# Patient Record
Sex: FEMALE | Race: WHITE | NOT HISPANIC OR LATINO | Employment: OTHER | URBAN - METROPOLITAN AREA
[De-identification: names, ages, dates, MRNs, and addresses within clinical notes are randomized per-mention and may not be internally consistent; named-entity substitution may affect disease eponyms.]

---

## 2017-01-06 ENCOUNTER — HOSPITAL ENCOUNTER (OUTPATIENT)
Dept: NEUROLOGY | Facility: HOSPITAL | Age: 72
Discharge: HOME | End: 2017-01-06
Payer: MEDICARE

## 2017-01-06 DIAGNOSIS — M54.12 CERVICAL RADICULOPATHY: ICD-10-CM

## 2017-01-06 DIAGNOSIS — G56.31 POSTERIOR INTEROSSEOUS MONONEUROPATHY, RIGHT: ICD-10-CM

## 2017-01-06 PROCEDURE — 95886 MUSC TEST DONE W/N TEST COMP: CPT

## 2017-01-06 PROCEDURE — 95912 NRV CNDJ TEST 11-12 STUDIES: CPT

## 2020-04-24 DIAGNOSIS — M47.27 OSTEOARTHRITIS OF SPINE WITH RADICULOPATHY, LUMBOSACRAL REGION: ICD-10-CM

## 2020-04-24 DIAGNOSIS — M17.0 PRIMARY OSTEOARTHRITIS OF BOTH KNEES: Primary | ICD-10-CM

## 2020-05-07 ENCOUNTER — TELEMEDICINE (OUTPATIENT)
Dept: FAMILY MEDICINE CLINIC | Facility: CLINIC | Age: 75
End: 2020-05-07
Payer: MEDICARE

## 2020-05-07 DIAGNOSIS — K21.9 CHRONIC GERD: ICD-10-CM

## 2020-05-07 DIAGNOSIS — T50.905A DRUG REACTION, INITIAL ENCOUNTER: Primary | ICD-10-CM

## 2020-05-07 DIAGNOSIS — M79.7 FIBROMYALGIA: ICD-10-CM

## 2020-05-07 DIAGNOSIS — R05.9 COUGH: ICD-10-CM

## 2020-05-07 PROCEDURE — 99442 PR PHYS/QHP TELEPHONE EVALUATION 11-20 MIN: CPT | Performed by: FAMILY MEDICINE

## 2020-05-07 RX ORDER — DULOXETIN HYDROCHLORIDE 30 MG/1
30 CAPSULE, DELAYED RELEASE ORAL DAILY
Qty: 30 CAPSULE | Refills: 6 | Status: SHIPPED | OUTPATIENT
Start: 2020-05-07 | End: 2020-07-30

## 2020-05-07 RX ORDER — FAMOTIDINE 40 MG/1
40 TABLET, FILM COATED ORAL DAILY
Qty: 90 TABLET | Refills: 3
Start: 2020-05-07 | End: 2020-08-19

## 2020-05-14 ENCOUNTER — TELEPHONE (OUTPATIENT)
Dept: FAMILY MEDICINE CLINIC | Facility: CLINIC | Age: 75
End: 2020-05-14

## 2020-05-21 ENCOUNTER — TELEPHONE (OUTPATIENT)
Dept: FAMILY MEDICINE CLINIC | Facility: CLINIC | Age: 75
End: 2020-05-21

## 2020-05-29 ENCOUNTER — TELEPHONE (OUTPATIENT)
Dept: FAMILY MEDICINE CLINIC | Facility: CLINIC | Age: 75
End: 2020-05-29

## 2020-05-29 DIAGNOSIS — M79.7 FIBROMYALGIA: Primary | ICD-10-CM

## 2020-06-01 RX ORDER — DULOXETIN HYDROCHLORIDE 60 MG/1
60 CAPSULE, DELAYED RELEASE ORAL DAILY
COMMUNITY
End: 2020-06-01 | Stop reason: SDUPTHER

## 2020-06-01 RX ORDER — DULOXETIN HYDROCHLORIDE 60 MG/1
60 CAPSULE, DELAYED RELEASE ORAL DAILY
Qty: 30 CAPSULE | Refills: 5 | Status: SHIPPED | OUTPATIENT
Start: 2020-06-01 | End: 2020-07-27 | Stop reason: SDUPTHER

## 2020-06-04 ENCOUNTER — TELEPHONE (OUTPATIENT)
Dept: FAMILY MEDICINE CLINIC | Facility: CLINIC | Age: 75
End: 2020-06-04

## 2020-06-04 DIAGNOSIS — E03.9 HYPOTHYROIDISM, ADULT: Primary | ICD-10-CM

## 2020-06-10 RX ORDER — LEVOTHYROXINE SODIUM 0.1 MG/1
100 TABLET ORAL DAILY
Qty: 90 TABLET | Refills: 1 | Status: SHIPPED | OUTPATIENT
Start: 2020-06-10 | End: 2020-10-19 | Stop reason: SDUPTHER

## 2020-06-23 ENCOUNTER — TRANSCRIBE ORDERS (OUTPATIENT)
Dept: ADMINISTRATIVE | Facility: HOSPITAL | Age: 75
End: 2020-06-23

## 2020-06-23 DIAGNOSIS — I65.29 STENOSIS OF CAROTID ARTERY, UNSPECIFIED LATERALITY: Primary | ICD-10-CM

## 2020-07-21 ENCOUNTER — HOSPITAL ENCOUNTER (OUTPATIENT)
Dept: NON INVASIVE DIAGNOSTICS | Facility: CLINIC | Age: 75
Discharge: HOME | End: 2020-07-21
Payer: MEDICARE

## 2020-07-21 DIAGNOSIS — I65.29 STENOSIS OF CAROTID ARTERY, UNSPECIFIED LATERALITY: ICD-10-CM

## 2020-07-21 PROCEDURE — 93880 EXTRACRANIAL BILAT STUDY: CPT

## 2020-07-21 PROCEDURE — 93880 EXTRACRANIAL BILAT STUDY: CPT | Performed by: SURGERY

## 2020-07-27 ENCOUNTER — TELEPHONE (OUTPATIENT)
Dept: FAMILY MEDICINE CLINIC | Facility: CLINIC | Age: 75
End: 2020-07-27

## 2020-07-27 DIAGNOSIS — M79.7 FIBROMYALGIA: ICD-10-CM

## 2020-07-27 RX ORDER — DULOXETIN HYDROCHLORIDE 60 MG/1
60 CAPSULE, DELAYED RELEASE ORAL DAILY
Qty: 30 CAPSULE | Refills: 5 | Status: SHIPPED | OUTPATIENT
Start: 2020-07-27 | End: 2020-07-30

## 2020-07-27 NOTE — TELEPHONE ENCOUNTER
[facility] received the order for DULoxetine (CYMBALTA) 30 mg but not for the 60 mg - patient takes both  - can you please send?

## 2020-07-30 ENCOUNTER — OFFICE VISIT (OUTPATIENT)
Dept: FAMILY MEDICINE CLINIC | Facility: CLINIC | Age: 75
End: 2020-07-30
Payer: MEDICARE

## 2020-07-30 VITALS
HEART RATE: 88 BPM | BODY MASS INDEX: 31.92 KG/M2 | TEMPERATURE: 97.3 F | OXYGEN SATURATION: 97 % | DIASTOLIC BLOOD PRESSURE: 80 MMHG | SYSTOLIC BLOOD PRESSURE: 130 MMHG | RESPIRATION RATE: 12 BRPM | WEIGHT: 187 LBS | HEIGHT: 64 IN

## 2020-07-30 DIAGNOSIS — E78.2 MIXED HYPERLIPIDEMIA: ICD-10-CM

## 2020-07-30 DIAGNOSIS — E55.9 VITAMIN D INSUFFICIENCY: ICD-10-CM

## 2020-07-30 DIAGNOSIS — R73.03 PRE-DIABETES: ICD-10-CM

## 2020-07-30 DIAGNOSIS — M31.6 TA (TEMPORAL ARTERITIS) (HCC): ICD-10-CM

## 2020-07-30 DIAGNOSIS — E03.9 HYPOTHYROIDISM, ADULT: Primary | ICD-10-CM

## 2020-07-30 DIAGNOSIS — M79.7 FIBROMYALGIA: ICD-10-CM

## 2020-07-30 DIAGNOSIS — G89.4 CHRONIC PAIN SYNDROME: ICD-10-CM

## 2020-07-30 DIAGNOSIS — M19.90 ARTHRITIS: ICD-10-CM

## 2020-07-30 PROCEDURE — 3008F BODY MASS INDEX DOCD: CPT | Performed by: FAMILY MEDICINE

## 2020-07-30 PROCEDURE — 4040F PNEUMOC VAC/ADMIN/RCVD: CPT | Performed by: FAMILY MEDICINE

## 2020-07-30 PROCEDURE — 1036F TOBACCO NON-USER: CPT | Performed by: FAMILY MEDICINE

## 2020-07-30 PROCEDURE — 99214 OFFICE O/P EST MOD 30 MIN: CPT | Performed by: FAMILY MEDICINE

## 2020-07-30 PROCEDURE — 1160F RVW MEDS BY RX/DR IN RCRD: CPT | Performed by: FAMILY MEDICINE

## 2020-07-30 RX ORDER — LATANOPROST 50 UG/ML
SOLUTION/ DROPS OPHTHALMIC
COMMUNITY
Start: 2020-05-22

## 2020-07-30 RX ORDER — FERROUS SULFATE 325(65) MG
1 TABLET ORAL 2 TIMES DAILY WITH MEALS
COMMUNITY
Start: 2020-07-15

## 2020-07-30 RX ORDER — BENZONATATE 100 MG/1
CAPSULE ORAL
COMMUNITY
End: 2020-07-30

## 2020-07-30 RX ORDER — ALENDRONATE SODIUM 70 MG/1
TABLET ORAL
COMMUNITY
Start: 2014-04-30 | End: 2020-07-30

## 2020-07-30 RX ORDER — SIMVASTATIN 40 MG
TABLET ORAL DAILY
COMMUNITY
End: 2020-07-30

## 2020-07-30 RX ORDER — DULOXETIN HYDROCHLORIDE 30 MG/1
30 CAPSULE, DELAYED RELEASE ORAL DAILY
Qty: 30 CAPSULE | Refills: 6 | Status: SHIPPED | OUTPATIENT
Start: 2020-07-30 | End: 2020-10-09

## 2020-07-30 RX ORDER — RIBOFLAVIN (VITAMIN B2) 100 MG
100 TABLET ORAL DAILY
COMMUNITY

## 2020-07-30 RX ORDER — VITAMIN B COMPLEX
1 CAPSULE ORAL DAILY
COMMUNITY

## 2020-07-30 RX ORDER — MOMETASONE FUROATE 1 MG/ML
SOLUTION TOPICAL
COMMUNITY
Start: 2020-07-13 | End: 2020-07-30

## 2020-07-30 RX ORDER — CITALOPRAM 20 MG/1
TABLET ORAL
COMMUNITY
End: 2020-07-30

## 2020-07-30 RX ORDER — FEXOFENADINE HCL 180 MG/1
180 TABLET ORAL AS NEEDED
COMMUNITY

## 2020-07-30 RX ORDER — RALOXIFENE HYDROCHLORIDE 60 MG/1
TABLET, FILM COATED ORAL DAILY
COMMUNITY
End: 2020-07-30

## 2020-07-30 RX ORDER — TRAMADOL HYDROCHLORIDE 50 MG/1
TABLET ORAL
COMMUNITY
Start: 2014-01-10 | End: 2020-07-30

## 2020-07-30 RX ORDER — OXYMORPHONE HYDROCHLORIDE 5 MG/1
TABLET ORAL EVERY 12 HOURS
COMMUNITY
End: 2020-07-30

## 2020-07-30 RX ORDER — HYDROCHLOROTHIAZIDE 25 MG/1
TABLET ORAL
COMMUNITY
End: 2020-07-30

## 2020-07-30 NOTE — PATIENT INSTRUCTIONS
Temporal Arteritis   WHAT YOU NEED TO KNOW:   Temporal arteritis (giant cell arteritis or cranial arteritis) is an inflammation of the lining of your arteries  It most often affects the temporal arteries  Temporal arteries are blood vessels that are located near your temples  Your arteries may become swollen, narrow, and tender  Over time, the swollen and narrowed temporal arteries cause decreased blood flow to the eyes, face, and brain  The lack of oxygen may result in other serious conditions, such as a stroke, heart attack, or blindness  Temporal arteritis may become life-threatening  DISCHARGE INSTRUCTIONS:   Medicines:   · Medicines , such as steroids, will be given to decrease inflammation  Medicines may also be given to help your immune system  · Antiplatelets , such as aspirin, help prevent blood clots  Take your antiplatelet medicine exactly as directed  These medicines make it more likely for you to bleed or bruise  If you are told to take aspirin, do not take acetaminophen or ibuprofen instead  · Vitamin D and calcium  may be given while you are using steroid medicines  These supplements help prevent bone loss  · Take your medicine as directed  Contact your healthcare provider if you think your medicine is not helping or if you have side effects  Tell him or her if you are allergic to any medicine  Keep a list of the medicines, vitamins, and herbs you take  Include the amounts, and when and why you take them  Bring the list or the pill bottles to follow-up visits  Carry your medicine list with you in case of an emergency  Follow up with your healthcare provider as directed:  Write down your questions so you remember to ask them during your visits  Self-care:   · Limit alcohol  Women should limit alcohol to 1 drink a day  Men should limit alcohol to 2 drinks a day  A drink of alcohol is 12 ounces of beer, 5 ounces of wine, or 1½ ounces of liquor  · Exercise    Ask your healthcare provider about the best exercise plan for you  Exercise can help build and strengthen bone  · Do not smoke  If you smoke, it is never too late to quit  Ask for information if you need help quitting  Contact your healthcare provider if:   · You have a fever  · You have chills, a cough, or feel weak and achy  · Your skin is itchy, swollen, or has a rash  · You have questions or concerns about your condition or care  Return to the emergency department if:   · You have any of the following signs of a heart attack:      ¨ Squeezing, pressure, or pain in your chest that lasts longer than 5 minutes or returns    ¨ Discomfort or pain in your back, neck, jaw, stomach, or arm     ¨ Trouble breathing    ¨ Nausea or vomiting    ¨ Lightheadedness or a sudden cold sweat, especially with chest pain or trouble breathing    · You have any of the following signs of a stroke:      ¨ Numbness or drooping on one side of your face     ¨ Weakness in an arm or leg    ¨ Confusion or difficulty speaking    ¨ Dizziness, a severe headache, or vision loss    · You have sudden vision loss in one or both eyes  · Your signs and symptoms come back or get worse  © 2017 [employer]  Information is for End User's use only and may not be sold, redistributed or otherwise used for commercial purposes  All illustrations and images included in CareNotes® are the copyrighted property of [employer]  or Leandro Medeiros  The above information is an  only  It is not intended as medical advice for individual conditions or treatments  Talk to your doctor, nurse or pharmacist before following any medical regimen to see if it is safe and effective for you

## 2020-07-30 NOTE — PROGRESS NOTES
Assessment/Plan:         Problem List Items Addressed This Visit     None            Subjective:      Patient ID: aSndrita Leone is a 67 y o  female  R facial swelling is under control and only swells q day late in the day  Starts around 4 pm, and lasts til 2-4 a m  And in AM upon awakening, it's gone  Slight tenderness over the R temple artery  Symptoms now are:  Skin stinging, burnin sensation , itching al oer gen pain, falls asleep anytime instantely, difficulty sleeping, up q 2-3 hrs, restless, no energy, feeling hot       The following portions of the patient's history were reviewed and updated as appropriate:   She has a past medical history of Chronic respiratory failure ([facility] [address] ), Hiatal hernia, and Pulmonary embolism ([facility] [address] )  ,  does not have a problem list on file  ,   has a past surgical history that includes Tonsillectomy; Vein ligation (Right); Tubal ligation; ADENOIDECTOMY; Laminectomy; Spinal fusion; Replacement total knee; Carpal tunnel release; Cardiac catheterization; Pulmonary embolism surgery; and Mastectomy (Bilateral)  ,  Family history is unknown by patient  ,   reports that she has never smoked  She has never used smokeless tobacco  Her alcohol and drug histories are not on file  ,  is allergic to hydromorphone; merthiolate  [thimerosal]; quinine derivatives; codeine; pollen extract; and wound dressing adhesive     Current Outpatient Medications   Medication Sig Dispense Refill    Ascorbic Acid (VITAMIN C) 100 MG tablet Take 100 mg by mouth daily      atorvastatin (LIPITOR) 40 mg tablet atorvastatin 40 mg tablet      b complex vitamins capsule Take 1 capsule by mouth daily      brimonidine (Alphagan P) 0 1 % Alphagan P 0 1 % eye drops   1qd      DULoxetine (CYMBALTA) 30 mg delayed release capsule Take 1 capsule (30 mg total) by mouth daily 30 capsule 6    famotidine (PEPCID) 40 MG tablet Take 1 tablet (40 mg total) by mouth daily 90 tablet 3    FEROSUL 325 (65 Fe) MG tablet Take 1 tablet by mouth 2 (two) times a day with meals      fexofenadine (ALLEGRA) 180 MG tablet Take 180 mg by mouth daily      fluticasone (FLONASE) 50 mcg/act nasal spray fluticasone propionate 50 mcg/actuation nasal spray,suspension   prn      folic acid (FOLVITE) 1 mg tablet Take 1 mg by mouth daily      latanoprost (XALATAN) 0 005 % ophthalmic solution       levothyroxine 100 mcg tablet Take 1 tablet (100 mcg total) by mouth daily 90 tablet 1    losartan (COZAAR) 50 mg tablet Take 50 mg by mouth daily      rivaroxaban (XARELTO) 20 mg tablet Xarelto 20 mg tablet      rOPINIRole (REQUIP) 1 mg tablet Take by mouth      torsemide (DEMADEX) 10 mg tablet TK 1 T PO QD       No current facility-administered medications for this visit  Review of Systems   Constitutional: Negative for activity change, appetite change, chills, diaphoresis, fatigue and fever  HENT: Negative for congestion, ear discharge, ear pain, facial swelling, nosebleeds, sore throat, tinnitus, trouble swallowing and voice change  Eyes: Negative for photophobia, pain, discharge, redness, itching and visual disturbance  Respiratory: Negative for apnea, cough, choking, chest tightness and shortness of breath  Cardiovascular: Negative for chest pain, palpitations and leg swelling  Gastrointestinal: Negative for abdominal distention, abdominal pain, blood in stool, constipation, diarrhea, nausea and vomiting  Endocrine: Negative for cold intolerance, heat intolerance, polydipsia, polyphagia and polyuria  Genitourinary: Negative for decreased urine volume, difficulty urinating, dysuria, enuresis, frequency, hematuria, pelvic pain, urgency and vaginal bleeding  Musculoskeletal: Negative for arthralgias, back pain, gait problem, joint swelling, neck pain and neck stiffness  Skin: Negative for color change, pallor and rash  Allergic/Immunologic: Negative for immunocompromised state     Neurological: Negative for dizziness, seizures, facial asymmetry, light-headedness, numbness and headaches  Hematological: Negative for adenopathy  Psychiatric/Behavioral: Negative for agitation, behavioral problems, confusion, decreased concentration, dysphoric mood and hallucinations  Objective:  Vitals:    07/30/20 1340   BP: 130/80   BP Location: Left arm   Patient Position: Sitting   Cuff Size: Adult   Pulse: 88   Resp: 12   Temp: (!) 97 3 °F (36 3 °C)   TempSrc: Temporal   SpO2: 97%   Weight: 84 8 kg (187 lb)   Height: 5' 4" (1 626 m)     Body mass index is 32 1 kg/m²  Physical Exam   Constitutional: She is oriented to person, place, and time  She appears well-developed and well-nourished  No distress  HENT:   Head: Normocephalic and atraumatic  Nose: Nose normal    Eyes: Pupils are equal, round, and reactive to light  Conjunctivae and EOM are normal    Neck: Normal range of motion  Neck supple  No tracheal deviation present  No thyromegaly present  Cardiovascular: Normal rate, regular rhythm and normal heart sounds  Pulmonary/Chest: Breath sounds normal  No respiratory distress  She has no wheezes  She has no rales  She exhibits no tenderness  Abdominal: Soft  Bowel sounds are normal  She exhibits no distension and no mass  There is no tenderness  There is no rebound and no guarding  Musculoskeletal: Normal range of motion  She exhibits no edema, tenderness or deformity  Neurological: She is alert and oriented to person, place, and time  No cranial nerve deficit  Skin: Skin is warm and dry  No rash noted  She is not diaphoretic  No erythema  No pallor  Psychiatric: She has a normal mood and affect  Her behavior is normal  Judgment and thought content normal    Nursing note and vitals reviewed

## 2020-07-31 ENCOUNTER — APPOINTMENT (OUTPATIENT)
Dept: LAB | Facility: HOSPITAL | Age: 75
End: 2020-07-31
Payer: MEDICARE

## 2020-07-31 ENCOUNTER — TELEPHONE (OUTPATIENT)
Dept: FAMILY MEDICINE CLINIC | Facility: CLINIC | Age: 75
End: 2020-07-31

## 2020-07-31 DIAGNOSIS — M19.90 ARTHRITIS: ICD-10-CM

## 2020-07-31 DIAGNOSIS — M79.7 FIBROMYALGIA: Primary | ICD-10-CM

## 2020-07-31 DIAGNOSIS — G89.4 CHRONIC PAIN SYNDROME: ICD-10-CM

## 2020-07-31 DIAGNOSIS — M31.6 TA (TEMPORAL ARTERITIS) (HCC): ICD-10-CM

## 2020-07-31 DIAGNOSIS — M79.7 FIBROMYALGIA: ICD-10-CM

## 2020-07-31 DIAGNOSIS — E03.9 HYPOTHYROIDISM, ADULT: ICD-10-CM

## 2020-07-31 DIAGNOSIS — E55.9 VITAMIN D INSUFFICIENCY: ICD-10-CM

## 2020-07-31 DIAGNOSIS — E78.2 MIXED HYPERLIPIDEMIA: ICD-10-CM

## 2020-07-31 LAB
25(OH)D3 SERPL-MCNC: 29.8 NG/ML (ref 30–100)
ALBUMIN SERPL BCP-MCNC: 4.2 G/DL (ref 3.4–4.8)
ALP SERPL-CCNC: 104.6 U/L (ref 35–140)
ALT SERPL W P-5'-P-CCNC: 20 U/L (ref 5–54)
ANION GAP SERPL CALCULATED.3IONS-SCNC: 12 MMOL/L (ref 4–13)
AST SERPL W P-5'-P-CCNC: 17 U/L (ref 15–41)
BACTERIA UR QL AUTO: ABNORMAL /HPF
BILIRUB SERPL-MCNC: 0.9 MG/DL (ref 0.3–1.2)
BILIRUB UR QL STRIP: NEGATIVE
BUN SERPL-MCNC: 12 MG/DL (ref 6–20)
CALCIUM SERPL-MCNC: 9.4 MG/DL (ref 8.4–10.2)
CHLORIDE SERPL-SCNC: 108 MMOL/L (ref 96–108)
CHOLEST SERPL-MCNC: 188 MG/DL
CLARITY UR: CLEAR
CO2 SERPL-SCNC: 22 MMOL/L (ref 22–33)
COLOR UR: YELLOW
CREAT SERPL-MCNC: 0.79 MG/DL (ref 0.4–1.1)
CRP SERPL QL: 0.2 MG/L (ref 0–1)
ERYTHROCYTE [DISTWIDTH] IN BLOOD BY AUTOMATED COUNT: 14.3 % (ref 11.6–15.1)
ERYTHROCYTE [SEDIMENTATION RATE] IN BLOOD: 20 MM/HOUR (ref 0–30)
EST. AVERAGE GLUCOSE BLD GHB EST-MCNC: 134 MG/DL
GFR SERPL CREATININE-BSD FRML MDRD: 73 ML/MIN/1.73SQ M
GLUCOSE P FAST SERPL-MCNC: 133 MG/DL (ref 65–99)
GLUCOSE UR STRIP-MCNC: NEGATIVE MG/DL
HBA1C MFR BLD: 6.3 %
HCT VFR BLD AUTO: 44 % (ref 34.8–46.1)
HDLC SERPL-MCNC: 56 MG/DL
HGB BLD-MCNC: 14.4 G/DL (ref 11.5–15.4)
HGB UR QL STRIP.AUTO: ABNORMAL
KETONES UR STRIP-MCNC: NEGATIVE MG/DL
LDLC SERPL CALC-MCNC: 95 MG/DL (ref 0–100)
LEUKOCYTE ESTERASE UR QL STRIP: ABNORMAL
MCH RBC QN AUTO: 29.8 PG (ref 26.8–34.3)
MCHC RBC AUTO-ENTMCNC: 32.7 G/DL (ref 31.4–37.4)
MCV RBC AUTO: 91 FL (ref 82–98)
NITRITE UR QL STRIP: NEGATIVE
NON-SQ EPI CELLS URNS QL MICRO: ABNORMAL /HPF
NONHDLC SERPL-MCNC: 132 MG/DL
OTHER STN SPEC: ABNORMAL
PH UR STRIP.AUTO: 5.5 [PH]
PLATELET # BLD AUTO: 368 THOUSANDS/UL (ref 149–390)
PMV BLD AUTO: 10.7 FL (ref 8.9–12.7)
POTASSIUM SERPL-SCNC: 3.7 MMOL/L (ref 3.5–5)
PROT SERPL-MCNC: 7.3 G/DL (ref 6.4–8.3)
PROT UR STRIP-MCNC: NEGATIVE MG/DL
RBC # BLD AUTO: 4.83 MILLION/UL (ref 3.81–5.12)
RBC #/AREA URNS AUTO: ABNORMAL /HPF
SODIUM SERPL-SCNC: 142 MMOL/L (ref 133–145)
SP GR UR STRIP.AUTO: 1.02 (ref 1–1.03)
TRIGL SERPL-MCNC: 185.1 MG/DL
TSH SERPL DL<=0.05 MIU/L-ACNC: 2.31 UIU/ML (ref 0.34–5.6)
UROBILINOGEN UR QL STRIP.AUTO: 0.2 E.U./DL
VIT B12 SERPL-MCNC: 402 PG/ML (ref 100–900)
WBC # BLD AUTO: 8.05 THOUSAND/UL (ref 4.31–10.16)
WBC #/AREA URNS AUTO: ABNORMAL /HPF

## 2020-07-31 PROCEDURE — 82306 VITAMIN D 25 HYDROXY: CPT

## 2020-07-31 PROCEDURE — 36415 COLL VENOUS BLD VENIPUNCTURE: CPT

## 2020-07-31 PROCEDURE — 86225 DNA ANTIBODY NATIVE: CPT

## 2020-07-31 PROCEDURE — 80061 LIPID PANEL: CPT

## 2020-07-31 PROCEDURE — 84443 ASSAY THYROID STIM HORMONE: CPT

## 2020-07-31 PROCEDURE — 80053 COMPREHEN METABOLIC PANEL: CPT

## 2020-07-31 PROCEDURE — 86038 ANTINUCLEAR ANTIBODIES: CPT

## 2020-07-31 PROCEDURE — 85652 RBC SED RATE AUTOMATED: CPT

## 2020-07-31 PROCEDURE — 87147 CULTURE TYPE IMMUNOLOGIC: CPT | Performed by: FAMILY MEDICINE

## 2020-07-31 PROCEDURE — 83036 HEMOGLOBIN GLYCOSYLATED A1C: CPT

## 2020-07-31 PROCEDURE — 81001 URINALYSIS AUTO W/SCOPE: CPT | Performed by: FAMILY MEDICINE

## 2020-07-31 PROCEDURE — 82607 VITAMIN B-12: CPT

## 2020-07-31 PROCEDURE — 85027 COMPLETE CBC AUTOMATED: CPT

## 2020-07-31 PROCEDURE — 86430 RHEUMATOID FACTOR TEST QUAL: CPT

## 2020-07-31 PROCEDURE — 86140 C-REACTIVE PROTEIN: CPT

## 2020-07-31 PROCEDURE — 87086 URINE CULTURE/COLONY COUNT: CPT | Performed by: FAMILY MEDICINE

## 2020-07-31 RX ORDER — DULOXETIN HYDROCHLORIDE 60 MG/1
60 CAPSULE, DELAYED RELEASE ORAL DAILY
COMMUNITY
End: 2020-07-31 | Stop reason: SDUPTHER

## 2020-07-31 RX ORDER — DULOXETIN HYDROCHLORIDE 60 MG/1
60 CAPSULE, DELAYED RELEASE ORAL DAILY
Qty: 90 CAPSULE | Refills: 1 | Status: SHIPPED | OUTPATIENT
Start: 2020-07-31 | End: 2020-10-19 | Stop reason: SDUPTHER

## 2020-07-31 NOTE — TELEPHONE ENCOUNTER
Patient called as had received duloxetine via mail order in 30mg but patient takes 90 mg of duloxetine, takes a 30mg capsule and a 60mg capsule daily  Asking if we can send order for the 60mg capsules to mail order aswell so can receive them

## 2020-08-01 LAB — DSDNA AB SER-ACNC: <1 IU/ML (ref 0–9)

## 2020-08-02 LAB
BACTERIA UR CULT: ABNORMAL
BACTERIA UR CULT: ABNORMAL

## 2020-08-03 LAB
RHEUMATOID FACT SER QL LA: NEGATIVE
RYE IGE QN: NEGATIVE

## 2020-08-10 ENCOUNTER — APPOINTMENT (OUTPATIENT)
Dept: LAB | Facility: HOSPITAL | Age: 75
End: 2020-08-10
Payer: MEDICARE

## 2020-08-10 ENCOUNTER — TRANSCRIBE ORDERS (OUTPATIENT)
Dept: ADMINISTRATIVE | Facility: HOSPITAL | Age: 75
End: 2020-08-10

## 2020-08-10 DIAGNOSIS — G89.4 CHRONIC PAIN SYNDROME: ICD-10-CM

## 2020-08-10 DIAGNOSIS — G89.4 CHRONIC PAIN SYNDROME: Primary | ICD-10-CM

## 2020-08-10 PROCEDURE — 87086 URINE CULTURE/COLONY COUNT: CPT

## 2020-08-11 LAB — BACTERIA UR CULT: NORMAL

## 2020-08-13 ENCOUNTER — TELEPHONE (OUTPATIENT)
Dept: FAMILY MEDICINE CLINIC | Facility: CLINIC | Age: 75
End: 2020-08-13

## 2020-08-13 DIAGNOSIS — L29.9 ITCHING: Primary | ICD-10-CM

## 2020-08-13 NOTE — TELEPHONE ENCOUNTER
Pt called and states that they are extremely itchy all over body  (states that Dr Tamia Ward aware of issue) Has taken Allegra, Claritan and Benadryl  Is requesting Adderax? Not sure of spelling      Breann Coley

## 2020-08-15 RX ORDER — HYDROXYZINE HYDROCHLORIDE 25 MG/1
25 TABLET, FILM COATED ORAL EVERY 6 HOURS PRN
Qty: 30 TABLET | Refills: 0 | Status: SHIPPED | OUTPATIENT
Start: 2020-08-15 | End: 2020-10-02 | Stop reason: SDUPTHER

## 2020-08-18 DIAGNOSIS — K21.9 CHRONIC GERD: ICD-10-CM

## 2020-08-19 RX ORDER — FAMOTIDINE 40 MG/1
TABLET, FILM COATED ORAL
Qty: 90 TABLET | Refills: 3 | Status: SHIPPED | OUTPATIENT
Start: 2020-08-19 | End: 2021-02-26 | Stop reason: SDUPTHER

## 2020-09-16 ENCOUNTER — OFFICE VISIT (OUTPATIENT)
Dept: FAMILY MEDICINE CLINIC | Facility: CLINIC | Age: 75
End: 2020-09-16
Payer: MEDICARE

## 2020-09-16 VITALS
TEMPERATURE: 98.3 F | RESPIRATION RATE: 18 BRPM | HEIGHT: 64 IN | BODY MASS INDEX: 28.68 KG/M2 | WEIGHT: 168 LBS | SYSTOLIC BLOOD PRESSURE: 126 MMHG | HEART RATE: 96 BPM | DIASTOLIC BLOOD PRESSURE: 80 MMHG

## 2020-09-16 DIAGNOSIS — F41.9 ANXIETY AND DEPRESSION: ICD-10-CM

## 2020-09-16 DIAGNOSIS — Z23 FLU VACCINE NEED: Primary | ICD-10-CM

## 2020-09-16 DIAGNOSIS — I10 ESSENTIAL HYPERTENSION: ICD-10-CM

## 2020-09-16 DIAGNOSIS — E78.2 MIXED HYPERLIPIDEMIA: ICD-10-CM

## 2020-09-16 DIAGNOSIS — E03.9 HYPOTHYROIDISM (ACQUIRED): ICD-10-CM

## 2020-09-16 DIAGNOSIS — K44.9 HIATAL HERNIA: ICD-10-CM

## 2020-09-16 DIAGNOSIS — F32.A ANXIETY AND DEPRESSION: ICD-10-CM

## 2020-09-16 DIAGNOSIS — G25.81 RESTLESS LEG SYNDROME, CONTROLLED: ICD-10-CM

## 2020-09-16 PROCEDURE — 99214 OFFICE O/P EST MOD 30 MIN: CPT | Performed by: FAMILY MEDICINE

## 2020-09-16 PROCEDURE — G0008 ADMIN INFLUENZA VIRUS VAC: HCPCS

## 2020-09-16 PROCEDURE — 90662 IIV NO PRSV INCREASED AG IM: CPT

## 2020-09-16 RX ORDER — AMLODIPINE BESYLATE 5 MG/1
5 TABLET ORAL DAILY
Qty: 90 TABLET | Refills: 3
Start: 2020-09-16 | End: 2021-02-26 | Stop reason: SDUPTHER

## 2020-09-16 NOTE — PATIENT INSTRUCTIONS

## 2020-09-16 NOTE — PROGRESS NOTES
Assessment/Plan:    67 y o female here for evala nd f/u of the following medical issues:   · HL and see the 2 LP's  · DDD and OA, vibha spinal stenosis   · HTN - controlled   · HH  · Hypothyroid - on Synthroid  · obesity         Problem List Items Addressed This Visit     None      Visit Diagnoses     Flu vaccine need    -  Primary    Relevant Orders    influenza vaccine, high-dose, PF 0 7 mL (FLUZONE HIGH-DOSE)            Subjective:  Doing better, Of[place] now living with pt  Patient ID: William Malik is a 67 y o  female  HPI    The following portions of the patient's history were reviewed and updated as appropriate:   She has a past medical history of Chronic respiratory failure ([facility] [address] ), Hiatal hernia, and Pulmonary embolism ([facility] [address] )  ,  does not have a problem list on file  ,   has a past surgical history that includes Tonsillectomy; Vein ligation (Right); Tubal ligation; ADENOIDECTOMY; Laminectomy; Spinal fusion; Replacement total knee; Carpal tunnel release; Cardiac catheterization; Pulmonary embolism surgery; and Mastectomy (Bilateral)  ,  Family history is unknown by patient  ,   reports that she has never smoked  She has never used smokeless tobacco  No history on file for alcohol and drug ,  is allergic to hydromorphone; merthiolate  [thimerosal]; quinine derivatives; codeine; pollen extract; and wound dressing adhesive     Current Outpatient Medications   Medication Sig Dispense Refill    Ascorbic Acid (VITAMIN C) 100 MG tablet Take 100 mg by mouth daily      atorvastatin (LIPITOR) 40 mg tablet atorvastatin 40 mg tablet      b complex vitamins capsule Take 1 capsule by mouth daily      brimonidine (Alphagan P) 0 1 % Alphagan P 0 1 % eye drops   1qd      DULoxetine (CYMBALTA) 30 mg delayed release capsule Take 1 capsule (30 mg total) by mouth daily 30 capsule 6    DULoxetine (CYMBALTA) 60 mg delayed release capsule Take 1 capsule (60 mg total) by mouth daily 90 capsule 1    famotidine (PEPCID) 40 MG tablet TAKE 1 TABLET EVERY DAY 90 tablet 3    FEROSUL 325 (65 Fe) MG tablet Take 1 tablet by mouth 2 (two) times a day with meals      fexofenadine (ALLEGRA) 180 MG tablet Take 180 mg by mouth daily      fluticasone (FLONASE) 50 mcg/act nasal spray fluticasone propionate 50 mcg/actuation nasal spray,suspension   prn      folic acid (FOLVITE) 1 mg tablet Take 1 mg by mouth daily      hydrOXYzine HCL (ATARAX) 25 mg tablet Take 1 tablet (25 mg total) by mouth every 6 (six) hours as needed for itching 30 tablet 0    latanoprost (XALATAN) 0 005 % ophthalmic solution       levothyroxine 100 mcg tablet Take 1 tablet (100 mcg total) by mouth daily 90 tablet 1    losartan (COZAAR) 50 mg tablet Take 50 mg by mouth daily      rivaroxaban (XARELTO) 20 mg tablet Xarelto 20 mg tablet      rOPINIRole (REQUIP) 1 mg tablet Take by mouth      torsemide (DEMADEX) 10 mg tablet TK 1 T PO QD       No current facility-administered medications for this visit  Review of Systems   Constitutional: Negative for activity change, appetite change, chills, diaphoresis, fatigue and fever  HENT: Negative for congestion, ear discharge, ear pain, facial swelling, nosebleeds, sore throat, tinnitus, trouble swallowing and voice change  Eyes: Negative for photophobia, pain, discharge, redness, itching and visual disturbance  Respiratory: Negative for apnea, cough, choking, chest tightness and shortness of breath  Cardiovascular: Negative for chest pain, palpitations and leg swelling  Gastrointestinal: Negative for abdominal distention, abdominal pain, blood in stool, constipation, diarrhea, nausea and vomiting  Endocrine: Negative for cold intolerance, heat intolerance, polydipsia, polyphagia and polyuria  Genitourinary: Negative for decreased urine volume, difficulty urinating, dysuria, enuresis, frequency, hematuria, pelvic pain, urgency and vaginal bleeding     Musculoskeletal: Negative for arthralgias, back pain, gait problem, joint swelling, neck pain and neck stiffness  Skin: Negative for color change, pallor and rash  Allergic/Immunologic: Negative for immunocompromised state  Neurological: Negative for dizziness, seizures, facial asymmetry, light-headedness, numbness and headaches  Hematological: Negative for adenopathy  Psychiatric/Behavioral: Negative for agitation, behavioral problems, confusion, decreased concentration, dysphoric mood and hallucinations  Objective:  Vitals:    09/16/20 1448   BP: 126/80   BP Location: Left arm   Patient Position: Sitting   Cuff Size: Standard   Pulse: 96   Resp: 18   Temp: 98 3 °F (36 8 °C)   TempSrc: Tympanic   Weight: 76 2 kg (168 lb)   Height: 5' 4" (1 626 m)     Body mass index is 28 84 kg/m²  Physical Exam  Vitals signs and nursing note reviewed  Constitutional:       General: She is not in acute distress  Appearance: She is well-developed  She is not diaphoretic  HENT:      Head: Normocephalic and atraumatic  Nose: Nose normal    Eyes:      Conjunctiva/sclera: Conjunctivae normal       Pupils: Pupils are equal, round, and reactive to light  Neck:      Musculoskeletal: Normal range of motion and neck supple  Thyroid: No thyromegaly  Trachea: No tracheal deviation  Cardiovascular:      Rate and Rhythm: Normal rate and regular rhythm  Heart sounds: Normal heart sounds  Pulmonary:      Effort: No respiratory distress  Breath sounds: Normal breath sounds  No wheezing or rales  Chest:      Chest wall: No tenderness  Abdominal:      General: There is no distension  Palpations: There is no mass  Tenderness: There is no abdominal tenderness  There is no guarding or rebound  Musculoskeletal: Normal range of motion  General: No tenderness or deformity  Skin:     General: Skin is warm and dry  Coloration: Skin is not pale  Findings: No erythema or rash     Neurological:      Mental Status: She is alert and oriented to person, place, and time  Cranial Nerves: No cranial nerve deficit  Psychiatric:         Behavior: Behavior normal          Thought Content:  Thought content normal          Judgment: Judgment normal

## 2020-09-18 ENCOUNTER — TELEPHONE (OUTPATIENT)
Dept: FAMILY MEDICINE CLINIC | Facility: CLINIC | Age: 75
End: 2020-09-18

## 2020-09-18 NOTE — TELEPHONE ENCOUNTER
OK great--thank you  Double check to make sure the Norvasc dose is 5 mg once daily in her med list, please  I will do it as soon as I close this note but was afraid if I closed the note I would lose my message to you

## 2020-09-18 NOTE — TELEPHONE ENCOUNTER
Pt called to confirm with you that she is indeed taking the: amLODIPine (NORVASC) (5 mg) tablet  Also that she is touch with her cardiologist regarding her cholesterol

## 2020-10-02 DIAGNOSIS — L29.9 ITCHING: ICD-10-CM

## 2020-10-02 RX ORDER — HYDROXYZINE HYDROCHLORIDE 25 MG/1
25 TABLET, FILM COATED ORAL EVERY 6 HOURS PRN
Qty: 30 TABLET | Refills: 1 | Status: SHIPPED | OUTPATIENT
Start: 2020-10-02 | End: 2021-09-28 | Stop reason: SDUPTHER

## 2020-10-09 ENCOUNTER — HOSPITAL ENCOUNTER (EMERGENCY)
Facility: HOSPITAL | Age: 75
Discharge: HOME | End: 2020-10-09
Attending: EMERGENCY MEDICINE
Payer: MEDICARE

## 2020-10-09 ENCOUNTER — APPOINTMENT (EMERGENCY)
Dept: VASCULAR ULTRASOUND | Facility: HOSPITAL | Age: 75
End: 2020-10-09
Payer: MEDICARE

## 2020-10-09 VITALS
OXYGEN SATURATION: 99 % | HEART RATE: 75 BPM | RESPIRATION RATE: 18 BRPM | DIASTOLIC BLOOD PRESSURE: 59 MMHG | SYSTOLIC BLOOD PRESSURE: 113 MMHG | TEMPERATURE: 97.6 F

## 2020-10-09 DIAGNOSIS — L03.90 CELLULITIS: Primary | ICD-10-CM

## 2020-10-09 PROCEDURE — 93971 EXTREMITY STUDY: CPT

## 2020-10-09 PROCEDURE — 99284 EMERGENCY DEPT VISIT MOD MDM: CPT | Performed by: EMERGENCY MEDICINE

## 2020-10-09 PROCEDURE — 93971 EXTREMITY STUDY: CPT | Performed by: SURGERY

## 2020-10-09 PROCEDURE — 99284 EMERGENCY DEPT VISIT MOD MDM: CPT

## 2020-10-09 RX ORDER — CEPHALEXIN 250 MG/1
500 CAPSULE ORAL ONCE
Status: COMPLETED | OUTPATIENT
Start: 2020-10-09 | End: 2020-10-09

## 2020-10-09 RX ORDER — CEPHALEXIN 500 MG/1
500 CAPSULE ORAL EVERY 6 HOURS SCHEDULED
Qty: 28 CAPSULE | Refills: 0 | Status: SHIPPED | OUTPATIENT
Start: 2020-10-09 | End: 2020-10-16

## 2020-10-09 RX ADMIN — CEPHALEXIN 500 MG: 250 CAPSULE ORAL at 13:21

## 2020-10-13 ENCOUNTER — OFFICE VISIT (OUTPATIENT)
Dept: FAMILY MEDICINE CLINIC | Facility: CLINIC | Age: 75
End: 2020-10-13
Payer: MEDICARE

## 2020-10-13 VITALS
SYSTOLIC BLOOD PRESSURE: 130 MMHG | BODY MASS INDEX: 28.68 KG/M2 | WEIGHT: 168 LBS | HEIGHT: 64 IN | OXYGEN SATURATION: 97 % | TEMPERATURE: 97.6 F | RESPIRATION RATE: 16 BRPM | HEART RATE: 88 BPM | DIASTOLIC BLOOD PRESSURE: 70 MMHG

## 2020-10-13 DIAGNOSIS — L03.115 CELLULITIS OF RIGHT LOWER EXTREMITY: Primary | ICD-10-CM

## 2020-10-13 DIAGNOSIS — F41.9 ANXIETY: ICD-10-CM

## 2020-10-13 DIAGNOSIS — E55.9 VITAMIN D DEFICIENCY: ICD-10-CM

## 2020-10-13 DIAGNOSIS — E03.9 ACQUIRED HYPOTHYROIDISM: ICD-10-CM

## 2020-10-13 DIAGNOSIS — D50.9 IRON DEFICIENCY ANEMIA, UNSPECIFIED IRON DEFICIENCY ANEMIA TYPE: ICD-10-CM

## 2020-10-13 DIAGNOSIS — R73.01 ELEVATED FASTING GLUCOSE: ICD-10-CM

## 2020-10-13 DIAGNOSIS — E78.2 MIXED HYPERLIPIDEMIA: ICD-10-CM

## 2020-10-13 PROBLEM — I26.99 BILATERAL PULMONARY EMBOLISM (HCC): Status: ACTIVE | Noted: 2019-08-25

## 2020-10-13 PROBLEM — D05.12 DUCTAL CARCINOMA IN SITU (DCIS) OF LEFT BREAST: Status: ACTIVE | Noted: 2020-09-28

## 2020-10-13 PROBLEM — M79.89 SWELLING OF LIMB: Status: ACTIVE | Noted: 2020-01-08

## 2020-10-13 PROBLEM — R26.9 ABNORMAL GAIT: Status: ACTIVE | Noted: 2020-10-13

## 2020-10-13 PROBLEM — G56.30 LESION OF RADIAL NERVE: Status: ACTIVE | Noted: 2017-05-31

## 2020-10-13 PROCEDURE — 99214 OFFICE O/P EST MOD 30 MIN: CPT | Performed by: NURSE PRACTITIONER

## 2020-10-13 RX ORDER — PRAVASTATIN SODIUM 40 MG
TABLET ORAL
COMMUNITY
Start: 2020-09-30 | End: 2020-10-13

## 2020-10-13 RX ORDER — TIMOLOL MALEATE 5 MG/1
TABLET ORAL
COMMUNITY
End: 2021-05-19

## 2020-10-13 RX ORDER — LANOLIN ALCOHOL/MO/W.PET/CERES
CREAM (GRAM) TOPICAL
COMMUNITY

## 2020-10-15 ENCOUNTER — LAB (OUTPATIENT)
Dept: LAB | Facility: HOSPITAL | Age: 75
End: 2020-10-15
Payer: MEDICARE

## 2020-10-15 DIAGNOSIS — E03.9 ACQUIRED HYPOTHYROIDISM: ICD-10-CM

## 2020-10-15 DIAGNOSIS — D50.9 IRON DEFICIENCY ANEMIA, UNSPECIFIED IRON DEFICIENCY ANEMIA TYPE: ICD-10-CM

## 2020-10-15 DIAGNOSIS — F41.9 ANXIETY: ICD-10-CM

## 2020-10-15 DIAGNOSIS — L03.115 CELLULITIS OF RIGHT LOWER EXTREMITY: ICD-10-CM

## 2020-10-15 DIAGNOSIS — R73.01 ELEVATED FASTING GLUCOSE: ICD-10-CM

## 2020-10-15 DIAGNOSIS — E55.9 VITAMIN D DEFICIENCY: ICD-10-CM

## 2020-10-15 DIAGNOSIS — E78.2 MIXED HYPERLIPIDEMIA: ICD-10-CM

## 2020-10-15 LAB
25(OH)D3 SERPL-MCNC: 27.4 NG/ML (ref 30–100)
ALBUMIN SERPL BCP-MCNC: 4.3 G/DL (ref 3.4–4.8)
ALP SERPL-CCNC: 111.4 U/L (ref 35–140)
ALT SERPL W P-5'-P-CCNC: 16 U/L (ref 5–54)
ANION GAP SERPL CALCULATED.3IONS-SCNC: 10 MMOL/L (ref 4–13)
AST SERPL W P-5'-P-CCNC: 14 U/L (ref 15–41)
BASOPHILS # BLD AUTO: 0.04 THOUSANDS/ΜL (ref 0–0.1)
BASOPHILS NFR BLD AUTO: 1 % (ref 0–1)
BILIRUB SERPL-MCNC: 0.64 MG/DL (ref 0.3–1.2)
BUN SERPL-MCNC: 10 MG/DL (ref 6–20)
CALCIUM SERPL-MCNC: 9.3 MG/DL (ref 8.4–10.2)
CHLORIDE SERPL-SCNC: 104 MMOL/L (ref 96–108)
CO2 SERPL-SCNC: 29 MMOL/L (ref 22–33)
CREAT SERPL-MCNC: 0.77 MG/DL (ref 0.4–1.1)
EOSINOPHIL # BLD AUTO: 0.22 THOUSAND/ΜL (ref 0–0.61)
EOSINOPHIL NFR BLD AUTO: 3 % (ref 0–6)
ERYTHROCYTE [DISTWIDTH] IN BLOOD BY AUTOMATED COUNT: 13.5 % (ref 11.6–15.1)
FERRITIN SERPL-MCNC: 78 NG/ML (ref 8–388)
GFR SERPL CREATININE-BSD FRML MDRD: 75 ML/MIN/1.73SQ M
GLUCOSE P FAST SERPL-MCNC: 107 MG/DL (ref 70–100)
HCT VFR BLD AUTO: 44 % (ref 34.8–46.1)
HGB BLD-MCNC: 14.4 G/DL (ref 11.5–15.4)
IMM GRANULOCYTES # BLD AUTO: 0.02 THOUSAND/UL (ref 0–0.2)
IMM GRANULOCYTES NFR BLD AUTO: 0 % (ref 0–2)
IRON SATN MFR SERPL: 39 %
IRON SERPL-MCNC: 115 UG/DL (ref 50–170)
LYMPHOCYTES # BLD AUTO: 1.87 THOUSANDS/ΜL (ref 0.6–4.47)
LYMPHOCYTES NFR BLD AUTO: 23 % (ref 14–44)
MCH RBC QN AUTO: 30.5 PG (ref 26.8–34.3)
MCHC RBC AUTO-ENTMCNC: 32.7 G/DL (ref 31.4–37.4)
MCV RBC AUTO: 93 FL (ref 82–98)
MONOCYTES # BLD AUTO: 0.78 THOUSAND/ΜL (ref 0.17–1.22)
MONOCYTES NFR BLD AUTO: 10 % (ref 4–12)
NEUTROPHILS # BLD AUTO: 5.12 THOUSANDS/ΜL (ref 1.85–7.62)
NEUTS SEG NFR BLD AUTO: 63 % (ref 43–75)
PLATELET # BLD AUTO: 381 THOUSANDS/UL (ref 149–390)
PMV BLD AUTO: 10.5 FL (ref 8.9–12.7)
POTASSIUM SERPL-SCNC: 4.2 MMOL/L (ref 3.5–5)
PROT SERPL-MCNC: 7.3 G/DL (ref 6.4–8.3)
RBC # BLD AUTO: 4.72 MILLION/UL (ref 3.81–5.12)
SODIUM SERPL-SCNC: 143 MMOL/L (ref 133–145)
TIBC SERPL-MCNC: 298 UG/DL (ref 250–450)
TSH SERPL DL<=0.05 MIU/L-ACNC: 2.92 UIU/ML (ref 0.34–5.6)
WBC # BLD AUTO: 8.05 THOUSAND/UL (ref 4.31–10.16)

## 2020-10-15 PROCEDURE — 36415 COLL VENOUS BLD VENIPUNCTURE: CPT

## 2020-10-15 PROCEDURE — 83550 IRON BINDING TEST: CPT

## 2020-10-15 PROCEDURE — 82306 VITAMIN D 25 HYDROXY: CPT

## 2020-10-15 PROCEDURE — 80053 COMPREHEN METABOLIC PANEL: CPT

## 2020-10-15 PROCEDURE — 84443 ASSAY THYROID STIM HORMONE: CPT

## 2020-10-15 PROCEDURE — 83036 HEMOGLOBIN GLYCOSYLATED A1C: CPT

## 2020-10-15 PROCEDURE — 82728 ASSAY OF FERRITIN: CPT

## 2020-10-15 PROCEDURE — 83540 ASSAY OF IRON: CPT

## 2020-10-15 PROCEDURE — 85025 COMPLETE CBC W/AUTO DIFF WBC: CPT

## 2020-10-16 LAB
EST. AVERAGE GLUCOSE BLD GHB EST-MCNC: 120 MG/DL
HBA1C MFR BLD: 5.8 %

## 2020-10-19 ENCOUNTER — TELEPHONE (OUTPATIENT)
Dept: FAMILY MEDICINE CLINIC | Facility: CLINIC | Age: 75
End: 2020-10-19

## 2020-10-19 DIAGNOSIS — E03.9 HYPOTHYROIDISM, ADULT: ICD-10-CM

## 2020-10-19 DIAGNOSIS — L03.116 CELLULITIS OF LEFT LOWER EXTREMITY: Primary | ICD-10-CM

## 2020-10-19 DIAGNOSIS — M79.7 FIBROMYALGIA: ICD-10-CM

## 2020-10-19 RX ORDER — DOXYCYCLINE HYCLATE 100 MG/1
100 CAPSULE ORAL EVERY 12 HOURS SCHEDULED
Qty: 14 CAPSULE | Refills: 0 | Status: SHIPPED | OUTPATIENT
Start: 2020-10-19 | End: 2020-10-24 | Stop reason: HOSPADM

## 2020-10-20 RX ORDER — LEVOTHYROXINE SODIUM 0.1 MG/1
100 TABLET ORAL DAILY
Qty: 90 TABLET | Refills: 1 | Status: SHIPPED | OUTPATIENT
Start: 2020-10-20 | End: 2021-02-26 | Stop reason: SDUPTHER

## 2020-10-20 RX ORDER — DULOXETIN HYDROCHLORIDE 60 MG/1
60 CAPSULE, DELAYED RELEASE ORAL DAILY
Qty: 90 CAPSULE | Refills: 1 | Status: ON HOLD | OUTPATIENT
Start: 2020-10-20 | End: 2021-05-24 | Stop reason: ALTCHOICE

## 2020-10-23 ENCOUNTER — APPOINTMENT (EMERGENCY)
Dept: RADIOLOGY | Facility: HOSPITAL | Age: 75
End: 2020-10-23
Payer: MEDICARE

## 2020-10-23 ENCOUNTER — APPOINTMENT (OUTPATIENT)
Dept: RADIOLOGY | Facility: HOSPITAL | Age: 75
End: 2020-10-23
Payer: MEDICARE

## 2020-10-23 ENCOUNTER — HOSPITAL ENCOUNTER (OUTPATIENT)
Facility: HOSPITAL | Age: 75
Setting detail: OBSERVATION
Discharge: HOME | End: 2020-10-24
Attending: EMERGENCY MEDICINE | Admitting: INTERNAL MEDICINE
Payer: MEDICARE

## 2020-10-23 DIAGNOSIS — L03.115 CELLULITIS AND ABSCESS OF RIGHT LOWER EXTREMITY: ICD-10-CM

## 2020-10-23 DIAGNOSIS — L03.115 CELLULITIS OF RIGHT LEG: ICD-10-CM

## 2020-10-23 DIAGNOSIS — L02.415 CELLULITIS AND ABSCESS OF RIGHT LOWER EXTREMITY: ICD-10-CM

## 2020-10-23 DIAGNOSIS — M70.72 BURSITIS OF LEFT HIP: Primary | ICD-10-CM

## 2020-10-23 PROBLEM — Z86.718 PERSONAL HISTORY OF DVT (DEEP VEIN THROMBOSIS): Status: ACTIVE | Noted: 2020-10-23

## 2020-10-23 PROBLEM — M70.62 TROCHANTERIC BURSITIS OF LEFT HIP: Status: ACTIVE | Noted: 2020-10-23

## 2020-10-23 LAB
ALBUMIN SERPL BCP-MCNC: 4.1 G/DL (ref 3.4–4.8)
ALP SERPL-CCNC: 93.9 U/L (ref 35–140)
ALT SERPL W P-5'-P-CCNC: 15 U/L (ref 5–54)
ANION GAP SERPL CALCULATED.3IONS-SCNC: 6 MMOL/L (ref 4–13)
APTT PPP: 29 SECONDS (ref 23–31)
AST SERPL W P-5'-P-CCNC: 15 U/L (ref 15–41)
BASOPHILS # BLD AUTO: 0.05 THOUSANDS/ΜL (ref 0–0.1)
BASOPHILS NFR BLD AUTO: 1 % (ref 0–1)
BILIRUB SERPL-MCNC: 0.38 MG/DL (ref 0.3–1.2)
BUN SERPL-MCNC: 13 MG/DL (ref 6–20)
CALCIUM SERPL-MCNC: 9.2 MG/DL (ref 8.4–10.2)
CHLORIDE SERPL-SCNC: 104 MMOL/L (ref 96–108)
CO2 SERPL-SCNC: 29 MMOL/L (ref 22–33)
CREAT SERPL-MCNC: 0.74 MG/DL (ref 0.4–1.1)
EOSINOPHIL # BLD AUTO: 0.13 THOUSAND/ΜL (ref 0–0.61)
EOSINOPHIL NFR BLD AUTO: 2 % (ref 0–6)
ERYTHROCYTE [DISTWIDTH] IN BLOOD BY AUTOMATED COUNT: 13.4 % (ref 11.6–15.1)
GFR SERPL CREATININE-BSD FRML MDRD: 79 ML/MIN/1.73SQ M
GLUCOSE SERPL-MCNC: 97 MG/DL (ref 65–140)
HCT VFR BLD AUTO: 42.3 % (ref 34.8–46.1)
HGB BLD-MCNC: 13.6 G/DL (ref 11.5–15.4)
IMM GRANULOCYTES # BLD AUTO: 0.02 THOUSAND/UL (ref 0–0.2)
IMM GRANULOCYTES NFR BLD AUTO: 0 % (ref 0–2)
INR PPP: 1.02 (ref 0.9–1.1)
LACTATE SERPL-SCNC: 1.2 MMOL/L (ref 0–2)
LYMPHOCYTES # BLD AUTO: 1.66 THOUSANDS/ΜL (ref 0.6–4.47)
LYMPHOCYTES NFR BLD AUTO: 23 % (ref 14–44)
MCH RBC QN AUTO: 30.5 PG (ref 26.8–34.3)
MCHC RBC AUTO-ENTMCNC: 32.2 G/DL (ref 31.4–37.4)
MCV RBC AUTO: 95 FL (ref 82–98)
MONOCYTES # BLD AUTO: 0.68 THOUSAND/ΜL (ref 0.17–1.22)
MONOCYTES NFR BLD AUTO: 10 % (ref 4–12)
NEUTROPHILS # BLD AUTO: 4.58 THOUSANDS/ΜL (ref 1.85–7.62)
NEUTS SEG NFR BLD AUTO: 64 % (ref 43–75)
PLATELET # BLD AUTO: 325 THOUSANDS/UL (ref 149–390)
PMV BLD AUTO: 10.2 FL (ref 8.9–12.7)
POTASSIUM SERPL-SCNC: 4.2 MMOL/L (ref 3.5–5)
PROT SERPL-MCNC: 7.1 G/DL (ref 6.4–8.3)
PROTHROMBIN TIME: 11.5 SECONDS (ref 9.5–12.1)
RBC # BLD AUTO: 4.46 MILLION/UL (ref 3.81–5.12)
SODIUM SERPL-SCNC: 139 MMOL/L (ref 133–145)
WBC # BLD AUTO: 7.12 THOUSAND/UL (ref 4.31–10.16)

## 2020-10-23 PROCEDURE — 99284 EMERGENCY DEPT VISIT MOD MDM: CPT | Performed by: EMERGENCY MEDICINE

## 2020-10-23 PROCEDURE — 85730 THROMBOPLASTIN TIME PARTIAL: CPT | Performed by: EMERGENCY MEDICINE

## 2020-10-23 PROCEDURE — 85025 COMPLETE CBC W/AUTO DIFF WBC: CPT | Performed by: EMERGENCY MEDICINE

## 2020-10-23 PROCEDURE — 80053 COMPREHEN METABOLIC PANEL: CPT | Performed by: EMERGENCY MEDICINE

## 2020-10-23 PROCEDURE — 83605 ASSAY OF LACTIC ACID: CPT | Performed by: EMERGENCY MEDICINE

## 2020-10-23 PROCEDURE — 85610 PROTHROMBIN TIME: CPT | Performed by: EMERGENCY MEDICINE

## 2020-10-23 PROCEDURE — 84145 PROCALCITONIN (PCT): CPT | Performed by: EMERGENCY MEDICINE

## 2020-10-23 PROCEDURE — 87040 BLOOD CULTURE FOR BACTERIA: CPT | Performed by: EMERGENCY MEDICINE

## 2020-10-23 PROCEDURE — 87081 CULTURE SCREEN ONLY: CPT | Performed by: INTERNAL MEDICINE

## 2020-10-23 PROCEDURE — 73502 X-RAY EXAM HIP UNI 2-3 VIEWS: CPT

## 2020-10-23 PROCEDURE — 99284 EMERGENCY DEPT VISIT MOD MDM: CPT

## 2020-10-23 PROCEDURE — 99219 PR INITIAL OBSERVATION CARE/DAY 50 MINUTES: CPT | Performed by: INTERNAL MEDICINE

## 2020-10-23 PROCEDURE — 96374 THER/PROPH/DIAG INJ IV PUSH: CPT

## 2020-10-23 PROCEDURE — 36415 COLL VENOUS BLD VENIPUNCTURE: CPT | Performed by: EMERGENCY MEDICINE

## 2020-10-23 RX ORDER — MAGNESIUM HYDROXIDE/ALUMINUM HYDROXICE/SIMETHICONE 120; 1200; 1200 MG/30ML; MG/30ML; MG/30ML
30 SUSPENSION ORAL EVERY 6 HOURS PRN
Status: DISCONTINUED | OUTPATIENT
Start: 2020-10-23 | End: 2020-10-24 | Stop reason: HOSPADM

## 2020-10-23 RX ORDER — ROPINIROLE 1 MG/1
2 TABLET, FILM COATED ORAL
Status: DISCONTINUED | OUTPATIENT
Start: 2020-10-24 | End: 2020-10-24 | Stop reason: HOSPADM

## 2020-10-23 RX ORDER — AMLODIPINE BESYLATE 5 MG/1
5 TABLET ORAL DAILY
Status: DISCONTINUED | OUTPATIENT
Start: 2020-10-24 | End: 2020-10-24 | Stop reason: HOSPADM

## 2020-10-23 RX ORDER — CEFEPIME HYDROCHLORIDE 2 G/50ML
2000 INJECTION, SOLUTION INTRAVENOUS ONCE
Status: COMPLETED | OUTPATIENT
Start: 2020-10-23 | End: 2020-10-23

## 2020-10-23 RX ORDER — ASCORBIC ACID 500 MG
500 TABLET ORAL DAILY
Status: DISCONTINUED | OUTPATIENT
Start: 2020-10-24 | End: 2020-10-24 | Stop reason: HOSPADM

## 2020-10-23 RX ORDER — BRIMONIDINE TARTRATE 0.15 %
1 DROPS OPHTHALMIC (EYE) EVERY 8 HOURS SCHEDULED
Status: DISCONTINUED | OUTPATIENT
Start: 2020-10-23 | End: 2020-10-24 | Stop reason: HOSPADM

## 2020-10-23 RX ORDER — LATANOPROST 50 UG/ML
1 SOLUTION/ DROPS OPHTHALMIC
Status: DISCONTINUED | OUTPATIENT
Start: 2020-10-23 | End: 2020-10-24 | Stop reason: HOSPADM

## 2020-10-23 RX ORDER — DOCUSATE SODIUM 100 MG/1
100 CAPSULE, LIQUID FILLED ORAL 2 TIMES DAILY
Status: DISCONTINUED | OUTPATIENT
Start: 2020-10-23 | End: 2020-10-24 | Stop reason: HOSPADM

## 2020-10-23 RX ORDER — LEVOTHYROXINE SODIUM 0.1 MG/1
100 TABLET ORAL
Status: DISCONTINUED | OUTPATIENT
Start: 2020-10-24 | End: 2020-10-24 | Stop reason: HOSPADM

## 2020-10-23 RX ORDER — CALCIUM CARBONATE 500(1250)
1 TABLET ORAL
Status: DISCONTINUED | OUTPATIENT
Start: 2020-10-24 | End: 2020-10-24 | Stop reason: HOSPADM

## 2020-10-23 RX ORDER — TORSEMIDE 10 MG/1
10 TABLET ORAL DAILY
Status: DISCONTINUED | OUTPATIENT
Start: 2020-10-24 | End: 2020-10-24 | Stop reason: HOSPADM

## 2020-10-23 RX ORDER — TRAMADOL HYDROCHLORIDE 50 MG/1
50 TABLET ORAL EVERY 6 HOURS PRN
Status: DISCONTINUED | OUTPATIENT
Start: 2020-10-23 | End: 2020-10-24 | Stop reason: HOSPADM

## 2020-10-23 RX ORDER — FERROUS SULFATE 325(65) MG
325 TABLET ORAL 2 TIMES DAILY WITH MEALS
Status: DISCONTINUED | OUTPATIENT
Start: 2020-10-23 | End: 2020-10-24 | Stop reason: HOSPADM

## 2020-10-23 RX ORDER — FLUTICASONE PROPIONATE 50 MCG
1 SPRAY, SUSPENSION (ML) NASAL DAILY
Status: DISCONTINUED | OUTPATIENT
Start: 2020-10-24 | End: 2020-10-24 | Stop reason: HOSPADM

## 2020-10-23 RX ORDER — ACETAMINOPHEN 325 MG/1
650 TABLET ORAL EVERY 6 HOURS PRN
Status: DISCONTINUED | OUTPATIENT
Start: 2020-10-23 | End: 2020-10-24 | Stop reason: HOSPADM

## 2020-10-23 RX ORDER — HYDROXYZINE HYDROCHLORIDE 25 MG/1
25 TABLET, FILM COATED ORAL EVERY 6 HOURS PRN
Status: DISCONTINUED | OUTPATIENT
Start: 2020-10-23 | End: 2020-10-24 | Stop reason: HOSPADM

## 2020-10-23 RX ORDER — VITAMIN B COMPLEX
1 CAPSULE ORAL DAILY
Status: DISCONTINUED | OUTPATIENT
Start: 2020-10-24 | End: 2020-10-23

## 2020-10-23 RX ORDER — ROPINIROLE 1 MG/1
1 TABLET, FILM COATED ORAL
Status: DISCONTINUED | OUTPATIENT
Start: 2020-10-23 | End: 2020-10-23

## 2020-10-23 RX ORDER — RIBOFLAVIN (VITAMIN B2) 100 MG
100 TABLET ORAL DAILY
Status: DISCONTINUED | OUTPATIENT
Start: 2020-10-24 | End: 2020-10-23

## 2020-10-23 RX ORDER — OXYCODONE HYDROCHLORIDE AND ACETAMINOPHEN 5; 325 MG/1; MG/1
1 TABLET ORAL EVERY 4 HOURS PRN
Status: DISCONTINUED | OUTPATIENT
Start: 2020-10-23 | End: 2020-10-23

## 2020-10-23 RX ORDER — FAMOTIDINE 20 MG/1
40 TABLET, FILM COATED ORAL DAILY
Status: DISCONTINUED | OUTPATIENT
Start: 2020-10-24 | End: 2020-10-24 | Stop reason: HOSPADM

## 2020-10-23 RX ORDER — DULOXETIN HYDROCHLORIDE 60 MG/1
60 CAPSULE, DELAYED RELEASE ORAL DAILY
Status: DISCONTINUED | OUTPATIENT
Start: 2020-10-24 | End: 2020-10-24 | Stop reason: HOSPADM

## 2020-10-23 RX ORDER — ROPINIROLE 1 MG/1
1 TABLET, FILM COATED ORAL ONCE
Status: COMPLETED | OUTPATIENT
Start: 2020-10-23 | End: 2020-10-23

## 2020-10-23 RX ORDER — FOLIC ACID 1 MG/1
1 TABLET ORAL DAILY
Status: DISCONTINUED | OUTPATIENT
Start: 2020-10-24 | End: 2020-10-24 | Stop reason: HOSPADM

## 2020-10-23 RX ORDER — ONDANSETRON 2 MG/ML
4 INJECTION INTRAMUSCULAR; INTRAVENOUS EVERY 6 HOURS PRN
Status: DISCONTINUED | OUTPATIENT
Start: 2020-10-23 | End: 2020-10-24 | Stop reason: HOSPADM

## 2020-10-23 RX ADMIN — DOCUSATE SODIUM 100 MG: 100 CAPSULE, LIQUID FILLED ORAL at 18:09

## 2020-10-23 RX ADMIN — LATANOPROST 1 DROP: 50 SOLUTION OPHTHALMIC at 21:11

## 2020-10-23 RX ADMIN — ROPINIROLE HYDROCHLORIDE 1 MG: 1 TABLET, FILM COATED ORAL at 21:06

## 2020-10-23 RX ADMIN — RIVAROXABAN 20 MG: 20 TABLET, FILM COATED ORAL at 23:13

## 2020-10-23 RX ADMIN — ROPINIROLE HYDROCHLORIDE 1 MG: 1 TABLET, FILM COATED ORAL at 23:13

## 2020-10-23 RX ADMIN — CEFEPIME HYDROCHLORIDE 2000 MG: 2 INJECTION, SOLUTION INTRAVENOUS at 14:18

## 2020-10-23 RX ADMIN — VANCOMYCIN HYDROCHLORIDE 1250 MG: 1.25 INJECTION, POWDER, LYOPHILIZED, FOR SOLUTION INTRAVENOUS at 15:00

## 2020-10-23 RX ADMIN — BRIMONIDINE TARTRATE 1 DROP: 1.5 SOLUTION OPHTHALMIC at 18:12

## 2020-10-23 RX ADMIN — BRIMONIDINE TARTRATE 1 DROP: 1.5 SOLUTION OPHTHALMIC at 21:09

## 2020-10-23 RX ADMIN — TRAMADOL HYDROCHLORIDE 50 MG: 50 TABLET, FILM COATED ORAL at 23:12

## 2020-10-23 RX ADMIN — FERROUS SULFATE TAB 325 MG (65 MG ELEMENTAL FE) 325 MG: 325 (65 FE) TAB at 18:09

## 2020-10-24 VITALS
RESPIRATION RATE: 18 BRPM | DIASTOLIC BLOOD PRESSURE: 52 MMHG | OXYGEN SATURATION: 96 % | TEMPERATURE: 98.1 F | HEART RATE: 74 BPM | SYSTOLIC BLOOD PRESSURE: 125 MMHG

## 2020-10-24 LAB
ALBUMIN SERPL BCP-MCNC: 3.7 G/DL (ref 3.4–4.8)
ALP SERPL-CCNC: 82.6 U/L (ref 35–140)
ALT SERPL W P-5'-P-CCNC: 11 U/L (ref 5–54)
ANION GAP SERPL CALCULATED.3IONS-SCNC: 5 MMOL/L (ref 4–13)
AST SERPL W P-5'-P-CCNC: 9 U/L (ref 15–41)
BILIRUB SERPL-MCNC: 0.57 MG/DL (ref 0.3–1.2)
BUN SERPL-MCNC: 12 MG/DL (ref 6–20)
CALCIUM SERPL-MCNC: 9.1 MG/DL (ref 8.4–10.2)
CHLORIDE SERPL-SCNC: 105 MMOL/L (ref 96–108)
CO2 SERPL-SCNC: 29 MMOL/L (ref 22–33)
CREAT SERPL-MCNC: 0.72 MG/DL (ref 0.4–1.1)
GFR SERPL CREATININE-BSD FRML MDRD: 82 ML/MIN/1.73SQ M
GLUCOSE P FAST SERPL-MCNC: 97 MG/DL (ref 70–100)
GLUCOSE SERPL-MCNC: 97 MG/DL (ref 65–140)
POTASSIUM SERPL-SCNC: 3.9 MMOL/L (ref 3.5–5)
PROCALCITONIN SERPL-MCNC: <0.05 NG/ML
PROCALCITONIN SERPL-MCNC: <0.05 NG/ML
PROT SERPL-MCNC: 6.3 G/DL (ref 6.4–8.3)
SODIUM SERPL-SCNC: 139 MMOL/L (ref 133–145)

## 2020-10-24 PROCEDURE — 80053 COMPREHEN METABOLIC PANEL: CPT | Performed by: INTERNAL MEDICINE

## 2020-10-24 PROCEDURE — 99217 PR OBSERVATION CARE DISCHARGE MANAGEMENT: CPT | Performed by: INTERNAL MEDICINE

## 2020-10-24 PROCEDURE — 84145 PROCALCITONIN (PCT): CPT | Performed by: EMERGENCY MEDICINE

## 2020-10-24 RX ORDER — SULFAMETHOXAZOLE AND TRIMETHOPRIM 400; 80 MG/1; MG/1
2 TABLET ORAL EVERY 12 HOURS SCHEDULED
Qty: 28 TABLET | Refills: 0 | Status: SHIPPED | OUTPATIENT
Start: 2020-10-24 | End: 2020-10-31

## 2020-10-24 RX ADMIN — AMLODIPINE BESYLATE 5 MG: 5 TABLET ORAL at 08:32

## 2020-10-24 RX ADMIN — HYDROXYZINE HYDROCHLORIDE 25 MG: 25 TABLET, FILM COATED ORAL at 03:44

## 2020-10-24 RX ADMIN — TRAMADOL HYDROCHLORIDE 50 MG: 50 TABLET, FILM COATED ORAL at 08:32

## 2020-10-24 RX ADMIN — FOLIC ACID 1 MG: 1 TABLET ORAL at 08:33

## 2020-10-24 RX ADMIN — LEVOTHYROXINE SODIUM 100 MCG: 100 TABLET ORAL at 06:00

## 2020-10-24 RX ADMIN — BRIMONIDINE TARTRATE 1 DROP: 1.5 SOLUTION OPHTHALMIC at 03:44

## 2020-10-24 RX ADMIN — DOCUSATE SODIUM 100 MG: 100 CAPSULE, LIQUID FILLED ORAL at 08:33

## 2020-10-24 RX ADMIN — FERROUS SULFATE TAB 325 MG (65 MG ELEMENTAL FE) 325 MG: 325 (65 FE) TAB at 08:33

## 2020-10-24 RX ADMIN — TORSEMIDE 10 MG: 10 TABLET ORAL at 08:32

## 2020-10-24 RX ADMIN — VANCOMYCIN HYDROCHLORIDE 750 MG: 750 INJECTION, SOLUTION INTRAVENOUS at 03:45

## 2020-10-24 RX ADMIN — OXYCODONE HYDROCHLORIDE AND ACETAMINOPHEN 500 MG: 500 TABLET ORAL at 08:32

## 2020-10-24 RX ADMIN — FAMOTIDINE 40 MG: 20 TABLET ORAL at 08:32

## 2020-10-24 RX ADMIN — CALCIUM 1 TABLET: 500 TABLET ORAL at 08:33

## 2020-10-25 LAB — MRSA NOSE QL CULT: NORMAL

## 2020-10-27 ENCOUNTER — OFFICE VISIT (OUTPATIENT)
Dept: FAMILY MEDICINE CLINIC | Facility: CLINIC | Age: 75
End: 2020-10-27
Payer: MEDICARE

## 2020-10-27 VITALS
HEART RATE: 93 BPM | DIASTOLIC BLOOD PRESSURE: 72 MMHG | HEIGHT: 63 IN | OXYGEN SATURATION: 97 % | WEIGHT: 168 LBS | BODY MASS INDEX: 29.77 KG/M2 | TEMPERATURE: 97 F | SYSTOLIC BLOOD PRESSURE: 106 MMHG

## 2020-10-27 DIAGNOSIS — F41.9 ANXIETY: ICD-10-CM

## 2020-10-27 DIAGNOSIS — M79.89 SWELLING OF LIMB: Primary | ICD-10-CM

## 2020-10-27 DIAGNOSIS — L03.115 CELLULITIS OF RIGHT LOWER EXTREMITY: Primary | ICD-10-CM

## 2020-10-27 PROCEDURE — 99214 OFFICE O/P EST MOD 30 MIN: CPT | Performed by: NURSE PRACTITIONER

## 2020-10-27 RX ORDER — DULOXETIN HYDROCHLORIDE 30 MG/1
CAPSULE, DELAYED RELEASE ORAL
Qty: 90 CAPSULE | Refills: 3 | Status: SHIPPED | OUTPATIENT
Start: 2020-10-27 | End: 2022-07-28

## 2020-10-27 RX ORDER — PRAVASTATIN SODIUM 40 MG
40 TABLET ORAL
COMMUNITY
Start: 2020-10-26 | End: 2021-05-19

## 2020-10-27 RX ORDER — ACETAMINOPHEN 325 MG/1
650 TABLET ORAL EVERY 6 HOURS PRN
COMMUNITY

## 2020-10-27 RX ORDER — TORSEMIDE 10 MG/1
TABLET ORAL
Qty: 90 TABLET | Refills: 3 | Status: SHIPPED | OUTPATIENT
Start: 2020-10-27 | End: 2021-04-26 | Stop reason: SDUPTHER

## 2020-10-29 LAB
BACTERIA BLD CULT: NORMAL
BACTERIA BLD CULT: NORMAL

## 2020-11-02 ENCOUNTER — HOSPITAL ENCOUNTER (OUTPATIENT)
Dept: RADIOLOGY | Facility: HOSPITAL | Age: 75
Discharge: HOME | End: 2020-11-02
Payer: MEDICARE

## 2020-11-02 DIAGNOSIS — L03.115 CELLULITIS OF RIGHT LOWER EXTREMITY: ICD-10-CM

## 2020-11-02 PROCEDURE — 93926 LOWER EXTREMITY STUDY: CPT

## 2020-11-03 PROCEDURE — 93926 LOWER EXTREMITY STUDY: CPT | Performed by: SURGERY

## 2020-11-03 PROCEDURE — 93922 UPR/L XTREMITY ART 2 LEVELS: CPT | Performed by: SURGERY

## 2020-11-09 ENCOUNTER — OFFICE VISIT (OUTPATIENT)
Dept: FAMILY MEDICINE CLINIC | Facility: CLINIC | Age: 75
End: 2020-11-09
Payer: MEDICARE

## 2020-11-09 VITALS
HEIGHT: 63 IN | OXYGEN SATURATION: 98 % | HEART RATE: 89 BPM | RESPIRATION RATE: 18 BRPM | BODY MASS INDEX: 28.88 KG/M2 | WEIGHT: 163 LBS | DIASTOLIC BLOOD PRESSURE: 78 MMHG | TEMPERATURE: 98.5 F | SYSTOLIC BLOOD PRESSURE: 132 MMHG

## 2020-11-09 DIAGNOSIS — M79.89 SWELLING OF LIMB: ICD-10-CM

## 2020-11-09 DIAGNOSIS — E78.2 MIXED HYPERLIPIDEMIA: ICD-10-CM

## 2020-11-09 DIAGNOSIS — G25.81 RESTLESS LEG SYNDROME, CONTROLLED: ICD-10-CM

## 2020-11-09 DIAGNOSIS — L03.116 CELLULITIS OF LEFT LOWER EXTREMITY: Primary | ICD-10-CM

## 2020-11-09 PROCEDURE — 99214 OFFICE O/P EST MOD 30 MIN: CPT | Performed by: FAMILY MEDICINE

## 2020-12-18 DIAGNOSIS — G25.81 RLS (RESTLESS LEGS SYNDROME): Primary | ICD-10-CM

## 2020-12-20 RX ORDER — ROPINIROLE 1 MG/1
1 TABLET, FILM COATED ORAL
Qty: 90 TABLET | Refills: 3 | Status: SHIPPED | OUTPATIENT
Start: 2020-12-20 | End: 2021-09-28 | Stop reason: SDUPTHER

## 2021-01-07 ENCOUNTER — HOSPITAL ENCOUNTER (OUTPATIENT)
Dept: RADIOLOGY | Facility: HOSPITAL | Age: 76
Discharge: HOME | End: 2021-01-07
Attending: INTERNAL MEDICINE
Payer: MEDICARE

## 2021-01-07 ENCOUNTER — TRANSCRIBE ORDERS (OUTPATIENT)
Dept: ADMINISTRATIVE | Facility: HOSPITAL | Age: 76
End: 2021-01-07

## 2021-01-07 ENCOUNTER — LAB (OUTPATIENT)
Dept: LAB | Facility: HOSPITAL | Age: 76
End: 2021-01-07
Attending: INTERNAL MEDICINE
Payer: MEDICARE

## 2021-01-07 DIAGNOSIS — M25.542 ARTHRALGIA OF LEFT HAND: ICD-10-CM

## 2021-01-07 DIAGNOSIS — M79.7 SCAPULOHUMERAL FIBROSITIS: ICD-10-CM

## 2021-01-07 DIAGNOSIS — M79.7 SCAPULOHUMERAL FIBROSITIS: Primary | ICD-10-CM

## 2021-01-07 DIAGNOSIS — M25.542 ARTHRALGIA OF LEFT HAND: Primary | ICD-10-CM

## 2021-01-07 LAB
25(OH)D3 SERPL-MCNC: 16.6 NG/ML (ref 30–100)
ALBUMIN SERPL BCP-MCNC: 3.9 G/DL (ref 3.4–4.8)
ALP SERPL-CCNC: 105.5 U/L (ref 35–140)
ALT SERPL W P-5'-P-CCNC: 16 U/L (ref 5–54)
ANION GAP SERPL CALCULATED.3IONS-SCNC: 7 MMOL/L (ref 4–13)
AST SERPL W P-5'-P-CCNC: 17 U/L (ref 15–41)
BASOPHILS # BLD AUTO: 0.01 THOUSANDS/ΜL (ref 0–0.1)
BASOPHILS NFR BLD AUTO: 0 % (ref 0–1)
BILIRUB SERPL-MCNC: 0.46 MG/DL (ref 0.3–1.2)
BUN SERPL-MCNC: 9 MG/DL (ref 6–20)
CALCIUM SERPL-MCNC: 9 MG/DL (ref 8.4–10.2)
CHLORIDE SERPL-SCNC: 107 MMOL/L (ref 96–108)
CK SERPL-CCNC: 60.9 U/L (ref 38–234)
CO2 SERPL-SCNC: 27 MMOL/L (ref 22–33)
CREAT SERPL-MCNC: 0.67 MG/DL (ref 0.4–1.1)
CRP SERPL QL: 1.4 MG/L (ref 0–1)
EOSINOPHIL # BLD AUTO: 0.04 THOUSAND/ΜL (ref 0–0.61)
EOSINOPHIL NFR BLD AUTO: 1 % (ref 0–6)
ERYTHROCYTE [DISTWIDTH] IN BLOOD BY AUTOMATED COUNT: 13.5 % (ref 11.6–15.1)
ERYTHROCYTE [SEDIMENTATION RATE] IN BLOOD: 22 MM/HOUR (ref 0–30)
GFR SERPL CREATININE-BSD FRML MDRD: 86 ML/MIN/1.73SQ M
GLUCOSE P FAST SERPL-MCNC: 119 MG/DL (ref 70–105)
HCT VFR BLD AUTO: 42.8 % (ref 34.8–46.1)
HGB BLD-MCNC: 13.8 G/DL (ref 11.5–15.4)
IMM GRANULOCYTES # BLD AUTO: 0.01 THOUSAND/UL (ref 0–0.2)
IMM GRANULOCYTES NFR BLD AUTO: 0 % (ref 0–2)
LYMPHOCYTES # BLD AUTO: 1.38 THOUSANDS/ΜL (ref 0.6–4.47)
LYMPHOCYTES NFR BLD AUTO: 33 % (ref 14–44)
MCH RBC QN AUTO: 30.1 PG (ref 26.8–34.3)
MCHC RBC AUTO-ENTMCNC: 32.2 G/DL (ref 31.4–37.4)
MCV RBC AUTO: 93 FL (ref 82–98)
MONOCYTES # BLD AUTO: 0.61 THOUSAND/ΜL (ref 0.17–1.22)
MONOCYTES NFR BLD AUTO: 14 % (ref 4–12)
NEUTROPHILS # BLD AUTO: 2.19 THOUSANDS/ΜL (ref 1.85–7.62)
NEUTS SEG NFR BLD AUTO: 52 % (ref 43–75)
PLATELET # BLD AUTO: 294 THOUSANDS/UL (ref 149–390)
PMV BLD AUTO: 10.2 FL (ref 8.9–12.7)
POTASSIUM SERPL-SCNC: 3.4 MMOL/L (ref 3.5–5)
PROT SERPL-MCNC: 6.9 G/DL (ref 6.4–8.3)
RBC # BLD AUTO: 4.59 MILLION/UL (ref 3.81–5.12)
SODIUM SERPL-SCNC: 141 MMOL/L (ref 133–145)
WBC # BLD AUTO: 4.24 THOUSAND/UL (ref 4.31–10.16)

## 2021-01-07 PROCEDURE — 85025 COMPLETE CBC W/AUTO DIFF WBC: CPT

## 2021-01-07 PROCEDURE — 86334 IMMUNOFIX E-PHORESIS SERUM: CPT

## 2021-01-07 PROCEDURE — 82306 VITAMIN D 25 HYDROXY: CPT

## 2021-01-07 PROCEDURE — 82085 ASSAY OF ALDOLASE: CPT

## 2021-01-07 PROCEDURE — 73120 X-RAY EXAM OF HAND: CPT

## 2021-01-07 PROCEDURE — 86235 NUCLEAR ANTIGEN ANTIBODY: CPT

## 2021-01-07 PROCEDURE — 84165 PROTEIN E-PHORESIS SERUM: CPT | Performed by: PATHOLOGY

## 2021-01-07 PROCEDURE — 84165 PROTEIN E-PHORESIS SERUM: CPT

## 2021-01-07 PROCEDURE — 85652 RBC SED RATE AUTOMATED: CPT

## 2021-01-07 PROCEDURE — 36415 COLL VENOUS BLD VENIPUNCTURE: CPT

## 2021-01-07 PROCEDURE — 86140 C-REACTIVE PROTEIN: CPT

## 2021-01-07 PROCEDURE — 73110 X-RAY EXAM OF WRIST: CPT

## 2021-01-07 PROCEDURE — 86038 ANTINUCLEAR ANTIBODIES: CPT

## 2021-01-07 PROCEDURE — 80053 COMPREHEN METABOLIC PANEL: CPT

## 2021-01-07 PROCEDURE — 86334 IMMUNOFIX E-PHORESIS SERUM: CPT | Performed by: PATHOLOGY

## 2021-01-07 PROCEDURE — 82550 ASSAY OF CK (CPK): CPT

## 2021-01-08 LAB
ALBUMIN SERPL ELPH-MCNC: 3.97 G/DL (ref 3.5–5)
ALBUMIN SERPL ELPH-MCNC: 58.4 % (ref 52–65)
ALDOLASE SERPL-CCNC: 5.2 U/L (ref 3.3–10.3)
ALPHA1 GLOB SERPL ELPH-MCNC: 0.33 G/DL (ref 0.1–0.4)
ALPHA1 GLOB SERPL ELPH-MCNC: 4.8 % (ref 2.5–5)
ALPHA2 GLOB SERPL ELPH-MCNC: 0.82 G/DL (ref 0.4–1.2)
ALPHA2 GLOB SERPL ELPH-MCNC: 12.1 % (ref 7–13)
BETA GLOB ABNORMAL SERPL ELPH-MCNC: 0.33 G/DL (ref 0.4–0.8)
BETA1 GLOB SERPL ELPH-MCNC: 4.8 % (ref 5–13)
BETA2 GLOB SERPL ELPH-MCNC: 4.5 % (ref 2–8)
BETA2+GAMMA GLOB SERPL ELPH-MCNC: 0.31 G/DL (ref 0.2–0.5)
ENA SS-A AB SER-ACNC: <0.2 AI (ref 0–0.9)
ENA SS-B AB SER-ACNC: <0.2 AI (ref 0–0.9)
GAMMA GLOB ABNORMAL SERPL ELPH-MCNC: 1.05 G/DL (ref 0.5–1.6)
GAMMA GLOB SERPL ELPH-MCNC: 15.4 % (ref 12–22)
IGG/ALB SER: 1.4 {RATIO} (ref 1.1–1.8)
INTERPRETATION UR IFE-IMP: NORMAL
M PROTEIN 1 MFR SERPL ELPH: 9.6 %
M PROTEIN 1 SERPL ELPH-MCNC: 0.65 G/DL
PROT PATTERN SERPL ELPH-IMP: ABNORMAL
PROT SERPL-MCNC: 6.8 G/DL (ref 6.4–8.2)
RYE IGE QN: NEGATIVE

## 2021-01-13 DIAGNOSIS — I26.99 BILATERAL PULMONARY EMBOLISM (HCC): Primary | ICD-10-CM

## 2021-01-13 RX ORDER — RIVAROXABAN 20 MG/1
TABLET, FILM COATED ORAL
Qty: 90 TABLET | Refills: 1 | Status: SHIPPED | OUTPATIENT
Start: 2021-01-13 | End: 2021-01-26 | Stop reason: SDUPTHER

## 2021-01-15 ENCOUNTER — TELEPHONE (OUTPATIENT)
Dept: GASTROENTEROLOGY | Facility: CLINIC | Age: 76
End: 2021-01-15

## 2021-01-15 NOTE — TELEPHONE ENCOUNTER
Pt was on for a 1 yr f/u ov in December and we had to cx due to change in doctors schedule  I left message on 12/24, 12/30 and 1/15 for pt to call back and reschedule  Will now wait for pt to call

## 2021-01-22 DIAGNOSIS — Z23 ENCOUNTER FOR IMMUNIZATION: ICD-10-CM

## 2021-01-26 DIAGNOSIS — I26.99 BILATERAL PULMONARY EMBOLISM (HCC): ICD-10-CM

## 2021-01-26 NOTE — TELEPHONE ENCOUNTER
Patient needs refill on xeralto  20 mg one daily   She is waiting on mail order could some be called to [facility] [address]   She has not had it for two days

## 2021-01-28 ENCOUNTER — IMMUNIZATIONS (OUTPATIENT)
Dept: FAMILY MEDICINE CLINIC | Facility: HOSPITAL | Age: 76
End: 2021-01-28

## 2021-01-28 DIAGNOSIS — Z23 ENCOUNTER FOR IMMUNIZATION: Primary | ICD-10-CM

## 2021-01-28 PROCEDURE — 91301 SARS-COV-2 / COVID-19 MRNA VACCINE (MODERNA) 100 MCG: CPT

## 2021-01-28 PROCEDURE — 0011A SARS-COV-2 / COVID-19 MRNA VACCINE (MODERNA) 100 MCG: CPT

## 2021-01-30 DIAGNOSIS — I26.99 BILATERAL PULMONARY EMBOLISM (HCC): ICD-10-CM

## 2021-02-01 RX ORDER — RIVAROXABAN 20 MG/1
TABLET, FILM COATED ORAL
Qty: 7 TABLET | Refills: 0 | OUTPATIENT
Start: 2021-02-01

## 2021-02-09 DIAGNOSIS — I26.99 BILATERAL PULMONARY EMBOLISM (HCC): ICD-10-CM

## 2021-02-10 NOTE — TELEPHONE ENCOUNTER
Something not right here - we wouldn't order 7 tab from [facility], [facility]  AND [facility]? But , would order from local if mailaway didn't come yet, right? Let's find out exactly what she wants?  Thx

## 2021-02-11 NOTE — TELEPHONE ENCOUNTER
I think what happened was that the last time it was prescribed it was sent as an emergency amount for 7 days until the mail order arrived  It must have carried over  [place] [facility] [place] a 30 or 90 day prescription is what she needs  [facility] pharmacy left message confirming that the patient required a refill of the medication

## 2021-02-25 ENCOUNTER — IMMUNIZATIONS (OUTPATIENT)
Dept: FAMILY MEDICINE CLINIC | Facility: HOSPITAL | Age: 76
End: 2021-02-25

## 2021-02-25 DIAGNOSIS — Z23 ENCOUNTER FOR IMMUNIZATION: Primary | ICD-10-CM

## 2021-02-25 PROCEDURE — 91301 SARS-COV-2 / COVID-19 MRNA VACCINE (MODERNA) 100 MCG: CPT | Performed by: EMERGENCY MEDICINE

## 2021-02-25 PROCEDURE — 0012A SARS-COV-2 / COVID-19 MRNA VACCINE (MODERNA) 100 MCG: CPT | Performed by: EMERGENCY MEDICINE

## 2021-02-26 ENCOUNTER — OFFICE VISIT (OUTPATIENT)
Dept: FAMILY MEDICINE CLINIC | Facility: CLINIC | Age: 76
End: 2021-02-26
Payer: MEDICARE

## 2021-02-26 VITALS
TEMPERATURE: 100.6 F | BODY MASS INDEX: 29.95 KG/M2 | HEART RATE: 92 BPM | SYSTOLIC BLOOD PRESSURE: 132 MMHG | DIASTOLIC BLOOD PRESSURE: 88 MMHG | WEIGHT: 169 LBS | OXYGEN SATURATION: 98 % | HEIGHT: 63 IN | RESPIRATION RATE: 18 BRPM

## 2021-02-26 DIAGNOSIS — E03.9 HYPOTHYROIDISM, ADULT: ICD-10-CM

## 2021-02-26 DIAGNOSIS — I27.82 CHRONIC PULMONARY EMBOLISM, UNSPECIFIED PULMONARY EMBOLISM TYPE, UNSPECIFIED WHETHER ACUTE COR PULMONALE PRESENT (HCC): ICD-10-CM

## 2021-02-26 DIAGNOSIS — I10 ESSENTIAL HYPERTENSION: ICD-10-CM

## 2021-02-26 DIAGNOSIS — K21.9 CHRONIC GERD: ICD-10-CM

## 2021-02-26 DIAGNOSIS — F33.9 DEPRESSION, RECURRENT (HCC): ICD-10-CM

## 2021-02-26 DIAGNOSIS — R63.8 INCREASED BMI: Primary | ICD-10-CM

## 2021-02-26 PROCEDURE — 99215 OFFICE O/P EST HI 40 MIN: CPT | Performed by: FAMILY MEDICINE

## 2021-02-26 PROCEDURE — 1123F ACP DISCUSS/DSCN MKR DOCD: CPT | Performed by: FAMILY MEDICINE

## 2021-02-26 PROCEDURE — G0438 PPPS, INITIAL VISIT: HCPCS | Performed by: FAMILY MEDICINE

## 2021-02-26 RX ORDER — LEVOTHYROXINE SODIUM 0.1 MG/1
100 TABLET ORAL DAILY
Qty: 90 TABLET | Refills: 1 | Status: SHIPPED | OUTPATIENT
Start: 2021-02-26 | End: 2021-09-28 | Stop reason: SDUPTHER

## 2021-02-26 RX ORDER — FAMOTIDINE 40 MG/1
40 TABLET, FILM COATED ORAL DAILY
Qty: 90 TABLET | Refills: 3 | Status: SHIPPED | OUTPATIENT
Start: 2021-02-26 | End: 2022-02-07 | Stop reason: SDUPTHER

## 2021-02-26 RX ORDER — ZOSTER VACCINE RECOMBINANT, ADJUVANTED 50 MCG/0.5
KIT INTRAMUSCULAR
Status: ON HOLD | COMMUNITY
End: 2021-05-24 | Stop reason: ALTCHOICE

## 2021-02-26 RX ORDER — PREGABALIN 150 MG/1
150 CAPSULE ORAL DAILY
COMMUNITY

## 2021-02-26 RX ORDER — AMLODIPINE BESYLATE 5 MG/1
5 TABLET ORAL DAILY
Qty: 90 TABLET | Refills: 3
Start: 2021-02-26

## 2021-02-26 NOTE — PROGRESS NOTES
BMI Counseling: Body mass index is 29 94 kg/m²  The BMI is above normal  Nutrition recommendations include decreasing portion sizes, encouraging healthy choices of fruits and vegetables, decreasing fast food intake, consuming healthier snacks, limiting drinks that contain sugar, moderation in carbohydrate intake, increasing intake of lean protein, reducing intake of saturated and trans fat and reducing intake of cholesterol  Exercise recommendations include moderate physical activity 150 minutes/week and exercising 3-5 times per week  No pharmacotherapy was ordered  Assessment/Plan:  well-known to me for many years presents for f/u and evaluation of the following medical issues:       F/u and eval HTN:  Controlled 132/88 on Norvasc 5 and Demadex 10 mg Od     F/u and eval NIDDM: No issue - on diet and exercise to keep BS down    Rides bike q day    F/u and eval HLD:  Off Pravachol, due to itching earlier this yr  F/u and eval deconditioning: walking and exercising best she can  F/u and eval polypharmacy:  Complicated, but all  - each one - discussed totally  F/u and eval : possible ASHLEY, never had it, but tested 8 yrs ago in the center  Will jennifer now - Dr Sabrina Henry is pulmonalist    F/u and eval all lab Jan 6 and 11 and all discussed  F/u and eval the letters of Dr Sylvester Richmond -- 2 letters in early January and pt doing better on Lyrica         Problem List Items Addressed This Visit     None      Visit Diagnoses     Increased BMI    -  Primary    Hypothyroidism, adult        Chronic GERD        Essential hypertension                Subjective:      Patient ID: Taylor Spears is a 67 y o  female  HPI--Assessment/Plan:  well-known to me for many years presents for f/u and evaluation of the following medical issues:       F/u and eval HTN:  Controlled 132/88 on Norvasc 5 and Demadex 10 mg Od     F/u and eval NIDDM: No issue - on diet and exercise to keep BS down     Rides bike q day    F/u and eval HLD:  Off Pravachol, due to itching earlier this yr  F/u and eval deconditioning: walking and exercising best she can  F/u and eval polypharmacy:  Complicated, but all  - each one - discussed totally  F/u and eval : possible ASHLEY, never had it, but tested 8 yrs ago in the center  Will jennifer now - Dr Duana Schirmer is pulmonalist    F/u and eval all lab Kehinde 6 and 11 and all discussed  F/u and eval the letters of Dr Karen Albarado -- 2 letters in early January and pt doing better on Lyrica      The following portions of the patient's history were reviewed and updated as appropriate:   Past Medical History:  She has a past medical history of Chronic respiratory failure ([facility] [address] ), Hiatal hernia, and Pulmonary embolism ([facility] [address] )  ,  _______________________________________________________________________  Medical Problems:  does not have any pertinent problems on file ,  _______________________________________________________________________  Past Surgical History:   has a past surgical history that includes Tonsillectomy; Vein ligation (Right); Tubal ligation; ADENOIDECTOMY; Laminectomy; Spinal fusion; Replacement total knee; Carpal tunnel release; Cardiac catheterization; Pulmonary embolism surgery; and Mastectomy (Bilateral)  ,  _______________________________________________________________________  Family History:  Family history is unknown by patient  ,  _______________________________________________________________________  Social History:   reports that she has never smoked  She has never used smokeless tobacco  She reports previous alcohol use  She reports previous drug use ,  _______________________________________________________________________  Allergies:  is allergic to hydromorphone; merthiolate  [thimerosal]; quinine derivatives; codeine; pamelor [nortriptyline]; pollen extract; and wound dressing adhesive     _______________________________________________________________________  Current Outpatient Medications   Medication Sig Dispense Refill    acetaminophen (Tylenol) 325 mg tablet Take 650 mg by mouth every 6 (six) hours as needed for mild pain      amLODIPine (NORVASC) 5 mg tablet Take 1 tablet (5 mg total) by mouth daily 90 tablet 3    Ascorbic Acid (VITAMIN C) 100 MG tablet Take 100 mg by mouth daily      b complex vitamins capsule Take 1 capsule by mouth daily      brimonidine (Alphagan P) 0 1 % Alphagan P 0 1 % eye drops   1qd      calcium citrate-vitamin D (CITRACAL+D) 315-200 MG-UNIT per tablet Calcium + D TABS    Refills: 0       Active      DULoxetine (CYMBALTA) 30 mg delayed release capsule TAKE 1 CAPSULE EVERY DAY 90 capsule 3    famotidine (PEPCID) 40 MG tablet TAKE 1 TABLET EVERY DAY 90 tablet 3    FEROSUL 325 (65 Fe) MG tablet Take 1 tablet by mouth 2 (two) times a day with meals      fluticasone (FLONASE) 50 mcg/act nasal spray fluticasone propionate 50 mcg/actuation nasal spray,suspension   prn      folic acid (FOLVITE) 1 mg tablet Take 1 mg by mouth daily      latanoprost (XALATAN) 0 005 % ophthalmic solution       levothyroxine 100 mcg tablet Take 1 tablet (100 mcg total) by mouth daily 90 tablet 1    pregabalin (LYRICA) 150 mg capsule Take 150 mg by mouth 2 (two) times a day      rivaroxaban (Xarelto) 20 mg tablet Take 1 tablet (20 mg total) by mouth daily 90 tablet 3    rOPINIRole (REQUIP) 1 mg tablet Take 1 tablet (1 mg total) by mouth daily at bedtime (Patient taking differently: Take 1 mg by mouth daily at bedtime Patient takes 1 tab QPM and 2 tabs at QHS) 90 tablet 3    timolol (BLOCADREN) 5 MG tablet TAKE 1 TABLET EVERY 12 HOURS DAILY        torsemide (DEMADEX) 10 mg tablet TAKE 1 TABLET EVERY DAY 90 tablet 3    ASPIRIN 81 PO Aspirin EC 81 MG Oral Tablet Delayed Release    Refills: 0       Active      atorvastatin (LIPITOR) 40 mg tablet atorvastatin 40 mg tablet      DULoxetine (CYMBALTA) 60 mg delayed release capsule Take 1 capsule (60 mg total) by mouth daily (Patient not taking: Reported on 2/26/2021) 90 capsule 1    fexofenadine (ALLEGRA) 180 MG tablet Take 180 mg by mouth daily      hydrOXYzine HCL (ATARAX) 25 mg tablet Take 1 tablet (25 mg total) by mouth every 6 (six) hours as needed for itching (Patient not taking: Reported on 2/26/2021) 30 tablet 1    mupirocin (BACTROBAN) 2 % ointment Apply topically 3 (three) times a day for 7 days 30 g 1    pravastatin (PRAVACHOL) 40 mg tablet 40 mg Started getting itching/burning  Stopped taking for 2 weeks  Will be restarting soon   Zoster Vac Recomb Adjuvanted (Shingrix) 50 MCG/0 5ML SUSR Shingrix (PF) 50 mcg/0 5 mL intramuscular suspension, kit       No current facility-administered medications for this visit       _______________________________________________________________________  Review of Systems   Constitutional: Negative for activity change, appetite change, chills, diaphoresis, fatigue and fever  HENT: Negative for congestion, ear discharge, ear pain, facial swelling, nosebleeds, sore throat, tinnitus, trouble swallowing and voice change  Eyes: Negative for photophobia, pain, discharge, redness, itching and visual disturbance  Respiratory: Negative for apnea, cough, choking, chest tightness and shortness of breath  Cardiovascular: Negative for chest pain, palpitations and leg swelling  Denies any and all cardiac symptoms   Gastrointestinal: Negative for abdominal distention, abdominal pain, blood in stool, constipation, diarrhea, nausea and vomiting  Endocrine: Negative for cold intolerance, heat intolerance, polydipsia, polyphagia and polyuria  Genitourinary: Negative for decreased urine volume, difficulty urinating, dysuria, enuresis, frequency, hematuria, pelvic pain, urgency and vaginal bleeding  Musculoskeletal: Positive for arthralgias, back pain and myalgias  Negative for gait problem, joint swelling, neck pain and neck stiffness     Skin: Negative for color change, pallor and rash  Allergic/Immunologic: Negative for immunocompromised state  Neurological: Negative for dizziness, seizures, facial asymmetry, light-headedness, numbness and headaches  Artice Sparrow 3x's in the past 2 wks  To ER once  No reason - just tripping  Jermaine Travis says, "in a hurry"   Hematological: Negative for adenopathy  Does not bruise/bleed easily  Psychiatric/Behavioral: Negative for agitation, behavioral problems, confusion, decreased concentration, dysphoric mood and hallucinations  The patient is not hyperactive  Objective:  Vitals:    02/26/21 1109   BP: 132/88   Pulse: 92   Resp: 18   Temp: (!) 100 6 °F (38 1 °C)   SpO2: 98%   Weight: 76 7 kg (169 lb)   Height: 5' 3" (1 6 m)     Body mass index is 29 94 kg/m²  Physical Exam  Vitals signs and nursing note reviewed  Constitutional:       General: She is not in acute distress  Appearance: She is well-developed  She is not diaphoretic  HENT:      Head: Normocephalic and atraumatic  Nose: Nose normal    Eyes:      Conjunctiva/sclera: Conjunctivae normal       Pupils: Pupils are equal, round, and reactive to light  Neck:      Musculoskeletal: Normal range of motion and neck supple  Thyroid: No thyromegaly  Trachea: No tracheal deviation  Cardiovascular:      Rate and Rhythm: Normal rate and regular rhythm  Heart sounds: Normal heart sounds  Pulmonary:      Effort: No respiratory distress  Breath sounds: Normal breath sounds  No wheezing or rales  Chest:      Chest wall: No tenderness  Abdominal:      General: Bowel sounds are normal  There is no distension  Palpations: Abdomen is soft  There is no mass  Tenderness: There is no abdominal tenderness  There is no guarding or rebound  Musculoskeletal: Normal range of motion  General: No tenderness or deformity  Right lower leg: No edema  Left lower leg: No edema  Skin:     General: Skin is warm and dry        Coloration: Skin is not jaundiced or pale  Findings: No bruising (Except over base knees where she has fallen  Bilateral knee replacements years ago but no serious injury), erythema or rash  Neurological:      General: No focal deficit present  Mental Status: She is alert and oriented to person, place, and time  Mental status is at baseline  Cranial Nerves: No cranial nerve deficit  Comments: Note--I have accessed the risk scores for the ASCVD risk at 24 5%   Psychiatric:         Mood and Affect: Mood normal          Behavior: Behavior normal          Thought Content: Thought content normal          Judgment: Judgment normal            Comorbidities addressed herein increase the amount and complexity of the data evaluated by me and pose a risk of complications and /or morbidity re pt management, and it is my role to address these issues with the pt, such that there is understanding of the key problems and issues listed and discussed  are understood thru thorough explaination, both verbally, In addition, I should like to point out that considerable time was spent by me in reviewing all tests, consults from speciality physicians, ordering medications, and determining the best tests to be ordered for this patient's medical condition

## 2021-02-26 NOTE — PROGRESS NOTES
Assessment and Plan:     Problem List Items Addressed This Visit     None        BMI Counseling: Body mass index is 29 94 kg/m²  The BMI is above normal  Nutrition recommendations include decreasing portion sizes, encouraging healthy choices of fruits and vegetables, decreasing fast food intake, consuming healthier snacks, limiting drinks that contain sugar, moderation in carbohydrate intake, increasing intake of lean protein and reducing intake of saturated and trans fat  Exercise recommendations include moderate physical activity 150 minutes/week and exercising 3-5 times per week  No pharmacotherapy was ordered  Preventive health issues were discussed with patient, and age appropriate screening tests were ordered as noted in patient's After Visit Summary  Personalized health advice and appropriate referrals for health education or preventive services given if needed, as noted in patient's After Visit Summary       History of Present Illness:     Patient presents for Medicare Annual Wellness visit    Patient Care Team:  Jona Barnes DO as PCP - General (Family Medicine)     Problem List:     Patient Active Problem List   Diagnosis    Hiatal hernia    Mixed hyperlipidemia    Anxiety and depression    Hypothyroidism (acquired)    Restless leg syndrome, controlled    Swelling of limb    Osteoporosis    GERD (gastroesophageal reflux disease)    Lesion of radial nerve    Ductal carcinoma in situ (DCIS) of left breast    Bilateral pulmonary embolism (HCC)    Anemia    Abnormal gait    Cellulitis and abscess of right lower extremity    Trochanteric bursitis of left hip    Personal history of DVT (deep vein thrombosis)      Past Medical and Surgical History:     Past Medical History:   Diagnosis Date    Chronic respiratory failure ([address] )     uses O2 at home with NC    Hiatal hernia     Pulmonary embolism ([address] )     Bilateral; on Xarelto     Past Surgical History:   Procedure Laterality Date    ADENOIDECTOMY      CARDIAC CATHETERIZATION      CARPAL TUNNEL RELEASE      LAMINECTOMY      MASTECTOMY Bilateral     PULMONARY EMBOLISM SURGERY      REPLACEMENT TOTAL KNEE      SPINAL FUSION      TONSILLECTOMY      TUBAL LIGATION      VEIN LIGATION Right       Family History:     Family History   Family history unknown: Yes      Social History:        Social History     Socioeconomic History    Marital status:       Spouse name: None    Number of children: None    Years of education: None    Highest education level: None   Occupational History    None   Social Needs    Financial resource strain: None    Food insecurity     Worry: None     Inability: None    Transportation needs     Medical: None     Non-medical: None   Tobacco Use    Smoking status: Never Smoker    Smokeless tobacco: Never Used   Substance and Sexual Activity    Alcohol use: Not Currently     Frequency: 2-4 times a month    Drug use: Not Currently    Sexual activity: None   Lifestyle    Physical activity     Days per week: None     Minutes per session: None    Stress: None   Relationships    Social connections     Talks on phone: None     Gets together: None     Attends Latter day service: None     Active member of club or organization: None     Attends meetings of clubs or organizations: None     Relationship status: None    Intimate partner violence     Fear of current or ex partner: None     Emotionally abused: None     Physically abused: None     Forced sexual activity: None   Other Topics Concern    None   Social History Narrative    · Most recent tobacco use screenin2019       Medications and Allergies:     Current Outpatient Medications   Medication Sig Dispense Refill    acetaminophen (Tylenol) 325 mg tablet Take 650 mg by mouth every 6 (six) hours as needed for mild pain      amLODIPine (NORVASC) 5 mg tablet Take 1 tablet (5 mg total) by mouth daily 90 tablet 3    Ascorbic Acid (VITAMIN C) 100 MG tablet Take 100 mg by mouth daily      b complex vitamins capsule Take 1 capsule by mouth daily      brimonidine (Alphagan P) 0 1 % Alphagan P 0 1 % eye drops   1qd      calcium citrate-vitamin D (CITRACAL+D) 315-200 MG-UNIT per tablet Calcium + D TABS    Refills: 0       Active      DULoxetine (CYMBALTA) 30 mg delayed release capsule TAKE 1 CAPSULE EVERY DAY 90 capsule 3    famotidine (PEPCID) 40 MG tablet TAKE 1 TABLET EVERY DAY 90 tablet 3    FEROSUL 325 (65 Fe) MG tablet Take 1 tablet by mouth 2 (two) times a day with meals      fluticasone (FLONASE) 50 mcg/act nasal spray fluticasone propionate 50 mcg/actuation nasal spray,suspension   prn      folic acid (FOLVITE) 1 mg tablet Take 1 mg by mouth daily      latanoprost (XALATAN) 0 005 % ophthalmic solution       levothyroxine 100 mcg tablet Take 1 tablet (100 mcg total) by mouth daily 90 tablet 1    pregabalin (LYRICA) 150 mg capsule Take 150 mg by mouth 2 (two) times a day      rivaroxaban (Xarelto) 20 mg tablet Take 1 tablet (20 mg total) by mouth daily 90 tablet 3    rOPINIRole (REQUIP) 1 mg tablet Take 1 tablet (1 mg total) by mouth daily at bedtime (Patient taking differently: Take 1 mg by mouth daily at bedtime Patient takes 1 tab QPM and 2 tabs at QHS) 90 tablet 3    timolol (BLOCADREN) 5 MG tablet TAKE 1 TABLET EVERY 12 HOURS DAILY        torsemide (DEMADEX) 10 mg tablet TAKE 1 TABLET EVERY DAY 90 tablet 3    ASPIRIN 81 PO Aspirin EC 81 MG Oral Tablet Delayed Release    Refills: 0       Active      atorvastatin (LIPITOR) 40 mg tablet atorvastatin 40 mg tablet      DULoxetine (CYMBALTA) 60 mg delayed release capsule Take 1 capsule (60 mg total) by mouth daily (Patient not taking: Reported on 2/26/2021) 90 capsule 1    fexofenadine (ALLEGRA) 180 MG tablet Take 180 mg by mouth daily      hydrOXYzine HCL (ATARAX) 25 mg tablet Take 1 tablet (25 mg total) by mouth every 6 (six) hours as needed for itching (Patient not taking: Reported on 2/26/2021) 30 tablet 1    mupirocin (BACTROBAN) 2 % ointment Apply topically 3 (three) times a day for 7 days 30 g 1    pravastatin (PRAVACHOL) 40 mg tablet 40 mg Started getting itching/burning  Stopped taking for 2 weeks  Will be restarting soon   Zoster Vac Recomb Adjuvanted (Shingrix) 50 MCG/0 5ML SUSR Shingrix (PF) 50 mcg/0 5 mL intramuscular suspension, kit       No current facility-administered medications for this visit  Allergies   Allergen Reactions    Hydromorphone Anaphylaxis and Hallucinations     disorientation; hallucination; confusion      Merthiolate  [Thimerosal] Hives    Quinine Derivatives Other (See Comments) and Tinnitus     tinnitus      Codeine Nausea Only and Other (See Comments)     nausea      Pamelor [Nortriptyline] Vomiting    Pollen Extract     Wound Dressing Adhesive Rash      Immunizations:     Immunization History   Administered Date(s) Administered    INFLUENZA 01/01/2008, 09/15/2011, 10/17/2012, 11/06/2013, 10/21/2014, 10/19/2015, 11/01/2018    Influenza, high dose seasonal 0 7 mL 09/16/2020    Pneumococcal Conjugate 13-Valent 10/19/2015    Pneumococcal Polysaccharide PPV23 01/01/2006, 01/01/2008    SARS-CoV-2 / COVID-19 mRNA IM (Moderna) 01/28/2021, 02/25/2021    Tdap 01/01/2006    Zoster Vaccine Recombinant 09/30/2020, 12/04/2020      Health Maintenance:         Topic Date Due    Colonoscopy Surveillance  09/11/2014         Topic Date Due    DTaP,Tdap,and Td Vaccines (2 - Td) 01/01/2016    Pneumococcal Vaccine: 65+ Years (2 of 2 - PPSV23) 10/19/2016      Medicare Health Risk Assessment:     /88   Pulse 92   Temp (!) 100 6 °F (38 1 °C)   Resp 18   Ht 5' 3" (1 6 m)   Wt 76 7 kg (169 lb)   SpO2 98%   BMI 29 94 kg/m²          Health Risk Assessment:   Patient rates overall health as good  Patient feels that their physical health rating is same  Eyesight was rated as same  Hearing was rated as same   Patient feels that their emotional and mental health rating is same  Pain experienced in the last 7 days has been some  Patient's pain rating has been 8/10  Patient states that she has experienced no weight loss or gain in last 6 months  Fall Risk Screening: In the past year, patient has experienced: no history of falling in past year      Urinary Incontinence Screening:   Patient has not leaked urine accidently in the last six months  Home Safety:  Patient has trouble with stairs inside or outside of their home  Patient has working smoke alarms and has working carbon monoxide detector  Home safety hazards include: none  Nutrition:   Current diet is Regular  Medications:   Patient is currently taking over-the-counter supplements  OTC medications include: see medication list  Patient is able to manage medications  Activities of Daily Living (ADLs)/Instrumental Activities of Daily Living (IADLs):   Walk and transfer into and out of bed and chair?: Yes  Dress and groom yourself?: Yes    Bathe or shower yourself?: Yes    Feed yourself? Yes  Do your laundry/housekeeping?: Yes  Manage your money, pay your bills and track your expenses?: Yes  Make your own meals?: Yes    Do your own shopping?: Yes    Previous Hospitalizations:   Any hospitalizations or ED visits within the last 12 months?: No      Advance Care Planning:   Living will: Yes    Durable POA for healthcare:  Yes    Advanced directive: Yes    Advanced directive counseling given: Yes    Five wishes given: Yes    Patient declined ACP directive: No    End of Life Decisions reviewed with patient: Yes    Provider agrees with end of life decisions: Yes      Cognitive Screening:   Provider or family/friend/caregiver concerned regarding cognition?: No    PREVENTIVE SCREENINGS      Cardiovascular Screening:    General: Screening Not Indicated and History Lipid Disorder      Diabetes Screening:     General: Screening Current      Breast Cancer Screening:     General: Screening Not Indicated      Cervical Cancer Screening:    General: Screening Not Indicated      Osteoporosis Screening:    General: Screening Not Indicated and History Osteoporosis      Lung Cancer Screening:     General: Screening Not Indicated    Other Counseling Topics:   Alcohol use counseling, car/seat belt/driving safety, skin self-exam, sunscreen and calcium and vitamin D intake and regular weightbearing exercise         Barb Turner DO

## 2021-02-26 NOTE — ASSESSMENT & PLAN NOTE
Has exertional dyspne, but is following with DR Leal, pulmon, at [facility]

## 2021-02-26 NOTE — PATIENT INSTRUCTIONS
Medicare Preventive Visit Patient Instructions  Thank you for completing your Welcome to Medicare Visit or Medicare Annual Wellness Visit today  Your next wellness visit will be due in one year (2/26/2022)  The screening/preventive services that you may require over the next 5-10 years are detailed below  Some tests may not apply to you based off risk factors and/or age  Screening tests ordered at today's visit but not completed yet may show as past due  Also, please note that scanned in results may not display below  Preventive Screenings:  Service Recommendations Previous Testing/Comments   Colorectal Cancer Screening  * Colonoscopy    * Fecal Occult Blood Test (FOBT)/Fecal Immunochemical Test (FIT)  * Fecal DNA/Cologuard Test  * Flexible Sigmoidoscopy Age: 54-65 years old   Colonoscopy: every 10 years (may be performed more frequently if at higher risk)  OR  FOBT/FIT: every 1 year  OR  Cologuard: every 3 years  OR  Sigmoidoscopy: every 5 years  Screening may be recommended earlier than age 48 if at higher risk for colorectal cancer  Also, an individualized decision between you and your healthcare provider will decide whether screening between the ages of 74-80 would be appropriate  Colonoscopy: 09/11/2009  FOBT/FIT: Not on file  Cologuard: Not on file  Sigmoidoscopy: Not on file         Breast Cancer Screening Age: 36 years old  Frequency: every 1-2 years  Not required if history of left and right mastectomy Mammogram: Not on file    Screening Not Indicated   Cervical Cancer Screening Between the ages of 21-29, pap smear recommended once every 3 years  Between the ages of 33-67, can perform pap smear with HPV co-testing every 5 years     Recommendations may differ for women with a history of total hysterectomy, cervical cancer, or abnormal pap smears in past  Pap Smear: Not on file    Screening Not Indicated   Hepatitis C Screening Once for adults born between 1945 and 1965  More frequently in patients at high risk for Hepatitis C Hep C Antibody: Not on file       Diabetes Screening 1-2 times per year if you're at risk for diabetes or have pre-diabetes Fasting glucose: 119 mg/dL   A1C: 5 8 %    Screening Current   Cholesterol Screening Once every 5 years if you don't have a lipid disorder  May order more often based on risk factors  Lipid panel: 09/15/2020    Screening Not Indicated  History Lipid Disorder     Other Preventive Screenings Covered by Medicare:  1  Abdominal Aortic Aneurysm (AAA) Screening: covered once if your at risk  You're considered to be at risk if you have a family history of AAA  2  Lung Cancer Screening: covers low dose CT scan once per year if you meet all of the following conditions: (1) Age 50-69; (2) No signs or symptoms of lung cancer; (3) Current smoker or have quit smoking within the last 15 years; (4) You have a tobacco smoking history of at least 30 pack years (packs per day multiplied by number of years you smoked); (5) You get a written order from a healthcare provider  3  Glaucoma Screening: covered annually if you're considered high risk: (1) You have diabetes OR (2) Family history of glaucoma OR (3)  aged 48 and older OR (3)  American aged 72 and older  3  Osteoporosis Screening: covered every 2 years if you meet one of the following conditions: (1) You're estrogen deficient and at risk for osteoporosis based off medical history and other findings; (2) Have a vertebral abnormality; (3) On glucocorticoid therapy for more than 3 months; (4) Have primary hyperparathyroidism; (5) On osteoporosis medications and need to assess response to drug therapy  · Last bone density test (DXA Scan): Not on file  5  HIV Screening: covered annually if you're between the age of 12-76  Also covered annually if you are younger than 13 and older than 72 with risk factors for HIV infection   For pregnant patients, it is covered up to 3 times per pregnancy  Immunizations:  Immunization Recommendations   Influenza Vaccine Annual influenza vaccination during flu season is recommended for all persons aged >= 6 months who do not have contraindications   Pneumococcal Vaccine (Prevnar and Pneumovax)  * Prevnar = PCV13  * Pneumovax = PPSV23   Adults 25-60 years old: 1-3 doses may be recommended based on certain risk factors  Adults 72 years old: Prevnar (PCV13) vaccine recommended followed by Pneumovax (PPSV23) vaccine  If already received PPSV23 since turning 65, then PCV13 recommended at least one year after PPSV23 dose  Hepatitis B Vaccine 3 dose series if at intermediate or high risk (ex: diabetes, end stage renal disease, liver disease)   Tetanus (Td) Vaccine - COST NOT COVERED BY MEDICARE PART B Following completion of primary series, a booster dose should be given every 10 years to maintain immunity against tetanus  Td may also be given as tetanus wound prophylaxis  Tdap Vaccine - COST NOT COVERED BY MEDICARE PART B Recommended at least once for all adults  For pregnant patients, recommended with each pregnancy  Shingles Vaccine (Shingrix) - COST NOT COVERED BY MEDICARE PART B  2 shot series recommended in those aged 48 and above     Health Maintenance Due:      Topic Date Due    Colonoscopy Surveillance  09/11/2014     Immunizations Due:      Topic Date Due    DTaP,Tdap,and Td Vaccines (2 - Td) 01/01/2016    Pneumococcal Vaccine: 65+ Years (2 of 2 - PPSV23) 10/19/2016     Advance Directives   What are advance directives? Advance directives are legal documents that state your wishes and plans for medical care  These plans are made ahead of time in case you lose your ability to make decisions for yourself  Advance directives can apply to any medical decision, such as the treatments you want, and if you want to donate organs  What are the types of advance directives?   There are many types of advance directives, and each state has rules about how to use them  You may choose a combination of any of the following:  · Living will: This is a written record of the treatment you want  You can also choose which treatments you do not want, which to limit, and which to stop at a certain time  This includes surgery, medicine, IV fluid, and tube feedings  · Durable power of  for healthcare [facility]): This is a written record that states who you want to make healthcare choices for you when you are unable to make them for yourself  This person, called a proxy, is usually a family member or a friend  You may choose more than 1 proxy  · Do not resuscitate (DNR) order:  A DNR order is used in case your heart stops beating or you stop breathing  It is a request not to have certain forms of treatment, such as CPR  A DNR order may be included in other types of advance directives  · Medical directive: This covers the care that you want if you are in a coma, near death, or unable to make decisions for yourself  You can list the treatments you want for each condition  Treatment may include pain medicine, surgery, blood transfusions, dialysis, IV or tube feedings, and a ventilator (breathing machine)  · Values history: This document has questions about your views, beliefs, and how you feel and think about life  This information can help others choose the care that you would choose  Why are advance directives important? An advance directive helps you control your care  Although spoken wishes may be used, it is better to have your wishes written down  Spoken wishes can be misunderstood, or not followed  Treatments may be given even if you do not want them  An advance directive may make it easier for your family to make difficult choices about your care     Weight Management   Why it is important to manage your weight:  Being overweight increases your risk of health conditions such as heart disease, high blood pressure, type 2 diabetes, and certain types of cancer  It can also increase your risk for osteoarthritis, sleep apnea, and other respiratory problems  Aim for a slow, steady weight loss  Even a small amount of weight loss can lower your risk of health problems  How to lose weight safely:  A safe and healthy way to lose weight is to eat fewer calories and get regular exercise  You can lose up about 1 pound a week by decreasing the number of calories you eat by 500 calories each day  Healthy meal plan for weight management:  A healthy meal plan includes a variety of foods, contains fewer calories, and helps you stay healthy  A healthy meal plan includes the following:  · Eat whole-grain foods more often  A healthy meal plan should contain fiber  Fiber is the part of grains, fruits, and vegetables that is not broken down by your body  Whole-grain foods are healthy and provide extra fiber in your diet  Some examples of whole-grain foods are whole-wheat breads and pastas, oatmeal, brown rice, and bulgur  · Eat a variety of vegetables every day  Include dark, leafy greens such as spinach, kale, ashley greens, and mustard greens  Eat yellow and orange vegetables such as carrots, sweet potatoes, and winter squash  · Eat a variety of fruits every day  Choose fresh or canned fruit (canned in its own juice or light syrup) instead of juice  Fruit juice has very little or no fiber  · Eat low-fat dairy foods  Drink fat-free (skim) milk or 1% milk  Eat fat-free yogurt and low-fat cottage cheese  Try low-fat cheeses such as mozzarella and other reduced-fat cheeses  · Choose meat and other protein foods that are low in fat  Choose beans or other legumes such as split peas or lentils  Choose fish, skinless poultry (chicken or turkey), or lean cuts of red meat (beef or pork)  Before you cook meat or poultry, cut off any visible fat  · Use less fat and oil  Try baking foods instead of frying them   Add less fat, such as margarine, sour cream, regular salad dressing and mayonnaise to foods  Eat fewer high-fat foods  Some examples of high-fat foods include french fries, doughnuts, ice cream, and cakes  · Eat fewer sweets  Limit foods and drinks that are high in sugar  This includes candy, cookies, regular soda, and sweetened drinks  Exercise:  Exercise at least 30 minutes per day on most days of the week  Some examples of exercise include walking, biking, dancing, and swimming  You can also fit in more physical activity by taking the stairs instead of the elevator or parking farther away from stores  Ask your healthcare provider about the best exercise plan for you  © Copyright [employer] 2018 Information is for End User's use only and may not be sold, redistributed or otherwise used for commercial purposes  All illustrations and images included in CareNotes® are the copyrighted property of A D A M , Inc  or [facility] Ritu Dupree   Hypokalemia   AMBULATORY CARE:   Hypokalemia  is a low level of potassium in your blood  Potassium helps control how your muscles, heart, and digestive system work  Hypokalemia occurs when your body loses too much potassium or does not absorb enough from food  Common signs and symptoms include the following: You may not have any signs or symptoms if you have mild hypokalemia  You may have any of the following if it is more severe:  · Fatigue    · Constipation    · Frequent urination or urinating large amounts    · Muscle cramps or skin tingling    · Muscle weakness    · Fast or irregular heartbeat    Seek care immediately if:   · You cannot move your arm or leg  · You have a fast or irregular heartbeat  · You are too tired or weak to stand up  Contact your healthcare provider if:   · You are vomiting, or you have diarrhea  · You have numbness or tingling in your arms or legs  · Your symptoms do not go away or they get worse  · You have questions or concerns about your condition or care      Treatment:  You will receive potassium to bring your levels back to normal  This may be given as a pill or IV  The amount of potassium you will be given depends on your potassium level  Eat foods that are high in potassium:  Foods that are high in potassium include bananas, oranges, tomatoes, potatoes, and avocado  Gonzalez beans, turkey, salmon, lean beef, yogurt, and milk are also high in potassium  Ask your healthcare provider or dietitian for more information about foods that are high in potassium  Follow up with your healthcare provider as directed:  Write down your questions so you remember to ask them during your visits  © Copyright [address] Information is for End User's use only and may not be sold, redistributed or otherwise used for commercial purposes  All illustrations and images included in CareNotes® are the copyrighted property of [employer] Inc  or [facility] Ritu Dupree   The above information is an  only  It is not intended as medical advice for individual conditions or treatments  Talk to your doctor, nurse or pharmacist before following any medical regimen to see if it is safe and effective for you  Chronic Hypertension   WHAT YOU NEED TO KNOW:   Hypertension is high blood pressure  Your blood pressure is the force of your blood moving against the walls of your arteries  Hypertension causes your blood pressure to get so high that your heart has to work much harder than normal  This can damage your heart  Even if you have hypertension for years, lifestyle changes, medicines, or both can help bring your blood pressure to normal   DISCHARGE INSTRUCTIONS:   Call 911 for any of the following:   · You have chest pain  · You have any of the following signs of a heart attack:      ?  Squeezing, pressure, or pain in your chest    ? You may  also have any of the following:     § Discomfort or pain in your back, neck, jaw, stomach, or arm    § Shortness of breath    § Nausea or vomiting    § Lightheadedness or a sudden cold sweat    · You become confused or have difficulty speaking  · You suddenly feel lightheaded or have trouble breathing  Return to the emergency department if:   · You have a severe headache or vision loss  · You have weakness in an arm or leg  Contact your healthcare provider if:   · You feel faint, dizzy, confused, or drowsy  · You have been taking your blood pressure medicine but your pressure is higher than your provider says it should be  · You have questions or concerns about your condition or care  Medicines: You may need any of the following:  · Antihypertensives  may be used to help lower your blood pressure  Several kinds of medicines are available  Your healthcare provider may change the medicine or medicines you currently take  This may be needed if your blood pressure is often high when you check it at home or you are having other problems with blood pressure control  · Diuretics  help decrease extra fluid that collects in your body  This will help lower your BP  You may urinate more often while you take this medicine  · Cholesterol medicine  helps lower your cholesterol level  A low cholesterol level helps prevent heart disease and makes it easier to control your blood pressure  · Take your medicine as directed  Contact your healthcare provider if you think your medicine is not helping or if you have side effects  Tell him or her if you are allergic to any medicine  Keep a list of the medicines, vitamins, and herbs you take  Include the amounts, and when and why you take them  Bring the list or the pill bottles to follow-up visits  Carry your medicine list with you in case of an emergency  Follow up with your healthcare provider as directed: You will need to return to have your blood pressure checked and to have other lab tests done  Write down your questions so you remember to ask them during your visits     Stages of hypertension:       · Normal blood pressure is 119/79 or lower   Your healthcare provider may only check your blood pressure each year if it stays at a normal level  · Elevated blood pressure is 120/79 to 129/79   This is sometimes called prehypertension  Your healthcare provider may suggest lifestyle changes to help lower your blood pressure to a normal level  He or she may then check it again in 3 to 6 months  · Stage 1 hypertension is 130/80  to 139/89   Your provider may recommend lifestyle changes, medication, and checks every 3 to 6 months until your blood pressure is controlled  · Stage 2 hypertension is 140/90 or higher   Your provider will recommend lifestyle changes and have you take 2 kinds of hypertension medicines  You will also need to have your blood pressure checked monthly until it is controlled  Manage chronic hypertension:   · Check your blood pressure at home  Avoid smoking, caffeine, and exercise at least 30 minutes before checking your blood pressure  Sit and rest for 5 minutes before you take your blood pressure  Extend your arm and support it on a flat surface  Your arm should be at the same level as your heart  Follow the directions that came with your blood pressure monitor  Check your blood pressure 2 times, 1 minute apart, before you take your medicine in the morning  Also check your blood pressure before your evening meal  Keep a record of your readings and bring it to your follow-up visits  Ask your healthcare provider what your blood pressure should be  · Manage any other health conditions you have  Health conditions such as diabetes can increase your risk for hypertension  Follow your healthcare provider's instructions and take all your medicines as directed  Talk to your healthcare provider about any new health conditions you have recently developed  · Ask about all medicines  Certain medicines can increase your blood pressure   Examples include oral birth control pills, decongestants, herbal supplements, and NSAIDs, such as ibuprofen  Your healthcare provider can tell you which medicines are safe for you to take  This includes prescription and over-the-counter medicines  Lifestyle changes you can make to lower your blood pressure: Your provider may want you to make more lifestyle changes if you are having trouble controlling your blood pressure  This may feel difficult over time, especially if you think you are making good changes but your pressure is still high  It might help to focus on one new change at a time  For example, try to add 1 more day of exercise, or exercise for an extra 10 minutes on 2 days  Small changes can make a big difference  Your healthcare provider can also refer you to specialists such as a dietitian who can help you make small changes  · Limit sodium (salt) as directed  Too much sodium can affect your fluid balance  Check labels to find low-sodium or no-salt-added foods  Some low-sodium foods use potassium salts for flavor  Too much potassium can also cause health problems  Your healthcare provider will tell you how much sodium and potassium are safe for you to have in a day  He or she may recommend that you limit sodium to 2,300 mg a day  · Follow the meal plan recommended by your healthcare provider  A dietitian or your provider can give you more information on low-sodium plans or the DASH (Dietary Approaches to Stop Hypertension) eating plan  The DASH plan is low in sodium, unhealthy fats, and total fat  It is high in potassium, calcium, and fiber  · Exercise to maintain a healthy weight  Exercise at least 30 minutes per day, on most days of the week  This will help decrease your blood pressure  Ask your healthcare provider about the best exercise plan for you  · Decrease stress  This may help lower your blood pressure  Learn ways to relax, such as deep breathing or listening to music  · Limit alcohol as directed    Alcohol can increase your blood pressure  A drink of alcohol is 12 ounces of beer, 5 ounces of wine, or 1½ ounces of liquor  · Do not smoke  Nicotine and other chemicals in cigarettes and cigars can increase your blood pressure and also cause lung damage  Ask your healthcare provider for information if you currently smoke and need help to quit  E-cigarettes or smokeless tobacco still contain nicotine  Talk to your healthcare provider before you use these products  © Copyright [address] Information is for End User's use only and may not be sold, redistributed or otherwise used for commercial purposes  All illustrations and images included in CareNotes® are the copyrighted property of [employer] D A M , Inc  or [address] [place] [address]  The above information is an  only  It is not intended as medical advice for individual conditions or treatments  Talk to your doctor, nurse or pharmacist before following any medical regimen to see if it is safe and effective for you

## 2021-03-10 PROBLEM — Z86.0101 HX OF ADENOMATOUS COLONIC POLYPS: Status: ACTIVE | Noted: 2021-03-10

## 2021-03-10 PROBLEM — Z86.010 HX OF ADENOMATOUS COLONIC POLYPS: Status: ACTIVE | Noted: 2021-03-10

## 2021-03-10 PROBLEM — R13.19 ESOPHAGEAL DYSPHAGIA: Status: ACTIVE | Noted: 2021-03-10

## 2021-03-11 ENCOUNTER — OFFICE VISIT (OUTPATIENT)
Dept: GASTROENTEROLOGY | Facility: CLINIC | Age: 76
End: 2021-03-11
Payer: MEDICARE

## 2021-03-11 VITALS
WEIGHT: 167.8 LBS | SYSTOLIC BLOOD PRESSURE: 133 MMHG | BODY MASS INDEX: 29.73 KG/M2 | DIASTOLIC BLOOD PRESSURE: 72 MMHG | HEIGHT: 63 IN | HEART RATE: 80 BPM

## 2021-03-11 DIAGNOSIS — K92.1 BLOOD IN STOOL: ICD-10-CM

## 2021-03-11 DIAGNOSIS — K21.9 GASTROESOPHAGEAL REFLUX DISEASE WITHOUT ESOPHAGITIS: ICD-10-CM

## 2021-03-11 DIAGNOSIS — R13.19 ESOPHAGEAL DYSPHAGIA: Primary | ICD-10-CM

## 2021-03-11 DIAGNOSIS — R19.4 CHANGE IN BOWEL HABITS: ICD-10-CM

## 2021-03-11 DIAGNOSIS — Z86.010 HX OF ADENOMATOUS COLONIC POLYPS: ICD-10-CM

## 2021-03-11 PROBLEM — Z86.711 HISTORY OF PULMONARY EMBOLISM: Status: ACTIVE | Noted: 2021-02-04

## 2021-03-11 PROCEDURE — 99214 OFFICE O/P EST MOD 30 MIN: CPT | Performed by: NURSE PRACTITIONER

## 2021-03-11 NOTE — PROGRESS NOTES
[facility] - Outpatient Follow-up Note  Rachel Lopez 67 y o  female MRN: [id number]  Encounter: [id number]          ASSESSMENT AND PLAN:      1  Esophageal dysphagia  Patient reports episodes were feels like the food is stuck in her esophagus  Patient reports that time she needs to vomit in order to get the food out of her esophagus because it will go down  Last dilation of esophagus was done 06/10/2019  At that time she was dilated to 15 mm with relief of symptoms   - EGD; schedule for EGD for further evaluation of esophageal dysphagia  Prep and procedure explained to patient in detail  Further recommendations pending results of EGD  -will obtain clearance from Dr Kia Ulloa to hold Xarelto prior to EGD and colonoscopy    2  Gastroesophageal reflux disease without esophagitis  Patient has history of GERD  She is reporting intermittent episodes of acid reflux and heartburn  Patient also experiences esophageal dysphagia which causes her to vomit  Further evaluation of acid reflux and heartburn will be done at time of EGD to evaluate for esophageal dysphagia  Further recommendations pending results of EGD  Continue famotidine 40 mg daily      3  Hx of adenomatous colonic polyps  Patient has history of adenoma polyps  Patient was due for repeat colonoscopy 08/26/2021  Colonoscopy is being scheduled for blood in stool  Will evaluate for colonic polyps at that time  Further recommendations pending results of colonoscopy  4  Blood in stool  Patient reports she has been having blood in stool, blood from rectal area and blood in toilet which has been ongoing for several months  The bleeding from rectal area may be secondary to ulceration, AVM, lesion, hemorrhoidal, or secondary to proctitis  - Colonoscopy; schedule for colonoscopy  Prep and procedure explained to patient in detail  Further recommendations pending results of colonoscopy    -will obtain clearance from Dr Rk Desir to hold Xarelto prior to EGD and colonoscopy  5  Change in bowel habits  Patient reports she has had a change in her bowel habits  She reports that she is moving her bowels every time she urinates and will pass mucus and blood from her rectal area  Patient reports his symptoms have been ongoing for the past year and she does complain of pressure in the rectal area  Change in bowel patterns may be secondary to inflammation, lack of dietary fiber, or colitis  Further evaluation will be done at time of colonoscopy  Further recommendations pending results of colonoscopy  Follow-up 1 month after the EGD and colonoscopy     ______________________________________________________________________    SUBJECTIVE:  Rafa Kendrick is a 44-year-old female with past medical history of anxiety, hypothyroidism, pulmonary embolism, osteoporosis, colon polyps, GERD, and left breast cancer who presents to office for follow-up for esophageal dysphagia and for complaint of blood from rectal area and change in bowel habits  Patient reports that she has had a change in her bowel habits over the last year which has progressively gotten worse  Patient reports that every time she urinates she needs to move her bowels and at times she is only having mucus passing from her rectal area  Bowel consistency varies from liquid to formed stool  She reports she does not feel like she is evacuating her stool completely and does feel pressure in the rectal area  Patient also reports blood from the rectal area, bright red blood in the toilet and blood in stool  Patient reported that over the last several weeks she has been having some intermittent episodes of dysphagia  Patient reports she cuts her food up and chews thoroughly but at times the food still gets caught in her esophagus and on rule occasion she will have to vomit in order to get the food out    She also reports intermittent episodes of acid reflux and heartburn associated with epigastric tenderness and abdominal bloating  Patient is currently taking famotidine 40 mg daily  Has had she is taking Xarelto daily due to history of pulmonary embolism approximately 2 years ago  Dr Alen Jackman is regulating her Xarelto  Patient patient's last EGD was done 06/10/2019 patient has history of Nissen fundoplication and  esophagus was dilated to 15 mm  Biopsy of EG junction showed squamous mucosa, no significant histopathological abnormality  White plaque on distal esophagus no significant histopathological abnormality  Last colonoscopy done 08/22/2016 which showed sigmoid diverticulosis and internal hemorrhoids  Two colon polyps removed  Biopsy of colon polyp showed transverse colon polyp benign colonic mucosa with prominent lymphoid nodule and descending colon polyp tubular adenoma  No family  history of gastric or colon cancer  REVIEW OF SYSTEMS IS OTHERWISE NEGATIVE        Historical Information   Past Medical History:   Diagnosis Date    Chronic respiratory failure ([address] )     uses O2 at home with NC    Hiatal hernia     Pulmonary embolism (HCC)     Bilateral; on Xarelto     Past Surgical History:   Procedure Laterality Date    ADENOIDECTOMY      CARDIAC CATHETERIZATION      CARPAL TUNNEL RELEASE      LAMINECTOMY      MASTECTOMY Bilateral     PULMONARY EMBOLISM SURGERY      REPLACEMENT TOTAL KNEE      SPINAL FUSION      TONSILLECTOMY      TUBAL LIGATION      VEIN LIGATION Right      Social History   Social History     Substance and Sexual Activity   Alcohol Use Not Currently    Frequency: 2-4 times a month     Social History     Substance and Sexual Activity   Drug Use Not Currently     Social History     Tobacco Use   Smoking Status Never Smoker   Smokeless Tobacco Never Used     Family History   Family history unknown: Yes       Meds/Allergies       Current Outpatient Medications:     acetaminophen (Tylenol) 325 mg tablet    amLODIPine (NORVASC) 5 mg tablet    Ascorbic Acid (VITAMIN C) 100 MG tablet    b complex vitamins capsule    brimonidine (Alphagan P) 0 1 %    DULoxetine (CYMBALTA) 30 mg delayed release capsule    famotidine (PEPCID) 40 MG tablet    FEROSUL 325 (65 Fe) MG tablet    fexofenadine (ALLEGRA) 180 MG tablet    fluticasone (FLONASE) 50 mcg/act nasal spray    folic acid (FOLVITE) 1 mg tablet    latanoprost (XALATAN) 0 005 % ophthalmic solution    levothyroxine 100 mcg tablet    pregabalin (LYRICA) 150 mg capsule    rivaroxaban (Xarelto) 20 mg tablet    rOPINIRole (REQUIP) 1 mg tablet    timolol (BLOCADREN) 5 MG tablet    torsemide (DEMADEX) 10 mg tablet    ASPIRIN 81 PO    atorvastatin (LIPITOR) 40 mg tablet    calcium citrate-vitamin D (CITRACAL+D) 315-200 MG-UNIT per tablet    DULoxetine (CYMBALTA) 60 mg delayed release capsule    hydrOXYzine HCL (ATARAX) 25 mg tablet    mupirocin (BACTROBAN) 2 % ointment    pravastatin (PRAVACHOL) 40 mg tablet    Zoster Vac Recomb Adjuvanted (Shingrix) 50 MCG/0 5ML SUSR    Allergies   Allergen Reactions    Hydromorphone Anaphylaxis and Hallucinations     disorientation; hallucination; confusion      Merthiolate  [Thimerosal] Hives    Quinine Derivatives Other (See Comments) and Tinnitus     tinnitus      Codeine Nausea Only and Other (See Comments)     nausea      Pamelor [Nortriptyline] Vomiting    Pollen Extract     Wound Dressing Adhesive Rash           Objective     Blood pressure 133/72, pulse 80, height 5' 3" (1 6 m), weight 76 1 kg (167 lb 12 8 oz)  Body mass index is 29 72 kg/m²  PHYSICAL EXAM:      General Appearance:   Alert, cooperative, no distress   HEENT:   Normocephalic, atraumatic, anicteric      Neck:  Supple, symmetrical, trachea midline   Lungs:   Clear to auscultation bilaterally; no rales, rhonchi or wheezing; respirations unlabored    Heart[de-identified]   Regular rate and rhythm; no murmur, rub, or gallop     Abdomen:   Soft, epigastric area tender to palpation, non-distended; normal bowel sounds; no masses, no organomegaly    Genitalia:   Deferred    Rectal:   Deferred    Extremities:  No cyanosis, clubbing or edema    Pulses:  2+ and symmetric    Skin:  No jaundice, rashes, or lesions    Lymph nodes:  No palpable cervical lymphadenopathy        Lab Results:   No visits with results within 1 Day(s) from this visit     Latest known visit with results is:   Lab on 01/07/2021   Component Date Value    WBC 01/07/2021 4 24*    RBC 01/07/2021 4 59     Hemoglobin 01/07/2021 13 8     Hematocrit 01/07/2021 42 8     MCV 01/07/2021 93     MCH 01/07/2021 30 1     MCHC 01/07/2021 32 2     RDW 01/07/2021 13 5     MPV 01/07/2021 10 2     Platelets 40/01/5518 294     Neutrophils Relative 01/07/2021 52     Immat GRANS % 01/07/2021 0     Lymphocytes Relative 01/07/2021 33     Monocytes Relative 01/07/2021 14*    Eosinophils Relative 01/07/2021 1     Basophils Relative 01/07/2021 0     Neutrophils Absolute 01/07/2021 2 19     Immature Grans Absolute 01/07/2021 0 01     Lymphocytes Absolute 01/07/2021 1 38     Monocytes Absolute 01/07/2021 0 61     Eosinophils Absolute 01/07/2021 0 04     Basophils Absolute 01/07/2021 0 01     Sodium 01/07/2021 141     Potassium 01/07/2021 3 4*    Chloride 01/07/2021 107     CO2 01/07/2021 27     ANION GAP 01/07/2021 7     BUN 01/07/2021 9     Creatinine 01/07/2021 0 67     Glucose, Fasting 01/07/2021 119*    Calcium 01/07/2021 9 0     AST 01/07/2021 17     ALT 01/07/2021 16     Alkaline Phosphatase 01/07/2021 105 5     Total Protein 01/07/2021 6 9     Albumin 01/07/2021 3 9     Total Bilirubin 01/07/2021 0 46     eGFR 01/07/2021 86     CRP 01/07/2021 1 4*    Sed Rate 01/07/2021 22     Vit D, 25-Hydroxy 01/07/2021 16 6*    Aldolase 01/07/2021 5 2     Total CK 01/07/2021 60 9     CORNELIO 01/07/2021 Negative     SS-A (RO) Ab 01/07/2021 <0 2     SS-B (LA) Ab 01/07/2021 <0 2     A/G Ratio 01/07/2021 1 40     Albumin Electrophoresis 01/07/2021 58 4     Albumin CONC 01/07/2021 3 97     Alpha 1 01/07/2021 4 8     ALPHA 1 CONC 01/07/2021 0 33     Alpha 2 01/07/2021 12 1     ALPHA 2 CONC 01/07/2021 0 82     Beta-1 01/07/2021 4 8*    BETA 1 CONC 01/07/2021 0 33*    Beta-2 01/07/2021 4 5     BETA 2 CONC 01/07/2021 0 31     Gamma Globulin 01/07/2021 15 4     GAMMA CONC 01/07/2021 1 05     M Peak ID 1 01/07/2021 9 60     M PEAK 1 CONC 01/07/2021 0 65     Total Protein 01/07/2021 6 8     SPEP Interpretation 01/07/2021 The SPEP shows a monoclonal peak in the gamma region  Immunofixation to be performed  Reviewed by: Ana Lilia Felix MD ([id number]) **Electronic Signature**     Immunofixation Interpret* 01/07/2021 Serum immunofixation shows a monoclonal gammopathy identified as IgG kappa (0 65 g/dL)  Reviewed by: Ana Lilia Felix MD ([id number]) **Electronic Signature**          Radiology Results:   No results found

## 2021-03-17 ENCOUNTER — APPOINTMENT (OUTPATIENT)
Dept: LAB | Facility: HOSPITAL | Age: 76
End: 2021-03-17
Payer: MEDICARE

## 2021-03-17 LAB
ALBUMIN SERPL BCP-MCNC: 3.9 G/DL (ref 3.4–4.8)
ALP SERPL-CCNC: 127.5 U/L (ref 35–140)
ALT SERPL W P-5'-P-CCNC: 9 U/L (ref 5–54)
ANION GAP SERPL CALCULATED.3IONS-SCNC: 8 MMOL/L (ref 4–13)
AST SERPL W P-5'-P-CCNC: 11 U/L (ref 15–41)
BILIRUB SERPL-MCNC: 0.83 MG/DL (ref 0.3–1.2)
BUN SERPL-MCNC: 19 MG/DL (ref 6–20)
CALCIUM SERPL-MCNC: 9.2 MG/DL (ref 8.4–10.2)
CHLORIDE SERPL-SCNC: 105 MMOL/L (ref 96–108)
CO2 SERPL-SCNC: 28 MMOL/L (ref 22–33)
CREAT SERPL-MCNC: 0.7 MG/DL (ref 0.4–1.1)
GFR SERPL CREATININE-BSD FRML MDRD: 84 ML/MIN/1.73SQ M
GLUCOSE P FAST SERPL-MCNC: 108 MG/DL (ref 70–105)
POTASSIUM SERPL-SCNC: 3.9 MMOL/L (ref 3.5–5)
PROT SERPL-MCNC: 7 G/DL (ref 6.4–8.3)
SODIUM SERPL-SCNC: 141 MMOL/L (ref 133–145)

## 2021-03-17 PROCEDURE — 80053 COMPREHEN METABOLIC PANEL: CPT | Performed by: FAMILY MEDICINE

## 2021-03-17 PROCEDURE — 36415 COLL VENOUS BLD VENIPUNCTURE: CPT | Performed by: FAMILY MEDICINE

## 2021-03-29 ENCOUNTER — TELEPHONE (OUTPATIENT)
Dept: GASTROENTEROLOGY | Facility: CLINIC | Age: 76
End: 2021-03-29

## 2021-03-29 NOTE — TELEPHONE ENCOUNTER
Faxed Cardiac Clearance to Dr Rafaela Rangel at [facility] for Xarelto hold-will scan in once form is received-mally

## 2021-04-02 NOTE — TELEPHONE ENCOUNTER
Received completed cardiac clearance form-patient is aware to hold xarelto 1 day prior to her procedure-mally

## 2021-04-19 ENCOUNTER — HOSPITAL ENCOUNTER (OUTPATIENT)
Dept: GASTROENTEROLOGY | Facility: HOSPITAL | Age: 76
Setting detail: OUTPATIENT SURGERY
Discharge: HOME | End: 2021-04-19
Admitting: INTERNAL MEDICINE
Payer: MEDICARE

## 2021-04-19 ENCOUNTER — ANESTHESIA (OUTPATIENT)
Dept: GASTROENTEROLOGY | Facility: HOSPITAL | Age: 76
End: 2021-04-19

## 2021-04-19 ENCOUNTER — ANESTHESIA EVENT (OUTPATIENT)
Dept: GASTROENTEROLOGY | Facility: HOSPITAL | Age: 76
End: 2021-04-19

## 2021-04-19 VITALS
RESPIRATION RATE: 18 BRPM | SYSTOLIC BLOOD PRESSURE: 123 MMHG | WEIGHT: 166 LBS | HEIGHT: 63 IN | HEART RATE: 71 BPM | BODY MASS INDEX: 29.41 KG/M2 | DIASTOLIC BLOOD PRESSURE: 58 MMHG | OXYGEN SATURATION: 99 % | TEMPERATURE: 97.8 F

## 2021-04-19 DIAGNOSIS — R13.19 ESOPHAGEAL DYSPHAGIA: ICD-10-CM

## 2021-04-19 DIAGNOSIS — K92.1 BLOOD IN STOOL: ICD-10-CM

## 2021-04-19 DIAGNOSIS — K21.9 GASTROESOPHAGEAL REFLUX DISEASE WITHOUT ESOPHAGITIS: ICD-10-CM

## 2021-04-19 PROCEDURE — 43239 EGD BIOPSY SINGLE/MULTIPLE: CPT | Performed by: INTERNAL MEDICINE

## 2021-04-19 PROCEDURE — 88313 SPECIAL STAINS GROUP 2: CPT | Performed by: PATHOLOGY

## 2021-04-19 PROCEDURE — 88305 TISSUE EXAM BY PATHOLOGIST: CPT | Performed by: PATHOLOGY

## 2021-04-19 PROCEDURE — 88342 IMHCHEM/IMCYTCHM 1ST ANTB: CPT | Performed by: PATHOLOGY

## 2021-04-19 PROCEDURE — 45380 COLONOSCOPY AND BIOPSY: CPT | Performed by: INTERNAL MEDICINE

## 2021-04-19 PROCEDURE — C1726 CATH, BAL DIL, NON-VASCULAR: HCPCS

## 2021-04-19 RX ORDER — PROPOFOL 10 MG/ML
INJECTION, EMULSION INTRAVENOUS AS NEEDED
Status: DISCONTINUED | OUTPATIENT
Start: 2021-04-19 | End: 2021-04-19

## 2021-04-19 RX ORDER — PROPOFOL 10 MG/ML
INJECTION, EMULSION INTRAVENOUS CONTINUOUS PRN
Status: DISCONTINUED | OUTPATIENT
Start: 2021-04-19 | End: 2021-04-19

## 2021-04-19 RX ORDER — LIDOCAINE HYDROCHLORIDE 10 MG/ML
INJECTION, SOLUTION EPIDURAL; INFILTRATION; INTRACAUDAL; PERINEURAL AS NEEDED
Status: DISCONTINUED | OUTPATIENT
Start: 2021-04-19 | End: 2021-04-19

## 2021-04-19 RX ORDER — SODIUM CHLORIDE, SODIUM LACTATE, POTASSIUM CHLORIDE, CALCIUM CHLORIDE 600; 310; 30; 20 MG/100ML; MG/100ML; MG/100ML; MG/100ML
INJECTION, SOLUTION INTRAVENOUS CONTINUOUS PRN
Status: DISCONTINUED | OUTPATIENT
Start: 2021-04-19 | End: 2021-04-19

## 2021-04-19 RX ADMIN — PROPOFOL 100 MG: 10 INJECTION, EMULSION INTRAVENOUS at 13:01

## 2021-04-19 RX ADMIN — PHENYLEPHRINE HYDROCHLORIDE 100 MCG: 10 INJECTION INTRAVENOUS at 13:05

## 2021-04-19 RX ADMIN — PROPOFOL 50 MG: 10 INJECTION, EMULSION INTRAVENOUS at 13:18

## 2021-04-19 RX ADMIN — PROPOFOL 30 MG: 10 INJECTION, EMULSION INTRAVENOUS at 13:26

## 2021-04-19 RX ADMIN — PROPOFOL 50 MG: 10 INJECTION, EMULSION INTRAVENOUS at 13:23

## 2021-04-19 RX ADMIN — PROPOFOL 20 MG: 10 INJECTION, EMULSION INTRAVENOUS at 13:27

## 2021-04-19 RX ADMIN — PROPOFOL 50 MG: 10 INJECTION, EMULSION INTRAVENOUS at 13:21

## 2021-04-19 RX ADMIN — PROPOFOL 40 MG: 10 INJECTION, EMULSION INTRAVENOUS at 13:05

## 2021-04-19 RX ADMIN — PROPOFOL 30 MG: 10 INJECTION, EMULSION INTRAVENOUS at 13:07

## 2021-04-19 RX ADMIN — PROPOFOL 30 MG: 10 INJECTION, EMULSION INTRAVENOUS at 13:15

## 2021-04-19 RX ADMIN — PHENYLEPHRINE HYDROCHLORIDE 100 MCG: 10 INJECTION INTRAVENOUS at 13:35

## 2021-04-19 RX ADMIN — PROPOFOL 30 MG: 10 INJECTION, EMULSION INTRAVENOUS at 13:11

## 2021-04-19 RX ADMIN — PROPOFOL 40 MCG/KG/MIN: 10 INJECTION, EMULSION INTRAVENOUS at 13:25

## 2021-04-19 RX ADMIN — SODIUM CHLORIDE, SODIUM LACTATE, POTASSIUM CHLORIDE, AND CALCIUM CHLORIDE: .6; .31; .03; .02 INJECTION, SOLUTION INTRAVENOUS at 12:50

## 2021-04-19 RX ADMIN — PROPOFOL 30 MG: 10 INJECTION, EMULSION INTRAVENOUS at 13:24

## 2021-04-19 RX ADMIN — LIDOCAINE HYDROCHLORIDE 40 MG: 10 INJECTION, SOLUTION EPIDURAL; INFILTRATION; INTRACAUDAL; PERINEURAL at 13:01

## 2021-04-19 RX ADMIN — PROPOFOL 50 MG: 10 INJECTION, EMULSION INTRAVENOUS at 13:13

## 2021-04-19 RX ADMIN — PROPOFOL 50 MG: 10 INJECTION, EMULSION INTRAVENOUS at 13:09

## 2021-04-19 NOTE — ANESTHESIA POSTPROCEDURE EVALUATION
Post-Op Assessment Note    CV Status:  Stable  Pain Score: 0    Pain management: adequate     Mental Status:  Sleepy   Hydration Status:  Stable   PONV Controlled:  None   Airway Patency:  Patent      Post Op Vitals Reviewed: Yes      Staff: Anesthesiologist, CRNA   Comments: vss        No complications documented      BP (!) 83/44 (04/19/21 1336)    Temp      Pulse 70 (04/19/21 1336)   Resp 15 (04/19/21 1336)    SpO2 100 % (04/19/21 1336)

## 2021-04-19 NOTE — H&P
History and Physical - [facility]  Ligia Gutiérrez 67 y o  female MRN: [id number]                  HPI: Ligia Gutiérrez is a 67y o  year old female who presents for EGD and colonoscopy      REVIEW OF SYSTEMS: Per the HPI, and otherwise unremarkable      Historical Information   Past Medical History:   Diagnosis Date    Chronic respiratory failure ([address] )     uses O2 at home with NC    Hiatal hernia     [address]monary embol[address] [address]     Bilateral; on Xarelto     Past Surgical History:   Procedure Laterality Date    ADENOIDECTOMY      CARDIAC CATHETERIZATION      CARPAL TUNNEL RELEASE      LAMINECTOMY      MASTECTOMY Bilateral     PULMONARY EMBOLISM SURGERY      REPLACEMENT TOTAL KNEE      SPINAL FUSION      TONSILLECTOMY      TUBAL LIGATION      VEIN LIGATION Right      Social History   Social History     Substance and Sexual Activity   Alcohol Use Not Currently    Frequency: 2-4 times a month     Social History     Substance and Sexual Activity   Drug Use Not Currently     Social History     Tobacco Use   Smoking Status Never Smoker   Smokeless Tobacco Never Used     Family History   Family history unknown: Yes       Meds/Allergies       Current Outpatient Medications:     acetaminophen (Tylenol) 325 mg tablet    amLODIPine (NORVASC) 5 mg tablet    Ascorbic Acid (VITAMIN C) 100 MG tablet    ASPIRIN 81 PO    b complex vitamins capsule    brimonidine (Alphagan P) 0 1 %    calcium citrate-vitamin D (CITRACAL+D) 315-200 MG-UNIT per tablet    DULoxetine (CYMBALTA) 30 mg delayed release capsule    famotidine (PEPCID) 40 MG tablet    FEROSUL 325 (65 Fe) MG tablet    fexofenadine (ALLEGRA) 180 MG tablet    fluticasone (FLONASE) 50 mcg/act nasal spray    folic acid (FOLVITE) 1 mg tablet    latanoprost (XALATAN) 0 005 % ophthalmic solution    levothyroxine 100 mcg tablet    pregabalin (LYRICA) 150 mg capsule    rivaroxaban (Xarelto) 20 mg tablet    rOPINIRole (REQUIP) 1 mg tablet    timolol (BLOCADREN) 5 MG tablet    torsemide (DEMADEX) 10 mg tablet    atorvastatin (LIPITOR) 40 mg tablet    DULoxetine (CYMBALTA) 60 mg delayed release capsule    hydrOXYzine HCL (ATARAX) 25 mg tablet    mupirocin (BACTROBAN) 2 % ointment    pravastatin (PRAVACHOL) 40 mg tablet    Zoster Vac Recomb Adjuvanted (Shingrix) 50 MCG/0 5ML SUSR  No current facility-administered medications for this encounter  Facility-Administered Medications Ordered in Other Encounters:     lactated ringers infusion, , , Continuous PRN, New Bag at 04/19/21 1250    Allergies   Allergen Reactions    Hydromorphone Anaphylaxis and Hallucinations     disorientation; hallucination; confusion      Merthiolate  [Thimerosal] Hives    Quinine Derivatives Other (See Comments) and Tinnitus     tinnitus      Codeine Nausea Only and Other (See Comments)     nausea      Pamelor [Nortriptyline] Vomiting    Pollen Extract     Statins Itching    Wound Dressing Adhesive Rash       Objective     /64   Pulse 85   Temp 98 3 °F (36 8 °C) (Temporal)   Resp 16   Ht 5' 3" (1 6 m)   Wt 75 3 kg (166 lb)   SpO2 94%   BMI 29 41 kg/m²       PHYSICAL EXAM    Gen: NAD  CV: RRR  CHEST: Clear  ABD: soft, NT/ND  EXT: no edema      ASSESSMENT/PLAN:  This is a 67y o  year old female here for EGD and colonoscopy, and she is stable and optimized for her procedure

## 2021-04-19 NOTE — ANESTHESIA PREPROCEDURE EVALUATION
Procedure:  COLONOSCOPY  EGD    Relevant Problems   CARDIO   (+) Mixed hyperlipidemia      ENDO   (+) Hypothyroidism (acquired)      GI/HEPATIC   (+) Esophageal dysphagia   (+) GERD (gastroesophageal reflux disease)   (+) Hiatal hernia      HEMATOLOGY   (+) Anemia      NEURO/PSYCH   (+) Anxiety and depression   (+) Depression, recurrent (HCC)   (+) History of pulmonary embolism   (+) Hx of adenomatous colonic polyps   (+) Personal history of DVT (deep vein thrombosis)        Physical Exam    Airway    Mallampati score: II  TM Distance: >3 FB       Dental       Cardiovascular  Rhythm: regular, Rate: normal,     Pulmonary  Breath sounds clear to auscultation,     Other Findings        Anesthesia Plan  ASA Score- 3     Anesthesia Type- IV sedation with anesthesia with ASA Monitors           Additional Monitors:   Airway Plan:           Plan Factors-    Induction-     Postoperative Plan-     Informed Consent-

## 2021-04-20 DIAGNOSIS — I26.99 BILATERAL PULMONARY EMBOLISM (HCC): ICD-10-CM

## 2021-04-20 NOTE — TELEPHONE ENCOUNTER
Patient is enrolled in [facility] program   ---she needs a refill on the xeralto   Sent to [facility] refill [facility]     Patient also would like samples if we have them

## 2021-04-20 NOTE — TELEPHONE ENCOUNTER
CALLED PATIENT TO GET CLARIFICATION ON THE MEDICATION/ PHARMACY AS WE DO NOT HAVE DOCUMENTATION ON WHERE THE MEDS NEED TO BE SENT OR WHAT THE ACTUAL PROGRAM IS  I GAVE THE INFORMATION FOR THE PROGRAM TO HER BUT WE NEVER RECEIVED ANY DOCUMENTATION THAT SHE IS ELIGIBLE  SO IF PATIENT CALLS BACK PLEASE GET CLARIFICATION ON WHAT DRUG STORE AND IF THERE IS ANY PROOF OF PAPER WORK OF THE APPROVAL PLEASE

## 2021-04-23 NOTE — TELEPHONE ENCOUNTER
Pt called back, the application was done online  She said it would be sent to [facility] on [address]   Everything was done through [facility]

## 2021-04-26 DIAGNOSIS — M79.89 SWELLING OF LIMB: ICD-10-CM

## 2021-04-26 RX ORDER — TORSEMIDE 10 MG/1
10 TABLET ORAL DAILY
Qty: 90 TABLET | Refills: 3 | Status: SHIPPED | OUTPATIENT
Start: 2021-04-26 | End: 2022-02-07 | Stop reason: SDUPTHER

## 2021-04-26 NOTE — TELEPHONE ENCOUNTER
Incoming fax refill request for Torsemide 10 mg tablet  Patient should be out of refills by 4/27/21   Order is pending approval

## 2021-04-29 ENCOUNTER — TELEPHONE (OUTPATIENT)
Dept: FAMILY MEDICINE CLINIC | Facility: CLINIC | Age: 76
End: 2021-04-29

## 2021-04-29 ENCOUNTER — APPOINTMENT (OUTPATIENT)
Dept: LAB | Facility: HOSPITAL | Age: 76
End: 2021-04-29
Payer: MEDICARE

## 2021-05-13 ENCOUNTER — OFFICE VISIT (OUTPATIENT)
Dept: GASTROENTEROLOGY | Facility: CLINIC | Age: 76
End: 2021-05-13
Payer: MEDICARE

## 2021-05-13 VITALS
BODY MASS INDEX: 29.95 KG/M2 | HEART RATE: 82 BPM | HEIGHT: 63 IN | WEIGHT: 169 LBS | SYSTOLIC BLOOD PRESSURE: 139 MMHG | DIASTOLIC BLOOD PRESSURE: 75 MMHG

## 2021-05-13 DIAGNOSIS — D50.0 IRON DEFICIENCY ANEMIA DUE TO CHRONIC BLOOD LOSS: Primary | ICD-10-CM

## 2021-05-13 DIAGNOSIS — K64.8 INTERNAL HEMORRHOIDS: ICD-10-CM

## 2021-05-13 DIAGNOSIS — K44.9 HIATAL HERNIA: ICD-10-CM

## 2021-05-13 DIAGNOSIS — Z86.010 HX OF ADENOMATOUS COLONIC POLYPS: ICD-10-CM

## 2021-05-13 DIAGNOSIS — R13.19 ESOPHAGEAL DYSPHAGIA: ICD-10-CM

## 2021-05-13 DIAGNOSIS — K21.00 GASTROESOPHAGEAL REFLUX DISEASE WITH ESOPHAGITIS WITHOUT HEMORRHAGE: ICD-10-CM

## 2021-05-13 DIAGNOSIS — K92.1 BLOOD IN STOOL: ICD-10-CM

## 2021-05-13 PROBLEM — K31.819 GAVE (GASTRIC ANTRAL VASCULAR ECTASIA): Status: ACTIVE | Noted: 2021-05-13

## 2021-05-13 PROCEDURE — 99214 OFFICE O/P EST MOD 30 MIN: CPT | Performed by: INTERNAL MEDICINE

## 2021-05-13 NOTE — PROGRESS NOTES
[facility] - Outpatient Follow-up Note  Minerva Tobar 67 y o  female MRN: [id number]  Encounter: [id number]          ASSESSMENT AND PLAN:      1  Iron deficiency anemia due to chronic blood loss   this is been present throughout her life however past 2 years has been   Taking iron  No recent CBC  She had an EGD and a colonoscopy was  Found to have hiatal hernia, gave and internal hemorrhoids  Will plan on future follow-up been band ligation of her internal hemorrhoids possibly EGD with APC for gave  - CBC and differential; Future    2  Blood in stool   hemorrhoidal bleeding planning on band ligation in the future    3  Internal hemorrhoids   as above    4  Gastroesophageal reflux disease with esophagitis without hemorrhage   controlled with famotidine    5  Esophageal dysphagia   status post dilatation some improvement but avoids meats  A understand she has tortuous esophagus which probably is contributing to the issue also  6  Hiatal hernia    Reflux under control 3-4 cm some tortuosity of the esophagus    7  Hx of adenomatous colonic polyps   colon cancer screening up-to-date  No polyps colonoscopy 7 years  ______________________________________________________________________    SUBJECTIVE:    Very pleasant 79-year-old lady with history of iron deficiency anemia intermittent rectal bleeding and dysphagia as well as reflux  Her reflux is under control  Her dysphagia some what improved after dilatation  She does have a tortuous esophagus we discussed this at length  Additionally discussed gave  She has been taking iron no recent CBC  REVIEW OF SYSTEMS IS OTHERWISE NEGATIVE        Historical Information   Past Medical History:   Diagnosis Date    Chronic respiratory failure ([address] )     uses O2 at home with NC    Hiatal hernia     Pulmonary embolism ([address] )     Bilateral; on Xarelto     Past Surgical History:   Procedure Laterality Date    ADENOIDECTOMY      CARDIAC CATHETERIZATION      CARPAL TUNNEL RELEASE      LAMINECTOMY      MASTECTOMY Bilateral     PULMONARY EMBOLISM SURGERY      REPLACEMENT TOTAL KNEE      SPINAL FUSION      TONSILLECTOMY      TUBAL LIGATION      VEIN LIGATION Right      Social History   Social History     Substance and Sexual Activity   Alcohol Use Not Currently    Frequency: 2-4 times a month     Social History     Substance and Sexual Activity   Drug Use Not Currently     Social History     Tobacco Use   Smoking Status Never Smoker   Smokeless Tobacco Never Used     Family History   Family history unknown: Yes       Meds/Allergies       Current Outpatient Medications:     acetaminophen (Tylenol) 325 mg tablet    amLODIPine (NORVASC) 5 mg tablet    Ascorbic Acid (VITAMIN C) 100 MG tablet    ASPIRIN 81 PO    atorvastatin (LIPITOR) 40 mg tablet    b complex vitamins capsule    brimonidine (Alphagan P) 0 1 %    calcium citrate-vitamin D (CITRACAL+D) 315-200 MG-UNIT per tablet    DULoxetine (CYMBALTA) 30 mg delayed release capsule    DULoxetine (CYMBALTA) 60 mg delayed release capsule    famotidine (PEPCID) 40 MG tablet    FEROSUL 325 (65 Fe) MG tablet    fexofenadine (ALLEGRA) 180 MG tablet    fluticasone (FLONASE) 50 mcg/act nasal spray    folic acid (FOLVITE) 1 mg tablet    hydrOXYzine HCL (ATARAX) 25 mg tablet    latanoprost (XALATAN) 0 005 % ophthalmic solution    levothyroxine 100 mcg tablet    pravastatin (PRAVACHOL) 40 mg tablet    pregabalin (LYRICA) 150 mg capsule    rivaroxaban (Xarelto) 20 mg tablet    rOPINIRole (REQUIP) 1 mg tablet    timolol (BLOCADREN) 5 MG tablet    torsemide (DEMADEX) 10 mg tablet    Zoster Vac Recomb Adjuvanted (Shingrix) 50 MCG/0 5ML SUSR    mupirocin (BACTROBAN) 2 % ointment    Allergies   Allergen Reactions    Hydromorphone Anaphylaxis and Hallucinations     disorientation; hallucination; confusion      Merthiolate  [Thimerosal] Hives    Quinine Derivatives Other (See Comments) and Tinnitus     tinnitus      Codeine Nausea Only and Other (See Comments)     nausea      Pamelor [Nortriptyline] Vomiting    Pollen Extract     Statins Itching    Wound Dressing Adhesive Rash           Objective     Blood pressure 139/75, pulse 82, height 5' 3" (1 6 m), weight 76 7 kg (169 lb)  Body mass index is 29 94 kg/m²  PHYSICAL EXAM:      General Appearance:   Alert, cooperative, no distress   HEENT:   Normocephalic, atraumatic, anicteric      Neck:  Supple, symmetrical, trachea midline   Lungs:   Clear to auscultation bilaterally; no rales, rhonchi or wheezing; respirations unlabored    Heart[de-identified]   Regular rate and rhythm; no murmur, rub, or gallop  Abdomen:   Soft, non-tender, non-distended; normal bowel sounds; no masses, no organomegaly    Genitalia:   Deferred    Rectal:   Deferred    Extremities:  No cyanosis, clubbing or edema    Pulses:  2+ and symmetric    Skin:  No jaundice, rashes, or lesions    Lymph nodes:  No palpable cervical lymphadenopathy        Lab Results:   No visits with results within 1 Day(s) from this visit     Latest known visit with results is:   Hospital Outpatient Visit on 04/19/2021   Component Date Value    Case Report 04/19/2021                      Value:Surgical Pathology Report                         Case: [id number]                                   Authorizing Provider:  Grace Richardson MD           Collected:           04/19/2021 1310              Ordering Location:     [facility]   Received:            04/19/2021 1634                                     Endoscopy                                                                    Pathologist:           Claude Schroeder, MD                                                                Specimens:   A) - Stomach, bx antrum                                                                             B) - Stomach, bx gastric body                                                                       C) - Polyp, Stomach/Small Intestine, bx gastric polyp                                               D) - Esophagus, bx eg junction                                                                      E) - Colon, bx ascending colon polyp                                                       Final Diagnosis 04/19/2021                      Value: This result contains rich text formatting which cannot be displayed here   Preliminary Diagnosis 04/19/2021                      Value: This result contains rich text formatting which cannot be displayed here   Additional Information 04/19/2021                      Value: This result contains rich text formatting which cannot be displayed here  [facility] Gross Description 04/19/2021                      Value: This result contains rich text formatting which cannot be displayed here  Radiology Results:   No results found

## 2021-05-17 ENCOUNTER — LAB (OUTPATIENT)
Dept: LAB | Facility: HOSPITAL | Age: 76
End: 2021-05-17
Attending: INTERNAL MEDICINE
Payer: MEDICARE

## 2021-05-17 ENCOUNTER — CONSULT (OUTPATIENT)
Dept: FAMILY MEDICINE CLINIC | Facility: CLINIC | Age: 76
End: 2021-05-17
Payer: MEDICARE

## 2021-05-17 VITALS
RESPIRATION RATE: 16 BRPM | WEIGHT: 167.6 LBS | BODY MASS INDEX: 29.7 KG/M2 | DIASTOLIC BLOOD PRESSURE: 80 MMHG | HEART RATE: 80 BPM | TEMPERATURE: 97 F | HEIGHT: 63 IN | SYSTOLIC BLOOD PRESSURE: 120 MMHG | OXYGEN SATURATION: 97 %

## 2021-05-17 DIAGNOSIS — Z01.818 PREOPERATIVE CLEARANCE: ICD-10-CM

## 2021-05-17 DIAGNOSIS — I10 ESSENTIAL HYPERTENSION: ICD-10-CM

## 2021-05-17 DIAGNOSIS — Z86.711 HISTORY OF PULMONARY EMBOLISM: ICD-10-CM

## 2021-05-17 DIAGNOSIS — R73.01 ELEVATED FASTING GLUCOSE: ICD-10-CM

## 2021-05-17 DIAGNOSIS — R94.31 ABNORMAL EKG: ICD-10-CM

## 2021-05-17 DIAGNOSIS — E78.2 MIXED HYPERLIPIDEMIA: ICD-10-CM

## 2021-05-17 DIAGNOSIS — E03.9 HYPOTHYROIDISM (ACQUIRED): ICD-10-CM

## 2021-05-17 DIAGNOSIS — K64.9 BLEEDING HEMORRHOIDS: ICD-10-CM

## 2021-05-17 DIAGNOSIS — Z86.718 PERSONAL HISTORY OF DVT (DEEP VEIN THROMBOSIS): ICD-10-CM

## 2021-05-17 DIAGNOSIS — Z01.818 PREOPERATIVE CLEARANCE: Primary | ICD-10-CM

## 2021-05-17 DIAGNOSIS — D50.0 IRON DEFICIENCY ANEMIA DUE TO CHRONIC BLOOD LOSS: ICD-10-CM

## 2021-05-17 PROBLEM — M21.42 ACQUIRED PES PLANUS OF LEFT FOOT: Status: ACTIVE | Noted: 2021-03-11

## 2021-05-17 PROBLEM — M21.40: Status: ACTIVE | Noted: 2021-05-13

## 2021-05-17 PROBLEM — M25.579 ANKLE PAIN: Status: ACTIVE | Noted: 2021-03-11

## 2021-05-17 PROBLEM — M25.579 PAIN IN JOINT INVOLVING ANKLE AND FOOT: Status: ACTIVE | Noted: 2021-03-11

## 2021-05-17 PROBLEM — M24.573 EQUINUS CONTRACTURE OF ANKLE: Status: ACTIVE | Noted: 2021-05-13

## 2021-05-17 PROBLEM — M31.6 TA (TEMPORAL ARTERITIS) (HCC): Status: ACTIVE | Noted: 2021-05-17

## 2021-05-17 PROBLEM — K92.1 HEMATOCHEZIA: Status: ACTIVE | Noted: 2021-03-11

## 2021-05-17 PROBLEM — G47.33 MODERATE OBSTRUCTIVE SLEEP APNEA: Status: ACTIVE | Noted: 2021-03-23

## 2021-05-17 PROBLEM — M21.372 LEFT FOOT DROP: Status: ACTIVE | Noted: 2021-03-11

## 2021-05-17 LAB
BASOPHILS # BLD AUTO: 0.02 THOUSANDS/ΜL (ref 0–0.1)
BASOPHILS NFR BLD AUTO: 0 % (ref 0–1)
EOSINOPHIL # BLD AUTO: 0.23 THOUSAND/ΜL (ref 0–0.61)
EOSINOPHIL NFR BLD AUTO: 3 % (ref 0–6)
ERYTHROCYTE [DISTWIDTH] IN BLOOD BY AUTOMATED COUNT: 13.1 % (ref 11.6–15.1)
HCT VFR BLD AUTO: 44 % (ref 34.8–46.1)
HGB BLD-MCNC: 14 G/DL (ref 11.5–15.4)
IMM GRANULOCYTES # BLD AUTO: 0.01 THOUSAND/UL (ref 0–0.2)
IMM GRANULOCYTES NFR BLD AUTO: 0 % (ref 0–2)
LYMPHOCYTES # BLD AUTO: 2.08 THOUSANDS/ΜL (ref 0.6–4.47)
LYMPHOCYTES NFR BLD AUTO: 29 % (ref 14–44)
MCH RBC QN AUTO: 29.4 PG (ref 26.8–34.3)
MCHC RBC AUTO-ENTMCNC: 31.8 G/DL (ref 31.4–37.4)
MCV RBC AUTO: 92 FL (ref 82–98)
MONOCYTES # BLD AUTO: 0.65 THOUSAND/ΜL (ref 0.17–1.22)
MONOCYTES NFR BLD AUTO: 9 % (ref 4–12)
NEUTROPHILS # BLD AUTO: 4.16 THOUSANDS/ΜL (ref 1.85–7.62)
NEUTS SEG NFR BLD AUTO: 59 % (ref 43–75)
PLATELET # BLD AUTO: 299 THOUSANDS/UL (ref 149–390)
PMV BLD AUTO: 10.8 FL (ref 8.9–12.7)
RBC # BLD AUTO: 4.77 MILLION/UL (ref 3.81–5.12)
TSH SERPL DL<=0.05 MIU/L-ACNC: 1.19 UIU/ML (ref 0.34–5.6)
WBC # BLD AUTO: 7.15 THOUSAND/UL (ref 4.31–10.16)

## 2021-05-17 PROCEDURE — 84443 ASSAY THYROID STIM HORMONE: CPT

## 2021-05-17 PROCEDURE — 93000 ELECTROCARDIOGRAM COMPLETE: CPT | Performed by: NURSE PRACTITIONER

## 2021-05-17 PROCEDURE — 85025 COMPLETE CBC W/AUTO DIFF WBC: CPT

## 2021-05-17 PROCEDURE — 36415 COLL VENOUS BLD VENIPUNCTURE: CPT

## 2021-05-17 PROCEDURE — 99214 OFFICE O/P EST MOD 30 MIN: CPT | Performed by: NURSE PRACTITIONER

## 2021-05-17 PROCEDURE — 83036 HEMOGLOBIN GLYCOSYLATED A1C: CPT

## 2021-05-17 RX ORDER — METHYLPREDNISOLONE ACETATE 40 MG/ML
1 INJECTION, SUSPENSION INTRA-ARTICULAR; INTRALESIONAL; INTRAMUSCULAR; SOFT TISSUE
COMMUNITY
End: 2021-05-19

## 2021-05-17 RX ORDER — BRIMONIDINE TARTRATE 0.15 %
DROPS OPHTHALMIC (EYE) EVERY EVENING
COMMUNITY
Start: 2021-04-13

## 2021-05-17 RX ORDER — TIMOLOL MALEATE 5 MG/ML
SOLUTION/ DROPS OPHTHALMIC 2 TIMES DAILY
COMMUNITY
Start: 2021-04-13

## 2021-05-17 RX ORDER — TIMOLOL MALEATE 5 MG/ML
SOLUTION/ DROPS OPHTHALMIC
COMMUNITY
End: 2021-05-19

## 2021-05-17 NOTE — PROGRESS NOTES
Assessment/Plan:     Presents in office for preoperative clearance     Underlying medical issues reviewed and noted     She is having left ankle foot surgery due to foot drop and needs tendon release  Reviewed labs --> additional labs ordered   EKG done and reviewed - copy sent to Dr [facility]   To reviewed   She is currently on Xeralto and Aspirin   ADDENDUM   Received clearance for Cardiology   She is moderate cardiac risk   Xerolto  to be hel 2 days prior patient aware   Aspirin 5 days prior and she has done so     Cardiology wants the Juan  resumed the day of surgery - will relay to [facility]            faxed EKG to Dr Pamela Son   Problem List Items Addressed This Visit        Endocrine    Hypothyroidism (acquired)       Other    Mixed hyperlipidemia    Relevant Orders    HEMOGLOBIN A1C W/ EAG ESTIMATION (Completed)    TSH, 3rd generation (Completed)    Personal history of DVT (deep vein thrombosis)    History of pulmonary embolism      Other Visit Diagnoses     Preoperative clearance    -  Primary    Relevant Orders    HEMOGLOBIN A1C W/ EAG ESTIMATION (Completed)    TSH, 3rd generation (Completed)    POCT ECG (Completed)    Elevated fasting glucose        Relevant Orders    HEMOGLOBIN A1C W/ EAG ESTIMATION (Completed)    TSH, 3rd generation (Completed)    Bleeding hemorrhoids        Relevant Orders    HEMOGLOBIN A1C W/ EAG ESTIMATION (Completed)    TSH, 3rd generation (Completed)    Abnormal EKG        Essential hypertension                Subjective:      Patient ID: Ariel Angela is a 67 y o  female  Presents in office for preoperative clearance - patient of Dr Arsh Coffey     Underlying medical issues reviewed and noted     She is having left ankle foot surgery due to foot drop and needs tendon release    Reviewed labs --> additional labs ordered   EKG done and reviewed - copy sent to Dr [facility]     Patient Active Problem List:     Hiatal hernia     Mixed hyperlipidemia     Anxiety and depression     Hypothyroidism (acquired)     Restless leg syndrome, controlled     Swelling of limb     Osteoporosis     GERD (gastroesophageal reflux disease)     Lesion of radial nerve     Ductal carcinoma in situ (DCIS) of left breast     Bilateral pulmonary embolism (HCC)     Anemia     Abnormal gait     Cellulitis and abscess of right lower extremity     Trochanteric bursitis of left hip     Personal history of DVT (deep vein thrombosis)     Depression, recurrent (HCC)     Hx of adenomatous colonic polyps     Esophageal dysphagia     History of pulmonary embolism     Blood in stool     Iron deficiency anemia due to chronic blood loss     Internal hemorrhoids     GAVE (gastric antral vascular ectasia)     Spasm, peroneo-extensor     Pain in joint involving ankle and foot     Ankle pain     Moderate obstructive sleep apnea     Left foot drop     Hematochezia     Equinus contracture of ankle     Acquired pes planus of left foot     TA (temporal arteritis) ([facility] [address] )        The following portions of the patient's history were reviewed and updated as appropriate:   Past Medical History:  She has a past medical history of Cancer ([address] ), Cervical herniated disc, Chronic pain disorder, Chronic respiratory failure ([address] ), CPAP (continuous positive airway pressure) dependence, Disease of thyroid gland, DVT (deep venous thrombosis) ([address] ) (2020), Family history of reaction to anesthesia, Fibromyalgia, primary, GERD (gastroesophageal reflux disease), Glaucoma, Hiatal hernia, History of palpitations, History of pneumonia, History of transfusion, Hyperlipidemia, Hypertension, Irregular heart beat, Migraine, Myocardial infarction ([address] ), OAB (overactive bladder), Pollen allergies, Pulmonary embolism ([address] ) (2019), Restless leg, Risk for falls, Sleep apnea, Use of cane as ambulatory aid, Uses brace, and Wears glasses  ,  _______________________________________________________________________  Medical Problems:  does not have any pertinent problems on file ,  _______________________________________________________________________  Past Surgical History:   has a past surgical history that includes Tonsillectomy; Vein ligation (Right); Tubal ligation; ADENOIDECTOMY; Laminectomy; Spinal fusion; Replacement total knee; Carpal tunnel release; Cardiac catheterization; Cataract extraction (Bilateral); Joint replacement (Bilateral); Foot surgery; Lumbar epidural injection; Mastectomy (Bilateral); Nissen fundoplication; and Tumor excision  ,  _______________________________________________________________________  Family History:  Family history is unknown by patient  ,  _______________________________________________________________________  Social History:   reports that she has never smoked  She has never used smokeless tobacco  She reports current alcohol use  She reports that she does not use drugs  ,  _______________________________________________________________________  Allergies:  is allergic to hydromorphone; merthiolate  [thimerosal]; quinine derivatives; codeine; pamelor [nortriptyline]; pollen extract; statins; and wound dressing adhesive     _______________________________________________________________________  Current Outpatient Medications   Medication Sig Dispense Refill    acetaminophen (Tylenol) 325 mg tablet Take 650 mg by mouth every 6 (six) hours as needed for mild pain      amLODIPine (NORVASC) 5 mg tablet Take 1 tablet (5 mg total) by mouth daily (Patient taking differently: Take 5 mg by mouth every evening ) 90 tablet 3    Ascorbic Acid (VITAMIN C) 100 MG tablet Take 100 mg by mouth daily      ASPIRIN 81 PO Aspirin EC 81 MG Oral Tablet Delayed Release    Refills: 0       Active      b complex vitamins capsule Take 1 capsule by mouth daily      brimonidine (ALPHAGAN P) 0 15 % ophthalmic solution Administer to the right eye every evening       calcium citrate-vitamin D (CITRACAL+D) 315-200 MG-UNIT per tablet Calcium + D TABS    Refills: 0       Active      DULoxetine (CYMBALTA) 30 mg delayed release capsule TAKE 1 CAPSULE EVERY DAY 90 capsule 3    famotidine (PEPCID) 40 MG tablet Take 1 tablet (40 mg total) by mouth daily (Patient taking differently: Take 40 mg by mouth daily at bedtime ) 90 tablet 3    FEROSUL 325 (65 Fe) MG tablet Take 1 tablet by mouth 2 (two) times a day with meals      fexofenadine (ALLEGRA) 180 MG tablet Take 180 mg by mouth as needed       fluticasone (FLONASE) 50 mcg/act nasal spray as needed       folic acid (FOLVITE) 1 mg tablet Take 1 mg by mouth daily      hydrOXYzine HCL (ATARAX) 25 mg tablet Take 1 tablet (25 mg total) by mouth every 6 (six) hours as needed for itching 30 tablet 1    latanoprost (XALATAN) 0 005 % ophthalmic solution       levothyroxine 100 mcg tablet Take 1 tablet (100 mcg total) by mouth daily 90 tablet 1    pregabalin (LYRICA) 150 mg capsule Take 150 mg by mouth 2 (two) times a day      rivaroxaban (Xarelto) 20 mg tablet Take 1 tablet (20 mg total) by mouth daily (Patient taking differently: Take 20 mg by mouth every evening ) 90 tablet 3    rOPINIRole (REQUIP) 1 mg tablet Take 1 tablet (1 mg total) by mouth daily at bedtime (Patient taking differently: Take 1 mg by mouth daily at bedtime Patient takes 1 tab QPM and 2 tabs at QHS) 90 tablet 3    timolol (TIMOPTIC) 0 5 % ophthalmic solution Administer to both eyes 2 (two) times a day       torsemide (DEMADEX) 10 mg tablet Take 1 tablet (10 mg total) by mouth daily 90 tablet 3    Zoster Vac Recomb Adjuvanted (Shingrix) 50 MCG/0 5ML SUSR Shingrix (PF) 50 mcg/0 5 mL intramuscular suspension, kit      cholecalciferol (VITAMIN D3) 1,000 units tablet Take 1,000 Units by mouth daily      DULoxetine (CYMBALTA) 60 mg delayed release capsule Take 1 capsule (60 mg total) by mouth daily 90 capsule 1    mupirocin (BACTROBAN) 2 % ointment Apply topically 3 (three) times a day for 7 days (Patient not taking: Reported on 5/17/2021) 30 g 1     No current facility-administered medications for this visit       _______________________________________________________________________  Review of Systems   Constitutional: Positive for fatigue  Negative for activity change, appetite change, chills, diaphoresis and fever  HENT: Negative for congestion, ear discharge, ear pain, facial swelling, nosebleeds, sore throat, tinnitus, trouble swallowing and voice change  Eyes: Negative for photophobia, pain, discharge, redness, itching and visual disturbance  Respiratory: Negative for apnea, cough, choking, chest tightness and shortness of breath  Cardiovascular: Negative for chest pain, palpitations and leg swelling  Denies any and all cardiac symptoms  pulm hypertensions and history of PE   Currently on  Dual blood thinners aspirin and Xarlto  Under care of Cardiology [facility]     Gastrointestinal: Negative for abdominal distention, abdominal pain, blood in stool, constipation, diarrhea, nausea and vomiting  Endocrine: Negative for cold intolerance, heat intolerance, polydipsia, polyphagia and polyuria  Genitourinary: Negative for decreased urine volume, difficulty urinating, dysuria, enuresis, frequency, hematuria, pelvic pain, urgency and vaginal bleeding  Musculoskeletal: Positive for arthralgias, back pain and myalgias  Negative for gait problem, joint swelling, neck pain and neck stiffness  Left foot drop    Skin: Negative for color change, pallor and rash  Allergic/Immunologic: Negative for immunocompromised state  Neurological: Negative for dizziness, seizures, facial asymmetry, light-headedness, numbness and headaches  Hematological: Negative for adenopathy  Does not bruise/bleed easily  Psychiatric/Behavioral: Negative for agitation, behavioral problems, confusion, decreased concentration, dysphoric mood and hallucinations  The patient is not hyperactive            Objective:  Vitals:    05/17/21 1043   BP: 120/80   BP Location: Right arm   Patient Position: Sitting   Cuff Size: Standard   Pulse: 80   Resp: 16   Temp: (!) 97 °F (36 1 °C)   TempSrc: Temporal   SpO2: 97%   Weight: 76 kg (167 lb 9 6 oz)   Height: 5' 3" (1 6 m)     Body mass index is 29 69 kg/m²  Physical Exam  Vitals signs and nursing note reviewed  Constitutional:       Comments: BMI 29 94    HENT:      Head: Atraumatic  Eyes:      Extraocular Movements: Extraocular movements intact  Neck:      Musculoskeletal: Normal range of motion  Cardiovascular:      Rate and Rhythm: Normal rate and regular rhythm  Comments: EKG reviewed and sent over to cardiology   Pulmonary:      Effort: Pulmonary effort is normal       Breath sounds: Normal breath sounds  Abdominal:      Palpations: Abdomen is soft  Musculoskeletal:      Comments: LEFT FOOT DROP and support brace    Skin:     General: Skin is dry  Neurological:      Mental Status: She is alert and oriented to person, place, and time  Psychiatric:         Mood and Affect: Mood normal          Behavior: Behavior normal          CBC and differential  Status: Final result   Imaging    CBC and differential (Order: [id number]) - 5/17/2021  Lab Order Result Printout    CBC and differential (Order #[id number]) on 5/17/21   External Results Report    Open External Results Report   Comments    This is a patient instruction: This test is non-fasting  Please drink two glasses of water morning of bloodwork            Lab Component SmartPhrase Guide    CBC and differential (Order #[id number]) on 5/17/21   CBC and differential  Order: [id number]  Status:  Final result   Visible to patient:  Yes ([address])   Next appt:  06/01/2021 at 11:30 AM in Family Medicine Hector Dye DO)   Dx:  Iron deficiency anemia due to chronic       Ref Range & Units 5/17/21 12:50 PM   WBC 4 31 - 10 16 Thousand/uL 7 15    RBC 3 81 - 5 12 Million/uL 4 77    Hemoglobin 11 5 - 15 4 g/dL 14 0    Hematocrit 34 8 - 46 1 % 44 0    MCV 82 - 98 fL 92    MCH 26 8 - 34 3 pg 29 4    MCHC 31 4 - 37 4 g/dL 31 8    RDW 11 6 - 15 1 % 13 1    MPV 8 9 - 12 7 fL 10 8    Platelets 512 - 457 Thousands/uL 299    Neutrophils Relative 43 - 75 % 59    Immat GRANS % 0 - 2 % 0    Lymphocytes Relative 14 - 44 % 29    Monocytes Relative 4 - 12 % 9    Eosinophils Relative 0 - 6 % 3    Basophils Relative 0 - 1 % 0    Neutrophils Absolute 1 85 - 7 62 Thousands/µL 4 16    Immature Grans Absolute 0 00 - 0 20 Thousand/uL 0 01    Lymphocytes Absolute 0 60 - 4 47 Thousands/µL 2 08    Monocytes Absolute 0 17 - 1 22 Thousand/µL 0 65    Eosinophils Absolute 0 00 - 0 61 Thousand/µL 0 23    Basophils Absolute 0 00 - 0 10 Thousands/µL 0 02          Specimen Collected: 05/17/21 12:50 PM   Last Resulted: 05/17/21  1:12 PM        Lab Flowsheet     Order Details     View Encounter     Lab and Collection Details     Routing     Result History           Result Communications    Result Notes   Royer Martínez LPN   9/06/3167  7:68 AM EDT      Patient aware  Educated  Ov scheduled  Thank you    Hilda Roe MD   5/17/2021  3:32 PM EDT      Please inform patient of unremarkable results   Schedule follow-up office visit            Ordered On 5/13/2021 11:24 AM    Ordering Provider Authorizing Provider Ordering User Ordering Department   MD Eulalio Watts MD Edmund Gardener, MD [address] [place] [address]        Other Results from 5/17/2021    Contains abnormal data HEMOGLOBIN A1C W/ EAG ESTIMATION  Order: [id number]    Status:  Final result   Visible to patient:  Yes ([facility])   Next appt:  06/01/2021 at 11:30 AM in Family Medicine [facility])   Dx:  Mixed hyperlipidemia; Bleeding hemorr    Ref Range & Units 5/17/21 12:50 PM   Hemoglobin A1C Normal 3 8-5 6%; PreDiabetic 5 7-6 4%;  Diabetic >=6 5%; Glycemic control for adults with diabetes <7 0% % 5 8High     EAG mg/dl 120          Specimen Collected: 05/17/21 12:50 PM   Last Resulted: 05/18/21 12:06 AM        Lab Flowsheet     Order Details     View Encounter     Lab and Collection Details     Routing     Result History              Ordered On 5/17/2021 11:11 AM    Ordering Provider Authorizing Provider Ordering User Ordering Department   Michell Wiggins, [address] [place], [place] [address]JACK CRNP [facility]    [phone]     [phone]           TSH, 3rd generation  Order: [id number]    Status:  Final result   Visible to patient:  Yes ([facility])   Next appt:  06/01/2021 at 11:30 AM in Family Medicine Aliya Smalls, )   Dx:  Mixed hyperlipidemia; Bleeding hemorr    Ref Range & Units 5/17/21 12:50 PM   TSH 3RD GENERATON 0 340 - 5 600 uIU/mL 1 194    Comment: The recommended reference ranges for TSH during pregnancy are as follows:   First trimester   0 05-3 7   Second trimester  0 31-4 35   Third trimester   0 41-5 18     Note: Normal ranges may not apply to patients who are transgender, non-binary, or whose legal sex, sex at birth, and gender identity differ  Narrative    Patients undergoing fluorescein dye angiography may retain small amounts of fluorescein in the body for 48-72 hours post procedure  Samples containing fluorescein can produce falsely depressed TSH values  If the patient had this procedure,a specimen should be resubmitted post fluorescein clearance         Specimen Collected: 05/17/21 12:50 PM   Last Resulted: 05/17/21  3:59 PM        Lab Flowsheet     Order Details     View Encounter     Lab and Collection Details     Routing     Result History              Ordered On 5/17/2021 11:11 AM    Ordering Provider Authorizing Provider Ordering User Ordering Department   JACK Hancock CRNP Nidia Hoop, [facility]    [phone]     [phone]        Specimen Description:      5/17/2021  1:12 PM    Component Value Flag Ref Range Units Status   WBC 7 15   4 31 - 10 16 Thousand/uL Final   RBC 4 77   3 81 - 5 12 Million/uL Final   Hemoglobin 14 0   11 5 - 15 4 g/dL Final   Hematocrit 44 0   34 8 - 46 1 % Final   MCV 92   82 - 98 fL Final   MCH 29 4   26 8 - 34 3 pg Final   MCHC 31 8   31 4 - 37 4 g/dL Final   RDW 13 1   11 6 - 15 1 % Final   MPV 10 8   8 9 - 12 7 fL Final   Platelets 594   055 - 390 Thousands/uL Final   Neutrophils Relative 59   43 - 75 % Final   Immat GRANS % 0   0 - 2 % Final   Lymphocytes Relative 29   14 - 44 % Final   Monocytes Relative 9   4 - 12 % Final   Eosinophils Relative 3   0 - 6 % Final   Basophils Relative 0   0 - 1 % Final   Neutrophils Absolute 4 16   1 85 - 7 62 Thousands/µL Final   Immature Grans Absolute 0 01   0 00 - 0 20 Thousand/uL Final   Lymphocytes Absolute 2 08   0 60 - 4 47 Thousands/µL Final   Monocytes Absolute 0 65   0 17 - 1 22 Thousand/µL Final   Eosinophils Absolute 0 23   0 00 - 0 61 Thousand/µL Final   Basophils Absolute 0 02   0 00 - 0 10 Thousands/µL Final   All Reviewers List    Raven Knight LPN on 6/90/8123  5:38 AM   Alana Soliman MD on 5/17/2021  3:32 PM   Lab Information    Lab   [facility]   [address] [address]   Tel: [phone]   : Jerica Coleman MD-Lab Medical Director              Additional Information    Specimen ID Bill Type Client ID   [id number]            Specimen Date Taken Specimen Time Taken Specimen Received Date Specimen Received Time Result Date Result Time   May 17, 2021 12:50 PM May 17, 2021  1:08 PM May 17, 2021  1:12 PM   Routing History    Priority Sent On From To Message Type    5/17/2021  3:59 PM Lab, Background User Sukhdev Sanchez Results    5/17/2021  3:32 PM MD [address]i [address] Clinical Result Notes    5/17/2021  1:12 PM Lab, Background User Alana Soliman MD Results   Results Routing Details    Order ID: [id number]   Result contact date: 5/17/21   Result status: Final result   Outcome: Routed using routing scheme   Routing Scheme Used: SL SYSTEM DEFINITION RESULTS ROUTING [de-identified]   Routing Scheme Line: Default   Resulting User: Lab, Background User [employer]   Routing Instant: Mon May 17, 2021  1:12 PM   Current Status: Routing Complete   Status History: Result contact created Mon May 17, 2021 12:50 PM     Routing Complete Mon May 17, 2021 12:50 PM     Routing started Mon May 17, 2021  1:12 PM     Routing Complete Mon May 17, 2021  1:12 PM   In [place] Sent: Message ID: [id number]  Recipients:        Fazal Mistry MD  [[id number]]              Responsible: Yes   Other Results from 5/17/2021     Comprehensive metabolic panel  Needs to be Collected     HEMOGLOBIN A1C W/ EAG ESTIMATION  Final result 5/17/2021    TSH, 3rd generation  Final result 5/17/2021     Contains abnormal data HEMOGLOBIN A1C W/ EAG ESTIMATION  Order: [id number]  Status:  Final result   Visible to patient:  Yes ([facility])   Next appt:  06/01/2021 at 11:30 AM [facility] )   Dx:  Mixed hyperlipidemia; Bleeding hemorr    Ref Range & Units 5/17/21 12:50 PM   Hemoglobin A1C Normal 3 8-5 6%; PreDiabetic 5 7-6 4%; Diabetic >=6 5%; Glycemic control for adults with diabetes <7 0% % 5 8High     EAG mg/dl 120          Specimen Collected: 05/17/21 12:50 PM   Last Resulted: 05/18/21 12:06 AM              TSH, 3rd generation  Order: [id number]  Status:  Final result   Visible to patient:  Yes ([facility])   Next appt:  06/01/2021 at 11:30 AM in [facility] )   Dx:  Mixed hyperlipidemia; Bleeding hemorr       Ref Range & Units 5/17/21 12:50 PM   TSH 3RD GENERATON 0 340 - 5 600 uIU/mL 1 194    Comment: The recommended reference ranges for TSH during pregnancy are as follows:   First trimester   0 05-3 7   Second trimester  0 31-4 35   Third trimester   0 41-5 18

## 2021-05-18 LAB
EST. AVERAGE GLUCOSE BLD GHB EST-MCNC: 120 MG/DL
HBA1C MFR BLD: 5.8 %

## 2021-05-19 RX ORDER — MELATONIN
1000 DAILY
COMMUNITY

## 2021-05-19 NOTE — PRE-PROCEDURE INSTRUCTIONS
Pre-Surgery Instructions:   Medication Instructions    amLODIPine (NORVASC) 5 mg tablet Instructed patient per Anesthesia Guidelines   Ascorbic Acid (VITAMIN C) 100 MG tablet Instructed patient per Anesthesia Guidelines   ASPIRIN 81 PO Patient was instructed by Physician and understands   b complex vitamins capsule Instructed patient per Anesthesia Guidelines   brimonidine (ALPHAGAN P) 0 15 % ophthalmic solution Instructed patient per Anesthesia Guidelines   calcium citrate-vitamin D (CITRACAL+D) 315-200 MG-UNIT per tablet Instructed patient per Anesthesia Guidelines   cholecalciferol (VITAMIN D3) 1,000 units tablet Instructed patient per Anesthesia Guidelines   DULoxetine (CYMBALTA) 30 mg delayed release capsule Instructed patient per Anesthesia Guidelines   famotidine (PEPCID) 40 MG tablet Instructed patient per Anesthesia Guidelines   FEROSUL 325 (65 Fe) MG tablet Instructed patient per Anesthesia Guidelines   fexofenadine (ALLEGRA) 180 MG tablet Instructed patient per Anesthesia Guidelines   fluticasone (FLONASE) 50 mcg/act nasal spray Instructed patient per Anesthesia Guidelines   folic acid (FOLVITE) 1 mg tablet Instructed patient per Anesthesia Guidelines   hydrOXYzine HCL (ATARAX) 25 mg tablet Instructed patient per Anesthesia Guidelines   latanoprost (XALATAN) 0 005 % ophthalmic solution Instructed patient per Anesthesia Guidelines   levothyroxine 100 mcg tablet Instructed patient per Anesthesia Guidelines   pregabalin (LYRICA) 150 mg capsule Instructed patient per Anesthesia Guidelines   rivaroxaban (Xarelto) 20 mg tablet Patient was instructed to contact Physician for medication instruction   rOPINIRole (REQUIP) 1 mg tablet Instructed patient per Anesthesia Guidelines   timolol (TIMOPTIC) 0 5 % ophthalmic solution Instructed patient per Anesthesia Guidelines   torsemide (DEMADEX) 10 mg tablet Instructed patient per Anesthesia Guidelines  Patient   Instructed to take levothyroxine and duloxetine**with a sip of water the morning of surgery and use timolol eye drops  Patient instructed on use of chlorhexidine soap per hospital protocol    Patient instructed to stop NSAIDS, vitamins and herbal supplements one week prior to surgery or per Dr Qi Prajapati

## 2021-05-21 ENCOUNTER — TELEPHONE (OUTPATIENT)
Dept: FAMILY MEDICINE CLINIC | Facility: CLINIC | Age: 76
End: 2021-05-21

## 2021-05-21 ENCOUNTER — ANESTHESIA EVENT (OUTPATIENT)
Dept: PERIOP | Facility: HOSPITAL | Age: 76
End: 2021-05-21
Payer: MEDICARE

## 2021-05-21 DIAGNOSIS — M79.7 FIBROMYALGIA: ICD-10-CM

## 2021-05-21 NOTE — TELEPHONE ENCOUNTER
Robinson Llanos from [facility] called requesting the status of the pre op clearance and also the [facility] Parkinson H&P form -  Per vera, she just needs to speak to pts cardiologist  She has reached out twice now with no response  She will try again today  pts surgery is scheduled for Monday    Contact info for Robinson Llanos :  Phone [phone]  Fax # [phone]

## 2021-05-21 NOTE — PATIENT INSTRUCTIONS
Medicine Management Before Surgery   AMBULATORY CARE:   Medicine management before surgery  means creating a plan with your healthcare providers to stop, start, or change your medicines  The plan can help prevent complications during and after surgery  The plan may also prevent your surgery from getting canceled or delayed  How to create a medicine management plan:   · Tell all of your healthcare providers about your surgery  Schedule appointments to see them before your surgery  You may need blood work or tests before surgery to help your healthcare provider make changes to your medicines  Some of your medicines may need to be stopped several days to weeks before surgery  Other medicines may be stopped the night before, or need to be taken the day of surgery  Do not stop taking your medicine until you talk to your healthcare providers  · Bring a list of all of your medicines, vitamins, and dietary supplements to your preoperative appointment and on the day of surgery  You may also bring your pill bottles  This will help your anesthesiologist plan your anesthesia and keep you safe during and after surgery  · If your healthcare provider tells you to take medicine on the day of surgery, take it in the morning with a sip of water  On the day of your surgery, tell healthcare providers what medicines you did or did not take that day  Contact your healthcare provider if:   · You have questions about when to stop taking your medicine before surgery  · You have questions about what medicines to take or not take on the day of your surgery  · You need a prescription refilled before surgery  Blood thinner and antiplatelet medicine: If you take blood thinners or antiplatelet medicines, you may be at risk for heavy bleeding during or after surgery  Ask your healthcare provider if you need to stop taking your medicine, and when to stop   After you have stopped your medicine for several days, you may need blood tests  Your surgery may be canceled or rescheduled if your blood tests are abnormal   · You may need to stop taking your blood thinner, such as warfarin, 5 to 7 days before your surgery  While you are not taking your blood thinner, you may need daily injections of heparin  Heparin may decrease your risk for a blood clot while you are not taking your blood thinner  You may need heparin injections once per day, starting the day that you stop your blood thinner  You may continue heparin injections until the day of your surgery  Talk to your healthcare provider about heparin injections  You may need a family member or friend to give you the heparin injection  · New oral anticoagulants (NOAC), such as rivaroxaban, may be stopped closer to the day of your surgery  Your healthcare provider may tell you to stop your NOAC 1 to 3 days before surgery  You will not need heparin injections or other medicines while you are not taking your NOAC  Talk to your healthcare provider before you stop taking your NOAC  · Your healthcare provider may tell you to stop taking antiplatelet medicine, such as aspirin, 5 to 7 days before surgery  You may instead need to continue taking your medicine until the day of your surgery  Talk to your healthcare provider about when to stop these medicines  Heart medicine: Take your heart medicine on the day of your surgery unless your healthcare provider tells you not to  Heart medicines may decrease your risk for complications during or after surgery  If you have had a heart attack or chest pain during exercise, you may need to see a cardiologist before surgery  You may also need to see a cardiologist if you have coronary artery disease  The cardiologist may change your medicines or start you on a heart medicine called a beta-blocker  A beta-blocker controls your heart rate, and may decrease your risk for heart problems during or after surgery     Blood pressure medicine:   · Take your blood pressure medicine on the day of surgery unless your healthcare provider tells you not to  Ask if you should take your diuretic, angiotensin-converting enzyme (ACE) inhibitor, or angiotensin receptor blocker (ARB) on the day of surgery  These medicines may need to be stopped 1 to 2 days before surgery  Diuretic medicines may cause your blood pressure to go too low during surgery  If you take more than one blood pressure medicine, ask which medicine to take on the day of your surgery  · You may need your blood pressure checked a few days before your surgery  This may help your healthcare provider make changes to your medicine and get you ready for surgery  If your blood pressure is not controlled before your surgery, the surgery may be canceled or delayed  Diabetes medicine:   · Keep a diary of your blood sugar levels for 2 weeks before your surgery  Bring your diary to your preoperative appointments  This will help your healthcare providers plan how to change, or when to stop, your diabetes medicine before surgery  · Talk to your healthcare provider about managing your diabetes medicine before surgery  You may be told to lower your dose of long-acting or intermediate-acting insulin the night before, or the morning of, your surgery  You may also be told to skip your dose of fast-acting insulin on the morning of your surgery  Do not take your oral diabetes medicine on the day of your surgery unless your healthcare provider says it is okay  Oral diabetes medicine may cause your blood sugar to drop too low during or after surgery  · If you use an insulin pump, ask what you should do the night before, and morning of, your surgery  Your healthcare provider may tell you to continue or lower your basal dose, and skip your bolus doses, on the day of surgery  Bring extra supplies for you insulin pump, such as cartridges, tubing, and needles   When you arrive for surgery, tell all healthcare providers that you use an insulin pump  They will need to know how your pump works, what your basal rate is, and how to make changes to your basal rate  Your anesthesiologist may disconnect your insulin pump and use IV insulin instead to control your blood sugars during surgery  · Check your blood sugar level on the morning before your surgery  Fasting may cause your blood sugar to be low  The stress of surgery may instead cause your blood sugar to be high  At your preoperative appointment ask your healthcare provider what to do on the morning of surgery if your blood sugar is high or low  Your blood sugar will be monitored closely before, during, and after surgery  This will prevent complications such as poor wound healing and infection  Antiseizure medicine: Take your antiseizure medicine on the morning of your surgery unless your healthcare provider tells you not to  Your healthcare provider may give you antiseizure medicine in your IV before or after surgery  Nonopioid pain medicine:  Stop taking your nonopioid pain medicine 2 days before surgery  Nonopioid pain medicine, such as NSAIDs, may increase your risk for bleeding during surgery  Other nonopioid medicine, such as gabapentin, may interfere with other medicines you may get during surgery  Steroids: Take your steroid medicine on the day of your surgery unless your healthcare provider tells you not to  Your healthcare provider may give you an extra, larger dose of IV steroids before or during your surgery to prevent low blood pressure  Hormones:  Take your thyroid medicine on the morning of your surgery unless your healthcare provider tells you not to  Ask your healthcare provider if you should take other hormone medicines on the day of surgery  Immunosuppressants: You may need to stop taking your immunosuppressants several days before, or the night before, your surgery   Immunosuppressants may increase your risk for wound infection and prevent wound healing  Talk to your healthcare provider about when to stop your immunosuppressants  Diet medicines:  Ask your healthcare provider when to stop taking diet medicine  Some diet medicines need to be stopped several days to weeks before surgery  Diet medicine may prevent other medicines from working or cause complications during surgery  Vitamins and herbal supplements:  Stop taking herbal supplements 1 week before surgery  Most vitamins can be taken up to the day before surgery  Ask your healthcare provider if you need to stop taking any vitamins before surgery  Some vitamins and herbal supplements may increase your risk for bleeding during surgery  They can also prevent anesthesia medicines from working right or increase your risk for a heart attack or stroke during surgery  Asthma or medicine for COPD:  Take your inhalers and asthma or COPD medicine on the morning of surgery  Bring your inhalers with you to your surgery  Anxiety, depression, or psychiatric medicine: Take your anxiety, depression, or psychiatric medicine on the morning of surgery, unless your healthcare provider tells you not to  Tell your anesthesiologist if you take a monoamine oxidase inhibitor (MAOI), such as phenelzine sulfate  To prevent complications, your anesthesiologist may need to change the type of anesthesia that you will get during surgery  Other medicines:  Ask your healthcare provider if and when you should stop take any other type of medicine before surgery  Follow up with your healthcare provider as directed:  Write down your questions so you remember to ask them during your visits  © Copyright [address] Information is for End User's use only and may not be sold, redistributed or otherwise used for commercial purposes  All illustrations and images included in CareNotes® are the copyrighted property of A D A M [employer] Inc  or [facility] Ritu Dupree   The above information is an  only   It is not intended as medical advice for individual conditions or treatments  Talk to your doctor, nurse or pharmacist before following any medical regimen to see if it is safe and effective for you

## 2021-05-21 NOTE — TELEPHONE ENCOUNTER
I tried refilling her lyrica it would not  allow me do the refill due to dosing ? ? Cymbalta too    I saw her for the clearance but we may need to make adjustments of her meds  due to age ?

## 2021-05-23 NOTE — TELEPHONE ENCOUNTER
Thanks Brooklynn  I'm curious, since when is insur co telling us we cannot refill based on age, or some such thing     N - more red tape, eating up time

## 2021-05-24 ENCOUNTER — HOSPITAL ENCOUNTER (OUTPATIENT)
Facility: HOSPITAL | Age: 76
Setting detail: OUTPATIENT SURGERY
Discharge: HOME | End: 2021-05-24
Attending: PODIATRIST | Admitting: PODIATRIST
Payer: MEDICARE

## 2021-05-24 ENCOUNTER — ANESTHESIA (OUTPATIENT)
Dept: PERIOP | Facility: HOSPITAL | Age: 76
End: 2021-05-24
Payer: MEDICARE

## 2021-05-24 VITALS
WEIGHT: 169 LBS | BODY MASS INDEX: 29.95 KG/M2 | HEIGHT: 63 IN | SYSTOLIC BLOOD PRESSURE: 110 MMHG | DIASTOLIC BLOOD PRESSURE: 68 MMHG | TEMPERATURE: 98 F | HEART RATE: 78 BPM | RESPIRATION RATE: 16 BRPM | OXYGEN SATURATION: 98 %

## 2021-05-24 DIAGNOSIS — M21.42: Primary | ICD-10-CM

## 2021-05-24 RX ORDER — HYDROCODONE BITARTRATE AND ACETAMINOPHEN 5; 325 MG/1; MG/1
1 TABLET ORAL EVERY 6 HOURS PRN
Qty: 20 TABLET | Refills: 0 | Status: SHIPPED | OUTPATIENT
Start: 2021-05-24 | End: 2021-06-03

## 2021-05-24 RX ORDER — ONDANSETRON 2 MG/ML
INJECTION INTRAMUSCULAR; INTRAVENOUS AS NEEDED
Status: DISCONTINUED | OUTPATIENT
Start: 2021-05-24 | End: 2021-05-24

## 2021-05-24 RX ORDER — LIDOCAINE HYDROCHLORIDE 20 MG/ML
INJECTION, SOLUTION EPIDURAL; INFILTRATION; INTRACAUDAL; PERINEURAL AS NEEDED
Status: DISCONTINUED | OUTPATIENT
Start: 2021-05-24 | End: 2021-05-24

## 2021-05-24 RX ORDER — ONDANSETRON 2 MG/ML
4 INJECTION INTRAMUSCULAR; INTRAVENOUS ONCE AS NEEDED
Status: DISCONTINUED | OUTPATIENT
Start: 2021-05-24 | End: 2021-05-24 | Stop reason: HOSPADM

## 2021-05-24 RX ORDER — OXYCODONE HYDROCHLORIDE AND ACETAMINOPHEN 5; 325 MG/1; MG/1
1 TABLET ORAL ONCE
Status: DISCONTINUED | OUTPATIENT
Start: 2021-05-24 | End: 2021-05-24 | Stop reason: HOSPADM

## 2021-05-24 RX ORDER — CYCLOBENZAPRINE HCL 10 MG
5 TABLET ORAL 3 TIMES DAILY
Qty: 20 TABLET | Refills: 0 | Status: SHIPPED | OUTPATIENT
Start: 2021-05-24 | End: 2022-02-03

## 2021-05-24 RX ORDER — PROPOFOL 10 MG/ML
INJECTION, EMULSION INTRAVENOUS AS NEEDED
Status: DISCONTINUED | OUTPATIENT
Start: 2021-05-24 | End: 2021-05-24

## 2021-05-24 RX ORDER — MAGNESIUM HYDROXIDE 1200 MG/15ML
LIQUID ORAL AS NEEDED
Status: DISCONTINUED | OUTPATIENT
Start: 2021-05-24 | End: 2021-05-24 | Stop reason: HOSPADM

## 2021-05-24 RX ORDER — FENTANYL CITRATE 50 UG/ML
INJECTION, SOLUTION INTRAMUSCULAR; INTRAVENOUS AS NEEDED
Status: DISCONTINUED | OUTPATIENT
Start: 2021-05-24 | End: 2021-05-24

## 2021-05-24 RX ORDER — CEFAZOLIN SODIUM 2 G/50ML
2000 SOLUTION INTRAVENOUS ONCE
Status: COMPLETED | OUTPATIENT
Start: 2021-05-24 | End: 2021-05-24

## 2021-05-24 RX ORDER — MIDAZOLAM HYDROCHLORIDE 2 MG/2ML
INJECTION, SOLUTION INTRAMUSCULAR; INTRAVENOUS AS NEEDED
Status: DISCONTINUED | OUTPATIENT
Start: 2021-05-24 | End: 2021-05-24

## 2021-05-24 RX ORDER — SODIUM CHLORIDE, SODIUM LACTATE, POTASSIUM CHLORIDE, CALCIUM CHLORIDE 600; 310; 30; 20 MG/100ML; MG/100ML; MG/100ML; MG/100ML
125 INJECTION, SOLUTION INTRAVENOUS CONTINUOUS
Status: DISCONTINUED | OUTPATIENT
Start: 2021-05-24 | End: 2021-05-24 | Stop reason: HOSPADM

## 2021-05-24 RX ORDER — FENTANYL CITRATE/PF 50 MCG/ML
25 SYRINGE (ML) INJECTION
Status: DISCONTINUED | OUTPATIENT
Start: 2021-05-24 | End: 2021-05-24 | Stop reason: HOSPADM

## 2021-05-24 RX ADMIN — PROPOFOL 160 MG: 10 INJECTION, EMULSION INTRAVENOUS at 07:39

## 2021-05-24 RX ADMIN — SODIUM CHLORIDE, SODIUM LACTATE, POTASSIUM CHLORIDE, AND CALCIUM CHLORIDE 125 ML/HR: .6; .31; .03; .02 INJECTION, SOLUTION INTRAVENOUS at 06:34

## 2021-05-24 RX ADMIN — PHENYLEPHRINE HYDROCHLORIDE 50 MCG: 10 INJECTION INTRAVENOUS at 08:04

## 2021-05-24 RX ADMIN — MIDAZOLAM 2 MG: 1 INJECTION INTRAMUSCULAR; INTRAVENOUS at 07:30

## 2021-05-24 RX ADMIN — LIDOCAINE HYDROCHLORIDE 60 MG: 20 INJECTION, SOLUTION EPIDURAL; INFILTRATION; INTRACAUDAL; PERINEURAL at 07:39

## 2021-05-24 RX ADMIN — PHENYLEPHRINE HYDROCHLORIDE 50 MCG: 10 INJECTION INTRAVENOUS at 07:52

## 2021-05-24 RX ADMIN — PHENYLEPHRINE HYDROCHLORIDE 25 MCG: 10 INJECTION INTRAVENOUS at 07:47

## 2021-05-24 RX ADMIN — CEFAZOLIN SODIUM 2000 MG: 2 SOLUTION INTRAVENOUS at 07:37

## 2021-05-24 RX ADMIN — ONDANSETRON 4 MG: 2 INJECTION INTRAMUSCULAR; INTRAVENOUS at 07:51

## 2021-05-24 RX ADMIN — PHENYLEPHRINE HYDROCHLORIDE 50 MCG: 10 INJECTION INTRAVENOUS at 07:56

## 2021-05-24 RX ADMIN — FENTANYL CITRATE 50 MCG: 50 INJECTION INTRAMUSCULAR; INTRAVENOUS at 07:56

## 2021-05-24 NOTE — ANESTHESIA POSTPROCEDURE EVALUATION
Post-Op Assessment Note    CV Status:  Stable    Pain management: adequate     Mental Status:  Alert   PONV Controlled:  None   Airway Patency:  Patent      Post Op Vitals Reviewed: Yes      Staff: Anesthesiologist       Blood pressure 104/51, pulse 82, temperature 98 1 °F (36 7 °C), resp  rate 21, height 5' 3" (1 6 m), weight 76 7 kg (169 lb), SpO2 98 %  No complications documented      BP      Temp      Pulse     Resp      SpO2

## 2021-05-24 NOTE — OP NOTE
OPERATIVE REPORT - Podiatry  PATIENT NAME: Fazal Flood    :  1945  MRN: [id number]  Pt Location: [facility] OR ROOM 06    SURGERY DATE: 2021    Surgeon(s) and Role:     * Diana Ellis DPM - Primary     * Brandon Wade DPM - Assisting    Pre-op Diagnosis:  Left foot drop [M21 372]  Gastrocsoleal equinus Left  Spastic peroneus Brevis Left  Spastic peroneus Longus Left    Post-Op Diagnosis Codes:  Left foot drop [M21 372]  Gastrocsoleal equinus Left  Spastic peroneus Brevis Left  Spastic peroneus Longus Left      Procedure(s) (LRB):  Achilles tendon lengthening Left  Peroneus Brevis lengthening/sectioning Left  Peroneus Brevis lengthening/sectioning Left    Specimen(s):  * No specimens in log *    Estimated Blood Loss:   10 mL    Drains:  * No LDAs found *    Anesthesia Type:   Choice with 10 ml of 1% Lidocaine with epinephrine and 0 5% Bupivacaine in a 1:1 mixture    Hemostasis:  Anatomic dissection    Materials:  * No implants in log *    Operative Findings:  Consistent with diagnosis    Complications:   None    Procedure and Technique:     Under mild sedation, the patient was brought into the operating room and placed on the operating room table in the supine position  IV sedation was achieved by anesthesia team and a universal timeout was performed where all parties are in agreement of correct patient, correct procedure and correct site  A pneumatic tourniquet was then placed over the patient's left thigh with ample padding however was not inflated during the procedure  A peripheral block by the surgeon was performed consisting of 10 ml of 1% Lidocaine with epinephrine and 0 5% Bupivacaine in a 1:1 mixture  The foot was then prepped and draped in the usual aseptic manner  An ipsilateral bump was placed below her left hip to internally rotate the lower limb     Attention was directed to the left lower extremity lateral where a 3 cm linear full thickness controlled depth skin incision was made over the lateral aspect of the lower leg along the peroneal tendons, dissection was carried down using blunt and sharp instrumentation  Any bleeding vessels were cauterized as necessary  The peroneal tendon sheath was incised linearly exposing both peroneal tendons  The Longus was mobilized and brought out of the incision and then under tension was incised with #15 scalpel  Next the peroneus brevis was identified and brought out into surgical view and under tension was incised sharply to release the tendon and contracture  The hindfoot was then aggressively manipulated with inversion to allow release of any additional adhesions  Attention was now made to the posterior lower leg and heel where three small incisions were made using a 11 blade in the posterior aspect of the Achilles tendon, two medial and one laterally  The foot was then dorsiflexed in a controlled manner and the achilles tendon was adequately lengthened  Following NSS rinse of lateral incision, the peroneal sheath was closed using 3-0 vicryl, followed by closure of the subcuticular using 3-0 vicryl and all skin incisions were then closed using 3-0 prolene  The incisions were joined dressed with Xeroform and dry sterile dressing  A below-knee fiberglass cast was applied  After it was dried the cast was bivalved and maintained with coban  The patient tolerated the procedure and anesthesia well without immediate complications and transferred to PACU with vital signs stable  Dr Rogerio Ojeda was present during the entire procedure and participated in all key aspects  Douglas Payne DPM  DATE: May 24, 2021  TIME: 8:43 AM      Portions of the record may have been created with voice recognition software  Occasional wrong word or "sound a like" substitutions may have occurred due to the inherent limitations of voice recognition software   Read the chart carefully and recognize, using context, where substitutions have occurred

## 2021-05-24 NOTE — ANESTHESIA PREPROCEDURE EVALUATION
Procedure:  Sectioning peroneal tendons with lengthening/sectioning achilles tendon lower leg and ankle (Left Ankle)    Relevant Problems   CARDIO   (+) Bilateral pulmonary embolism (HCC)   (+) GAVE (gastric antral vascular ectasia)   (+) Mixed hyperlipidemia      ENDO   (+) Hypothyroidism (acquired)      GI/HEPATIC   (+) Esophageal dysphagia   (+) GERD (gastroesophageal reflux disease)   (+) Hiatal hernia      HEMATOLOGY   (+) Anemia   (+) Iron deficiency anemia due to chronic blood loss      NEURO/PSYCH   (+) Anxiety and depression   (+) Depression, recurrent (HCC)   (+) History of pulmonary embolism   (+) Hx of adenomatous colonic polyps   (+) Personal history of DVT (deep vein thrombosis)      PULMONARY   (+) Moderate obstructive sleep apnea      Other   (+) Ductal carcinoma in situ (DCIS) of left breast   LEFT VENTRICLE    Normal left ventricular chamber size  Normal left ventricular systolic     function  Normal regional wall motion  Mild concentric left ventricular     hypertrophy  Estimated left ventricular ejection fraction is 60%  RIGHT VENTRICLE      Normal right ventricular size and function  LEFT ATRIUM    Normal left atrial size  RIGHT ATRIUM    Normal right atrial size  Physical Exam    Airway    Mallampati score: II  TM Distance: >3 FB  Neck ROM: full     Dental       Cardiovascular  Rhythm: regular, Rate: normal,     Pulmonary  Breath sounds clear to auscultation,     Other Findings        Anesthesia Plan  ASA Score- 3     Anesthesia Type- IV sedation with anesthesia and general with ASA Monitors  Additional Monitors:   Airway Plan:           Plan Factors-    Chart reviewed  EKG reviewed  Existing labs reviewed  Patient summary reviewed  Patient is not a current smoker  Patient instructed to abstain from smoking on day of procedure  Patient did not smoke on day of surgery  Induction- intravenous      Postoperative Plan-     Informed Consent- Anesthetic plan and risks discussed with patient

## 2021-05-24 NOTE — DISCHARGE INSTRUCTIONS
Dr Beau Ritter Instructions    1  Take your prescribed medication as directed  2  Upon arrival at home, lie down and elevate your surgical foot on 2 pillows  3  Stay off your feet as much as possible for the first 24-48 hours  This is when your feet first swell and may become painful  After 48 hours you may begin limited walking following these restrictions:   Nonweightbearing to surgical foot  4  Drink large quantities of water  Consume no alcohol  Continue a well-balanced diet  5  Report any unusual discomfort or fever to this office  6  A limited amount of discomfort and swelling is to be expected  In some cases the skin may take on a bruised appearance  The surgical cleansing solution that was applied to your foot prior to the operation is dark in color and the operation site may appear to be oozing when it actually is not  7  A slight amount of blood is to be expected, and is no cause for alarm  Do not remove the dressings  If there is active bleeding and if the bleeding persists, add additional gauze to the bandage, apply direct pressure, elevate your feet and call this office  8  Do not get the dressings wet  As regular bathing may be inconvenient, sponge baths are recommended  9  When anesthesia wears off and if any discomfort should be present, apply an ice pack directly over the operated area for 15 minute intervals for several hours or until the pain leaves  (USE IN EXCESS OF 15 MINUTES COULD CAUSE FROSTBITE)  Do not use hot water bags or electric pads  A convenient icepack can be made by placing ice cubes in a plastic bag and covering this with a towel  10  Take over-the-counter laxative for constipation, this is common with use of narcotic medications

## 2021-05-24 NOTE — DISCHARGE SUMMARY
Discharge Summary Outpatient Procedure Podiatry -   Sandrita Leone 67 y o  female MRN: [id number]  Unit/Bed#: OR POOL Encounter: [id number]    Admission Date: 5/24/2021     Admitting Diagnosis: Left foot drop [M21 372]    Discharge Diagnosis: same    Procedures Performed: Sectioning peroneal tendons with lengthening/sectioning achilles tendon lower leg and ankle: 71888 (CPT®)    Complications: none    Condition at Discharge: stable    Discharge instructions/Information to patient and family:   See after visit summary for information provided to patient and family  Provisions for Follow-Up Care/Important appointments:  See after visit summary for information related to follow-up care and any pertinent home health orders  Discharge Medications:  See after visit summary for reconciled discharge medications provided to patient and family

## 2021-08-10 ENCOUNTER — OFFICE VISIT (OUTPATIENT)
Dept: FAMILY MEDICINE CLINIC | Facility: CLINIC | Age: 76
End: 2021-08-10
Payer: MEDICARE

## 2021-08-10 VITALS
RESPIRATION RATE: 16 BRPM | HEIGHT: 63 IN | HEART RATE: 95 BPM | OXYGEN SATURATION: 98 % | WEIGHT: 169 LBS | SYSTOLIC BLOOD PRESSURE: 118 MMHG | BODY MASS INDEX: 29.95 KG/M2 | TEMPERATURE: 98.6 F | DIASTOLIC BLOOD PRESSURE: 64 MMHG

## 2021-08-10 DIAGNOSIS — M21.372 LEFT FOOT DROP: ICD-10-CM

## 2021-08-10 DIAGNOSIS — M24.573 EQUINUS CONTRACTURE OF ANKLE: ICD-10-CM

## 2021-08-10 DIAGNOSIS — M31.6 TA (TEMPORAL ARTERITIS) (HCC): ICD-10-CM

## 2021-08-10 DIAGNOSIS — R22.31 ARM MASS, RIGHT: Primary | ICD-10-CM

## 2021-08-10 PROCEDURE — 99215 OFFICE O/P EST HI 40 MIN: CPT | Performed by: NURSE PRACTITIONER

## 2021-08-10 RX ORDER — CEPHALEXIN 500 MG/1
500 CAPSULE ORAL 4 TIMES DAILY
COMMUNITY
Start: 2021-08-09 | End: 2021-08-19

## 2021-08-10 RX ORDER — ZINC GLUCONATE 50 MG
50 TABLET ORAL DAILY
COMMUNITY

## 2021-08-10 RX ORDER — METHYLPREDNISOLONE 4 MG/1
TABLET ORAL
COMMUNITY
Start: 2021-08-04 | End: 2022-02-03

## 2021-08-10 NOTE — ASSESSMENT & PLAN NOTE
Patient order for soft tissue ultrasound the right forearm  Patient also has a schedule follow-up appointment [facility] Dr Vinny Johnson her prior doctor for removal of right arm lipoma  Concerns of regrowth of lipoma this time  Impression currently on antibiotics for treatment cellulitis as well as possible abscess  X-ray indicates soft tissue swelling

## 2021-08-10 NOTE — ASSESSMENT & PLAN NOTE
Patient had recent surgery she no longer has contracture in the left ankle  Mild weakness noted  Patient to follow-up with Podiatry tomorrow  Dr Eve Saelh

## 2021-08-10 NOTE — ASSESSMENT & PLAN NOTE
Left footdrop results post surgery  Patient has left ankle support brace maintain  Patient seen Dr Neda Rucker tomorrow for follow-up evaluation

## 2021-08-10 NOTE — PROGRESS NOTES
BMI Counseling: Body mass index is 29 94 kg/m²  The BMI is above normal  Nutrition recommendations include decreasing portion sizes, encouraging healthy choices of fruits and vegetables, decreasing fast food intake, consuming healthier snacks, limiting drinks that contain sugar, moderation in carbohydrate intake, increasing intake of lean protein, reducing intake of saturated and trans fat and reducing intake of cholesterol  Exercise recommendations include moderate physical activity 150 minutes/week, vigorous physical activity 75 minutes/week, exercising 3-5 times per week, obtaining a gym membership and strength training exercises  No pharmacotherapy was ordered  Depression Screening and Follow-up Plan: Clincally patient does not have depression  No treatment is required  Falls Plan of Care: balance, strength, and gait training instructions were provided  Medications that increase falls were reviewed  Vitamin D supplementation was recommended  Home safety education provided  Assessment/Plan:         Problem List Items Addressed This Visit        Other    Left foot drop     Left footdrop results post surgery  Patient has left ankle support brace maintain  Patient seen Dr Kuldeep Walker tomorrow for follow-up evaluation  Equinus contracture of ankle     Patient had recent surgery she no longer has contracture in the left ankle  Mild weakness noted  Patient to follow-up with Podiatry tomorrow  Dr Jolanta Anderson  Arm mass, right - Primary     Patient order for soft tissue ultrasound the right forearm  Patient also has a schedule follow-up appointment [facility] Dr Prosper Arreola her prior doctor for removal of right arm lipoma  Concerns of regrowth of lipoma this time  Impression currently on antibiotics for treatment cellulitis as well as possible abscess  X-ray indicates soft tissue swelling  Subjective:      Patient ID: Wenceslao Ghosh is a 67 y o  female  Patient is a 67year old female that reports last Thursday had a lump on her right elbow that progressively worsened  She was ween at [facility] for evaluation of abscess treated with keflex 500mg p o  qid x 10 days    Currently 4cm x 4 5cm in dimension, no redness, swelling or drainage noted  VSS -_refer to flow sheet  Patient has surgery at [facility] in 2017 for right upper arm cyst   Patient to note tissue evaluation for lipoma  Patient had prior symptomatology to right lower arm wrist prior to surgery  Patient to follow-up with Dr Erik Álvarez for further evaluation concerns for regrowth of lipoma  Currently ordered for ultrasound of the right forearm  The following portions of the patient's history were reviewed and updated as appropriate:   Past Medical History:  She has a past medical history of Cancer ([facility] [address] ), Cervical herniated disc, Chronic pain disorder, Chronic respiratory failure ([address] ), CPAP (continuous positive airway pressure) dependence, Disease of thyroid gland, DVT (deep venous thrombosis) ([address] ) (2020), Family history of reaction to anesthesia, Fibromyalgia, primary, GERD (gastroesophageal reflux disease), Glaucoma, Hiatal hernia, History of palpitations, History of pneumonia, History of transfusion, Hyperlipidemia, Hypertension, Irregular heart beat, Migraine, Myocardial infarction ([address] ), OAB (overactive bladder), Pollen allergies, Pulmonary embolism ([address] ) (2019), Restless leg, Risk for falls, Sleep apnea, Use of cane as ambulatory aid, Uses brace, and Wears glasses  ,  _______________________________________________________________________  Medical Problems:  does not have any pertinent problems on file ,  _______________________________________________________________________  Past Surgical History:   has a past surgical history that includes Tonsillectomy; Vein ligation (Right); Tubal ligation; ADENOIDECTOMY; Laminectomy;  Spinal fusion; Replacement total knee; Carpal tunnel release; Cardiac catheterization; Cataract extraction (Bilateral); Joint replacement (Bilateral); Foot surgery; Lumbar epidural injection; Mastectomy (Bilateral); Nissen fundoplication; Tumor excision; and pr length/short leg/ankl tendon,single (Left, 5/24/2021)  ,  _______________________________________________________________________  Family History:  Family history is unknown by patient  ,  _______________________________________________________________________  Social History:   reports that she has never smoked  She has never used smokeless tobacco  She reports current alcohol use  She reports that she does not use drugs  ,  _______________________________________________________________________  Allergies:  is allergic to hydromorphone, merthiolate  [thimerosal], quinine derivatives, codeine, pamelor [nortriptyline], pollen extract, statins, and wound dressing adhesive     _______________________________________________________________________  Current Outpatient Medications   Medication Sig Dispense Refill    acetaminophen (Tylenol) 325 mg tablet Take 650 mg by mouth every 6 (six) hours as needed for mild pain      amLODIPine (NORVASC) 5 mg tablet Take 1 tablet (5 mg total) by mouth daily (Patient taking differently: Take 5 mg by mouth every evening ) 90 tablet 3    Ascorbic Acid (VITAMIN C) 100 MG tablet Take 100 mg by mouth daily      ASPIRIN 81 PO Aspirin EC 81 MG Oral Tablet Delayed Release    Refills: 0       Active      b complex vitamins capsule Take 1 capsule by mouth daily      brimonidine (ALPHAGAN P) 0 15 % ophthalmic solution Administer to the right eye every evening       calcium citrate-vitamin D (CITRACAL+D) 315-200 MG-UNIT per tablet Calcium + D TABS    Refills: 0       Active      cephalexin (KEFLEX) 500 mg capsule Take 500 mg by mouth 4 (four) times a day      cholecalciferol (VITAMIN D3) 1,000 units tablet Take 1,000 Units by mouth daily      COVID-19 mRNA Virus Vaccine (MODERNA COVID-19 VACCINE IM) Inject into a muscle Moderna 2nd dose 2/21/21      cyclobenzaprine (FLEXERIL) 10 mg tablet Take 0 5 tablets (5 mg total) by mouth 3 (three) times a day 20 tablet 0    DULoxetine (CYMBALTA) 30 mg delayed release capsule TAKE 1 CAPSULE EVERY DAY 90 capsule 3    famotidine (PEPCID) 40 MG tablet Take 1 tablet (40 mg total) by mouth daily (Patient taking differently: Take 40 mg by mouth daily at bedtime ) 90 tablet 3    FEROSUL 325 (65 Fe) MG tablet Take 1 tablet by mouth 2 (two) times a day with meals      fexofenadine (ALLEGRA) 180 MG tablet Take 180 mg by mouth as needed       fluticasone (FLONASE) 50 mcg/act nasal spray as needed       folic acid (FOLVITE) 1 mg tablet Take 1 mg by mouth daily      hydrOXYzine HCL (ATARAX) 25 mg tablet Take 1 tablet (25 mg total) by mouth every 6 (six) hours as needed for itching 30 tablet 1    latanoprost (XALATAN) 0 005 % ophthalmic solution       levothyroxine 100 mcg tablet Take 1 tablet (100 mcg total) by mouth daily 90 tablet 1    methylPREDNISolone 4 MG tablet therapy pack TAKE BY MOUTH AS DIRECTED FOR 6 DAYS      pregabalin (LYRICA) 150 mg capsule Take 150 mg by mouth 2 (two) times a day      rivaroxaban (Xarelto) 20 mg tablet Take 1 tablet (20 mg total) by mouth daily (Patient taking differently: Take 20 mg by mouth every evening ) 90 tablet 3    rOPINIRole (REQUIP) 1 mg tablet Take 1 tablet (1 mg total) by mouth daily at bedtime (Patient taking differently: Take 1 mg by mouth daily at bedtime Patient takes 1 tab QPM and 2 tabs at QHS) 90 tablet 3    timolol (TIMOPTIC) 0 5 % ophthalmic solution Administer to both eyes 2 (two) times a day       torsemide (DEMADEX) 10 mg tablet Take 1 tablet (10 mg total) by mouth daily 90 tablet 3    zinc gluconate 50 mg tablet Take 50 mg by mouth daily       No current facility-administered medications for this visit  _______________________________________________________________________  Review of Systems   Constitutional: Negative for activity change, appetite change, chills, diaphoresis, fatigue, fever and unexpected weight change  HENT: Negative for congestion, drooling, ear discharge, ear pain, nosebleeds, postnasal drip, rhinorrhea, sinus pressure, sinus pain, sneezing, sore throat and voice change  Eyes: Negative  Negative for pain, redness and visual disturbance  Respiratory: Negative for cough, chest tightness, shortness of breath and wheezing  Cardiovascular: Negative for chest pain and palpitations  Gastrointestinal: Negative for abdominal distention, abdominal pain, blood in stool, constipation, diarrhea, nausea and vomiting  Endocrine: Negative  Genitourinary: Negative for decreased urine volume, difficulty urinating, dysuria, frequency, hematuria and urgency  Musculoskeletal: Negative for arthralgias and myalgias  Skin: Negative  Right fore arm lump with redness and swelling that developed last Thursday  Seen in urgent care 8/9/2021  Allergic/Immunologic: Negative  Neurological: Negative  Hematological: Negative  Psychiatric/Behavioral: Negative  Objective:  Vitals:    08/10/21 1139   BP: 118/64   Pulse: 95   Resp: 16   Temp: 98 6 °F (37 °C)   TempSrc: Tympanic   SpO2: 98%   Weight: 76 7 kg (169 lb)   Height: 5' 3" (1 6 m)     Body mass index is 29 94 kg/m²  Physical Exam  Vitals and nursing note reviewed  Constitutional:       Appearance: Normal appearance  She is well-developed  She is obese  HENT:      Head: Normocephalic and atraumatic  Right Ear: Tympanic membrane, ear canal and external ear normal       Left Ear: Tympanic membrane, ear canal and external ear normal       Nose: Nose normal       Mouth/Throat:      Mouth: Mucous membranes are moist    Eyes:      Extraocular Movements: Extraocular movements intact        Conjunctiva/sclera: Conjunctivae normal       Pupils: Pupils are equal, round, and reactive to light  Cardiovascular:      Rate and Rhythm: Normal rate and regular rhythm  Pulses: Normal pulses  Heart sounds: Normal heart sounds  No murmur heard  Pulmonary:      Effort: Pulmonary effort is normal       Breath sounds: Normal breath sounds  Abdominal:      General: Bowel sounds are normal       Palpations: Abdomen is soft  Musculoskeletal:         General: Normal range of motion  Cervical back: Normal range of motion and neck supple  Comments: Old scar from prior right upper forearm removal  Right arm lateral aspect of arm Right forearm 4cm x 4 5 cm elevated soft tissue mass  Patient reports tingling in her elbow  Skin:     General: Skin is warm and dry  Capillary Refill: Capillary refill takes less than 2 seconds  Coloration: Skin is not jaundiced or pale  Findings: Lesion present  No bruising, erythema or rash  Comments: Old scar to right forearm from tumor  Neurological:      General: No focal deficit present  Mental Status: She is alert and oriented to person, place, and time  Coordination: Coordination abnormal       Gait: Gait abnormal       Comments: Decreased sensation to left foot  Old surgical scar to left ankle  Left foot surgery with brace  Patient still has some mild left foot Achilles weakness     Psychiatric:         Mood and Affect: Mood normal          Behavior: Behavior normal

## 2021-08-10 NOTE — PATIENT INSTRUCTIONS
Lipoma   GENERAL INFORMATION:   What is a lipoma? A lipoma is a lump made up of fat cells  It is most often found just under your skin on your shoulders, back, or neck, but can be found in other areas of your body  Multiple lipomas found in different areas of your body is called lipomatosis  Lipomas normally grow very slowly and rarely turn into cancer  What increases my risk of a lipoma? The exact cause of a lipoma is not known  Single lipomas are more common in women  Men are more likely to have lipomatosis  The following may increase your risk of getting a lipoma or lipomatosis:  · Family history of lipoma     · Certain medical conditions, such as liver disease, or problems controlling your blood sugar    · Obesity    · A blunt blow or injury to your body  What are the signs and symptoms of a lipoma? Lipomas can occur anywhere in your body and cause signs and symptoms depending on where they occur  Most often, you have a soft, round, movable lump just under your skin  The lump may be painful, but this is not common  How is a lipoma diagnosed? Your caregiver will examine you  He may feel the area around your lipoma  Tell your caregiver about any signs and symptoms you have  You may need any of the following:  · Ultrasound: An ultrasound uses sound waves to show pictures on a monitor  An ultrasound may be done to look at your lipoma and surrounding tissue  · X-ray:  An x-ray may be taken to check for lipomas in your muscles and other areas of your body  · CT scan: This test is also called a CAT scan  An x-ray machine uses a computer to take pictures of your lipoma and nearby tissue  You may be given a dye before the pictures are taken to help caregivers see the pictures better  Tell the caregiver if you have ever had an allergic reaction to contrast dye  · MRI:  This scan uses powerful magnets and a computer to take pictures of your lipoma and nearby tissue   You may be given dye to help the pictures show up better  Tell the caregiver if you have ever had an allergic reaction to contrast dye  Do not enter the MRI room with anything metal  Metal can cause serious injury  Tell the caregiver if you have any metal in or on your body  · Biopsy:  During this procedure, a small amount of tissue is removed from your lipoma  The tissue sample will be sent to a lab for tests  How is a lipoma treated? You may need treatment if your lipoma grows and causes symptoms such as pain  You may choose to have your lipoma treated if you do not like how it looks  Treatment may include any of the following:  · Steroid injections: This is given as a shot into your lipoma to help it shrink  · Liposuction:  During this procedure, your caregiver will use a syringe with a needle to remove your lipoma  He may also insert a scope and tools through a small incision  A scope is a thin, flexible tube with a light and tiny camera on the end  This will help him see and remove your lipoma  · Surgical removal:  Your caregiver will remove your lipoma through an incision in your skin  Anesthesia medicine will be used to numb the surgery site  A drain may be put into your skin to remove extra blood or fluid from your surgery area  The incision may be closed with stitches and a bandage may cover your wound  What are the risks of a lipoma? · Treatment for your lipoma may cause pain, swelling, and bruising  Liposuction may cause dimpling or color changes in your skin  Surgical removal of your lipoma may cause a scar  With surgery, a seroma (pocket of fluid) may form in nearby tissue or organs  Your nerves may be damaged and cause numbness or tingling in your skin  Your muscles may also be injured, and you may bleed more than expected or get an infection  You may need to have more than one treatment for your lipoma  Even with treatment, your lipoma may not be completely removed, and it may increase in size again      · Without treatment, your lipoma may become large and painful  A lipoma in your bowel may block bowel movements  It may also injure nearby tissue causing a hemorrhage (heavy bleeding)  Lipomas in your neck and throat may cause you to choke, have trouble breathing, and be life-threatening  When should I contact my caregiver? Contact your caregiver if:  · You have blood in your bowel movement  · Your lipoma increases in size  · Your lipoma is painful or you have pain in the area of your lipoma  · You have questions or concerns about your condition or care  When should I seek immediate care? Seek care immediately or call 911 if:  · You feel a lump in your throat or have trouble swallowing  · You suddenly have trouble breathing  CARE AGREEMENT:   You have the right to help plan your care  Learn about your health condition and how it may be treated  Discuss treatment options with your caregivers to decide what care you want to receive  You always have the right to refuse treatment  The above information is an  only  It is not intended as medical advice for individual conditions or treatments  Talk to your doctor, nurse or pharmacist before following any medical regimen to see if it is safe and effective for you  © 2014 [address] is for End User's use only and may not be sold, redistributed or otherwise used for commercial purposes  All illustrations and images included in CareNotes® are the copyrighted property of A D [employer] M [employer]  or Leandro Medeiros

## 2021-08-11 ENCOUNTER — TELEPHONE (OUTPATIENT)
Dept: FAMILY MEDICINE CLINIC | Facility: CLINIC | Age: 76
End: 2021-08-11

## 2021-08-11 ENCOUNTER — HOSPITAL ENCOUNTER (OUTPATIENT)
Dept: ULTRASOUND IMAGING | Facility: HOSPITAL | Age: 76
Discharge: HOME | End: 2021-08-11
Payer: MEDICARE

## 2021-08-11 DIAGNOSIS — R22.31 ARM MASS, RIGHT: ICD-10-CM

## 2021-08-11 PROCEDURE — 76882 US LMTD JT/FCL EVL NVASC XTR: CPT

## 2021-08-11 NOTE — TELEPHONE ENCOUNTER
[facility] called - Immediate/ significant findings on ultrasound - please call [phone] opt 3

## 2021-08-11 NOTE — TELEPHONE ENCOUNTER
Yuni De Leon from Radiology called again to inform us that there are significant findings for soft tissue on Ultrasound patient had  Please review as soon as possible

## 2021-08-11 NOTE — TELEPHONE ENCOUNTER
Patient currently being treated with antibiotic therapy  Patient to follow-up next week will discuss results with her at that time  Will re-evaluate the abscess

## 2021-08-17 PROBLEM — R22.31 ARM MASS, RIGHT: Status: RESOLVED | Noted: 2021-08-10 | Resolved: 2021-08-17

## 2021-08-17 PROBLEM — L02.413 ABSCESS OF FOREARM, RIGHT: Status: ACTIVE | Noted: 2021-08-17

## 2021-08-18 PROCEDURE — 87070 CULTURE OTHR SPECIMN AEROBIC: CPT | Performed by: SURGERY

## 2021-08-18 PROCEDURE — 87205 SMEAR GRAM STAIN: CPT | Performed by: SURGERY

## 2021-08-19 ENCOUNTER — OFFICE VISIT (OUTPATIENT)
Dept: SURGERY | Facility: CLINIC | Age: 76
End: 2021-08-19

## 2021-08-19 VITALS
SYSTOLIC BLOOD PRESSURE: 130 MMHG | BODY MASS INDEX: 29.95 KG/M2 | HEIGHT: 63 IN | DIASTOLIC BLOOD PRESSURE: 82 MMHG | WEIGHT: 169 LBS | HEART RATE: 88 BPM

## 2021-08-19 DIAGNOSIS — L02.413 ABSCESS OF FOREARM, RIGHT: Primary | ICD-10-CM

## 2021-08-19 PROCEDURE — 99024 POSTOP FOLLOW-UP VISIT: CPT | Performed by: SURGERY

## 2021-09-07 NOTE — PROGRESS NOTES
Assessment/Plan:     Diagnoses and all orders for this visit:    Abscess of forearm, right        Resolved abscess  Will see p r n     Subjective:      Patient ID: Trent Abraham is a 67 y o  female  HPI     Patient is 2 days status post incision and drainage of abscess of the right forearm  Patient has resolution of her symptoms and has no complaints  The following portions of the patient's history were reviewed and updated as appropriate: allergies, current medications, past family history, past medical history, past social history, past surgical history, and problem list     Review of Systems      Non contributory    Objective:    /82   Pulse 88   Ht 5' 3" (1 6 m)   Wt 76 7 kg (169 lb)   BMI 29 94 kg/m²      Physical Exam    The abscess site on the right forearm is healing satisfactorily  Packing removed  The drainage site is open  Dressing applied

## 2021-09-28 ENCOUNTER — OFFICE VISIT (OUTPATIENT)
Dept: FAMILY MEDICINE CLINIC | Facility: CLINIC | Age: 76
End: 2021-09-28
Payer: MEDICARE

## 2021-09-28 VITALS
HEIGHT: 63 IN | OXYGEN SATURATION: 97 % | HEART RATE: 85 BPM | TEMPERATURE: 98 F | BODY MASS INDEX: 30.48 KG/M2 | SYSTOLIC BLOOD PRESSURE: 128 MMHG | DIASTOLIC BLOOD PRESSURE: 68 MMHG | WEIGHT: 172 LBS | RESPIRATION RATE: 18 BRPM

## 2021-09-28 DIAGNOSIS — Z23 NEED FOR VACCINATION: ICD-10-CM

## 2021-09-28 DIAGNOSIS — G25.81 RLS (RESTLESS LEGS SYNDROME): ICD-10-CM

## 2021-09-28 DIAGNOSIS — E03.9 HYPOTHYROIDISM, ADULT: ICD-10-CM

## 2021-09-28 DIAGNOSIS — M79.7 FIBROMYALGIA: Primary | ICD-10-CM

## 2021-09-28 DIAGNOSIS — L29.9 ITCHING: ICD-10-CM

## 2021-09-28 DIAGNOSIS — Z79.899 ENCOUNTER FOR LONG-TERM (CURRENT) USE OF MEDICATIONS: ICD-10-CM

## 2021-09-28 DIAGNOSIS — R53.83 FATIGUE, UNSPECIFIED TYPE: ICD-10-CM

## 2021-09-28 PROCEDURE — 90732 PPSV23 VACC 2 YRS+ SUBQ/IM: CPT | Performed by: FAMILY MEDICINE

## 2021-09-28 PROCEDURE — 99214 OFFICE O/P EST MOD 30 MIN: CPT | Performed by: FAMILY MEDICINE

## 2021-09-28 PROCEDURE — G0009 ADMIN PNEUMOCOCCAL VACCINE: HCPCS | Performed by: FAMILY MEDICINE

## 2021-09-28 RX ORDER — HYDROXYZINE HYDROCHLORIDE 25 MG/1
25 TABLET, FILM COATED ORAL EVERY 6 HOURS PRN
Qty: 90 TABLET | Refills: 1 | Status: SHIPPED | OUTPATIENT
Start: 2021-09-28 | End: 2022-01-28

## 2021-09-28 RX ORDER — ROPINIROLE 1 MG/1
1 TABLET, FILM COATED ORAL
Qty: 270 TABLET | Refills: 1 | Status: SHIPPED | OUTPATIENT
Start: 2021-09-28 | End: 2022-02-03

## 2021-09-28 RX ORDER — LEVOTHYROXINE SODIUM 0.1 MG/1
100 TABLET ORAL DAILY
Qty: 90 TABLET | Refills: 1 | Status: SHIPPED | OUTPATIENT
Start: 2021-09-28 | End: 2021-11-10

## 2021-09-28 NOTE — PROGRESS NOTES
Assessment/Plan:  66 yo female, well-known to me for many years presents for f/u and evaluation of the following medical issues:     F/u and eval new issue:  LLE tendon revision for ft drop done Memorial Day weekend  In PT ever since  Wore boop and that threq out the low back - terrible pain in LB  So had one injection in back and no help  So Dr Paula Gandhi ordered MRI of L Sp and that is on Oct 27  F/u and eval HTN:  At goal 128/68    F/u and eval NIDDM:  No issue     F/u and eval HLD:  Not on statins - 'was terrible musclecramps and sleepy"    F/u and eval deconditioning: limited walking now, having surg L Achilles Tendon  New issue:  [place] in yard, 3 wks ago, picking tomatoes, toe caught and fell on chest and both breast implants now gone and new ones put in  Going to [facility] [place]  F/u and eval polypharmacy:all meds reviewed and discussed     F/u and eval GERD:  On Pepcid 40 mg OD            Problem List Items Addressed This Visit        Endocrine    Hypothyroidism, adult    Relevant Medications    levothyroxine 100 mcg tablet       Other    Fatigue    Encounter for long-term (current) use of medications    RLS (restless legs syndrome)    Relevant Medications    rOPINIRole (REQUIP) 1 mg tablet    Fibromyalgia - Primary      Other Visit Diagnoses     Need for vaccination        Pneumo-23 Vac given today by me    Relevant Orders    PNEUMOCOCCAL POLYSACCHARIDE VACCINE 23-VALENT =>1YO SQ IM (Completed)    Itching        Relevant Medications    hydrOXYzine HCL (ATARAX) 25 mg tablet            Subjective:      Patient ID: Cheri Roblero is a 67 y o  female  HPI--77 yo female, well-known to me for many years presents for f/u and evaluation of the following medical issues:     F/u and eval new issue:  LLE tendon revision for ft drop done Memorial Day weekend  In PT ever since  Wore boop and that threq out the low back - terrible pain in LB  So had one injection in back and no help   So Dr Paula Gandhi ordered MRI of L Sp and that is on Oct 27  F/u and eval HTN:  At goal 128/68    F/u and eval NIDDM:  No issue     F/u and eval HLD:  Not on statins - 'was terrible musclecramps and sleepy"    F/u and eval deconditioning: limited walking now, having surg L Achilles Tendon  New issue:  [place]us[place] Burks in yard, 3 wks ago, picking tomatoes, toe caught and fell on chest and both breast implants now gone and new ones put in  Going to [facility] [place]  F/u and eval polypharmacy:all meds reviewed and discussed     F/u and eval GERD:  On Pepcid 40 mg OD     The following portions of the patient's history were reviewed and updated as appropriate:   Past Medical History:  She has a past medical history of Cancer ([facility] [address] ), Cervical herniated disc, Chronic pain disorder, Chronic respiratory failure ([address] ), CPAP (continuous positive airway pressure) dependence, Disease of thyroid gland, DVT (deep venous thrombosis) ([facility] [address] ) (2020), Family history of reaction to anesthesia, Fibromyalgia, primary, GERD (gastroesophageal reflux disease), Glaucoma, Hiatal hernia, History of palpitations, History of pneumonia, History of transfusion, Hyperlipidemia, Hypertension, Irregular heart beat, Migraine, Myocardial infarction ([address] ), OAB (overactive bladder), Pollen allergies, Pulmonary embolism ([facility] [address] ) (2019), Restless leg, Risk for falls, Sleep apnea, Use of cane as ambulatory aid, Uses brace, and Wears glasses  ,  _______________________________________________________________________  Medical Problems:  does not have any pertinent problems on file ,  _______________________________________________________________________  Past Surgical History:   has a past surgical history that includes Tonsillectomy; Vein ligation (Right); Tubal ligation; ADENOIDECTOMY; Laminectomy; Spinal fusion; Replacement total knee; Carpal tunnel release; Cardiac catheterization; Cataract extraction (Bilateral); Joint replacement (Bilateral); Foot surgery; Lumbar epidural injection;  Mastectomy (Bilateral); Nissen fundoplication; Tumor excision; and pr length/short leg/ankl tendon,single (Left, 5/24/2021)  ,  _______________________________________________________________________  Family History:  family history includes Cancer in her sister  ,  _______________________________________________________________________  Social History:   reports that she has never smoked  She has never used smokeless tobacco  She reports current alcohol use  She reports that she does not use drugs  ,  _______________________________________________________________________  Allergies:  is allergic to hydromorphone, merthiolate  [thimerosal], quinine derivatives, codeine, pamelor [nortriptyline], pollen extract, statins, and wound dressing adhesive     _______________________________________________________________________  Current Outpatient Medications   Medication Sig Dispense Refill    acetaminophen (Tylenol) 325 mg tablet Take 650 mg by mouth every 6 (six) hours as needed for mild pain      amLODIPine (NORVASC) 5 mg tablet Take 1 tablet (5 mg total) by mouth daily (Patient taking differently: Take 5 mg by mouth every evening ) 90 tablet 3    Ascorbic Acid (VITAMIN C) 100 MG tablet Take 100 mg by mouth daily      ASPIRIN 81 PO Aspirin EC 81 MG Oral Tablet Delayed Release    Refills: 0       Active      b complex vitamins capsule Take 1 capsule by mouth daily      brimonidine (ALPHAGAN P) 0 15 % ophthalmic solution Administer to the right eye every evening       calcium citrate-vitamin D (CITRACAL+D) 315-200 MG-UNIT per tablet Calcium + D TABS    Refills: 0       Active      cholecalciferol (VITAMIN D3) 1,000 units tablet Take 1,000 Units by mouth daily      COVID-19 mRNA Virus Vaccine (MODERNA COVID-19 VACCINE IM) Inject into a muscle Moderna 2nd dose 2/21/21      DULoxetine (CYMBALTA) 30 mg delayed release capsule TAKE 1 CAPSULE EVERY DAY 90 capsule 3    famotidine (PEPCID) 40 MG tablet Take 1 tablet (40 mg total) by mouth daily 90 tablet 3    FEROSUL 325 (65 Fe) MG tablet Take 1 tablet by mouth 2 (two) times a day with meals      fexofenadine (ALLEGRA) 180 MG tablet Take 180 mg by mouth as needed       fluticasone (FLONASE) 50 mcg/act nasal spray as needed       folic acid (FOLVITE) 1 mg tablet Take 1 mg by mouth daily      hydrOXYzine HCL (ATARAX) 25 mg tablet Take 1 tablet (25 mg total) by mouth every 6 (six) hours as needed for itching 90 tablet 1    latanoprost (XALATAN) 0 005 % ophthalmic solution       levothyroxine 100 mcg tablet Take 1 tablet (100 mcg total) by mouth daily 90 tablet 1    pregabalin (LYRICA) 150 mg capsule Take 150 mg by mouth 2 (two) times a day      rivaroxaban (Xarelto) 20 mg tablet Take 1 tablet (20 mg total) by mouth daily (Patient taking differently: Take 20 mg by mouth every evening ) 90 tablet 3    rOPINIRole (REQUIP) 1 mg tablet Take 1 tablet (1 mg total) by mouth daily at bedtime Patient takes 1 tab QPM and 2 tabs at  tablet 1    timolol (TIMOPTIC) 0 5 % ophthalmic solution Administer to both eyes 2 (two) times a day       torsemide (DEMADEX) 10 mg tablet Take 1 tablet (10 mg total) by mouth daily 90 tablet 3    zinc gluconate 50 mg tablet Take 50 mg by mouth daily      cyclobenzaprine (FLEXERIL) 10 mg tablet Take 0 5 tablets (5 mg total) by mouth 3 (three) times a day (Patient not taking: Reported on 8/17/2021) 20 tablet 0    methylPREDNISolone 4 MG tablet therapy pack TAKE BY MOUTH AS DIRECTED FOR 6 DAYS (Patient not taking: Reported on 8/17/2021)       No current facility-administered medications for this visit      _______________________________________________________________________  Review of Systems   Constitutional: Negative  HENT: Negative  Eyes: Negative  Cardiovascular: Negative  Gastrointestinal: Negative  Endocrine: Positive for cold intolerance  Genitourinary: Negative      Musculoskeletal: Positive for arthralgias, gait problem and myalgias  [facility] LLE tendon surg to stretch it   Hematological: Negative  Psychiatric/Behavioral: Negative  Objective:  Vitals:    09/28/21 1515   BP: 128/68   Pulse: 85   Resp: 18   Temp: 98 °F (36 7 °C)   SpO2: 97%   Weight: 78 kg (172 lb)   Height: 5' 3" (1 6 m)     Body mass index is 30 47 kg/m²  Physical Exam  Vitals and nursing note reviewed  Constitutional:       General: She is not in acute distress  Appearance: She is well-developed  She is not diaphoretic  HENT:      Head: Normocephalic and atraumatic  Nose: Nose normal    Eyes:      Conjunctiva/sclera: Conjunctivae normal       Pupils: Pupils are equal, round, and reactive to light  Neck:      Thyroid: No thyromegaly  Trachea: No tracheal deviation  Cardiovascular:      Rate and Rhythm: Normal rate and regular rhythm  Pulses: Normal pulses  Heart sounds: Normal heart sounds  Pulmonary:      Effort: No respiratory distress  Breath sounds: Normal breath sounds  No wheezing, rhonchi or rales  Chest:      Chest wall: No tenderness  Abdominal:      General: Bowel sounds are normal  There is no distension  Palpations: Abdomen is soft  There is no mass  Tenderness: There is no abdominal tenderness  There is no guarding or rebound  Musculoskeletal:         General: No tenderness or deformity  Normal range of motion  Cervical back: Normal range of motion and neck supple  Right lower leg: No edema  Left lower leg: No edema  Comments: [facility] LLE tendon    Complete cure of R elbow olecranon bursa - abx cured it   Skin:     General: Skin is warm and dry  Coloration: Skin is not jaundiced or pale  Findings: No bruising (Except over base knees where she has fallen  Bilateral knee replacements years ago but no serious injury), erythema or rash  Neurological:      General: No focal deficit present        Mental Status: She is alert and oriented to person, place, and time  Mental status is at baseline  Cranial Nerves: No cranial nerve deficit  Comments: Note--I have accessed the risk scores for the ASCVD risk at 24 5%   Psychiatric:         Mood and Affect: Mood normal          Behavior: Behavior normal          Thought Content: Thought content normal          Judgment: Judgment normal         30 min with pt reviewing all the above issues and labs, and f/u of LLE surg, R elbow surg 8/17, LBP, and bilat breast implant ruptured and will be replaced at the [facility]  This time was spent reviewing previous records, reviewing previous laboratory and other tests, taking history from patient, examination of patient, discussion of prognosis and treatment, ordering laboratory tests, ordering medications, and completion of the medical record

## 2021-10-11 ENCOUNTER — APPOINTMENT (OUTPATIENT)
Dept: LAB | Facility: HOSPITAL | Age: 76
End: 2021-10-11
Attending: INTERNAL MEDICINE
Payer: MEDICARE

## 2021-10-11 DIAGNOSIS — D47.2 MONOCLONAL PARAPROTEINEMIA: ICD-10-CM

## 2021-10-11 PROCEDURE — 84165 PROTEIN E-PHORESIS SERUM: CPT

## 2021-10-11 PROCEDURE — 84165 PROTEIN E-PHORESIS SERUM: CPT | Performed by: PATHOLOGY

## 2021-10-12 LAB
ALBUMIN SERPL ELPH-MCNC: 4.55 G/DL (ref 3.5–5)
ALBUMIN SERPL ELPH-MCNC: 59.1 % (ref 52–65)
ALPHA1 GLOB SERPL ELPH-MCNC: 0.29 G/DL (ref 0.1–0.4)
ALPHA1 GLOB SERPL ELPH-MCNC: 3.8 % (ref 2.5–5)
ALPHA2 GLOB SERPL ELPH-MCNC: 0.89 G/DL (ref 0.4–1.2)
ALPHA2 GLOB SERPL ELPH-MCNC: 11.6 % (ref 7–13)
BETA GLOB ABNORMAL SERPL ELPH-MCNC: 0.41 G/DL (ref 0.4–0.8)
BETA1 GLOB SERPL ELPH-MCNC: 5.3 % (ref 5–13)
BETA2 GLOB SERPL ELPH-MCNC: 4.7 % (ref 2–8)
BETA2+GAMMA GLOB SERPL ELPH-MCNC: 0.36 G/DL (ref 0.2–0.5)
GAMMA GLOB ABNORMAL SERPL ELPH-MCNC: 1.19 G/DL (ref 0.5–1.6)
GAMMA GLOB SERPL ELPH-MCNC: 15.5 % (ref 12–22)
IGG/ALB SER: 1.44 {RATIO} (ref 1.1–1.8)
M PROTEIN 1 MFR SERPL ELPH: 8.6 %
M PROTEIN 1 SERPL ELPH-MCNC: 0.66 G/DL
PROT PATTERN SERPL ELPH-IMP: NORMAL
PROT SERPL-MCNC: 7.7 G/DL (ref 6.4–8.2)

## 2021-10-25 ENCOUNTER — TELEPHONE (OUTPATIENT)
Dept: FAMILY MEDICINE CLINIC | Facility: CLINIC | Age: 76
End: 2021-10-25

## 2021-10-27 ENCOUNTER — HOSPITAL ENCOUNTER (OUTPATIENT)
Dept: RADIOLOGY | Facility: HOSPITAL | Age: 76
Discharge: HOME | End: 2021-10-27
Payer: MEDICARE

## 2021-10-27 DIAGNOSIS — M54.16 LUMBAR RADICULOPATHY: ICD-10-CM

## 2021-10-27 PROCEDURE — 72148 MRI LUMBAR SPINE W/O DYE: CPT

## 2021-10-28 ENCOUNTER — HOSPITAL ENCOUNTER (EMERGENCY)
Facility: HOSPITAL | Age: 76
Discharge: HOME | End: 2021-10-28
Attending: EMERGENCY MEDICINE | Admitting: EMERGENCY MEDICINE
Payer: COMMERCIAL

## 2021-10-28 ENCOUNTER — APPOINTMENT (EMERGENCY)
Dept: CT IMAGING | Facility: HOSPITAL | Age: 76
End: 2021-10-28
Payer: COMMERCIAL

## 2021-10-28 ENCOUNTER — APPOINTMENT (EMERGENCY)
Dept: RADIOLOGY | Facility: HOSPITAL | Age: 76
End: 2021-10-28
Payer: COMMERCIAL

## 2021-10-28 VITALS
RESPIRATION RATE: 16 BRPM | DIASTOLIC BLOOD PRESSURE: 74 MMHG | HEIGHT: 63 IN | HEART RATE: 85 BPM | SYSTOLIC BLOOD PRESSURE: 140 MMHG | OXYGEN SATURATION: 96 % | TEMPERATURE: 97.6 F | BODY MASS INDEX: 30.82 KG/M2 | WEIGHT: 173.94 LBS

## 2021-10-28 DIAGNOSIS — S33.5XXA LUMBAR SPRAIN, INITIAL ENCOUNTER: ICD-10-CM

## 2021-10-28 DIAGNOSIS — S93.402A SPRAIN OF LEFT ANKLE, UNSPECIFIED LIGAMENT, INITIAL ENCOUNTER: ICD-10-CM
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
## 2021-10-28 DIAGNOSIS — S63.502A WRIST SPRAIN, LEFT, INITIAL ENCOUNTER: ICD-10-CM

## 2021-10-28 DIAGNOSIS — R07.89 ACUTE CHEST WALL PAIN: ICD-10-CM

## 2021-10-28 DIAGNOSIS — S13.9XXA NECK SPRAIN, INITIAL ENCOUNTER: ICD-10-CM

## 2021-10-28 PROCEDURE — G1004 CDSM NDSC: HCPCS

## 2021-10-28 PROCEDURE — 72131 CT LUMBAR SPINE W/O DYE: CPT

## 2021-10-28 PROCEDURE — 73110 X-RAY EXAM OF WRIST: CPT

## 2021-10-28 PROCEDURE — 99284 EMERGENCY DEPT VISIT MOD MDM: CPT

## 2021-10-28 PROCEDURE — 73610 X-RAY EXAM OF ANKLE: CPT

## 2021-10-28 PROCEDURE — 99282 EMERGENCY DEPT VISIT SF MDM: CPT | Performed by: EMERGENCY MEDICINE

## 2021-10-28 PROCEDURE — 72125 CT NECK SPINE W/O DYE: CPT

## 2021-10-28 PROCEDURE — 93005 ELECTROCARDIOGRAM TRACING: CPT

## 2021-10-28 PROCEDURE — 70450 CT HEAD/BRAIN W/O DYE: CPT

## 2021-10-28 PROCEDURE — 71045 X-RAY EXAM CHEST 1 VIEW: CPT

## 2021-10-28 RX ORDER — ACETAMINOPHEN 325 MG/1
975 TABLET ORAL ONCE
Status: COMPLETED | OUTPATIENT
Start: 2021-10-28 | End: 2021-10-28

## 2021-10-28 RX ADMIN — ACETAMINOPHEN 975 MG: 325 TABLET, FILM COATED ORAL at 19:05

## 2021-10-29 LAB
ATRIAL RATE: 64 BPM
P AXIS: 63 DEGREES
PR INTERVAL: 145 MS
QRS AXIS: -8 DEGREES
QRSD INTERVAL: 88 MS
QT INTERVAL: 418 MS
QTC INTERVAL: 432 MS
T WAVE AXIS: 62 DEGREES
VENTRICULAR RATE: 64 BPM

## 2021-10-29 PROCEDURE — 93010 ELECTROCARDIOGRAM REPORT: CPT | Performed by: INTERNAL MEDICINE

## 2021-11-03 ENCOUNTER — OFFICE VISIT (OUTPATIENT)
Dept: FAMILY MEDICINE CLINIC | Facility: CLINIC | Age: 76
End: 2021-11-03

## 2021-11-03 VITALS
OXYGEN SATURATION: 97 % | RESPIRATION RATE: 12 BRPM | SYSTOLIC BLOOD PRESSURE: 118 MMHG | HEIGHT: 63 IN | TEMPERATURE: 97.1 F | DIASTOLIC BLOOD PRESSURE: 54 MMHG | BODY MASS INDEX: 30.65 KG/M2 | HEART RATE: 91 BPM | WEIGHT: 173 LBS

## 2021-11-03 DIAGNOSIS — R26.9 ABNORMAL GAIT: ICD-10-CM

## 2021-11-03 DIAGNOSIS — S29.8XXD BLUNT TRAUMA TO CHEST, SUBSEQUENT ENCOUNTER: ICD-10-CM

## 2021-11-03 DIAGNOSIS — F32.A ANXIETY AND DEPRESSION: ICD-10-CM

## 2021-11-03 DIAGNOSIS — S30.0XXD LUMBAR CONTUSION, SUBSEQUENT ENCOUNTER: ICD-10-CM

## 2021-11-03 DIAGNOSIS — F41.9 ANXIETY AND DEPRESSION: ICD-10-CM

## 2021-11-03 DIAGNOSIS — S13.9XXS: ICD-10-CM

## 2021-11-03 DIAGNOSIS — Z23 ENCOUNTER FOR IMMUNIZATION: ICD-10-CM

## 2021-11-03 DIAGNOSIS — Z09 EXAMINATION FOR, FOLLOW-UP: Primary | ICD-10-CM

## 2021-11-03 DIAGNOSIS — Z04.1 ENCOUNTER FOR EXAMINATION FOLLOWING MOTOR VEHICLE ACCIDENT (MVA): ICD-10-CM

## 2021-11-03 DIAGNOSIS — S93.402D SPRAIN OF LEFT ANKLE, UNSPECIFIED LIGAMENT, SUBSEQUENT ENCOUNTER: ICD-10-CM

## 2021-11-03 DIAGNOSIS — S63.502D LEFT WRIST SPRAIN, SUBSEQUENT ENCOUNTER: ICD-10-CM

## 2021-11-03 DIAGNOSIS — M21.372 LEFT FOOT DROP: ICD-10-CM

## 2021-11-03 PROBLEM — S93.402A SPRAIN OF LEFT ANKLE: Status: ACTIVE | Noted: 2021-11-03

## 2021-11-03 PROBLEM — S29.8XXA BLUNT INJURY TO CHEST: Status: ACTIVE | Noted: 2021-11-03

## 2021-11-03 PROBLEM — S30.0XXA LUMBAR CONTUSION: Status: ACTIVE | Noted: 2021-11-03

## 2021-11-03 PROCEDURE — 99215 OFFICE O/P EST HI 40 MIN: CPT | Performed by: NURSE PRACTITIONER

## 2021-11-03 PROCEDURE — 90471 IMMUNIZATION ADMIN: CPT

## 2021-11-03 PROCEDURE — 90662 IIV NO PRSV INCREASED AG IM: CPT

## 2021-11-10 DIAGNOSIS — E03.9 HYPOTHYROIDISM, ADULT: ICD-10-CM

## 2021-11-10 RX ORDER — LEVOTHYROXINE SODIUM 0.1 MG/1
100 TABLET ORAL DAILY
Qty: 90 TABLET | Refills: 1 | Status: SHIPPED | OUTPATIENT
Start: 2021-11-10 | End: 2022-01-11 | Stop reason: SDUPTHER

## 2021-11-15 ENCOUNTER — TELEPHONE (OUTPATIENT)
Dept: FAMILY MEDICINE CLINIC | Facility: CLINIC | Age: 76
End: 2021-11-15

## 2021-12-01 ENCOUNTER — TELEPHONE (OUTPATIENT)
Dept: FAMILY MEDICINE CLINIC | Facility: CLINIC | Age: 76
End: 2021-12-01

## 2021-12-01 ENCOUNTER — HOSPITAL ENCOUNTER (EMERGENCY)
Facility: HOSPITAL | Age: 76
Discharge: HOME | End: 2021-12-01
Payer: MEDICARE

## 2021-12-01 ENCOUNTER — APPOINTMENT (EMERGENCY)
Dept: VASCULAR ULTRASOUND | Facility: HOSPITAL | Age: 76
End: 2021-12-01
Payer: MEDICARE

## 2021-12-01 VITALS
SYSTOLIC BLOOD PRESSURE: 103 MMHG | BODY MASS INDEX: 30.65 KG/M2 | RESPIRATION RATE: 18 BRPM | HEART RATE: 85 BPM | HEIGHT: 63 IN | TEMPERATURE: 98.3 F | WEIGHT: 173 LBS | DIASTOLIC BLOOD PRESSURE: 46 MMHG | OXYGEN SATURATION: 96 %

## 2021-12-01 DIAGNOSIS — M79.89 LEFT LEG SWELLING: Primary | ICD-10-CM

## 2021-12-01 PROCEDURE — 93970 EXTREMITY STUDY: CPT

## 2021-12-01 PROCEDURE — 99284 EMERGENCY DEPT VISIT MOD MDM: CPT

## 2021-12-01 PROCEDURE — 93970 EXTREMITY STUDY: CPT | Performed by: SURGERY

## 2021-12-01 PROCEDURE — 99283 EMERGENCY DEPT VISIT LOW MDM: CPT

## 2021-12-16 ENCOUNTER — OFFICE VISIT (OUTPATIENT)
Dept: FAMILY MEDICINE CLINIC | Facility: CLINIC | Age: 76
End: 2021-12-16
Payer: MEDICARE

## 2021-12-16 VITALS
RESPIRATION RATE: 18 BRPM | DIASTOLIC BLOOD PRESSURE: 70 MMHG | BODY MASS INDEX: 31.29 KG/M2 | WEIGHT: 176.6 LBS | HEART RATE: 102 BPM | TEMPERATURE: 98 F | HEIGHT: 63 IN | OXYGEN SATURATION: 98 % | SYSTOLIC BLOOD PRESSURE: 150 MMHG

## 2021-12-16 DIAGNOSIS — G62.9 MULTIFACTORIAL PERIPHERAL NEUROPATHY: Primary | ICD-10-CM

## 2021-12-16 DIAGNOSIS — M54.50 ACUTE BILATERAL LOW BACK PAIN, UNSPECIFIED WHETHER SCIATICA PRESENT: ICD-10-CM

## 2021-12-16 DIAGNOSIS — F41.9 ANXIETY AND DEPRESSION: ICD-10-CM

## 2021-12-16 DIAGNOSIS — M21.372 LEFT FOOT DROP: ICD-10-CM

## 2021-12-16 DIAGNOSIS — F32.A ANXIETY AND DEPRESSION: ICD-10-CM

## 2021-12-16 DIAGNOSIS — G25.81 RLS (RESTLESS LEGS SYNDROME): ICD-10-CM

## 2021-12-16 DIAGNOSIS — M79.7 FIBROMYALGIA: ICD-10-CM

## 2021-12-16 DIAGNOSIS — Z79.899 ENCOUNTER FOR LONG-TERM (CURRENT) USE OF MEDICATIONS: ICD-10-CM

## 2021-12-16 PROCEDURE — 99215 OFFICE O/P EST HI 40 MIN: CPT | Performed by: FAMILY MEDICINE

## 2022-01-11 DIAGNOSIS — I26.99 BILATERAL PULMONARY EMBOLISM (HCC): ICD-10-CM

## 2022-01-11 DIAGNOSIS — E03.9 HYPOTHYROIDISM, ADULT: ICD-10-CM

## 2022-01-11 RX ORDER — LEVOTHYROXINE SODIUM 0.1 MG/1
100 TABLET ORAL DAILY
Qty: 90 TABLET | Refills: 1 | Status: SHIPPED | OUTPATIENT
Start: 2022-01-11 | End: 2022-02-07 | Stop reason: SDUPTHER

## 2022-01-27 DIAGNOSIS — L29.9 ITCHING: ICD-10-CM

## 2022-01-28 RX ORDER — HYDROXYZINE HYDROCHLORIDE 25 MG/1
25 TABLET, FILM COATED ORAL EVERY 6 HOURS PRN
Qty: 90 TABLET | Refills: 1 | Status: SHIPPED | OUTPATIENT
Start: 2022-01-28 | End: 2022-02-03

## 2022-01-31 ENCOUNTER — OFFICE VISIT (OUTPATIENT)
Dept: FAMILY MEDICINE CLINIC | Facility: CLINIC | Age: 77
End: 2022-01-31
Payer: MEDICARE

## 2022-01-31 VITALS
TEMPERATURE: 97 F | WEIGHT: 176 LBS | DIASTOLIC BLOOD PRESSURE: 78 MMHG | HEART RATE: 76 BPM | SYSTOLIC BLOOD PRESSURE: 124 MMHG | HEIGHT: 63 IN | RESPIRATION RATE: 18 BRPM | OXYGEN SATURATION: 98 % | BODY MASS INDEX: 31.18 KG/M2

## 2022-01-31 DIAGNOSIS — I26.99 BILATERAL PULMONARY EMBOLISM (HCC): ICD-10-CM

## 2022-01-31 DIAGNOSIS — F33.9 DEPRESSION, RECURRENT (HCC): ICD-10-CM

## 2022-01-31 DIAGNOSIS — D47.2 MONOCLONAL GAMMOPATHY OF UNDETERMINED SIGNIFICANCE: ICD-10-CM

## 2022-01-31 DIAGNOSIS — C50.912 BILATERAL MALIGNANT NEOPLASM OF BREAST IN FEMALE, UNSPECIFIED ESTROGEN RECEPTOR STATUS, UNSPECIFIED SITE OF BREAST (HCC): Primary | ICD-10-CM

## 2022-01-31 DIAGNOSIS — C50.911 BILATERAL MALIGNANT NEOPLASM OF BREAST IN FEMALE, UNSPECIFIED ESTROGEN RECEPTOR STATUS, UNSPECIFIED SITE OF BREAST (HCC): Primary | ICD-10-CM

## 2022-01-31 PROCEDURE — 99214 OFFICE O/P EST MOD 30 MIN: CPT | Performed by: FAMILY MEDICINE

## 2022-01-31 RX ORDER — AZELASTINE 1 MG/ML
2 SPRAY, METERED NASAL 2 TIMES DAILY
COMMUNITY
Start: 2022-01-26

## 2022-01-31 NOTE — PROGRESS NOTES
Assessment/Plan:76 y o, almost 64 yo seen for abn SPEP pattern per Dr Viviana Gibson  All SPEP patterns seens and discussed with pt  She wishes to wait and f/u with serial lbs  There are no diagnoses linked to this encounter  Subjective:      Patient ID: Janessa Trevizo is a 67 y o  female  HPI    The following portions of the patient's history were reviewed and updated as appropriate: She  has a past medical history of Cancer [facility]), Cervical herniated disc, Chronic pain disorder, Chronic respiratory failure ([address] ), CPAP (continuous positive airway pressure) dependence, Disease of thyroid gland, DVT (deep venous thrombosis) ([address] ) (2020), Family history of reaction to anesthesia, Fibromyalgia, primary, GERD (gastroesophageal reflux disease), Glaucoma, Hiatal hernia, History of palpitations, History of pneumonia, History of transfusion, Hyperlipidemia, Hypertension, Irregular heart beat, Migraine, Myocardial infarction ([address] ), OAB (overactive bladder), Pollen allergies, Pulmonary embolism ([address] ) (2019), Restless leg, Risk for falls, Sleep apnea, Use of cane as ambulatory aid, Uses brace, and Wears glasses    She   Patient Active Problem List    Diagnosis Date Noted    Examination for, follow-up 11/03/2021    Blunt injury to chest 11/03/2021    Sprain of left ankle 11/03/2021    Left wrist sprain, subsequent encounter 11/03/2021    Sprain of cervical neck, sequela 11/03/2021    Lumbar contusion 11/03/2021    Fatigue 09/28/2021    Encounter for immunization 09/28/2021    RLS (restless legs syndrome) 09/28/2021    Fibromyalgia 09/28/2021    Abscess of forearm, right 08/17/2021    TA (temporal arteritis) ([facility] [address] ) 05/17/2021    Iron deficiency anemia due to chronic blood loss 05/13/2021    Internal hemorrhoids 05/13/2021    GAVE (gastric antral vascular ectasia) 05/13/2021    Spasm, peroneo-extensor 05/13/2021    Equinus contracture of ankle 05/13/2021    Moderate obstructive sleep apnea 03/23/2021    Blood in stool 03/11/2021    Pain in joint involving ankle and foot 03/11/2021    Ankle pain 03/11/2021    Left foot drop 03/11/2021    Hematochezia 03/11/2021    Acquired pes planus of left foot 03/11/2021    Hx of adenomatous colonic polyps 03/10/2021    Esophageal dysphagia 03/10/2021    Depression, recurrent ([facility] [address] ) 02/26/2021    History of pulmonary embolism 02/04/2021    Cellulitis and abscess of right lower extremity 10/23/2020    Trochanteric bursitis of left hip 10/23/2020    Personal history of DVT (deep vein thrombosis) 10/23/2020    Abnormal gait 10/13/2020    Ductal carcinoma in situ (DCIS) of left breast 09/28/2020    Mixed hyperlipidemia 09/16/2020    Anxiety and depression 09/16/2020    Hypothyroidism, adult 09/16/2020    Restless leg syndrome, controlled 09/16/2020    Hiatal hernia     Swelling of limb 01/08/2020    Bilateral pulmonary embolism ([facility] [address] ) 08/25/2019    Lesion of radial nerve 05/31/2017    GERD (gastroesophageal reflux disease) 03/08/2016    Anemia 07/10/2014    Osteoporosis 03/02/2010     She  has a past surgical history that includes Tonsillectomy; Vein ligation (Right); Tubal ligation; ADENOIDECTOMY; Laminectomy; Spinal fusion; Replacement total knee; Carpal tunnel release; Cardiac catheterization; Cataract extraction (Bilateral); Joint replacement (Bilateral); Foot surgery; Lumbar epidural injection; Mastectomy (Bilateral); Nissen fundoplication; Tumor excision; and pr length/short leg/ankl tendon,single (Left, 5/24/2021)  Her family history includes Cancer in her sister  She  reports that she has never smoked  She has never used smokeless tobacco  She reports current alcohol use  She reports that she does not use drugs    Current Outpatient Medications   Medication Sig Dispense Refill    acetaminophen (Tylenol) 325 mg tablet Take 650 mg by mouth every 6 (six) hours as needed for mild pain      amLODIPine (NORVASC) 5 mg tablet Take 1 tablet (5 mg total) by mouth daily (Patient taking differently: Take 5 mg by mouth every evening ) 90 tablet 3    Ascorbic Acid (VITAMIN C) 100 MG tablet Take 100 mg by mouth daily      ASPIRIN 81 PO Aspirin EC 81 MG Oral Tablet Delayed Release    Refills: 0       Active      azelastine (ASTELIN) 0 1 % nasal spray 2 sprays into each nostril 2 (two) times a day      b complex vitamins capsule Take 1 capsule by mouth daily      brimonidine (ALPHAGAN P) 0 15 % ophthalmic solution Administer to the right eye every evening       calcium citrate-vitamin D (CITRACAL+D) 315-200 MG-UNIT per tablet Calcium + D TABS    Refills: 0       Active      cholecalciferol (VITAMIN D3) 1,000 units tablet Take 1,000 Units by mouth daily      cyclobenzaprine (FLEXERIL) 10 mg tablet Take 0 5 tablets (5 mg total) by mouth 3 (three) times a day 20 tablet 0    DULoxetine (CYMBALTA) 30 mg delayed release capsule TAKE 1 CAPSULE EVERY DAY (Patient taking differently: 30 mg 2 (two) times a day  ) 90 capsule 3    famotidine (PEPCID) 40 MG tablet Take 1 tablet (40 mg total) by mouth daily 90 tablet 3    FEROSUL 325 (65 Fe) MG tablet Take 1 tablet by mouth 2 (two) times a day with meals      fexofenadine (ALLEGRA) 180 MG tablet Take 180 mg by mouth as needed       fluticasone (FLONASE) 50 mcg/act nasal spray as needed       folic acid (FOLVITE) 1 mg tablet Take 1 mg by mouth daily      hydrOXYzine HCL (ATARAX) 25 mg tablet TAKE 1 TABLET (25 MG TOTAL) BY MOUTH EVERY 6 (SIX) HOURS AS NEEDED FOR ITCHING 90 tablet 1    latanoprost (XALATAN) 0 005 % ophthalmic solution       levothyroxine 100 mcg tablet Take 1 tablet (100 mcg total) by mouth daily 90 tablet 1    pregabalin (LYRICA) 150 mg capsule Take 150 mg by mouth in the morning        rivaroxaban (Xarelto) 20 mg tablet Take 1 tablet (20 mg total) by mouth every evening 90 tablet 1    rOPINIRole (REQUIP) 1 mg tablet Take 1 tablet (1 mg total) by mouth daily at bedtime Patient takes 1 tab QPM and 2 tabs at [facility] 270 tablet 1    timolol (TIMOPTIC) 0 5 % ophthalmic solution Administer to both eyes 2 (two) times a day       torsemide (DEMADEX) 10 mg tablet Take 1 tablet (10 mg total) by mouth daily 90 tablet 3    zinc gluconate 50 mg tablet Take 50 mg by mouth daily      COVID-19 mRNA Virus Vaccine (MODERNA COVID-19 VACCINE IM) Inject into a muscle Moderna 2nd dose 2/21/21      methylPREDNISolone 4 MG tablet therapy pack TAKE BY MOUTH AS DIRECTED FOR 6 DAYS (Patient not taking: Reported on 8/17/2021)       No current facility-administered medications for this visit       Current Outpatient Medications on File Prior to Visit   Medication Sig    acetaminophen (Tylenol) 325 mg tablet Take 650 mg by mouth every 6 (six) hours as needed for mild pain    amLODIPine (NORVASC) 5 mg tablet Take 1 tablet (5 mg total) by mouth daily (Patient taking differently: Take 5 mg by mouth every evening )    Ascorbic Acid (VITAMIN C) 100 MG tablet Take 100 mg by mouth daily    ASPIRIN 81 PO Aspirin EC 81 MG Oral Tablet Delayed Release    Refills: 0       Active    azelastine (ASTELIN) 0 1 % nasal spray 2 sprays into each nostril 2 (two) times a day    b complex vitamins capsule Take 1 capsule by mouth daily    brimonidine (ALPHAGAN P) 0 15 % ophthalmic solution Administer to the right eye every evening     calcium citrate-vitamin D (CITRACAL+D) 315-200 MG-UNIT per tablet Calcium + D TABS    Refills: 0       Active    cholecalciferol (VITAMIN D3) 1,000 units tablet Take 1,000 Units by mouth daily    cyclobenzaprine (FLEXERIL) 10 mg tablet Take 0 5 tablets (5 mg total) by mouth 3 (three) times a day    DULoxetine (CYMBALTA) 30 mg delayed release capsule TAKE 1 CAPSULE EVERY DAY (Patient taking differently: 30 mg 2 (two) times a day  )    famotidine (PEPCID) 40 MG tablet Take 1 tablet (40 mg total) by mouth daily    FEROSUL 325 (65 Fe) MG tablet Take 1 tablet by mouth 2 (two) times a day with meals    fexofenadine (ALLEGRA) 180 MG tablet Take 180 mg by mouth as needed     fluticasone (FLONASE) 50 mcg/act nasal spray as needed     folic acid (FOLVITE) 1 mg tablet Take 1 mg by mouth daily    hydrOXYzine HCL (ATARAX) 25 mg tablet TAKE 1 TABLET (25 MG TOTAL) BY MOUTH EVERY 6 (SIX) HOURS AS NEEDED FOR ITCHING    latanoprost (XALATAN) 0 005 % ophthalmic solution     levothyroxine 100 mcg tablet Take 1 tablet (100 mcg total) by mouth daily    pregabalin (LYRICA) 150 mg capsule Take 150 mg by mouth in the morning      rivaroxaban (Xarelto) 20 mg tablet Take 1 tablet (20 mg total) by mouth every evening    rOPINIRole (REQUIP) 1 mg tablet Take 1 tablet (1 mg total) by mouth daily at bedtime Patient takes 1 tab QPM and 2 tabs at [facility]    timolol (TIMOPTIC) 0 5 % ophthalmic solution Administer to both eyes 2 (two) times a day     torsemide (DEMADEX) 10 mg tablet Take 1 tablet (10 mg total) by mouth daily    zinc gluconate 50 mg tablet Take 50 mg by mouth daily    COVID-19 mRNA Virus Vaccine (MODERNA COVID-19 VACCINE IM) Inject into a muscle Moderna 2nd dose 2/21/21    methylPREDNISolone 4 MG tablet therapy pack TAKE BY MOUTH AS DIRECTED FOR 6 DAYS (Patient not taking: Reported on 8/17/2021)     No current facility-administered medications on file prior to visit  She is allergic to benzalkonium chloride, hydromorphone, merthiolate  [thimerosal], quinine derivatives, codeine, pamelor [nortriptyline], pollen extract, statins, and wound dressing adhesive       Review of Systems   Constitutional: Negative  HENT: Negative  Eyes: Negative  Cardiovascular: Negative  Gastrointestinal: Negative  Endocrine: Positive for cold intolerance  Genitourinary: Negative  Musculoskeletal: Positive for arthralgias, back pain, gait problem, joint swelling and myalgias  [facility] LLE tendon surg to stretch it  Had EMG before that and found out left foot is only 20% functioning according to her    Still wearing left foot lift, to avoid tripping and use of cane when appropriate   Skin: Positive for wound (Apparently she states "burned" by some a cautery during or after surgery  This is over the right eye just off the midline noticeable scar)  Hematological: Negative  Psychiatric/Behavioral: Negative  Objective:      /78   Pulse 76   Temp (!) 97 °F (36 1 °C)   Resp 18   Ht 5' 3" (1 6 m)   Wt 79 8 kg (176 lb)   SpO2 98%   BMI 31 18 kg/m²          Physical Exam  Vitals and nursing note reviewed  Constitutional:       General: She is not in acute distress  Appearance: Normal appearance  She is well-developed  She is obese  She is not ill-appearing, toxic-appearing or diaphoretic  HENT:      Head: Normocephalic and atraumatic  Nose: Nose normal    Eyes:      Conjunctiva/sclera: Conjunctivae normal       Pupils: Pupils are equal, round, and reactive to light  Neck:      Thyroid: No thyromegaly  Trachea: No tracheal deviation  Cardiovascular:      Rate and Rhythm: Normal rate and regular rhythm  Pulses: Normal pulses  Heart sounds: Normal heart sounds  Pulmonary:      Effort: No respiratory distress  Breath sounds: Normal breath sounds  No wheezing, rhonchi or rales  Chest:      Chest wall: No tenderness  Abdominal:      General: Bowel sounds are normal  There is no distension  Palpations: Abdomen is soft  There is no mass  Tenderness: There is no abdominal tenderness  There is no guarding or rebound  Musculoskeletal:         General: No tenderness or deformity  Normal range of motion  Cervical back: Normal range of motion and neck supple  Right lower leg: No edema  Left lower leg: No edema  Comments: [facility] surg LLE tendon    Complete cure of R elbow olecranon bursa - abx cured it   Skin:     General: Skin is warm and dry  Coloration: Skin is not jaundiced or pale        Findings: No bruising (Except over base knees where she has fallen  Bilateral knee replacements years ago but no serious injury), erythema or rash  Comments: Scars from bilateral collapsed breasts with new implants healing nicely  See opening comments regarding extra surgery on right breast following seroma   Neurological:      General: No focal deficit present  Mental Status: She is alert and oriented to person, place, and time  Mental status is at baseline  Cranial Nerves: No cranial nerve deficit  Comments: Note--I have accessed the risk scores for the ASCVD risk at 24 5%   Psychiatric:         Mood and Affect: Mood normal          Behavior: Behavior normal          Thought Content: Thought content normal          Judgment: Judgment normal          Pt seen from 9: 55 to 11:00 a m  with all the above discussed  This time was spent reviewing previous records, reviewing previous laboratory and other tests, taking history from patient, examination of patient, discussion of prognosis and treatment, ordering laboratory tests, ordering medications, and completion of the medical record

## 2022-02-03 ENCOUNTER — OFFICE VISIT (OUTPATIENT)
Dept: FAMILY MEDICINE CLINIC | Facility: CLINIC | Age: 77
End: 2022-02-03
Payer: MEDICARE

## 2022-02-03 VITALS
OXYGEN SATURATION: 97 % | RESPIRATION RATE: 18 BRPM | HEIGHT: 63 IN | SYSTOLIC BLOOD PRESSURE: 132 MMHG | TEMPERATURE: 98.3 F | DIASTOLIC BLOOD PRESSURE: 82 MMHG | WEIGHT: 176 LBS | HEART RATE: 90 BPM | BODY MASS INDEX: 31.18 KG/M2

## 2022-02-03 DIAGNOSIS — E55.9 VITAMIN D INSUFFICIENCY: ICD-10-CM

## 2022-02-03 DIAGNOSIS — G25.81 RLS (RESTLESS LEGS SYNDROME): ICD-10-CM

## 2022-02-03 DIAGNOSIS — R06.02 SOB (SHORTNESS OF BREATH) ON EXERTION: ICD-10-CM

## 2022-02-03 DIAGNOSIS — M31.6 TA (TEMPORAL ARTERITIS) (HCC): ICD-10-CM

## 2022-02-03 DIAGNOSIS — M48.062 SPINAL STENOSIS OF LUMBAR REGION WITH NEUROGENIC CLAUDICATION: Primary | ICD-10-CM

## 2022-02-03 DIAGNOSIS — Z79.899 POLYPHARMACY: ICD-10-CM

## 2022-02-03 DIAGNOSIS — Z79.899 ENCOUNTER FOR LONG-TERM (CURRENT) USE OF MEDICATIONS: ICD-10-CM

## 2022-02-03 DIAGNOSIS — Z71.84 TRAVEL ADVICE ENCOUNTER: ICD-10-CM

## 2022-02-03 PROCEDURE — 1123F ACP DISCUSS/DSCN MKR DOCD: CPT | Performed by: FAMILY MEDICINE

## 2022-02-03 PROCEDURE — 99215 OFFICE O/P EST HI 40 MIN: CPT | Performed by: FAMILY MEDICINE

## 2022-02-03 RX ORDER — TRAMADOL HYDROCHLORIDE 50 MG/1
TABLET ORAL
Qty: 60 TABLET | Refills: 3 | Status: SHIPPED | OUTPATIENT
Start: 2022-02-03 | End: 2022-05-04 | Stop reason: SDUPTHER

## 2022-02-03 RX ORDER — ROPINIROLE 1 MG/1
TABLET, FILM COATED ORAL
Qty: 270 TABLET | Refills: 1 | Status: SHIPPED | OUTPATIENT
Start: 2022-02-03 | End: 2022-02-15 | Stop reason: SDUPTHER

## 2022-02-03 NOTE — PATIENT INSTRUCTIONS
Lumbar Spinal Stenosis   WHAT YOU NEED TO KNOW:   What is lumbar spinal stenosis? Lumbar spinal stenosis is narrowing of the spinal canal in your lower back  Your spinal canal is the opening in your spine that contains your spinal cord  When your spinal canal narrows, it may put pressure on your spinal cord and nerves  What causes lumbar spinal stenosis? Narrowing of your spinal canal may be caused by changes that happen as you age  These changes include bone spurs (growths), herniated discs, and thickened ligaments  Bone growths can be caused by osteoarthritis  A herniated disc bulges out between the vertebrae (bones) and into your spinal canal  Discs are spongy cushions between the vertebrae in your spine  Herniated discs may be caused by activities that increase stress on the spine  An example is heavy lifting  Ligaments that connect the vertebrae may thicken and harden as you get older  Other conditions, such as spinal injuries and Paget's disease, can also cause spinal stenosis  What are the signs and symptoms of lumbar spinal stenosis? Signs and symptoms may start or get worse when you stand or walk  They are often relieved when you sit or lean forward  · Low back pain    · Pain, numbness, tingling, or weakness in one or both legs    · Pain in your buttocks that extends to your thighs or legs    How is lumbar spinal stenosis diagnosed? Your healthcare provider will ask about your symptoms and when they started  He or she will ask if you have any medical conditions  Your provider may ask you to lift, bend, walk, sit, or reach  An x-ray, MRI or a CT may show problems in your spine that are causing spinal stenosis  You may be given contrast liquid to help the spine show up better in the pictures  Tell the healthcare provider if you have ever had an allergic reaction to contrast liquid  Do not enter the MRI room with anything metal  Metal can cause serious injury   Tell the healthcare provider if you have any metal in or on your body  How is lumbar spinal stenosis treated? · NSAIDs , such as ibuprofen, help decrease swelling, pain, and fever  NSAIDs can cause stomach bleeding or kidney problems in certain people  If you take blood thinner medicine, always ask your healthcare provider if NSAIDs are safe for you  Always read the medicine label and follow directions  · Acetaminophen  decreases pain and fever  It is available without a doctor's order  Ask how much to take and how often to take it  Follow directions  Read the labels of all other medicines you are using to see if they also contain acetaminophen, or ask your doctor or pharmacist  Acetaminophen can cause liver damage if not taken correctly  Do not use more than 4 grams (4,000 milligrams) total of acetaminophen in one day  · Prescription pain medicine  may be given  Ask how to take this medicine safely  · Muscle relaxers  help decrease pain and muscle spasms  · A steroid injection  may be given to reduce inflammation  Steroid medicine is injected into the epidural space  The epidural space is between your spinal cord and vertebrae  · A nerve block  is an injection of numbing medicine  You may need a nerve block if your pain is not going away, or is getting worse  · Surgery  may be needed to widen your spinal canal or decrease pressure on your spinal cord  Surgery may also be done to fix damaged or injured vertebrae in your back  How can I manage my symptoms? · Go to physical and occupational therapy  as directed  A physical therapist teaches you exercises to help improve movement and strength, and to decrease pain  An occupational therapist teaches you skills to help with your daily activities  · Rest  when you feel it is needed  Slowly start to do more each day  Return to your daily activities as directed  · Apply heat on your back for 20 to 30 minutes every 2 hours for as many days as directed   Heat helps decrease pain and muscle spasms  · Apply ice  on your back for 15 to 20 minutes every hour or as directed  Use an ice pack, or put crushed ice in a plastic bag  Cover it with a towel before you apply it to your skin  Ice helps prevent tissue damage and decreases swelling and pain  When should I seek immediate care? · You have pain in your leg that does not go away or gets worse  · You have trouble moving your legs  · You cannot control when you urinate or have a bowel movement  When should I contact my healthcare provider? · You have new or worsening symptoms  · Your symptoms keep you from doing your daily activities  · You have questions or concerns about your condition or care  CARE AGREEMENT:   You have the right to help plan your care  Learn about your health condition and how it may be treated  Discuss treatment options with your healthcare providers to decide what care you want to receive  You always have the right to refuse treatment  The above information is an  only  It is not intended as medical advice for individual conditions or treatments  Talk to your doctor, nurse or pharmacist before following any medical regimen to see if it is safe and effective for you  © Copyright [address] 2021 Information is for End User's use only and may not be sold, redistributed or otherwise used for commercial purposes   All illustrations and images included in CareNotes® are the copyrighted property of A D A M , [employer]  or [address] [place] [address]
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
## 2022-02-07 DIAGNOSIS — E03.9 HYPOTHYROIDISM, ADULT: ICD-10-CM

## 2022-02-07 DIAGNOSIS — K21.9 CHRONIC GERD: ICD-10-CM

## 2022-02-07 DIAGNOSIS — I26.99 BILATERAL PULMONARY EMBOLISM (HCC): ICD-10-CM

## 2022-02-07 DIAGNOSIS — M79.89 SWELLING OF LIMB: ICD-10-CM

## 2022-02-07 RX ORDER — LEVOTHYROXINE SODIUM 0.1 MG/1
100 TABLET ORAL DAILY
Qty: 90 TABLET | Refills: 1 | Status: SHIPPED | OUTPATIENT
Start: 2022-02-07 | End: 2022-07-05

## 2022-02-07 RX ORDER — TORSEMIDE 10 MG/1
10 TABLET ORAL DAILY
Qty: 90 TABLET | Refills: 3 | Status: SHIPPED | OUTPATIENT
Start: 2022-02-07

## 2022-02-07 RX ORDER — FAMOTIDINE 40 MG/1
40 TABLET, FILM COATED ORAL DAILY
Qty: 90 TABLET | Refills: 3 | Status: SHIPPED | OUTPATIENT
Start: 2022-02-07

## 2022-02-15 DIAGNOSIS — G25.81 RLS (RESTLESS LEGS SYNDROME): ICD-10-CM

## 2022-02-15 RX ORDER — ROPINIROLE 1 MG/1
TABLET, FILM COATED ORAL
Qty: 270 TABLET | Refills: 3 | Status: SHIPPED | OUTPATIENT
Start: 2022-02-15

## 2022-02-16 ENCOUNTER — HOSPITAL ENCOUNTER (OUTPATIENT)
Dept: NON INVASIVE DIAGNOSTICS | Facility: HOSPITAL | Age: 77
Discharge: HOME | End: 2022-02-16
Payer: MEDICARE

## 2022-02-16 VITALS
HEART RATE: 90 BPM | HEIGHT: 63 IN | BODY MASS INDEX: 31.18 KG/M2 | DIASTOLIC BLOOD PRESSURE: 82 MMHG | WEIGHT: 176 LBS | SYSTOLIC BLOOD PRESSURE: 132 MMHG

## 2022-02-16 DIAGNOSIS — R06.02 SOB (SHORTNESS OF BREATH) ON EXERTION: ICD-10-CM

## 2022-02-16 LAB
AORTIC ROOT: 3 CM
APICAL FOUR CHAMBER EJECTION FRACTION: 75 %
E WAVE DECELERATION TIME: 304 MS
FRACTIONAL SHORTENING: 42 % (ref 28–44)
INTERVENTRICULAR SEPTUM IN DIASTOLE (PARASTERNAL SHORT AXIS VIEW): 1.1 CM (ref 0.52–0.98)
INTERVENTRICULAR SEPTUM: 1.1 CM (ref 0.6–1.1)
LAAS-AP4: 18.2 CM2
LEFT ATRIUM SIZE: 3.1 CM
LEFT INTERNAL DIMENSION IN SYSTOLE: 2.6 CM (ref 2.69–4.07)
LEFT VENTRICULAR INTERNAL DIMENSION IN DIASTOLE: 4.5 CM (ref 4.41–6.56)
LEFT VENTRICULAR POSTERIOR WALL IN END DIASTOLE: 1.1 CM (ref 0.51–0.97)
LEFT VENTRICULAR STROKE VOLUME: 69 ML
LVSV (TEICH): 69 ML
MV E'TISSUE VEL-LAT: 7 CM/S
MV PEAK A VEL: 1.07 M/S
MV PEAK E VEL: 59 CM/S
MV STENOSIS PRESSURE HALF TIME: 0 MS
RA PRESSURE ESTIMATED: 5 MMHG
RIGHT ATRIAL 2D VOLUME: 43 ML
RIGHT ATRIUM AREA SYSTOLE A4C: 16.2 CM2
RIGHT VENTRICLE ID DIMENSION: 3.3 CM
RV PSP: 26 MMHG
SL CV LV EF: 65
SL CV PED ECHO LEFT VENTRICLE DIASTOLIC VOLUME (MOD BIPLANE) 2D: 93 ML
SL CV PED ECHO LEFT VENTRICLE SYSTOLIC VOLUME (MOD BIPLANE) 2D: 24 ML
TR MAX PG: 21 MMHG
TR PEAK VELOCITY: 2.3 M/S
TRICUSPID VALVE PEAK REGURGITATION VELOCITY: 2.29 M/S
Z-SCORE OF INTERVENTRICULAR SEPTUM IN END DIASTOLE: 2.99
Z-SCORE OF LEFT VENTRICULAR DIMENSION IN END DIASTOLE: -1.78
Z-SCORE OF LEFT VENTRICULAR DIMENSION IN END SYSTOLE: -1.91
Z-SCORE OF LEFT VENTRICULAR POSTERIOR WALL IN END DIASTOLE: 3.1

## 2022-02-16 PROCEDURE — 93306 TTE W/DOPPLER COMPLETE: CPT | Performed by: INTERNAL MEDICINE

## 2022-02-16 PROCEDURE — 93306 TTE W/DOPPLER COMPLETE: CPT

## 2022-02-18 ENCOUNTER — TELEPHONE (OUTPATIENT)
Dept: FAMILY MEDICINE CLINIC | Facility: CLINIC | Age: 77
End: 2022-02-18

## 2022-02-18 PROBLEM — Z04.1 ENCOUNTER FOR EXAMINATION FOLLOWING MOTOR VEHICLE ACCIDENT (MVA): Status: ACTIVE | Noted: 2022-02-18
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
## 2022-03-16 ENCOUNTER — TELEPHONE (OUTPATIENT)
Dept: GASTROENTEROLOGY | Facility: CLINIC | Age: 77
End: 2022-03-16

## 2022-03-16 NOTE — TELEPHONE ENCOUNTER
Returned patient call to schedule her recall EGD with Dr Alfredo DREW asking her to call back to schedule

## 2022-03-16 NOTE — TELEPHONE ENCOUNTER
Scheduled date of EGD(as of today):5/17/22  Physician performing EGD:Dr Travon Paris   Location of EGD:[facility]   Instructions reviewed with patient by:marcos  Clearances: Dr Alexey Parmar due to taking Xarelto  Will fax clearance one month prior to procedure

## 2022-04-15 NOTE — TELEPHONE ENCOUNTER
Faxed cardiac/xarelto clearance to Dr Mateo Dennis [phone]  Will call their office in one week to make sure that clearance request was received [phone]

## 2022-04-22 NOTE — TELEPHONE ENCOUNTER
Called Dr Abebe Martines office [phone], lmom for nurse Lavelle Herbert to please call me back to let me know if cardiac/xarelto clearance request was received  Will call their office again in one week if do not hear back from any one or receive clearance back

## 2022-04-22 NOTE — TELEPHONE ENCOUNTER
Received call back from Dr Josh Brooks office from [facility] whom inofrmed that wilbert/xarelto clearance fax was received and that the doctor is in the office on Monday  She will have him review at that time  Will call again in one week if do not receive clearance back from them

## 2022-04-28 NOTE — TELEPHONE ENCOUNTER
I lmom informing pt that we received clearance from Dr Delfin Canada and that she is cleared to hold her Xarelto 2 days prior to the procedure and may also hold her Aspirin for 5 days if wishes to do so  I asked her to please call me back to confirm that she received this message  Will call pt again in one week if do not hear back from her

## 2022-04-28 NOTE — TELEPHONE ENCOUNTER
Patient called back  I informed her that she is cleared to hold her xarelto 2 days prior to her procedure and may also hold her aspirin for 5 days prior if she wishes to do so  Patient verbalized understanding and will sapna it on her calendar so she does not forget to stop the medication   Thank you

## 2022-04-28 NOTE — TELEPHONE ENCOUNTER
Received cardiac/Xarelto clearance from Dr Edel Sorto  Pt is cleared for the procedure and may hold her Xarelto for 2 days prior to the procedure and her Aspirin 5 days prior  Faxed to [employer]  Will call pt to inform

## 2022-04-29 ENCOUNTER — RA CDI HCC (OUTPATIENT)
Dept: OTHER | Facility: HOSPITAL | Age: 77
End: 2022-04-29

## 2022-04-29 NOTE — PROGRESS NOTES
[address]  coding opportunities       Chart reviewed, no opportunity found: CHART REVIEWED, NO OPPORTUNITY FOUND        Patients Insurance     Medicare Insurance: Medicare

## 2022-05-03 RX ORDER — HYDROXYZINE HYDROCHLORIDE 25 MG/1
TABLET, FILM COATED ORAL
COMMUNITY
Start: 2022-02-03

## 2022-05-04 ENCOUNTER — OFFICE VISIT (OUTPATIENT)
Dept: FAMILY MEDICINE CLINIC | Facility: CLINIC | Age: 77
End: 2022-05-04
Payer: MEDICARE

## 2022-05-04 VITALS
OXYGEN SATURATION: 97 % | BODY MASS INDEX: 32.6 KG/M2 | DIASTOLIC BLOOD PRESSURE: 82 MMHG | TEMPERATURE: 97.8 F | HEIGHT: 63 IN | WEIGHT: 184 LBS | HEART RATE: 78 BPM | RESPIRATION RATE: 18 BRPM | SYSTOLIC BLOOD PRESSURE: 136 MMHG

## 2022-05-04 DIAGNOSIS — R53.83 FATIGUE, UNSPECIFIED TYPE: ICD-10-CM

## 2022-05-04 DIAGNOSIS — E55.9 VITAMIN D INSUFFICIENCY: ICD-10-CM

## 2022-05-04 DIAGNOSIS — R73.9 ELEVATED BLOOD SUGAR: ICD-10-CM

## 2022-05-04 DIAGNOSIS — R26.9 ABNORMAL GAIT: ICD-10-CM

## 2022-05-04 DIAGNOSIS — D47.2 MONOCLONAL GAMMOPATHY OF UNDETERMINED SIGNIFICANCE: ICD-10-CM

## 2022-05-04 DIAGNOSIS — F11.20 CONTINUOUS OPIOID DEPENDENCE (HCC): ICD-10-CM

## 2022-05-04 DIAGNOSIS — G25.81 RLS (RESTLESS LEGS SYNDROME): ICD-10-CM

## 2022-05-04 DIAGNOSIS — Z11.59 NEED FOR HEPATITIS C SCREENING TEST: ICD-10-CM

## 2022-05-04 DIAGNOSIS — Z79.899 ENCOUNTER FOR LONG-TERM (CURRENT) USE OF MEDICATIONS: ICD-10-CM

## 2022-05-04 DIAGNOSIS — M54.50 ACUTE BILATERAL LOW BACK PAIN, UNSPECIFIED WHETHER SCIATICA PRESENT: ICD-10-CM

## 2022-05-04 DIAGNOSIS — Z00.00 ENCOUNTER FOR MEDICARE ANNUAL WELLNESS EXAM: ICD-10-CM

## 2022-05-04 DIAGNOSIS — M48.062 SPINAL STENOSIS OF LUMBAR REGION WITH NEUROGENIC CLAUDICATION: Primary | ICD-10-CM

## 2022-05-04 PROCEDURE — G0439 PPPS, SUBSEQ VISIT: HCPCS | Performed by: FAMILY MEDICINE

## 2022-05-04 PROCEDURE — 99215 OFFICE O/P EST HI 40 MIN: CPT | Performed by: FAMILY MEDICINE

## 2022-05-04 RX ORDER — TRAMADOL HYDROCHLORIDE 50 MG/1
TABLET ORAL
Qty: 60 TABLET | Refills: 3 | Status: SHIPPED | OUTPATIENT
Start: 2022-05-04

## 2022-05-04 NOTE — PATIENT INSTRUCTIONS
Medicare Preventive Visit Patient Instructions  Thank you for completing your Welcome to Medicare Visit or Medicare Annual Wellness Visit today  Your next wellness visit will be due in one year (5/5/2023)  The screening/preventive services that you may require over the next 5-10 years are detailed below  Some tests may not apply to you based off risk factors and/or age  Screening tests ordered at today's visit but not completed yet may show as past due  Also, please note that scanned in results may not display below  Preventive Screenings:  Service Recommendations Previous Testing/Comments   Colorectal Cancer Screening  * Colonoscopy    * Fecal Occult Blood Test (FOBT)/Fecal Immunochemical Test (FIT)  * Fecal DNA/Cologuard Test  * Flexible Sigmoidoscopy Age: 54-65 years old   Colonoscopy: every 10 years (may be performed more frequently if at higher risk)  OR  FOBT/FIT: every 1 year  OR  Cologuard: every 3 years  OR  Sigmoidoscopy: every 5 years  Screening may be recommended earlier than age 48 if at higher risk for colorectal cancer  Also, an individualized decision between you and your healthcare provider will decide whether screening between the ages of 74-80 would be appropriate  Colonoscopy: 04/19/2021  FOBT/FIT: Not on file  Cologuard: Not on file  Sigmoidoscopy: Not on file          Breast Cancer Screening Age: 36 years old  Frequency: every 1-2 years  Not required if history of left and right mastectomy Mammogram: Not on file    Screening Not Indicated  History Breast Cancer   Cervical Cancer Screening Between the ages of 21-29, pap smear recommended once every 3 years  Between the ages of 33-67, can perform pap smear with HPV co-testing every 5 years     Recommendations may differ for women with a history of total hysterectomy, cervical cancer, or abnormal pap smears in past  Pap Smear: Not on file    Screening Not Indicated   Hepatitis C Screening Once for adults born between [facility] frequently in patients at high risk for Hepatitis C Hep C Antibody: Not on file        Diabetes Screening 1-2 times per year if you're at risk for diabetes or have pre-diabetes Fasting glucose: 96 mg/dL   A1C: 5 8 %    Screening Current   Cholesterol Screening Once every 5 years if you don't have a lipid disorder  May order more often based on risk factors  Lipid panel: 09/15/2020    Screening Not Indicated  History Lipid Disorder     Other Preventive Screenings Covered by Medicare:  1  Abdominal Aortic Aneurysm (AAA) Screening: covered once if your at risk  You're considered to be at risk if you have a family history of AAA  2  Lung Cancer Screening: covers low dose CT scan once per year if you meet all of the following conditions: (1) Age 50-69; (2) No signs or symptoms of lung cancer; (3) Current smoker or have quit smoking within the last 15 years; (4) You have a tobacco smoking history of at least 30 pack years (packs per day multiplied by number of years you smoked); (5) You get a written order from a healthcare provider  3  Glaucoma Screening: covered annually if you're considered high risk: (1) You have diabetes OR (2) Family history of glaucoma OR (3)  aged 48 and older OR (3)  American aged 72 and older  3  Osteoporosis Screening: covered every 2 years if you meet one of the following conditions: (1) You're estrogen deficient and at risk for osteoporosis based off medical history and other findings; (2) Have a vertebral abnormality; (3) On glucocorticoid therapy for more than 3 months; (4) Have primary hyperparathyroidism; (5) On osteoporosis medications and need to assess response to drug therapy  · Last bone density test (DXA Scan): Not on file  5  HIV Screening: covered annually if you're between the age of 12-76  Also covered annually if you are younger than 13 and older than 72 with risk factors for HIV infection   For pregnant patients, it is covered up to 3 times per pregnancy  Immunizations:  Immunization Recommendations   Influenza Vaccine Annual influenza vaccination during flu season is recommended for all persons aged >= 6 months who do not have contraindications   Pneumococcal Vaccine (Prevnar and Pneumovax)  * Prevnar = PCV13  * Pneumovax = PPSV23   Adults 25-60 years old: 1-3 doses may be recommended based on certain risk factors  Adults 72 years old: Prevnar (PCV13) vaccine recommended followed by Pneumovax (PPSV23) vaccine  If already received PPSV23 since turning 65, then PCV13 recommended at least one year after PPSV23 dose  Hepatitis B Vaccine 3 dose series if at intermediate or high risk (ex: diabetes, end stage renal disease, liver disease)   Tetanus (Td) Vaccine - COST NOT COVERED BY MEDICARE PART B Following completion of primary series, a booster dose should be given every 10 years to maintain immunity against tetanus  Td may also be given as tetanus wound prophylaxis  Tdap Vaccine - COST NOT COVERED BY MEDICARE PART B Recommended at least once for all adults  For pregnant patients, recommended with each pregnancy  Shingles Vaccine (Shingrix) - COST NOT COVERED BY MEDICARE PART B  2 shot series recommended in those aged 48 and above     Health Maintenance Due:      Topic Date Due    Hepatitis C Screening  Never done     Immunizations Due:  There are no preventive care reminders to display for this patient  Advance Directives   What are advance directives? Advance directives are legal documents that state your wishes and plans for medical care  These plans are made ahead of time in case you lose your ability to make decisions for yourself  Advance directives can apply to any medical decision, such as the treatments you want, and if you want to donate organs  What are the types of advance directives? There are many types of advance directives, and each state has rules about how to use them   You may choose a combination of any of the following:  · Living will: This is a written record of the treatment you want  You can also choose which treatments you do not want, which to limit, and which to stop at a certain time  This includes surgery, medicine, IV fluid, and tube feedings  · Durable power of  for healthcare [facility]): This is a written record that states who you want to make healthcare choices for you when you are unable to make them for yourself  This person, called a proxy, is usually a family member or a friend  You may choose more than 1 proxy  · Do not resuscitate (DNR) order:  A DNR order is used in case your heart stops beating or you stop breathing  It is a request not to have certain forms of treatment, such as CPR  A DNR order may be included in other types of advance directives  · Medical directive: This covers the care that you want if you are in a coma, near death, or unable to make decisions for yourself  You can list the treatments you want for each condition  Treatment may include pain medicine, surgery, blood transfusions, dialysis, IV or tube feedings, and a ventilator (breathing machine)  · Values history: This document has questions about your views, beliefs, and how you feel and think about life  This information can help others choose the care that you would choose  Why are advance directives important? An advance directive helps you control your care  Although spoken wishes may be used, it is better to have your wishes written down  Spoken wishes can be misunderstood, or not followed  Treatments may be given even if you do not want them  An advance directive may make it easier for your family to make difficult choices about your care  Fall Prevention    Fall prevention  includes ways to make your home and other areas safer  It also includes ways you can move more carefully to prevent a fall   Health conditions that cause changes in your blood pressure, vision, or muscle strength and coordination may increase your risk for falls  Medicines may also increase your risk for falls if they make you dizzy, weak, or sleepy  Fall prevention tips:   · Stand or sit up slowly  · Use assistive devices as directed  · Wear shoes that fit well and have soles that   · Wear a personal alarm  · Stay active  · Manage your medical conditions  Home Safety Tips:  · Add items to prevent falls in the bathroom  · Keep paths clear  · Install bright lights in your home  · Keep items you use often on shelves within reach  · Paint or place reflective tape on the edges of your stairs  Weight Management   Why it is important to manage your weight:  Being overweight increases your risk of health conditions such as heart disease, high blood pressure, type 2 diabetes, and certain types of cancer  It can also increase your risk for osteoarthritis, sleep apnea, and other respiratory problems  Aim for a slow, steady weight loss  Even a small amount of weight loss can lower your risk of health problems  How to lose weight safely:  A safe and healthy way to lose weight is to eat fewer calories and get regular exercise  You can lose up about 1 pound a week by decreasing the number of calories you eat by 500 calories each day  Healthy meal plan for weight management:  A healthy meal plan includes a variety of foods, contains fewer calories, and helps you stay healthy  A healthy meal plan includes the following:  · Eat whole-grain foods more often  A healthy meal plan should contain fiber  Fiber is the part of grains, fruits, and vegetables that is not broken down by your body  Whole-grain foods are healthy and provide extra fiber in your diet  Some examples of whole-grain foods are whole-wheat breads and pastas, oatmeal, brown rice, and bulgur  · Eat a variety of vegetables every day  Include dark, leafy greens such as spinach, kale, ashley greens, and mustard greens   Eat yellow and orange vegetables such as carrots, sweet potatoes, and winter squash  · Eat a variety of fruits every day  Choose fresh or canned fruit (canned in its own juice or light syrup) instead of juice  Fruit juice has very little or no fiber  · Eat low-fat dairy foods  Drink fat-free (skim) milk or 1% milk  Eat fat-free yogurt and low-fat cottage cheese  Try low-fat cheeses such as mozzarella and other reduced-fat cheeses  · Choose meat and other protein foods that are low in fat  Choose beans or other legumes such as split peas or lentils  Choose fish, skinless poultry (chicken or turkey), or lean cuts of red meat (beef or pork)  Before you cook meat or poultry, cut off any visible fat  · Use less fat and oil  Try baking foods instead of frying them  Add less fat, such as margarine, sour cream, regular salad dressing and mayonnaise to foods  Eat fewer high-fat foods  Some examples of high-fat foods include french fries, doughnuts, ice cream, and cakes  · Eat fewer sweets  Limit foods and drinks that are high in sugar  This includes candy, cookies, regular soda, and sweetened drinks  Exercise:  Exercise at least 30 minutes per day on most days of the week  Some examples of exercise include walking, biking, dancing, and swimming  You can also fit in more physical activity by taking the stairs instead of the elevator or parking farther away from stores  Ask your healthcare provider about the best exercise plan for you  Narcotic (Opioid) Safety    Use narcotics safely:  · Take prescribed narcotics exactly as directed  · Do not give narcotics to others or take narcotics that belong to someone else  · Do not mix narcotics without medicines or alcohol  · Do not drive or operate heavy machinery after you take the narcotic  · Monitor for side effects and notify your healthcare provider if you experienced side effects such as nausea, sleepiness, itching, or trouble thinking clearly      Manage constipation:    Constipation is the most common side effect of narcotic medicine  Constipation is when you have hard, dry bowel movements, or you go longer than usual between bowel movements  Tell your healthcare provider about all changes in your bowel movements while you are taking narcotics  He or she may recommend laxative medicine to help you have a bowel movement  He or she may also change the kind of narcotic you are taking, or change when you take it  The following are more ways you can prevent or relieve constipation:    · Drink liquids as directed  You may need to drink extra liquids to help soften and move your bowels  Ask how much liquid to drink each day and which liquids are best for you  · Eat high-fiber foods  This may help decrease constipation by adding bulk to your bowel movements  High-fiber foods include fruits, vegetables, whole-grain breads and cereals, and beans  Your healthcare provider or dietitian can help you create a high-fiber meal plan  Your provider may also recommend a fiber supplement if you cannot get enough fiber from food  · Exercise regularly  Regular physical activity can help stimulate your intestines  Walking is a good exercise to prevent or relieve constipation  Ask which exercises are best for you  · Schedule a time each day to have a bowel movement  This may help train your body to have regular bowel movements  Bend forward while you are on the toilet to help move the bowel movement out  Sit on the toilet for at least 10 minutes, even if you do not have a bowel movement  Store narcotics safely:   · Store narcotics where others cannot easily get them  Keep them in a locked cabinet or secure area  Do not  keep them in a purse or other bag you carry with you  A person may be looking for something else and find the narcotics  · Make sure narcotics are stored out of the reach of children  A child can easily overdose on narcotics  Narcotics may look like candy to a small child      The best way to dispose of narcotics: The laws vary by country and area  In the [place], the best way is to return the narcotics through a take-back program  This program is offered by the [employer] ([employer])  The following are options for using the program:  · Take the narcotics to a [facility] collection site  The site is often a law enforcement center  Call your local law enforcement center for scheduled take-back days in your area  You will be given information on where to go if the collection site is in a different location  · Take the narcotics to an approved pharmacy or hospital   A pharmacy or hospital may be set up as a collection site  You will need to ask if it is a [facility] collection site if you were not directed there  A pharmacy or doctor's office may not be able to take back narcotics unless it is a D[facility]ETHA site  · Use a mail-back system  This means you are given containers to put the narcotics into  You will then mail them in the containers  · Use a take-back drop box  This is a place to leave the narcotics at any time  People and animals will not be able to get into the box  Your local law enforcement agency can tell you where to find a drop box in your area  Other ways to manage pain:   · Ask your healthcare provider about non-narcotic medicines to control pain  Nonprescription medicines include NSAIDs (such as ibuprofen) and acetaminophen  Prescription medicines include muscle relaxers, antidepressants, and steroids  · Pain may be managed without any medicines  Some ways to relieve pain include massage, aromatherapy, or meditation  Physical or occupational therapy may also help  For more information:   · Drug Enforcement Administration  [address] [place] [address]  [address]  Phone: 0- 410 - 424-2170  Web Address: [facility]/drug_disposal/    · [address] and Drug Administration  [place] , [address]  Phone: 0- 042 - 607-9334  Web Address: http://[employer]/     © Copyright [employer] 2018 Information is for End User's use only and may not be sold, redistributed or otherwise used for commercial purposes  All illustrations and images included in CareNotes® are the copyrighted property of A D A M , Inc  or Kike Hamilton  Lumbar Spinal Stenosis   WHAT YOU NEED TO KNOW:   What is lumbar spinal stenosis? Lumbar spinal stenosis is narrowing of the spinal canal in your lower back  Your spinal canal is the opening in your spine that contains your spinal cord  When your spinal canal narrows, it may put pressure on your spinal cord and nerves  What causes lumbar spinal stenosis? Narrowing of your spinal canal may be caused by changes that happen as you age  These changes include bone spurs (growths), herniated discs, and thickened ligaments  Bone growths can be caused by osteoarthritis  A herniated disc bulges out between the vertebrae (bones) and into your spinal canal  Discs are spongy cushions between the vertebrae in your spine  Herniated discs may be caused by activities that increase stress on the spine  An example is heavy lifting  Ligaments that connect the vertebrae may thicken and harden as you get older  Other conditions, such as spinal injuries and Paget's disease, can also cause spinal stenosis  What are the signs and symptoms of lumbar spinal stenosis? Signs and symptoms may start or get worse when you stand or walk  They are often relieved when you sit or lean forward  · Low back pain    · Pain, numbness, tingling, or weakness in one or both legs    · Pain in your buttocks that extends to your thighs or legs    How is lumbar spinal stenosis diagnosed? Your healthcare provider will ask about your symptoms and when they started  He or she will ask if you have any medical conditions  Your provider may ask you to lift, bend, walk, sit, or reach   An x-ray, MRI or a CT may show problems in your spine that are causing spinal stenosis  You may be given contrast liquid to help the spine show up better in the pictures  Tell the healthcare provider if you have ever had an allergic reaction to contrast liquid  Do not enter the MRI room with anything metal  Metal can cause serious injury  Tell the healthcare provider if you have any metal in or on your body  How is lumbar spinal stenosis treated? · NSAIDs , such as ibuprofen, help decrease swelling, pain, and fever  NSAIDs can cause stomach bleeding or kidney problems in certain people  If you take blood thinner medicine, always ask your healthcare provider if NSAIDs are safe for you  Always read the medicine label and follow directions  · Acetaminophen  decreases pain and fever  It is available without a doctor's order  Ask how much to take and how often to take it  Follow directions  Read the labels of all other medicines you are using to see if they also contain acetaminophen, or ask your doctor or pharmacist  Acetaminophen can cause liver damage if not taken correctly  Do not use more than 4 grams (4,000 milligrams) total of acetaminophen in one day  · Prescription pain medicine  may be given  Ask how to take this medicine safely  · Muscle relaxers  help decrease pain and muscle spasms  · A steroid injection  may be given to reduce inflammation  Steroid medicine is injected into the epidural space  The epidural space is between your spinal cord and vertebrae  · A nerve block  is an injection of numbing medicine  You may need a nerve block if your pain is not going away, or is getting worse  · Surgery  may be needed to widen your spinal canal or decrease pressure on your spinal cord  Surgery may also be done to fix damaged or injured vertebrae in your back  How can I manage my symptoms? · Go to physical and occupational therapy  as directed  A physical therapist teaches you exercises to help improve movement and strength, and to decrease pain   An occupational therapist teaches you skills to help with your daily activities  · Rest  when you feel it is needed  Slowly start to do more each day  Return to your daily activities as directed  · Apply heat on your back for 20 to 30 minutes every 2 hours for as many days as directed  Heat helps decrease pain and muscle spasms  · Apply ice  on your back for 15 to 20 minutes every hour or as directed  Use an ice pack, or put crushed ice in a plastic bag  Cover it with a towel before you apply it to your skin  Ice helps prevent tissue damage and decreases swelling and pain  When should I seek immediate care? · You have pain in your leg that does not go away or gets worse  · You have trouble moving your legs  · You cannot control when you urinate or have a bowel movement  When should I contact my healthcare provider? · You have new or worsening symptoms  · Your symptoms keep you from doing your daily activities  · You have questions or concerns about your condition or care  CARE AGREEMENT:   You have the right to help plan your care  Learn about your health condition and how it may be treated  Discuss treatment options with your healthcare providers to decide what care you want to receive  You always have the right to refuse treatment  The above information is an  only  It is not intended as medical advice for individual conditions or treatments  Talk to your doctor, nurse or pharmacist before following any medical regimen to see if it is safe and effective for you  © Copyright [employer] 2022 Information is for End User's use only and may not be sold, redistributed or otherwise used for commercial purposes   All illustrations and images included in CareNotes® are the copyrighted property of [employer]  or [address] [place] [address]
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
## 2022-05-11 ENCOUNTER — TELEPHONE (OUTPATIENT)
Dept: GASTROENTEROLOGY | Facility: CLINIC | Age: 77
End: 2022-05-11

## 2022-05-11 NOTE — TELEPHONE ENCOUNTER
Called and spoke to pt and confirmed her EGD scheduled on 5/17/22 with Dr Britt Gordon at [facility]  I informed pt that [facility] will be calling her 1 day prior with  arrival time  Informed pt to be npo after midnight and that she will need a  the day of procedure  Pt is aware of the Xarelto hold

## 2022-05-17 ENCOUNTER — HOSPITAL ENCOUNTER (OUTPATIENT)
Dept: GASTROENTEROLOGY | Facility: HOSPITAL | Age: 77
Setting detail: OUTPATIENT SURGERY
Discharge: HOME | End: 2022-05-17
Attending: INTERNAL MEDICINE
Payer: MEDICARE

## 2022-05-17 ENCOUNTER — ANESTHESIA EVENT (OUTPATIENT)
Dept: GASTROENTEROLOGY | Facility: HOSPITAL | Age: 77
End: 2022-05-17

## 2022-05-17 ENCOUNTER — APPOINTMENT (OUTPATIENT)
Dept: LAB | Facility: HOSPITAL | Age: 77
End: 2022-05-17
Payer: MEDICARE

## 2022-05-17 ENCOUNTER — ANESTHESIA (OUTPATIENT)
Dept: GASTROENTEROLOGY | Facility: HOSPITAL | Age: 77
End: 2022-05-17

## 2022-05-17 VITALS
BODY MASS INDEX: 32.27 KG/M2 | DIASTOLIC BLOOD PRESSURE: 55 MMHG | TEMPERATURE: 97.2 F | HEIGHT: 63 IN | WEIGHT: 182.1 LBS | SYSTOLIC BLOOD PRESSURE: 111 MMHG | OXYGEN SATURATION: 99 % | HEART RATE: 66 BPM | RESPIRATION RATE: 12 BRPM

## 2022-05-17 DIAGNOSIS — E55.9 VITAMIN D INSUFFICIENCY: ICD-10-CM

## 2022-05-17 DIAGNOSIS — R13.10 DYSPHAGIA, UNSPECIFIED TYPE: ICD-10-CM

## 2022-05-17 DIAGNOSIS — Z79.899 ENCOUNTER FOR LONG-TERM (CURRENT) USE OF MEDICATIONS: ICD-10-CM

## 2022-05-17 DIAGNOSIS — D47.2 MONOCLONAL GAMMOPATHY OF UNDETERMINED SIGNIFICANCE: ICD-10-CM

## 2022-05-17 DIAGNOSIS — K22.2 ESOPHAGEAL OBSTRUCTION: ICD-10-CM

## 2022-05-17 DIAGNOSIS — R73.9 ELEVATED BLOOD SUGAR: ICD-10-CM

## 2022-05-17 DIAGNOSIS — Z11.59 NEED FOR HEPATITIS C SCREENING TEST: ICD-10-CM

## 2022-05-17 DIAGNOSIS — R53.83 FATIGUE, UNSPECIFIED TYPE: ICD-10-CM

## 2022-05-17 LAB
25(OH)D3 SERPL-MCNC: 27.4 NG/ML (ref 30–100)
ALBUMIN SERPL BCP-MCNC: 4.1 G/DL (ref 3.5–5)
ALP SERPL-CCNC: 109 U/L (ref 34–104)
ALT SERPL W P-5'-P-CCNC: 12 U/L (ref 7–52)
ANION GAP SERPL CALCULATED.3IONS-SCNC: 9 MMOL/L (ref 4–13)
AST SERPL W P-5'-P-CCNC: 13 U/L (ref 13–39)
BACTERIA UR QL AUTO: ABNORMAL /HPF
BILIRUB SERPL-MCNC: 0.67 MG/DL (ref 0.2–1)
BILIRUB UR QL STRIP: NEGATIVE
BUN SERPL-MCNC: 19 MG/DL (ref 5–25)
CALCIUM SERPL-MCNC: 9.6 MG/DL (ref 8.4–10.2)
CHLORIDE SERPL-SCNC: 104 MMOL/L (ref 96–108)
CHOLEST SERPL-MCNC: 266 MG/DL
CLARITY UR: CLEAR
CO2 SERPL-SCNC: 26 MMOL/L (ref 21–32)
COLOR UR: YELLOW
CREAT SERPL-MCNC: 0.77 MG/DL (ref 0.6–1.3)
ERYTHROCYTE [DISTWIDTH] IN BLOOD BY AUTOMATED COUNT: 13.4 % (ref 11.6–15.1)
ERYTHROCYTE [SEDIMENTATION RATE] IN BLOOD: 24 MM/HOUR (ref 0–30)
EST. AVERAGE GLUCOSE BLD GHB EST-MCNC: 114 MG/DL
GFR SERPL CREATININE-BSD FRML MDRD: 74 ML/MIN/1.73SQ M
GLUCOSE P FAST SERPL-MCNC: 111 MG/DL (ref 65–99)
GLUCOSE UR STRIP-MCNC: NEGATIVE MG/DL
HBA1C MFR BLD: 5.6 %
HCT VFR BLD AUTO: 42.6 % (ref 34.8–46.1)
HCV AB SER QL: NORMAL
HDLC SERPL-MCNC: 53 MG/DL
HGB BLD-MCNC: 13.7 G/DL (ref 11.5–15.4)
HGB UR QL STRIP.AUTO: NEGATIVE
KETONES UR STRIP-MCNC: NEGATIVE MG/DL
LDLC SERPL CALC-MCNC: 181 MG/DL (ref 0–100)
LEUKOCYTE ESTERASE UR QL STRIP: ABNORMAL
MCH RBC QN AUTO: 30.6 PG (ref 26.8–34.3)
MCHC RBC AUTO-ENTMCNC: 32.2 G/DL (ref 31.4–37.4)
MCV RBC AUTO: 95 FL (ref 82–98)
MUCOUS THREADS UR QL AUTO: ABNORMAL
NITRITE UR QL STRIP: NEGATIVE
NON-SQ EPI CELLS URNS QL MICRO: ABNORMAL /HPF
NONHDLC SERPL-MCNC: 213 MG/DL
PH UR STRIP.AUTO: 6 [PH]
PLATELET # BLD AUTO: 367 THOUSANDS/UL (ref 149–390)
PMV BLD AUTO: 10.9 FL (ref 8.9–12.7)
POTASSIUM SERPL-SCNC: 3.8 MMOL/L (ref 3.5–5.3)
PROT SERPL-MCNC: 7.4 G/DL (ref 6.4–8.4)
PROT UR STRIP-MCNC: NEGATIVE MG/DL
RBC # BLD AUTO: 4.48 MILLION/UL (ref 3.81–5.12)
RBC #/AREA URNS AUTO: ABNORMAL /HPF
SODIUM SERPL-SCNC: 139 MMOL/L (ref 135–147)
SP GR UR STRIP.AUTO: >=1.03 (ref 1–1.03)
TRIGL SERPL-MCNC: 161 MG/DL
TSH SERPL DL<=0.05 MIU/L-ACNC: 1.52 UIU/ML (ref 0.45–4.5)
UROBILINOGEN UR QL STRIP.AUTO: 0.2 E.U./DL
WBC # BLD AUTO: 6.8 THOUSAND/UL (ref 4.31–10.16)
WBC #/AREA URNS AUTO: ABNORMAL /HPF

## 2022-05-17 PROCEDURE — 80061 LIPID PANEL: CPT

## 2022-05-17 PROCEDURE — 84165 PROTEIN E-PHORESIS SERUM: CPT

## 2022-05-17 PROCEDURE — 81003 URINALYSIS AUTO W/O SCOPE: CPT | Performed by: FAMILY MEDICINE

## 2022-05-17 PROCEDURE — 86334 IMMUNOFIX E-PHORESIS SERUM: CPT

## 2022-05-17 PROCEDURE — 85652 RBC SED RATE AUTOMATED: CPT

## 2022-05-17 PROCEDURE — 84166 PROTEIN E-PHORESIS/URINE/CSF: CPT | Performed by: PATHOLOGY

## 2022-05-17 PROCEDURE — 82306 VITAMIN D 25 HYDROXY: CPT

## 2022-05-17 PROCEDURE — 86803 HEPATITIS C AB TEST: CPT

## 2022-05-17 PROCEDURE — 43249 ESOPH EGD DILATION <30 MM: CPT | Performed by: INTERNAL MEDICINE

## 2022-05-17 PROCEDURE — 84166 PROTEIN E-PHORESIS/URINE/CSF: CPT | Performed by: FAMILY MEDICINE

## 2022-05-17 PROCEDURE — 85027 COMPLETE CBC AUTOMATED: CPT

## 2022-05-17 PROCEDURE — 84443 ASSAY THYROID STIM HORMONE: CPT

## 2022-05-17 PROCEDURE — 86334 IMMUNOFIX E-PHORESIS SERUM: CPT | Performed by: PATHOLOGY

## 2022-05-17 PROCEDURE — 83036 HEMOGLOBIN GLYCOSYLATED A1C: CPT

## 2022-05-17 PROCEDURE — 80053 COMPREHEN METABOLIC PANEL: CPT

## 2022-05-17 PROCEDURE — 36415 COLL VENOUS BLD VENIPUNCTURE: CPT

## 2022-05-17 PROCEDURE — 84165 PROTEIN E-PHORESIS SERUM: CPT | Performed by: PATHOLOGY

## 2022-05-17 PROCEDURE — C1726 CATH, BAL DIL, NON-VASCULAR: HCPCS

## 2022-05-17 PROCEDURE — 81001 URINALYSIS AUTO W/SCOPE: CPT | Performed by: FAMILY MEDICINE

## 2022-05-17 RX ORDER — LIDOCAINE HYDROCHLORIDE 20 MG/ML
INJECTION, SOLUTION EPIDURAL; INFILTRATION; INTRACAUDAL; PERINEURAL AS NEEDED
Status: DISCONTINUED | OUTPATIENT
Start: 2022-05-17 | End: 2022-05-17

## 2022-05-17 RX ORDER — SODIUM CHLORIDE, SODIUM LACTATE, POTASSIUM CHLORIDE, CALCIUM CHLORIDE 600; 310; 30; 20 MG/100ML; MG/100ML; MG/100ML; MG/100ML
INJECTION, SOLUTION INTRAVENOUS CONTINUOUS PRN
Status: DISCONTINUED | OUTPATIENT
Start: 2022-05-17 | End: 2022-05-17

## 2022-05-17 RX ORDER — PROPOFOL 10 MG/ML
INJECTION, EMULSION INTRAVENOUS AS NEEDED
Status: DISCONTINUED | OUTPATIENT
Start: 2022-05-17 | End: 2022-05-17

## 2022-05-17 RX ADMIN — PROPOFOL 20 MG: 10 INJECTION, EMULSION INTRAVENOUS at 09:53

## 2022-05-17 RX ADMIN — PROPOFOL 80 MG: 10 INJECTION, EMULSION INTRAVENOUS at 09:50

## 2022-05-17 RX ADMIN — PROPOFOL 20 MG: 10 INJECTION, EMULSION INTRAVENOUS at 09:52

## 2022-05-17 RX ADMIN — LIDOCAINE HYDROCHLORIDE 100 MG: 20 INJECTION, SOLUTION EPIDURAL; INFILTRATION; INTRACAUDAL; PERINEURAL at 09:48

## 2022-05-17 RX ADMIN — PROPOFOL 30 MG: 10 INJECTION, EMULSION INTRAVENOUS at 09:55

## 2022-05-17 RX ADMIN — PROPOFOL 20 MG: 10 INJECTION, EMULSION INTRAVENOUS at 09:51

## 2022-05-17 RX ADMIN — SODIUM CHLORIDE, SODIUM LACTATE, POTASSIUM CHLORIDE, AND CALCIUM CHLORIDE: .6; .31; .03; .02 INJECTION, SOLUTION INTRAVENOUS at 08:40

## 2022-05-17 RX ADMIN — PROPOFOL 30 MG: 10 INJECTION, EMULSION INTRAVENOUS at 09:58

## 2022-05-17 NOTE — ANESTHESIA PREPROCEDURE EVALUATION
Procedure:  EGD    Relevant Problems   ANESTHESIA (within normal limits)      CARDIO   (+) Mixed hyperlipidemia      ENDO   (+) Hypothyroidism, adult      GI/HEPATIC   (+) Esophageal dysphagia   (+) GERD (gastroesophageal reflux disease)   (+) Hiatal hernia      /RENAL (within normal limits)      GYN   (+) Bilateral malignant neoplasm of breast in female, unspecified estrogen receptor status, unspecified site of breast (HCC)      HEMATOLOGY   (+) Anemia   (+) Iron deficiency anemia due to chronic blood loss      MUSCULOSKELETAL   (+) Fibromyalgia      NEURO/PSYCH   (+) Anxiety and depression   (+) Continuous opioid dependence (HCC)   (+) Depression, recurrent (HCC)   (+) Fibromyalgia   (+) History of pulmonary embolism   (+) Hx of adenomatous colonic polyps   (+) Personal history of DVT (deep vein thrombosis)      PULMONARY   (+) Moderate obstructive sleep apnea   (-) Asthma      TTE 2/16/2022  Interpretation Summary         Left Ventricle: Left ventricular cavity size is normal  The left ventricular ejection fraction is 65%  Systolic function is normal  Wall motion is normal  Wall thickness is increased  There is mild asymmetric hypertrophy of the basal septal wall  Diastolic function is mildly abnormal, consistent with grade I (abnormal) relaxation    Right Ventricle: Systolic function is normal  Normal tricuspid annular plane systolic excursion (TAPSE) > 1 7 cm    Aortic Valve: There is no evidence of stenosis    Mitral Valve: There is no evidence of regurgitation    Tricuspid Valve: The right ventricular systolic pressure is normal  The estimated right ventricular systolic pressure is 50 43 mmHg    Pericardium: There is no pericardial effusion        Physical Exam    Airway    Mallampati score: III  TM Distance: >3 FB  Neck ROM: limited     Dental       Cardiovascular      Pulmonary      Other Findings        Anesthesia Plan  ASA Score- 4     Anesthesia Type- IV sedation with anesthesia with ASA Monitors  Additional Monitors:   Airway Plan:           Plan Factors-Exercise tolerance (METS): <4 METS  Chart reviewed  EKG reviewed  Existing labs reviewed  Patient summary reviewed  Patient is not a current smoker  Induction- intravenous  Postoperative Plan-     Informed Consent- Anesthetic plan and risks discussed with patient  I personally reviewed this patient with the CRNA  Discussed and agreed on the Anesthesia Plan with the CRNA  Kasi Oroepza

## 2022-05-17 NOTE — H&P
History and Physical -  [facility]  Sola Jacobo 65 y o  female MRN: [id number]                  HPI: Sola Jacobo is a 65y o  year old female who presents for EGD for dysphagia  Likewise had a history of iron deficiency anemia had a workup she is on iron most recent hemoglobin is good  REVIEW OF SYSTEMS: Per the HPI, and otherwise unremarkable      Historical Information   Past Medical History:   Diagnosis Date    Cancer [facility])     left breast cancer    Cervical herniated disc     Chronic pain disorder     back pain    Chronic respiratory failure ([facility] )     uses O2 at home with [address] /5/19 no longer using/just CPAP    CPAP (continuous positive airway pressure) dependence     Disease of thyroid gland     hypothyroid    DVT (deep venous thrombosis) ([place] [address] ) 2020    right leg    Family history of reaction to anesthesia     "son and daughter hard time waking up and also PONV"    Fibromyalgia, primary     GERD (gastroesophageal reflux disease)     Glaucoma     Hiatal hernia     History of palpitations     History of pneumonia     History of transfusion     Hyperlipidemia     Hypertension     Irregular heart beat     Migraine     Myocardial infarction [facility])     pt sees cardilogist Dr Beau Willis realize had one, found on EKG before a surgery"    OAB (overactive bladder)     Pollen allergies     Pulmonary embolism ([facility] ) 2019    Bilateral; on Xarelto    Restless leg     Risk for falls     Sleep apnea     Use of cane as ambulatory aid     occas    Uses brace     Mafo/left leg    Wears glasses      Past Surgical History:   Procedure Laterality Date    ADENOIDECTOMY      CARDIAC CATHETERIZATION      CARPAL TUNNEL RELEASE      CATARACT EXTRACTION Bilateral     FOOT SURGERY      pin implanted right toe    JOINT REPLACEMENT Bilateral     knees    LAMINECTOMY      LUMBAR EPIDURAL INJECTION      MASTECTOMY Bilateral     with reconstruction and implants    NISSEN FUNDOPLICATION      AZ LENGTH/SHORT LEG/ANKL TENDON,SINGLE Left 5/24/2021    Procedure: Sectioning peroneal tendons with lengthening/sectioning achilles tendon lower leg and ankle;  Surgeon: Christin Rivera DPM;  Location: [facility];  Service: Podiatry    REPLACEMENT TOTAL KNEE      SPINAL FUSION      TONSILLECTOMY      TUBAL LIGATION      TUMOR EXCISION      right forearm/benign    VEIN LIGATION Right      Social History   Social History     Substance and Sexual Activity   Alcohol Use Yes    Comment: socially     Social History     Substance and Sexual Activity   Drug Use Never     Social History     Tobacco Use   Smoking Status Never Smoker   Smokeless Tobacco Never Used     Family History   Problem Relation Age of Onset    Cancer Sister        Meds/Allergies       Current Outpatient Medications:     acetaminophen (TYLENOL) 325 mg tablet    amLODIPine (NORVASC) 5 mg tablet    ASPIRIN 81 PO    azelastine (ASTELIN) 0 1 % nasal spray    b complex vitamins capsule    brimonidine (ALPHAGAN P) 0 15 % ophthalmic solution    calcium citrate-vitamin D (CITRACAL+D) 315-200 MG-UNIT per tablet    DULoxetine (CYMBALTA) 30 mg delayed release capsule    famotidine (PEPCID) 40 MG tablet    fexofenadine (ALLEGRA) 180 MG tablet    fluticasone (FLONASE) 50 mcg/act nasal spray    folic acid (FOLVITE) 1 mg tablet    latanoprost (XALATAN) 0 005 % ophthalmic solution    levothyroxine 100 mcg tablet    pregabalin (LYRICA) 150 mg capsule    rivaroxaban (Xarelto) 20 mg tablet    rOPINIRole (REQUIP) 1 mg tablet    timolol (TIMOPTIC) 0 5 % ophthalmic solution    torsemide (DEMADEX) 10 mg tablet    traMADol (Ultram) 50 mg tablet    zinc gluconate 50 mg tablet    Ascorbic Acid (VITAMIN C) 100 MG tablet    cholecalciferol (VITAMIN D3) 1,000 units tablet    COVID-19 mRNA Virus Vaccine (MODERNA COVID-19 VACCINE IM)    FEROSUL 325 (65 Fe) MG tablet    hydrOXYzine HCL (ATARAX) 25 mg tablet  No current facility-administered medications for this encounter  Facility-Administered Medications Ordered in Other Encounters:     lactated ringers infusion, , Intravenous, Continuous PRN, New Bag at 05/17/22 0840    Allergies   Allergen Reactions    Benzalkonium Chloride Hives     Merthiolate tincture 9/28/2020      Hydromorphone Hallucinations     disorientation; hallucination; confusion      Merthiolate  [Thimerosal] Hives    Quinine Derivatives Other (See Comments) and Tinnitus     tinnitus      Codeine Nausea Only and Other (See Comments)     nausea      Pamelor [Nortriptyline] Vomiting     Per pt, itching also    Pollen Extract Nasal Congestion    Statins Itching    Wound Dressing Adhesive Rash       Objective     /55   Pulse 77   Temp (!) 97 4 °F (36 3 °C) (Temporal)   Resp 19   Ht 5' 3" (1 6 m)   Wt 82 6 kg (182 lb 1 6 oz)   SpO2 97%   BMI 32 26 kg/m²       PHYSICAL EXAM    Gen: NAD  Head: NCAT  CV: RRR  CHEST: Clear  ABD: soft, NT/ND  EXT: no edema      ASSESSMENT/PLAN:  This is a 65y o  year old female here for upper endoscopy, and she is stable and optimized for her procedure

## 2022-05-17 NOTE — DISCHARGE INSTRUCTIONS
Upper Endoscopy   WHAT YOU NEED TO KNOW:   An upper endoscopy is also called an upper gastrointestinal (GI) endoscopy, or an esophagogastroduodenoscopy (EGD)  It is a procedure to examine the inside of your esophagus, stomach, and duodenum (first part of the small intestine) with a scope  You may feel bloated, gassy, or have some abdominal discomfort after your procedure  Your throat may be sore for 24 to 36 hours  You may burp or pass gas from air that is still inside your body  DISCHARGE INSTRUCTIONS:   Seek care immediately if:   You have sudden, severe abdominal pain  You have problems swallowing  You have a large amount of black, sticky bowel movements or blood in your bowel movements  You have sudden trouble breathing  You feel weak, lightheaded, or faint or your heart beats faster than normal for you  Contact your healthcare provider if:   You have a fever and chills  You have nausea or are vomiting  Your abdomen is bloated or feels full and hard  You have abdominal pain  You have black, sticky bowel movements or blood in your bowel movements  You have not had a bowel movement for 3 days after your procedure  You have rash or hives  You have questions or concerns about your procedure  Activity:   Do not lift, strain, or run for 24 hours after your procedure  Rest after your procedure  You have been given medicine to relax you  Do not drive or make important decisions until the day after your procedure  Return to your normal activity as directed  Relieve gas and discomfort from bloating by lying on your right side with a heating pad on your abdomen  You may need to take short walks to help the gas move out  Eat small meals until bloating is relieved  Follow up with your healthcare provider as directed: Write down your questions so you remember to ask them during your visits       If you take a blood thinner, please review the specific instructions from your endoscopist about when you should resume it  These can be found in the Recommendation and Your Medication list sections of this After Visit Summary

## 2022-05-18 LAB
ALBUMIN SERPL ELPH-MCNC: 4.29 G/DL (ref 3.5–5)
ALBUMIN SERPL ELPH-MCNC: 58.8 % (ref 52–65)
ALBUMIN UR ELPH-MCNC: 100 %
ALPHA1 GLOB MFR UR ELPH: 0 %
ALPHA1 GLOB SERPL ELPH-MCNC: 0.29 G/DL (ref 0.1–0.4)
ALPHA1 GLOB SERPL ELPH-MCNC: 4 % (ref 2.5–5)
ALPHA2 GLOB MFR UR ELPH: 0 %
ALPHA2 GLOB SERPL ELPH-MCNC: 0.85 G/DL (ref 0.4–1.2)
ALPHA2 GLOB SERPL ELPH-MCNC: 11.6 % (ref 7–13)
B-GLOBULIN MFR UR ELPH: 0 %
BETA GLOB ABNORMAL SERPL ELPH-MCNC: 0.37 G/DL (ref 0.4–0.8)
BETA1 GLOB SERPL ELPH-MCNC: 5 % (ref 5–13)
BETA2 GLOB SERPL ELPH-MCNC: 4.6 % (ref 2–8)
BETA2+GAMMA GLOB SERPL ELPH-MCNC: 0.34 G/DL (ref 0.2–0.5)
GAMMA GLOB ABNORMAL SERPL ELPH-MCNC: 1.17 G/DL (ref 0.5–1.6)
GAMMA GLOB MFR UR ELPH: 0 %
GAMMA GLOB SERPL ELPH-MCNC: 16 % (ref 12–22)
IGG/ALB SER: 1.43 {RATIO} (ref 1.1–1.8)
INTERPRETATION UR IFE-IMP: NORMAL
M PROTEIN 1 MFR SERPL ELPH: 8.8 %
M PROTEIN 1 SERPL ELPH-MCNC: 0.64 G/DL
PROT PATTERN SERPL ELPH-IMP: ABNORMAL
PROT PATTERN UR ELPH-IMP: ABNORMAL
PROT SERPL-MCNC: 7.3 G/DL (ref 6.4–8.2)
PROT UR-MCNC: 19 MG/DL

## 2022-07-04 DIAGNOSIS — E03.9 HYPOTHYROIDISM, ADULT: ICD-10-CM

## 2022-07-05 RX ORDER — LEVOTHYROXINE SODIUM 0.1 MG/1
100 TABLET ORAL DAILY
Qty: 90 TABLET | Refills: 1 | Status: SHIPPED | OUTPATIENT
Start: 2022-07-05

## 2022-07-14 NOTE — PROGRESS NOTES
BMI Counseling: Body mass index is 32 42 kg/m²  The BMI is above normal  Nutrition recommendations include decreasing portion sizes, encouraging healthy choices of fruits and vegetables, decreasing fast food intake, consuming healthier snacks, limiting drinks that contain sugar, moderation in carbohydrate intake, increasing intake of lean protein, reducing intake of saturated and trans fat and reducing intake of cholesterol  Exercise recommendations include vigorous physical activity 75 minutes/week, exercising 3-5 times per week, obtaining a gym membership and strength training exercises  No pharmacotherapy was ordered  Rationale for BMI follow-up plan is due to patient being overweight or obese  Assessment/Plan:         Problem List Items Addressed This Visit        Endocrine    Hypothyroidism, adult     Recheck TSH, 3rd generation with free T4  Patient reports fatigue and feeling washed out  Patient currently on 100 mcg Synthroid daily  Relevant Orders    TSH, 3rd generation with Free T4 reflex       Respiratory    Moderate obstructive sleep apnea     Patient currently uses CPAP at night  Subacute ethmoidal sinusitis - Primary     Patient had from amoxicillin 875 mg p o  B i d  instructed on sinusitis education  To use Neti pot on routine basis  Re-evaluate 1 week  Relevant Medications    amoxicillin (AMOXIL) 875 mg tablet       Other    Restless leg syndrome, controlled     Patient has been weaning dose of tramadol to half dose 3 times daily  Patient uses for restless legs syndrome at bedtime  Depression, recurrent ([facility] )     PHQ -9 scale reviewed  Patient currently on Cymbalta and Lyrica  Patient has history of fibromyalgia  Left foot drop     Patient has brace to left foot to maintain footdrop  Fatigue     Patient has history of fibromyalgia  Currently for CBC with diff, BMP, Lyme testing, urine culture           Relevant Orders    Lyme Antibody Profile with reflex to WB    Basic metabolic panel    Screening for blood or protein in urine    Relevant Orders    UA w Reflex to Microscopic w Reflex to Culture    Fibromyalgia     Patient had been instructed by Dr Otoniel Chaudhary and passed to discuss with rheumatology if she should reduce Lyrica as well as Cymbalta medication dosage  Patient will discuss with Dr Otoniel Chaudhary next visit  Dizziness     Patient currently weaning off tramadol  Currently reports taking half dose t i d   Also may be side effects of her other medications such as Lyrica or Cymbalta  She indicates that she was trying to decrease dose medications as well  Patient will follow-up with rheumatology  Patient also noted to have sinusitis currently started on amoxicillin 875 mg p o  B i d  For treatment  Re-evaluate in 1 week  Exam for population survey     CBC with diff and CMP ordered at this time  Relevant Orders    CBC and differential    Basic metabolic panel            Subjective:      Patient ID: Sandrita Leone is a 65 y o  female  Patient is a 65year old female that feels washed out, fatigued and does not feel herself  Currently present for office visit evaluation        The following portions of the patient's history were reviewed and updated as appropriate:   Past Medical History:  She has a past medical history of Cancer ([address] ), Cervical herniated disc, Chronic pain disorder, Chronic respiratory failure ([address] ), CPAP (continuous positive airway pressure) dependence, Disease of thyroid gland, DVT (deep venous thrombosis) ([address] ) (2020), Family history of reaction to anesthesia, Fibromyalgia, primary, GERD (gastroesophageal reflux disease), Glaucoma, Hiatal hernia, History of palpitations, History of pneumonia, History of transfusion, Hyperlipidemia, Hypertension, Irregular heart beat, Migraine, Myocardial infarction ([address] ), OAB (overactive bladder), Pollen allergies, Pulmonary embolism ([address] ) (2019), Restless leg, Risk for falls, Sleep apnea, Use of cane as ambulatory aid, Uses brace, and Wears glasses  ,  _______________________________________________________________________  Medical Problems:  does not have any pertinent problems on file ,  _______________________________________________________________________  Past Surgical History:   has a past surgical history that includes Tonsillectomy; Vein ligation (Right); Tubal ligation; ADENOIDECTOMY; Laminectomy; Spinal fusion; Replacement total knee; Carpal tunnel release; Cardiac catheterization; Cataract extraction (Bilateral); Joint replacement (Bilateral); Foot surgery; Lumbar epidural injection; Mastectomy (Bilateral); Nissen fundoplication; Tumor excision; and pr length/short leg/ankl tendon,single (Left, 5/24/2021)  ,  _______________________________________________________________________  Family History:  family history includes Cancer in her sister  ,  _______________________________________________________________________  Social History:   reports that she has never smoked  She has never used smokeless tobacco  She reports current alcohol use  She reports that she does not use drugs  ,  _______________________________________________________________________  Allergies:  is allergic to benzalkonium chloride, hydromorphone, merthiolate  [thimerosal], quinine derivatives, codeine, pamelor [nortriptyline], pollen extract, statins, and wound dressing adhesive     _______________________________________________________________________  Current Outpatient Medications   Medication Sig Dispense Refill    acetaminophen (TYLENOL) 325 mg tablet Take 650 mg by mouth every 6 (six) hours as needed for mild pain      amLODIPine (NORVASC) 5 mg tablet Take 1 tablet (5 mg total) by mouth daily (Patient taking differently: Take 5 mg by mouth every evening) 90 tablet 3    amoxicillin (AMOXIL) 875 mg tablet Take 1 tablet (875 mg total) by mouth 2 (two) times a day for 10 days 20 tablet 0    ASPIRIN 81 PO Aspirin EC 81 MG Oral Tablet Delayed Release    Refills: 0       Active      azelastine (ASTELIN) 0 1 % nasal spray 2 sprays into each nostril 2 (two) times a day      b complex vitamins capsule Take 1 capsule by mouth daily      brimonidine (ALPHAGAN P) 0 15 % ophthalmic solution Administer to the right eye every evening       calcium citrate-vitamin D (CITRACAL+D) 315-200 MG-UNIT per tablet Calcium + D TABS    Refills: 0       Active      COVID-19 mRNA Virus Vaccine (MODERNA COVID-19 VACCINE IM) Inject into a muscle Moderna 2nd dose 2/21/21      DULoxetine (CYMBALTA) 30 mg delayed release capsule TAKE 1 CAPSULE EVERY DAY (Patient taking differently: 30 mg 2 (two) times a day) 90 capsule 3    famotidine (PEPCID) 40 MG tablet Take 1 tablet (40 mg total) by mouth daily 90 tablet 3    fexofenadine (ALLEGRA) 180 MG tablet Take 180 mg by mouth as needed       fluticasone (FLONASE) 50 mcg/act nasal spray as needed       folic acid (FOLVITE) 1 mg tablet Take 1 mg by mouth daily      hydrOXYzine HCL (ATARAX) 25 mg tablet       latanoprost (XALATAN) 0 005 % ophthalmic solution       levothyroxine 100 mcg tablet TAKE 1 TABLET (100 MCG TOTAL) BY MOUTH DAILY 90 tablet 1    pregabalin (LYRICA) 150 mg capsule Take 150 mg by mouth in the morning        rivaroxaban (Xarelto) 20 mg tablet Take 1 tablet (20 mg total) by mouth every evening 30 tablet 1    rOPINIRole (REQUIP) 1 mg tablet Patient takes 1 tab at [facility] and 2 tabs at  tablet 3    timolol (TIMOPTIC) 0 5 % ophthalmic solution Administer to both eyes 2 (two) times a day       torsemide (DEMADEX) 10 mg tablet Take 1 tablet (10 mg total) by mouth daily 90 tablet 3    traMADol (Ultram) 50 mg tablet Take 1/2 tab at 8AM, 1/2 tab at 1PM, and 1 tab at HS (total 2 tabs/day) 60 tablet 3    zinc gluconate 50 mg tablet Take 50 mg by mouth daily      Ascorbic Acid (VITAMIN C) 100 MG tablet Take 100 mg by mouth daily (Patient not taking: Reported on 7/15/2022)      cholecalciferol (VITAMIN D3) 1,000 units tablet Take 1,000 Units by mouth daily (Patient not taking: Reported on 7/15/2022)      FEROSUL 325 (65 Fe) MG tablet Take 1 tablet by mouth 2 (two) times a day with meals (Patient not taking: Reported on 7/15/2022)       No current facility-administered medications for this visit      _______________________________________________________________________  Review of Systems   Constitutional: Positive for fatigue  Negative for activity change, appetite change, chills, fever and unexpected weight change  HENT: Positive for postnasal drip and sneezing  Negative for congestion, ear discharge, ear pain, nosebleeds, rhinorrhea, sinus pressure, sinus pain, sore throat and voice change  Eyes: Negative for pain, redness and visual disturbance  Respiratory: Positive for cough  Negative for chest tightness, shortness of breath and wheezing  Cardiovascular: Negative for chest pain and palpitations  Gastrointestinal: Negative for abdominal distention, abdominal pain, constipation, diarrhea, nausea and vomiting  Endocrine: Negative  Genitourinary: Negative for difficulty urinating, dysuria, flank pain, frequency, hematuria and urgency  Musculoskeletal: Negative for arthralgias and myalgias  Skin: Negative  Allergic/Immunologic: Negative  Neurological: Positive for dizziness  Negative for seizures, speech difficulty, weakness, light-headedness, numbness and headaches  Feels washed out  Patient indicates intermittent dizziness  Noted history of covid-19  Hematological: Negative  Psychiatric/Behavioral: Negative  Objective:  Vitals:    07/15/22 1037   BP: 128/70   BP Location: Left arm   Patient Position: Sitting   Cuff Size: Standard   Pulse: 83   Resp: 19   Temp: (!) 97 1 °F (36 2 °C)   TempSrc: Temporal   SpO2: 97%   Weight: 83 kg (183 lb)   Height: 5' 3" (1 6 m)     Body mass index is 32 42 kg/m²       Physical Exam  Vitals and nursing note reviewed  Constitutional:       Appearance: Normal appearance  She is well-developed  She is obese  HENT:      Head: Normocephalic and atraumatic  Right Ear: Tympanic membrane, ear canal and external ear normal       Left Ear: Tympanic membrane, ear canal and external ear normal       Nose: Congestion and rhinorrhea present  Comments: Left nares reddened and inflamed  Mouth/Throat:      Mouth: Mucous membranes are moist       Pharynx: No oropharyngeal exudate or posterior oropharyngeal erythema  Eyes:      Extraocular Movements: Extraocular movements intact  Conjunctiva/sclera: Conjunctivae normal       Pupils: Pupils are equal, round, and reactive to light  Cardiovascular:      Rate and Rhythm: Normal rate and regular rhythm  Pulses: Normal pulses  Heart sounds: Normal heart sounds  No murmur heard  Pulmonary:      Effort: Pulmonary effort is normal       Breath sounds: Normal breath sounds  Abdominal:      General: Bowel sounds are normal       Palpations: Abdomen is soft  Musculoskeletal:         General: Normal range of motion  Cervical back: Normal range of motion  Comments: Left foot drop  With use of brace  Patient ambulates with a cane  Skin:     General: Skin is warm  Neurological:      General: No focal deficit present  Mental Status: She is alert and oriented to person, place, and time     Psychiatric:         Mood and Affect: Mood normal          Behavior: Behavior normal

## 2022-07-15 ENCOUNTER — OFFICE VISIT (OUTPATIENT)
Dept: FAMILY MEDICINE CLINIC | Facility: CLINIC | Age: 77
End: 2022-07-15
Payer: MEDICARE

## 2022-07-15 VITALS
BODY MASS INDEX: 32.43 KG/M2 | HEIGHT: 63 IN | OXYGEN SATURATION: 97 % | TEMPERATURE: 97.1 F | SYSTOLIC BLOOD PRESSURE: 128 MMHG | WEIGHT: 183 LBS | DIASTOLIC BLOOD PRESSURE: 70 MMHG | RESPIRATION RATE: 19 BRPM | HEART RATE: 83 BPM

## 2022-07-15 DIAGNOSIS — I26.99 BILATERAL PULMONARY EMBOLISM (HCC): ICD-10-CM

## 2022-07-15 DIAGNOSIS — Z00.8 EXAM FOR POPULATION SURVEY: ICD-10-CM

## 2022-07-15 DIAGNOSIS — M79.7 FIBROMYALGIA: ICD-10-CM

## 2022-07-15 DIAGNOSIS — E03.9 HYPOTHYROIDISM, ADULT: ICD-10-CM

## 2022-07-15 DIAGNOSIS — J01.20 SUBACUTE ETHMOIDAL SINUSITIS: Primary | ICD-10-CM

## 2022-07-15 DIAGNOSIS — Z13.89 SCREENING FOR BLOOD OR PROTEIN IN URINE: ICD-10-CM

## 2022-07-15 DIAGNOSIS — R53.83 FATIGUE, UNSPECIFIED TYPE: ICD-10-CM

## 2022-07-15 DIAGNOSIS — G47.33 MODERATE OBSTRUCTIVE SLEEP APNEA: ICD-10-CM

## 2022-07-15 DIAGNOSIS — G25.81 RESTLESS LEG SYNDROME, CONTROLLED: ICD-10-CM

## 2022-07-15 DIAGNOSIS — F33.9 DEPRESSION, RECURRENT (HCC): ICD-10-CM

## 2022-07-15 DIAGNOSIS — M21.372 LEFT FOOT DROP: ICD-10-CM

## 2022-07-15 DIAGNOSIS — R42 DIZZINESS: ICD-10-CM

## 2022-07-15 PROCEDURE — 99215 OFFICE O/P EST HI 40 MIN: CPT | Performed by: NURSE PRACTITIONER

## 2022-07-15 RX ORDER — AMOXICILLIN 875 MG/1
875 TABLET, COATED ORAL 2 TIMES DAILY
Qty: 20 TABLET | Refills: 0 | Status: SHIPPED | OUTPATIENT
Start: 2022-07-15 | End: 2022-07-25

## 2022-07-15 NOTE — ASSESSMENT & PLAN NOTE
PHQ -9 scale reviewed  Patient currently on Cymbalta and Lyrica  Patient has history of fibromyalgia

## 2022-07-15 NOTE — ASSESSMENT & PLAN NOTE
Recheck TSH, 3rd generation with free T4  Patient reports fatigue and feeling washed out  Patient currently on 100 mcg Synthroid daily

## 2022-07-15 NOTE — ASSESSMENT & PLAN NOTE
Patient currently weaning off tramadol  Currently reports taking half dose t i d   Also may be side effects of her other medications such as Lyrica or Cymbalta  She indicates that she was trying to decrease dose medications as well  Patient will follow-up with rheumatology  Patient also noted to have sinusitis currently started on amoxicillin 875 mg p o  B i d  For treatment  Re-evaluate in 1 week

## 2022-07-15 NOTE — ASSESSMENT & PLAN NOTE
Patient had from amoxicillin 875 mg p o  B i d  instructed on sinusitis education  To use Neti pot on routine basis  Re-evaluate 1 week

## 2022-07-15 NOTE — ASSESSMENT & PLAN NOTE
Patient has been weaning dose of tramadol to half dose 3 times daily  Patient uses for restless legs syndrome at bedtime

## 2022-07-15 NOTE — PATIENT INSTRUCTIONS
Allergies   WHAT YOU NEED TO KNOW:   What are allergies? Allergies are an immune system reaction to a substance called an allergen  Your immune system sees the allergen as harmful and attacks it  What causes allergies? You may have allergies at certain times of the year or all year  The following are common allergies:  Seasonal airborne allergies  happen during certain times of the year  This is also called hay fever  Tree, weed, or grass pollen are examples of allergens that you breathe in  Environmental airborne allergy  triggers you may breathe in year-round include dust, mold, and pet hair  Contact allergies  include latex, found in items such as condoms and medical gloves  Latex allergies can be very serious  Insect sting allergies  may be caused by bees, hornets, fire ants, or other insects that sting or bite you  Insect allergies can be very serious  Food allergies  commonly include shellfish, wheat, and eggs  Some foods must be eaten to produce an allergic reaction  Other foods can trigger a reaction if they touch your skin or are breathed in  What increases my risk for allergies? Allergic reactions can happen at any time, even if you have not had allergies before  You may develop an allergy after you have been exposed to an allergen more than once  Allergies are most common in children and elderly people, but anyone can have an allergic reaction  Your risk is also increased if you have a family history of allergies or a medical condition such as asthma  What are the signs and symptoms of allergies? Mild symptoms  include sneezing and a runny, itchy, or stuffy nose  You may also have swollen, watery, or itchy eyes, or skin itching  You may have swelling or pain where an insect bit or stung you  Anaphylaxis symptoms  include trouble breathing or swallowing, a rash or hives, or severe swelling  You may also have a cough, wheezing, or feel lightheaded or dizzy   Anaphylaxis is a sudden, life-threatening reaction that needs immediate treatment  How are allergies diagnosed? Your healthcare provider will ask about your signs and symptoms  He or she will ask what allergens you have been exposed to and if you have ever had other allergic reactions  He or she may look in your nose, ears, or throat  You may need additional testing if you developed anaphylaxis after you were exposed to a trigger and then exercised  This is called exercise-induced anaphylaxis  You may also need the following tests:  Blood tests  are used to check for signs of a reaction to allergens  Nasal tests  are used to see how your nasal passages react to allergens  A sample of your nasal fluid may also be tested  Skin tests  can help your healthcare provider find what you are allergic to  He will place a small amount of allergen on your arm or back and then prick your skin with a needle  He will watch how your skin reacts to the allergen  How are allergies treated? Antihistamines  help decrease itching, sneezing, and swelling  You may take them as a pill or use drops in your nose or eyes  Decongestants  help your nose feel less stuffy  Steroids  decrease swelling and redness  Topical treatments  help decrease itching or swelling  You also may be given nasal sprays or eyedrops  Epinephrine  is medicine used to treat severe allergic reactions such as anaphylaxis  Desensitization  gets your body used to allergens you cannot avoid  Your healthcare provider will give you a shot that contains a small amount of an allergen  He or she will treat any allergic reaction you have  Your provider will give you more of the allergen a little at a time until your body gets used to it  Your reaction to the allergen may be less serious after this treatment  Your healthcare provider will tell you how long to get the shots  What steps do I need to take for signs or symptoms of anaphylaxis?    Immediately  give 1 shot of epinephrine only into the outer thigh muscle  Leave the shot in place  as directed  Your healthcare provider may recommend you leave it in place for up to 10 seconds before you remove it  This helps make sure all of the epinephrine is delivered  Call 911 and go to the emergency department,  even if the shot improved symptoms  Do not drive yourself  Bring the used epinephrine shot with you  What safety precautions do I need to take if I am at risk for anaphylaxis? Keep 2 shots of epinephrine with you at all times  You may need a second shot, because epinephrine only works for about 20 minutes and symptoms may return  Your healthcare provider can show you and family members how to give the shot  Check the expiration date every month and replace it before it expires  Create an action plan  Your healthcare provider can help you create a written plan that explains the allergy and an emergency plan to treat a reaction  The plan explains when to give a second epinephrine shot if symptoms return or do not improve after the first  Give copies of the action plan and emergency instructions to family members and work staff  Show them how to give a shot of epinephrine  Be careful when you exercise  If you have had exercise-induced anaphylaxis, do not exercise right after you eat  Stop exercising right away if you start to develop any signs or symptoms of anaphylaxis  You may first feel tired, warm, or have itchy skin  Hives, swelling, and severe breathing problems may develop if you continue to exercise  Carry medical alert identification  Wear medical alert jewelry or carry a card that explains the allergy  Ask your healthcare provider where to get these items  Inform all healthcare providers of the allergy  This includes dentists, nurses, doctors, and surgeons  How can I manage allergies? Use nasal rinses as directed  Rinse with a saline solution daily   This will help clear allergens out of your nose  Use distilled water if possible  You can also boil tap water and let it cool before you use it  Do not use tap water that has not been boiled  Do not smoke  Allergy symptoms may decrease if you are not around smoke  Nicotine and other chemicals in cigarettes and cigars can cause lung damage  Ask your healthcare provider for information if you currently smoke and need help to quit  E-cigarettes or smokeless tobacco still contain nicotine  Talk to your healthcare provider before you use these products  How can I prevent an allergic reaction? Do not go outside when pollen counts are high if you have seasonal allergies  Your symptoms may be better if you go outside only in the morning or evening  Use your air conditioner, and change air filters often  Avoid dust, fur, and mold  Dust and vacuum your home often  You may want to wear a mask when you vacuum  Keep pets in certain rooms, and bathe them often  Use a dehumidifier (machine that decreases moisture) to help prevent mold  Do not use products that contain latex if you have a latex allergy  Use nonlatex gloves if you work in healthcare or in food preparation  Always tell healthcare providers about a latex allergy  Avoid areas that attract insects if you have an insect bite or sting allergy  Areas include trash cans, gardens, and picnics  Do not wear bright clothing or strong scents when you will be outside  Prevent an allergic reaction caused by food  You may have a reaction if your food is not prepared safely  For example, you could be served food that touched your trigger food during preparation  This is called cross-contamination  Kitchen tools can also cause cross-contamination  You may also eat baked foods that contain a trigger food you do not know about  Ask if the food contains your trigger food before you handle or eat it      Call 911 for signs or symptoms of anaphylaxis,  such as trouble breathing, swelling in your mouth or throat, or wheezing  You may also have itching, a rash, hives, or feel like you are going to faint  When should I seek immediate care? You have tingling in your hands or feet  Your skin is red or flushed  When should I contact my healthcare provider? You have questions or concerns about your condition or care  CARE AGREEMENT:   You have the right to help plan your care  Learn about your health condition and how it may be treated  Discuss treatment options with your healthcare providers to decide what care you want to receive  You always have the right to refuse treatment  The above information is an  only  It is not intended as medical advice for individual conditions or treatments  Talk to your doctor, nurse or pharmacist before following any medical regimen to see if it is safe and effective for you  © Copyright "[employer] 2022 Information is for End User's use only and may not be sold, redistributed or otherwise used for commercial purposes  All illustrations and images included in CareNotes® are the copyrighted property of [employer]  or Kike Dupree   COVID-19 and Chronic Health Conditions   WHAT YOU NEED TO KNOW:   Your chronic condition may increase your risk for COVID-19 or serious problems it can cause  Healthcare providers might need to make changes that affect how you usually manage your chronic health condition  Providers may change hours of operation or not have patients come in to be seen  You may not be able to make appointments to get blood drawn or to have tests or procedures  This may continue until the virus that causes COVID-19 is controlled  Until then, you can take steps to manage your condition  The steps will also lower your risk for COVID-19 or the serious problems it causes  If you do develop COVID-19, healthcare providers will tell you when it is okay to be around others after you recover   This depends on your chronic condition, any symptoms of COVID-19 that developed, and how severe the symptoms were  DISCHARGE INSTRUCTIONS:   Call your local emergency number [phone] in the [facility]) or an emergency department if:   You have trouble breathing or shortness of breath  You have chest pain or pressure that lasts longer than 5 minutes  You become confused or hard to wake  Your lips or face are blue  Return to the emergency department if:   You have a fever of 104°F (40°C) or higher  Call your doctor or healthcare provider if:   You have symptoms of COVID-19  You have questions or concerns about COVID-19 or your chronic condition  What you need to know about serious problems from the virus:  You may develop long-term health problems caused by the virus  Your risk is higher if you are 65 or older  A weak immune system, obesity, diabetes, chronic kidney disease, or a heart or lung condition can also increase your risk  Your risk is also higher if you are a current or former cigarette smoker  COVID-19 can lead to any of the following:  Multisymptom inflammatory syndrome in adults (MIS-A) or in children (MIS-C), causing inflammation in the heart, digestive system, skin, or brain    Shortness of breath, serious lower respiratory conditions, such as pneumonia or acute respiratory distress syndrome (ARDS)    Blood clots or blood vessel damage    Organ damage from a lack of oxygen or from blood clots    Sleep problems    Problems thinking clearly, remembering information, or concentrating    Mood changes, depression, or anxiety    Long-term problems tasting or smelling    Loss of appetite and weight loss    Nerve pain    Fatigue (feeling mentally and physically tired)    How the 2019 coronavirus spreads: The virus spreads quickly and easily  The virus can be passed starting 2 to 3 days before symptoms begin or before a positive test if symptoms never begin  Droplets are the main way all coronaviruses spread    The virus travels in droplets that form when a person talks, sings, coughs, or sneezes  The droplets can also float in the air for minutes or hours  Infection happens when you breathe in the droplets or get them in your eyes or nose  Close personal contact with an infected person increases your risk for infection  This means being within 6 feet (2 meters) of the person for at least 15 minutes over 24 hours  Person-to-person contact can spread the virus  For example, a person with the virus on his or her hands can spread it by shaking hands with someone  The virus can stay on objects and surfaces for up to 3 days  You may become infected by touching the object or surface and then touching your eyes or mouth  Manage your chronic health condition during this time:  If you do not have a regular healthcare provider, experts recommend you contact a local [facility] health center or health department  The following can help you manage your condition and prevent COVID-19:  Get emergency care for your condition if needed  Talk to your healthcare providers about symptoms of your chronic condition that need immediate care  Your providers can help you create a plan or add exacerbation management to your plan  The plan will include when to go to an emergency department and when to call your local emergency number  This will depend on where you live and the services that are available during this time  Go to dialysis appointments as scheduled  It is important to stay on schedule  You will need to have enough food to be able to follow the emergency diet plan if you must miss a session  The emergency diet needs to be part of the management plan for your condition  Reschedule any upcoming appointments as needed  Medical facilities may be closed until the coronavirus is better controlled  This means you may need to reschedule a surgery, procedure, or check-up appointment   If you cannot have a phone or video appointment, you will need to make a new appointment  Some providers may be scheduling appointments several months in advance  Some surgeries and procedures will happen as scheduled  This depends on the medical condition and the reason for the surgery or procedure  You may need to have extra testing for COVID-19 several days before  Follow any regular management plan you use  Your healthcare provider will tell you if you need to make any changes to your regular management plan  For example, if you have asthma, continue to follow your asthma action plan  If you have diabetes, you may need to check your blood sugar level more often  Stress and illness can make blood sugar levels go up  You may need to adjust medicine such as insulin  If you have a heart condition or high blood pressure, you may need to check your blood pressure more often  Stress and illness can also raise your blood pressure  Talk to your healthcare providers about your medicines  You may be able to get more than 1 month of medicine at a time  This will lower the number of times you need to go to a pharmacy to get your medicines  Make sure you have enough medicine if you have a condition that can lead to an emergency  Examples include asthma medicines, insulin, or an epinephrine pen  Check the expiration dates on the medicines you currently have  Ask for refills as soon as possible, if needed  If it is not time to refill prescriptions, you may be able to get an emergency supply of some medicines  Medicine plans vary, so ask your healthcare provider or pharmacist for options  Have supplies available in your home  If possible, get extra supplies you use regularly  Examples include absorbent pads, syringes, and wound cleaning solutions  This will limit the number of trips out of your home to get supplies  Know the signs and symptoms of COVID-19  Signs and symptoms usually start about 5 days after infection but can take 2 to 14 days   Signs and symptoms range from mild to severe  You may feel like you have the flu or a bad cold  Your chronic health condition may cause some of the same symptoms COVID-19 causes  This can make it hard to know if a symptom is from COVID-19 or your chronic condition  Keep a record of any new or worsening symptom you have  This is especially important if you have a condition that often causes shortness of breath  Your provider can tell you if you should be tested for COVID-19  Tell your healthcare provider if you think you were infected but develop signs or symptoms not listed below:    A cough    Shortness of breath or trouble breathing that may become severe    A fever of at least 100 4°F, or 38°C (may be lower in adults 65 or older)    Chills that might include shaking    Muscle pain, body aches, or a headache    A sore throat    Suddenly not being able to taste or smell anything    Feeling very tired (fatigue)    Congestion (stuffy head and nose), or a runny nose    Diarrhea, nausea, or vomiting    What you can do to prevent having to go out of your home during this time:   Ask your healthcare provider for other ways to have appointments  Some providers offer phone, video, or other types of appointments  Have food, medicines, and other supplies delivered  Some pharmacies can send certain medicines to you through the mail  Grocery stores and restaurants may be able to deliver food and other items  If possible, have delivered items placed somewhere  Try not to have someone hand you an item  You will be so close to the person that the virus can spread between you  Ask someone to get items you need  The person can get groceries, medicines, or other needed items for you  Choose a person who does not have signs or symptoms of COVID-19 or has tested negative for it  The person should not be waiting for test results  He or she should not have a condition that increases the risk for COVID-19 or serious problems it causes      What you need to know about COVID-19 vaccines: Your healthcare provider can give you more information about what to expect, depending on your chronic condition  You are considered fully vaccinated against COVID-19 two weeks after the final dose of any COVID-19 vaccine  Let your healthcare provider know when you have received the final dose of the vaccine  Make a copy of your vaccination card  Keep the original with you in case you need to show it  Keep the copy in a safe place  COVID-19 vaccines are given as a shot in 1 or 2 doses  Vaccination is recommended for everyone 5 years or older  One 2-dose vaccine is fully approved  for those 16 years or older  This vaccine also has an emergency use authorization (EUA) for children 11to 13years old  No vaccine is currently available for children younger than 5 years  A booster (additional) dose  is given to help the immune system continue to protect against severe COVID-19  A booster is recommended for all adults 18 or older  The booster can be a different brand of the COVID-19 vaccine than you originally received  The timing for the booster depends on the type of vaccine you received:    1-dose vaccine: The booster is given at least 2 months after you received the vaccine  2-dose vaccine: The booster is given at least 5 or 6 months after the second dose  A booster can be given to adolescents 15to 16years old  Only 1 COVID-19 vaccine has this EUA  The booster is given at least 5 months after the second dose of the original vaccine series  A booster is recommended for immunocompromised children 11to 6years old  Only 1 COVID-19 vaccine has this EUA  The booster is given 28 days after the second dose  Continue social distancing and other measures, even after you get the vaccine  Although it is not common, you can become infected after you get the vaccine  You may also be able to pass the virus to others without knowing you are infected      After you get the vaccine, check local, national, and international travel rules  You may need to be tested before you travel  Some countries require proof of a negative test before you travel  You may also need to quarantine after you return  Medicine may be given to prevent infection  The medicine can be given if you are at high risk for infection and cannot get the vaccine  It can also be given if your immune system does not respond well to the vaccine  Lower your risk for COVID-19:   Wash your hands often throughout the day  Use soap and water  Rub your soapy hands together, lacing your fingers, for at least 20 seconds  Rinse with warm, running water  Dry your hands with a clean towel or paper towel  Use hand  that contains alcohol if soap and water are not available  Teach children how to wash their hands and use hand   Cover sneezes and coughs  Turn your face away and cover your mouth and nose with a tissue  Throw the tissue away  Use the bend of your arm if a tissue is not available  Then wash your hands with soap and water or use hand   Teach children how to cover a cough or sneeze  Follow worldwide, national, and local social distancing guidelines  Keep at least 6 feet (2 meters) between you and others  Wear a face covering (mask) around anyone who does not live in your home  Use a cloth covering with at least 2 layers  You can also create layers by putting a cloth covering over a disposable non-medical mask  Cover your mouth and your nose  Try not to touch your face  If you get the virus on your hands, you can transfer it to your eyes, nose, or mouth and become infected  You can also transfer it to objects, surfaces, or people  Clean and disinfect high-touch surfaces and objects in your home often  Use disinfecting wipes, or make a solution by mixing 4 teaspoons of bleach with 1 quart (4 cups) of water   Do not  use any cleaning or disinfecting products that can trigger an asthma attack or other breathing problems  Open windows or have circulating air as you clean  Do not  mix ammonia with bleach  This will create toxic fumes  How to follow social distancing guidelines:  National and local social distancing rules vary  Rules and restrictions may change over time as restrictions are lifted  The following are general guidelines:  Stay home if you are sick or think you may have COVID-19  It is important to stay home if you are waiting for a testing appointment or for test results  Avoid close physical contact with anyone who does not live in your home  Do not shake hands with, hug, or kiss a person as a greeting  If you must use public transportation (such as a bus or subway), try to sit or stand away from others  Wear your face covering  Avoid in-person gatherings and crowds  Attend virtually if possible  Help strengthen your immune system:   Ask about other vaccines you may need  Get the influenza (flu) vaccine as soon as recommended each year, usually starting in September or October  Get the pneumonia vaccine if recommended  Your healthcare provider can tell you if you should also get other vaccines, and when to get them  Do not smoke  Nicotine and other chemicals in cigarettes and cigars can increase your risk for infection and for serious COVID-19 effects  Ask your healthcare provider for information if you currently smoke and need help to quit  E-cigarettes or smokeless tobacco still contain nicotine  Talk to your healthcare provider before you use these products  Eat a variety of healthy foods  Examples include vegetables, fruits, whole-grain breads and cereals, lean meats and poultry, fish, low-fat dairy products, and cooked beans  Healthy foods contain nutrients that help keep your immune system strong  Find ways to manage stress  You may be feeling more stressed than usual because of the COVID-19 outbreak   The situation is very stressful to many people  Talk to your healthcare providers about ways to manage stress during this time  Stress can lead to breathing problems or make the problems worse  Stress can trigger an attack or exacerbation of many health conditions  It is important to do things that help you feel more relaxed, such as the following:     Pick 1 or 2 times a day to watch the news  Constant news watching can increase your stress levels  Talk to a friend on the phone or through a video chat  Take a warm, soothing bath  Listen to music  Exercise can also help relieve stress  This may be hard if your regular gym or outdoor exercise area is closed  If you do not have exercise equipment at home, try walking inside your home  You can walk quickly or turn on music and dance  Follow up with your doctor or healthcare provider as directed: Your providers will tell you when you can come in for tests, procedures, or check-ups  Bring your symptom record with you to all appointments  Write down your questions so you remember to ask them during your visits  For more information:   [facility]  [address] [place] , [address]  Phone: [phone]  Phone: [phone]  Web Address: [employer] com br    © Copyright "[employer]" 2022 Information is for End User's use only and may not be sold, redistributed or otherwise used for commercial purposes  All illustrations and images included in CareNotes® are the copyrighted property of A D A M , Inc  or Kike Dupree   The above information is an  only  It is not intended as medical advice for individual conditions or treatments  Talk to your doctor, nurse or pharmacist before following any medical regimen to see if it is safe and effective for you  Sinusitis   WHAT YOU NEED TO KNOW:   What is sinusitis? Sinusitis is inflammation or infection of your sinuses   Sinusitis is most often caused by a virus  Acute sinusitis may last up to 12 weeks  Chronic sinusitis lasts longer than 12 weeks  Recurrent sinusitis means you have 4 or more infections in 1 year  What increases my risk for sinusitis? Medical conditions, such as an upper respiratory infection, allergies, asthma, or cystic fibrosis    Dental infections or procedures, such as gum infections, tooth decay, or a root canal    Smoking or exposure to secondhand smoke    Abnormal sinus structure, such as nasal growths, swollen tonsils, or a deviated septum    A weak immune system, from diseases such as diabetes or HIV    What are the signs and symptoms of sinusitis? Fever    Pain, pressure, redness, or swelling around the forehead, cheeks, or eyes    Thick yellow or green discharge from your nose    Tenderness when you touch your face over your sinuses    Dry cough that happens mostly at night or when you lie down    Headache and face pain that is worse when you lean forward    Tooth pain, or pain when you chew    How is sinusitis diagnosed? Your healthcare provider will examine you and ask about your symptoms  He or she may check inside your nose using a nasal speculum  You may need any of the following tests:  A sample of mucus from your nose  may show what germ is causing your infection  A CT or MRI of your head  may show the mucous lining of your sinuses  You may be given contrast liquid to help your sinuses show up better in the pictures  Tell your healthcare provider if you have ever had an allergic reaction to contrast liquid  Do not enter the MRI room with anything metal  Metal can cause serious injury  Tell your healthcare provider if you have any metal in or on your body  How is sinusitis treated? Your symptoms may go away on their own  Your healthcare provider may recommend watchful waiting for up to 10 days before starting antibiotics  You may need any of the following:  Acetaminophen  decreases pain and fever   It is available without a doctor's order  Ask how much to take and how often to take it  Follow directions  Read the labels of all other medicines you are using to see if they also contain acetaminophen, or ask your doctor or pharmacist  Acetaminophen can cause liver damage if not taken correctly  Do not use more than 4 grams (4,000 milligrams) total of acetaminophen in one day  NSAIDs , such as ibuprofen, help decrease swelling, pain, and fever  This medicine is available with or without a doctor's order  NSAIDs can cause stomach bleeding or kidney problems in certain people  If you take blood thinner medicine, always ask your healthcare provider if NSAIDs are safe for you  Always read the medicine label and follow directions  Nasal steroid sprays  may help decrease inflammation in your nose and sinuses  Decongestants  help reduce swelling and drain mucus in the nose and sinuses  They may help you breathe easier  Antihistamines  help dry mucus in the nose and relieve sneezing  Antibiotics  help treat or prevent a bacterial infection  How can I manage my symptoms? Rinse your sinuses as directed  Use a sinus rinse device to rinse your nasal passages with a saline (salt water) solution or distilled water  Do not use tap water  This will help thin the mucus in your nose and rinse away pollen and dirt  It will also help reduce swelling so you can breathe normally  Use a humidifier  to increase air moisture in your home  This may make it easier for you to breathe and help decrease your cough  Sleep with your head elevated  Place an extra pillow under your head before you go to sleep to help your sinuses drain  Drink liquids as directed  Ask your healthcare provider how much liquid to drink each day and which liquids are best for you  Liquids will thin the mucus in your nose and help it drain  Avoid drinks that contain alcohol or caffeine  Do not smoke, and avoid secondhand smoke    Nicotine and other chemicals in cigarettes and cigars can make your symptoms worse  Ask your healthcare provider for information if you currently smoke and need help to quit  E-cigarettes or smokeless tobacco still contain nicotine  Talk to your healthcare provider before you use these products  How can I help prevent the spread of germs? Wash your hands often with soap and water  Wash your hands after you use the bathroom, change a child's diaper, or sneeze  Wash your hands before you prepare or eat food  Stay away from people who are sick  Some germs spread easily and quickly through contact  When should I seek immediate care? You have trouble breathing or wheezing that is getting worse  You have a stiff neck, a fever, or a bad headache  You cannot open your eye  Your eyeball bulges out or you cannot move your eye  You are more sleepy than normal, or you notice changes in your ability to think, move, or talk  You have swelling of your forehead or scalp  When should I call my doctor? You have vision changes, such as double vision  Your eye and eyelid are red, swollen, and painful  Your symptoms do not improve or go away after 10 days  You have nausea and are vomiting  Your nose is bleeding  You have questions or concerns about your condition or care  CARE AGREEMENT:   You have the right to help plan your care  Learn about your health condition and how it may be treated  Discuss treatment options with your healthcare providers to decide what care you want to receive  You always have the right to refuse treatment  The above information is an  only  It is not intended as medical advice for individual conditions or treatments  Talk to your doctor, nurse or pharmacist before following any medical regimen to see if it is safe and effective for you    © Copyright [employer] 2022 Information is for End User's use only and may not be sold, redistributed or otherwise used for commercial purposes   All illustrations and images included in CareNotes® are the copyrighted property of A D A M , Inc  or [facility] Ritu Hamilton

## 2022-07-15 NOTE — ASSESSMENT & PLAN NOTE
Patient had been instructed by Dr Willis Shepherd and passed to discuss with rheumatology if she should reduce Lyrica as well as Cymbalta medication dosage  Patient will discuss with Dr Willis Shepherd next visit

## 2022-07-19 ENCOUNTER — APPOINTMENT (OUTPATIENT)
Dept: LAB | Facility: HOSPITAL | Age: 77
End: 2022-07-19
Payer: MEDICARE

## 2022-07-19 DIAGNOSIS — R53.83 FATIGUE, UNSPECIFIED TYPE: ICD-10-CM

## 2022-07-19 DIAGNOSIS — Z00.8 EXAM FOR POPULATION SURVEY: ICD-10-CM

## 2022-07-19 DIAGNOSIS — E03.9 HYPOTHYROIDISM, ADULT: ICD-10-CM

## 2022-07-19 LAB
ANION GAP SERPL CALCULATED.3IONS-SCNC: 9 MMOL/L (ref 4–13)
B BURGDOR IGG+IGM SER-ACNC: <0.2 AI
BASOPHILS # BLD AUTO: 0.03 THOUSANDS/ΜL (ref 0–0.1)
BASOPHILS NFR BLD AUTO: 1 % (ref 0–1)
BILIRUB UR QL STRIP: NEGATIVE
BUN SERPL-MCNC: 21 MG/DL (ref 5–25)
CALCIUM SERPL-MCNC: 9.1 MG/DL (ref 8.4–10.2)
CHLORIDE SERPL-SCNC: 110 MMOL/L (ref 96–108)
CLARITY UR: CLEAR
CO2 SERPL-SCNC: 22 MMOL/L (ref 21–32)
COLOR UR: YELLOW
CREAT SERPL-MCNC: 0.61 MG/DL (ref 0.6–1.3)
EOSINOPHIL # BLD AUTO: 0.3 THOUSAND/ΜL (ref 0–0.61)
EOSINOPHIL NFR BLD AUTO: 5 % (ref 0–6)
ERYTHROCYTE [DISTWIDTH] IN BLOOD BY AUTOMATED COUNT: 12.8 % (ref 11.6–15.1)
GFR SERPL CREATININE-BSD FRML MDRD: 87 ML/MIN/1.73SQ M
GLUCOSE P FAST SERPL-MCNC: 107 MG/DL (ref 65–99)
GLUCOSE UR STRIP-MCNC: NEGATIVE MG/DL
HCT VFR BLD AUTO: 41.3 % (ref 34.8–46.1)
HGB BLD-MCNC: 13.7 G/DL (ref 11.5–15.4)
HGB UR QL STRIP.AUTO: NEGATIVE
IMM GRANULOCYTES # BLD AUTO: 0.02 THOUSAND/UL (ref 0–0.2)
IMM GRANULOCYTES NFR BLD AUTO: 0 % (ref 0–2)
KETONES UR STRIP-MCNC: NEGATIVE MG/DL
LEUKOCYTE ESTERASE UR QL STRIP: NEGATIVE
LYMPHOCYTES # BLD AUTO: 1.59 THOUSANDS/ΜL (ref 0.6–4.47)
LYMPHOCYTES NFR BLD AUTO: 29 % (ref 14–44)
MCH RBC QN AUTO: 30.7 PG (ref 26.8–34.3)
MCHC RBC AUTO-ENTMCNC: 33.2 G/DL (ref 31.4–37.4)
MCV RBC AUTO: 93 FL (ref 82–98)
MONOCYTES # BLD AUTO: 0.59 THOUSAND/ΜL (ref 0.17–1.22)
MONOCYTES NFR BLD AUTO: 11 % (ref 4–12)
NEUTROPHILS # BLD AUTO: 3 THOUSANDS/ΜL (ref 1.85–7.62)
NEUTS SEG NFR BLD AUTO: 54 % (ref 43–75)
NITRITE UR QL STRIP: NEGATIVE
NRBC BLD AUTO-RTO: 0 /100 WBCS
PH UR STRIP.AUTO: 6 [PH]
PLATELET # BLD AUTO: 307 THOUSANDS/UL (ref 149–390)
PMV BLD AUTO: 10.6 FL (ref 8.9–12.7)
POTASSIUM SERPL-SCNC: 3.7 MMOL/L (ref 3.5–5.3)
PROT UR STRIP-MCNC: NEGATIVE MG/DL
RBC # BLD AUTO: 4.46 MILLION/UL (ref 3.81–5.12)
SODIUM SERPL-SCNC: 141 MMOL/L (ref 135–147)
SP GR UR STRIP.AUTO: 1.02 (ref 1–1.03)
TSH SERPL DL<=0.05 MIU/L-ACNC: 0.76 UIU/ML (ref 0.45–4.5)
UROBILINOGEN UR QL STRIP.AUTO: 0.2 E.U./DL
WBC # BLD AUTO: 5.53 THOUSAND/UL (ref 4.31–10.16)

## 2022-07-19 PROCEDURE — 85025 COMPLETE CBC W/AUTO DIFF WBC: CPT

## 2022-07-19 PROCEDURE — 81003 URINALYSIS AUTO W/O SCOPE: CPT | Performed by: NURSE PRACTITIONER

## 2022-07-19 PROCEDURE — 80048 BASIC METABOLIC PNL TOTAL CA: CPT

## 2022-07-19 PROCEDURE — 86618 LYME DISEASE ANTIBODY: CPT

## 2022-07-19 PROCEDURE — 84443 ASSAY THYROID STIM HORMONE: CPT

## 2022-07-19 PROCEDURE — 36415 COLL VENOUS BLD VENIPUNCTURE: CPT

## 2022-07-28 ENCOUNTER — OFFICE VISIT (OUTPATIENT)
Dept: FAMILY MEDICINE CLINIC | Facility: CLINIC | Age: 77
End: 2022-07-28
Payer: MEDICARE

## 2022-07-28 VITALS
HEIGHT: 63 IN | DIASTOLIC BLOOD PRESSURE: 78 MMHG | BODY MASS INDEX: 32.43 KG/M2 | RESPIRATION RATE: 19 BRPM | SYSTOLIC BLOOD PRESSURE: 140 MMHG | WEIGHT: 183 LBS | HEART RATE: 87 BPM | OXYGEN SATURATION: 95 % | TEMPERATURE: 97.2 F

## 2022-07-28 DIAGNOSIS — G93.32 COVID-19 LONG HAULER MANIFESTING CHRONIC FATIGUE: ICD-10-CM

## 2022-07-28 DIAGNOSIS — J01.20 SUBACUTE ETHMOIDAL SINUSITIS: Primary | ICD-10-CM

## 2022-07-28 DIAGNOSIS — E66.09 CLASS 1 OBESITY DUE TO EXCESS CALORIES WITH SERIOUS COMORBIDITY AND BODY MASS INDEX (BMI) OF 32.0 TO 32.9 IN ADULT: ICD-10-CM

## 2022-07-28 DIAGNOSIS — U09.9 COVID-19 LONG HAULER MANIFESTING CHRONIC FATIGUE: ICD-10-CM

## 2022-07-28 DIAGNOSIS — M79.7 FIBROMYALGIA: ICD-10-CM

## 2022-07-28 DIAGNOSIS — G25.81 RESTLESS LEG SYNDROME, CONTROLLED: ICD-10-CM

## 2022-07-28 PROBLEM — R53.82 COVID-19 LONG HAULER MANIFESTING CHRONIC FATIGUE: Status: ACTIVE | Noted: 2022-07-28

## 2022-07-28 PROBLEM — E66.811 CLASS 1 OBESITY DUE TO EXCESS CALORIES WITH SERIOUS COMORBIDITY AND BODY MASS INDEX (BMI) OF 32.0 TO 32.9 IN ADULT: Status: ACTIVE | Noted: 2022-07-28

## 2022-07-28 LAB
SARS-COV-2 AG UPPER RESP QL IA: NEGATIVE
VALID CONTROL: NORMAL

## 2022-07-28 PROCEDURE — 99215 OFFICE O/P EST HI 40 MIN: CPT | Performed by: NURSE PRACTITIONER

## 2022-07-28 PROCEDURE — 87811 SARS-COV-2 COVID19 W/OPTIC: CPT | Performed by: NURSE PRACTITIONER

## 2022-07-28 RX ORDER — DULOXETIN HYDROCHLORIDE 20 MG/1
20 CAPSULE, DELAYED RELEASE ORAL DAILY
Qty: 90 CAPSULE | Refills: 3 | Status: SHIPPED | OUTPATIENT
Start: 2022-07-28 | End: 2022-09-20

## 2022-07-28 RX ORDER — CLARITHROMYCIN 500 MG/1
500 TABLET, COATED ORAL EVERY 12 HOURS SCHEDULED
Qty: 20 TABLET | Refills: 0 | Status: SHIPPED | OUTPATIENT
Start: 2022-07-28 | End: 2022-08-07

## 2022-07-28 NOTE — ASSESSMENT & PLAN NOTE
BMI currently 32 42 kg/M2  Patient counseled on proper diet nutrition and exercise  Recheck BMI next office visit

## 2022-07-28 NOTE — ASSESSMENT & PLAN NOTE
Patient had been treated 07/15/2022 for sinusitis  Currently continues to have symptoms as well as fatigue and nasal congestion with postnasal drip  Will switch to erythromycin 500 mg p o  B i d   Patient also instructed to switch from Allegra to Claritin 10 mg p o  Q h s   Patient also to use Flonase nasal spray daily

## 2022-07-28 NOTE — ASSESSMENT & PLAN NOTE
Patient noted to have chronic fatigue with COVID-19  Possibly secondary to current medications that she may be on  Medications currently being weaned down  Lyrica 150 mg b i d  Changed to daily  Duloxetine 30 mg p o  Q h s  changed to 20 mg q h s     Patient follow-up in 4 weeks

## 2022-07-28 NOTE — PROGRESS NOTES
BMI Counseling: Body mass index is 32 42 kg/m²  The BMI is above normal  Nutrition recommendations include decreasing portion sizes, encouraging healthy choices of fruits and vegetables, decreasing fast food intake, consuming healthier snacks, limiting drinks that contain sugar, moderation in carbohydrate intake, increasing intake of lean protein, reducing intake of saturated and trans fat and reducing intake of cholesterol  Exercise recommendations include vigorous physical activity 75 minutes/week, exercising 3-5 times per week, obtaining a gym membership and strength training exercises  No pharmacotherapy was ordered  Rationale for BMI follow-up plan is due to patient being overweight or obese  Depression Screening and Follow-up Plan: Clincally patient does not have depression  No treatment is required  Assessment/Plan:         Problem List Items Addressed This Visit        Respiratory    Subacute ethmoidal sinusitis - Primary     Patient had been treated 07/15/2022 for sinusitis  Currently continues to have symptoms as well as fatigue and nasal congestion with postnasal drip  Will switch to erythromycin 500 mg p o  B i d   Patient also instructed to switch from Allegra to Claritin 10 mg p o  Q h s   Patient also to use Flonase nasal spray daily  Relevant Medications    clarithromycin (BIAXIN) 500 mg tablet       Other    Restless leg syndrome, controlled     Patient maintain Requip 1 mg p o  Q h s  Fibromyalgia     Patient treated for fibromyalgia with Cymbalta 20 mg p o  Daily and there lyrica 75 mg p o  Daily  Current medications have been weaned down as per Dr Shahnaz Dobbs  We will see how the patient feels and 1 month  Relevant Medications    DULoxetine (CYMBALTA) 20 mg capsule    Class 1 obesity due to excess calories with serious comorbidity and body mass index (BMI) of 32 0 to 32 9 in adult     BMI currently 32 42 kg/M2    Patient counseled on proper diet nutrition and exercise  Recheck BMI next office visit  COVID-19 long hauler manifesting chronic fatigue     Patient noted to have chronic fatigue with COVID-19  Possibly secondary to current medications that she may be on  Medications currently being weaned down  Lyrica 150 mg b i d  Changed to daily  Duloxetine 30 mg p o  Q h s  changed to 20 mg q h s     Patient follow-up in 4 weeks  Subjective:      Patient ID: Janessa Trevizo is a 65 y o  female  Patient is a 65year old female present for evolution of fatigue, cough, nasal congestion, brain fog, balance issues, nausea in morning, and muscle aches and pains  The following portions of the patient's history were reviewed and updated as appropriate:   Past Medical History:  She has a past medical history of Cancer ([facility] [address] ), Cervical herniated disc, Chronic pain disorder, Chronic respiratory failure ([address] ), CPAP (continuous positive airway pressure) dependence, Disease of thyroid gland, DVT (deep venous thrombosis) ([facility] [address] ) (2020), Family history of reaction to anesthesia, Fibromyalgia, primary, GERD (gastroesophageal reflux disease), Glaucoma, Hiatal hernia, History of palpitations, History of pneumonia, History of transfusion, Hyperlipidemia, Hypertension, Irregular heart beat, Migraine, Myocardial infarction ([address] ), OAB (overactive bladder), Pollen allergies, Pulmonary embolism ([address] ) (2019), Restless leg, Risk for falls, Sleep apnea, Use of cane as ambulatory aid, Uses brace, and Wears glasses  ,  _______________________________________________________________________  Medical Problems:  does not have any pertinent problems on file ,  _______________________________________________________________________  Past Surgical History:   has a past surgical history that includes Tonsillectomy; Vein ligation (Right); Tubal ligation; ADENOIDECTOMY; Laminectomy;  Spinal fusion; Replacement total knee; Carpal tunnel release; Cardiac catheterization; Cataract extraction (Bilateral); Joint replacement (Bilateral); Foot surgery; Lumbar epidural injection; Mastectomy (Bilateral); Nissen fundoplication; Tumor excision; and pr length/short leg/ankl tendon,single (Left, 5/24/2021)  ,  _______________________________________________________________________  Family History:  family history includes Cancer in her sister  ,  _______________________________________________________________________  Social History:   reports that she has never smoked  She has never used smokeless tobacco  She reports current alcohol use  She reports that she does not use drugs  ,  _______________________________________________________________________  Allergies:  is allergic to benzalkonium chloride, hydromorphone, merthiolate  [thimerosal], quinine derivatives, codeine, pamelor [nortriptyline], pollen extract, statins, and wound dressing adhesive     _______________________________________________________________________  Current Outpatient Medications   Medication Sig Dispense Refill    acetaminophen (TYLENOL) 325 mg tablet Take 650 mg by mouth every 6 (six) hours as needed for mild pain      amLODIPine (NORVASC) 5 mg tablet Take 1 tablet (5 mg total) by mouth daily (Patient taking differently: Take 5 mg by mouth every evening) 90 tablet 3    ASPIRIN 81 PO Aspirin EC 81 MG Oral Tablet Delayed Release    Refills: 0       Active      azelastine (ASTELIN) 0 1 % nasal spray 2 sprays into each nostril 2 (two) times a day      b complex vitamins capsule Take 1 capsule by mouth daily      brimonidine (ALPHAGAN P) 0 15 % ophthalmic solution Administer to the right eye every evening       calcium citrate-vitamin D (CITRACAL+D) 315-200 MG-UNIT per tablet Calcium + D TABS    Refills: 0       Active      clarithromycin (BIAXIN) 500 mg tablet Take 1 tablet (500 mg total) by mouth every 12 (twelve) hours for 10 days 20 tablet 0    COVID-19 mRNA Virus Vaccine (MODERNA COVID-19 VACCINE IM) Inject into a muscle Moderna 2nd dose 2/21/21      DULoxetine (CYMBALTA) 20 mg capsule Take 1 capsule (20 mg total) by mouth daily 90 capsule 3    famotidine (PEPCID) 40 MG tablet Take 1 tablet (40 mg total) by mouth daily 90 tablet 3    fexofenadine (ALLEGRA) 180 MG tablet Take 180 mg by mouth as needed       fluticasone (FLONASE) 50 mcg/act nasal spray as needed       folic acid (FOLVITE) 1 mg tablet Take 1 mg by mouth daily      hydrOXYzine HCL (ATARAX) 25 mg tablet       latanoprost (XALATAN) 0 005 % ophthalmic solution       levothyroxine 100 mcg tablet TAKE 1 TABLET (100 MCG TOTAL) BY MOUTH DAILY 90 tablet 1    pregabalin (LYRICA) 150 mg capsule Take 150 mg by mouth in the morning        rivaroxaban (Xarelto) 20 mg tablet Take 1 tablet (20 mg total) by mouth every evening 30 tablet 1    rOPINIRole (REQUIP) 1 mg tablet Patient takes 1 tab at [facility] and 2 tabs at  tablet 3    timolol (TIMOPTIC) 0 5 % ophthalmic solution Administer to both eyes 2 (two) times a day       torsemide (DEMADEX) 10 mg tablet Take 1 tablet (10 mg total) by mouth daily 90 tablet 3    traMADol (Ultram) 50 mg tablet Take 1/2 tab at 8AM, 1/2 tab at 1PM, and 1 tab at HS (total 2 tabs/day) 60 tablet 3    zinc gluconate 50 mg tablet Take 50 mg by mouth daily      Ascorbic Acid (VITAMIN C) 100 MG tablet Take 100 mg by mouth daily (Patient not taking: No sig reported)      cholecalciferol (VITAMIN D3) 1,000 units tablet Take 1,000 Units by mouth daily (Patient not taking: No sig reported)      FEROSUL 325 (65 Fe) MG tablet Take 1 tablet by mouth 2 (two) times a day with meals (Patient not taking: No sig reported)       No current facility-administered medications for this visit      _______________________________________________________________________  Review of Systems   Constitutional: Positive for fatigue  Negative for activity change, appetite change, chills, fever and unexpected weight change     HENT: Positive for congestion, postnasal drip, sinus pressure and sinus pain  Negative for ear discharge, ear pain, nosebleeds, rhinorrhea, sneezing, sore throat and voice change  Eyes: Negative for pain, redness and visual disturbance  Respiratory: Positive for cough  Negative for chest tightness, shortness of breath and wheezing  Cardiovascular: Negative for chest pain and palpitations  Gastrointestinal: Negative for abdominal distention, abdominal pain, constipation, diarrhea, nausea and vomiting  Endocrine: Negative  Genitourinary: Negative for difficulty urinating, dysuria, flank pain, frequency, hematuria and urgency  Musculoskeletal: Positive for arthralgias, gait problem and myalgias  Negative for back pain  Balance issues reported  Patient has history of left footdrop  Patient reports generalized muscle aches and pains as well as joint pain secondary to fibromyalgia  Skin: Negative  Negative for color change and rash  Allergic/Immunologic: Negative  Neurological: Negative for seizures and syncope  Hematological: Negative  Psychiatric/Behavioral: Negative  Objective:  Vitals:    07/28/22 1408   BP: 140/78   BP Location: Left arm   Patient Position: Sitting   Cuff Size: Standard   Pulse: 87   Resp: 19   Temp: (!) 97 2 °F (36 2 °C)   TempSrc: Temporal   SpO2: 95%   Weight: 83 kg (183 lb)   Height: 5' 3" (1 6 m)     Body mass index is 32 42 kg/m²  Physical Exam  Vitals and nursing note reviewed  Constitutional:       Appearance: Normal appearance  She is well-developed  She is obese  HENT:      Head: Normocephalic and atraumatic  Right Ear: Tympanic membrane, ear canal and external ear normal       Left Ear: Tympanic membrane, ear canal and external ear normal       Nose: Congestion and rhinorrhea present  Mouth/Throat:      Mouth: Mucous membranes are moist       Pharynx: No oropharyngeal exudate or posterior oropharyngeal erythema     Eyes:      Extraocular Movements: Extraocular movements intact  Conjunctiva/sclera: Conjunctivae normal       Pupils: Pupils are equal, round, and reactive to light  Cardiovascular:      Rate and Rhythm: Normal rate and regular rhythm  Pulses: Normal pulses  Heart sounds: Normal heart sounds  No murmur heard  Pulmonary:      Effort: Pulmonary effort is normal       Breath sounds: Normal breath sounds  Abdominal:      General: Bowel sounds are normal       Palpations: Abdomen is soft  Musculoskeletal:         General: Normal range of motion  Cervical back: Normal range of motion  Skin:     General: Skin is warm  Capillary Refill: Capillary refill takes less than 2 seconds  Comments: Soft tissue lipomas to skin  Neurological:      General: No focal deficit present  Mental Status: She is alert and oriented to person, place, and time     Psychiatric:         Mood and Affect: Mood normal          Behavior: Behavior normal

## 2022-07-28 NOTE — ASSESSMENT & PLAN NOTE
Patient currently taking duloxetine 30 mg p o  Q h s  Only  Currently on Lyrica 75 mg p o  B i d  currently we will decrease her duloxetine 30 mg p o  Q h s  to 20 mg  We will also decrease Lyrica to 75 mg daily only  Patient had been seen Rheumatology however currently indicates that her specialist indicated that she does not need to be seen for future appointments

## 2022-07-28 NOTE — PATIENT INSTRUCTIONS
Pregabalin (By mouth)   Pregabalin (pre-GA-ba-agustin)  Treats nerve and muscle pain, including fibromyalgia  Also treats partial-onset seizures  Brand Name(s): Lyrica, Lyrica CR   There may be other brand names for this medicine  When This Medicine Should Not Be Used: This medicine is not right for everyone  Do not use it if you had an allergic reaction to pregabalin  How to Use This Medicine:   Capsule, Liquid, Long Acting Tablet  Take your medicine as directed  Your dose may need to be changed several times to find what works best for you  Extended-release tablet: Swallow the extended-release tablet whole  Do not crush, break, or chew it  Take it after an evening meal   Oral liquid: Measure the oral liquid medicine with a marked measuring spoon, oral syringe, or medicine cup  This medicine should come with a Medication Guide  Ask your pharmacist for a copy if you do not have one  Missed dose: Take a dose as soon as you remember  If it is almost time for your next dose, wait until then and take a regular dose  Do not take extra medicine to make up for a missed dose  If you miss a dose of the extended-release tablet after your evening meal, take it before bedtime after a snack  If you miss the dose before bedtime, take it after your morning meal  If you do not take the dose the following morning, then take the next dose at your regular time after your evening meal  Do not take 2 doses at the same time  Store the medicine in a closed container at room temperature, away from heat, moisture, and direct light  Drugs and Foods to Avoid:   Ask your doctor or pharmacist before using any other medicine, including over-the-counter medicines, vitamins, and herbal products  Some medicines can affect how pregabalin works   Tell your doctor if you are using any of the following:   ACE inhibitor (including benazepril, enalapril, lisinopril, quinapril, ramipril)  Oral diabetes medicine (including metformin, pioglitazone, rosiglitazone)  Do not drink alcohol while you are using this medicine  Tell your doctor if you use anything else that makes you sleepy  Some examples are allergy medicine, narcotic pain medicine, and alcohol  Tell your doctor if you are also using oxycodone, lorazepam, or zolpidem  Warnings While Using This Medicine:   Tell your doctor if you are pregnant or breastfeeding, or if you have kidney disease, heart failure, heart rhythm problems, lung or breathing problems, a bleeding disorder, diabetes, sores or skin problems, or a low blood platelet count  Tell your doctor if you have a history of angioedema (severe swelling), alcohol or drug abuse, depression, or other mood problems  This medicine may cause the following problems:   Angioedema (severe swelling), which may be life-threatening  Changes in mood or behavior, including suicidal thoughts or behavior  Respiratory depression (serious breathing problem that can be life-threatening), when used with narcotic pain medicines  Peripheral edema (swelling of your hands, ankles, feet, or lower legs)  Increased risk for cancer and bleeding  Serious muscle problems  Heart rhythm changes  This medicine may make you dizzy or drowsy  It may also cause blurry or double vision  Do not drive or do anything else that could be dangerous until you know how this medicine affects you  Do not stop using this medicine suddenly  Your doctor will need to slowly decrease your dose before you stop it completely  Your doctor will do lab tests at regular visits to check on the effects of this medicine  Keep all appointments  Keep all medicine out of the reach of children  Never share your medicine with anyone  Possible Side Effects While Using This Medicine:   Call your doctor right away if you notice any of these side effects:   Allergic reaction: Itching or hives, swelling in your face or hands, swelling or tingling in your mouth or throat, chest tightness, trouble breathing  Blistering, peeling, red skin rash  Blue lips, fingernails, or skin, trouble breathing, chest pain  Blurry or double vision  Fever, chills, cough, sore throat, body aches  Muscle pain, tenderness, or weakness, general feeling of illness  Rapid weight gain, swelling in your hands, ankles, or feet  Severe dizziness or drowsiness  Sudden mood changes, unusual moods or behavior, including extreme happiness or depression, thoughts or attempts of killing oneself  Swelling in your throat, head, or neck  Uneven heartbeat  Unusual bleeding, bruising, or weakness  If you notice these less serious side effects, talk with your doctor:   Confusion, trouble concentrating  Constipation  Dry mouth  If you notice other side effects that you think are caused by this medicine, tell your doctor  Call your doctor for medical advice about side effects  You may report side effects to FDA at 9-519-FDA-5522    © Copyright [employer] 2022 Information is for End User's use only and may not be sold, redistributed or otherwise used for commercial purposes  The above information is an  only  It is not intended as medical advice for individual conditions or treatments  Talk to your doctor, nurse or pharmacist before following any medical regimen to see if it is safe and effective for you  Duloxetine (By mouth)   Duloxetine (doo-LOX-e-teen)  Treats depression, anxiety, diabetic peripheral neuropathy, fibromyalgia, and chronic muscle or bone pain  This medicine is an SSNRI  Brand Name(s): Cymbalta, izalRudy Ludwig   There may be other brand names for this medicine  When This Medicine Should Not Be Used: This medicine is not right for everyone  Do not use it if you had an allergic reaction to duloxetine  How to Use This Medicine:   Capsule, Delayed Release Capsule  Take your medicine as directed  Your dose may need to be changed several times to find what works best for you    Delayed-release capsule: Swallow the capsule whole  Do not crush, chew, break, or open it  Do not open the Cymbalta® delayed-release capsule and sprinkle the contents on food or in liquids  If you have trouble swallowing the Sheeba Caputo delayed release capsule: You may open the capsule and sprinkle the contents over one tablespoon (15 mL) of applesauce  Swallow the mixture right away and do not save any of the mixture to use later  You may open the capsule and pour the contents to an all plastic catheter tip syringe and add 50 mL of water  Do not use other liquids  Gently shake it for 10 seconds, and then use it through a nasogastric tube  Rinse with additional water (about 15 mL) if needed  This medicine should come with a Medication Guide  Ask your pharmacist for a copy if you do not have one  Missed dose: Take a dose as soon as you remember  If it is almost time for your next dose, wait until then and take a regular dose  Do not take extra medicine to make up for a missed dose  Store the medicine in a closed container at room temperature, away from heat, moisture, and direct light  Drugs and Foods to Avoid:   Ask your doctor or pharmacist before using any other medicine, including over-the-counter medicines, vitamins, and herbal products  Do not take duloxetine if you have used an MAO inhibitor (MAOI) within the past 14 days  Do not start taking an MAO inhibitor within 5 days of stopping duloxetine  Ask your doctor if you are not sure if you take an MAOI, including linezolid or methylene blue injection  Some medicines can affect how duloxetine works   Tell your doctor if you are using any of the following:  Buspirone, cimetidine, ciprofloxacin, enoxacin, fentanyl, fluvoxamine, lithium, Mahin's wort, theophylline, tramadol, tryptophan, warfarin  Amphetamines  Blood pressure medicine  Diuretic (water pill)  Medicine for heart rhythm problems (including flecainide, propafenone, quinidine)  Medicine to treat migraine headaches (including triptans)  NSAID pain or arthritis medicine (including aspirin, celecoxib, diclofenac, ibuprofen, naproxen)  Other medicine to treat depression or mood disorders (including amitriptyline, desipramine, fluoxetine, imipramine, nortriptyline, paroxetine)  Phenothiazine medicine (including thioridazine)  Stomach medicine (including famotidine, antacids containing aluminum or magnesium, PPIs)  Tell your doctor if you use anything else that makes you sleepy  Some examples are allergy medicine, narcotic pain medicine, and alcohol  Do not drink alcohol while you are using this medicine  Warnings While Using This Medicine:   Tell your doctor if you are pregnant or breastfeeding, or if you have kidney disease, liver disease, bleeding problems, diabetes, digestion problems, glaucoma, heart disease, high or low blood pressure, or problems with urination  Tell your doctor if you smoke or you have a history of seizures, mental health problems (including bipolar disorder, olya), or drug or alcohol addiction  This medicine may cause the following problems:   Serious liver problems  Serotonin syndrome, when used with certain medicines  Increased risk of bleeding problems  Serious skin reactions, including erythema multiforme, Smith-Lucas syndrome  Low sodium levels in the blood  Sexual problems  This medicine can increase thoughts of suicide  Tell your doctor right away if you start to feel depressed and have thoughts about hurting yourself  This medicine may cause blurred vision, dizziness, drowsiness, trouble with thinking, or trouble with controlling body movements, which may lead to falls, fractures, or other injuries  Do not drive or do anything that could be dangerous until you know how this medicine affects you  Stand up slowly to avoid falls  Do not stop using this medicine suddenly  Your doctor will need to slowly decrease your dose before you stop it completely    Your doctor will check your progress and the effects of this medicine at regular visits  Keep all appointments  Keep all medicine out of the reach of children  Never share your medicine with anyone  Possible Side Effects While Using This Medicine:   Call your doctor right away if you notice any of these side effects: Allergic reaction: Itching or hives, swelling in your face or hands, swelling or tingling in your mouth or throat, chest tightness, trouble breathing  Anxiety, restlessness, fever, fast heartbeat, sweating, muscle spasms, diarrhea, seeing or hearing things that are not there  Blistering, peeling, red skin rash  Confusion, weakness, muscle twitching  Dark urine or pale stools, nausea, vomiting, loss of appetite, stomach pain, yellow skin or eyes  Decrease in how much or how often you urinate  Decrease in sexual ability, desire, drive, or performance  Eye pain, vision changes, seeing halos around lights  Feeling more energetic than usual  Lightheadedness, dizziness, fainting  Unusual moods or behaviors, worsening depression, thoughts about hurting yourself, trouble sleeping  Unusual bleeding or bruising  If you notice these less serious side effects, talk with your doctor:   Decrease in appetite or weight  Dry mouth, constipation, mild nausea  Headache  Unusual drowsiness, sleepiness, or tiredness  If you notice other side effects that you think are caused by this medicine, tell your doctor  Call your doctor for medical advice about side effects  You may report side effects to FDA at 9-630-FDA-1609    © Copyright [employer] 2022 Information is for End User's use only and may not be sold, redistributed or otherwise used for commercial purposes  The above information is an  only  It is not intended as medical advice for individual conditions or treatments  Talk to your doctor, nurse or pharmacist before following any medical regimen to see if it is safe and effective for you    Fibromyalgia   WHAT YOU NEED TO KNOW:   What is fibromyalgia? Fibromyalgia is a long-term condition that causes pain and tender points throughout your body  Fibromyalgia can start at any age and is more common in women than in men  The exact cause is not known  The pain may be caused or triggered by hormone changes, physical injury, or intense emotional trauma  What are the signs and symptoms of fibromyalgia? Pain and tender spots for at least 3 months    Fatigue and trouble falling or staying asleep    Shortness of breath or heart palpitations    Dry eyes or sensitivity to medicines you take    Headaches, memory problems, difficulty concentrating, or anxiety    Numbness, muscle stiffness, or swelling of the hands and feet    How is fibromyalgia diagnosed? Your healthcare provider will ask about your symptoms and other health conditions  He or she will press on points in your body to check for pain  No specific lab tests can diagnose fibromyalgia  Blood and urine tests, a spinal tap, or sleep studies may be done to rule out other causes of your symptoms  How is fibromyalgia treated? Fibromyalgia can be managed but not cured  The following can help you manage your symptoms:  Keep a pain diary  Include your symptoms and what activity caused them  This may also help you track pain cycles and show a pattern to your symptoms  Exercise as directed  Ask your healthcare provider about the best exercise plan for you  Aerobic exercise, such as walking, and strength-training activities may decrease pain and sleep problems  Exercise such as yoga or les chi can also help with sleep problems  Set a sleep schedule  Do not nap during the day  Go to bed at the same time each night  Make sure your bedroom is dark, quiet, and comfortable  Do not drink caffeine or alcohol right before you go to bed  These can make it difficult for you to sleep  Limit other liquids to help decrease your need to urinate in the night      Reach or maintain a healthy weight  Obesity can make fibromyalgia symptoms worse  Your healthcare provider can help you create a weight loss plan if you are overweight  Take medicines as directed  You may find relief from nerve medicines, muscle relaxers, antidepressants, or antiseizure medicines  NSAIDs, acetaminophen, or prescription pain medicines may also help but are usually not recommended first  Fibromyalgia pain is not caused by inflammation or other causes that pain medicines treat, such as an injury  You may develop other pain that responds to pain medicine  Your healthcare provider will help you manage your medicines so you do not take too much  Ask about massage or acupuncture  Myofascial release massage may help relax and stretch tight muscles, and improve blood flow  Acupuncture may also help relieve pain  Where can I find support and more information? National Chronic Fatigue Syndrome and Fibromyalgia Association  [address] [facility] , [address] [place] [address]  Phone: [phone]  Web Address: [employer] ee  org    When should I call my doctor? You are depressed and feel you cannot cope with your condition  Your pain increases, even after you take your pain medicine  You have difficulty sleeping  You have questions or concerns about your condition or care  CARE AGREEMENT:   You have the right to help plan your care  Learn about your health condition and how it may be treated  Discuss treatment options with your healthcare providers to decide what care you want to receive  You always have the right to refuse treatment  The above information is an  only  It is not intended as medical advice for individual conditions or treatments  Talk to your doctor, nurse or pharmacist before following any medical regimen to see if it is safe and effective for you    © Copyright [employer] 2022 Information is for End User's use only and may not be sold, redistributed or otherwise used for commercial purposes   All illustrations and images included in CareNotes® are the copyrighted property of A D A M , Inc  or [facility] Ritu Hamilton

## 2022-07-28 NOTE — ASSESSMENT & PLAN NOTE
Patient treated for fibromyalgia with Cymbalta 20 mg p o  Daily and there lyrica 75 mg p o  Daily  Current medications have been weaned down as per Dr Damion Sousa  We will see how the patient feels and 1 month

## 2022-08-22 ENCOUNTER — TELEPHONE (OUTPATIENT)
Dept: FAMILY MEDICINE CLINIC | Facility: CLINIC | Age: 77
End: 2022-08-22

## 2022-08-22 DIAGNOSIS — R53.83 FATIGUE, UNSPECIFIED TYPE: Primary | ICD-10-CM

## 2022-08-22 DIAGNOSIS — U09.9 COVID-19 LONG HAULER MANIFESTING CHRONIC FATIGUE: ICD-10-CM

## 2022-08-22 DIAGNOSIS — G93.32 COVID-19 LONG HAULER MANIFESTING CHRONIC FATIGUE: ICD-10-CM

## 2022-08-22 NOTE — TELEPHONE ENCOUNTER
Pt called and stated that she is still tired , weak and feels washed ou and has to lay down, she stated that she saw Yuri and he gave her antibiotics and she cut back on some of her meds,, she stated that she still does feel her self   Please call pt

## 2022-08-23 NOTE — TELEPHONE ENCOUNTER
Pt called, she will go get labs at [facility] tomorrow as asked, and then has an appt to see Dr Trice Hernández on Tue 8/30

## 2022-08-24 ENCOUNTER — APPOINTMENT (OUTPATIENT)
Dept: LAB | Facility: HOSPITAL | Age: 77
End: 2022-08-24
Payer: MEDICARE

## 2022-08-24 ENCOUNTER — OFFICE VISIT (OUTPATIENT)
Dept: GASTROENTEROLOGY | Facility: CLINIC | Age: 77
End: 2022-08-24
Payer: MEDICARE

## 2022-08-24 VITALS
HEART RATE: 66 BPM | SYSTOLIC BLOOD PRESSURE: 129 MMHG | DIASTOLIC BLOOD PRESSURE: 76 MMHG | WEIGHT: 183 LBS | HEIGHT: 63 IN | BODY MASS INDEX: 32.43 KG/M2

## 2022-08-24 DIAGNOSIS — D50.0 IRON DEFICIENCY ANEMIA DUE TO CHRONIC BLOOD LOSS: ICD-10-CM

## 2022-08-24 DIAGNOSIS — G93.32 COVID-19 LONG HAULER MANIFESTING CHRONIC FATIGUE: ICD-10-CM

## 2022-08-24 DIAGNOSIS — K21.00 GASTROESOPHAGEAL REFLUX DISEASE WITH ESOPHAGITIS WITHOUT HEMORRHAGE: ICD-10-CM

## 2022-08-24 DIAGNOSIS — K44.9 HIATAL HERNIA: ICD-10-CM

## 2022-08-24 DIAGNOSIS — R13.10 DYSPHAGIA, UNSPECIFIED TYPE: Primary | ICD-10-CM

## 2022-08-24 DIAGNOSIS — Z86.010 HX OF ADENOMATOUS COLONIC POLYPS: ICD-10-CM

## 2022-08-24 DIAGNOSIS — U09.9 COVID-19 LONG HAULER MANIFESTING CHRONIC FATIGUE: ICD-10-CM

## 2022-08-24 DIAGNOSIS — R53.83 FATIGUE, UNSPECIFIED TYPE: ICD-10-CM

## 2022-08-24 LAB
BNP SERPL-MCNC: 30.2 PG/ML (ref 0–100)
CORTIS AM PEAK SERPL-MCNC: 15.8 UG/DL (ref 4.2–22.4)
CRP SERPL QL: 3.7 MG/L
ERYTHROCYTE [SEDIMENTATION RATE] IN BLOOD: 20 MM/HOUR (ref 0–30)

## 2022-08-24 PROCEDURE — 82533 TOTAL CORTISOL: CPT

## 2022-08-24 PROCEDURE — 99214 OFFICE O/P EST MOD 30 MIN: CPT | Performed by: INTERNAL MEDICINE

## 2022-08-24 PROCEDURE — 85652 RBC SED RATE AUTOMATED: CPT

## 2022-08-24 PROCEDURE — 36415 COLL VENOUS BLD VENIPUNCTURE: CPT

## 2022-08-24 PROCEDURE — 83880 ASSAY OF NATRIURETIC PEPTIDE: CPT

## 2022-08-24 PROCEDURE — 82024 ASSAY OF ACTH: CPT

## 2022-08-24 PROCEDURE — 86140 C-REACTIVE PROTEIN: CPT

## 2022-08-24 NOTE — PROGRESS NOTES
[facility] - Outpatient Follow-up Note  Fredi Pair 65 y o  female MRN: [id number]  Encounter: [id number]          ASSESSMENT AND PLAN:      1  Dysphagia, unspecified type  Still with rare episodes but improved after 15 mm balloon dilatation in May  - [place] barium swallow; Future    2  Gastroesophageal reflux disease with esophagitis without hemorrhage  Well controlled with famotidine    3  Hiatal hernia  3 cm    4  Hx of adenomatous colonic polyps  Next colonoscopy in March of 2028      5  Iron deficiency anemia due to chronic blood loss  Last hemoglobin 13 7  She does have internal hemorrhoids admits to intermittent bleeding we will schedule band ligation in the office  She will otherwise follow-up in 6 months     ______________________________________________________________________    SUBJECTIVE:  Very pleasant 48-year-old lady we see for colon cancer screening she does have a history of tubular adenoma she has a hiatal hernia reflux and dysphagia  She is status post dilatation with a 15 mm balloon in May of this year doing better however still occasional episodes  Her reflux is under control she does have internal hemorrhoids has occasional rectal bleeding      REVIEW OF SYSTEMS IS OTHERWISE NEGATIVE        Historical Information   Past Medical History:   Diagnosis Date    Cancer [facility])     left breast cancer    Cervical herniated disc     Chronic pain disorder     back pain    Chronic respiratory failure (HCC)     uses O2 at home with [facility] /5/19 no longer using/just CPAP    CPAP (continuous positive airway pressure) dependence     Disease of thyroid gland     hypothyroid    DVT (deep venous thrombosis) ([facility] [address] ) 2020    right leg    Family history of reaction to anesthesia     "son and daughter hard time waking up and also PONV"    Fibromyalgia, primary     GERD (gastroesophageal reflux disease)     Glaucoma     Hiatal hernia     History of palpitations     History of pneumonia     History of transfusion     Hyperlipidemia     Hypertension     Irregular heart beat     Migraine     Myocardial infarction [facility])     pt sees cardilogist Dr Anya Ochoa realize had one, found on EKG before a surgery"    OAB (overactive bladder)     Pollen allergies     Pulmonary embolism ([address] ) 2019    Bilateral; on Xarelto    Restless leg     Risk for falls     Sleep apnea     Use of cane as ambulatory aid     occas    Uses brace     Mafo/left leg    Wears glasses      Past Surgical History:   Procedure Laterality Date    ADENOIDECTOMY      CARDIAC CATHETERIZATION      CARPAL TUNNEL RELEASE      CATARACT EXTRACTION Bilateral     FOOT SURGERY      pin implanted right toe    JOINT REPLACEMENT Bilateral     knees    LAMINECTOMY      LUMBAR EPIDURAL INJECTION      MASTECTOMY Bilateral     with reconstruction and implants    NISSEN FUNDOPLICATION      GA LENGTH/SHORT LEG/ANKL TENDON,SINGLE Left 5/24/2021    Procedure: Sectioning peroneal tendons with lengthening/sectioning achilles tendon lower leg and ankle;  Surgeon: Shannen Lucas DPM;  Location: [facility];  Service: Podiatry    REPLACEMENT TOTAL KNEE      SPINAL FUSION      TONSILLECTOMY      TUBAL LIGATION      TUMOR EXCISION      right forearm/benign    VEIN LIGATION Right      Social History   Social History     Substance and Sexual Activity   Alcohol Use Yes    Comment: socially     Social History     Substance and Sexual Activity   Drug Use Never     Social History     Tobacco Use   Smoking Status Never Smoker   Smokeless Tobacco Never Used     Family History   Problem Relation Age of Onset    Cancer Sister        Meds/Allergies       Current Outpatient Medications:     acetaminophen (TYLENOL) 325 mg tablet    amLODIPine (NORVASC) 5 mg tablet    ASPIRIN 81 PO    azelastine (ASTELIN) 0 1 % nasal spray    b complex vitamins capsule    brimonidine (ALPHAGAN P) 0 15 % ophthalmic solution    calcium citrate-vitamin D (CITRACAL+D) 315-200 MG-UNIT per tablet    COVID-19 mRNA Virus Vaccine (MODERNA COVID-19 VACCINE IM)    famotidine (PEPCID) 40 MG tablet    fexofenadine (ALLEGRA) 180 MG tablet    fluticasone (FLONASE) 50 mcg/act nasal spray    folic acid (FOLVITE) 1 mg tablet    hydrOXYzine HCL (ATARAX) 25 mg tablet    latanoprost (XALATAN) 0 005 % ophthalmic solution    levothyroxine 100 mcg tablet    pregabalin (LYRICA) 150 mg capsule    rivaroxaban (Xarelto) 20 mg tablet    rOPINIRole (REQUIP) 1 mg tablet    timolol (TIMOPTIC) 0 5 % ophthalmic solution    torsemide (DEMADEX) 10 mg tablet    zinc gluconate 50 mg tablet    Ascorbic Acid (VITAMIN C) 100 MG tablet    cholecalciferol (VITAMIN D3) 1,000 units tablet    DULoxetine (CYMBALTA) 20 mg capsule    FEROSUL 325 (65 Fe) MG tablet    traMADol (Ultram) 50 mg tablet    Allergies   Allergen Reactions    Benzalkonium Chloride Hives     Merthiolate tincture 9/28/2020      Hydromorphone Hallucinations     disorientation; hallucination; confusion      Merthiolate  [Thimerosal] Hives    Quinine Derivatives Other (See Comments) and Tinnitus     tinnitus      Codeine Nausea Only and Other (See Comments)     nausea      Pamelor [Nortriptyline] Vomiting     Per pt, itching also    Pollen Extract Nasal Congestion    Statins Itching    Wound Dressing Adhesive Rash           Objective     Blood pressure 129/76, pulse 66, height 5' 3" (1 6 m), weight 83 kg (183 lb)  Body mass index is 32 42 kg/m²  PHYSICAL EXAM:      General Appearance:   Alert, cooperative, no distress   HEENT:   Normocephalic, atraumatic, anicteric      Neck:  Supple, symmetrical, trachea midline   Lungs:   Clear to auscultation bilaterally; no rales, rhonchi or wheezing; respirations unlabored    Heart[de-identified]   Regular rate and rhythm; no murmur, rub, or gallop     Abdomen:   Soft, non-tender, non-distended; normal bowel sounds; no masses, no organomegaly    Genitalia:   Deferred  Rectal:   Deferred    Extremities:  No cyanosis, clubbing or edema    Pulses:  2+ and symmetric    Skin:  No jaundice, rashes, or lesions    Lymph nodes:  No palpable cervical lymphadenopathy        Lab Results:   No visits with results within 1 Day(s) from this visit  Latest known visit with results is:   Office Visit on 07/28/2022   Component Date Value    POCT SARS-CoV-2 Ag 07/28/2022 Negative     VALID CONTROL 07/28/2022 Valid          Radiology Results:   No results found

## 2022-08-25 LAB — ACTH PLAS-MCNC: 14.5 PG/ML (ref 7.2–63.3)

## 2022-09-01 ENCOUNTER — RA CDI HCC (OUTPATIENT)
Dept: OTHER | Facility: HOSPITAL | Age: 77
End: 2022-09-01

## 2022-09-06 ENCOUNTER — HOSPITAL ENCOUNTER (OUTPATIENT)
Dept: RADIOLOGY | Facility: HOSPITAL | Age: 77
Discharge: HOME | End: 2022-09-06
Attending: INTERNAL MEDICINE
Payer: MEDICARE

## 2022-09-06 DIAGNOSIS — R13.10 DYSPHAGIA, UNSPECIFIED TYPE: ICD-10-CM

## 2022-09-06 PROCEDURE — 74220 X-RAY XM ESOPHAGUS 1CNTRST: CPT

## 2022-09-06 NOTE — RESULT ENCOUNTER NOTE
Please call the patient regarding her abnormal result  If still having difficulty swallowing would consider the larger balloon for dilatation of the lower esophagus

## 2022-09-08 ENCOUNTER — TELEPHONE (OUTPATIENT)
Dept: OTHER | Facility: OTHER | Age: 77
End: 2022-09-08

## 2022-09-14 ENCOUNTER — ANESTHESIA (INPATIENT)
Dept: PERIOP | Facility: HOSPITAL | Age: 77
DRG: 392 | End: 2022-09-14
Payer: MEDICARE

## 2022-09-14 ENCOUNTER — HOSPITAL ENCOUNTER (INPATIENT)
Facility: HOSPITAL | Age: 77
LOS: 2 days | Discharge: HOME | DRG: 392 | End: 2022-09-16
Attending: EMERGENCY MEDICINE | Admitting: INTERNAL MEDICINE
Payer: MEDICARE

## 2022-09-14 ENCOUNTER — APPOINTMENT (EMERGENCY)
Dept: CT IMAGING | Facility: HOSPITAL | Age: 77
DRG: 392 | End: 2022-09-14
Payer: MEDICARE

## 2022-09-14 ENCOUNTER — APPOINTMENT (OUTPATIENT)
Dept: RADIOLOGY | Facility: HOSPITAL | Age: 77
DRG: 392 | End: 2022-09-14
Payer: MEDICARE

## 2022-09-14 ENCOUNTER — ANESTHESIA EVENT (INPATIENT)
Dept: PERIOP | Facility: HOSPITAL | Age: 77
DRG: 392 | End: 2022-09-14
Payer: MEDICARE

## 2022-09-14 ENCOUNTER — APPOINTMENT (OUTPATIENT)
Dept: PERIOP | Facility: HOSPITAL | Age: 77
DRG: 392 | End: 2022-09-14
Payer: MEDICARE

## 2022-09-14 DIAGNOSIS — R53.1 GENERALIZED WEAKNESS: Primary | ICD-10-CM

## 2022-09-14 DIAGNOSIS — R11.2 NAUSEA & VOMITING: ICD-10-CM

## 2022-09-14 DIAGNOSIS — R06.02 SHORTNESS OF BREATH: ICD-10-CM

## 2022-09-14 DIAGNOSIS — R09.02 HYPOXIA: ICD-10-CM

## 2022-09-14 DIAGNOSIS — R09.89 FOREIGN BODY SENSATION IN THROAT: ICD-10-CM

## 2022-09-14 DIAGNOSIS — T18.128A FOOD IMPACTION OF ESOPHAGUS, INITIAL ENCOUNTER: ICD-10-CM

## 2022-09-14 LAB
ALBUMIN SERPL BCP-MCNC: 4 G/DL (ref 3.5–5)
ALP SERPL-CCNC: 132 U/L (ref 34–104)
ALT SERPL W P-5'-P-CCNC: 15 U/L (ref 7–52)
ANION GAP SERPL CALCULATED.3IONS-SCNC: 7 MMOL/L (ref 4–13)
AST SERPL W P-5'-P-CCNC: 14 U/L (ref 13–39)
BACTERIA UR QL AUTO: NORMAL /HPF
BASOPHILS # BLD AUTO: 0.04 THOUSANDS/ΜL (ref 0–0.1)
BASOPHILS NFR BLD AUTO: 1 % (ref 0–1)
BILIRUB SERPL-MCNC: 0.63 MG/DL (ref 0.2–1)
BILIRUB UR QL STRIP: NEGATIVE
BUN SERPL-MCNC: 16 MG/DL (ref 5–25)
CALCIUM SERPL-MCNC: 9.4 MG/DL (ref 8.4–10.2)
CARDIAC TROPONIN I PNL SERPL HS: 2 NG/L
CARDIAC TROPONIN I PNL SERPL HS: 3 NG/L (ref 8–18)
CHLORIDE SERPL-SCNC: 105 MMOL/L (ref 96–108)
CK SERPL-CCNC: 60 U/L (ref 26–192)
CLARITY UR: CLEAR
CO2 SERPL-SCNC: 26 MMOL/L (ref 21–32)
COLOR UR: YELLOW
CREAT SERPL-MCNC: 0.73 MG/DL (ref 0.6–1.3)
EOSINOPHIL # BLD AUTO: 0.32 THOUSAND/ΜL (ref 0–0.61)
EOSINOPHIL NFR BLD AUTO: 4 % (ref 0–6)
ERYTHROCYTE [DISTWIDTH] IN BLOOD BY AUTOMATED COUNT: 13.4 % (ref 11.6–15.1)
GFR SERPL CREATININE-BSD FRML MDRD: 79 ML/MIN/1.73SQ M
GLUCOSE SERPL-MCNC: 101 MG/DL (ref 65–140)
GLUCOSE UR STRIP-MCNC: NEGATIVE MG/DL
HCT VFR BLD AUTO: 41.4 % (ref 34.8–46.1)
HGB BLD-MCNC: 13.8 G/DL (ref 11.5–15.4)
HGB UR QL STRIP.AUTO: NEGATIVE
IMM GRANULOCYTES # BLD AUTO: 0.04 THOUSAND/UL (ref 0–0.2)
IMM GRANULOCYTES NFR BLD AUTO: 1 % (ref 0–2)
KETONES UR STRIP-MCNC: NEGATIVE MG/DL
LEUKOCYTE ESTERASE UR QL STRIP: ABNORMAL
LIPASE SERPL-CCNC: 37 U/L (ref 11–82)
LYMPHOCYTES # BLD AUTO: 1.55 THOUSANDS/ΜL (ref 0.6–4.47)
LYMPHOCYTES NFR BLD AUTO: 20 % (ref 14–44)
MAGNESIUM SERPL-MCNC: 2 MG/DL (ref 1.9–2.7)
MCH RBC QN AUTO: 29.9 PG (ref 26.8–34.3)
MCHC RBC AUTO-ENTMCNC: 33.3 G/DL (ref 31.4–37.4)
MCV RBC AUTO: 90 FL (ref 82–98)
MONOCYTES # BLD AUTO: 0.58 THOUSAND/ΜL (ref 0.17–1.22)
MONOCYTES NFR BLD AUTO: 7 % (ref 4–12)
NEUTROPHILS # BLD AUTO: 5.26 THOUSANDS/ΜL (ref 1.85–7.62)
NEUTS SEG NFR BLD AUTO: 67 % (ref 43–75)
NITRITE UR QL STRIP: NEGATIVE
NON-SQ EPI CELLS URNS QL MICRO: NORMAL /HPF
NRBC BLD AUTO-RTO: 0 /100 WBCS
PH UR STRIP.AUTO: 5.5 [PH]
PLATELET # BLD AUTO: 338 THOUSANDS/UL (ref 149–390)
PMV BLD AUTO: 9.7 FL (ref 8.9–12.7)
POTASSIUM SERPL-SCNC: 3.6 MMOL/L (ref 3.5–5.3)
PROT SERPL-MCNC: 7.3 G/DL (ref 6.4–8.4)
PROT UR STRIP-MCNC: NEGATIVE MG/DL
RBC # BLD AUTO: 4.61 MILLION/UL (ref 3.81–5.12)
RBC #/AREA URNS AUTO: NORMAL /HPF
SODIUM SERPL-SCNC: 138 MMOL/L (ref 135–147)
SP GR UR STRIP.AUTO: 1.01 (ref 1–1.03)
TSH SERPL DL<=0.05 MIU/L-ACNC: 2.12 UIU/ML (ref 0.45–4.5)
UROBILINOGEN UR QL STRIP.AUTO: 0.2 E.U./DL
WBC # BLD AUTO: 7.79 THOUSAND/UL (ref 4.31–10.16)
WBC #/AREA URNS AUTO: NORMAL /HPF

## 2022-09-14 PROCEDURE — 84443 ASSAY THYROID STIM HORMONE: CPT | Performed by: EMERGENCY MEDICINE

## 2022-09-14 PROCEDURE — 82550 ASSAY OF CK (CPK): CPT | Performed by: EMERGENCY MEDICINE

## 2022-09-14 PROCEDURE — 71045 X-RAY EXAM CHEST 1 VIEW: CPT

## 2022-09-14 PROCEDURE — 83735 ASSAY OF MAGNESIUM: CPT | Performed by: EMERGENCY MEDICINE

## 2022-09-14 PROCEDURE — 96374 THER/PROPH/DIAG INJ IV PUSH: CPT

## 2022-09-14 PROCEDURE — 99285 EMERGENCY DEPT VISIT HI MDM: CPT | Performed by: EMERGENCY MEDICINE

## 2022-09-14 PROCEDURE — C9113 INJ PANTOPRAZOLE SODIUM, VIA: HCPCS | Performed by: INTERNAL MEDICINE

## 2022-09-14 PROCEDURE — 81001 URINALYSIS AUTO W/SCOPE: CPT | Performed by: EMERGENCY MEDICINE

## 2022-09-14 PROCEDURE — 99285 EMERGENCY DEPT VISIT HI MDM: CPT

## 2022-09-14 PROCEDURE — 99223 1ST HOSP IP/OBS HIGH 75: CPT | Performed by: INTERNAL MEDICINE

## 2022-09-14 PROCEDURE — 36415 COLL VENOUS BLD VENIPUNCTURE: CPT | Performed by: EMERGENCY MEDICINE

## 2022-09-14 PROCEDURE — G1004 CDSM NDSC: HCPCS

## 2022-09-14 PROCEDURE — 0DJ08ZZ INSPECTION OF UPPER INTESTINAL TRACT, VIA NATURAL OR ARTIFICIAL OPENING ENDOSCOPIC: ICD-10-PCS | Performed by: INTERNAL MEDICINE

## 2022-09-14 PROCEDURE — 85025 COMPLETE CBC W/AUTO DIFF WBC: CPT | Performed by: EMERGENCY MEDICINE

## 2022-09-14 PROCEDURE — 74177 CT ABD & PELVIS W/CONTRAST: CPT

## 2022-09-14 PROCEDURE — 80053 COMPREHEN METABOLIC PANEL: CPT | Performed by: EMERGENCY MEDICINE

## 2022-09-14 PROCEDURE — 93005 ELECTROCARDIOGRAM TRACING: CPT

## 2022-09-14 PROCEDURE — 84484 ASSAY OF TROPONIN QUANT: CPT | Performed by: EMERGENCY MEDICINE

## 2022-09-14 PROCEDURE — 83690 ASSAY OF LIPASE: CPT | Performed by: EMERGENCY MEDICINE

## 2022-09-14 PROCEDURE — 84484 ASSAY OF TROPONIN QUANT: CPT | Performed by: INTERNAL MEDICINE

## 2022-09-14 PROCEDURE — 43235 EGD DIAGNOSTIC BRUSH WASH: CPT | Performed by: INTERNAL MEDICINE

## 2022-09-14 RX ORDER — ONDANSETRON 2 MG/ML
4 INJECTION INTRAMUSCULAR; INTRAVENOUS EVERY 6 HOURS PRN
Status: DISCONTINUED | OUTPATIENT
Start: 2022-09-14 | End: 2022-09-16 | Stop reason: HOSPADM

## 2022-09-14 RX ORDER — PROPOFOL 10 MG/ML
INJECTION, EMULSION INTRAVENOUS AS NEEDED
Status: DISCONTINUED | OUTPATIENT
Start: 2022-09-14 | End: 2022-09-14

## 2022-09-14 RX ORDER — DEXAMETHASONE SODIUM PHOSPHATE 10 MG/ML
INJECTION, SOLUTION INTRAMUSCULAR; INTRAVENOUS AS NEEDED
Status: DISCONTINUED | OUTPATIENT
Start: 2022-09-14 | End: 2022-09-14

## 2022-09-14 RX ORDER — SODIUM CHLORIDE, SODIUM LACTATE, POTASSIUM CHLORIDE, CALCIUM CHLORIDE 600; 310; 30; 20 MG/100ML; MG/100ML; MG/100ML; MG/100ML
75 INJECTION, SOLUTION INTRAVENOUS CONTINUOUS
Status: DISCONTINUED | OUTPATIENT
Start: 2022-09-14 | End: 2022-09-15

## 2022-09-14 RX ORDER — ONDANSETRON 2 MG/ML
4 INJECTION INTRAMUSCULAR; INTRAVENOUS ONCE AS NEEDED
Status: DISCONTINUED | OUTPATIENT
Start: 2022-09-14 | End: 2022-09-14 | Stop reason: HOSPADM

## 2022-09-14 RX ORDER — FERROUS SULFATE 325(65) MG
325 TABLET ORAL
Status: DISCONTINUED | OUTPATIENT
Start: 2022-09-15 | End: 2022-09-16 | Stop reason: HOSPADM

## 2022-09-14 RX ORDER — LEVOTHYROXINE SODIUM 0.1 MG/1
100 TABLET ORAL
Status: DISCONTINUED | OUTPATIENT
Start: 2022-09-15 | End: 2022-09-16 | Stop reason: HOSPADM

## 2022-09-14 RX ORDER — ONDANSETRON 2 MG/ML
INJECTION INTRAMUSCULAR; INTRAVENOUS AS NEEDED
Status: DISCONTINUED | OUTPATIENT
Start: 2022-09-14 | End: 2022-09-14

## 2022-09-14 RX ORDER — ONDANSETRON 2 MG/ML
4 INJECTION INTRAMUSCULAR; INTRAVENOUS ONCE
Status: COMPLETED | OUTPATIENT
Start: 2022-09-14 | End: 2022-09-14

## 2022-09-14 RX ORDER — ALBUTEROL SULFATE 2.5 MG/3ML
2.5 SOLUTION RESPIRATORY (INHALATION) ONCE AS NEEDED
Status: DISCONTINUED | OUTPATIENT
Start: 2022-09-14 | End: 2022-09-14 | Stop reason: HOSPADM

## 2022-09-14 RX ORDER — PANTOPRAZOLE SODIUM 40 MG/10ML
40 INJECTION, POWDER, LYOPHILIZED, FOR SOLUTION INTRAVENOUS EVERY 12 HOURS SCHEDULED
Status: DISCONTINUED | OUTPATIENT
Start: 2022-09-14 | End: 2022-09-16 | Stop reason: HOSPADM

## 2022-09-14 RX ORDER — FOLIC ACID 1 MG/1
1 TABLET ORAL DAILY
Status: DISCONTINUED | OUTPATIENT
Start: 2022-09-15 | End: 2022-09-16 | Stop reason: HOSPADM

## 2022-09-14 RX ORDER — FENTANYL CITRATE/PF 50 MCG/ML
50 SYRINGE (ML) INJECTION
Status: DISCONTINUED | OUTPATIENT
Start: 2022-09-14 | End: 2022-09-14 | Stop reason: HOSPADM

## 2022-09-14 RX ORDER — ROPINIROLE 0.25 MG/1
0.5 TABLET, FILM COATED ORAL
Status: DISCONTINUED | OUTPATIENT
Start: 2022-09-14 | End: 2022-09-16 | Stop reason: HOSPADM

## 2022-09-14 RX ORDER — BRIMONIDINE TARTRATE 2 MG/ML
1 SOLUTION/ DROPS OPHTHALMIC EVERY EVENING
Status: DISCONTINUED | OUTPATIENT
Start: 2022-09-14 | End: 2022-09-16 | Stop reason: HOSPADM

## 2022-09-14 RX ORDER — FENTANYL CITRATE 50 UG/ML
INJECTION, SOLUTION INTRAMUSCULAR; INTRAVENOUS AS NEEDED
Status: DISCONTINUED | OUTPATIENT
Start: 2022-09-14 | End: 2022-09-14

## 2022-09-14 RX ORDER — LIDOCAINE HYDROCHLORIDE 20 MG/ML
INJECTION, SOLUTION EPIDURAL; INFILTRATION; INTRACAUDAL; PERINEURAL AS NEEDED
Status: DISCONTINUED | OUTPATIENT
Start: 2022-09-14 | End: 2022-09-14

## 2022-09-14 RX ORDER — FLUTICASONE PROPIONATE 50 MCG
2 SPRAY, SUSPENSION (ML) NASAL DAILY
Status: DISCONTINUED | OUTPATIENT
Start: 2022-09-14 | End: 2022-09-16 | Stop reason: HOSPADM

## 2022-09-14 RX ORDER — ASPIRIN 81 MG/1
81 TABLET, CHEWABLE ORAL DAILY
Status: DISCONTINUED | OUTPATIENT
Start: 2022-09-14 | End: 2022-09-16 | Stop reason: HOSPADM

## 2022-09-14 RX ORDER — LATANOPROST 50 UG/ML
1 SOLUTION/ DROPS OPHTHALMIC
Status: DISCONTINUED | OUTPATIENT
Start: 2022-09-14 | End: 2022-09-16 | Stop reason: HOSPADM

## 2022-09-14 RX ORDER — PREGABALIN 75 MG/1
150 CAPSULE ORAL DAILY
Status: DISCONTINUED | OUTPATIENT
Start: 2022-09-14 | End: 2022-09-16 | Stop reason: HOSPADM

## 2022-09-14 RX ORDER — ACETAMINOPHEN 325 MG/1
650 TABLET ORAL EVERY 6 HOURS PRN
Status: DISCONTINUED | OUTPATIENT
Start: 2022-09-14 | End: 2022-09-16 | Stop reason: HOSPADM

## 2022-09-14 RX ORDER — TIMOLOL MALEATE 5 MG/ML
1 SOLUTION/ DROPS OPHTHALMIC 2 TIMES DAILY
Status: DISCONTINUED | OUTPATIENT
Start: 2022-09-14 | End: 2022-09-16 | Stop reason: HOSPADM

## 2022-09-14 RX ORDER — SUCCINYLCHOLINE/SOD CL,ISO/PF 100 MG/5ML
SYRINGE (ML) INTRAVENOUS AS NEEDED
Status: DISCONTINUED | OUTPATIENT
Start: 2022-09-14 | End: 2022-09-14

## 2022-09-14 RX ORDER — AMLODIPINE BESYLATE 5 MG/1
5 TABLET ORAL DAILY
Status: DISCONTINUED | OUTPATIENT
Start: 2022-09-15 | End: 2022-09-16 | Stop reason: HOSPADM

## 2022-09-14 RX ORDER — SODIUM CHLORIDE, SODIUM LACTATE, POTASSIUM CHLORIDE, CALCIUM CHLORIDE 600; 310; 30; 20 MG/100ML; MG/100ML; MG/100ML; MG/100ML
50 INJECTION, SOLUTION INTRAVENOUS CONTINUOUS
Status: DISCONTINUED | OUTPATIENT
Start: 2022-09-14 | End: 2022-09-15

## 2022-09-14 RX ADMIN — IOHEXOL 100 ML: 350 INJECTION, SOLUTION INTRAVENOUS at 11:39

## 2022-09-14 RX ADMIN — FENTANYL CITRATE 50 MCG: 50 INJECTION, SOLUTION INTRAMUSCULAR; INTRAVENOUS at 16:16

## 2022-09-14 RX ADMIN — FENTANYL CITRATE 50 MCG: 50 INJECTION, SOLUTION INTRAMUSCULAR; INTRAVENOUS at 16:24

## 2022-09-14 RX ADMIN — ACETAMINOPHEN 650 MG: 325 TABLET, FILM COATED ORAL at 23:38

## 2022-09-14 RX ADMIN — TIMOLOL MALEATE 1 DROP: 5 SOLUTION/ DROPS OPHTHALMIC at 17:26

## 2022-09-14 RX ADMIN — ROPINIROLE 0.5 MG: 0.25 TABLET, FILM COATED ORAL at 21:34

## 2022-09-14 RX ADMIN — SODIUM CHLORIDE, SODIUM LACTATE, POTASSIUM CHLORIDE, AND CALCIUM CHLORIDE 75 ML/HR: .6; .31; .03; .02 INJECTION, SOLUTION INTRAVENOUS at 15:11

## 2022-09-14 RX ADMIN — PROPOFOL 150 MG: 10 INJECTION, EMULSION INTRAVENOUS at 16:16

## 2022-09-14 RX ADMIN — RIVAROXABAN 20 MG: 20 TABLET, FILM COATED ORAL at 17:27

## 2022-09-14 RX ADMIN — Medication 80 MG: at 16:17

## 2022-09-14 RX ADMIN — ASPIRIN 81 MG: 81 TABLET, CHEWABLE ORAL at 22:16

## 2022-09-14 RX ADMIN — FLUTICASONE PROPIONATE 2 SPRAY: 50 SPRAY, METERED NASAL at 15:11

## 2022-09-14 RX ADMIN — PANTOPRAZOLE SODIUM 40 MG: 40 INJECTION, POWDER, FOR SOLUTION INTRAVENOUS at 21:35

## 2022-09-14 RX ADMIN — LIDOCAINE HYDROCHLORIDE 80 MG: 20 INJECTION, SOLUTION EPIDURAL; INFILTRATION; INTRACAUDAL; PERINEURAL at 16:17

## 2022-09-14 RX ADMIN — ONDANSETRON 4 MG: 2 INJECTION INTRAMUSCULAR; INTRAVENOUS at 16:19

## 2022-09-14 RX ADMIN — DEXAMETHASONE SODIUM PHOSPHATE 10 MG: 10 INJECTION, SOLUTION INTRAMUSCULAR; INTRAVENOUS at 16:20

## 2022-09-14 RX ADMIN — ONDANSETRON 4 MG: 2 INJECTION INTRAMUSCULAR; INTRAVENOUS at 10:52

## 2022-09-14 RX ADMIN — PREGABALIN 150 MG: 75 CAPSULE ORAL at 15:11

## 2022-09-14 RX ADMIN — LATANOPROST 1 DROP: 50 SOLUTION OPHTHALMIC at 21:32

## 2022-09-14 RX ADMIN — BRIMONIDINE TARTRATE 1 DROP: 2 SOLUTION/ DROPS OPHTHALMIC at 17:26

## 2022-09-14 NOTE — ED PROVIDER NOTES
History  Chief Complaint   Patient presents with    Abdominal Pain    Weakness - Generalized     Patient here with c/o generalized weakness, fatigue, nausea and muscle aches  Symptoms for two months now  Patient denies any vomiting/diarrhea/recent sick contacts  69-year-old female with history of esophageal narrowing, hiatal hernia, breast cancer, DVT/PE on Xarelto, CAD, hypertension, hyperlipidemia who presents for evaluation of multiple symptoms  Patient reports that these symptoms have been ongoing for the past 2 months  She has been experiencing shortness of breath, nausea with vomiting 3-4 times per week, epigastric abdominal pain, generalized weakness, fatigue  She reports that she had blood work done by her primary care physician which was reportedly normal   She had a barium swallow test performed last week which showed narrowing of her esophagus  She has not been taking any medications for her symptoms at home  She denies chest pain, diarrhea, fevers, urinary symptoms  Prior to Admission Medications   Prescriptions Last Dose Informant Patient Reported? Taking?    ASPIRIN 81 PO  Self Yes No   Sig: Aspirin EC 81 MG Oral Tablet Delayed Release    Refills: 0       Active   Ascorbic Acid (VITAMIN C) 100 MG tablet  Self Yes No   Sig: Take 100 mg by mouth daily   Patient not taking: No sig reported   COVID-19 mRNA Virus Vaccine (MODERNA COVID-19 VACCINE IM)  Self Yes No   Sig: Inject into a muscle Moderna 2nd dose 2/21/21   DULoxetine (CYMBALTA) 20 mg capsule  Self No No   Sig: Take 1 capsule (20 mg total) by mouth daily   Patient not taking: No sig reported   FEROSUL 325 (65 Fe) MG tablet  Self Yes No   Sig: Take 1 tablet by mouth 2 (two) times a day with meals   Patient not taking: No sig reported   acetaminophen (TYLENOL) 325 mg tablet  Self Yes No   Sig: Take 650 mg by mouth every 6 (six) hours as needed for mild pain   amLODIPine (NORVASC) 5 mg tablet  Self No No   Sig: Take 1 tablet (5 mg total) by mouth daily   Patient taking differently: Take 5 mg by mouth every evening   azelastine (ASTELIN) 0 1 % nasal spray  Self Yes No   Si sprays into each nostril 2 (two) times a day   b complex vitamins capsule  Self Yes No   Sig: Take 1 capsule by mouth daily   brimonidine (ALPHAGAN P) 0 15 % ophthalmic solution  Self Yes No   Sig: Administer to the right eye every evening    calcium citrate-vitamin D (CITRACAL+D) 315-200 MG-UNIT per tablet  Self Yes No   Sig: Calcium + D TABS    Refills: 0       Active   cholecalciferol (VITAMIN D3) 1,000 units tablet  Self Yes No   Sig: Take 1,000 Units by mouth daily   Patient not taking: No sig reported   famotidine (PEPCID) 40 MG tablet  Self No No   Sig: Take 1 tablet (40 mg total) by mouth daily   fexofenadine (ALLEGRA) 180 MG tablet  Self Yes No   Sig: Take 180 mg by mouth as needed    fluticasone (FLONASE) 50 mcg/act nasal spray  Self Yes No   Sig: as needed    folic acid (FOLVITE) 1 mg tablet  Self Yes No   Sig: Take 1 mg by mouth daily   hydrOXYzine HCL (ATARAX) 25 mg tablet  Self Yes No   latanoprost (XALATAN) 0 005 % ophthalmic solution  Self Yes No   levothyroxine 100 mcg tablet  Self No No   Sig: TAKE 1 TABLET (100 MCG TOTAL) BY MOUTH DAILY   pregabalin (LYRICA) 150 mg capsule  Self Yes No   Sig: Take 150 mg by mouth in the morning     rOPINIRole (REQUIP) 1 mg tablet  Self No No   Sig: Patient takes 1 tab at [facility] and 2 tabs at HS   rivaroxaban (Xarelto) 20 mg tablet  Self No No   Sig: Take 1 tablet (20 mg total) by mouth every evening   timolol (TIMOPTIC) 0 5 % ophthalmic solution  Self Yes No   Sig: Administer to both eyes 2 (two) times a day    torsemide (DEMADEX) 10 mg tablet  Self No No   Sig: Take 1 tablet (10 mg total) by mouth daily   traMADol (Ultram) 50 mg tablet  Self No No   Sig: Take 1/2 tab at 8AM, 1/2 tab at 1PM, and 1 tab at HS (total 2 tabs/day)   Patient not taking: No sig reported   zinc gluconate 50 mg tablet  Self Yes No   Sig: Take 50 mg by mouth daily      Facility-Administered Medications: None       Past Medical History:   Diagnosis Date    Cancer [facility])     left breast cancer    Cervical herniated disc     Chronic pain disorder     back pain    Chronic respiratory failure ([facility] [address] )     [facility] O2 at home with [facility] /5/19 no longer using/just CPAP    CPAP (continuous positive airway pressure) dependence     Disease of thyroid gland     hypothyroid    D[facility] 2020    right leg    Family history of reaction to anesthesia     "son and daughter hard time waking up and also PONV"    Fibromyalgia, primary     GERD (gastroesophageal reflux disease)     Glaucoma     Hiatal hernia     History of palpitations     History of pneumonia     History of transfusion     Hyperlipidemia     Hypertension     Irregular heart beat     Migraine     Myocardial infarction [facility])     pt sees cardilogist Dr Magen Chavarria realize had one, found on EKG before a surgery"    OAB (overactive bladder)     Pollen allergies     Pulmonary embolism ([facility] [address] ) 2019    Bilateral; on Xarelto    Restless leg     Risk for falls     Sleep apnea     Use of cane as ambulatory aid     occas    Uses brace     Mafo/left leg    Wears glasses        Past Surgical History:   Procedure Laterality Date    ADENOIDECTOMY      CARDIAC CATHETERIZATION      CARPAL TUNNEL RELEASE      CATARACT EXTRACTION Bilateral     FOOT SURGERY      pin implanted right toe    JOINT REPLACEMENT Bilateral     knees    LAMINECTOMY      LUMBAR EPIDURAL INJECTION      MASTECTOMY Bilateral     with reconstruction and implants    NISSEN FUNDOPLICATION      DC LENGTH/SHORT LEG/ANKL TENDON,SINGLE Left 5/24/2021    Procedure: Sectioning peroneal tendons with lengthening/sectioning achilles tendon lower leg and ankle;  Surgeon: Gianna Mata DPM;  Location: [facility];  Service: Podiatry    REPLACEMENT TOTAL KNEE      SPINAL FUSION      TONSILLECTOMY      TUBAL LIGATION      TUMOR EXCISION      right forearm/benign    VEIN LIGATION Right        Family History   Problem Relation Age of Onset    Cancer Sister      I have reviewed and agree with the history as documented  E-Cigarette/Vaping    E-Cigarette Use Never User      E-Cigarette/Vaping Substances    Nicotine No     THC No     CBD No     Flavoring No     Other No     Unknown No      Social History     Tobacco Use    Smoking status: Never Smoker    Smokeless tobacco: Never Used   Vaping Use    Vaping Use: Never used   Substance Use Topics    Alcohol use: Yes     Comment: socially    Drug use: Never       Review of Systems   Constitutional: Positive for fatigue  Negative for chills and fever  HENT: Negative for congestion and sore throat  Eyes: Negative for visual disturbance  Respiratory: Positive for shortness of breath  Negative for cough and chest tightness  Cardiovascular: Negative for chest pain and leg swelling  Gastrointestinal: Positive for abdominal pain, nausea and vomiting  Negative for abdominal distention, blood in stool, constipation and diarrhea  Genitourinary: Negative for dysuria, flank pain, frequency and urgency  Musculoskeletal: Negative for back pain and gait problem  Skin: Negative for pallor and rash  Neurological: Positive for weakness and light-headedness  Negative for syncope and headaches  All other systems reviewed and are negative  Physical Exam  Physical Exam  Vitals and nursing note reviewed  Constitutional:       General: She is not in acute distress  Appearance: She is well-developed  She is not ill-appearing  HENT:      Head: Normocephalic and atraumatic  Nose: Nose normal       Mouth/Throat:      Mouth: Mucous membranes are moist    Eyes:      Extraocular Movements: Extraocular movements intact  Cardiovascular:      Rate and Rhythm: Normal rate and regular rhythm  Heart sounds: No murmur heard  No friction rub   No gallop  Pulmonary:      Effort: Pulmonary effort is normal       Breath sounds: Normal breath sounds  No wheezing, rhonchi or rales  Abdominal:      General: There is no distension  Palpations: Abdomen is soft  Tenderness: There is abdominal tenderness in the right upper quadrant, epigastric area and left upper quadrant  There is no guarding or rebound  Musculoskeletal:         General: No swelling or tenderness  Normal range of motion  Cervical back: Normal range of motion and neck supple  Skin:     General: Skin is warm and dry  Coloration: Skin is not pale  Findings: No rash  Neurological:      General: No focal deficit present  Mental Status: She is alert and oriented to person, place, and time  Psychiatric:         Behavior: Behavior normal          Vital Signs  ED Triage Vitals [09/14/22 1001]   Temperature Pulse Respirations Blood Pressure SpO2   (!) 97 3 °F (36 3 °C) 86 17 128/64 98 %      Temp Source Heart Rate Source Patient Position - Orthostatic VS BP Location FiO2 (%)   Oral Monitor Sitting Left arm --      Pain Score       3           Vitals:    09/14/22 1001 09/14/22 1202 09/14/22 1300   BP: 128/64 (!) 120/46 118/74   Pulse: 86 66 78   Patient Position - Orthostatic VS: Sitting Lying Lying         Visual Acuity      ED Medications  Medications   ondansetron (ZOFRAN) injection 4 mg (4 mg Intravenous Given 9/14/22 1052)   iohexol (OMNIPAQUE) 350 MG/ML injection (MULTI-DOSE) 100 mL (100 mL Intravenous Given 9/14/22 1139)       Diagnostic Studies  Results Reviewed     Procedure Component Value Units Date/Time    TSH, 3rd generation with Free T4 reflex [[id number]]  (Normal) Collected: 09/14/22 1047    Lab Status: Final result Specimen: Blood from Arm, Left Updated: 09/14/22 1125     TSH 3RD GENERATON 2 124 uIU/mL     Narrative:      Patients undergoing fluorescein dye angiography may retain small amounts of fluorescein in the body for 48-72 hours post procedure  Samples containing fluorescein can produce falsely depressed TSH values  If the patient had this procedure,a specimen should be resubmitted post fluorescein clearance        Urine Microscopic [[id number]]  (Normal) Collected: 09/14/22 1052    Lab Status: Final result Specimen: Urine, Clean Catch Updated: 09/14/22 1119     RBC, UA None Seen /hpf      WBC, UA None Seen /hpf      Epithelial Cells None Seen /hpf      Bacteria, UA None Seen /hpf     HS Troponin 0hr (reflex protocol) [[id number]]  (Normal) Collected: 09/14/22 1047    Lab Status: Final result Specimen: Blood from Arm, Left Updated: 09/14/22 1117     hs TnI 0hr 2 ng/L     Comprehensive metabolic panel [[id number]]  (Abnormal) Collected: 09/14/22 1047    Lab Status: Final result Specimen: Blood from Arm, Left Updated: 09/14/22 1113     Sodium 138 mmol/L      Potassium 3 6 mmol/L      Chloride 105 mmol/L      CO2 26 mmol/L      ANION GAP 7 mmol/L      BUN 16 mg/dL      Creatinine 0 73 mg/dL      Glucose 101 mg/dL      Calcium 9 4 mg/dL      AST 14 U/L      ALT 15 U/L      Alkaline Phosphatase 132 U/L      Total Protein 7 3 g/dL      Albumin 4 0 g/dL      Total Bilirubin 0 63 mg/dL      eGFR 79 ml/min/1 73sq m     Narrative:      [place] guidelines for Chronic Kidney Disease (CKD):     Stage 1 with normal or high GFR (GFR > 90 mL/min/1 73 square meters)    Stage 2 Mild CKD (GFR = 60-89 mL/min/1 73 square meters)    Stage 3A Moderate CKD (GFR = 45-59 mL/min/1 73 square meters)    Stage 3B Moderate CKD (GFR = 30-44 mL/min/1 73 square meters)    Stage 4 Severe CKD (GFR = 15-29 mL/min/1 73 square meters)    Stage 5 End Stage CKD (GFR <15 mL/min/1 73 square meters)  Note: GFR calculation is accurate only with a steady state creatinine    Lipase [[id number]]  (Normal) Collected: 09/14/22 1047    Lab Status: Final result Specimen: Blood from Arm, Left Updated: 09/14/22 1113     Lipase 37 u/L     Magnesium [[id number]]  (Normal) Collected: 09/14/22 1047    Lab Status: Final result Specimen: Blood from Arm, Left Updated: 09/14/22 1113     Magnesium 2 0 mg/dL     CK Total with Reflex CKMB [[id number]]  (Normal) Collected: 09/14/22 1047    Lab Status: Final result Specimen: Blood from Arm, Left Updated: 09/14/22 1113     Total CK 60 U/L     UA w Reflex to Microscopic w Reflex to Culture [[id number]]  (Abnormal) Collected: 09/14/22 1052    Lab Status: Final result Specimen: Urine, Clean Catch Updated: 09/14/22 1101     Color, UA Yellow     Clarity, UA Clear     Specific Gravity, UA 1 010     pH, UA 5 5     Leukocytes, UA Trace     Nitrite, UA Negative     Protein, UA Negative mg/dl      Glucose, UA Negative mg/dl      Ketones, UA Negative mg/dl      Urobilinogen, UA 0 2 E U /dl      Bilirubin, UA Negative     Occult Blood, UA Negative    CBC and differential [[id number]] Collected: 09/14/22 1047    Lab Status: Final result Specimen: Blood from Arm, Left Updated: 09/14/22 1053     WBC 7 79 Thousand/uL      RBC 4 61 Million/uL      Hemoglobin 13 8 g/dL      Hematocrit 41 4 %      MCV 90 fL      MCH 29 9 pg      MCHC 33 3 g/dL      RDW 13 4 %      MPV 9 7 fL      Platelets 728 Thousands/uL      nRBC 0 /100 WBCs      Neutrophils Relative 67 %      Immat GRANS % 1 %      Lymphocytes Relative 20 %      Monocytes Relative 7 %      Eosinophils Relative 4 %      Basophils Relative 1 %      Neutrophils Absolute 5 26 Thousands/µL      Immature Grans Absolute 0 04 Thousand/uL      Lymphocytes Absolute 1 55 Thousands/µL      Monocytes Absolute 0 58 Thousand/µL      Eosinophils Absolute 0 32 Thousand/µL      Basophils Absolute 0 04 Thousands/µL                  XR chest 1 view portable   Final Result by Norma Storm MD (09/14 1346)      No acute cardiopulmonary disease  Findings are stable      Workstation performed: [id number]         CT abdomen pelvis with contrast   Final Result by Renee Goodwin MD (09/14 1225)         1    Hiatal hernia with dilatation of the distal esophagus and the suggestion of narrowing of the gastroesophageal junction  Correlate with results of recent barium swallow  2   Enlarged uterus containing a 12 3 cm myoma  3   Colonic diverticulosis without associated diverticulitis  Workstation performed: [id number]                    Procedures  Procedures         ED Course  ED Course as of 09/14/22 1428   Wed Sep 14, 2022   1056 CBC and differential   1102 UA w Reflex to Microscopic w Reflex to Culture(!)   1114 Total CK: 60   1114 Magnesium: 2 0   1114 Lipase: 37   1114 Comprehensive metabolic panel(!)   8190 hs TnI 0hr: 2   1138 TSH 3RD GENERATON: 2 124   1158 Procedure Note: EKG  Date/Time: 09/14/22 11:54 AM   Interpreted by: Zahida Valdivia  Indications / Diagnosis: nausea, weakness  ECG reviewed by me, the ED Provider: yes   The EKG demonstrates:  Rhythm: rate 66, normal sinus  Intervals: normal intervals  Axis: normal axis  QRS/Blocks: normal QRS  ST Changes: No ALEXANDER  Subtle ST depressions in leads II, aVF, V4, V5, V6  SBIRT 20yo+    Flowsheet Row Most Recent Value   SBIRT (25 yo +)    In order to provide better care to our patients, we are screening all of our patients for alcohol and drug use  Would it be okay to ask you these screening questions? Yes Filed at: 09/14/2022 1002   Initial Alcohol Screen: US AUDIT-C     1  How often do you have a drink containing alcohol? 2 Filed at: 09/14/2022 1002   2  How many drinks containing alcohol do you have on a typical day you are drinking? 1 Filed at: 09/14/2022 1002   3a  Male UNDER 65: How often do you have five or more drinks on one occasion? 0 Filed at: 09/14/2022 1002   3b  FEMALE Any Age, or MALE 65+: How often do you have 4 or more drinks on one occassion? 0 Filed at: 09/14/2022 1002   Audit-C Score 3 Filed at: 09/14/2022 1002   CARLITOS: How many times in the past year have you        Used an illegal drug or used a prescription medication for non-medical reasons? Never Filed at: 09/14/2022 1002                    [facility]  Number of Diagnoses or Management Options  Foreign body sensation in throat: new and requires workup  Generalized weakness: new and requires workup  Hypoxia: new and requires workup  Nausea & vomiting: new and requires workup  Shortness of breath: new and requires workup  Diagnosis management comments: 69-year-old female who presents for evaluation of multiple complaints  Will obtain cardiac workup, abdominal labs, TSH, CT abdomen pelvis  Labs largely unremarkable  UA without evidence of UTI  CT abdomen pelvis without any new acute pathology  Troponin negative, however, EKG with nonspecific ST depressions in inferior leads as well as V 4 through 6  Patient also intermittently requiring 2 L nasal cannula secondary to hypoxia of 88%  Will also require GI evaluation given her history  Discussed with laurel and admitted to their service         Amount and/or Complexity of Data Reviewed  Clinical lab tests: ordered and reviewed  Tests in the radiology section of CPT®: ordered and reviewed  Review and summarize past medical records: yes    Risk of Complications, Morbidity, and/or Mortality  Presenting problems: high  Diagnostic procedures: low  Management options: moderate    Patient Progress  Patient progress: stable      Disposition  Final diagnoses:   Generalized weakness   Nausea & vomiting   Foreign body sensation in throat   Shortness of breath   Hypoxia     Time reflects when diagnosis was documented in both MDM as applicable and the Disposition within this note     Time User Action Codes Description Comment    9/14/2022  1:35 PM Chapin Sheppard Add [T03 086L] Food impaction of esophagus, initial encounter     9/14/2022  2:02 PM Yari Chinchilla Add [R53 1] Generalized weakness     9/14/2022  2:02 PM Yari Chinchilla Add [R11 2] Nausea & vomiting     9/14/2022  2:02 PM Yari Chinchilla Modify [Q60 810O] Food impaction of esophagus, initial encounter     9/14/2022  2:02 PM Zahida Valdivia Modify [R53 1] Generalized weakness     9/14/2022  2:02 PM Zahida Valdivia Add [R09 89] Foreign body sensation in throat     9/14/2022  2:03 PM Zahida Valdivia Add [R06 02] Shortness of breath     9/14/2022  2:03 PM Zahida Valdivia Add [R09 02] Hypoxia       ED Disposition     ED Disposition   Admit    Condition   Stable    Date/Time   Wed Sep 14, 2022  1:30 PM    Comment   Case was discussed with PAMELA and the patient's admission status was agreed to be Admission Status: inpatient status to the service of Dr Gerard Valentine   Follow-up Information    None         Patient's Medications   Discharge Prescriptions    No medications on file       No discharge procedures on file      PDMP Review       Value Time User    PDMP Reviewed  Yes 5/4/2022  1:32 PM JOE Fitzpatrick DO          ED Provider  Electronically Signed by           Lovely Otero MD  09/14/22 0041

## 2022-09-14 NOTE — PLAN OF CARE
Problem: MOBILITY - ADULT  Goal: Maintain or return to baseline ADL function  Description: INTERVENTIONS:  -  Assess patient's ability to carry out ADLs; assess patient's baseline for ADL function and identify physical deficits which impact ability to perform ADLs (bathing, care of mouth/teeth, toileting, grooming, dressing, etc )  - Assess/evaluate cause of self-care deficits   - Assess range of motion  - Assess patient's mobility; develop plan if impaired  - Assess patient's need for assistive devices and provide as appropriate  - Encourage maximum independence but intervene and supervise when necessary  - Involve family in performance of ADLs  - Assess for home care needs following discharge   - Consider OT consult to assist with ADL evaluation and planning for discharge  - Provide patient education as appropriate  Outcome: Progressing  Goal: Maintains/Returns to pre admission functional level  Description: INTERVENTIONS:  - Perform BMAT or MOVE assessment daily    - Set and communicate daily mobility goal to care team and patient/family/caregiver  - Collaborate with rehabilitation services on mobility goals if consulted  - Perform Range of Motion 3 times a day    - Dangle patient 3 times a day  - Stand patient 3  times a day  - Ambulate patient 3 times a day  - Out of bed to chair 3 times a day   - Out of bed for meals  3  times a day  - Out of bed for toileting  - Record patient progress and toleration of activity level   Outcome: Progressing     Problem: Potential for Falls  Goal: Patient will remain free of falls  Description: INTERVENTIONS:  - Educate patient/family on patient safety including physical limitations  - Instruct patient to call for assistance with activity   - Consult OT/PT to assist with strengthening/mobility   - Keep Call bell within reach  - Keep bed low and locked with side rails adjusted as appropriate  - Keep care items and personal belongings within reach  - Initiate and maintain comfort rounds  - Make Fall Risk Sign visible to staff  - Offer Toileting every  2 Hours, in advance of need  - Initiate/Maintain  bed and chair alarm  - Apply yellow socks and bracelet for high fall risk patients  - Consider moving patient to room near nurses station  Outcome: Progressing     Problem: CARDIOVASCULAR - ADULT  Goal: Maintains optimal cardiac output and hemodynamic stability  Description: INTERVENTIONS:  - Monitor I/O, vital signs and rhythm  - Monitor for S/S and trends of decreased cardiac output  - Administer and titrate ordered vasoactive medications to optimize hemodynamic stability  - Assess quality of pulses, skin color and temperature  - Assess for signs of decreased coronary artery perfusion  - Instruct patient to report change in severity of symptoms  Outcome: Progressing  Goal: Absence of cardiac dysrhythmias or at baseline rhythm  Description: INTERVENTIONS:  - Continuous cardiac monitoring, vital signs, obtain 12 lead EKG if ordered  - Administer antiarrhythmic and heart rate control medications as ordered  - Monitor electrolytes and administer replacement therapy as ordered  Outcome: Progressing     Problem: RESPIRATORY - ADULT  Goal: Achieves optimal ventilation and oxygenation  Description: INTERVENTIONS:  - Assess for changes in respiratory status  - Assess for changes in mentation and behavior  - Position to facilitate oxygenation and minimize respiratory effort  - Oxygen administered by appropriate delivery if ordered  - Encourage broncho-pulmonary hygiene including cough, deep breathe, Incentive Spirometry  - Assess the need for suctioning and aspirate as needed  - Assess and instruct to report SOB or any respiratory difficulty  - Respiratory Therapy support as indicated  Outcome: Progressing

## 2022-09-14 NOTE — ANESTHESIA POSTPROCEDURE EVALUATION
Post-Op Assessment Note    CV Status:  Stable  Pain Score: 0    Pain management: adequate     Mental Status:  Awake   Hydration Status:  Stable   PONV Controlled:  Controlled   Airway Patency:  Patent      Post Op Vitals Reviewed: Yes      Staff: CRNA         No complications documented      /56 (09/14/22 1700)    Temp      Pulse 72 (09/14/22 1700)   Resp 15 (09/14/22 1700)    SpO2 98 % (09/14/22 1700)

## 2022-09-14 NOTE — ANESTHESIA PREPROCEDURE EVALUATION
Procedure:  EGD      Hx of DVT/PE 3 years ago  ASHLEY on CPAP  Relevant Problems   CARDIO   (+) Mixed hyperlipidemia      ENDO   (+) Hypothyroidism, adult      GI/HEPATIC   (+) Esophageal dysphagia   (+) GERD (gastroesophageal reflux disease)   (+) Hiatal hernia      GYN   (+) Bilateral malignant neoplasm of breast in female, unspecified estrogen receptor status, unspecified site of breast ([address] )      HEMATOLOGY   (+) Anemia   (+) Iron deficiency anemia due to chronic blood loss      MUSCULOSKELETAL   (+) Fibromyalgia      NEURO/PSYCH   (+) Anxiety and depression   (+) Continuous opioid dependence (HCC)   (+) Depression, recurrent (HCC)   (+) Fibromyalgia   (+) History of pulmonary embolism   (+) Hx of adenomatous colonic polyps   (+) Personal history of DVT (deep vein thrombosis)      PULMONARY   (+) Moderate obstructive sleep apnea      Left Ventricle Left ventricular cavity size is normal  Wall thickness is increased  The left ventricular ejection fraction is 65%  Systolic function is normal   Wall motion is normal  There is mild asymmetric hypertrophy of the basal septal wall  Diastolic function is mildly abnormal, consistent with grade I (abnormal) relaxation  Right Ventricle Right ventricular cavity size is normal  Systolic function is normal  Normal tricuspid annular plane systolic excursion (TAPSE) > 1 7 cm  Wall thickness is normal    Left Atrium The atrium is normal in size  Right Atrium The atrium is normal in size  Aortic Valve The aortic valve is trileaflet  The leaflets are not thickened  The leaflets are not calcified  The leaflets exhibit normal mobility  There is trace regurgitation  There is no evidence of stenosis  Mitral Valve The mitral valve has normal structure and function  There is no evidence of regurgitation  There is no evidence of stenosis  Tricuspid Valve Tricuspid valve structure is normal  There is trace regurgitation  There is no evidence of stenosis   The right ventricular systolic pressure is normal  The estimated right ventricular systolic pressure is 83 69 mmHg  Pulmonic Valve Pulmonic valve structure is normal  There is no evidence of regurgitation  There is no evidence of stenosis  Ascending Aorta The aortic root is normal in size  IVC/SVC The right atrial pressure is estimated at 5 0 mmHg  The inferior vena cava is normal in size  Pericardium There is no pericardial effusion  The pericardium is normal in appearance     EKG (10/2021): NSR      Physical Exam    Airway    Mallampati score: III  TM Distance: >3 FB  Neck ROM: full     Dental       Cardiovascular  Rhythm: regular, Rate: normal,     Pulmonary  Breath sounds clear to auscultation,     Other Findings  Intercisor Distance > 3cm          Anesthesia Plan  ASA Score- 3     Anesthesia Type- general with ASA Monitors  Additional Monitors:   Airway Plan: ETT  Comment: Discussed benefits/risks of general anesthesia including possibility of mouth/throat pain, injury to lips/teeth, nausea/vomiting, and surgical pain along with more rare complications such as stroke, MI, pneumonia, aspiration, and injury to blood vessels  Patient understands and wishes to proceed  All questions answered          Plan Factors-Exercise tolerance (METS): >4 METS  Chart reviewed  EKG reviewed  Existing labs reviewed  Induction- intravenous and rapid sequence induction  Postoperative Plan- Plan for postoperative opioid use  Planned trial extubation    Informed Consent- Anesthetic plan and risks discussed with patient  I personally reviewed this patient with the CRNA  Discussed and agreed on the Anesthesia Plan with the CRNA  April Roberson

## 2022-09-14 NOTE — ED NOTES
Patient O2 saturation dropping in range from 89-91%  Nasal canula 2L applied , will continue to monitor        Herman High RN  09/14/22 5413

## 2022-09-14 NOTE — QUICK NOTE
23-year-old female with history of dysphagia  We were called by ER as patient did have difficulty swallowing and was having persistent nausea or vomiting  Patient was seen by hospitalist and she reports that she drank tea and ate some dry toast and this became lodged and she has been on a able to tolerate any food, liquids or her secretions since 8:00 a m  This morning  Did just have EGD with dilatation in the past and she does have tortuous esophagus with hiatal hernia and some mild stenosis in the distal esophagus status post dilatation  Recently had barium swallow which had shown prominent esophageal dysmotility with prominent tertiary contractions and delayed emptying with reflux of contrast into the upper esophagus into the pharynx    Small hiatal hernia and prior fundoplication was noted and patient did cough multiple times after ingesting water or barium but lokesh aspiration was not visualized but video swallow was recommended   -Physical exam to follow and will be done by Dr Usman Reyes who is performing the procedure  -Plan is for urgent EGD today in OR with intubation for food impaction  -full consult to follow

## 2022-09-14 NOTE — H&P
[address] 1945, 65 y o  female MRN: [id number]  Unit/Bed#: -01 Encounter: [id number]  Primary Care Provider: Katia Astudillo DO   Date and time admitted to hospital: 9/14/2022  9:50 AM    No new Assessment & Plan notes have been filed under this hospital service since the last note was generated  Service: Hospitalist  Dysphagia with retching and nausea and vomiting likely secondary to food impaction with esophageal narrowing, patient will be scheduled for urgent EGD today and will keep patient NPO  Patient had dry toast this morning and has been having increased retching and nausea and vomiting and unable to swallow thus bed  Patient complains of discomfort in the upper abdominal area epigastric and bilateral upper quadrant, consult GI  Epigastric pain/chest pain-troponin 1 set high sensitivity is negative, EKG shows sinus rhythm with minimum ST-T changes which are similar to prior EKG, will trend troponin  Place on telemetry  History of PE and patient is on Xarelto  Iron deficiency anemia-continue ferrous sulfate  Esophageal dysphagia-plan as above  History of temporal arteritis-continue supportive care  History of breast cancer  Hypothyroidism on Synthroid  Restless leg syndrome       VTE Pharmacologic Prophylaxis:   Moderate Risk (Score 3-4) - Pharmacological DVT Prophylaxis Ordered: rivaroxaban (Xarelto)  Code Status: Level 1 - Full Code   Discussion with family: Updated  (daughter) via phone  Anticipated Length of Stay: Patient will be admitted on an inpatient basis with an anticipated length of stay of greater than 2 midnights secondary to food impaction   Total Time for Visit, including Counseling / Coordination of Care: 45 minutes Greater than 50% of this total time spent on direct patient counseling and coordination of care      Chief Complaint:  Shortness of breath nausea and vomiting, and epigastric abdominal pain    History of Present Illness:  Emiliano Adams is a 65 y o  female with a PMH of PE on Xarelto, history of breast cancer, hypothyroidism, presents with complaints of shortness of breath nausea and vomiting and epigastric abdominal pain for the past 2 months  Patient has been having active dry heaving intermittently currently despite getting Zofran  Patient also had hypoxia mild with O2 saturation dropping to 88% on room air requiring 2 L of oxygen by nasal cannula  Patient has hadHistory of dysphagia tortuous esophagus with a hiatal hernia and some mild stenosis distal esophagus status post dilatation in March 2022, CT of the abdomen was reviewed which shows hiatal hernia with dilation of the distal esophagus and narrowing of the gastroesophageal junction noted, and recent barium swallow was reviewed which showed prominent esophageal dysmotility with delayed emptying and reflux of contrast from upper esophagus into the pharynx noted  History of prior fundoplication noted  Patient plan was discussed with the GI team and will need of urgent EGD had due to possible food impaction and will be planned for EGD today on 09/14/2022  Review of Systems:  Review of Systems   Constitutional: Negative  HENT: Negative  Eyes: Negative  Respiratory: Negative  Cardiovascular: Negative  Gastrointestinal: Positive for abdominal pain, nausea and vomiting  Endocrine: Negative  Genitourinary: Negative  Musculoskeletal: Negative  Skin: Negative  Allergic/Immunologic: Negative  Neurological: Negative  Hematological: Negative  Psychiatric/Behavioral: Negative          Past Medical and Surgical History:   Past Medical History:   Diagnosis Date    Cancer [facility])     left breast cancer    Cervical herniated disc     Chronic pain disorder     back pain    Chronic respiratory failure ([address] )     uses O2 at home with [facility] /5/19 no longer using/just CPAP    CPAP (continuous positive airway pressure) dependence  Disease of thyroid gland     hypothyroid    DVT (deep venous thrombosis) ([facility] [address] ) 2020    right leg    Family history of reaction to anesthesia     "son and daughter hard time waking up and also PONV"    Fibromyalgia, primary     GERD (gastroesophageal reflux disease)     Glaucoma     Hiatal hernia     History of palpitations     History of pneumonia     History of transfusion     Hyperlipidemia     Hypertension     Irregular heart beat     Migraine     Myocardial infarction [facility])     pt sees cardilogist Dr Rosa Aguilar realize had one, found on EKG before a surgery"    OAB (overactive bladder)     Pollen allergies     [facility]monary embolism [facility] ) 2019    Bilateral; on Xarelto    Restless leg     Risk for falls     Sleep apnea     Use of cane as ambulatory aid     occas    Uses brace     Mafo/left leg    Wears glasses        Past Surgical History:   Procedure Laterality Date    ADENOIDECTOMY      CARDIAC CATHETERIZATION      CARPAL TUNNEL RELEASE      CATARACT EXTRACTION Bilateral     FOOT SURGERY      pin implanted right toe    JOINT REPLACEMENT Bilateral     knees    LAMINECTOMY      LUMBAR EPIDURAL INJECTION      MASTECTOMY Bilateral     with reconstruction and implants    NISSEN FUNDOPLICATION      ND LENGTH/SHORT LEG/ANKL TENDON,SINGLE Left 5/24/2021    Procedure: Sectioning peroneal tendons with lengthening/sectioning achilles tendon lower leg and ankle;  Surgeon: Kulwant Sarah DPM;  Location: [facility];  Service: Podiatry    REPLACEMENT TOTAL KNEE      SPINAL FUSION      TONSILLECTOMY      TUBAL LIGATION      TUMOR EXCISION      right forearm/benign    VEIN LIGATION Right        Meds/Allergies:  Prior to Admission medications    Medication Sig Start Date End Date Taking?  Authorizing Provider   acetaminophen (TYLENOL) 325 mg tablet Take 650 mg by mouth every 6 (six) hours as needed for mild pain   Yes Historical Provider, MD   amLODIPine (NORVASC) 5 mg tablet Take 1 tablet (5 mg total) by mouth daily  Patient taking differently: Take 5 mg by mouth every evening 2/26/21  Yes JOE Jones,    Ascorbic Acid (VITAMIN C) 100 MG tablet Take 100 mg by mouth daily   Yes Historical Provider, MD   ASPIRIN 81 PO Aspirin EC 81 MG Oral Tablet Delayed Release    Refills: 0       Active   Yes Historical Provider, MD   b complex vitamins capsule Take 1 capsule by mouth daily   Yes Historical Provider, MD   brimonidine (ALPHAGAN P) 0 15 % ophthalmic solution Administer to the right eye every evening  4/13/21  Yes Historical Provider, MD   calcium citrate-vitamin D (CITRACAL+D) 315-200 MG-UNIT per tablet Calcium + D TABS    Refills: 0       Active   Yes Historical Provider, MD   famotidine (PEPCID) 40 MG tablet Take 1 tablet (40 mg total) by mouth daily 2/7/22  Yes JOE Yates DO   FEROSUL 325 (65 Fe) MG tablet Take 1 tablet by mouth 2 (two) times a day with meals 7/15/20  Yes Historical Provider, MD   fexofenadine (ALLEGRA) 180 MG tablet Take 180 mg by mouth as needed    Yes Historical Provider, MD   fluticasone (FLONASE) 50 mcg/act nasal spray as needed  2/24/14  Yes Historical Provider, MD   folic acid (FOLVITE) 1 mg tablet Take 1 mg by mouth daily   Yes Historical Provider, MD   hydrOXYzine HCL (ATARAX) 25 mg tablet  2/3/22  Yes Historical Provider, MD   latanoprost (XALATAN) 0 005 % ophthalmic solution  5/22/20  Yes Historical Provider, MD   levothyroxine 100 mcg tablet TAKE 1 TABLET (100 MCG TOTAL) BY MOUTH DAILY 7/5/22  Yes Levester Severin, DO   pregabalin (LYRICA) 150 mg capsule Take 150 mg by mouth in the morning     Yes Historical Provider, MD   rivaroxaban (Xarelto) 20 mg tablet Take 1 tablet (20 mg total) by mouth every evening 7/15/22  Yes JACK Sweet   rOPINIRole (REQUIP) 1 mg tablet Patient takes 1 tab at [facility] and 2 tabs at  2/15/22  Yes JOE Yates DO   timolol (TIMOPTIC) 0 5 % ophthalmic solution Administer to both eyes 2 (two) times a day  4/13/21 Yes Historical Provider, MD   torsemide (DEMADEX) 10 mg tablet Take 1 tablet (10 mg total) by mouth daily 2/7/22  Yes G Collin Chaudhary DO   azelastine (ASTELIN) 0 1 % nasal spray 2 sprays into each nostril 2 (two) times a day  Patient not taking: Reported on 9/14/2022 1/26/22   Historical Provider, MD   cholecalciferol (VITAMIN D3) 1,000 units tablet Take 1,000 Units by mouth daily  Patient not taking: No sig reported    Historical Provider, MD   COVID-19 mRNA Virus Vaccine (MODERNA COVID-19 VACCINE IM) Inject into a muscle Moderna 2nd dose 2/21/21    Historical Provider, MD   DULoxetine (CYMBALTA) 20 mg capsule Take 1 capsule (20 mg total) by mouth daily  Patient not taking: No sig reported 7/28/22   JACK Clement   traMADol (Ultram) 50 mg tablet Take 1/2 tab at [address], 1/2 tab at 1PM, and 1 tab at HS (total 2 tabs/day)  Patient not taking: No sig reported 5/4/22   Matty Terry DO   zinc gluconate 50 mg tablet Take 50 mg by mouth daily  Patient not taking: Reported on 9/14/2022    Historical Provider, MD     I have reviewed home medications with patient family member  Allergies: Allergies   Allergen Reactions    Benzalkonium Chloride Hives     Merthiolate tincture 9/28/2020      Hydromorphone Hallucinations     disorientation; hallucination; confusion      Merthiolate  [Thimerosal] Hives    Quinine Derivatives Other (See Comments) and Tinnitus     tinnitus      Codeine Nausea Only and Other (See Comments)     nausea      Pamelor [Nortriptyline] Vomiting     Per pt, itching also    Pollen Extract Nasal Congestion    Statins Itching    Wound Dressing Adhesive Rash       Social History:  Marital Status:     Occupation: retired  Patient Pre-hospital Living Situation: Home  Patient Pre-hospital Level of Mobility: walks  Patient Pre-hospital Diet Restrictions: nil  Substance Use History:   Social History     Substance and Sexual Activity   Alcohol Use Yes    Comment: socially     Social History Tobacco Use   Smoking Status Never Smoker   Smokeless Tobacco Never Used     Social History     Substance and Sexual Activity   Drug Use Never       Family History:  Family History   Problem Relation Age of Onset    Cancer Sister        Physical Exam:     Vitals:   Blood Pressure: 123/70 (09/14/22 1449)  Pulse: 75 (09/14/22 1449)  Temperature: 98 2 °F (36 8 °C) (09/14/22 1449)  Temp Source: Tympanic (09/14/22 1449)  Respirations: 18 (09/14/22 1449)  Height: 5' 3" (160 cm) (09/14/22 1449)  Weight - Scale: 81 kg (178 lb 9 2 oz) (09/14/22 1449)  SpO2: 97 % (09/14/22 1449)    Physical Exam   HEENT-PERRLA, moist oral mucosa  Neck-supple, no JVD elevation   Respiratory-equal air entry bilaterally, no rales or rhonchi  Cardiovascular system-S1, S2 heard, no murmur or gallops or rubs  Abdomen-soft, nontender, no guarding or rigidity, bowel sounds heard  Extremities-no pedal edema  Peripheral pulses palpable  Musculoskeletal-no contractures  Central nervous system-no acute focal neurological deficit ,no sensory or motor deficit noted  Skin-no rash noted        Additional Data:     Lab Results:  Results from last 7 days   Lab Units 09/14/22  1047   WBC Thousand/uL 7 79   HEMOGLOBIN g/dL 13 8   HEMATOCRIT % 41 4   PLATELETS Thousands/uL 338   NEUTROS PCT % 67   LYMPHS PCT % 20   MONOS PCT % 7   EOS PCT % 4     Results from last 7 days   Lab Units 09/14/22  1047   SODIUM mmol/L 138   POTASSIUM mmol/L 3 6   CHLORIDE mmol/L 105   CO2 mmol/L 26   BUN mg/dL 16   CREATININE mg/dL 0 73   ANION GAP mmol/L 7   CALCIUM mg/dL 9 4   ALBUMIN g/dL 4 0   TOTAL BILIRUBIN mg/dL 0 63   ALK PHOS U/L 132*   ALT U/L 15   AST U/L 14   GLUCOSE RANDOM mg/dL 101                       Imaging: Reviewed radiology reports from this admission including: abdominal/pelvic CT  XR chest 1 view portable   Final Result by Issac White MD (09/14 1346)      No acute cardiopulmonary disease        Findings are stable      Workstation performed: [id number]         CT abdomen pelvis with contrast   Final Result by Trevor Arenas MD (09/14 1224)         1  Hiatal hernia with dilatation of the distal esophagus and the suggestion of narrowing of the gastroesophageal junction  Correlate with results of recent barium swallow  2   Enlarged uterus containing a 12 3 cm myoma  3   Colonic diverticulosis without associated diverticulitis  Workstation performed: [id number]             EKG and Other Studies Reviewed on Admission:   · EKG: NSR  HR Normal sinus rhythm noted, previous EKGs were come compared subtle ST depression on the EKG however similar changes noted in the prior EKG,     ** Please Note: This note has been constructed using a voice recognition system   **

## 2022-09-15 PROBLEM — R07.89 CHEST DISCOMFORT: Status: ACTIVE | Noted: 2022-09-15

## 2022-09-15 PROBLEM — R13.10 DYSPHAGIA: Status: ACTIVE | Noted: 2021-03-10

## 2022-09-15 LAB
ANION GAP SERPL CALCULATED.3IONS-SCNC: 8 MMOL/L (ref 4–13)
BUN SERPL-MCNC: 13 MG/DL (ref 5–25)
CALCIUM SERPL-MCNC: 9.2 MG/DL (ref 8.4–10.2)
CHLORIDE SERPL-SCNC: 105 MMOL/L (ref 96–108)
CO2 SERPL-SCNC: 25 MMOL/L (ref 21–32)
CREAT SERPL-MCNC: 0.67 MG/DL (ref 0.6–1.3)
ERYTHROCYTE [DISTWIDTH] IN BLOOD BY AUTOMATED COUNT: 13.4 % (ref 11.6–15.1)
GFR SERPL CREATININE-BSD FRML MDRD: 84 ML/MIN/1.73SQ M
GLUCOSE SERPL-MCNC: 137 MG/DL (ref 65–140)
HCT VFR BLD AUTO: 40.5 % (ref 34.8–46.1)
HGB BLD-MCNC: 13.3 G/DL (ref 11.5–15.4)
MCH RBC QN AUTO: 30 PG (ref 26.8–34.3)
MCHC RBC AUTO-ENTMCNC: 32.8 G/DL (ref 31.4–37.4)
MCV RBC AUTO: 91 FL (ref 82–98)
PLATELET # BLD AUTO: 340 THOUSANDS/UL (ref 149–390)
PMV BLD AUTO: 10.5 FL (ref 8.9–12.7)
POTASSIUM SERPL-SCNC: 3.7 MMOL/L (ref 3.5–5.3)
RBC # BLD AUTO: 4.44 MILLION/UL (ref 3.81–5.12)
SODIUM SERPL-SCNC: 138 MMOL/L (ref 135–147)
WBC # BLD AUTO: 7.52 THOUSAND/UL (ref 4.31–10.16)

## 2022-09-15 PROCEDURE — 99232 SBSQ HOSP IP/OBS MODERATE 35: CPT | Performed by: PHYSICIAN ASSISTANT

## 2022-09-15 PROCEDURE — 85027 COMPLETE CBC AUTOMATED: CPT | Performed by: INTERNAL MEDICINE

## 2022-09-15 PROCEDURE — 80048 BASIC METABOLIC PNL TOTAL CA: CPT | Performed by: INTERNAL MEDICINE

## 2022-09-15 PROCEDURE — C9113 INJ PANTOPRAZOLE SODIUM, VIA: HCPCS | Performed by: INTERNAL MEDICINE

## 2022-09-15 PROCEDURE — 92526 ORAL FUNCTION THERAPY: CPT

## 2022-09-15 PROCEDURE — 92610 EVALUATE SWALLOWING FUNCTION: CPT

## 2022-09-15 RX ORDER — HYDROXYZINE HYDROCHLORIDE 25 MG/1
25 TABLET, FILM COATED ORAL EVERY 6 HOURS PRN
Status: DISCONTINUED | OUTPATIENT
Start: 2022-09-15 | End: 2022-09-16 | Stop reason: HOSPADM

## 2022-09-15 RX ORDER — MAGNESIUM HYDROXIDE/ALUMINUM HYDROXICE/SIMETHICONE 120; 1200; 1200 MG/30ML; MG/30ML; MG/30ML
30 SUSPENSION ORAL EVERY 4 HOURS PRN
Status: DISCONTINUED | OUTPATIENT
Start: 2022-09-15 | End: 2022-09-16 | Stop reason: HOSPADM

## 2022-09-15 RX ADMIN — FOLIC ACID 1 MG: 1 TABLET ORAL at 08:16

## 2022-09-15 RX ADMIN — ROPINIROLE 0.5 MG: 0.25 TABLET, FILM COATED ORAL at 21:04

## 2022-09-15 RX ADMIN — FERROUS SULFATE TAB 325 MG (65 MG ELEMENTAL FE) 325 MG: 325 (65 FE) TAB at 08:16

## 2022-09-15 RX ADMIN — PREGABALIN 150 MG: 75 CAPSULE ORAL at 08:15

## 2022-09-15 RX ADMIN — FLUTICASONE PROPIONATE 2 SPRAY: 50 SPRAY, METERED NASAL at 08:21

## 2022-09-15 RX ADMIN — RIVAROXABAN 20 MG: 20 TABLET, FILM COATED ORAL at 17:21

## 2022-09-15 RX ADMIN — LATANOPROST 1 DROP: 50 SOLUTION OPHTHALMIC at 21:04

## 2022-09-15 RX ADMIN — ASPIRIN 81 MG: 81 TABLET, CHEWABLE ORAL at 08:16

## 2022-09-15 RX ADMIN — TIMOLOL MALEATE 1 DROP: 5 SOLUTION/ DROPS OPHTHALMIC at 08:21

## 2022-09-15 RX ADMIN — TIMOLOL MALEATE 1 DROP: 5 SOLUTION/ DROPS OPHTHALMIC at 17:21

## 2022-09-15 RX ADMIN — PANTOPRAZOLE SODIUM 40 MG: 40 INJECTION, POWDER, FOR SOLUTION INTRAVENOUS at 08:16

## 2022-09-15 RX ADMIN — LEVOTHYROXINE SODIUM 100 MCG: 0.1 TABLET ORAL at 06:29

## 2022-09-15 RX ADMIN — AMLODIPINE BESYLATE 5 MG: 5 TABLET ORAL at 08:16

## 2022-09-15 RX ADMIN — PANTOPRAZOLE SODIUM 40 MG: 40 INJECTION, POWDER, FOR SOLUTION INTRAVENOUS at 21:04

## 2022-09-15 RX ADMIN — BRIMONIDINE TARTRATE 1 DROP: 2 SOLUTION/ DROPS OPHTHALMIC at 17:21

## 2022-09-15 RX ADMIN — ACETAMINOPHEN 650 MG: 325 TABLET, FILM COATED ORAL at 22:18

## 2022-09-15 NOTE — PLAN OF CARE
Problem: MOBILITY - ADULT  Goal: Maintain or return to baseline ADL function  Description: INTERVENTIONS:  -  Assess patient's ability to carry out ADLs; assess patient's baseline for ADL function and identify physical deficits which impact ability to perform ADLs (bathing, care of mouth/teeth, toileting, grooming, dressing, etc )  - Assess/evaluate cause of self-care deficits   - Assess range of motion  - Assess patient's mobility; develop plan if impaired  - Assess patient's need for assistive devices and provide as appropriate  - Encourage maximum independence but intervene and supervise when necessary  - Involve family in performance of ADLs  - Assess for home care needs following discharge   - Consider OT consult to assist with ADL evaluation and planning for discharge  - Provide patient education as appropriate  Outcome: Progressing     Problem: Potential for Falls  Goal: Patient will remain free of falls  Description: INTERVENTIONS:  - Educate patient/family on patient safety including physical limitations  - Instruct patient to call for assistance with activity   - Consult OT/PT to assist with strengthening/mobility   - Keep Call bell within reach  - Keep bed low and locked with side rails adjusted as appropriate  - Keep care items and personal belongings within reach  - Initiate and maintain comfort rounds  - Make Fall Risk Sign visible to staff  - Offer Toileting every 2 Hours, in advance of need  - Initiate/Maintain 2 alarm  - Apply yellow socks and bracelet for high fall risk patients  - Consider moving patient to room near nurses station  Outcome: Progressing     Problem: CARDIOVASCULAR - ADULT  Goal: Maintains optimal cardiac output and hemodynamic stability  Description: INTERVENTIONS:  - Monitor I/O, vital signs and rhythm  - Monitor for S/S and trends of decreased cardiac output  - Administer and titrate ordered vasoactive medications to optimize hemodynamic stability  - Assess quality of pulses, skin color and temperature  - Assess for signs of decreased coronary artery perfusion  - Instruct patient to report change in severity of symptoms  Outcome: Progressing

## 2022-09-15 NOTE — PROGRESS NOTES
Progress Note - Kina Bassett 65 y o  female MRN: [id number]    Unit/Bed#: -01 Encounter: [id number]        Assessment/Plan:  71-year-old female with history of dysphagia  We were called by ER as patient did have difficulty swallowing and was having persistent nausea or vomiting  Patient was seen by hospitalist and she reports that she drank tea and ate some dry toast and this became lodged and she has been on a able to tolerate any food, liquids or her secretions yesterday  EGD with dilatation was done in May 2022 and she does have tortuous esophagus with hiatal hernia and some mild stenosis in the distal esophagus status post dilatation  Recently had barium swallow which had shown prominent esophageal dysmotility with prominent tertiary contractions and delayed emptying with reflux of contrast into the upper esophagus into the pharynx  Small hiatal hernia and prior fundoplication was noted and patient did cough multiple times after ingesting water or barium but lokesh aspiration was not visualized but video swallow was recommended   -patient had EGD yesterday showed Small/Medium hiatal hernia and foreshortened esophagus  EG Junction is relatively narrow   -patient will need further dilatation of EG junction  -currently on pureed diet with thin liquids  -evaluated by speech therapy at bedside  -will order esophageal manometry as well, likely component of esophageal dysmotility as well  -she can continue famotidine 40 mg twice daily  -may need ultimately need revision of hiatal hernia repair    Subjective:   Patient is lying in bed  Reports frequent belching and coughing  She is wearing oxygen because she feels that cough is worse once oxygen has been discontinued  Respiratory will evaluate  Chest x-ray showed no acute findings  Patient reports symptoms of frequent regurgitation as well as sensation that food is sitting in her chest even when eating soft foods    Does report she had a Nissen fundoplication with repair of hiatal hernia in the past and she did well for some time, this was performed in 2016 at [facility]      Objective:     Vitals: /52 (BP Location: Left arm)   Pulse 75   Temp 98 9 °F (37 2 °C) (Tympanic)   Resp 16   Ht 5' 3" (1 6 m)   Wt 81 kg (178 lb 9 2 oz)   SpO2 93%   BMI 31 63 kg/m²       Physical Exam:  Gen-alert no acute distress  Abd-soft positive bowel sounds nondistended, some tenderness palpation of epigastrium and left lower quadrant, no rebound rigidity guarding       Lab, Imaging and other studies:   Recent Results (from the past 72 hour(s))   CBC and differential    Collection Time: 09/14/22 10:47 AM   Result Value Ref Range    WBC 7 79 4 31 - 10 16 Thousand/uL    RBC 4 61 3 81 - 5 12 Million/uL    Hemoglobin 13 8 11 5 - 15 4 g/dL    Hematocrit 41 4 34 8 - 46 1 %    MCV 90 82 - 98 fL    MCH 29 9 26 8 - 34 3 pg    MCHC 33 3 31 4 - 37 4 g/dL    RDW 13 4 11 6 - 15 1 %    MPV 9 7 8 9 - 12 7 fL    Platelets 768 160 - 457 Thousands/uL    nRBC 0 /100 WBCs    Neutrophils Relative 67 43 - 75 %    Immat GRANS % 1 0 - 2 %    Lymphocytes Relative 20 14 - 44 %    Monocytes Relative 7 4 - 12 %    Eosinophils Relative 4 0 - 6 %    Basophils Relative 1 0 - 1 %    Neutrophils Absolute 5 26 1 85 - 7 62 Thousands/µL    Immature Grans Absolute 0 04 0 00 - 0 20 Thousand/uL    Lymphocytes Absolute 1 55 0 60 - 4 47 Thousands/µL    Monocytes Absolute 0 58 0 17 - 1 22 Thousand/µL    Eosinophils Absolute 0 32 0 00 - 0 61 Thousand/µL    Basophils Absolute 0 04 0 00 - 0 10 Thousands/µL   Comprehensive metabolic panel    Collection Time: 09/14/22 10:47 AM   Result Value Ref Range    Sodium 138 135 - 147 mmol/L    Potassium 3 6 3 5 - 5 3 mmol/L    Chloride 105 96 - 108 mmol/L    CO2 26 21 - 32 mmol/L    ANION GAP 7 4 - 13 mmol/L    BUN 16 5 - 25 mg/dL    Creatinine 0 73 0 60 - 1 30 mg/dL    Glucose 101 65 - 140 mg/dL    Calcium 9 4 8 4 - 10 2 mg/dL    AST 14 13 - 39 U/L    ALT 15 7 - 52 U/L    Alkaline Phosphatase 132 (H) 34 - 104 U/L    Total Protein 7 3 6 4 - 8 4 g/dL    Albumin 4 0 3 5 - 5 0 g/dL    Total Bilirubin 0 63 0 20 - 1 00 mg/dL    eGFR 79 ml/min/1 73sq m   Lipase    Collection Time: 09/14/22 10:47 AM   Result Value Ref Range    Lipase 37 11 - 82 u/L   Magnesium    Collection Time: 09/14/22 10:47 AM   Result Value Ref Range    Magnesium 2 0 1 9 - 2 7 mg/dL   TSH, 3rd generation with Free T4 reflex    Collection Time: 09/14/22 10:47 AM   Result Value Ref Range    TSH 3RD GENERATON 2 124 0 450 - 4 500 uIU/mL   HS Troponin 0hr (reflex protocol)    Collection Time: 09/14/22 10:47 AM   Result Value Ref Range    hs TnI 0hr 2 "Refer to ACS Flowchart"- see link ng/L   CK Total with Reflex CKMB    Collection Time: 09/14/22 10:47 AM   Result Value Ref Range    Total CK 60 26 - 192 U/L   UA w Reflex to Microscopic w Reflex to Culture    Collection Time: 09/14/22 10:52 AM    Specimen: Urine, Clean Catch   Result Value Ref Range    Color, UA Yellow Yellow    Clarity, UA Clear Clear    Specific [place], UA 1 010 1 001 - 1 030    pH, UA 5 5 5 0, 5 5, 6 0, 6 5, 7 0, 7 5, 8 0    Leukocytes, UA Trace (A) Negative    Nitrite, UA Negative Negative    Protein, UA Negative Negative, Interference- unable to analyze mg/dl    Glucose, UA Negative Negative mg/dl    Ketones, UA Negative Negative mg/dl    Urobilinogen, UA 0 2 0 2, 1 0 E U /dl E U /dl    Bilirubin, UA Negative Negative    Occult Blood, UA Negative Negative   Urine Microscopic    Collection Time: 09/14/22 10:52 AM   Result Value Ref Range    RBC, UA None Seen None Seen, 0-1, 1-2, 2-4, 0-5 /hpf    WBC, UA None Seen None Seen, 0-1, 1-2, 0-5, 2-4 /hpf    Epithelial Cells None Seen None Seen, Occasional /hpf    Bacteria, UA None Seen None Seen, Occasional /hpf   High Sensitivity Troponin I Random    Collection Time: 09/14/22  3:21 PM   Result Value Ref Range    HS TnI random 3 (L) 8 - 18 ng/L   Basic metabolic panel    Collection Time: 09/15/22  4:39 AM Result Value Ref Range    Sodium 138 135 - 147 mmol/L    Potassium 3 7 3 5 - 5 3 mmol/L    Chloride 105 96 - 108 mmol/L    CO2 25 21 - 32 mmol/L    ANION GAP 8 4 - 13 mmol/L    BUN 13 5 - 25 mg/dL    Creatinine 0 67 0 60 - 1 30 mg/dL    Glucose 137 65 - 140 mg/dL    Calcium 9 2 8 4 - 10 2 mg/dL    eGFR 84 ml/min/1 73sq m   CBC (With Platelets)    Collection Time: 09/15/22  4:39 AM   Result Value Ref Range    WBC 7 52 4 31 - 10 16 Thousand/uL    RBC 4 44 3 81 - 5 12 Million/uL    Hemoglobin 13 3 11 5 - 15 4 g/dL    Hematocrit 40 5 34 8 - 46 1 %    MCV 91 82 - 98 fL    MCH 30 0 26 8 - 34 3 pg    MCHC 32 8 31 4 - 37 4 g/dL    RDW 13 4 11 6 - 15 1 %    Platelets 291 763 - 120 Thousands/uL    MPV 10 5 8 9 - 12 7 fL

## 2022-09-15 NOTE — ASSESSMENT & PLAN NOTE
Patient with history of esophageal dysphagia  Presents after eating toast/tea this morning  States that it became lodged in her esophagus  Now with nausea/vomiting, inability to handle oral secretions  · Recent barium swallow done recently which showed prominent esophageal dysmotility, delayed emptying with reflex  · Etiology likely secondary to esophageal impaction  · Status post urgent EGD 9/14 for removal of food impaction  · Patient today with persistent coughing but no nausea/vomiting  Patient currently on 1 5 L NC O2 for comfort, patient stating that her symptoms are improved with supplemental O2    · Speech consulted  · GI consult appreciated:  · Soft/wet who to be chewed until further dilatation  · To follow with Dr Bladimir Mcgrath outpatient, consider esophageal manometry  · Likely will need desaturation screening prior discharge

## 2022-09-15 NOTE — ASSESSMENT & PLAN NOTE
Patient with history of esophageal dysphagia  Presents after eating toast/tea this morning  States that it became lodged in her esophagus  Now with nausea/vomiting, inability to handle oral secretions  · Recent barium swallow done recently which showed prominent esophageal dysmotility, delayed emptying with reflex  · Etiology likely secondary to esophageal impaction  · Status post urgent EGD 9/14 for removal of food impaction  · Patient today stating she feels like a "wet rag"  · Patient currently on RA  · Patient stating that her symptoms are improved with supplemental O2    · Desaturation screening performed> Patient does not qualify for Home O2  · Speech consulted  · GI consult appreciated:  · Soft/wet food to be chewed until further dilatation  · To follow with Dr Adriel Jordan outpatient, consider esophageal manometry

## 2022-09-15 NOTE — DISCHARGE SUMMARY
Rejimo 44  Discharge- Terry Mccann 1945, 65 y o  female MRN: [id number]  Unit/Bed#: -01 Encounter: [id number]  Primary Care Provider: Kuldeep Tirado DO   Date and time admitted to hospital: 9/14/2022  9:50 AM    * Dysphagia  Assessment & Plan  Patient with history of esophageal dysphagia  Presents after eating toast/tea this morning  States that it became lodged in her esophagus  Now with nausea/vomiting, inability to handle oral secretions  · Recent barium swallow done recently which showed prominent esophageal dysmotility, delayed emptying with reflex  · Etiology likely secondary to esophageal impaction  · Status post urgent EGD 9/14 for removal of food impaction  · Patient today stating she feels like a "wet rag"  · Patient currently on RA  · Patient stating that her symptoms are improved with supplemental O2    · Desaturation screening performed> Patient does not qualify for Home O2  · Speech consulted  · GI consult appreciated:  · Soft/wet food to be chewed until further dilatation  · To follow with Dr Ubaldo Colunga outpatient, consider esophageal manometry    Chest discomfort  Assessment & Plan  · With epigastric/chest pain on arrival- Since resolved  · Hs trop: 3  · EKG with normal sinus, nonspecific ST T-wave changes similar to prior EKG  · Telemetry reviewed, NSR  ->Discontinued     Moderate obstructive sleep apnea  Assessment & Plan  · Continue with CPAP qHs  · Per Family, Concern for nocturnal Hypoxia-> May benefit from repeat Sleep evaluation  · Recommend outpatient follow up with Sleep Specialist     Iron deficiency anemia due to chronic blood loss  Assessment & Plan    · No S/S of active bleeding  · Continu ferrous sulfate    History of pulmonary embolus (PE)  Assessment & Plan  · Continue A/C with Xarelto    Restless leg syndrome, controlled  Assessment & Plan  · Continue Home medication:   · Requip    Hypothyroidism, adult  Assessment & Plan  · Continue Home levothyroxine      Medical Problems             Resolved Problems  Date Reviewed: 9/16/2022   None               Discharging Physician / Practitioner: JACK Aparicio  PCP: Matty Terry DO  Admission Date:   Admission Orders (From admission, onward)     Ordered        09/14/22 1402  INPATIENT ADMISSION  Once                      Discharge Date: 09/16/22    Consultations During Hospital Stay:  · GI  · Speech    Procedures Performed:   · EGD 9/14: IMPRESSION:  1  Small/Medium hiatal hernia and foreshortened esophagus  2  EG Junction is relatively narrow  Significant Findings / Test Results:   XR chest 1 view portable   Final Result by Ayleen Mccrary MD (09/14 2775)      No acute cardiopulmonary disease  Findings are stable      Workstation performed: [id number]         CT abdomen pelvis with contrast   Final Result by Melvi Cavazos MD (09/14 122)         1  Hiatal hernia with dilatation of the distal esophagus and the suggestion of narrowing of the gastroesophageal junction  Correlate with results of recent barium swallow  2   Enlarged uterus containing a 12 3 cm myoma  3   Colonic diverticulosis without associated diverticulitis  Workstation performed: [id number]             Incidental Findings:   · As above on CT a/p with myoma and enlarged uterus, incidental findings note completed    Test Results Pending at Discharge (will require follow up):    · None     Outpatient Tests Requested:  · Follow-up with GI outpatient, will need repeat EGD and esophageal manometry  · Follow-up with OBGYN outpatient for evaluation of myoma and enlarged uterus  · Follow up with Pulmonology for further Sleep study evaluations  · Follow-up with PCP for management of chronic conditions    Complications:  None    Reason for Admission:  Dysphagia    Hospital Course:   Sandrita Leone is a 65 y o  female patient, PMH dysphagia, hypothyroidism, iron deficiency anemia, history of PE anticoagulated on Xarelto, restless leg syndrome, who originally presented to the hospital on 9/14/2022 due to dysphagia  Patient presented with concerns for lodged food in esophagus after eating breakfast, with symptoms of epigastric/chest discomfort, nausea/vomiting and inability to clear oral secretions  Patient has known history of esophageal dysphagia, recent barium swallow had showed prominent esophageal dysmotility  On arrival to ED, patient intermittently required supplemental O2, otherwise hemodynamically stable  CXR unremarkable, EKG without signs of ischemia, negative troponin  CT a/p showed hiatal hernia with dilatation of distal esophagus and narrowing of GE junction  GI had evaluated, status post urgent EGD 9/14 for removal of possible food impaction  Patient's EGD did not show any retained food bolus/impaction, speech evaluated recommending maintaining pureed diet with thin liquids  During admission, patient had persistent cough, burping, although without other signs/symptoms of reflux to explain symptoms, possibly anxiety related  Patient was placed on supplemental NC O2 for comfort, desaturation screening with RT showed no current requirement for Home O2  Recommend outpatient follow-up with GI, can continue famotidine BID, could have repeat EGD and esophageal manometry outpatient, may need hiatal hernia surgery repair in the future  Patient complaining of intermittently feeling like a "wet rag" for months  Patient states feeling better with supplemental Oxygen  Recommend outpatient follow up with Sleep medicine to evaluate home CPAP and potential need for nocturnal oxygen  Discussed plan with patient and patient's daughter  All questions were answered  Please see above list of diagnoses and related plan for additional information  Condition at Discharge: stable    Discharge Day Visit / Exam:   Subjective:  Patient stated she feels like a wet rag   Patient stated she has had this ongoing feelings over the last few months  Vitals: Blood Pressure: 116/63 (09/16/22 1448)  Pulse: 57 (09/16/22 1448)  Temperature: 98 4 °F (36 9 °C) (09/16/22 1448)  Temp Source: Tympanic (09/16/22 1448)  Respirations: 16 (09/16/22 1448)  Height: 5' 3" (160 cm) (09/14/22 1449)  Weight - Scale: 81 kg (178 lb 9 2 oz) (09/14/22 1449)  SpO2: 99 % (09/16/22 1448)  Exam:   Physical Exam  Vitals and nursing note reviewed  Constitutional:       General: She is not in acute distress  Appearance: Normal appearance  She is normal weight  HENT:      Head: Normocephalic  Nose: Nose normal  No congestion  Mouth/Throat:      Mouth: Mucous membranes are moist       Pharynx: Oropharynx is clear  Cardiovascular:      Rate and Rhythm: Normal rate and regular rhythm  Pulses: Normal pulses  Heart sounds: Normal heart sounds  No murmur heard  Pulmonary:      Effort: Pulmonary effort is normal  No respiratory distress  Breath sounds: Normal breath sounds  Abdominal:      General: Abdomen is flat  Bowel sounds are normal  There is no distension  Palpations: Abdomen is soft  Tenderness: There is no abdominal tenderness  Musculoskeletal:         General: No swelling or tenderness  Normal range of motion  Cervical back: Normal range of motion  Right lower leg: No edema  Left lower leg: No edema  Skin:     General: Skin is warm and dry  Capillary Refill: Capillary refill takes less than 2 seconds  Neurological:      General: No focal deficit present  Mental Status: She is alert and oriented to person, place, and time  Mental status is at baseline  Motor: No weakness  Psychiatric:         Mood and Affect: Mood is anxious  Speech: Speech normal          Behavior: Behavior is cooperative  Judgment: Judgment normal           Discussion with Family: Updated  (daughter) via phone      Discharge instructions/Information to patient and family:   See after visit summary for information provided to patient and family  Provisions for Follow-Up Care:  See after visit summary for information related to follow-up care and any pertinent home health orders  Disposition:   Home    Planned Readmission: None     Discharge Statement:  I spent 60 minutes discharging the patient  This time was spent on the day of discharge  I had direct contact with the patient on the day of discharge  Greater than 50% of the total time was spent examining patient, answering all patient questions, arranging and discussing plan of care with patient as well as directly providing post-discharge instructions  Additional time then spent on discharge activities  Discharge Medications:  See after visit summary for reconciled discharge medications provided to patient and/or family        **Please Note: This note may have been constructed using a voice recognition system**

## 2022-09-15 NOTE — H&P (VIEW-ONLY)
Progress Note - Vincent January 65 y o  female MRN: [id number]    Unit/Bed#: -01 Encounter: [id number]        Assessment/Plan:  78-year-old female with history of dysphagia  We were called by ER as patient did have difficulty swallowing and was having persistent nausea or vomiting  Patient was seen by hospitalist and she reports that she drank tea and ate some dry toast and this became lodged and she has been on a able to tolerate any food, liquids or her secretions yesterday  EGD with dilatation was done in May 2022 and she does have tortuous esophagus with hiatal hernia and some mild stenosis in the distal esophagus status post dilatation  Recently had barium swallow which had shown prominent esophageal dysmotility with prominent tertiary contractions and delayed emptying with reflux of contrast into the upper esophagus into the pharynx  Small hiatal hernia and prior fundoplication was noted and patient did cough multiple times after ingesting water or barium but lokesh aspiration was not visualized but video swallow was recommended   -patient had EGD yesterday showed Small/Medium hiatal hernia and foreshortened esophagus  EG Junction is relatively narrow   -patient will need further dilatation of EG junction  -currently on pureed diet with thin liquids  -evaluated by speech therapy at bedside  -will order esophageal manometry as well, likely component of esophageal dysmotility as well  -she can continue famotidine 40 mg twice daily  -may need ultimately need revision of hiatal hernia repair    Subjective:   Patient is lying in bed  Reports frequent belching and coughing  She is wearing oxygen because she feels that cough is worse once oxygen has been discontinued  Respiratory will evaluate  Chest x-ray showed no acute findings  Patient reports symptoms of frequent regurgitation as well as sensation that food is sitting in her chest even when eating soft foods    Does report she had a Nissen fundoplication with repair of hiatal hernia in the past and she did well for some time, this was performed in 2016 at [facility]      Objective:     Vitals: /52 (BP Location: Left arm)   Pulse 75   Temp 98 9 °F (37 2 °C) (Tympanic)   Resp 16   Ht 5' 3" (1 6 m)   Wt 81 kg (178 lb 9 2 oz)   SpO2 93%   BMI 31 63 kg/m²       Physical Exam:  Gen-alert no acute distress  Abd-soft positive bowel sounds nondistended, some tenderness palpation of epigastrium and left lower quadrant, no rebound rigidity guarding       Lab, Imaging and other studies:   Recent Results (from the past 72 hour(s))   CBC and differential    Collection Time: 09/14/22 10:47 AM   Result Value Ref Range    WBC 7 79 4 31 - 10 16 Thousand/uL    RBC 4 61 3 81 - 5 12 Million/uL    Hemoglobin 13 8 11 5 - 15 4 g/dL    Hematocrit 41 4 34 8 - 46 1 %    MCV 90 82 - 98 fL    MCH 29 9 26 8 - 34 3 pg    MCHC 33 3 31 4 - 37 4 g/dL    RDW 13 4 11 6 - 15 1 %    MPV 9 7 8 9 - 12 7 fL    Platelets 827 249 - 685 Thousands/uL    nRBC 0 /100 WBCs    Neutrophils Relative 67 43 - 75 %    Immat GRANS % 1 0 - 2 %    Lymphocytes Relative 20 14 - 44 %    Monocytes Relative 7 4 - 12 %    Eosinophils Relative 4 0 - 6 %    Basophils Relative 1 0 - 1 %    Neutrophils Absolute 5 26 1 85 - 7 62 Thousands/µL    Immature Grans Absolute 0 04 0 00 - 0 20 Thousand/uL    Lymphocytes Absolute 1 55 0 60 - 4 47 Thousands/µL    Monocytes Absolute 0 58 0 17 - 1 22 Thousand/µL    Eosinophils Absolute 0 32 0 00 - 0 61 Thousand/µL    Basophils Absolute 0 04 0 00 - 0 10 Thousands/µL   Comprehensive metabolic panel    Collection Time: 09/14/22 10:47 AM   Result Value Ref Range    Sodium 138 135 - 147 mmol/L    Potassium 3 6 3 5 - 5 3 mmol/L    Chloride 105 96 - 108 mmol/L    CO2 26 21 - 32 mmol/L    ANION GAP 7 4 - 13 mmol/L    BUN 16 5 - 25 mg/dL    Creatinine 0 73 0 60 - 1 30 mg/dL    Glucose 101 65 - 140 mg/dL    Calcium 9 4 8 4 - 10 2 mg/dL    AST 14 13 - 39 U/L    ALT 15 7 - 52 U/L    Alkaline Phosphatase 132 (H) 34 - 104 U/L    Total Protein 7 3 6 4 - 8 4 g/dL    Albumin 4 0 3 5 - 5 0 g/dL    Total Bilirubin 0 63 0 20 - 1 00 mg/dL    eGFR 79 ml/min/1 73sq m   Lipase    Collection Time: 09/14/22 10:47 AM   Result Value Ref Range    Lipase 37 11 - 82 u/L   Magnesium    Collection Time: 09/14/22 10:47 AM   Result Value Ref Range    Magnesium 2 0 1 9 - 2 7 mg/dL   TSH, 3rd generation with Free T4 reflex    Collection Time: 09/14/22 10:47 AM   Result Value Ref Range    TSH 3RD GENERATON 2 124 0 450 - 4 500 uIU/mL   HS Troponin 0hr (reflex protocol)    Collection Time: 09/14/22 10:47 AM   Result Value Ref Range    hs TnI 0hr 2 "Refer to ACS Flowchart"- see link ng/L   CK Total with Reflex CKMB    Collection Time: 09/14/22 10:47 AM   Result Value Ref Range    Total CK 60 26 - 192 U/L   UA w Reflex to Microscopic w Reflex to Culture    Collection Time: 09/14/22 10:52 AM    Specimen: Urine, Clean Catch   Result Value Ref Range    Color, UA Yellow Yellow    Clarity, UA Clear Clear    Specific [place], UA 1 010 1 001 - 1 030    pH, UA 5 5 5 0, 5 5, 6 0, 6 5, 7 0, 7 5, 8 0    Leukocytes, UA Trace (A) Negative    Nitrite, UA Negative Negative    Protein, UA Negative Negative, Interference- unable to analyze mg/dl    Glucose, UA Negative Negative mg/dl    Ketones, UA Negative Negative mg/dl    Urobilinogen, UA 0 2 0 2, 1 0 E U /dl E U /dl    Bilirubin, UA Negative Negative    Occult Blood, UA Negative Negative   Urine Microscopic    Collection Time: 09/14/22 10:52 AM   Result Value Ref Range    RBC, UA None Seen None Seen, 0-1, 1-2, 2-4, 0-5 /hpf    WBC, UA None Seen None Seen, 0-1, 1-2, 0-5, 2-4 /hpf    Epithelial Cells None Seen None Seen, Occasional /hpf    Bacteria, UA None Seen None Seen, Occasional /hpf   High Sensitivity Troponin I Random    Collection Time: 09/14/22  3:21 PM   Result Value Ref Range    HS TnI random 3 (L) 8 - 18 ng/L   Basic metabolic panel    Collection Time: 09/15/22  4:39 AM Result Value Ref Range    Sodium 138 135 - 147 mmol/L    Potassium 3 7 3 5 - 5 3 mmol/L    Chloride 105 96 - 108 mmol/L    CO2 25 21 - 32 mmol/L    ANION GAP 8 4 - 13 mmol/L    BUN 13 5 - 25 mg/dL    Creatinine 0 67 0 60 - 1 30 mg/dL    Glucose 137 65 - 140 mg/dL    Calcium 9 2 8 4 - 10 2 mg/dL    eGFR 84 ml/min/1 73sq m   CBC (With Platelets)    Collection Time: 09/15/22  4:39 AM   Result Value Ref Range    WBC 7 52 4 31 - 10 16 Thousand/uL    RBC 4 44 3 81 - 5 12 Million/uL    Hemoglobin 13 3 11 5 - 15 4 g/dL    Hematocrit 40 5 34 8 - 46 1 %    MCV 91 82 - 98 fL    MCH 30 0 26 8 - 34 3 pg    MCHC 32 8 31 4 - 37 4 g/dL    RDW 13 4 11 6 - 15 1 %    Platelets 114 499 - 101 Thousands/uL    MPV 10 5 8 9 - 12 7 fL

## 2022-09-15 NOTE — SPEECH THERAPY NOTE
Speech-Language Pathology Bedside Swallow Evaluation      Patient Name: Elana ORTIZ Date: 9/15/2022     Problem List  Principal Problem:    Dysphagia  Active Problems:    Hypothyroidism, adult    Restless leg syndrome, controlled    History of pulmonary embolus (PE)    Iron deficiency anemia due to chronic blood loss    Chest discomfort      Past Medical History  Past Medical History:   Diagnosis Date    Cancer [facility])     left breast cancer    Cervical herniated disc     Chronic pain disorder     back pain    Chronic respiratory failure ([facility] )     uses O2 at home with NC /5/19 no longer using/just CPAP    CPAP (continuous positive airway pressure) dependence     Disease of thyroid gland     hypothyroid    DVT (deep venous thrombosis) ([facility] ) 2020    right leg    Family history of reaction to anesthesia     "son and daughter hard time waking up and also PONV"    Fibromyalgia, primary     GERD (gastroesophageal reflux disease)     Glaucoma     Hiatal hernia     History of palpitations     History of pneumonia     History of transfusion     Hyperlipidemia     Hypertension     Irregular heart beat     Migraine     Myocardial infarction [facility])     pt sees cardilogist Dr Nikolas Warner realize had one, found on EKG before a surgery"    OAB (overactive bladder)     Pollen allergies     Pulmonary embolism ([facility] ) 2019    Bilateral; on Xarelto    Restless leg     Risk for falls     Sleep apnea     Use of cane as ambulatory aid     occas    Uses brace     Mafo/left leg    Wears glasses        Past Surgical History  Past Surgical History:   Procedure Laterality Date    ADENOIDECTOMY      CARDIAC CATHETERIZATION      CARPAL TUNNEL RELEASE      CATARACT EXTRACTION Bilateral     FOOT SURGERY      pin implanted right toe    JOINT REPLACEMENT Bilateral     knees    LAMINECTOMY      LUMBAR EPIDURAL INJECTION      MASTECTOMY Bilateral     with reconstruction and implants    NISSEN FUNDOPLICATION      MN LENGTH/SHORT LEG/ANKL TENDON,SINGLE Left 5/24/2021    Procedure: Sectioning peroneal tendons with lengthening/sectioning achilles tendon lower leg and ankle;  Surgeon: Gianni Brown DPM;  Location: [facility];  Service: Podiatry    REPLACEMENT TOTAL KNEE      SPINAL FUSION      TONSILLECTOMY      TUBAL LIGATION      TUMOR EXCISION      right forearm/benign    VEIN LIGATION Right        Summary   Pt presented with functional appearing oral and pharyngeal stage swallowing skills with materials administered today  However, she presented wioth s/s consistent with severe esophageal dysmotility with significantly limited intake during evaluation process (limited to 5 sips of tea, 5 bites of jello) with frequent and repeated belching s/p swallowing  Following evaluation, education session completed (images and results of esophagram & EGD reviewed in detail, as were all recommendations)    Recommended Diet: continue current pureed iet with thin liquids and all precautions to promote esophageal clearance  Strategies to promote esophageal clearance:   1  Upright posture during and for a min of 1 hour after intake  2  Small frequent feedings (eg 5 small meals daily of highly nutrition food/liquid  3  Soft MOIST well cut or whipped foods  4  Thoroughly chew (eg a min of 25-30 times per each bite)  5   Minimize or eliminate ice/ice cold drinks (to promote peristalsis),   6  Slow rate of feeding  We discussed alternating food with sips of liquid but variability in assistance across patients-->pt reported no benefit from attempting same  Other Recommendations: Continue oral care and all reflux precautions per MD        Current Medical Status  Madeleine Venegas is a 64 yo F with a PMH of PE on Xarelto, breast cancer, hypothyroidism who presented 9/14 with c/o SOB, N/V and epigastric/abdominal pain for the past 2 months    Patient has been having active dry heaving intermittently currently despite getting Zofran  Patient also had hypoxia mild with O2 saturation dropping to 88% on room air requiring 2 L O2 via NC  Patient has a h/o dysphagia, tortuous esophagus with a hiatal hernia and some mild stenosis distal esophagus (is s/p dilatation 3/2022  CT of the abdomen was reviewed which shows hiatal hernia with dilation of the distal esophagus and narrowing of the gastroesophageal junction noted  Recent barium swallow was reviewed which showed prominent esophageal dysmotility with delayed emptying and reflux of contrast from upper esophagus into the pharynx noted  History of prior fundoplication noted  She is now s/p urgent EGD  FINDINGS:  · Medium sliding hiatal hernia (type I hiatal hernia) - GE junction 34 cm from the incisors, diaphragmatic impression 39 cm from the incisors  EG Junction is slightly narrow  Esophagus is foreshortened and tortuous  RECOMMENDATION:  Follow up with PCP and GI (in clinic with Dr Vito Rodriguez )  Soft, moist and well chewed food until further dilatation  Esophageal manometry? SLP Swallowing Evaluation also ordered at this time  Current Precautions:  Fall     Allergies:  No known food allergies    Past medical history:  Please see H&P for details    Special Studies:  9/14 CXR: No acute cardiopulmonary disease  Findings are stable    9/14 CT of abd/pelvis:   1  Hiatal hernia with dilatation of the distal esophagus and the suggestion of narrowing of the gastroesophageal junction  Correlate with results of recent barium swallow  2   Enlarged uterus containing a 12 3 cm myoma  3   Colonic diverticulosis without associated diverticulitis  9/6 Esophagram:   Prominent esophageal dysmotility with prominent tertiary contractions and delayed emptying  Reflux of contrast from the upper esophagus into the pharynx seen particularly with prone imaging  Small hiatal hernia  Prior fundoplication  The patient coughed multiple times after ingesting water or barium    Nash Saxena lokesh aspiration was not visualized, a dedicated video speech swallow evaluation may be helpful to assess for any penetration or aspiration  Social/Education/Vocational Hx:  Pt lives with family (dtr)  Is   Has 4 children  Spoke of her parents (known to me), their passing, 's passing (was tearful and support provided)    Swallow Information   Current Symptoms/Concerns: food stasis and regurgitation  Current Diet: puree/level 1 diet and thin liquids   Baseline Diet: soft/level 3 diet and thin liquids (selected and prepared soft foods, cut all skins off fruits, avoided most meats including lunch meats, "dunked" breads/toast      Baseline Assessment   Behavior/Cognition: alert  Speech/Language Status: able to participate in conversation  Patient Positioning: upright in bed  Pain Status/Interventions/Response to Interventions:  No report of or nonverbal indications of pain  Swallow Mechanism Exam  Facial: symmetrical  Labial: WFL  Lingual: WFL  Velum: symmetrical  Mandible: adequate ROM  Dentition: adequate  Vocal quality:clear/adequate   Cough: strong/productive   Respiratory Status: on 1 5L O2      Consistencies Assessed and Performance   Materials administered included warm tea, jello    Oral Stage: WFL with limited materials    Bolus formation and transfer were functional with no significant oral residue noted  No overt s/s reduced oral control  Pharyngeal Stage: WFL appearing with limited materials  Swallow Mechanics:  Swallowing initiation appeared prompt  Laryngeal rise was palpated and judged to be within functional limits  No coughing, throat clearing, change in vocal quality or respiratory status noted today       Esophageal Concerns: globus sensation, very frequent belching, MINIMAL intake, early satiety    Strategies and Efficacy: slow rate, small amts    Summary and Recommendations (see above)    Results Reviewed with: patient and RN     Treatment Recommended: defer to GI for additional education and f/u (pt will f/u with MD)     SLP to f/u x1 and ensure use of strategies and assess if ready for any textured food beyond pureed

## 2022-09-15 NOTE — INCIDENTAL FINDINGS
The following findings require follow up:  Radiographic finding   Finding:   · CT a/p 9/14: Enlarged uterus containing a 12 3 cm myoma        Follow up required:    · Follow-up with OBGYN for evaluation for  Possible biopsy & ultrasound evaluation     Follow up should be done within 1-3 month(s)    Please notify the following clinician to assist with the follow up:   Dr Gladis Zapien, DO

## 2022-09-15 NOTE — PROGRESS NOTES
Jovani BARFIELD  65   Progress Note - Janessa Trevizo 1945, 65 y o  female MRN: [id number]  Unit/Bed#: -[facility] Encounter: [id number]  Primary Care Provider: Gilmar Gage DO   Date and time admitted to hospital: 9/14/2022  9:50 AM    * Dysphagia  Assessment & Plan  Patient with history of esophageal dysphagia  Presents after eating toast/tea this morning  States that it became lodged in her esophagus  Now with nausea/vomiting, inability to handle oral secretions  · Recent barium swallow done recently which showed prominent esophageal dysmotility, delayed emptying with reflex  · Etiology likely secondary to esophageal impaction  · Status post urgent EGD 9/14 for removal of food impaction  · Patient today with persistent coughing but no nausea/vomiting  Patient currently on 1 5 L NC O2 for comfort, patient stating that her symptoms are improved with supplemental O2  · Speech consulted  · GI consult appreciated:  · Soft/wet who to be chewed until further dilatation  · To follow with Dr Richar Stevens outpatient, consider esophageal manometry  · Likely will need desaturation screening prior discharge    Chest discomfort  Assessment & Plan  · With epigastric/chest pain on arrival that has since resolved  · Hs trop: 3  · EKG with normal sinus, nonspecific ST T-wave changes similar to prior EKG  · Telemetry reviewed, NSR  Will D/C  Iron deficiency anemia due to chronic blood loss  Assessment & Plan  · Hgb 13 8  · No S/S of active bleeding  · Continu ferrous sulfate    History of pulmonary embolus (PE)  Assessment & Plan  · Continue Xarelto    Restless leg syndrome, controlled  Assessment & Plan  · Continue Requip    Hypothyroidism, adult  Assessment & Plan  · Continue levothyroxine      VTE Pharmacologic Prophylaxis:   Moderate Risk (Score 3-4) - Pharmacological DVT Prophylaxis Ordered: rivaroxaban (Xarelto)      Patient Centered Rounds: I performed bedside rounds with nursing staff today   Discussions with Specialists or Other Care Team Provider:  GI, speech, case management    Education and Discussions with Family / Patient: Patient declined call to   Time Spent for Care: 30 minutes  More than 50% of total time spent on counseling and coordination of care as described above  Current Length of Stay: 1 day(s)  Current Patient Status: Inpatient   Certification Statement: The patient will continue to require additional inpatient hospital stay due to Wean off O2  Discharge Plan: Anticipate discharge tomorrow to home  Code Status: Level 1 - Full Code    Subjective:   Patient seen at bedside this a m , reports that she still has persistent cough without much production, able to tolerate eating/drinking, although states that she is very anxious/nervous about aspirating  Patient also reports that while she does not have SOB or chest pain, her congestion/coughing improves on supplemental O2  Objective:     Vitals:   Temp (24hrs), Av 1 °F (36 7 °C), Min:97 4 °F (36 3 °C), Max:98 9 °F (37 2 °C)    Temp:  [97 4 °F (36 3 °C)-98 9 °F (37 2 °C)] 98 9 °F (37 2 °C)  HR:  [71-89] 75  Resp:  [13-20] 16  BP: (111-131)/(52-76) 124/52  SpO2:  [93 %-98 %] 93 %  Body mass index is 31 63 kg/m²  Input and Output Summary (last 24 hours):   No intake or output data in the 24 hours ending 09/15/22 1427    Physical Exam:   Physical Exam  Constitutional:       General: She is not in acute distress  Appearance: She is not ill-appearing, toxic-appearing or diaphoretic  Cardiovascular:      Rate and Rhythm: Normal rate and regular rhythm  Pulses: Normal pulses  Heart sounds: Normal heart sounds  Pulmonary:      Effort: Pulmonary effort is normal  No respiratory distress  Breath sounds: Normal breath sounds  Abdominal:      General: Bowel sounds are normal  There is no distension  Palpations: Abdomen is soft  Tenderness: There is no abdominal tenderness  Musculoskeletal:         General: No swelling or tenderness  Skin:     General: Skin is warm  Neurological:      General: No focal deficit present  Mental Status: She is alert     Psychiatric:      Comments: Patient tearful, anxious         Additional Data:     Labs:  Results from last 7 days   Lab Units 09/15/22  0439 09/14/22  1047   WBC Thousand/uL 7 52 7 79   HEMOGLOBIN g/dL 13 3 13 8   HEMATOCRIT % 40 5 41 4   PLATELETS Thousands/uL 340 338   NEUTROS PCT %  --  67   LYMPHS PCT %  --  20   MONOS PCT %  --  7   EOS PCT %  --  4     Results from last 7 days   Lab Units 09/15/22  0439 09/14/22  1047   SODIUM mmol/L 138 138   POTASSIUM mmol/L 3 7 3 6   CHLORIDE mmol/L 105 105   CO2 mmol/L 25 26   BUN mg/dL 13 16   CREATININE mg/dL 0 67 0 73   ANION GAP mmol/L 8 7   CALCIUM mg/dL 9 2 9 4   ALBUMIN g/dL  --  4 0   TOTAL BILIRUBIN mg/dL  --  0 63   ALK PHOS U/L  --  132*   ALT U/L  --  15   AST U/L  --  14   GLUCOSE RANDOM mg/dL 137 101                       Lines/Drains:  Invasive Devices  Report    Peripheral Intravenous Line  Duration           Peripheral IV 09/14/22 Left Antecubital 1 day                  Telemetry:  Telemetry Orders (From admission, onward)             48 Hour Telemetry Monitoring  Continuous x 48 hours        References:    Telemetry Guidelines   Question:  Reason for 48 Hour Telemetry  Answer:  Acute MI, chest pain - R/O MI, or unstable angina                 Telemetry Reviewed: Sinus Bradycardia  Indication for Continued Telemetry Use: Arrthymias requiring medical therapy             Imaging: Reviewed radiology reports from this admission including: procedure reports    Recent Cultures (last 7 days):         Last 24 Hours Medication List:   Current Facility-Administered Medications   Medication Dose Route Frequency Provider Last Rate    acetaminophen  650 mg Oral Q6H PRN Terry Mcmahan MD      amLODIPine  5 mg Oral Daily Debra Simeon MD      aspirin  81 mg Oral Daily Maria Elena Fisher PA-C      brimonidine tartrate  1 drop Right Eye QPM Debra Campo MD      ferrous sulfate  325 mg Oral Daily With Breakfast Debra Campo MD      fluticasone  2 spray Each Nare Daily Debra Campo MD      folic acid  1 mg Oral Daily Debra Campo MD      lactated ringers  75 mL/hr Intravenous Continuous Shana Bower MD 75 mL/hr (09/14/22 1511)    lactated ringers  50 mL/hr Intravenous Continuous Delcie ADAM Garnett 50 mL/hr (09/14/22 1727)    latanoprost  1 drop Both Eyes HS Debra Campo MD      levothyroxine  100 mcg Oral Early Morning Shana Bower MD      ondansetron  4 mg Intravenous Q6H PRN Shana Bower MD      pantoprazole  40 mg Intravenous Q12H [address] Debra Campo MD      pregabalin  150 mg Oral Daily Shana Bower MD      rivaroxaban  20 mg Oral QPM Shana Bower MD      rOPINIRole  0 5 mg Oral HS Shana Bower MD      timolol  1 drop Both Eyes BID Shana Bower MD          Today, Patient Was Seen By: Cornelia Duval PA-C    **Please Note: This note may have been constructed using a voice recognition system  **

## 2022-09-15 NOTE — PLAN OF CARE
Problem: MOBILITY - ADULT  Goal: Maintain or return to baseline ADL function  Description: INTERVENTIONS:  -  Assess patient's ability to carry out ADLs; assess patient's baseline for ADL function and identify physical deficits which impact ability to perform ADLs (bathing, care of mouth/teeth, toileting, grooming, dressing, etc )  - Assess/evaluate cause of self-care deficits   - Assess range of motion  - Assess patient's mobility; develop plan if impaired  - Assess patient's need for assistive devices and provide as appropriate  - Encourage maximum independence but intervene and supervise when necessary  - Involve family in performance of ADLs  - Assess for home care needs following discharge   - Consider OT consult to assist with ADL evaluation and planning for discharge  - Provide patient education as appropriate  Outcome: Progressing  Goal: Maintains/Returns to pre admission functional level  Description: INTERVENTIONS:  - Perform BMAT or MOVE assessment daily    - Set and communicate daily mobility goal to care team and patient/family/caregiver  - Collaborate with rehabilitation services on mobility goals if consulted  - Perform Range of Motion 3 times a day    - Dangle patient 3  times a day  - Stand patient  3  times a day  - Ambulate patient  3  times a day  - Out of bed to chair  3  times a day   - Out of bed for meals 3  times a day  - Out of bed for toileting  - Record patient progress and toleration of activity level   Outcome: Progressing     Problem: Potential for Falls  Goal: Patient will remain free of falls  Description: INTERVENTIONS:  - Educate patient/family on patient safety including physical limitations  - Instruct patient to call for assistance with activity   - Consult OT/PT to assist with strengthening/mobility   - Keep Call bell within reach  - Keep bed low and locked with side rails adjusted as appropriate  - Keep care items and personal belongings within reach  - Initiate and maintain comfort rounds  - Make Fall Risk Sign visible to staff  - Offer Toileting every  2 Hours, in advance of need  - Initiate/Maintain  bed and chair alarm  - Apply yellow socks and bracelet for high fall risk patients  - Consider moving patient to room near nurses station  Outcome: Progressing     Problem: CARDIOVASCULAR - ADULT  Goal: Maintains optimal cardiac output and hemodynamic stability  Description: INTERVENTIONS:  - Monitor I/O, vital signs and rhythm  - Monitor for S/S and trends of decreased cardiac output  - Administer and titrate ordered vasoactive medications to optimize hemodynamic stability  - Assess quality of pulses, skin color and temperature  - Assess for signs of decreased coronary artery perfusion  - Instruct patient to report change in severity of symptoms  Outcome: Progressing  Goal: Absence of cardiac dysrhythmias or at baseline rhythm  Description: INTERVENTIONS:  - Continuous cardiac monitoring, vital signs, obtain 12 lead EKG if ordered  - Administer antiarrhythmic and heart rate control medications as ordered  - Monitor electrolytes and administer replacement therapy as ordered  Outcome: Progressing     Problem: RESPIRATORY - ADULT  Goal: Achieves optimal ventilation and oxygenation  Description: INTERVENTIONS:  - Assess for changes in respiratory status  - Assess for changes in mentation and behavior  - Position to facilitate oxygenation and minimize respiratory effort  - Oxygen administered by appropriate delivery if ordered  - Initiate smoking cessation education as indicated  - Encourage broncho-pulmonary hygiene including cough, deep breathe, Incentive Spirometry  - Assess the need for suctioning and aspirate as needed  - Assess and instruct to report SOB or any respiratory difficulty  - Respiratory Therapy support as indicated  Outcome: Progressing

## 2022-09-16 ENCOUNTER — TELEPHONE (OUTPATIENT)
Dept: GASTROENTEROLOGY | Facility: CLINIC | Age: 77
End: 2022-09-16

## 2022-09-16 VITALS
HEART RATE: 57 BPM | OXYGEN SATURATION: 99 % | DIASTOLIC BLOOD PRESSURE: 63 MMHG | TEMPERATURE: 98.4 F | SYSTOLIC BLOOD PRESSURE: 116 MMHG | WEIGHT: 178.57 LBS | RESPIRATION RATE: 16 BRPM | HEIGHT: 63 IN | BODY MASS INDEX: 31.64 KG/M2

## 2022-09-16 LAB
ATRIAL RATE: 67 BPM
ATRIAL RATE: 70 BPM
P AXIS: 47 DEGREES
P AXIS: 61 DEGREES
PR INTERVAL: 146 MS
PR INTERVAL: 237 MS
QRS AXIS: -12 DEGREES
QRS AXIS: -4 DEGREES
QRSD INTERVAL: 85 MS
QRSD INTERVAL: 89 MS
QT INTERVAL: 402 MS
QT INTERVAL: 414 MS
QTC INTERVAL: 434 MS
QTC INTERVAL: 434 MS
T WAVE AXIS: 60 DEGREES
T WAVE AXIS: 74 DEGREES
VENTRICULAR RATE: 66 BPM
VENTRICULAR RATE: 70 BPM

## 2022-09-16 PROCEDURE — C9113 INJ PANTOPRAZOLE SODIUM, VIA: HCPCS | Performed by: INTERNAL MEDICINE

## 2022-09-16 PROCEDURE — 94761 N-INVAS EAR/PLS OXIMETRY MLT: CPT

## 2022-09-16 PROCEDURE — 99239 HOSP IP/OBS DSCHRG MGMT >30: CPT

## 2022-09-16 PROCEDURE — 93010 ELECTROCARDIOGRAM REPORT: CPT | Performed by: INTERNAL MEDICINE

## 2022-09-16 RX ADMIN — ACETAMINOPHEN 650 MG: 325 TABLET, FILM COATED ORAL at 16:23

## 2022-09-16 RX ADMIN — FOLIC ACID 1 MG: 1 TABLET ORAL at 08:12

## 2022-09-16 RX ADMIN — LEVOTHYROXINE SODIUM 100 MCG: 0.1 TABLET ORAL at 05:22

## 2022-09-16 RX ADMIN — ASPIRIN 81 MG: 81 TABLET, CHEWABLE ORAL at 08:12

## 2022-09-16 RX ADMIN — AMLODIPINE BESYLATE 5 MG: 5 TABLET ORAL at 08:12

## 2022-09-16 RX ADMIN — FLUTICASONE PROPIONATE 2 SPRAY: 50 SPRAY, METERED NASAL at 08:13

## 2022-09-16 RX ADMIN — PANTOPRAZOLE SODIUM 40 MG: 40 INJECTION, POWDER, FOR SOLUTION INTRAVENOUS at 08:12

## 2022-09-16 RX ADMIN — TIMOLOL MALEATE 1 DROP: 5 SOLUTION/ DROPS OPHTHALMIC at 08:13

## 2022-09-16 RX ADMIN — FERROUS SULFATE TAB 325 MG (65 MG ELEMENTAL FE) 325 MG: 325 (65 FE) TAB at 08:12

## 2022-09-16 NOTE — ASSESSMENT & PLAN NOTE
· With epigastric/chest pain on arrival- Since resolved  · Hs trop: 3  · EKG with normal sinus, nonspecific ST T-wave changes similar to prior EKG  · Telemetry reviewed, NSR  ->Discontinued

## 2022-09-16 NOTE — DISCHARGE INSTR - DIET
Recommended Diet: food that is best tolerated (pureed or v soft moist well chewed food) and thin liquids and all precautions to promote esophageal clearance  Strategies to promote esophageal clearance:   1  Upright posture during and for a min of 1 hour after intake  2  Small frequent feedings (eg 5 small meals daily of highly nutrition food/liquid  3  Soft MOIST well cut or whipped foods  4  Thoroughly chew (eg a min of 25-30 times per each bite)  5   Minimize or eliminate ice/ice cold drinks (to promote peristalsis),   6    Slow rate of feeding

## 2022-09-16 NOTE — ASSESSMENT & PLAN NOTE
· Continue with CPAP qHs  · Per Family, Concern for nocturnal Hypoxia-> May benefit from repeat Sleep evaluation  · Recommend outpatient follow up with Sleep Specialist

## 2022-09-16 NOTE — TELEPHONE ENCOUNTER
----- Message from Sahara Williamson PA-C sent at 9/15/2022  4:29 PM EDT -----  Please schedule patient for EGD with dilatation with Dr Doc Bence take Xarelto so will need cardiac clearance

## 2022-09-16 NOTE — RESPIRATORY THERAPY NOTE
Home Oxygen Qualifying Test     Patient name: Adrien Darby        : 1945   Date of Test:  2022  Diagnosis:    Home Oxygen Test:    **Medicare Guidelines require item(s) 1-5 on all ambulatory patients or 1 and 2 on non-ambulatory patients  1  Baseline SPO2 on Room Air at rest 97 %   a  If <= 88% on Room Air add O2 via NC to obtain SpO2 >=88%  If LPM needed, document LPM  needed to reach =>88%    2  SPO2 during exertion on Room Air 92  %  a  During exertion monitor SPO2  If SPO2 increases >=89%, do not add supplemental oxygen    3  SPO2 on Oxygen at Rest NA % at NA LPM    4  SPO2 during exertion on Oxygen NA % at NA LPM    Test performed during exertion activity  Ambulation     []  Supplemental Home Oxygen is indicated  [x]  Client does not qualify for home oxygen  Respiratory Additional Notes-     Patient seen today for a Home Oxygen Qualifying Test   Patient was received on room air saturating 97%  Testing procedure was explained and testing started  Patient was able to independently ambulate with the assistance of a cane  She needed to rest after 60 feet, stating that she felt "washed out" and rested for 3 1/2 minutes before restarting the walk  She ambulated a total distance of 200 feet  Patient was return to her pre testing settings  Patient does not qualify for supplemental oxygen at this time           Antonio Perez, RT
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
## 2022-09-16 NOTE — PLAN OF CARE
Problem: RESPIRATORY - ADULT  Goal: Achieves optimal ventilation and oxygenation  Description: INTERVENTIONS:  - Assess for changes in respiratory status  - Assess for changes in mentation and behavior  - Position to facilitate oxygenation and minimize respiratory effort  - Oxygen administered by appropriate delivery if ordered  - Initiate smoking cessation education as indicated  - Encourage broncho-pulmonary hygiene including cough, deep breathe, Incentive Spirometry  - Assess the need for suctioning and aspirate as needed  - Assess and instruct to report SOB or any respiratory difficulty  - Respiratory Therapy support as indicated  9/16/2022 1125 by Caleb Jordan RN  Outcome: Progressing  9/16/2022 1124 by Caleb Jordan RN  Outcome: Progressing     Problem: Potential for Falls  Goal: Patient will remain free of falls  Description: INTERVENTIONS:  - Educate patient/family on patient safety including physical limitations  - Instruct patient to call for assistance with activity   - Consult OT/PT to assist with strengthening/mobility   - Keep Call bell within reach  - Keep bed low and locked with side rails adjusted as appropriate  - Keep care items and personal belongings within reach  - Initiate and maintain comfort rounds  - Make Fall Risk Sign visible to staff  - Offer Toileting every 2 Hours, in advance of need  - Initiate/Maintain bed alarm  - Obtain necessary fall risk management equipment  - Apply yellow socks and bracelet for high fall risk patients  - Consider moving patient to room near nurses station  9/16/2022 1125 by Caleb Jordan RN  Outcome: Progressing  9/16/2022 1124 by Caleb Jordan RN  Outcome: Progressing

## 2022-09-16 NOTE — PLAN OF CARE
Problem: MOBILITY - ADULT  Goal: Maintain or return to baseline ADL function  Description: INTERVENTIONS:  -  Assess patient's ability to carry out ADLs; assess patient's baseline for ADL function and identify physical deficits which impact ability to perform ADLs (bathing, care of mouth/teeth, toileting, grooming, dressing, etc )  - Assess/evaluate cause of self-care deficits   - Assess range of motion  - Assess patient's mobility; develop plan if impaired  - Assess patient's need for assistive devices and provide as appropriate  - Encourage maximum independence but intervene and supervise when necessary  - Involve family in performance of ADLs  - Assess for home care needs following discharge   - Consider OT consult to assist with ADL evaluation and planning for discharge  - Provide patient education as appropriate  Outcome: Progressing     Problem: Potential for Falls  Goal: Patient will remain free of falls  Description: INTERVENTIONS:  - Educate patient/family on patient safety including physical limitations  - Instruct patient to call for assistance with activity   - Consult OT/PT to assist with strengthening/mobility   - Keep Call bell within reach  - Keep bed low and locked with side rails adjusted as appropriate  - Keep care items and personal belongings within reach  - Initiate and maintain comfort rounds  - Make Fall Risk Sign visible to staff   - Apply yellow socks and bracelet for high fall risk patients  - Consider moving patient to room near nurses station  Outcome: Progressing     Problem: CARDIOVASCULAR - ADULT  Goal: Maintains optimal cardiac output and hemodynamic stability  Description: INTERVENTIONS:  - Monitor I/O, vital signs and rhythm  - Monitor for S/S and trends of decreased cardiac output  - Administer and titrate ordered vasoactive medications to optimize hemodynamic stability  - Assess quality of pulses, skin color and temperature  - Assess for signs of decreased coronary artery perfusion  - Instruct patient to report change in severity of symptoms  Outcome: Progressing

## 2022-09-20 ENCOUNTER — OFFICE VISIT (OUTPATIENT)
Dept: FAMILY MEDICINE CLINIC | Facility: CLINIC | Age: 77
End: 2022-09-20
Payer: MEDICARE

## 2022-09-20 ENCOUNTER — OFFICE VISIT (OUTPATIENT)
Dept: GASTROENTEROLOGY | Facility: CLINIC | Age: 77
End: 2022-09-20
Payer: MEDICARE

## 2022-09-20 VITALS
WEIGHT: 175 LBS | DIASTOLIC BLOOD PRESSURE: 78 MMHG | BODY MASS INDEX: 31.01 KG/M2 | HEIGHT: 63 IN | HEART RATE: 88 BPM | SYSTOLIC BLOOD PRESSURE: 112 MMHG

## 2022-09-20 VITALS
DIASTOLIC BLOOD PRESSURE: 60 MMHG | HEIGHT: 63 IN | WEIGHT: 178 LBS | TEMPERATURE: 97.6 F | OXYGEN SATURATION: 97 % | BODY MASS INDEX: 31.54 KG/M2 | SYSTOLIC BLOOD PRESSURE: 124 MMHG | HEART RATE: 74 BPM

## 2022-09-20 DIAGNOSIS — Z98.890 S/P LAPAROSCOPIC FUNDOPLICATION: ICD-10-CM

## 2022-09-20 DIAGNOSIS — Z79.899 ENCOUNTER FOR LONG-TERM (CURRENT) USE OF MEDICATIONS: ICD-10-CM

## 2022-09-20 DIAGNOSIS — K21.9 CHRONIC GERD: Primary | ICD-10-CM

## 2022-09-20 DIAGNOSIS — K31.83 ACHLORHYDRIA: ICD-10-CM

## 2022-09-20 DIAGNOSIS — G70.00 MYASTHENIA GRAVIS (HCC): ICD-10-CM

## 2022-09-20 DIAGNOSIS — R13.10 DYSPHAGIA, UNSPECIFIED TYPE: ICD-10-CM

## 2022-09-20 DIAGNOSIS — D50.0 IRON DEFICIENCY ANEMIA DUE TO CHRONIC BLOOD LOSS: ICD-10-CM

## 2022-09-20 DIAGNOSIS — K22.4 ESOPHAGEAL DYSMOTILITY DUE TO SYSTEMIC DISEASE: ICD-10-CM

## 2022-09-20 DIAGNOSIS — Z86.010 HX OF ADENOMATOUS COLONIC POLYPS: ICD-10-CM

## 2022-09-20 DIAGNOSIS — D47.2 MONOCLONAL GAMMOPATHY OF UNDETERMINED SIGNIFICANCE: ICD-10-CM

## 2022-09-20 DIAGNOSIS — E64.0 SEQUELAE OF PROTEIN-CALORIE MALNUTRITION (HCC): ICD-10-CM

## 2022-09-20 DIAGNOSIS — M48.062 SPINAL STENOSIS OF LUMBAR REGION WITH NEUROGENIC CLAUDICATION: ICD-10-CM

## 2022-09-20 DIAGNOSIS — F34.1 DYSTHYMIA (OR DEPRESSIVE NEUROSIS): Primary | ICD-10-CM

## 2022-09-20 PROCEDURE — 99214 OFFICE O/P EST MOD 30 MIN: CPT | Performed by: NURSE PRACTITIONER

## 2022-09-20 PROCEDURE — 99496 TRANSJ CARE MGMT HIGH F2F 7D: CPT | Performed by: FAMILY MEDICINE

## 2022-09-20 RX ORDER — PREGABALIN 150 MG/1
150 CAPSULE ORAL DAILY
Qty: 90 CAPSULE | Refills: 1 | Status: SHIPPED | OUTPATIENT
Start: 2022-09-20

## 2022-09-20 RX ORDER — DESVENLAFAXINE 50 MG/1
50 TABLET, EXTENDED RELEASE ORAL DAILY
Qty: 30 TABLET | Refills: 3 | Status: SHIPPED | OUTPATIENT
Start: 2022-09-20

## 2022-09-20 RX ORDER — FAMOTIDINE 40 MG/1
40 TABLET, FILM COATED ORAL 2 TIMES DAILY
Qty: 60 TABLET | Refills: 3 | Status: SHIPPED | OUTPATIENT
Start: 2022-09-20

## 2022-09-20 RX ORDER — TRAMADOL HYDROCHLORIDE 50 MG/1
TABLET ORAL
Qty: 60 TABLET | Refills: 3 | Status: SHIPPED | OUTPATIENT
Start: 2022-09-20

## 2022-09-20 NOTE — PROGRESS NOTES
[facility] - Outpatient Follow-up Note  Elana Haines 65 y o  female MRN: [id number]  Encounter: [id number]          ASSESSMENT AND PLAN:      1  Chronic GERD  2  S/P laparoscopic fundoplication  3  Dysphagia, unspecified type  71-year-old female with history of GERD s/p fundoplication approximately 7 years ago at [facility] now w/ dysphagia and recent hospitalization for food impaction after eating dry toast  EGD with dilatation was done in May 2022 and she does have tortuous esophagus with hiatal hernia and some mild stenosis in the distal esophagus status post dilatation   Recently had barium swallow which had shown prominent esophageal dysmotility with prominent tertiary contractions and delayed emptying with reflux of contrast into the upper esophagus into the pharynx   Small hiatal hernia and prior fundoplication was noted and patient did cough multiple times after ingesting water or barium but lokesh aspiration was not visualized but video swallow was recommended  Patient had EGD 9/14 which showed small/Medium hiatal hernia and foreshortened esophagus  EG Junction is relatively narrow  She's still reporting belching, dysphagia, early satiety  Only has been taking Pepcid daily      -Will need further dilatation of EG junction  Will send message to scheduling to get this scheduled as patient has to run to her next OV w/ PCP  Will need clearance to hold Xarelto prior to procedure  -Continue pureed diet with thin liquids, ensure nutritional supplements  -Esophageal manometry scheduled 9/29 for further evaluation of component of esophageal dysmotility as well  -Continue famotidine 40 mg twice daily  -May need ultimately need revision of hiatal hernia repair  -     famotidine (PEPCID) 40 MG tablet; Take 1 tablet (40 mg total) by mouth 2 (two) times a day        4   Hx of adenomatous colonic polyps  Next colonoscopy in March of 2028      5  Iron deficiency anemia due to chronic blood loss  Last Hgb stable 13 3  She's scheduled for hemorrhoidal banding on 11/4 for intermittent rectal bleeding        ______________________________________________________________________    SUBJECTIVE:  Sandrita Leone is a 65 y o  female with history of HTN, HLD, CAD, hypothyroidism, breast cancer s/p bilateral mastectomy, chronic back pain & opioid use, DVT/PE on Xarelto, ASHLEY on CPAP at ,  GERD w/ history of fundoplication over 7 years ago presenting for hospital follow up  She continues belching, early satiety, nausea, trouble swallowing  She's taking Famotine 40mg daily, but is supposed to take it twice daily according to hospital notes  She's following a puree diet with thin liquids  She has lost a couple pounds  She has ensure supplement at home  She has manometry scheduled on 9/29/22  She's moving her bowels, uses smooth move tea PRN  She feels Miralax doesn't work for her    Fundoplication was done at [facility] approximately 7 years ago-Dr Verna Graves  9/14/22     PHYSICIAN(S):  Attending:   Navjot Hannah MD      Fellow:   No Staff Documented         INDICATION:  Food impaction of esophagus, initial encounter  Patient came with drooling and vomiting      POST-OP DIAGNOSIS:  See the impression below      PREPROCEDURE:  Informed consent was obtained for the procedure, including sedation  Risks of perforation, hemorrhage, adverse drug reaction and aspiration were discussed  The patient was placed in the left lateral decubitus position      Patient was explained about the risks and benefits of the procedure  Risks including but not limited to bleeding, infection, and perforation were explained in detail  Also explained about less than 100% sensitivity with the exam and other alternatives      DETAILS OF PROCEDURE:  Patient was taken to the procedure room where a time out was performed to confirm correct patient and correct procedure   The patient underwent monitored anesthesia care, which was administered by an anesthesia professional  The patient's blood pressure, heart rate, level of consciousness, respirations and oxygen were monitored throughout the procedure  The scope was advanced to the second part of the duodenum  Retroflexion was performed in the fundus  The patient experienced no blood loss  The procedure was not difficult  The patient tolerated the procedure well  There were no apparent complications       ANESTHESIA INFORMATION:  ASA: III  Anesthesia Type: General     MEDICATIONS:  No administrations occurring from 1614 to 1634 on 09/14/22         FINDINGS:  · Medium sliding hiatal hernia (type I hiatal hernia) - GE junction 34 cm from the incisors, diaphragmatic impression 39 cm from the incisors  EG Junction is slightly narrow  Esophagus is foreshortened and tortuous         SPECIMENS:  * No specimens in log *        IMPRESSION:  1  Small/Medium hiatal hernia and foreshortened esophagus  2  EG Junction is relatively narrow      RECOMMENDATION:  Follow up with PCP  Follow up with me in clinic (Follow with Dr Colin Campbell )  Soft and wet food to be chewed well till further dilatation  Esophageal manometry? And hiatal hernia may be needed          REVIEW OF SYSTEMS IS OTHERWISE NEGATIVE        Historical Information   Past Medical History:   Diagnosis Date    Cancer [facility])     left breast cancer    Cervical herniated disc     Chronic pain disorder     back pain    Chronic respiratory failure (HCC)     uses O2 at home with [facility] /5/19 no longer using/just CPAP    CPAP (continuous positive airway pressure) dependence     Disease of thyroid gland     hypothyroid    DVT (deep venous thrombosis) ([facility] [address] ) 2020    right leg    Family history of reaction to anesthesia     "son and daughter hard time waking up and also PONV"    Fibromyalgia, primary     GERD (gastroesophageal reflux disease)     Glaucoma     Hiatal hernia     History of palpitations     History of pneumonia     History of transfusion     Hyperlipidemia     Hypertension     Irregular heart beat     Migraine     Myocardial infarction [facility])     pt sees cardilogist Dr Ellyn Carranza realize had one, found on EKG before a surgery"    OAB (overactive bladder)     Pollen allergies     Pulmonary embolism ([address] ) 2019    Bilateral; on Xarelto    Restless leg     Risk for falls     Sleep apnea     Use of cane as ambulatory aid     occas    Uses brace     Mafo/left leg    Wears glasses      Past Surgical History:   Procedure Laterality Date    ADENOIDECTOMY      CARDIAC CATHETERIZATION      CARPAL TUNNEL RELEASE      CATARACT EXTRACTION Bilateral     FOOT SURGERY      pin implanted right toe    JOINT REPLACEMENT Bilateral     knees    LAMINECTOMY      LUMBAR EPIDURAL INJECTION      MASTECTOMY Bilateral     with reconstruction and implants    NISSEN FUNDOPLICATION      ID LENGTH/SHORT LEG/ANKL TENDON,SINGLE Left 5/24/2021    Procedure: Sectioning peroneal tendons with lengthening/sectioning achilles tendon lower leg and ankle;  Surgeon: Alfonzo Xiong DPM;  Location: [facility];  Service: Podiatry    REPLACEMENT TOTAL KNEE      SPINAL FUSION      TONSILLECTOMY      TUBAL LIGATION      TUMOR EXCISION      right forearm/benign    VEIN LIGATION Right      Social History   Social History     Substance and Sexual Activity   Alcohol Use Yes    Comment: socially     Social History     Substance and Sexual Activity   Drug Use Never     Social History     Tobacco Use   Smoking Status Never Smoker   Smokeless Tobacco Never Used     Family History   Problem Relation Age of Onset    Cancer Sister        Meds/Allergies       Current Outpatient Medications:     acetaminophen (TYLENOL) 325 mg tablet    amLODIPine (NORVASC) 5 mg tablet    Ascorbic Acid (VITAMIN C) 100 MG tablet    ASPIRIN 81 PO    azelastine (ASTELIN) 0 1 % nasal spray    b complex vitamins capsule    brimonidine (ALPHAGAN P) 0 15 % ophthalmic solution    calcium citrate-vitamin D (CITRACAL+D) 315-200 MG-UNIT per tablet    cholecalciferol (VITAMIN D3) 1,000 units tablet    COVID-19 mRNA Virus Vaccine (MODERNA COVID-19 VACCINE IM)    DULoxetine (CYMBALTA) 20 mg capsule    famotidine (PEPCID) 40 MG tablet    FEROSUL 325 (65 Fe) MG tablet    fexofenadine (ALLEGRA) 180 MG tablet    fluticasone (FLONASE) 50 mcg/act nasal spray    folic acid (FOLVITE) 1 mg tablet    hydrOXYzine HCL (ATARAX) 25 mg tablet    latanoprost (XALATAN) 0 005 % ophthalmic solution    levothyroxine 100 mcg tablet    pregabalin (LYRICA) 150 mg capsule    rivaroxaban (Xarelto) 20 mg tablet    rOPINIRole (REQUIP) 1 mg tablet    timolol (TIMOPTIC) 0 5 % ophthalmic solution    torsemide (DEMADEX) 10 mg tablet    traMADol (Ultram) 50 mg tablet    zinc gluconate 50 mg tablet    Allergies   Allergen Reactions    Benzalkonium Chloride Hives     Merthiolate tincture 9/28/2020      Hydromorphone Hallucinations     disorientation; hallucination; confusion      Merthiolate  [Thimerosal] Hives    Quinine Derivatives Other (See Comments) and Tinnitus     tinnitus      Codeine Nausea Only and Other (See Comments)     nausea      Pamelor [Nortriptyline] Vomiting     Per pt, itching also    Pollen Extract Nasal Congestion    Statins Itching    Wound Dressing Adhesive Rash           Objective     Blood pressure 112/78, pulse 88, height 5' 3" (1 6 m), weight 79 4 kg (175 lb)  Body mass index is 31 kg/m²  PHYSICAL EXAM:      General Appearance:   Alert, cooperative, no distress   HEENT:   Normocephalic, atraumatic, anicteric  Neck:  Supple, symmetrical, trachea midline   Lungs:   Clear to auscultation bilaterally; no rales, rhonchi or wheezing; respirations unlabored    Heart[de-identified]   Regular rate and rhythm; no murmur     Abdomen:   Soft, non-tender, non-distended; normal bowel sounds; no masses, no organomegaly    Genitalia:   Deferred    Rectal:   Deferred    Extremities:  No cyanosis, clubbing or edema    Skin:  No jaundice, rashes, or lesions    Lymph nodes:  No palpable cervical lymphadenopathy        Lab Results:   No visits with results within 1 Day(s) from this visit  Latest known visit with results is:   Admission on 09/14/2022, Discharged on 09/16/2022   Component Date Value    WBC 09/14/2022 7 79     RBC 09/14/2022 4 61     Hemoglobin 09/14/2022 13 8     Hematocrit 09/14/2022 41 4     MCV 09/14/2022 90     MCH 09/14/2022 29 9     MCHC 09/14/2022 33 3     RDW 09/14/2022 13 4     MPV 09/14/2022 9 7     Platelets 16/67/6537 338     nRBC 09/14/2022 0     Neutrophils Relative 09/14/2022 67     Immat GRANS % 09/14/2022 1     Lymphocytes Relative 09/14/2022 20     Monocytes Relative 09/14/2022 7     Eosinophils Relative 09/14/2022 4     Basophils Relative 09/14/2022 1     Neutrophils Absolute 09/14/2022 5 26     Immature Grans Absolute 09/14/2022 0 04     Lymphocytes Absolute 09/14/2022 1 55     Monocytes Absolute 09/14/2022 0 58     Eosinophils Absolute 09/14/2022 0 32     Basophils Absolute 09/14/2022 0 04     Sodium 09/14/2022 138     Potassium 09/14/2022 3 6     Chloride 09/14/2022 105     CO2 09/14/2022 26     ANION GAP 09/14/2022 7     BUN 09/14/2022 16     Creatinine 09/14/2022 0 73     Glucose 09/14/2022 101     Calcium 09/14/2022 9 4     AST 09/14/2022 14     ALT 09/14/2022 15     Alkaline Phosphatase 09/14/2022 132 (A)    Total Protein 09/14/2022 7 3     Albumin 09/14/2022 4 0     Total Bilirubin 09/14/2022 0 63     eGFR 09/14/2022 79     Lipase 09/14/2022 37     Magnesium 09/14/2022 2 0     TSH 3RD GENERATON 09/14/2022 2  124     Color, UA 09/14/2022 Yellow     Clarity, UA 09/14/2022 Clear     Specific Gravity, UA 09/14/2022 1 010     pH, UA 09/14/2022 5 5     Leukocytes, UA 09/14/2022 Trace (A)    Nitrite, UA 09/14/2022 Negative     Protein, UA 09/14/2022 Negative     Glucose, UA 09/14/2022 Negative     Ketones, UA 09/14/2022 Negative     Urobilinogen, UA 09/14/2022 0 2     Bilirubin, UA 09/14/2022 Negative     Occult Blood, UA 09/14/2022 Negative     hs TnI 0hr 09/14/2022 2     Total CK 09/14/2022 60     RBC, UA 09/14/2022 None Seen     WBC, UA 09/14/2022 None Seen     Epithelial Cells 09/14/2022 None Seen     Bacteria, UA 09/14/2022 None Seen     HS TnI random 09/14/2022 3 (A)    Sodium 09/15/2022 138     Potassium 09/15/2022 3 7     Chloride 09/15/2022 105     CO2 09/15/2022 25     ANION GAP 09/15/2022 8     BUN 09/15/2022 13     Creatinine 09/15/2022 0 67     Glucose 09/15/2022 137     Calcium 09/15/2022 9 2     eGFR 09/15/2022 84     WBC 09/15/2022 7 52     RBC 09/15/2022 4 44     Hemoglobin 09/15/2022 13 3     Hematocrit 09/15/2022 40 5     MCV 09/15/2022 91     MCH 09/15/2022 30 0     MCHC 09/15/2022 32 8     RDW 09/15/2022 13 4     Platelets 64/59/7989 340     MPV 09/15/2022 10 5     Ventricular Rate 09/14/2022 70     Atrial Rate 09/14/2022 70     AR Interval 09/14/2022 237     QRSD Interval 09/14/2022 89     QT Interval 09/14/2022 402     QTC Interval 09/14/2022 434     P Axis 09/14/2022 47     QRS Axis 09/14/2022 -12     T Wave Axis 09/14/2022 60     Ventricular Rate 09/14/2022 66     Atrial Rate 09/14/2022 67     AR Interval 09/14/2022 146     QRSD Interval 09/14/2022 85     QT Interval 09/14/2022 414     QTC Interval 09/14/2022 434     P Axis 09/14/2022 61     QRS Axis 09/14/2022 -4     T Wave Axis 09/14/2022 74          Radiology Results:   EGD    Result Date: 9/14/2022  Narrative: [facility] [address] [address] [phone] DATE OF SERVICE: 9/14/22 PHYSICIAN(S): Attending: Jose Venegas MD Fellow: No Staff Documented INDICATION: Food impaction of esophagus, initial encounter  Patient came with drooling and vomiting  POST-OP DIAGNOSIS: See the impression below   PREPROCEDURE: Informed consent was obtained for the procedure, including sedation  Risks of perforation, hemorrhage, adverse drug reaction and aspiration were discussed  The patient was placed in the left lateral decubitus position  Patient was explained about the risks and benefits of the procedure  Risks including but not limited to bleeding, infection, and perforation were explained in detail  Also explained about less than 100% sensitivity with the exam and other alternatives  DETAILS OF PROCEDURE: Patient was taken to the procedure room where a time out was performed to confirm correct patient and correct procedure  The patient underwent monitored anesthesia care, which was administered by an anesthesia professional  The patient's blood pressure, heart rate, level of consciousness, respirations and oxygen were monitored throughout the procedure  The scope was advanced to the second part of the duodenum  Retroflexion was performed in the fundus  The patient experienced no blood loss  The procedure was not difficult  The patient tolerated the procedure well  There were no apparent complications  ANESTHESIA INFORMATION: ASA: III Anesthesia Type: General MEDICATIONS: No administrations occurring from 1614 to 1634 on 09/14/22 FINDINGS: Medium sliding hiatal hernia (type I hiatal hernia) - GE junction 34 cm from the incisors, diaphragmatic impression 39 cm from the incisors  EG Junction is slightly narrow  Esophagus is foreshortened and tortuous  SPECIMENS: * No specimens in log *     Impression: Small/Medium hiatal hernia and foreshortened esophagus  EG Junction is relatively narrow  RECOMMENDATION: Follow up with PCP Follow up with me in clinic (Follow with Dr Breonna Lawrence ) Soft and wet food to be chewed well till further dilatation  Esophageal manometry? And hiatal hernia may be needed  Hanane Scott MD     XR chest 1 view portable    Result Date: 9/14/2022  Narrative: CHEST INDICATION:   hypoxia   COMPARISON:  10/20/2021 EXAM PERFORMED/VIEWS:  XR CHEST PORTABLE Single view FINDINGS: Cardiomediastinal silhouette appears unremarkable  The lungs are clear  No pneumothorax or pleural effusion  Osseous structures appear within normal limits for patient age  Impression: No acute cardiopulmonary disease  Findings are stable Workstation performed: [id number]     FL barium swallow    Result Date: 9/6/2022  Narrative: BARIUM SWALLOW-ESOPHAGRAM INDICATION:   R13 10: Dysphagia, unspecified  COMPARISON:  Correlation is made with the prior chest CT study dated 12/16/2015  IMAGES:  29 FLUOROSCOPY TIME:   2 7 MIN  TECHNIQUE: The patient was given a barium tablet and barium by mouth and images of the esophagus were obtained  FINDINGS: The esophagus is normal in caliber  There is prominent esophageal dysmotility with prominent tertiary contractions and delayed emptying  No fixed stricture or large ulceration seen  Gastroesophageal reflux was not observed  However, there was reflux of contrast from the upper esophagus into the pharynx seen particularly with prone imaging  Small hiatal hernia  Prior fundoplication surgery  The patient also coughed multiple times after ingesting water or barium  Lokesh aspiration not seen  Impression: Prominent esophageal dysmotility with prominent tertiary contractions and delayed emptying  Reflux of contrast from the upper esophagus into the pharynx seen particularly with prone imaging  Small hiatal hernia  Prior fundoplication  The patient coughed multiple times after ingesting water or barium  Wall lokesh aspiration was not visualized, a dedicated video speech swallow evaluation may be helpful to assess for any penetration or aspiration  Workstation performed: [id number]     CT abdomen pelvis with contrast    Result Date: 9/14/2022  Narrative: CT ABDOMEN AND PELVIS WITH IV CONTRAST INDICATION:   Abdominal pain, acute, nonlocalized Generalized abdominal pain, nausea, vomiting   COMPARISON:  Pelvic ultrasound on MRI pelvis June 2015 TECHNIQUE:  CT examination of the abdomen and pelvis was performed  Axial, sagittal, and coronal 2D reformatted images were created from the source data and submitted for interpretation  Radiation dose length product (DLP) for this visit:  658 7 mGy-cm   This examination, like all CT scans performed in the [facility], was performed utilizing techniques to minimize radiation dose exposure, including the use of iterative reconstruction and automated exposure control  IV Contrast:  100 mL of iohexol (OMNIPAQUE) Enteric Contrast:  Enteric contrast was not administered  FINDINGS: ABDOMEN LOWER CHEST: Hiatal hernia  Dilatation of the distal esophagus with air-fluid level  Patient has had a recent swallow study on September 6  LIVER/BILIARY TREE:  Hepatomegaly and steatosis  GALLBLADDER:  No calcified gallstones  No pericholecystic inflammatory change  SPLEEN:  Unremarkable  PANCREAS:  Moderate fatty replacement more focal in the pancreatic head  ADRENAL GLANDS:  Unremarkable  KIDNEYS/URETERS:  Mild fullness of the renal collecting systems secondary to mass effect from large uterine myoma    No hydronephrosis  STOMACH AND BOWEL:  Colonic diverticulosis  Normal caliber of small bowel loops  d APPENDIX:  A normal appendix was visualized  ABDOMINOPELVIC CAVITY:  No ascites  No pneumoperitoneum  No lymphadenopathy  VESSELS:  Unremarkable for patient's age  PELVIS REPRODUCTIVE ORGANS:  Enlarged uterus containing a 12 3 x 12 3 cm myoma  No adnexal mass  URINARY BLADDER:  Unremarkable  ABDOMINAL WALL/INGUINAL REGIONS: Surgical clips in the right inguinal region  There is a small fat-containing umbilical hernia  OSSEOUS STRUCTURES: Lumbar laminectomy, graft harvesting from the right iliac bone  Impression: 1  Hiatal hernia with dilatation of the distal esophagus and the suggestion of narrowing of the gastroesophageal junction  Correlate with results of recent barium swallow   2   Enlarged uterus containing a 12 3 cm myoma  3   Colonic diverticulosis without associated diverticulitis   Workstation performed: [id number]

## 2022-09-20 NOTE — PROGRESS NOTES
Assessment & Plan     1  Dysthymia (or depressive neurosis)  -     desvenlafaxine succinate (PRISTIQ) 50 mg 24 hr tablet; Take 1 tablet (50 mg total) by mouth daily  -     Folate; Future  -     Vitamin B12; Future; Expected date: 09/21/2022    2  Esophageal dysmotility due to systemic disease  Comments:  See reports from GI  See barium swallow  Concern for myasthenia gravis? 3  Myasthenia gravis ([facility] [address] )  Comments: This is uncertain diagnosis and unproven  With esophageal dysmotility, it is in the differential diagnosis  Orders:  -     Acetylcholine Receptor (AChR) Binding Antibodies Complete Panel With Reflex to MuSK Antibodies; Future    4  Spinal stenosis of lumbar region with neurogenic claudication  -     pregabalin (LYRICA) 150 mg capsule; Take 1 capsule (150 mg total) by mouth in the morning  -     traMADol (Ultram) 50 mg tablet; Take 1/2 tab at 8AM, 1/2 tab at 1PM, and 1 tab at HS (total 2 tabs/day)    5  Achlorhydria   -     Folate; Future    6  Encounter for long-term (current) use of medications  Comments: All medications reviewed and discussed  She is already on Lyrica which I was going to recommend for her lower extremity neuropathy  Orders:  -     traMADol (Ultram) 50 mg tablet; Take 1/2 tab at 8AM, 1/2 tab at 1PM, and 1 tab at HS (total 2 tabs/day)    7  Monoclonal gammopathy of undetermined significance  Comments:  Dr Betsey Henderson is following this --has not declared itself as yet  Orders:  -     traMADol (Ultram) 50 mg tablet; Take 1/2 tab at 8AM, 1/2 tab at 1PM, and 1 tab at HS (total 2 tabs/day)    8  Sequelae of protein-calorie malnutrition ([facility] )   -     Vitamin B12; Future; Expected date: 09/21/2022       Subjective     Transitional Care Management Review:   Brendon Parekh is a 65 y o  female here for TCM follow up       During the TCM phone call patient stated:  TCM Call     None      TCM Call     None        65 y o female, well-known to me for yrs, here with MANY problems as follows: Nausea and not feeling myself  Will stop Cymbalta since not helping  Also pain in upper legs bilaterally felt like a knife going through muscle also, that is so intense takes her breath away and may make her cry  She states she is not depressed  Bleed cannot walk a block because feels too weak  Feels like a "wet washclothe"    In hosp x 2-3 days, and SaO2 did not drop  Hence, no 02 required  At home, finger Sa02 can be 85 %    Review of Systems   HENT: Positive for trouble swallowing  Eyes: Negative  Respiratory: Positive for shortness of breath  Not much pulmonary reserve and probably would benefit from pulmonary rehab? Cardiovascular:        Denies today chest pain tightness heaviness pressure but does have vague aches and pains and is considerably deconditioned  Gastrointestinal: Positive for nausea  Has what appears to be esophageal dysmotility syndrome or something that is affecting the esophagus given GERD and regurgitation and feeling of food impaction  Genitourinary: Negative  Musculoskeletal: Positive for arthralgias, back pain, joint swelling and myalgias  Stopped Cymbalta - not helping   Not herself    Skin: Positive for pallor  Neurological: Positive for dizziness and weakness  Hematological: Negative  Psychiatric/Behavioral: Positive for dysphoric mood and sleep disturbance (States she sleeps 2 hours then up and sleep is broken)  Objective     /60 (BP Location: Right arm, Patient Position: Sitting, Cuff Size: Adult)   Pulse 74   Temp 97 6 °F (36 4 °C) (Tympanic)   Ht 5' 3" (1 6 m)   Wt 80 7 kg (178 lb)   SpO2 97%   BMI 31 53 kg/m²      Physical Exam  Vitals and nursing note reviewed  Constitutional:       General: She is not in acute distress  Appearance: Normal appearance  She is well-developed  She is obese  She is not ill-appearing, toxic-appearing or diaphoretic  HENT:      Head: Normocephalic and atraumatic  Mouth/Throat:      Comments: Has esophageal dysmotility syndrome--and I just wonder--although a long shot--could there be early developed things of myasthenia gravis? Will check lab  Eyes:      Conjunctiva/sclera: Conjunctivae normal    Cardiovascular:      Rate and Rhythm: Normal rate and regular rhythm  Heart sounds: No murmur heard  Pulmonary:      Effort: Pulmonary effort is normal  No respiratory distress  Breath sounds: Normal breath sounds  Abdominal:      Palpations: Abdomen is soft  Tenderness: There is no abdominal tenderness  Musculoskeletal:         General: No swelling  Cervical back: Neck supple  Comments: Complains of sore muscles and knife-like sensation going through both thighs  No fever chills rashes night sweats etc    Skin:     General: Skin is warm and dry  Neurological:      General: No focal deficit present  Mental Status: She is alert and oriented to person, place, and time  Mental status is at baseline  Psychiatric:         Behavior: Behavior normal          Judgment: Judgment normal       Comments: States she is not depressed but she occasionally breaks down and cries because she feels so tired and weak that she can barely go on  All lab test to date have been quite good will check for myasthenia gravis --although it is a long shot  Will start Pristiq 50 mg at bedtime and plan to go to 100 mg if all goes well  Recheck her in 3-4 weeks       NOTE: all of the above issues discussed include chronic illness(es)  with severe exacerbations OR pose threats to life or body function if not managed/monitored effectively  I am as concerned about the long term effects of these disease states, as much as the short term effects  I spent considerable time assuring that my patient  understands  Very much time was spent reviewing the discharge summary from [facility] dated 9 16 22   Discussion there included dislodge a, chest discomfort, moderate obstructive sleep apnea, iron deficiency anemia, history of pulmonary embolus, restless leg syndrome, and hypothyroidism  Obviously this is multifactorial, complex medical issues  I want to entertain the diagnosis of myasthenia gravis perhaps in a very earliest sense and given her immune deficiencies as well as probable monoclonal antibodies of uncertain significance, raises a good point  Will order appropriate laboratory test and follow up accordingly  I reviewed prior internal and external notes,  consults,  hospital discharges,  and any other appropriate documents  I  have discussed the management and interpretation of them as well  Again, patient expresses understanding  Nonetheless, this is a complicated involved case that took considerable time  Actually, time reviewing discharge summary and records in excess of 10 minutes and time with patient in excess of 35 minutes        Dioni Parker,

## 2022-09-21 ENCOUNTER — TELEPHONE (OUTPATIENT)
Dept: GASTROENTEROLOGY | Facility: CLINIC | Age: 77
End: 2022-09-21

## 2022-09-21 ENCOUNTER — APPOINTMENT (OUTPATIENT)
Dept: LAB | Facility: HOSPITAL | Age: 77
End: 2022-09-21
Payer: MEDICARE

## 2022-09-21 DIAGNOSIS — E64.0 SEQUELAE OF PROTEIN-CALORIE MALNUTRITION (HCC): ICD-10-CM

## 2022-09-21 DIAGNOSIS — U09.9 COVID-19 LONG HAULER MANIFESTING CHRONIC FATIGUE: ICD-10-CM

## 2022-09-21 DIAGNOSIS — F34.1 DYSTHYMIA (OR DEPRESSIVE NEUROSIS): ICD-10-CM

## 2022-09-21 DIAGNOSIS — K31.83 ACHLORHYDRIA: ICD-10-CM

## 2022-09-21 DIAGNOSIS — R53.83 FATIGUE, UNSPECIFIED TYPE: ICD-10-CM

## 2022-09-21 DIAGNOSIS — G93.32 COVID-19 LONG HAULER MANIFESTING CHRONIC FATIGUE: ICD-10-CM

## 2022-09-21 LAB
FOLATE SERPL-MCNC: >20 NG/ML (ref 3.1–17.5)
NT-PROBNP SERPL-MCNC: 117 PG/ML
VIT B12 SERPL-MCNC: 793 PG/ML (ref 100–900)

## 2022-09-21 PROCEDURE — 83880 ASSAY OF NATRIURETIC PEPTIDE: CPT

## 2022-09-21 PROCEDURE — 82607 VITAMIN B-12: CPT

## 2022-09-21 PROCEDURE — 82746 ASSAY OF FOLIC ACID SERUM: CPT

## 2022-09-21 PROCEDURE — 36415 COLL VENOUS BLD VENIPUNCTURE: CPT

## 2022-09-22 ENCOUNTER — TELEPHONE (OUTPATIENT)
Dept: OTHER | Facility: OTHER | Age: 77
End: 2022-09-22

## 2022-09-26 ENCOUNTER — ANESTHESIA (OUTPATIENT)
Dept: GASTROENTEROLOGY | Facility: HOSPITAL | Age: 77
End: 2022-09-26

## 2022-09-26 ENCOUNTER — TELEPHONE (OUTPATIENT)
Dept: GASTROENTEROLOGY | Facility: HOSPITAL | Age: 77
End: 2022-09-26

## 2022-09-26 ENCOUNTER — HOSPITAL ENCOUNTER (OUTPATIENT)
Dept: GASTROENTEROLOGY | Facility: HOSPITAL | Age: 77
Setting detail: OUTPATIENT SURGERY
Discharge: HOME | End: 2022-09-26
Payer: MEDICARE

## 2022-09-26 ENCOUNTER — ANESTHESIA EVENT (OUTPATIENT)
Dept: GASTROENTEROLOGY | Facility: HOSPITAL | Age: 77
End: 2022-09-26

## 2022-09-26 VITALS
WEIGHT: 176.5 LBS | SYSTOLIC BLOOD PRESSURE: 116 MMHG | DIASTOLIC BLOOD PRESSURE: 60 MMHG | RESPIRATION RATE: 14 BRPM | HEIGHT: 63 IN | TEMPERATURE: 98 F | OXYGEN SATURATION: 95 % | BODY MASS INDEX: 31.27 KG/M2 | HEART RATE: 70 BPM

## 2022-09-26 DIAGNOSIS — R13.10 DYSPHAGIA, UNSPECIFIED TYPE: ICD-10-CM

## 2022-09-26 DIAGNOSIS — R11.0 NAUSEA: Primary | ICD-10-CM

## 2022-09-26 PROCEDURE — C1726 CATH, BAL DIL, NON-VASCULAR: HCPCS

## 2022-09-26 PROCEDURE — 43249 ESOPH EGD DILATION <30 MM: CPT | Performed by: INTERNAL MEDICINE

## 2022-09-26 RX ORDER — PROPOFOL 10 MG/ML
INJECTION, EMULSION INTRAVENOUS AS NEEDED
Status: DISCONTINUED | OUTPATIENT
Start: 2022-09-26 | End: 2022-09-26

## 2022-09-26 RX ORDER — ONDANSETRON 4 MG/1
4 TABLET, FILM COATED ORAL EVERY 8 HOURS PRN
Qty: 90 TABLET | Refills: 0 | Status: SHIPPED | OUTPATIENT
Start: 2022-09-26

## 2022-09-26 RX ORDER — LIDOCAINE HYDROCHLORIDE 20 MG/ML
INJECTION, SOLUTION EPIDURAL; INFILTRATION; INTRACAUDAL; PERINEURAL AS NEEDED
Status: DISCONTINUED | OUTPATIENT
Start: 2022-09-26 | End: 2022-09-26

## 2022-09-26 RX ORDER — SODIUM CHLORIDE, SODIUM LACTATE, POTASSIUM CHLORIDE, CALCIUM CHLORIDE 600; 310; 30; 20 MG/100ML; MG/100ML; MG/100ML; MG/100ML
INJECTION, SOLUTION INTRAVENOUS CONTINUOUS PRN
Status: DISCONTINUED | OUTPATIENT
Start: 2022-09-26 | End: 2022-09-26

## 2022-09-26 RX ADMIN — PROPOFOL 90 MG: 10 INJECTION, EMULSION INTRAVENOUS at 09:52

## 2022-09-26 RX ADMIN — PROPOFOL 30 MG: 10 INJECTION, EMULSION INTRAVENOUS at 09:55

## 2022-09-26 RX ADMIN — SODIUM CHLORIDE, SODIUM LACTATE, POTASSIUM CHLORIDE, AND CALCIUM CHLORIDE: .6; .31; .03; .02 INJECTION, SOLUTION INTRAVENOUS at 09:48

## 2022-09-26 RX ADMIN — LIDOCAINE HYDROCHLORIDE 80 MG: 20 INJECTION, SOLUTION EPIDURAL; INFILTRATION; INTRACAUDAL at 09:52

## 2022-09-26 RX ADMIN — PROPOFOL 30 MG: 10 INJECTION, EMULSION INTRAVENOUS at 10:03

## 2022-09-26 RX ADMIN — PROPOFOL 50 MG: 10 INJECTION, EMULSION INTRAVENOUS at 09:58

## 2022-09-26 NOTE — ANESTHESIA POSTPROCEDURE EVALUATION
Post-Op Assessment Note    CV Status:  Stable    Pain management: adequate     Mental Status:  Alert and awake   Hydration Status:  Euvolemic   PONV Controlled:  Controlled   Airway Patency:  Patent      Post Op Vitals Reviewed: Yes      Staff: CRNA         No complications documented      BP   1217/60   Temp   98   Pulse  74   Resp   16   SpO2   98%

## 2022-09-26 NOTE — INTERVAL H&P NOTE
H&P reviewed  After examining the patient I find no changes in the patients condition since the H&P had been written      Vitals:    09/26/22 0940   BP: 130/60   Pulse: 69   Resp: 18   Temp: 97 6 °F (36 4 °C)   SpO2: 95%

## 2022-09-26 NOTE — ANESTHESIA PREPROCEDURE EVALUATION
Procedure:  EGD    Relevant Problems   CARDIO   (+) Mixed hyperlipidemia      ENDO   (+) Hypothyroidism, adult      GI/HEPATIC   (+) Dysphagia   (+) GERD (gastroesophageal reflux disease)   (+) Hiatal hernia      GYN   (+) Bilateral malignant neoplasm of breast in female, unspecified estrogen receptor status, unspecified site of breast ([facility] [address] )      HEMATOLOGY   (+) Anemia   (+) Iron deficiency anemia due to chronic blood loss      MUSCULOSKELETAL   (+) Fibromyalgia      NEURO/PSYCH   (+) Anxiety and depression   (+) Continuous opioid dependence (HCC)   (+) Depression, recurrent (HCC)   (+) Fibromyalgia   (+) History of pulmonary embolus (PE)   (+) Hx of adenomatous colonic polyps   (+) Personal history of DVT (deep vein thrombosis)      PULMONARY   (+) Moderate obstructive sleep apnea        Physical Exam    Airway    Mallampati score: III  TM Distance: >3 FB  Neck ROM: full     Dental   No notable dental hx     Cardiovascular  Rhythm: regular, Rate: normal,     Pulmonary  Breath sounds clear to auscultation,     Other Findings  Intercisor Distance > 3cm          Anesthesia Plan  ASA Score- 3     Anesthesia Type- IV sedation with anesthesia with ASA Monitors  Additional Monitors:   Airway Plan:     Comment: NPO appropriate  Discussed benefits/risks of monitored anesthetic care which involves providing a dynamic level of mild to deep sedation  Complications include awareness and/or airway obstruction/aspiration which may necessitate conversion to general anesthesia  All questions answered  Patient understands and wishes to proceed          Plan Factors-Exercise tolerance (METS): >4 METS  Chart reviewed  EKG reviewed  Existing labs reviewed  Induction-     Postoperative Plan- Plan for postoperative opioid use  Informed Consent- Anesthetic plan and risks discussed with patient  I personally reviewed this patient with the CRNA   Discussed and agreed on the Anesthesia Plan with the ADAM Curtis

## 2022-09-27 ENCOUNTER — OFFICE VISIT (OUTPATIENT)
Dept: PULMONOLOGY | Facility: MEDICAL CENTER | Age: 77
End: 2022-09-27
Payer: MEDICARE

## 2022-09-27 ENCOUNTER — APPOINTMENT (OUTPATIENT)
Dept: LAB | Facility: HOSPITAL | Age: 77
End: 2022-09-27
Payer: MEDICARE

## 2022-09-27 VITALS
BODY MASS INDEX: 31.01 KG/M2 | TEMPERATURE: 97.6 F | SYSTOLIC BLOOD PRESSURE: 130 MMHG | HEIGHT: 63 IN | HEART RATE: 78 BPM | WEIGHT: 175 LBS | DIASTOLIC BLOOD PRESSURE: 70 MMHG | RESPIRATION RATE: 12 BRPM | OXYGEN SATURATION: 95 %

## 2022-09-27 DIAGNOSIS — G25.81 RLS (RESTLESS LEGS SYNDROME): ICD-10-CM

## 2022-09-27 DIAGNOSIS — G70.00 MYASTHENIA GRAVIS (HCC): ICD-10-CM

## 2022-09-27 DIAGNOSIS — G47.33 OSA (OBSTRUCTIVE SLEEP APNEA): Primary | ICD-10-CM

## 2022-09-27 DIAGNOSIS — R53.83 FATIGUE, UNSPECIFIED TYPE: ICD-10-CM

## 2022-09-27 DIAGNOSIS — G47.33 MODERATE OBSTRUCTIVE SLEEP APNEA: ICD-10-CM

## 2022-09-27 PROCEDURE — 83519 RIA NONANTIBODY: CPT

## 2022-09-27 PROCEDURE — 99204 OFFICE O/P NEW MOD 45 MIN: CPT | Performed by: INTERNAL MEDICINE

## 2022-09-27 NOTE — PATIENT INSTRUCTIONS
Sleep Apnea   AMBULATORY CARE:   Sleep apnea  is a condition that causes you to stop breathing often during sleep  Types of sleep apnea:   Obstructive sleep apnea (ASHLEY)  is the most common kind  The muscles and tissues around your throat relax and block air from passing through  Obesity, use of alcohol or cigarettes, or a family history are common causes  ASHLEY may increase your risk for complications after surgery  Central sleep apnea (CSA)  means your brain does not send signals to the muscles that control breathing  You do not take a breath even though your airway is open  Common causes include medical conditions such as heart failure, being older than 40, or use of opioids  Complex (or mixed) sleep apnea  means you have both obstructive and central sleep apnea  Common signs and symptoms:   Loud snoring or long pauses in breathing    Feeling sleepy, slow, and tired during the day    Snorting, gasping, or choking while you sleep, and waking up suddenly because of these    Feeling irritable during the day    Dry mouth or a headache in the mornings    Heavy night sweating    A hard time thinking, remembering things, or focusing on your tasks the following day    Call your local emergency number (911 in the [address] [facility]) if:   You have chest pain or trouble breathing  Call your doctor if:   You have new or worsening signs or symptoms  You have questions or concerns about your condition or care  Treatment  depends on the kind of apnea you have  A mouth device  may be needed if you have mild sleep apnea  These are designed to keep your throat open  Ask your dentist or healthcare provider about the best mouth device for you  A machine  may be used to help you get more air during sleep  A mask may be placed over your nose and mouth, or just your nose  The mask is hooked to the machine  You will get air through the mask      A continuous positive airway pressure (CPAP) machine  is used to keep your airway open during sleep  The machine blows a gentle stream of air into the mask when you breathe  This helps keep your airway open so you can breathe more regularly  Extra oxygen may be given through the machine  A bilevel positive airway pressure (BiPAP) machine  gives air but lowers the pressure when you breathe out  An adaptive servo-ventilator (ASV)  is a machine that learns your usual breathing pattern  Then, it uses pressure to give you air and prevent stops in your breathing  Surgery  to expand your airway or remove extra tissues may be needed  Surgery is usually only considered if other treatments do not work  Manage or prevent sleep apnea:   Reach and maintain a healthy weight  Ask your healthcare provider what a healthy weight is for you  Ask him or her to help you create a safe weight loss plan if you are overweight  Even a small goal of a 10% weight loss can improve your symptoms  Do not smoke  Nicotine and other chemicals in cigarettes and cigars can cause lung damage  Ask your healthcare provider for information if you currently smoke and need help to quit  E-cigarettes or smokeless tobacco still contain nicotine  Talk to your healthcare provider before you use these products  Do not drink alcohol or take sedative medicine before you go to sleep  Alcohol and sedatives can relax the muscles and tissues around your throat  This can block the airflow to your lungs  Sleep on your side or use pillows designed to prevent sleep apnea  This prevents your tongue or other tissues from blocking your throat  You can also raise the head of your bed  Follow up with your doctor or specialist as directed: You may need to have blood tests during your follow-up visits  Work with your provider to find the right breathing support equipment and settings for you  Write down your questions so you remember to ask them during your visits    © Copyright [employer] 2022 Information is for End User's use only and may not be sold, redistributed or otherwise used for commercial purposes  All illustrations and images included in CareNotes® are the copyrighted property of A D A M , Inc  or Kike Hamilton  The above information is an  only  It is not intended as medical advice for individual conditions or treatments  Talk to your doctor, nurse or pharmacist before following any medical regimen to see if it is safe and effective for you

## 2022-09-27 NOTE — ASSESSMENT & PLAN NOTE
· Initial sleep study done more than 6 years ago at [facility], which was negative for sleep apnea but positive for restless leg syndrome  · Had repeat home sleep study on 3/5/21 for snoring and nocturnal desaturation- noted to have moderate sleep apnea with AHI of 22 events/hr  · Was ordered AutoCPAP 6 min pressure/12 max pressure and was using until May 2022 when she began having nausea and dysphagia  Currently undergoing workup for GI issues  · Received new CPAP machine in May 2022  · Pulmonary function testing performed 8/9/22: Normal spirometry with FEV1 2 13 L, 110% predicted, normal volumes and diffusion at 71%  · Echo 02/2022: normal EF, G1DD, normal RV systolic pressure  · 6 minute walk test with no desaturation below 93%  · Most recent CPAP compliance report reviewed  AHI 4 4  · Very symptomatic with daytime sleepiness and fatigue, falling asleep during meals or conversations  · Encouraged to continue to current CPAP   If + nausea, use anti-nausea medication as prescribed by GI or PCP at bedtime  · CPAP supplies reordered

## 2022-09-27 NOTE — ASSESSMENT & PLAN NOTE
· Diagnosed with RLS 7 years ago during initial sleep study  · Has been using Requip since then, with improvement

## 2022-09-27 NOTE — PROGRESS NOTES
Sleep Consultation   Lucy Workman 65 y o  female MRN: [id number]      Reason for consultation: Moderate Obstructive Sleep Apnea    Requesting physician: Dr Chetna Mathews    Assessment/Plan  1  ASHLEY (obstructive sleep apnea)  -     PAP DME Resupply/Reorder    2  Moderate obstructive sleep apnea  Assessment & Plan:  · Initial sleep study done more than 6 years ago at [facility], which was negative for sleep apnea but positive for restless leg syndrome  · Had repeat home sleep study on 3/5/21 for snoring and nocturnal desaturation- noted to have moderate sleep apnea with AHI of 22 events/hr  · Was ordered AutoCPAP 6 min pressure/12 max pressure and was using until May 2022 when she began having nausea and dysphagia  Currently undergoing workup for GI issues  · Received new CPAP machine in May 2022  · Pulmonary function testing performed 8/9/22: Normal spirometry with FEV1 2 13 L, 110% predicted, normal volumes and diffusion at 71%  · Echo 02/2022: normal EF, G1DD, normal RV systolic pressure  · 6 minute walk test with no desaturation below 93%  · Most recent CPAP compliance report reviewed  AHI 4 4  · Very symptomatic with daytime sleepiness and fatigue, falling asleep during meals or conversations  · Encouraged to continue to current CPAP  If + nausea, use anti-nausea medication as prescribed by GI or PCP at bedtime  · CPAP supplies reordered            3  Fatigue, unspecified type  Assessment & Plan:  · Due to unrefreshing sleep, frequent nocturnal awakenings every 2 hours  · Not using CPAP since May due to nausea  · Epsworth score of 15  · Encouraged continued use CPAP HS for improved sleep and less daytime sleepiness      4   RLS (restless legs syndrome)  Assessment & Plan:  · Diagnosed with RLS 7 years ago during initial sleep study  · Has been using Requip since then, with improvement          History of Present Illness   HPI:  Lucy Workman is a 65 y o  female with PMHx as below who comes in for evaluation of obstructive sleep apnea  She had a diagnostic sleep study done in March 2021 for snoring and daytime sleepiness that revealed moderate ASHLEY with AHI of 22 2  She reports wearing CPAP daily without much improvement in symptoms  In May, she received a new CPAP machine however has not been wearing it due to nausea  She is currently getting workup for dysphagia and GI issues  She has persistent daytime sleepiness and fatigue  Her daughter has noticed her falling asleep during meals and conversations  Patient endorses taking afternoon naps due to fatigue  She was hospitalized recently for the aforementioned GI issues and at that time required 2 L oxygen via nasal cannula  She had an ambulatory pulse ox done prior to discharge and was deemed not to require home oxygen  Recent compliant report was reviewed and discussed with patient  Review of Systems    Review of Systems   Constitutional: Positive for fatigue  Negative for appetite change  HENT: Positive for trouble swallowing  Negative for congestion, drooling and postnasal drip  Respiratory: Positive for shortness of breath  Negative for cough  Cardiovascular: Negative for chest pain and palpitations  Gastrointestinal: Positive for nausea  Negative for abdominal pain, constipation, diarrhea and vomiting  Skin: Negative for rash  Neurological: Negative for dizziness, syncope, light-headedness and headaches         Historical Information   Past Medical History:   Diagnosis Date    Cancer [facility])     left breast cancer    Cervical herniated disc     Chronic pain disorder     back pain    Chronic respiratory failure ([facility] )     uses O2 at home with [place] /5/19 no longer using/just CPAP    CPAP (continuous positive airway pressure) dependence     Disease of thyroid gland     hypothyroid    DVT (deep venous thrombosis) ([facility] ) 2020    right leg    Family history of reaction to anesthesia     "son and daughter hard time waking up and also PONV"    Fibromyalgia, primary     GERD (gastroesophageal reflux disease)     Glaucoma     Hiatal hernia     History of palpitations     History of pneumonia     History of transfusion     Hyperlipidemia     Hypertension     Irregular heart beat     Migraine     Myocardial infarction [facility])     pt sees cardilogist Dr All Capellan realize had one, found on EKG before a surgery"    OAB (overactive bladder)     Pollen allergies     Pulmonary embolism ([facility] ) 2019    Bilateral; on Xarelto    Restless leg     Risk for falls     Sleep apnea     Use of cane as ambulatory aid     occas    Uses brace     Mafo/left leg    Wears glasses      Past Surgical History:   Procedure Laterality Date    ADENOIDECTOMY      CARDIAC CATHETERIZATION      CARPAL TUNNEL RELEASE      CATARACT EXTRACTION Bilateral     FOOT SURGERY      pin implanted right toe    JOINT REPLACEMENT Bilateral     knees    LAMINECTOMY      LUMBAR EPIDURAL INJECTION      MASTECTOMY Bilateral     with reconstruction and implants    NISSEN FUNDOPLICATION      DC LENGTH/SHORT LEG/ANKL TENDON,SINGLE Left 5/24/2021    Procedure: Sectioning peroneal tendons with lengthening/sectioning achilles tendon lower leg and ankle;  Surgeon: Didier Nova DPM;  Location: [facility];  Service: Podiatry    REPLACEMENT TOTAL KNEE      SPINAL FUSION      TONSILLECTOMY      TUBAL LIGATION      TUMOR EXCISION      right forearm/benign    VEIN LIGATION Right      Family History   Problem Relation Age of Onset    Cancer Sister      Social History     Socioeconomic History    Marital status:      Spouse name: Not on file    Number of children: Not on file    Years of education: Not on file    Highest education level: Not on file   Occupational History    Not on file   Tobacco Use    Smoking status: Never Smoker    Smokeless tobacco: Never Used   Vaping Use    Vaping Use: Never used   Substance and Sexual Activity    Alcohol use:  Yes Comment: socially    Drug use: Never    Sexual activity: Not on file     Comment: defer   Other Topics Concern    Not on file   Social History Narrative    · Most recent tobacco use screenin2019      Social Determinants of Health     Financial Resource Strain: Not on file   Food Insecurity: Not on file   Transportation Needs: Not on file   Physical Activity: Not on file   Stress: Not on file   Social Connections: Not on file   Intimate Partner Violence: Not on file   Housing Stability: Not on file       Occupational History: retired nurse    Meds/Allergies   Allergies   Allergen Reactions    Benzalkonium Chloride Hives     Merthiolate tincture 2020      Hydromorphone Hallucinations     disorientation; hallucination; confusion      Merthiolate  [Thimerosal] Hives    Quinine Derivatives Other (See Comments) and Tinnitus     tinnitus      Codeine Nausea Only and Other (See Comments)     nausea      Pamelor [Nortriptyline] Vomiting     Per pt, itching also    Pollen Extract Nasal Congestion    Statins Itching    Wound Dressing Adhesive Rash       Home medications:  Prior to Admission medications    Medication Sig Start Date End Date Taking?  Authorizing Provider   acetaminophen (TYLENOL) 325 mg tablet Take 650 mg by mouth every 6 (six) hours as needed for mild pain   Yes Historical Provider, MD   ASPIRIN 81 PO Aspirin EC 81 MG Oral Tablet Delayed Release    Refills: 0       Active   Yes Historical Provider, MD   azelastine (ASTELIN) 0 1 % nasal spray 2 sprays into each nostril 2 (two) times a day 22  Yes Historical Provider, MD   brimonidine (ALPHAGAN P) 0 15 % ophthalmic solution Administer to the right eye every evening  21  Yes Historical Provider, MD   COVID-19 mRNA Virus Vaccine (MODERNA COVID-19 VACCINE IM) Inject into a muscle Moderna 2nd dose 21   Yes Historical Provider, MD   desvenlafaxine succinate (PRISTIQ) 50 mg 24 hr tablet Take 1 tablet (50 mg total) by mouth daily 9/20/22  Yes Isaias Ellsworth DO   famotidine (PEPCID) 40 MG tablet Take 1 tablet (40 mg total) by mouth 2 (two) times a day 9/20/22  Yes JACK Hernandez   fexofenadine (ALLEGRA) 180 MG tablet Take 180 mg by mouth as needed    Yes Historical Provider, MD   fluticasone (FLONASE) 50 mcg/act nasal spray as needed  2/24/14  Yes Historical Provider, MD   hydrOXYzine HCL (ATARAX) 25 mg tablet  2/3/22  Yes Historical Provider, MD   latanoprost (XALATAN) 0 005 % ophthalmic solution  5/22/20  Yes Historical Provider, MD   levothyroxine 100 mcg tablet TAKE 1 TABLET (100 MCG TOTAL) BY MOUTH DAILY 7/5/22  Yes Isaias Ellsworth DO   ondansetron (ZOFRAN) 4 mg tablet Take 1 tablet (4 mg total) by mouth every 8 (eight) hours as needed for nausea or vomiting 9/26/22  Yes Paige Stone MD   pregabalin (LYRICA) 150 mg capsule Take 1 capsule (150 mg total) by mouth in the morning 9/20/22  Yes Isaias Ellsworth DO   rivaroxaban (Xarelto) 20 mg tablet Take 1 tablet (20 mg total) by mouth every evening 7/15/22  Yes JACK Sweet   rOPINIRole (REQUIP) 1 mg tablet Patient takes 1 tab at [facility] and 2 tabs at [facility] 2/15/22  Yes JOE Hutchinson,    timolol (TIMOPTIC) 0 5 % ophthalmic solution Administer to both eyes 2 (two) times a day  4/13/21  Yes Historical Provider, MD   torsemide (DEMADEX) 10 mg tablet Take 1 tablet (10 mg total) by mouth daily 2/7/22  Yes Isaias Ellsworth DO   traMADol (Ultram) 50 mg tablet Take 1/2 tab at [address], 1/2 tab at [address] [place] [address], and 1 tab at  (total 2 tabs/day) 9/20/22  Yes JOE Yates,    amLODIPine (NORVASC) 5 mg tablet Take 1 tablet (5 mg total) by mouth daily  Patient taking differently: Take 5 mg by mouth every evening 2/26/21   Isaias Ellsworth DO   Ascorbic Acid (VITAMIN C) 100 MG tablet Take 100 mg by mouth daily    Historical Provider, MD   b complex vitamins capsule Take 1 capsule by mouth daily  Patient not taking: Reported on 9/27/2022    Historical Provider, MD   calcium citrate-vitamin D (CITRACAL+D) 315-200 MG-UNIT per tablet Calcium + D TABS    Refills: 0       Active  Patient not taking: Reported on 9/27/2022    Historical Provider, MD   cholecalciferol (VITAMIN D3) 1,000 units tablet Take 1,000 Units by mouth daily    Historical Provider, MD   FERCANDIS 325 (65 Fe) MG tablet Take 1 tablet by mouth 2 (two) times a day with meals  Patient not taking: Reported on 9/27/2022 7/15/20   Historical Provider, MD   folic acid (FOLVITE) 1 mg tablet Take 1 mg by mouth daily  Patient not taking: Reported on 9/27/2022    Historical Provider, MD   zinc gluconate 50 mg tablet Take 50 mg by mouth daily  Patient not taking: No sig reported    Historical Provider, MD       Vitals:   Blood pressure 130/70, pulse 78, temperature 97 6 °F (36 4 °C), temperature source Temporal, resp  rate 12, height 5' 3" (1 6 m), weight 79 4 kg (175 lb), SpO2 95 %  ,  Body mass index is 31 kg/m²  Neck Circumference: 16    Physical Exam  General: Awake alert and oriented x 3, conversant without conversational dyspnea, NAD, normal affect  HEENT:   Sclera noninjected, nonicteric OU, Nares patent,  no craniofacial abnormalities, Mucous membranes, moist, no oral lesions, normal dentition  NECK: Trachea midline, no accessory muscle use, no stridor,  JVP not elevated  CARDIAC: Reg, single s1/S2, no m/r/g  PULM: CTA bilaterally no wheezing, rhonchi or rales  ABD: Soft nontender, nondistended, no rebound, no rigidity, no guarding  EXT: No cyanosis, no clubbing, normal capillary refill, +varisose veins in b/l LE  NEURO: no focal neurologic deficits, AAOx3, moving all extremities appropriately    Labs: I have personally reviewed pertinent lab results    Lab Results   Component Value Date    WBC 7 52 09/15/2022    HGB 13 3 09/15/2022    HCT 40 5 09/15/2022    MCV 91 09/15/2022     09/15/2022      Lab Results   Component Value Date    CALCIUM 9 2 09/15/2022    K 3 7 09/15/2022    CO2 25 09/15/2022     09/15/2022    BUN 13 09/15/2022 CREATININE 0 67 09/15/2022     Lab Results   Component Value Date    IRON 115 10/15/2020    TIBC 298 10/15/2020    FERRITIN 78 10/15/2020     Lab Results   Component Value Date    [id number] 09/21/2022     Lab Results   Component Value Date    FOLATE >20 0 (H) 09/21/2022       Sleep studies:   Diagnostic: 3/5/21      Compliance Data:    Type of CPAP:  DreamStation Auto CPAP V1 1 8 [id number]                                   Percent usage: 90%                                   Average time used: 5 hours 31 min                                  Time in large leak: 2 min 11 sec                                   Residual AHI: 4 4        Al Rodriguez MD  [facility]       Portions of the record may have been created with voice recognition software  Occasional wrong word or "sound a like" substitutions may have occurred due to the inherent limitations of voice recognition software  Read the chart carefully and recognize, using context, where substitutions have occurred

## 2022-09-27 NOTE — ASSESSMENT & PLAN NOTE
· Due to unrefreshing sleep, frequent nocturnal awakenings every 2 hours  · Not using CPAP since May due to nausea  · Epsworth score of 15  · Encouraged continued use CPAP HS for improved sleep and less daytime sleepiness

## 2022-09-28 ENCOUNTER — TELEPHONE (OUTPATIENT)
Dept: FAMILY MEDICINE CLINIC | Facility: CLINIC | Age: 77
End: 2022-09-28

## 2022-09-28 NOTE — TELEPHONE ENCOUNTER
----- Message from [facility] sent at 9/28/2022 12:55 PM EDT -----      ----- Message -----  From: Gil Rangel DO  Sent: 9/22/2022   8:24 AM EDT  To: Minh - all these labs are good  I still think the Prestiq will help you a lot  Takes a little time - be patient

## 2022-09-29 ENCOUNTER — HOSPITAL ENCOUNTER (OUTPATIENT)
Dept: GASTROENTEROLOGY | Facility: HOSPITAL | Age: 77
End: 2022-09-29
Payer: MEDICARE

## 2022-09-29 VITALS
SYSTOLIC BLOOD PRESSURE: 128 MMHG | RESPIRATION RATE: 16 BRPM | OXYGEN SATURATION: 94 % | HEART RATE: 70 BPM | DIASTOLIC BLOOD PRESSURE: 71 MMHG | TEMPERATURE: 98.3 F

## 2022-09-29 DIAGNOSIS — R13.10 DYSPHAGIA, UNSPECIFIED TYPE: ICD-10-CM

## 2022-09-29 PROCEDURE — 91020 GASTRIC MOTILITY STUDIES: CPT

## 2022-09-29 NOTE — PERIOPERATIVE NURSING NOTE
Patient brought in the room and educated on procedure  Lidocaine 2% topical solution inserted via nostrils  Manometry catheter inserted via right  nostril and positioned and secured  10 liquid swallow, 10 viscous swallow,  1 multiple 3  rapid swallow , 5 Upright drink swallow and 1 rapid drink challenge with 200 cc water performed  Patient tolerated procedure  Catheter removed intact  Discharge instructions given to patient and patient left the room in stable condition

## 2022-10-10 ENCOUNTER — OFFICE VISIT (OUTPATIENT)
Dept: FAMILY MEDICINE CLINIC | Facility: CLINIC | Age: 77
End: 2022-10-10
Payer: MEDICARE

## 2022-10-10 VITALS
DIASTOLIC BLOOD PRESSURE: 80 MMHG | BODY MASS INDEX: 31.01 KG/M2 | WEIGHT: 175 LBS | OXYGEN SATURATION: 93 % | HEIGHT: 63 IN | SYSTOLIC BLOOD PRESSURE: 110 MMHG | TEMPERATURE: 97.2 F | HEART RATE: 70 BPM

## 2022-10-10 DIAGNOSIS — Z79.899 ENCOUNTER FOR LONG-TERM (CURRENT) USE OF MEDICATIONS: ICD-10-CM

## 2022-10-10 DIAGNOSIS — I82.411 DVT FEMORAL (DEEP VENOUS THROMBOSIS) WITH THROMBOPHLEBITIS, RIGHT (HCC): ICD-10-CM

## 2022-10-10 DIAGNOSIS — I26.99 BILATERAL PULMONARY EMBOLISM (HCC): ICD-10-CM

## 2022-10-10 DIAGNOSIS — R53.83 FATIGUE, UNSPECIFIED TYPE: ICD-10-CM

## 2022-10-10 DIAGNOSIS — K22.4 ESOPHAGEAL DYSMOTILITY DUE TO SYSTEMIC DISEASE: ICD-10-CM

## 2022-10-10 DIAGNOSIS — Z79.899 ENCOUNTER FOR LONG-TERM CURRENT USE OF HIGH RISK MEDICATION: ICD-10-CM

## 2022-10-10 DIAGNOSIS — Z23 FLU VACCINE NEED: ICD-10-CM

## 2022-10-10 DIAGNOSIS — E03.9 HYPOTHYROIDISM, ADULT: Primary | ICD-10-CM

## 2022-10-10 DIAGNOSIS — G25.81 RLS (RESTLESS LEGS SYNDROME): ICD-10-CM

## 2022-10-10 DIAGNOSIS — R26.81 GAIT INSTABILITY: ICD-10-CM

## 2022-10-10 PROCEDURE — 90662 IIV NO PRSV INCREASED AG IM: CPT

## 2022-10-10 PROCEDURE — 99214 OFFICE O/P EST MOD 30 MIN: CPT | Performed by: FAMILY MEDICINE

## 2022-10-10 PROCEDURE — G0008 ADMIN INFLUENZA VIRUS VAC: HCPCS
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
## 2022-10-11 PROBLEM — J01.20 SUBACUTE ETHMOIDAL SINUSITIS: Status: RESOLVED | Noted: 2022-07-15 | Resolved: 2022-10-11

## 2022-10-11 PROBLEM — Z04.1 ENCOUNTER FOR EXAMINATION FOLLOWING MOTOR VEHICLE ACCIDENT (MVA): Status: RESOLVED | Noted: 2022-02-18 | Resolved: 2022-10-11

## 2022-10-11 PROBLEM — Z00.8 EXAM FOR POPULATION SURVEY: Status: RESOLVED | Noted: 2022-07-15 | Resolved: 2022-10-11

## 2022-10-18 LAB — MISCELLANEOUS LAB TEST RESULT: NORMAL

## 2022-11-04 ENCOUNTER — OFFICE VISIT (OUTPATIENT)
Dept: GASTROENTEROLOGY | Facility: CLINIC | Age: 77
End: 2022-11-04

## 2022-11-04 VITALS
HEART RATE: 79 BPM | DIASTOLIC BLOOD PRESSURE: 77 MMHG | HEIGHT: 63 IN | SYSTOLIC BLOOD PRESSURE: 133 MMHG | WEIGHT: 175.8 LBS | BODY MASS INDEX: 31.15 KG/M2

## 2022-11-04 DIAGNOSIS — K64.8 INTERNAL HEMORRHOIDS: Primary | ICD-10-CM

## 2022-11-04 NOTE — PROGRESS NOTES
Anal Excision Procedures  Performed by: Rigo Marie MD  Authorized by: Rigo Marie MD     Universal Protocol:     Verbal consent obtained?: Yes      Consent given by:  Patient    Site marked: No      Patient identity confirmed:  Verbally with patient    Time out: Immediately prior to the procedure a time out was called    A time out verifies correct patient, procedure, equipment, support staff and site/side marked as required:   Procedure Type: hemorrhoidectomy    Patient Position:  LPO  Hemorrhoidectomy Details:   Hemorrhoid type: internal    Internal position:  Ten o'clock  Single rubber band ligation    Patient tolerance:  Patient tolerated the procedure well with no immediate complications  Additiional Procedure Intervention Details:  Anoscopy was performed and she had grade 2 hemorrhoids in the usual locations  Patient tolerated procedure well she is return in 2-4 weeks for a 2nd banding  She cannot stop her Xarelto due to recurrent blood clots    She understands the increased risk of bleeding

## 2022-12-06 ENCOUNTER — HOSPITAL ENCOUNTER (EMERGENCY)
Facility: HOSPITAL | Age: 77
Discharge: HOME | End: 2022-12-06
Attending: INTERNAL MEDICINE

## 2022-12-06 ENCOUNTER — TELEPHONE (OUTPATIENT)
Dept: FAMILY MEDICINE CLINIC | Facility: CLINIC | Age: 77
End: 2022-12-06

## 2022-12-06 VITALS
DIASTOLIC BLOOD PRESSURE: 59 MMHG | SYSTOLIC BLOOD PRESSURE: 137 MMHG | WEIGHT: 175 LBS | OXYGEN SATURATION: 96 % | HEART RATE: 86 BPM | TEMPERATURE: 97.5 F | HEIGHT: 63 IN | RESPIRATION RATE: 16 BRPM | BODY MASS INDEX: 31.01 KG/M2

## 2022-12-06 DIAGNOSIS — R04.0 LEFT-SIDED EPISTAXIS: Primary | ICD-10-CM

## 2022-12-06 LAB
ANION GAP SERPL CALCULATED.3IONS-SCNC: 9 MMOL/L (ref 4–13)
BASOPHILS # BLD AUTO: 0.04 THOUSANDS/ÂΜL (ref 0–0.1)
BASOPHILS NFR BLD AUTO: 1 % (ref 0–1)
BUN SERPL-MCNC: 14 MG/DL (ref 5–25)
CALCIUM SERPL-MCNC: 9.2 MG/DL (ref 8.4–10.2)
CHLORIDE SERPL-SCNC: 104 MMOL/L (ref 96–108)
CO2 SERPL-SCNC: 26 MMOL/L (ref 21–32)
CREAT SERPL-MCNC: 0.76 MG/DL (ref 0.6–1.3)
EOSINOPHIL # BLD AUTO: 0.28 THOUSAND/ÂΜL (ref 0–0.61)
EOSINOPHIL NFR BLD AUTO: 4 % (ref 0–6)
ERYTHROCYTE [DISTWIDTH] IN BLOOD BY AUTOMATED COUNT: 12.8 % (ref 11.6–15.1)
GFR SERPL CREATININE-BSD FRML MDRD: 75 ML/MIN/1.73SQ M
GLUCOSE SERPL-MCNC: 97 MG/DL (ref 65–140)
HCT VFR BLD AUTO: 40.2 % (ref 34.8–46.1)
HGB BLD-MCNC: 13.4 G/DL (ref 11.5–15.4)
IMM GRANULOCYTES # BLD AUTO: 0.03 THOUSAND/UL (ref 0–0.2)
IMM GRANULOCYTES NFR BLD AUTO: 0 % (ref 0–2)
LYMPHOCYTES # BLD AUTO: 2.1 THOUSANDS/ÂΜL (ref 0.6–4.47)
LYMPHOCYTES NFR BLD AUTO: 30 % (ref 14–44)
MCH RBC QN AUTO: 30.2 PG (ref 26.8–34.3)
MCHC RBC AUTO-ENTMCNC: 33.3 G/DL (ref 31.4–37.4)
MCV RBC AUTO: 91 FL (ref 82–98)
MONOCYTES # BLD AUTO: 0.68 THOUSAND/ÂΜL (ref 0.17–1.22)
MONOCYTES NFR BLD AUTO: 10 % (ref 4–12)
NEUTROPHILS # BLD AUTO: 3.91 THOUSANDS/ÂΜL (ref 1.85–7.62)
NEUTS SEG NFR BLD AUTO: 55 % (ref 43–75)
NRBC BLD AUTO-RTO: 0 /100 WBCS
PLATELET # BLD AUTO: 357 THOUSANDS/UL (ref 149–390)
PMV BLD AUTO: 9.8 FL (ref 8.9–12.7)
POTASSIUM SERPL-SCNC: 3.8 MMOL/L (ref 3.5–5.3)
RBC # BLD AUTO: 4.44 MILLION/UL (ref 3.81–5.12)
SODIUM SERPL-SCNC: 139 MMOL/L (ref 135–147)
WBC # BLD AUTO: 7.04 THOUSAND/UL (ref 4.31–10.16)

## 2022-12-06 RX ADMIN — SILVER NITRATE APPLICATORS 1 APPLICATOR: 25; 75 STICK TOPICAL at 14:31

## 2022-12-06 NOTE — ED PROVIDER NOTES
History  Chief Complaint   Patient presents with   • Nose Bleed     Pt states she has had a nose bleed since 6 am  Pt takes xarelto  This is 64y old came for having nasal bleeding on and off for 3 weeks  Pt is on Xaralto  For having PE,DVT  Pt take it daily , and last dose was yesterday   Pt bleeding  is on and off  Pt applied NeoSyniffrin as her daughter advise  Pt found blood clot in her mouth  Pt denies fever, recent URI   Pt has no sob  Pt also take ASA  PT has h/o of HTN  Pt has h/o of cancer breast  At the time of exam pt has no active bleeding   History provided by:  Patient   used: No    Nose Bleed  Location:  L nare  Severity:  Moderate  Timing:  Constant  Progression:  Waxing and waning  Chronicity:  Recurrent  Context: anticoagulants    Relieved by:  Nothing  Worsened by:  Nothing  Ineffective treatments:  None tried  Associated symptoms: blood in oropharynx    Associated symptoms: no congestion, no cough, no dizziness, no fever, no headaches, no sinus pain, no sneezing and no sore throat    Risk factors: no alcohol use        Prior to Admission Medications   Prescriptions Last Dose Informant Patient Reported? Taking?    ASPIRIN 81 PO   Yes No   Sig: Aspirin EC 81 MG Oral Tablet Delayed Release    Refills: 0       Active   Ascorbic Acid (VITAMIN C) 100 MG tablet   Yes No   Sig: Take 100 mg by mouth daily   Patient not taking: Reported on 11/4/2022   COVID-19 mRNA Virus Vaccine (MODERNA COVID-19 VACCINE IM)   Yes No   Sig: Inject into a muscle Moderna 2nd dose 2/21/21   FEROSUL 325 (65 Fe) MG tablet   Yes No   Sig: Take 1 tablet by mouth 2 (two) times a day with meals   acetaminophen (TYLENOL) 325 mg tablet   Yes No   Sig: Take 650 mg by mouth every 6 (six) hours as needed for mild pain   amLODIPine (NORVASC) 5 mg tablet   No No   Sig: Take 1 tablet (5 mg total) by mouth daily   Patient taking differently: Take 5 mg by mouth every evening   azelastine (ASTELIN) 0 1 % nasal spray   Yes No   Si sprays into each nostril 2 (two) times a day   b complex vitamins capsule   Yes No   Sig: Take 1 capsule by mouth daily   brimonidine (ALPHAGAN P) 0 15 % ophthalmic solution   Yes No   Sig: Administer to the right eye every evening    calcium citrate-vitamin D (CITRACAL+D) 315-200 MG-UNIT per tablet   Yes No   Sig: Calcium + D TABS    Refills: 0       Active   cholecalciferol (VITAMIN D3) 1,000 units tablet   Yes No   Sig: Take 1,000 Units by mouth daily   Patient not taking: Reported on 2022   desvenlafaxine succinate (PRISTIQ) 50 mg 24 hr tablet   No No   Sig: Take 1 tablet (50 mg total) by mouth daily   famotidine (PEPCID) 40 MG tablet   No No   Sig: Take 1 tablet (40 mg total) by mouth 2 (two) times a day   fexofenadine (ALLEGRA) 180 MG tablet   Yes No   Sig: Take 180 mg by mouth as needed    fluticasone (FLONASE) 50 mcg/act nasal spray   Yes No   Sig: as needed    folic acid (FOLVITE) 1 mg tablet   Yes No   Sig: Take 1 mg by mouth daily   hydrOXYzine HCL (ATARAX) 25 mg tablet   Yes No   latanoprost (XALATAN) 0 005 % ophthalmic solution   Yes No   levothyroxine 100 mcg tablet   No No   Sig: TAKE 1 TABLET (100 MCG TOTAL) BY MOUTH DAILY   ondansetron (ZOFRAN) 4 mg tablet   No No   Sig: Take 1 tablet (4 mg total) by mouth every 8 (eight) hours as needed for nausea or vomiting   pregabalin (LYRICA) 150 mg capsule   No No   Sig: Take 1 capsule (150 mg total) by mouth in the morning   rOPINIRole (REQUIP) 1 mg tablet   No No   Sig: Patient takes 1 tab at [facility] and 2 tabs at    rivaroxaban (Xarelto) 20 mg tablet   No No   Sig: Take 1 tablet (20 mg total) by mouth every evening   timolol (TIMOPTIC) 0 5 % ophthalmic solution   Yes No   Sig: Administer to both eyes 2 (two) times a day    torsemide (DEMADEX) 10 mg tablet   No No   Sig: Take 1 tablet (10 mg total) by mouth daily   traMADol (Ultram) 50 mg tablet   No No   Sig: Take 1/2 tab at 8AM, 1/2 tab at 1PM, and 1 tab at HS (total 2 tabs/day) zinc gluconate 50 mg tablet   Yes No   Sig: Take 50 mg by mouth daily      Facility-Administered Medications: None       Past Medical History:   Diagnosis Date   • Cancer ([facility] )     left breast cancer   • Cervical herniated disc    • Chronic pain disorder     back pain   • Chronic respiratory failure ([place] [address] )     uses O2 at home with [facility] /5/19 no longer using/just CPAP   • CPAP (continuous positive airway pressure) dependence    • Disease of thyroid gland     hypothyroid   • DVT (deep venous thrombosis) ([place] [address] ) 2020    right leg   • Family history of reaction to anesthesia     "son and daughter hard time waking up and also PONV"   • Fibromyalgia, primary    • GERD (gastroesophageal reflux disease)    • Glaucoma    • Hiatal hernia    • History of palpitations    • History of pneumonia    • History of transfusion    • Hyperlipidemia    • Hypertension    • Irregular heart beat    • Migraine    • Myocardial infarction [facility])     pt sees cardilogist Dr Regis León realize had one, found on EKG before a surgery"   • OAB (overactive bladder)    • Pollen allergies    • Pulmonary embolism ([place] [address] ) 2019    Bilateral; on Xarelto   • Restless leg    • Risk for falls    • Sleep apnea    • Use of cane as ambulatory aid     occas   • Uses brace     Mafo/left leg   • Wears glasses        Past Surgical History:   Procedure Laterality Date   • ADENOIDECTOMY     • CARDIAC CATHETERIZATION     • CARPAL TUNNEL RELEASE     • CATARACT EXTRACTION Bilateral    • FOOT SURGERY      pin implanted right toe   • JOINT REPLACEMENT Bilateral     knees   • LAMINECTOMY     • LUMBAR EPIDURAL INJECTION     • MASTECTOMY Bilateral     with reconstruction and implants   • NISSEN FUNDOPLICATION     • VA LENGTH/SHORT LEG/ANKL TENDON,SINGLE Left 5/24/2021    Procedure: Sectioning peroneal tendons with lengthening/sectioning achilles tendon lower leg and ankle;  Surgeon: Brody Turner DPM;  Location: [facility];  Service: Podiatry   • REPLACEMENT TOTAL KNEE     • SPINAL FUSION     • TONSILLECTOMY     • TUBAL LIGATION     • TUMOR EXCISION      right forearm/benign   • VEIN LIGATION Right        Family History   Problem Relation Age of Onset   • Cancer Sister      I have reviewed and agree with the history as documented  E-Cigarette/Vaping   • E-Cigarette Use Never User      E-Cigarette/Vaping Substances   • Nicotine No    • THC No    • CBD No    • Flavoring No    • Other No    • Unknown No      Social History     Tobacco Use   • Smoking status: Never   • Smokeless tobacco: Never   Vaping Use   • Vaping Use: Never used   Substance Use Topics   • Alcohol use: Yes     Comment: socially   • Drug use: Never       Review of Systems   Constitutional: Negative for fatigue and fever  HENT: Positive for nosebleeds  Negative for congestion, sinus pressure, sinus pain, sneezing, sore throat and tinnitus  Eyes: Negative for visual disturbance  Respiratory: Negative for cough, shortness of breath and wheezing  Cardiovascular: Negative for chest pain and palpitations  Gastrointestinal: Negative for abdominal pain, diarrhea, nausea and vomiting  Genitourinary: Negative for difficulty urinating, dysuria, flank pain, frequency and hematuria  Musculoskeletal: Negative for back pain, myalgias, neck pain and neck stiffness  Skin: Negative for color change, pallor and rash  Neurological: Negative for dizziness, light-headedness and headaches  Psychiatric/Behavioral: Negative for agitation and behavioral problems  Physical Exam  Physical Exam  Vitals and nursing note reviewed  Constitutional:       General: She is not in acute distress  Appearance: She is well-developed and well-nourished  She is not ill-appearing, toxic-appearing or diaphoretic  HENT:      Head: Normocephalic and atraumatic        Right Ear: Tympanic membrane and ear canal normal       Left Ear: Tympanic membrane and ear canal normal       Nose: Nose normal  No congestion or rhinorrhea  Comments: Has blood clot in L nostril septum , no active bleeding ,  Has dried blood in the oropharynx  Mouth/Throat:      Mouth: Oropharynx is clear and moist  Mucous membranes are moist       Pharynx: No oropharyngeal exudate or posterior oropharyngeal erythema  Cardiovascular:      Rate and Rhythm: Normal rate and regular rhythm  Pulses: Intact distal pulses  Heart sounds: Normal heart sounds  No murmur heard  No friction rub  No gallop  Pulmonary:      Effort: Pulmonary effort is normal  No respiratory distress  Breath sounds: Normal breath sounds  No wheezing  Abdominal:      General: Bowel sounds are normal       Palpations: Abdomen is soft  Musculoskeletal:         General: No swelling, tenderness, deformity, signs of injury or edema  Normal range of motion  Cervical back: Normal range of motion and neck supple  Right lower leg: No edema  Left lower leg: No edema  Skin:     General: Skin is warm and dry  Capillary Refill: Capillary refill takes less than 2 seconds  Coloration: Skin is not jaundiced or pale  Findings: No bruising, erythema, lesion or rash  Neurological:      Mental Status: She is alert and oriented to person, place, and time     Psychiatric:         Mood and Affect: Mood and affect normal          Behavior: Behavior normal          Vital Signs  ED Triage Vitals [12/06/22 1403]   Temperature Pulse Respirations Blood Pressure SpO2   97 5 °F (36 4 °C) 86 16 137/59 96 %      Temp Source Heart Rate Source Patient Position - Orthostatic VS BP Location FiO2 (%)   Oral -- Lying Right arm --      Pain Score       No Pain           Vitals:    12/06/22 1403   BP: 137/59   Pulse: 86   Patient Position - Orthostatic VS: Lying         Visual Acuity      ED Medications  Medications   silver nitrate-potassium nitrate (ARZOL SILVER NITRATE) 35-40 % applicator 1 applicator (1 applicator Topical Given 12/6/22 1431)       Diagnostic Studies  Results Reviewed     Procedure Component Value Units Date/Time    Basic metabolic panel [[id number]] Collected: 12/06/22 1441    Lab Status: Final result Specimen: Blood from Arm, Right Updated: 12/06/22 1502     Sodium 139 mmol/L      Potassium 3 8 mmol/L      Chloride 104 mmol/L      CO2 26 mmol/L      ANION GAP 9 mmol/L      BUN 14 mg/dL      Creatinine 0 76 mg/dL      Glucose 97 mg/dL      Calcium 9 2 mg/dL      eGFR 75 ml/min/1 73sq m     Narrative:      National Kidney Disease Foundation guidelines for Chronic Kidney Disease (CKD):   •  Stage 1 with normal or high GFR (GFR > 90 mL/min/1 73 square meters)  •  Stage 2 Mild CKD (GFR = 60-89 mL/min/1 73 square meters)  •  Stage 3A Moderate CKD (GFR = 45-59 mL/min/1 73 square meters)  •  Stage 3B Moderate CKD (GFR = 30-44 mL/min/1 73 square meters)  •  Stage 4 Severe CKD (GFR = 15-29 mL/min/1 73 square meters)  •  Stage 5 End Stage CKD (GFR <15 mL/min/1 73 square meters)  Note: GFR calculation is accurate only with a steady state creatinine    CBC and differential [[id number]] Collected: 12/06/22 1441    Lab Status: Final result Specimen: Blood from Arm, Right Updated: 12/06/22 1447     WBC 7 04 Thousand/uL      RBC 4 44 Million/uL      Hemoglobin 13 4 g/dL      Hematocrit 40 2 %      MCV 91 fL      MCH 30 2 pg      MCHC 33 3 g/dL      RDW 12 8 %      MPV 9 8 fL      Platelets 037 Thousands/uL      nRBC 0 /100 WBCs      Neutrophils Relative 55 %      Immat GRANS % 0 %      Lymphocytes Relative 30 %      Monocytes Relative 10 %      Eosinophils Relative 4 %      Basophils Relative 1 %      Neutrophils Absolute 3 91 Thousands/µL      Immature Grans Absolute 0 03 Thousand/uL      Lymphocytes Absolute 2 10 Thousands/µL      Monocytes Absolute 0 68 Thousand/µL      Eosinophils Absolute 0 28 Thousand/µL      Basophils Absolute 0 04 Thousands/µL                  No orders to display              Procedures  Epistaxis management    Date/Time: 12/6/2022 3:04 PM  Performed by: Ruthie Kim MD  Authorized by: Ruthie Kim MD   Universal Protocol:  Procedure performed by:  Consent given by: patient  Patient understanding: patient states understanding of the procedure being performed  Patient consent: the patient's understanding of the procedure matches consent given  Procedure consent: procedure consent matches procedure scheduled  Relevant documents: relevant documents present and verified  Test results: test results available and properly labeled  Site marked: the operative site was marked  Patient identity confirmed: verbally with patient, arm band, provided demographic data, hospital-assigned identification number and anonymous protocol, patient vented/unresponsive      Patient location:  Bedside  Anesthesia (see MAR for exact dosages): Anesthesia method:  None  Procedure details:     Treatment site:  L anterior    Hemostasis method:  Silver nitrate    Approach:  External    Spec Headlamp used: No      Treatment complexity:  Limited    Treatment episode: initial    Post-procedure details:     Assessment:  Bleeding stopped    Patient tolerance of procedure: Tolerated well, no immediate complications             ED Course                               SBIRT 20yo+    Flowsheet Row Most Recent Value   SBIRT (25 yo +)    In order to provide better care to our patients, we are screening all of our patients for alcohol and drug use  Would it be okay to ask you these screening questions? No Filed at: 12/06/2022 1424                    [facility]  Number of Diagnoses or Management Options  Diagnosis management comments: This is 64y old came for having anterior epistaxis of L nostril   Pt has this epistaxis on and off and she is on Xaralto  On exam; has blood clots at the septum of L nostril   Clots removed and cleaned   A silver nitrate application done at the septum of L nostril and bleeding stopped    Labs reviewed with pt and it is normal   Pt tolerated procedure well   Pt observed at er and no more bleeding   Pt to be discharged home and follow up with ENT  Amount and/or Complexity of Data Reviewed  Clinical lab tests: ordered and reviewed    Risk of Complications, Morbidity, and/or Mortality  Presenting problems: moderate  Diagnostic procedures: low  Management options: moderate    Patient Progress  Patient progress: improved      Disposition  Final diagnoses:   Left-sided epistaxis     Time reflects when diagnosis was documented in both MDM as applicable and the Disposition within this note     Time User Action Codes Description Comment    12/6/2022  3:09 PM [place] [R04 0] Left-sided epistaxis       ED Disposition     ED Disposition   Discharge    Condition   Stable    Date/Time   Tue Dec 6, 2022  3:09 PM    Comment   Tristen Berg discharge to home/self care                 Follow-up Information     Follow up With Specialties Details Why [address] Miko Malik MD Otolaryngology In 3 days  [address]  [phone]            Discharge Medication List as of 12/6/2022  3:11 PM      CONTINUE these medications which have NOT CHANGED    Details   acetaminophen (TYLENOL) 325 mg tablet Take 650 mg by mouth every 6 (six) hours as needed for mild pain, Historical Med      amLODIPine (NORVASC) 5 mg tablet Take 1 tablet (5 mg total) by mouth daily, Starting Fri 2/26/2021, No Print      Ascorbic Acid (VITAMIN C) 100 MG tablet Take 100 mg by mouth daily, Historical Med      ASPIRIN 81 PO Aspirin EC 81 MG Oral Tablet Delayed Release    Refills: 0       Active, Historical Med      azelastine (ASTELIN) 0 1 % nasal spray 2 sprays into each nostril 2 (two) times a day, Starting Wed 1/26/2022, Historical Med      b complex vitamins capsule Take 1 capsule by mouth daily, Historical Med      brimonidine (ALPHAGAN P) 0 15 % ophthalmic solution Administer to the right eye every evening , Starting Tue 4/13/2021, Historical Med calcium citrate-vitamin D (CITRACAL+D) 315-200 MG-UNIT per tablet Calcium + D TABS    Refills: 0       Active, Historical Med      cholecalciferol (VITAMIN D3) 1,000 units tablet Take 1,000 Units by mouth daily, Historical Med      COVID-19 mRNA Virus Vaccine (MODERNA COVID-19 VACCINE IM) Inject into a muscle Moderna 2nd dose 2/21/21, Historical Med      desvenlafaxine succinate (PRISTIQ) 50 mg 24 hr tablet Take 1 tablet (50 mg total) by mouth daily, Starting Tue 9/20/2022, Normal      famotidine (PEPCID) 40 MG tablet Take 1 tablet (40 mg total) by mouth 2 (two) times a day, Starting Tue 9/20/2022, Normal      FEROSUL 325 (65 Fe) MG tablet Take 1 tablet by mouth 2 (two) times a day with meals, Starting Wed 7/15/2020, Historical Med      fexofenadine (ALLEGRA) 180 MG tablet Take 180 mg by mouth as needed , Historical Med      fluticasone (FLONASE) 50 mcg/act nasal spray as needed , Starting Mon 2/24/2014, Historical Med      folic acid (FOLVITE) 1 mg tablet Take 1 mg by mouth daily, Historical Med      hydrOXYzine HCL (ATARAX) 25 mg tablet Starting Thu 2/3/2022, Historical Med      latanoprost (XALATAN) 0 005 % ophthalmic solution Starting Fri 5/22/2020, Historical Med      levothyroxine 100 mcg tablet TAKE 1 TABLET (100 MCG TOTAL) BY MOUTH DAILY, Starting Tue 7/5/2022, Normal      ondansetron (ZOFRAN) 4 mg tablet Take 1 tablet (4 mg total) by mouth every 8 (eight) hours as needed for nausea or vomiting, Starting Mon 9/26/2022, Normal      pregabalin (LYRICA) 150 mg capsule Take 1 capsule (150 mg total) by mouth in the morning, Starting Tue 9/20/2022, Normal      rivaroxaban (Xarelto) 20 mg tablet Take 1 tablet (20 mg total) by mouth every evening, Starting Mon 10/10/2022, Normal      rOPINIRole (REQUIP) 1 mg tablet Patient takes 1 tab at [facility] and 2 tabs at , Normal      timolol (TIMOPTIC) 0 5 % ophthalmic solution Administer to both eyes 2 (two) times a day , Starting Tue 4/13/2021, Historical Med torsemide (DEMADEX) 10 mg tablet Take 1 tablet (10 mg total) by mouth daily, Starting Mon 2/7/2022, Normal      traMADol (Ultram) 50 mg tablet Take 1/2 tab at 8AM, 1/2 tab at 1PM, and 1 tab at HS (total 2 tabs/day), Normal      zinc gluconate 50 mg tablet Take 50 mg by mouth daily, Historical Med             No discharge procedures on file      PDMP Review       Value Time User    PDMP Reviewed  Yes 9/20/2022  4:10 PM [address] Flaquito Carpio DO          ED Provider  Electronically Signed by           Sofia Ny MD  12/07/22 3355

## 2022-12-06 NOTE — TELEPHONE ENCOUNTER
Spoke with patient  Pt stated been having bloody nose for three days now  Patient stated was bleeding this morning  Told patient to go to ED

## 2022-12-07 ENCOUNTER — TELEPHONE (OUTPATIENT)
Dept: FAMILY MEDICINE CLINIC | Facility: CLINIC | Age: 77
End: 2022-12-07

## 2022-12-07 DIAGNOSIS — G25.81 RLS (RESTLESS LEGS SYNDROME): ICD-10-CM

## 2022-12-07 RX ORDER — ROPINIROLE 1 MG/1
TABLET, FILM COATED ORAL
Qty: 270 TABLET | Refills: 3 | Status: SHIPPED | OUTPATIENT
Start: 2022-12-07

## 2022-12-07 NOTE — TELEPHONE ENCOUNTER
Pt called to let you know that she was in the ER for nose bleed and they cautirized it  Pt has ariane to see ENT on Fri 12/10, as an FYI

## 2022-12-08 DIAGNOSIS — M48.062 SPINAL STENOSIS OF LUMBAR REGION WITH NEUROGENIC CLAUDICATION: ICD-10-CM

## 2022-12-08 DIAGNOSIS — I26.99 BILATERAL PULMONARY EMBOLISM (HCC): ICD-10-CM

## 2022-12-08 NOTE — TELEPHONE ENCOUNTER
Pt lvm on prescription line requesting med refill to be called into two different pharmacies       Pt would like med listed below to be called into [facility]     pregabalin (LYRICA) 150 mg capsule       Pt would like med listed below to be called into [facility]     rivaroxaban (Xarelto) 20 mg tablet

## 2022-12-13 RX ORDER — PREGABALIN 150 MG/1
150 CAPSULE ORAL DAILY
Qty: 90 CAPSULE | Refills: 1 | Status: SHIPPED | OUTPATIENT
Start: 2022-12-13

## 2022-12-14 DIAGNOSIS — M79.89 SWELLING OF LIMB: ICD-10-CM

## 2022-12-14 RX ORDER — TORSEMIDE 10 MG/1
10 TABLET ORAL DAILY
Qty: 90 TABLET | Refills: 3 | Status: SHIPPED | OUTPATIENT
Start: 2022-12-14

## 2022-12-21 DIAGNOSIS — F34.1 DYSTHYMIA (OR DEPRESSIVE NEUROSIS): ICD-10-CM

## 2022-12-22 DIAGNOSIS — I26.99 BILATERAL PULMONARY EMBOLISM (HCC): ICD-10-CM

## 2022-12-22 NOTE — TELEPHONE ENCOUNTER
Pt left voicemail and stated needs refill on Xarelto (20 mg) PO daily; Pt stated she uses [facility] on [address] [place]

## 2022-12-23 ENCOUNTER — HOSPITAL ENCOUNTER (OUTPATIENT)
Facility: HOSPITAL | Age: 77
Setting detail: OBSERVATION
Discharge: HOME | End: 2022-12-24
Attending: STUDENT IN AN ORGANIZED HEALTH CARE EDUCATION/TRAINING PROGRAM | Admitting: HOSPITALIST

## 2022-12-23 ENCOUNTER — APPOINTMENT (EMERGENCY)
Dept: CT IMAGING | Facility: HOSPITAL | Age: 77
End: 2022-12-23

## 2022-12-23 ENCOUNTER — APPOINTMENT (EMERGENCY)
Dept: RADIOLOGY | Facility: HOSPITAL | Age: 77
End: 2022-12-23

## 2022-12-23 DIAGNOSIS — S09.90XA INJURY OF HEAD, INITIAL ENCOUNTER: Primary | ICD-10-CM

## 2022-12-23 PROBLEM — R04.0 EPISTAXIS: Status: ACTIVE | Noted: 2022-12-23

## 2022-12-23 PROBLEM — S00.83XA FACIAL HEMATOMA: Status: ACTIVE | Noted: 2022-12-23

## 2022-12-23 LAB
2HR DELTA HS TROPONIN: <-1 NG/L
4HR DELTA HS TROPONIN: -1 NG/L
ALBUMIN SERPL BCP-MCNC: 4.2 G/DL (ref 3.5–5)
ALP SERPL-CCNC: 148 U/L (ref 34–104)
ALT SERPL W P-5'-P-CCNC: 11 U/L (ref 7–52)
ANION GAP SERPL CALCULATED.3IONS-SCNC: 9 MMOL/L (ref 4–13)
AST SERPL W P-5'-P-CCNC: 11 U/L (ref 13–39)
BASE EXCESS BLDA CALC-SCNC: 2 MMOL/L (ref -2–3)
BASOPHILS # BLD AUTO: 0.04 THOUSANDS/ÂΜL (ref 0–0.1)
BASOPHILS NFR BLD AUTO: 1 % (ref 0–1)
BILIRUB SERPL-MCNC: 0.51 MG/DL (ref 0.2–1)
BUN SERPL-MCNC: 20 MG/DL (ref 5–25)
CA-I BLD-SCNC: 1.17 MMOL/L (ref 1.12–1.32)
CALCIUM SERPL-MCNC: 9.4 MG/DL (ref 8.4–10.2)
CARDIAC TROPONIN I PNL SERPL HS: 2 NG/L
CARDIAC TROPONIN I PNL SERPL HS: 3 NG/L
CARDIAC TROPONIN I PNL SERPL HS: <2 NG/L
CHLORIDE SERPL-SCNC: 105 MMOL/L (ref 96–108)
CO2 SERPL-SCNC: 25 MMOL/L (ref 21–32)
CREAT SERPL-MCNC: 0.71 MG/DL (ref 0.6–1.3)
EOSINOPHIL # BLD AUTO: 0.42 THOUSAND/ÂΜL (ref 0–0.61)
EOSINOPHIL NFR BLD AUTO: 5 % (ref 0–6)
ERYTHROCYTE [DISTWIDTH] IN BLOOD BY AUTOMATED COUNT: 13 % (ref 11.6–15.1)
GFR SERPL CREATININE-BSD FRML MDRD: 81 ML/MIN/1.73SQ M
GLUCOSE SERPL-MCNC: 110 MG/DL (ref 65–140)
GLUCOSE SERPL-MCNC: 111 MG/DL (ref 65–140)
HCO3 BLDA-SCNC: 26.5 MMOL/L (ref 24–30)
HCT VFR BLD AUTO: 42.7 % (ref 34.8–46.1)
HCT VFR BLD CALC: 42 % (ref 34.8–46.1)
HGB BLD-MCNC: 13.8 G/DL (ref 11.5–15.4)
HGB BLDA-MCNC: 14.3 G/DL (ref 11.5–15.4)
IMM GRANULOCYTES # BLD AUTO: 0.02 THOUSAND/UL (ref 0–0.2)
IMM GRANULOCYTES NFR BLD AUTO: 0 % (ref 0–2)
LYMPHOCYTES # BLD AUTO: 2.62 THOUSANDS/ÂΜL (ref 0.6–4.47)
LYMPHOCYTES NFR BLD AUTO: 34 % (ref 14–44)
MCH RBC QN AUTO: 29.4 PG (ref 26.8–34.3)
MCHC RBC AUTO-ENTMCNC: 32.3 G/DL (ref 31.4–37.4)
MCV RBC AUTO: 91 FL (ref 82–98)
MONOCYTES # BLD AUTO: 0.67 THOUSAND/ÂΜL (ref 0.17–1.22)
MONOCYTES NFR BLD AUTO: 9 % (ref 4–12)
NEUTROPHILS # BLD AUTO: 4.06 THOUSANDS/ÂΜL (ref 1.85–7.62)
NEUTS SEG NFR BLD AUTO: 51 % (ref 43–75)
NRBC BLD AUTO-RTO: 0 /100 WBCS
PCO2 BLD: 28 MMOL/L (ref 21–32)
PCO2 BLD: 39.7 MM HG (ref 42–50)
PH BLD: 7.43 [PH] (ref 7.3–7.4)
PLATELET # BLD AUTO: 356 THOUSANDS/UL (ref 149–390)
PMV BLD AUTO: 9.7 FL (ref 8.9–12.7)
PO2 BLD: 46 MM HG (ref 35–45)
POTASSIUM BLD-SCNC: 3.8 MMOL/L (ref 3.5–5.3)
POTASSIUM SERPL-SCNC: 3.9 MMOL/L (ref 3.5–5.3)
PROT SERPL-MCNC: 7.5 G/DL (ref 6.4–8.4)
RBC # BLD AUTO: 4.69 MILLION/UL (ref 3.81–5.12)
SAO2 % BLD FROM PO2: 83 % (ref 60–85)
SODIUM BLD-SCNC: 142 MMOL/L (ref 136–145)
SODIUM SERPL-SCNC: 139 MMOL/L (ref 135–147)
SPECIMEN SOURCE: ABNORMAL
TSH SERPL DL<=0.05 MIU/L-ACNC: 1.4 UIU/ML (ref 0.45–4.5)
WBC # BLD AUTO: 7.83 THOUSAND/UL (ref 4.31–10.16)

## 2022-12-23 RX ORDER — ROPINIROLE 1 MG/1
2 TABLET, FILM COATED ORAL
Status: DISCONTINUED | OUTPATIENT
Start: 2022-12-23 | End: 2022-12-24 | Stop reason: HOSPADM

## 2022-12-23 RX ORDER — ROPINIROLE 1 MG/1
1 TABLET, FILM COATED ORAL
Status: DISCONTINUED | OUTPATIENT
Start: 2022-12-23 | End: 2022-12-24 | Stop reason: HOSPADM

## 2022-12-23 RX ORDER — TRAMADOL HYDROCHLORIDE 50 MG/1
25 TABLET ORAL EVERY 12 HOURS
Status: DISCONTINUED | OUTPATIENT
Start: 2022-12-23 | End: 2022-12-24 | Stop reason: HOSPADM

## 2022-12-23 RX ORDER — ACETAMINOPHEN 325 MG/1
650 TABLET ORAL EVERY 6 HOURS PRN
Status: DISCONTINUED | OUTPATIENT
Start: 2022-12-23 | End: 2022-12-24 | Stop reason: HOSPADM

## 2022-12-23 RX ORDER — MELATONIN
1000 DAILY
Status: DISCONTINUED | OUTPATIENT
Start: 2022-12-24 | End: 2022-12-24 | Stop reason: HOSPADM

## 2022-12-23 RX ORDER — AMLODIPINE BESYLATE 5 MG/1
5 TABLET ORAL DAILY
Status: DISCONTINUED | OUTPATIENT
Start: 2022-12-24 | End: 2022-12-24 | Stop reason: HOSPADM

## 2022-12-23 RX ORDER — ASPIRIN 81 MG/1
81 TABLET, CHEWABLE ORAL DAILY
Status: DISCONTINUED | OUTPATIENT
Start: 2022-12-24 | End: 2022-12-24 | Stop reason: HOSPADM

## 2022-12-23 RX ORDER — TORSEMIDE 10 MG/1
10 TABLET ORAL DAILY
Status: DISCONTINUED | OUTPATIENT
Start: 2022-12-24 | End: 2022-12-24 | Stop reason: HOSPADM

## 2022-12-23 RX ORDER — ONDANSETRON 2 MG/ML
4 INJECTION INTRAMUSCULAR; INTRAVENOUS EVERY 6 HOURS PRN
Status: DISCONTINUED | OUTPATIENT
Start: 2022-12-23 | End: 2022-12-24 | Stop reason: HOSPADM

## 2022-12-23 RX ORDER — PREGABALIN 75 MG/1
150 CAPSULE ORAL DAILY
Status: DISCONTINUED | OUTPATIENT
Start: 2022-12-23 | End: 2022-12-24 | Stop reason: HOSPADM

## 2022-12-23 RX ORDER — LATANOPROST 50 UG/ML
1 SOLUTION/ DROPS OPHTHALMIC
Status: DISCONTINUED | OUTPATIENT
Start: 2022-12-23 | End: 2022-12-24 | Stop reason: HOSPADM

## 2022-12-23 RX ORDER — TIMOLOL MALEATE 5 MG/ML
1 SOLUTION/ DROPS OPHTHALMIC EVERY 12 HOURS SCHEDULED
Status: DISCONTINUED | OUTPATIENT
Start: 2022-12-23 | End: 2022-12-24 | Stop reason: HOSPADM

## 2022-12-23 RX ORDER — IPRATROPIUM BROMIDE 21 UG/1
1 SPRAY, METERED NASAL EVERY 12 HOURS PRN
Status: DISCONTINUED | OUTPATIENT
Start: 2022-12-23 | End: 2022-12-23

## 2022-12-23 RX ORDER — DESVENLAFAXINE 50 MG/1
50 TABLET, EXTENDED RELEASE ORAL DAILY
Status: DISCONTINUED | OUTPATIENT
Start: 2022-12-24 | End: 2022-12-24 | Stop reason: HOSPADM

## 2022-12-23 RX ORDER — HYDROXYZINE HYDROCHLORIDE 25 MG/1
25 TABLET, FILM COATED ORAL
Status: DISCONTINUED | OUTPATIENT
Start: 2022-12-23 | End: 2022-12-24 | Stop reason: HOSPADM

## 2022-12-23 RX ORDER — FAMOTIDINE 20 MG/1
40 TABLET, FILM COATED ORAL 2 TIMES DAILY
Status: DISCONTINUED | OUTPATIENT
Start: 2022-12-23 | End: 2022-12-24 | Stop reason: HOSPADM

## 2022-12-23 RX ORDER — BRIMONIDINE TARTRATE 2 MG/ML
1 SOLUTION/ DROPS OPHTHALMIC EVERY EVENING
Status: DISCONTINUED | OUTPATIENT
Start: 2022-12-23 | End: 2022-12-24 | Stop reason: HOSPADM

## 2022-12-23 RX ORDER — LEVOTHYROXINE SODIUM 0.1 MG/1
100 TABLET ORAL
Status: DISCONTINUED | OUTPATIENT
Start: 2022-12-24 | End: 2022-12-24 | Stop reason: HOSPADM

## 2022-12-23 RX ORDER — DESVENLAFAXINE 50 MG/1
TABLET, EXTENDED RELEASE ORAL
Qty: 30 TABLET | Refills: 3 | Status: SHIPPED | OUTPATIENT
Start: 2022-12-23

## 2022-12-23 RX ADMIN — HYDROXYZINE HYDROCHLORIDE 25 MG: 25 TABLET ORAL at 21:03

## 2022-12-23 RX ADMIN — ROPINIROLE HYDROCHLORIDE 2 MG: 1 TABLET, FILM COATED ORAL at 21:03

## 2022-12-23 RX ADMIN — ROPINIROLE HYDROCHLORIDE 1 MG: 1 TABLET, FILM COATED ORAL at 18:05

## 2022-12-23 RX ADMIN — LATANOPROST 1 DROP: 50 SOLUTION OPHTHALMIC at 21:03

## 2022-12-23 RX ADMIN — TRAMADOL HYDROCHLORIDE 25 MG: 50 TABLET, COATED ORAL at 20:06

## 2022-12-23 RX ADMIN — PREGABALIN 150 MG: 75 CAPSULE ORAL at 18:05

## 2022-12-23 RX ADMIN — TIMOLOL MALEATE 1 DROP: 5 SOLUTION/ DROPS OPHTHALMIC at 21:03

## 2022-12-23 RX ADMIN — ACETAMINOPHEN 650 MG: 325 TABLET, FILM COATED ORAL at 20:06

## 2022-12-23 RX ADMIN — RIVAROXABAN 20 MG: 20 TABLET, FILM COATED ORAL at 18:05

## 2022-12-23 RX ADMIN — FAMOTIDINE 40 MG: 20 TABLET, FILM COATED ORAL at 18:05

## 2022-12-23 NOTE — ED PROVIDER NOTES
Emergency Department Airway Evaluation and Management Form    History  Obtained from: pt herself  Benzalkonium chloride, Hydromorphone, Merthiolate  [thimerosal], Quinine derivatives, Codeine, Pamelor [nortriptyline], Pollen extract, Statins, and Wound dressing adhesive  Chief Complaint   Patient presents with   • Amaryllis Curt xerelto      HPI  Fall, on blood thinners, hit head, neck pain, ?  Syncope      Past Medical History:   Diagnosis Date   • Cancer [facility])     left breast cancer   • Cervical herniated disc    • Chronic pain disorder     back pain   • Chronic respiratory failure (HCC)     uses O2 at home with [facility] /5/19 no longer using/just CPAP   • CPAP (continuous positive airway pressure) dependence    • Disease of thyroid gland     hypothyroid   • [facility] 2020    right leg   • Family history of reaction to anesthesia     "son and daughter hard time waking up and also PONV"   • Fibromyalgia, primary    • GERD (gastroesophageal reflux disease)    • Glaucoma    • Hiatal hernia    • History of palpitations    • History of pneumonia    • History of transfusion    • Hyperlipidemia    • Hypertension    • Irregular heart beat    • Migraine    • Myocardial infarction [facility])     pt sees cardilogist Dr Giuseppe Gandhi realize had one, found on EKG before a surgery"   • OAB (overactive bladder)    • Pollen allergies    • [facility] ) 2019    Bilateral; on Xarelto   • Restless leg    • Risk for falls    • Sleep apnea    • Use of cane as ambulatory aid     occas   • Uses brace     Mafo/left leg   • Wears glasses      Past Surgical History:   Procedure Laterality Date   • ADENOIDECTOMY     • CARDIAC CATHETERIZATION     • CARPAL TUNNEL RELEASE     • CATARACT EXTRACTION Bilateral    • FOOT SURGERY      pin implanted right toe   • JOINT REPLACEMENT Bilateral     knees   • LAMINECTOMY     • LUMBAR EPIDURAL INJECTION     • MASTECTOMY Bilateral     with reconstruction and implants   • NISSEN FUNDOPLICATION     • PA LENGTH/SHORT LEG/ANKL TENDON,SINGLE Left 5/24/2021    Procedure: Sectioning peroneal tendons with lengthening/sectioning achilles tendon lower leg and ankle;  Surgeon: Roxie Grant DPM;  Location: [facility];  Service: Podiatry   • REPLACEMENT TOTAL KNEE     • SPINAL FUSION     • TONSILLECTOMY     • TUBAL LIGATION     • TUMOR EXCISION      right forearm/benign   • VEIN LIGATION Right      Family History   Problem Relation Age of Onset   • Cancer Sister      Social History     Tobacco Use   • Smoking status: Never   • Smokeless tobacco: Never   Vaping Use   • Vaping Use: Never used   Substance Use Topics   • Alcohol use: Yes     Comment: socially   • Drug use: Never     I have reviewed and agree with the history as documented  Review of Systems    Physical Exam  There were no vitals taken for this visit  Clear bilateral breath sounds , airway intact    Physical Exam    ED Medications  Medications - No data to display    Intubation  Procedures    Notes  No acute airway intervention needed  , remainder of care per primary trauma team        Final Diagnosis  Final diagnoses:   None       ED Provider  Electronically Signed by     Heath Prajapati DO  12/23/22 [id number]

## 2022-12-23 NOTE — ASSESSMENT & PLAN NOTE
· Dizziness noted today and now with new facial hematoma  · Unclear if had syncopal episode that she does not recall  · Neurologic checks  · No loss of bowel or bladder, no tongue lacerations to suggest seizure activity at this time  · Fall precautions  · Trend  troponins and monitor on telemetry  · EKG nonischemic  · Labs unremarkable   · CT head negative  · Check orthostatics

## 2022-12-23 NOTE — H&P
[address] [facility] 1945, 65 y o  female MRN: [id number]  Unit/Bed#: S -01 Encounter: [id number]  Primary Care Provider: Onur Simon DO   Date and time admitted to hospital: 12/23/2022  1:54 PM    * Dizziness  Assessment & Plan  · Dizziness noted today and now with new facial hematoma  · Unclear if had syncopal episode that she does not recall  · Neurologic checks  · No loss of bowel or bladder, no tongue lacerations to suggest seizure activity at this time  · Fall precautions  · Trend  troponins and monitor on telemetry  · EKG nonischemic  · Labs unremarkable   · CT head negative  · Check orthostatics    Facial hematoma  Assessment & Plan  · Patient has hematoma under left eye noticed today - unclear etiology  · Does not remember falling today or in recent days  · Seen by trauma and cleared for SLIM admit  · Monitor area  · Cannot rule out syncope/fall given dizziness today  · Check orthostatic vital signs  · Fall precautions    Epistaxis  Assessment & Plan  · She was noted to have epistaxis on 12/6/2022 prompting ER visit status post cauterization  · Seen by ENT 12/9/2022 with additional cauterization  · Seen by ENT 12/20 with no further bleeding  · Felt epistaxis today which was self-limited  · Unclear if had traumatic injury versus related to recurrent epistaxis recently  · Nasal saline    History of pulmonary embolus (PE)  Assessment & Plan  · Continue xarelto    Restless leg syndrome, controlled  Assessment & Plan  · Continue requip    Continuous opioid dependence ([address] )  Assessment & Plan  · Maintained on chronic tramadol 1/2 tab PO BID    Moderate obstructive sleep apnea  Assessment & Plan  · CPAP QHS    Class 1 obesity due to excess calories with body mass index (BMI) of 31 0 to 31 9 in adult  Assessment & Plan  · BMI 31 00  · Dietary and lifestyle modifications    VTE Pharmacologic Prophylaxis: VTE Score: 7 High Risk (Score >/= 5) - Pharmacological DVT Prophylaxis Contraindicated  Sequential Compression Devices Ordered  Code Status: Level 3 - DNAR and DNI confirmed with pt  Discussion with family: Patient declined call to   Anticipated Length of Stay: Patient will be admitted on an observation basis with an anticipated length of stay of less than 2 midnights secondary to possible syncope  Total Time for Visit, including Counseling / Coordination of Care: 70 minutes Greater than 50% of this total time spent on direct patient counseling and coordination of care  Chief Complaint: nose bleed, head and neck pain    History of Present Illness:  Arturo Almonte is a 65 y o  female with a PMH of epistaxis, GERD, internal hemorrhoids, obstructive sleep apnea, history of bilateral pulmonary emboli DVT, hyperlipidemia, anxiety and depression, restless leg syndrome, breast cancer status post mastectomy and tamoxifen, iron deficiency anemia, obesity who presents with nosebleed and facial swelling  Patient was seen emergency department on 12/6/2022 with nosebleed and was cauterized, then seen by ENT on 12/9/2022 given that her nose has been bleeding intermittently and her nose was cauterized  She was given bacitracin ointment twice daily and Atrovent for gustatory rhinitis  Patient reports that she felt slightly dizzy this morning  She was then able to do her physical therapy exercises with no difficulty  She was sitting around for the rest of the morning  Her daughter went and had a surgical procedure done and when her daughter came back to the house she noted that her mom had a bruise on the left side of her face  It was unclear where this came from  She also started having some nosebleeds again and therefore they brought her to the emergency department because they were unclear what caused the facial hematoma/bruising and nosebleed  Patient reports that the nosebleed stopped upon arrival to the emergency department    She feels that her left side of her face is swollen and tender to palpation but otherwise does not have any pain in this area  She does not recall passing out  She felt slightly dizzy however otherwise was in her normal state of health today  She had no loss of bowel or bladder  Review of Systems:  Review of Systems   Constitutional: Negative for chills, diaphoresis, fatigue, fever and unexpected weight change  HENT: Positive for facial swelling and nosebleeds  Negative for sore throat  Respiratory: Negative for cough, chest tightness and shortness of breath  Cardiovascular: Negative for chest pain, palpitations and leg swelling  Gastrointestinal: Negative for abdominal pain, diarrhea, nausea and vomiting  Genitourinary: Negative for dysuria  Musculoskeletal: Negative for myalgias  Neurological: Positive for dizziness  Negative for tremors, seizures, syncope, weakness, numbness and headaches  Psychiatric/Behavioral: Negative for confusion  All other systems reviewed and are negative        Past Medical and Surgical History:   Past Medical History:   Diagnosis Date   • Cancer [facility])     left breast cancer   • Cervical herniated disc    • Chronic pain disorder     back pain   • Chronic respiratory failure (HCC)     uses O2 at home with [facility] /5/19 no longer using/just CPAP   • CPAP (continuous positive airway pressure) dependence    • Disease of thyroid gland     hypothyroid   • DVT (deep venous thrombosis) ([facility] [address] ) 2020    right leg   • Family history of reaction to anesthesia     "son and daughter hard time waking up and also PONV"   • Fibromyalgia, primary    • GERD (gastroesophageal reflux disease)    • Glaucoma    • Hiatal hernia    • History of palpitations    • History of pneumonia    • History of transfusion    • Hyperlipidemia    • Hypertension    • Irregular heart beat    • Migraine    • Myocardial infarction [facility])     pt sees cardilogist Dr Duncan Gaines realize had one, found on EKG before a surgery" • OAB (overactive bladder)    • Pollen allergies    • Pulmonary embolism ([facility] [address] ) 2019    Bilateral; on Xarelto   • Restless leg    • Risk for falls    • Sleep apnea    • Use of cane as ambulatory aid     occas   • Uses brace     Mafo/left leg   • Wears glasses        Past Surgical History:   Procedure Laterality Date   • ADENOIDECTOMY     • CARDIAC CATHETERIZATION     • CARPAL TUNNEL RELEASE     • CATARACT EXTRACTION Bilateral    • FOOT SURGERY      pin implanted right toe   • JOINT REPLACEMENT Bilateral     knees   • LAMINECTOMY     • LUMBAR EPIDURAL INJECTION     • MASTECTOMY Bilateral     with reconstruction and implants   • NISSEN FUNDOPLICATION     • KS LNGTH/SHRT TENDON LEG/ANKLE 1 TENDON SPX Left 5/24/2021    Procedure: Sectioning peroneal tendons with lengthening/sectioning achilles tendon lower leg and ankle;  Surgeon: Anup Valencia DPM;  Location: [facility];  Service: Podiatry   • REPLACEMENT TOTAL KNEE     • SPINAL FUSION     • TONSILLECTOMY     • TUBAL LIGATION     • TUMOR EXCISION      right forearm/benign   • VEIN LIGATION Right        Meds/Allergies:  Prior to Admission medications    Medication Sig Start Date End Date Taking?  Authorizing Provider   acetaminophen (TYLENOL) 325 mg tablet Take 650 mg by mouth every 6 (six) hours as needed for mild pain    Historical Provider, MD   amLODIPine (NORVASC) 5 mg tablet Take 1 tablet (5 mg total) by mouth daily  Patient taking differently: Take 5 mg by mouth every evening 2/26/21   Carmelo Echevarria DO   Ascorbic Acid (VITAMIN C) 100 MG tablet Take 100 mg by mouth daily  Patient not taking: Reported on 11/4/2022    Historical Provider, MD   ASPIRIN 81 PO Aspirin EC 81 MG Oral Tablet Delayed Release    Refills: 0       Active    Historical Provider, MD   azelastine (ASTELIN) 0 1 % nasal spray 2 sprays into each nostril 2 (two) times a day 1/26/22   Historical Provider, MD   b complex vitamins capsule Take 1 capsule by mouth daily    Historical Provider, MD brimonidine (ALPHAGAN P) 0 15 % ophthalmic solution Administer to the right eye every evening  4/13/21   Historical Provider, MD   calcium citrate-vitamin D (CITRACAL+D) 315-200 MG-UNIT per tablet Calcium + D TABS    Refills: 0       Active    Historical Provider, MD   cholecalciferol (VITAMIN D3) 1,000 units tablet Take 1,000 Units by mouth daily  Patient not taking: Reported on 11/4/2022    Historical Provider, MD   COVID-19 mRNA Virus Vaccine (MODERNA COVID-19 VACCINE IM) Inject into a muscle Moderna 2nd dose 2/21/21    Historical Provider, MD   desvenlafaxine succinate (PRISTIQ) 50 mg 24 hr tablet TAKE 1 TABLET(50 MG) BY MOUTH DAILY 12/23/22   Georgina Marcelo DO   famotidine (PEPCID) 40 MG tablet Take 1 tablet (40 mg total) by mouth 2 (two) times a day 9/20/22   JACK Boyd   FEROSUL 325 (65 Fe) MG tablet Take 1 tablet by mouth 2 (two) times a day with meals 7/15/20   Historical Provider, MD   fexofenadine (ALLEGRA) 180 MG tablet Take 180 mg by mouth as needed     Historical Provider, MD   fluticasone (FLONASE) 50 mcg/act nasal spray as needed  2/24/14   Historical Provider, MD   folic acid (FOLVITE) 1 mg tablet Take 1 mg by mouth daily    Historical Provider, MD   hydrOXYzine HCL (ATARAX) 25 mg tablet  2/3/22   Historical Provider, MD   ipratropium (ATROVENT) 0 03 % nasal spray USE 2 SPRAYS IN EACH NOSTRIL TWICE DAILY AS NEEDED FOR RHINITIS OR RUNNY NOSE 12/12/22   Nandini Gamez MD   latanoprost (XALATAN) 0 005 % ophthalmic solution  5/22/20   Historical Provider, MD   levothyroxine 100 mcg tablet TAKE 1 TABLET (100 MCG TOTAL) BY MOUTH DAILY 7/5/22   Georgina Marcelo DO   ondansetron (ZOFRAN) 4 mg tablet Take 1 tablet (4 mg total) by mouth every 8 (eight) hours as needed for nausea or vomiting 9/26/22   Michelle Galvan MD   pregabalin (LYRICA) 150 mg capsule Take 1 capsule (150 mg total) by mouth in the morning 12/13/22   Georgina Marcelo DO   rivaroxaban (Xarelto) 20 mg tablet Take 1 tablet (20 mg total) by mouth every evening 12/13/22   Aidan Gilbert DO   rOPINIRole (REQUIP) 1 mg tablet TAKE 1 TABLET AT 6PM AND TAKE 2 TABLETS AT BEDTIME 12/7/22   Aidan Gilbert DO   timolol (TIMOPTIC) 0 5 % ophthalmic solution Administer to both eyes 2 (two) times a day  4/13/21   Historical Provider, MD   torsemide (DEMADEX) 10 mg tablet TAKE 1 TABLET (10 MG TOTAL) BY MOUTH DAILY 12/14/22   Aidan Gilbert DO   traMADol (Ultram) 50 mg tablet Take 1/2 tab at [facility] [address], 1/2 tab at 1PM, and 1 tab at HS (total 2 tabs/day) 9/20/22   Aidan Gilbert DO   zinc gluconate 50 mg tablet Take 50 mg by mouth daily    Historical Provider, MD   desvenlafaxine succinate (PRISTIQ) 50 mg 24 hr tablet Take 1 tablet (50 mg total) by mouth daily 9/20/22 12/23/22  Aidan Gilbert DO     I have reviewed home medications with patient personally  Allergies: Allergies   Allergen Reactions   • Benzalkonium Chloride Hives     Merthiolate tincture 9/28/2020     • Hydromorphone Hallucinations     disorientation; hallucination; confusion     • Merthiolate  [Thimerosal] Hives   • Quinine Derivatives Other (See Comments) and Tinnitus     tinnitus     • Codeine Nausea Only and Other (See Comments)     nausea     • Pamelor [Nortriptyline] Vomiting     Per pt, itching also   • Pollen Extract Nasal Congestion   • Statins Itching   • Wound Dressing Adhesive Rash       Social History:  Marital Status:     Occupation: retired  Patient Pre-hospital Living Situation: Home  Patient Pre-hospital Level of Mobility: walks with cane  Patient Pre-hospital Diet Restrictions: none  Substance Use History:   Social History     Substance and Sexual Activity   Alcohol Use Yes    Comment: socially     Social History     Tobacco Use   Smoking Status Never   Smokeless Tobacco Never     Social History     Substance and Sexual Activity   Drug Use Never       Family History:  Family History   Problem Relation Age of Onset   • Cancer Sister        Physical Exam: Vitals:   Blood Pressure: 118/66 (12/23/22 1730)  Pulse: 76 (12/23/22 1730)  Temperature: 98 °F (36 7 °C) (12/23/22 1730)  Temp Source: Oral (12/23/22 1730)  Respirations: 18 (12/23/22 1730)  Height: 5' 4" (162 6 cm) (12/23/22 1730)  Weight - Scale: 79 4 kg (175 lb) (12/23/22 1730)  SpO2: 97 % (12/23/22 1730)    Physical Exam  Vitals and nursing note reviewed  Constitutional:       General: She is not in acute distress  Appearance: Normal appearance  She is not diaphoretic  HENT:      Head: Normocephalic and atraumatic  Mouth/Throat:      Mouth: Mucous membranes are moist    Cardiovascular:      Rate and Rhythm: Normal rate and regular rhythm  Pulmonary:      Effort: Pulmonary effort is normal       Breath sounds: Normal breath sounds  No stridor  No wheezing, rhonchi or rales  Abdominal:      General: Bowel sounds are normal       Palpations: Abdomen is soft  There is no mass  Tenderness: There is no abdominal tenderness  There is no guarding  Musculoskeletal:      Right lower leg: No edema  Left lower leg: No edema  Comments: Chronic left foot drop   Skin:     General: Skin is warm and dry  Neurological:      Mental Status: She is alert  Comments: Alert and oriented x3  Follows commands  Speech clear without dysarthria  Extraocular movements intact without nystagmus, strabismus or lid lag  Pupils equal round and reactive to light    BUE strength symmetric, LLE foot drop   Psychiatric:         Mood and Affect: Mood normal          Behavior: Behavior normal           Additional Data:     Lab Results:  Results from last 7 days   Lab Units 12/23/22  1405 12/23/22  1402   WBC Thousand/uL  --  7 83   HEMOGLOBIN g/dL  --  13 8   I STAT HEMOGLOBIN g/dl 14 3  --    HEMATOCRIT %  --  42 7   HEMATOCRIT, ISTAT % 42  --    PLATELETS Thousands/uL  --  356   NEUTROS PCT %  --  51   LYMPHS PCT %  --  34   MONOS PCT %  --  9   EOS PCT %  --  5     Results from last 7 days   Lab Units 12/23/22  1405 12/23/22  1402   SODIUM mmol/L  --  139   POTASSIUM mmol/L  --  3 9   CHLORIDE mmol/L  --  105   CO2 mmol/L  --  25   CO2, I-STAT mmol/L 28  --    BUN mg/dL  --  20   CREATININE mg/dL  --  0 71   ANION GAP mmol/L  --  9   CALCIUM mg/dL  --  9 4   ALBUMIN g/dL  --  4 2   TOTAL BILIRUBIN mg/dL  --  0 51   ALK PHOS U/L  --  148*   ALT U/L  --  11   AST U/L  --  11*   GLUCOSE RANDOM mg/dL  --  111                       Lines/Drains:  Invasive Devices     Peripheral Intravenous Line  Duration           Peripheral IV 12/23/22 Left Antecubital <1 day                    Imaging: Reviewed radiology reports from this admission including: CT head and xray(s)  TRAUMA - CT head wo contrast   Final Result by Dilip Lawrence MD (12/23 1427)      No acute intracranial pathology  In particular, no intracranial hemorrhage or calvarial fracture  I personally discussed this study with Nikhil Pritchett on 12/23/2022 at 2:26 PM               Workstation performed: [id number]         TRAUMA - CT spine cervical wo contrast   Final Result by Dilip Lawrence MD (12/23 3656)      No cervical spine fracture or traumatic malalignment  Moderate multilevel degenerative changes, most pronounced at C4-C6  I personally discussed this study with Nikhil Pritchett on 12/23/2022 at 2:26 PM                       Workstation performed: [id number]         XR trauma multiple   Final Result by Dilip Lawrence MD (12/23 3456)      No acute pulmonary pathology  I personally discussed this study with Nikhil Pritchett on 12/23/2022 at 2:26 PM       Workstation performed: [id number]         XR chest portable    (Results Pending)       EKG and Other Studies Reviewed on Admission:   · EKG: NSR  HR 79 bpm     ** Please Note: This note has been constructed using a voice recognition system   **

## 2022-12-23 NOTE — ASSESSMENT & PLAN NOTE
· Patient has hematoma under left eye noticed today - unclear etiology  · Does not remember falling today or in recent days  · Seen by trauma and cleared for SLIM admit  · Monitor area  · Cannot rule out syncope/fall given dizziness today  · Check orthostatic vital signs  · Fall precautions

## 2022-12-23 NOTE — PROCEDURES
POC FAST US    Date/Time: 12/23/2022 6:10 PM  Performed by: Zia Call MD  Authorized by: Zia Call MD     Patient location:  Trauma  Procedure details:     Exam Type:  Diagnostic    Indications: blunt abdominal trauma      Assess for:  Hemothorax, intra-abdominal fluid and pericardial effusion    Technique: FAST      Views obtained:  Heart - Pericardial sac, RUQ - Kerns's Pouch, LUQ - Splenorenal space and Suprapubic - Pouch of Rajiv    Image quality: diagnostic      Image availability:  Images available in PACS, still images obtained and video obtained  FAST Findings:     RUQ (Hepatorenal) free fluid: absent      LUQ (Splenorenal) free fluid: absent      Suprapubic free fluid: absent      Cardiac wall motion: identified      Pericardial effusion: absent    Interpretation:     Impressions: negative

## 2022-12-23 NOTE — ASSESSMENT & PLAN NOTE
· She was noted to have epistaxis on 12/6/2022 prompting ER visit status post cauterization  · Seen by ENT 12/9/2022 with additional cauterization  · Seen by ENT 12/20 with no further bleeding  · Felt epistaxis today which was self-limited  · Unclear if had traumatic injury versus related to recurrent epistaxis recently  · Nasal saline

## 2022-12-23 NOTE — H&P
H&P - Trauma   Diana Kinsey 65 y o  female MRN: [id number]  Unit/Bed#: S -01 Encounter: [id number]    Trauma Alert: Level B   Model of Arrival: Self    Trauma Team: Attending Zehra Mooney and Residents [facility]  Consultants:     None      Assessment/Plan   Active Problems / Assessment:   Syncope  Possible fall with head injury  Epistaxis     Plan:   CT head/C-spine  ECG/troponin/lab work  Admit to mymxlog for syncope evaluation    History of Present Illness     Chief Complaint: Hip pain  Mechanism:Fall     HPI:    Diana Kinsey is a 65 y o  female who presents with face injuries of unknown source  Patient states that she was noted by her daughter to have a nosebleed and facial injuries, has head and neck pain however has no recollection of these injuries  Does not recall falling  No history of syncope or cardiac disease  Does have history of PE for which she is on Xarelto for  On arrival to trauma bay, bleeding from nosebleed control with some tissue in her left nare  Review of Systems   Unable to perform ROS: Acuity of condition   Constitutional: Negative for activity change and fatigue  HENT: Positive for nosebleeds  Skin: Positive for wound  Neurological: Positive for syncope  12-point, complete review of systems was reviewed and negative except as stated above       Historical Information     Past Medical History:   Diagnosis Date   • Cancer [facility])     left breast cancer   • Cervical herniated disc    • Chronic pain disorder     back pain   • Chronic respiratory failure (HCC)     uses O2 at home with [facility] /5/19 no longer using/just CPAP   • CPAP (continuous positive airway pressure) dependence    • Disease of thyroid gland     hypothyroid   • DVT (deep venous thrombosis) ([facility]) 2020    right leg   • Family history of reaction to anesthesia     "son and daughter hard time waking up and also PONV"   • Fibromyalgia, primary    • GERD (gastroesophageal reflux disease)    • Glaucoma    • Hiatal hernia    • History of palpitations    • History of pneumonia    • History of transfusion    • Hyperlipidemia    • Hypertension    • Irregular heart beat    • Migraine    • Myocardial infarction [facility])     pt sees cardilogist Dr Lloyd Rogel realize had one, found on EKG before a surgery"   • OAB (overactive bladder)    • Pollen allergies    • Pulmonary embolism ([facility] [address] ) 2019    Bilateral; on Xarelto   • Restless leg    • Risk for falls    • Sleep apnea    • Use of cane as ambulatory aid     occas   • Uses brace     Mafo/left leg   • Wears glasses      Past Surgical History:   Procedure Laterality Date   • ADENOIDECTOMY     • CARDIAC CATHETERIZATION     • CARPAL TUNNEL RELEASE     • CATARACT EXTRACTION Bilateral    • FOOT SURGERY      pin implanted right toe   • JOINT REPLACEMENT Bilateral     knees   • LAMINECTOMY     • LUMBAR EPIDURAL INJECTION     • MASTECTOMY Bilateral     with reconstruction and implants   • NISSEN FUNDOPLICATION     • UT LNGTH/SHRT TENDON LEG/ANKLE 1 TENDON SPX Left 5/24/2021    Procedure: Sectioning peroneal tendons with lengthening/sectioning achilles tendon lower leg and ankle;  Surgeon: Chucky Gottlieb DPM;  Location: [facility] OR;  Service: Podiatry   • REPLACEMENT TOTAL KNEE     • SPINAL FUSION     • TONSILLECTOMY     • TUBAL LIGATION     • TUMOR EXCISION      right forearm/benign   • VEIN LIGATION Right         Social History     Tobacco Use   • Smoking status: Never   • Smokeless tobacco: Never   Vaping Use   • Vaping Use: Never used   Substance Use Topics   • Alcohol use: Yes     Comment: socially   • Drug use: Never     Immunization History   Administered Date(s) Administered   • COVID-19 MODERNA VACC 0 5 ML IM 01/28/2021, 02/25/2021   • INFLUENZA 01/01/2008, 09/15/2011, 10/17/2012, 11/06/2013, 10/21/2014, 10/19/2015, 11/01/2018, 11/20/2020   • Influenza Split High Dose Preservative Free IM 11/20/2020   • Influenza, high dose seasonal 0 7 mL 09/16/2020, 11/03/2021, 10/10/2022   • Pneumococcal Conjugate 13-Valent 10/19/2015   • Pneumococcal Polysaccharide PPV23 01/01/2006, 01/01/2008, 09/28/2021   • Tdap 01/01/2006   • Zoster Vaccine Recombinant 09/30/2020, 12/04/2020     Last Tetanus: N/A  Family History: Non-contributory    1  Before the illness or injury that brought you to the Emergency, did you need someone to help you on a regular basis? 0=No   2  Since the illness or injury that brought you to the Emergency, have you needed more help than usual to take care of yourself? 0=No   3  Have you been hospitalized for one or more nights during the past 6 months (excluding a stay in the Emergency Department)? 1=Yes   4  In general, do you see well? 0=Yes   5  In general, do you have serious problems with your memory? 0=No   6  Do you take more than three different medications everyday? 1=Yes   TOTAL   2     Did you order a geriatric consult if the score was 2 or greater?:  Admitted to [facility]     Meds/Allergies   all current active meds have been reviewed  Allergies have not been reviewed;     Allergies   Allergen Reactions   • Benzalkonium Chloride Hives     Merthiolate tincture 9/28/2020     • Hydromorphone Hallucinations     disorientation; hallucination; confusion     • Merthiolate  [Thimerosal] Hives   • Quinine Derivatives Other (See Comments) and Tinnitus     tinnitus     • Codeine Nausea Only and Other (See Comments)     nausea     • Pamelor [Nortriptyline] Vomiting     Per pt, itching also   • Pollen Extract Nasal Congestion   • Statins Itching   • Wound Dressing Adhesive Rash       Objective   Initial Vitals:   Temperature: 97 6 °F (36 4 °C) (12/23/22 1402)  Pulse: 80 (12/23/22 1358)  Respirations: 18 (12/23/22 1358)  Blood Pressure: 145/82 (12/23/22 1358)  FiO2 (%): 21 (12/23/22 2248)    Primary Survey:   Airway:        Status: patent;        Pre-hospital Interventions: none        Hospital Interventions: none  Breathing:        Pre-hospital Interventions: none       Effort: normal Right breath sounds: normal       Left breath sounds: normal  Circulation:        Rhythm: regular       Rate: regular   Right Pulses Left Pulses    R radial: 2+  R femoral: 2+  R pedal: 2+     L radial: 2+  L femoral: 2+  L pedal: 2+       Disability:        GCS: Eye: 4; Verbal: 5 Motor: 6 Total: 15       Right Pupil: 4 mm;  round;  reactive         Left Pupil:  4 mm;  round;  reactive      R Motor Strength L Motor Strength    R : 5/5  R dorsiflex: 5/5  R plantarflex: 5/5 L : 5/5  L dorsiflex: 5/5  L plantarflex: 5/5        Sensory:  No sensory deficit  Exposure:       Completed: Yes      Secondary Survey:  Physical Exam  Vitals and nursing note reviewed  Constitutional:       General: She is in acute distress  Appearance: She is normal weight  She is not ill-appearing, toxic-appearing or diaphoretic  HENT:      Head: Normocephalic  Right Ear: Tympanic membrane, ear canal and external ear normal       Left Ear: Tympanic membrane, ear canal and external ear normal       Nose:      Comments: Tissue packed into the left nose  No active bleeding  No obvious deformity or swelling     Mouth/Throat:      Mouth: Mucous membranes are moist    Eyes:      Extraocular Movements: Extraocular movements intact  Conjunctiva/sclera: Conjunctivae normal       Pupils: Pupils are equal, round, and reactive to light  Cardiovascular:      Rate and Rhythm: Normal rate and regular rhythm  Pulses: Normal pulses  Heart sounds: Normal heart sounds  Pulmonary:      Effort: Pulmonary effort is normal  No respiratory distress  Breath sounds: Normal breath sounds  No stridor  No wheezing or rhonchi  Abdominal:      General: Abdomen is flat  There is no distension  Tenderness: There is no abdominal tenderness  There is no guarding or rebound  Genitourinary:     General: Normal vulva  Musculoskeletal:        Back:    Skin:     General: Skin is warm        Capillary Refill: Capillary refill takes less than 2 seconds  Neurological:      General: No focal deficit present  Mental Status: She is alert  Psychiatric:         Mood and Affect: Mood normal        Cervical Collar Clearance: The patient had a CT scan of the cervical spine demonstrating no acute injury  On exam, the patient had no midline point tenderness or paresthesias/numbness/weakness in the extremities  The patient had full range of motion (was then able to flex, extend, and rotate head laterally) without pain  There were no distracting injuries and the patient was not intoxicated  The patient's cervical spine was cleared radiologically and clinically  Cervical collar removed at this time  Invasive Devices     None               Lab Results: Results: I have personally reviewed all pertinent laboratory/tests results and ABG: No results found for: PHART, LWO4MDS, PO2ART, CRE9JQN, H9EVCFPO, BEART, SOURCE    Imaging Results: I have personally reviewed pertinent reports  and I have personally reviewed pertinent films in PACS  Chest Xray(s): negative for acute findings   FAST exam(s): negative for acute findings   CT Scan(s): negative for acute findings   Additional Xray(s): N/A     Other Studies: ECG normal    Code Status: Prior  Advance Directive and Living Will:      Power of :    POLST:    I have spent 25 minutes with Patient and family today in which greater than 50% of this time was spent in counseling/coordination of care regarding Diagnostic results, Prognosis and Risks and benefits of tx options

## 2022-12-23 NOTE — CASE MANAGEMENT
CM responded to trauma alert  Patient was transported into ED by a friend for evaluation and brought into trauma bay for further testing  Patient alert and made to respond to medical team's questions and instructions  CM located patients emergency contact- daughter Nancy Fisher from [facility]; [phone]  Daughter aware of situation and stating number is good for updates, general update provided  CM attempted to meet with neighbor who escorted patient in- neighbor was not in waiting room  Trauma AP aware of above  No current identified CM needs  CM will follow and update screening, assessment, and discharge planning as appropriate

## 2022-12-24 VITALS
TEMPERATURE: 98.3 F | HEIGHT: 64 IN | HEART RATE: 99 BPM | BODY MASS INDEX: 29.88 KG/M2 | SYSTOLIC BLOOD PRESSURE: 132 MMHG | RESPIRATION RATE: 20 BRPM | OXYGEN SATURATION: 95 % | WEIGHT: 175 LBS | DIASTOLIC BLOOD PRESSURE: 77 MMHG

## 2022-12-24 LAB
ATRIAL RATE: 79 BPM
P AXIS: 68 DEGREES
PR INTERVAL: 136 MS
QRS AXIS: -3 DEGREES
QRSD INTERVAL: 82 MS
QT INTERVAL: 400 MS
QTC INTERVAL: 458 MS
T WAVE AXIS: 96 DEGREES
VENTRICULAR RATE: 79 BPM

## 2022-12-24 RX ADMIN — DESVENLAFAXINE 50 MG: 50 TABLET, FILM COATED, EXTENDED RELEASE ORAL at 08:45

## 2022-12-24 RX ADMIN — FAMOTIDINE 40 MG: 20 TABLET, FILM COATED ORAL at 08:43

## 2022-12-24 RX ADMIN — Medication 1000 UNITS: at 08:44

## 2022-12-24 RX ADMIN — PREGABALIN 150 MG: 75 CAPSULE ORAL at 08:43

## 2022-12-24 RX ADMIN — TIMOLOL MALEATE 1 DROP: 5 SOLUTION/ DROPS OPHTHALMIC at 08:46

## 2022-12-24 RX ADMIN — TRAMADOL HYDROCHLORIDE 25 MG: 50 TABLET, COATED ORAL at 08:44

## 2022-12-24 RX ADMIN — LEVOTHYROXINE SODIUM 100 MCG: 100 TABLET ORAL at 04:43

## 2022-12-24 RX ADMIN — TORSEMIDE 10 MG: 10 TABLET ORAL at 08:43

## 2022-12-24 RX ADMIN — AMLODIPINE BESYLATE 5 MG: 5 TABLET ORAL at 08:44

## 2022-12-24 RX ADMIN — ASPIRIN 81 MG CHEWABLE TABLET 81 MG: 81 TABLET CHEWABLE at 08:44

## 2022-12-24 NOTE — ASSESSMENT & PLAN NOTE
- Admitted to internal medicine for syncope work up as patient had a head injury without memory of falling/ any trauma  - No additional injuries identified on tertiary trauma exam  - Trauma service signing off, please contact with any questions or concerns  - All further work up per internal medicine

## 2022-12-24 NOTE — DISCHARGE SUMMARY
[facility]  Discharge- Rebecca Hack 1945, 65 y o  female MRN: [id number]  Unit/Bed#: S -01 Encounter: [id number]  Primary Care Provider: Khari Moraes DO   Date and time admitted to hospital: 12/23/2022  1:54 PM    * Dizziness  Assessment & Plan  · Dizziness noted today and now with new facial hematoma  · Unclear if had syncopal episode that she does not recall  · Likely concussive syndrome   · No loss of bowel or bladder, no tongue lacerations to suggest seizure activity at this time  · Normal troponins  · No events on tele  · Recommend reestablishing with her outpatient cardiologist for event monitoring  · EKG nonischemic  · Labs unremarkable   · CT head negative    Epistaxis  Assessment & Plan  · She was noted to have epistaxis on 12/6/2022 prompting ER visit status post cauterization  · Seen by ENT 12/9/2022 with additional cauterization  · Seen by ENT 12/20 with no further bleeding  · Nasal saline    Facial hematoma  Assessment & Plan  · Patient has hematoma under left eye noticed today - unclear etiology  · Does not remember falling today or in recent days  · Seen by trauma and cleared for SLIM admit  · Monitor area    Class 1 obesity due to excess calories with body mass index (BMI) of 31 0 to 31 9 in adult  Assessment & Plan  · BMI 31 00  · Dietary and lifestyle modifications    Continuous opioid dependence ([facility] [address] )  Assessment & Plan  · Maintained on chronic tramadol 1/2 tab PO BID    Moderate obstructive sleep apnea  Assessment & Plan  · CPAP QHS    History of pulmonary embolus (PE)  Assessment & Plan  · Continue xarelto    Restless leg syndrome, controlled  Assessment & Plan  · Continue requip    Medical Problems     Resolved Problems  Date Reviewed: 12/24/2022   None       Discharging Physician / Practitioner: Sangeetha Vernon PA-C  PCP: Khari Moraes DO  Admission Date:   Admission Orders (From admission, onward)     Ordered        12/23/22 1616  Place in Observation  Once                      Discharge Date: 12/24/22    Consultations During Hospital Stay:  · None     Procedures Performed:   · None     Significant Findings / Test Results:   · Normal troponin x3   · No events on telemetry monitoring    Incidental Findings:   ·  none      Test Results Pending at Discharge (will require follow up): · None      Outpatient Tests Requested:  · None     Complications:  None     Reason for Admission: fall    Hospital Course:   Ewelina Oliva is a 65 y o  female patient who originally presented to the hospital on 12/23/2022 due to fall on Xarelto and possible syncopal episode  Patient was cleared by the trauma service  She cannot recall the events of her fall and thus was admitted for possible syncope  She seemed to have sustained a concussion as she complained of some dizziness and headache after her fall with amnesia  There were no seizure-like events, no tongue biting, loss of bowel or bladder to suggest seizure  Work-up was benign with normal troponins and telemetry monitoring  Recommended for outpatient Holter monitor with cardiologist as well as follow-up with PCP in 2 weeks  Please see above list of diagnoses and related plan for additional information  Condition at Discharge: stable    Discharge Day Visit / Exam:   Subjective:  Feeling some headache and dizziness  Otherwise okay  Vitals: Blood Pressure: 122/65 (12/24/22 0756)  Pulse: 87 (12/24/22 0756)  Temperature: 98 3 °F (36 8 °C) (12/24/22 0756)  Temp Source: Oral (12/23/22 1730)  Respirations: 20 (12/24/22 0756)  Height: 5' 4" (162 6 cm) (12/23/22 1730)  Weight - Scale: 79 4 kg (175 lb) (12/23/22 1730)  SpO2: 95 % (12/24/22 0756)  Exam:   Physical Exam  Vitals and nursing note reviewed  Constitutional:       General: She is not in acute distress  Appearance: Normal appearance  HENT:      Head: Normocephalic        Comments: L periorbital contusion     Mouth/Throat:      Mouth: Mucous membranes are moist    Eyes:      General: No scleral icterus  Pupils: Pupils are equal, round, and reactive to light  Cardiovascular:      Rate and Rhythm: Normal rate and regular rhythm  Heart sounds: No murmur heard  Pulmonary:      Effort: Pulmonary effort is normal  No respiratory distress  Breath sounds: Normal breath sounds  No wheezing, rhonchi or rales  Abdominal:      General: Bowel sounds are normal  There is no distension  Palpations: Abdomen is soft  Tenderness: There is no abdominal tenderness  Musculoskeletal:         General: No swelling  Right lower leg: No edema  Left lower leg: No edema  Skin:     Capillary Refill: Capillary refill takes less than 2 seconds  Neurological:      General: No focal deficit present  Mental Status: She is alert and oriented to person, place, and time  Mental status is at baseline  Discussion with Family: Updated  (son) via phone  Discharge instructions/Information to patient and family:   See after visit summary for information provided to patient and family  Provisions for Follow-Up Care:  See after visit summary for information related to follow-up care and any pertinent home health orders  Disposition:   Home    Planned Readmission: no     Discharge Statement:  I spent 45 minutes discharging the patient  This time was spent on the day of discharge  I had direct contact with the patient on the day of discharge  Greater than 50% of the total time was spent examining patient, answering all patient questions, arranging and discussing plan of care with patient as well as directly providing post-discharge instructions  Additional time then spent on discharge activities  Discharge Medications:  See after visit summary for reconciled discharge medications provided to patient and/or family        **Please Note: This note may have been constructed using a voice recognition system**

## 2022-12-24 NOTE — PLAN OF CARE
Problem: MOBILITY - ADULT  Goal: Maintain or return to baseline ADL function  Description: INTERVENTIONS:  -  Assess patient's ability to carry out ADLs; assess patient's baseline for ADL function and identify physical deficits which impact ability to perform ADLs (bathing, care of mouth/teeth, toileting, grooming, dressing, etc )  - Assess/evaluate cause of self-care deficits   - Assess range of motion  - Assess patient's mobility; develop plan if impaired  - Assess patient's need for assistive devices and provide as appropriate  - Encourage maximum independence but intervene and supervise when necessary  - Involve family in performance of ADLs  - Assess for home care needs following discharge   - Consider OT consult to assist with ADL evaluation and planning for discharge  - Provide patient education as appropriate  Outcome: Progressing  Goal: Maintains/Returns to pre admission functional level  Description: INTERVENTIONS:  - Perform BMAT or MOVE assessment daily    - Set and communicate daily mobility goal to care team and patient/family/caregiver  - Collaborate with rehabilitation services on mobility goals if consulted  - Perform Range of Motion  times a day  - Reposition patient every  hours    - Dangle patient  times a day  - Stand patient times a day  - Ambulate patient  times a day  - Out of bed to chair  times a day   - Out of bed for meals  times a day  - Out of bed for toileting  - Record patient progress and toleration of activity level   Outcome: Progressing     Problem: Potential for Falls  Goal: Patient will remain free of falls  Description: INTERVENTIONS:  - Educate patient/family on patient safety including physical limitations  - Instruct patient to call for assistance with activity   - Consult OT/PT to assist with strengthening/mobility   - Keep Call bell within reach  - Keep bed low and locked with side rails adjusted as appropriate  - Keep care items and personal belongings within reach  - Initiate and maintain comfort rounds  - Make Fall Risk Sign visible to staff  - Offer Toileting every Hours, in advance of need  - Initiate/Maintain alarm  - Obtain necessary fall risk management equipme  - Apply yellow socks and bracelet for high fall risk patients  - Consider moving patient to room near nurses station  Outcome: Progressing     Problem: PAIN - ADULT  Goal: Verbalizes/displays adequate comfort level or baseline comfort level  Description: Interventions:  - Encourage patient to monitor pain and request assistance  - Assess pain using appropriate pain scale  - Administer analgesics based on type and severity of pain and evaluate response  - Implement non-pharmacological measures as appropriate and evaluate response  - Consider cultural and social influences on pain and pain management  - Notify physician/advanced practitioner if interventions unsuccessful or patient reports new pain  Outcome: Progressing     Problem: CARDIOVASCULAR - ADULT  Goal: Maintains optimal cardiac output and hemodynamic stability  Description: INTERVENTIONS:  - Monitor I/O, vital signs and rhythm  - Monitor for S/S and trends of decreased cardiac output  - Administer and titrate ordered vasoactive medications to optimize hemodynamic stability  - Assess quality of pulses, skin color and temperature  - Assess for signs of decreased coronary artery perfusion  - Instruct patient to report change in severity of symptoms  Outcome: Progressing  Goal: Absence of cardiac dysrhythmias or at baseline rhythm  Description: INTERVENTIONS:  - Continuous cardiac monitoring, vital signs, obtain 12 lead EKG if ordered  - Administer antiarrhythmic and heart rate control medications as ordered  - Monitor electrolytes and administer replacement therapy as ordered  Outcome: Progressing

## 2022-12-24 NOTE — ASSESSMENT & PLAN NOTE
· Dizziness noted today and now with new facial hematoma  · Unclear if had syncopal episode that she does not recall  · Likely concussive syndrome   · No loss of bowel or bladder, no tongue lacerations to suggest seizure activity at this time  · Normal troponins  · No events on tele  · Recommend reestablishing with her outpatient cardiologist for event monitoring  · EKG nonischemic  · Labs unremarkable   · CT head negative

## 2022-12-24 NOTE — ASSESSMENT & PLAN NOTE
· She was noted to have epistaxis on 12/6/2022 prompting ER visit status post cauterization  · Seen by ENT 12/9/2022 with additional cauterization  · Seen by ENT 12/20 with no further bleeding  · Nasal saline

## 2022-12-24 NOTE — PROGRESS NOTES
[facility]  Progress Note - Valeria Rad 1945, 65 y o  female MRN: [id number]  Unit/Bed#: S -01 Encounter: [id number]  Primary Care Provider: Megan Fabian DO   Date and time admitted to hospital: 12/23/2022  1:54 PM    Facial hematoma  Assessment & Plan  - Recommend ice, HOB elevation, and Tylenol PRN      * Dizziness  Assessment & Plan  - Admitted to internal medicine for syncope work up as patient had a head injury without memory of falling/ any trauma  - No additional injuries identified on tertiary trauma exam  - Trauma service signing off, please contact with any questions or concerns  - All further work up per internal medicine      TRAUMA TERTIARY SURVEY NOTE    VTE Prophylaxis:Sequential compression device (Venodyne)  and home Xarelto    Disposition: Trauma service signing off  Code status:  Level 3 - DNAR and DNI    Consultants: None    Subjective   Transfer from: None    Mechanism of Injury:Other: possible syncope     Chief Complaint: "I feel good"    HPI/Last 24 hour events: Patient reports that she is feeling good, however still worried that she hit her head yesterday and does not remember  She reports that she was doing house chores, and the only part that she does not remember is going from the upstairs to the downstairs  She denies any headaches or dizziness, reports that she was able to ambulate independently overnight  She further denies any chest pain, shortness of breath, abdominal pain, numbness or tingling, neck or back pain  Objective   Vitals:   Temp:  [97 6 °F (36 4 °C)-98 °F (36 7 °C)] 97 6 °F (36 4 °C)  HR:  [61-87] 78  Resp:  [18] 18  BP: (109-145)/(58-82) 119/60  FiO2 (%):  [21] 21    I/O     None           Physical Exam:   GENERAL APPEARANCE: Patient in no acute distress  HEENT: Left periorbital ecchymosis;  PERRL, EOMs intact; Mucous membranes moist  NECK / BACK: Nontender, ROM intact  CV: Regular rate and rhythm; no murmur/gallops/rubs appreciated  CHEST / LUNGS: Clear to auscultation; no wheezes/rales/rhonci  ABD: NABS; soft; non-distended; non-tender  : Voiding  EXT: +2 pulses bilaterally upper & lower extremities; no edema  NEURO: GCS 15; no focal neurologic deficits; neurovascularly intact  SKIN: Warm, dry and well perfused; no rash; no jaundice  Invasive Devices     Peripheral Intravenous Line  Duration           Peripheral IV 12/23/22 Left Antecubital <1 day                   1  Before the illness or injury that brought you to the Emergency, did you need someone to help you on a regular basis? 0=No   2  Since the illness or injury that brought you to the Emergency, have you needed more help than usual to take care of yourself? 1=Yes   3  Have you been hospitalized for one or more nights during the past 6 months (excluding a stay in the Emergency Department)? 1=Yes   4  In general, do you see well? 1=No   5  In general, do you have serious problems with your memory? 0=No   6  Do you take more than three different medications everyday? 1=Yes   TOTAL   4     Did you order a geriatric consult if the score was 2 or greater?: patient is primarily admitted to internal medicine service         Lab Results: Results: I have personally reviewed all pertinent laboratory/tests results    Imaging Results: I have personally reviewed pertinent reports  Chest Xray(s): negative for acute findings   FAST exam(s): N/A   CT Scan(s): negative for acute findings   Additional Xray(s): N/A     Other Studies:   TRAUMA - CT head wo contrast   Final Result by Marlin Davidson MD (12/23 1427)      No acute intracranial pathology  In particular, no intracranial hemorrhage or calvarial fracture        I personally discussed this study with Tavon Joy on 12/23/2022 at 2:26 PM               Workstation performed: [id number]         TRAUMA - CT spine cervical wo contrast   Final Result by Marlin Davidson MD (12/23 1427)      No cervical spine fracture or traumatic malalignment  Moderate multilevel degenerative changes, most pronounced at C4-C6  I personally discussed this study with Isaiah Chanel on 12/23/2022 at 2:26 PM                       Workstation performed: [id number]         XR trauma multiple   Final Result by Asher Prieto MD (12/23 1430)      No acute pulmonary pathology        I personally discussed this study with Isaiah Chanel on 12/23/2022 at 2:26 PM       Workstation performed: [id number]         XR chest portable    (Results Pending)

## 2022-12-24 NOTE — PLAN OF CARE
Problem: MOBILITY - ADULT  Goal: Maintain or return to baseline ADL function  Description: INTERVENTIONS:  -  Assess patient's ability to carry out ADLs; assess patient's baseline for ADL function and identify physical deficits which impact ability to perform ADLs (bathing, care of mouth/teeth, toileting, grooming, dressing, etc )  - Assess/evaluate cause of self-care deficits   - Assess range of motion  - Assess patient's mobility; develop plan if impaired  - Assess patient's need for assistive devices and provide as appropriate  - Encourage maximum independence but intervene and supervise when necessary  - Involve family in performance of ADLs  - Assess for home care needs following discharge   - Consider OT consult to assist with ADL evaluation and planning for discharge  - Provide patient education as appropriate  Outcome: Adequate for Discharge  Goal: Maintains/Returns to pre admission functional level  Description: INTERVENTIONS:  - Perform BMAT or MOVE assessment daily    - Set and communicate daily mobility goal to care team and patient/family/caregiver  - Collaborate with rehabilitation services on mobility goals if consulted  - Perform Range of Motion   - Reposition patient every 2 hours    - Dangle patient   - Stand patient   - Ambulate patient   - Out of bed to chair  - Out of bed for meals   - Out of bed for toileting  - Record patient progress and toleration of activity level   Outcome: Adequate for Discharge     Problem: Potential for Falls  Goal: Patient will remain free of falls  Description: INTERVENTIONS:  - Educate patient/family on patient safety including physical limitations  - Instruct patient to call for assistance with activity   - Consult OT/PT to assist with strengthening/mobility   - Keep Call bell within reach  - Keep bed low and locked with side rails adjusted as appropriate  - Keep care items and personal belongings within reach  - Initiate and maintain comfort rounds  - Make Fall Risk Sign visible to staff  - Offer Toileting every 2 Hours, in advance of need  - Initiate/Maintain bed/chair alarm  - Obtain necessary fall risk management equipment  - Apply yellow socks and bracelet for high fall risk patients  - Consider moving patient to room near nurses station  Outcome: Adequate for Discharge     Problem: PAIN - ADULT  Goal: Verbalizes/displays adequate comfort level or baseline comfort level  Description: Interventions:  - Encourage patient to monitor pain and request assistance  - Assess pain using appropriate pain scale  - Administer analgesics based on type and severity of pain and evaluate response  - Implement non-pharmacological measures as appropriate and evaluate response  - Consider cultural and social influences on pain and pain management  - Notify physician/advanced practitioner if interventions unsuccessful or patient reports new pain  Outcome: Adequate for Discharge     Problem: CARDIOVASCULAR - ADULT  Goal: Maintains optimal cardiac output and hemodynamic stability  Description: INTERVENTIONS:  - Monitor I/O, vital signs and rhythm  - Monitor for S/S and trends of decreased cardiac output  - Administer and titrate ordered vasoactive medications to optimize hemodynamic stability  - Assess quality of pulses, skin color and temperature  - Assess for signs of decreased coronary artery perfusion  - Instruct patient to report change in severity of symptoms  Outcome: Adequate for Discharge  Goal: Absence of cardiac dysrhythmias or at baseline rhythm  Description: INTERVENTIONS:  - Continuous cardiac monitoring, vital signs, obtain 12 lead EKG if ordered  - Administer antiarrhythmic and heart rate control medications as ordered  - Monitor electrolytes and administer replacement therapy as ordered  Outcome: Adequate for Discharge

## 2022-12-24 NOTE — ASSESSMENT & PLAN NOTE
· Patient has hematoma under left eye noticed today - unclear etiology  · Does not remember falling today or in recent days  · Seen by trauma and cleared for SLIM admit  · Monitor area

## 2022-12-28 ENCOUNTER — TELEPHONE (OUTPATIENT)
Dept: FAMILY MEDICINE CLINIC | Facility: CLINIC | Age: 77
End: 2022-12-28

## 2022-12-28 DIAGNOSIS — I26.99 BILATERAL PULMONARY EMBOLISM (HCC): ICD-10-CM

## 2022-12-28 NOTE — TELEPHONE ENCOUNTER
Patient called she needs a  refill on [facility] [address]          She also wanted you to know she was in the hospital last weekend

## 2023-01-05 ENCOUNTER — TELEPHONE (OUTPATIENT)
Dept: GASTROENTEROLOGY | Facility: CLINIC | Age: 78
End: 2023-01-05

## 2023-01-05 NOTE — TELEPHONE ENCOUNTER
Dr Nga Gusman, Pt stopped by and has a hem  banding on 1/13/2023  Does she need to stop her xarelto before the banding?   Thanks Mavis Pro

## 2023-01-10 ENCOUNTER — OFFICE VISIT (OUTPATIENT)
Dept: FAMILY MEDICINE CLINIC | Facility: CLINIC | Age: 78
End: 2023-01-10

## 2023-01-10 VITALS
OXYGEN SATURATION: 96 % | TEMPERATURE: 98.2 F | HEART RATE: 80 BPM | SYSTOLIC BLOOD PRESSURE: 116 MMHG | DIASTOLIC BLOOD PRESSURE: 70 MMHG | BODY MASS INDEX: 30.04 KG/M2 | HEIGHT: 64 IN

## 2023-01-10 DIAGNOSIS — M25.461 EFFUSION OF BURSA OF RIGHT KNEE: ICD-10-CM

## 2023-01-10 DIAGNOSIS — Z79.899 ENCOUNTER FOR LONG-TERM (CURRENT) USE OF MEDICATIONS: ICD-10-CM

## 2023-01-10 DIAGNOSIS — S00.83XA FACIAL BRUISING, INITIAL ENCOUNTER: Primary | ICD-10-CM

## 2023-01-10 DIAGNOSIS — Z79.899 ENCOUNTER FOR LONG-TERM CURRENT USE OF HIGH RISK MEDICATION: ICD-10-CM
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
## 2023-01-10 NOTE — PROGRESS NOTES
Name: James Santos      :       MRN: [id number]  Encounter Provider: Chinedu Lorenz DO  Encounter Date: 1/10/2023   Encounter department: [address] [place] [address]  Facial bruising, initial encounter  Comments:  ecchymosis around OS, in fall on Dec 23 which she doesn't remember    2  Encounter for long-term (current) use of medications    3  , initial encounter  Comments:  Dec 23, 2022 at home  No recollection of the fall or what happen  Stayed overnight in [facility]  BMI Counseling: Body mass index is 30 04 kg/m²  The BMI is above normal  Nutrition recommendations include decreasing portion sizes, encouraging healthy choices of fruits and vegetables, decreasing fast food intake, consuming healthier snacks, limiting drinks that contain sugar, moderation in carbohydrate intake, increasing intake of lean protein and reducing intake of saturated and trans fat  Exercise recommendations include moderate physical activity 150 minutes/week and exercising 3-5 times per week  No pharmacotherapy was ordered  Rationale for BMI follow-up plan is due to patient being overweight or obese  Subjective      HPI  Review of Systems   HENT: Negative for trouble swallowing  Eyes: Negative  Respiratory: Negative for shortness of breath  Not much pulmonary reserve and probably would benefit from pulmonary rehab? Cardiovascular:        Denies today chest pain tightness heaviness pressure but does have vague aches and pains and is considerably deconditioned  Gastrointestinal: Negative for nausea  Has what appears to be esophageal dysmotility syndrome or something that is affecting the esophagus given GERD and regurgitation and feeling of food impaction  Just completed the Ba swallow and noteable esoph dismotility   Genitourinary: Negative  Musculoskeletal: Positive for arthralgias, back pain, joint swelling and myalgias          Stopped Cymbalta - not helping   Not herself    Skin: Positive for pallor  Neurological: Negative for dizziness and weakness  Hematological: Negative  Psychiatric/Behavioral: Positive for sleep disturbance (States she sleeps 2 hours then up and sleep is broken)  Negative for dysphoric mood         Current Outpatient Medications on File Prior to Visit   Medication Sig   • acetaminophen (TYLENOL) 325 mg tablet Take 650 mg by mouth every 6 (six) hours as needed for mild pain   • amLODIPine (NORVASC) 5 mg tablet Take 1 tablet (5 mg total) by mouth daily   • ASPIRIN 81 PO Aspirin EC 81 MG Oral Tablet Delayed Release    Refills: 0       Active   • brimonidine (ALPHAGAN P) 0 15 % ophthalmic solution Administer to the right eye every evening    • cholecalciferol (VITAMIN D3) 1,000 units tablet Take 1,000 Units by mouth daily   • desvenlafaxine succinate (PRISTIQ) 50 mg 24 hr tablet TAKE 1 TABLET(50 MG) BY MOUTH DAILY   • famotidine (PEPCID) 40 MG tablet Take 1 tablet (40 mg total) by mouth 2 (two) times a day   • fexofenadine (ALLEGRA) 180 MG tablet Take 180 mg by mouth as needed    • hydrOXYzine HCL (ATARAX) 25 mg tablet Take 25 mg by mouth daily at bedtime as needed for anxiety or itching   • ipratropium (ATROVENT) 0 03 % nasal spray USE 2 SPRAYS IN EACH NOSTRIL TWICE DAILY AS NEEDED FOR RHINITIS OR RUNNY NOSE   • latanoprost (XALATAN) 0 005 % ophthalmic solution Administer 1 drop to both eyes daily at bedtime   • levothyroxine 100 mcg tablet TAKE 1 TABLET (100 MCG TOTAL) BY MOUTH DAILY   • pregabalin (LYRICA) 150 mg capsule Take 1 capsule (150 mg total) by mouth in the morning   • rivaroxaban (Xarelto) 20 mg tablet Take 1 tablet (20 mg total) by mouth every evening   • rOPINIRole (REQUIP) 1 mg tablet TAKE 1 TABLET AT 6PM AND TAKE 2 TABLETS AT BEDTIME   • timolol (TIMOPTIC) 0 5 % ophthalmic solution Administer to both eyes 2 (two) times a day    • torsemide (DEMADEX) 10 mg tablet TAKE 1 TABLET (10 MG TOTAL) BY MOUTH DAILY • traMADol (Ultram) 50 mg tablet Take 1/2 tab at 8AM, 1/2 tab at 1PM, and 1 tab at HS (total 2 tabs/day) (Patient taking differently: Take 1/2 tab at 8AM and 1/2 tab at night)   • ondansetron (ZOFRAN) 4 mg tablet Take 1 tablet (4 mg total) by mouth every 8 (eight) hours as needed for nausea or vomiting (Patient not taking: Reported on 1/10/2023)       Objective     /70 (BP Location: Right arm, Patient Position: Sitting, Cuff Size: Standard)   Pulse 80   Temp 98 2 °F (36 8 °C) (Temporal)   Ht 5' 4" (1 626 m)   SpO2 96%   BMI 30 04 kg/m²     Physical Exam  Vitals and nursing note reviewed  Constitutional:       General: She is not in acute distress  Appearance: Normal appearance  She is well-developed  She is obese  She is not ill-appearing, toxic-appearing or diaphoretic  HENT:      Head: Normocephalic and atraumatic  Nose: Nose normal    Eyes:      Conjunctiva/sclera: Conjunctivae normal       Pupils: Pupils are equal, round, and reactive to light  Neck:      Thyroid: No thyromegaly  Trachea: No tracheal deviation  Cardiovascular:      Rate and Rhythm: Normal rate and regular rhythm  Pulses: Normal pulses  Heart sounds: Normal heart sounds  Pulmonary:      Effort: No respiratory distress  Breath sounds: Normal breath sounds  No wheezing, rhonchi or rales  Chest:      Chest wall: No tenderness  Abdominal:      General: Bowel sounds are normal  There is no distension  Palpations: Abdomen is soft  There is no mass  Tenderness: There is no abdominal tenderness  There is no guarding or rebound  Musculoskeletal:         General: No tenderness or deformity  Normal range of motion  Cervical back: Normal range of motion and neck supple  Right lower leg: No edema  Left lower leg: No edema  Comments: [facility], 2021 surg LLE tendon  Doing well, can now flex foot     Skin:     General: Skin is warm and dry        Coloration: Skin is not jaundiced or pale  Findings: No bruising, erythema or rash  Neurological:      General: No focal deficit present  Mental Status: She is alert and oriented to person, place, and time  Mental status is at baseline  Cranial Nerves: No cranial nerve deficit  Comments: Note--I have accessed the risk scores for the ASCVD risk at 24 5%   Psychiatric:         Mood and Affect: Mood normal          Behavior: Behavior normal          Thought Content:  Thought content normal          Judgment: Judgment normal        Kuldeep Tirado DO

## 2023-01-13 ENCOUNTER — TELEPHONE (OUTPATIENT)
Dept: PULMONOLOGY | Facility: MEDICAL CENTER | Age: 78
End: 2023-01-13

## 2023-01-13 ENCOUNTER — OFFICE VISIT (OUTPATIENT)
Dept: GASTROENTEROLOGY | Facility: CLINIC | Age: 78
End: 2023-01-13

## 2023-01-13 VITALS
WEIGHT: 175 LBS | HEIGHT: 64 IN | DIASTOLIC BLOOD PRESSURE: 65 MMHG | BODY MASS INDEX: 29.88 KG/M2 | SYSTOLIC BLOOD PRESSURE: 108 MMHG | HEART RATE: 83 BPM

## 2023-01-13 DIAGNOSIS — K64.8 INTERNAL HEMORRHOIDS: Primary | ICD-10-CM

## 2023-01-13 DIAGNOSIS — K62.5 RECTAL BLEEDING: ICD-10-CM

## 2023-01-13 NOTE — TELEPHONE ENCOUNTER
Madeleine walked in to the office requesting information about a new sleep study order  She reports discussing the possibility of a new sleep study in December  I do not see an order in her chart or reference to same  She is also received a call from Mark Webb stating she needs a letter allowing her to use the Wisp nasal mask with magnets due to knee replacement surgery

## 2023-01-13 NOTE — PROGRESS NOTES
Anal Excision Procedures  Performed by: Justin Gonzalez MD  Authorized by: Justin Gonzalez MD     Universal Protocol:     Verbal consent obtained?: Yes      Risks and benefits: Risks, benefits and alternatives were discussed      Consent given by:  Patient    Patient states understanding of procedure being performed: Yes      Patient's understanding of procedure matches consent: Yes      Procedure consent matches procedure scheduled: Yes      Relevant documents present and verified: Yes      Test results available and properly labeled: Yes      Site marked: No      Radiology Images displayed and confirmed  If images not available, report reviewed: No      Required items: Required blood products, implants, devices and special equipment available      Patient identity confirmed:  Verbally with patient    Time out: Immediately prior to the procedure a time out was called    A time out verifies correct patient, procedure, equipment, support staff and site/side marked as required:   Procedure Type: hemorrhoidectomy    Patient Position:  LPO  Hemorrhoidectomy Details:   Hemorrhoid type: internal    Internal position:  One o'clock  Single rubber band ligation    Patient tolerance:  Patient tolerated the procedure well with no immediate complications  Additiional Procedure Intervention Details:   Patient was on Xarelto understand the increased risk of bleeding with the procedure  There was no bleeding noted    Patient will follow-up in 4 weeks for 3rd banding

## 2023-01-13 NOTE — TELEPHONE ENCOUNTER
I don't see or recall having a conversation on a new sleep study, she seems to already had a new CPAP machine in May of last year    If she needs to have further discussion or clarification she can schedule a follow-up appointment thanks

## 2023-01-24 DIAGNOSIS — K21.9 CHRONIC GERD: ICD-10-CM

## 2023-01-24 RX ORDER — FAMOTIDINE 40 MG/1
TABLET, FILM COATED ORAL
Qty: 180 TABLET | Refills: 0 | Status: SHIPPED | OUTPATIENT
Start: 2023-01-24

## 2023-03-24 ENCOUNTER — OFFICE VISIT (OUTPATIENT)
Dept: GASTROENTEROLOGY | Facility: CLINIC | Age: 78
End: 2023-03-24

## 2023-03-24 VITALS
HEART RATE: 79 BPM | BODY MASS INDEX: 31.24 KG/M2 | DIASTOLIC BLOOD PRESSURE: 65 MMHG | WEIGHT: 183 LBS | SYSTOLIC BLOOD PRESSURE: 136 MMHG | HEIGHT: 64 IN

## 2023-03-24 DIAGNOSIS — K62.5 RECTAL BLEEDING: ICD-10-CM

## 2023-03-24 DIAGNOSIS — K64.8 INTERNAL HEMORRHOIDS: Primary | ICD-10-CM

## 2023-03-24 NOTE — PROGRESS NOTES
Anal Excision Procedures  Performed by: Mitch Cantrell MD  Authorized by: Mitch Cantrell MD     Universal Protocol:     Verbal consent obtained?: Yes      Risks and benefits: Risks, benefits and alternatives were discussed      Patient states understanding of procedure being performed: Yes      Patient's understanding of procedure matches consent: Yes      Procedure consent matches procedure scheduled: Yes      Relevant documents present and verified: Yes      Test results available and properly labeled: Yes      Site marked: No      Radiology Images displayed and confirmed  If images not available, report reviewed: No      Required items: Required blood products, implants, devices and special equipment available      Patient identity confirmed:  Verbally with patient  A time out verifies correct patient, procedure, equipment, support staff and site/side marked as required:   Procedure Type: hemorrhoidectomy    Patient Position:  LPO  Hemorrhoidectomy Details:   Hemorrhoid type: internal    Internal position:  Twelve o'clock  Single rubber band ligation    Patient tolerance:  Patient tolerated the procedure well with no immediate complications  Additiional Procedure Intervention Details:  Prior to hemorrhoidectomy due to continued intermittent bleeding reinspection with anoscopy was performed  Large hemorrhoid at 12:00 appears as though it had bled recently  6:00 hemorrhoid present  Patient is on Xarelto cannot stop it      Patient will follow-up in 4 weeks for banding of the 6:00 hemorrhoid

## 2023-03-28 DIAGNOSIS — M54.9 BACK PAIN, UNSPECIFIED BACK LOCATION, UNSPECIFIED BACK PAIN LATERALITY, UNSPECIFIED CHRONICITY: Primary | ICD-10-CM

## 2023-03-28 DIAGNOSIS — M79.661 PAIN IN BOTH LOWER LEGS: ICD-10-CM

## 2023-03-28 DIAGNOSIS — M79.662 PAIN IN BOTH LOWER LEGS: ICD-10-CM

## 2023-03-28 NOTE — TELEPHONE ENCOUNTER
Hi, I was wondering if I could see doctor Darrel Poles  I'd like to have him write me a prescription for Flexeril  This is janene Pop   My phone number is [phone]  I'm still having a lot of pain in my back and my leg and I had a couple leftover and I took a couple because I need something to sleep at night  So I would appreciate it if I could have a call back either to see him or [place]  Thank you very much  Have a good day   Bye

## 2023-03-30 NOTE — TELEPHONE ENCOUNTER
Lucita Najjar I am looking at the patients medication active list for the flexeril and its not in there, patient is request flexeril

## 2023-03-31 RX ORDER — CYCLOBENZAPRINE HCL 5 MG
5 TABLET ORAL 3 TIMES DAILY PRN
Qty: 30 TABLET | Refills: 0 | Status: SHIPPED | OUTPATIENT
Start: 2023-03-31

## 2023-03-31 NOTE — TELEPHONE ENCOUNTER
Pt contraindication/allergy warning to the medication Flexeril     Epic flagged Flexeril/Nortriptyline as allergen that will cause vomiting

## 2023-05-08 DIAGNOSIS — E03.9 HYPOTHYROIDISM, ADULT: ICD-10-CM

## 2023-05-08 RX ORDER — LEVOTHYROXINE SODIUM 0.1 MG/1
TABLET ORAL
Qty: 90 TABLET | Refills: 3 | Status: SHIPPED | OUTPATIENT
Start: 2023-05-08

## 2023-07-05 DIAGNOSIS — M48.062 SPINAL STENOSIS OF LUMBAR REGION WITH NEUROGENIC CLAUDICATION: ICD-10-CM

## 2023-07-05 RX ORDER — PREGABALIN 150 MG/1
150 CAPSULE ORAL DAILY
Qty: 90 CAPSULE | Refills: 1 | Status: SHIPPED | OUTPATIENT
Start: 2023-07-05

## 2023-07-05 NOTE — TELEPHONE ENCOUNTER
Amy Sierra, this is Mrs. Sanchez. I called last week about a prescription refill,  pregabalin (LYRICA) 150 mg capsule  I called on Thursday, haven't heard anything from the pharmacy [facility]. I was just wondering if anybody called that in for me. I haven't had it now for two days and I appreciate it if someone would call me back or call a prescription in. My phone number is [phone]. Thank you.

## 2023-07-06 NOTE — TELEPHONE ENCOUNTER
LM  - This is Elke Napoles calling again about my prescription refill of paraKody Patel wanted 50 milligram capsules. I am out and I have not had the pills for three days and I am feeling terrible now. I called last week on Thursday and I called again yesterday. The pharmacy has not received any calls and I really need the medication. You can call me back [phone]. My birth date is [birth year] [place]. Thank you, I appreciate getting this taken care of for me. -- called pt to inform her that the medication was sent to the pharmacy at this time.

## 2023-07-11 DIAGNOSIS — K21.9 CHRONIC GERD: ICD-10-CM

## 2023-07-11 RX ORDER — FAMOTIDINE 40 MG/1
TABLET, FILM COATED ORAL
Qty: 180 TABLET | Refills: 2 | Status: SHIPPED | OUTPATIENT
Start: 2023-07-11

## 2023-07-12 ENCOUNTER — RA CDI HCC (OUTPATIENT)
Dept: OTHER | Facility: HOSPITAL | Age: 78
End: 2023-07-12

## 2023-07-12 NOTE — PROGRESS NOTES
[facility] coding opportunities       Chart reviewed, no opportunity found: CHART REVIEWED, NO OPPORTUNITY FOUND        Patients Insurance     Medicare Insurance: Medicare

## 2023-07-18 ENCOUNTER — OFFICE VISIT (OUTPATIENT)
Dept: FAMILY MEDICINE CLINIC | Facility: CLINIC | Age: 78
End: 2023-07-18
Payer: MEDICARE

## 2023-07-18 VITALS
HEART RATE: 82 BPM | WEIGHT: 183 LBS | BODY MASS INDEX: 31.24 KG/M2 | TEMPERATURE: 98.6 F | DIASTOLIC BLOOD PRESSURE: 80 MMHG | HEIGHT: 64 IN | SYSTOLIC BLOOD PRESSURE: 130 MMHG

## 2023-07-18 DIAGNOSIS — F33.9 DEPRESSION, RECURRENT (HCC): ICD-10-CM

## 2023-07-18 DIAGNOSIS — Z71.2 ENCOUNTER TO DISCUSS TEST RESULTS: Primary | ICD-10-CM

## 2023-07-18 DIAGNOSIS — Z79.899 MEDICATION MANAGEMENT: ICD-10-CM

## 2023-07-18 DIAGNOSIS — F11.20 OPIOID DEPENDENCE, UNCOMPLICATED (HCC): ICD-10-CM

## 2023-07-18 DIAGNOSIS — C50.911 BILATERAL MALIGNANT NEOPLASM OF BREAST IN FEMALE, UNSPECIFIED ESTROGEN RECEPTOR STATUS, UNSPECIFIED SITE OF BREAST (HCC): ICD-10-CM

## 2023-07-18 DIAGNOSIS — I26.99 BILATERAL PULMONARY EMBOLISM (HCC): ICD-10-CM

## 2023-07-18 DIAGNOSIS — Z98.82 H/O BILATERAL BREAST IMPLANTS: ICD-10-CM

## 2023-07-18 DIAGNOSIS — C50.912 BILATERAL MALIGNANT NEOPLASM OF BREAST IN FEMALE, UNSPECIFIED ESTROGEN RECEPTOR STATUS, UNSPECIFIED SITE OF BREAST (HCC): ICD-10-CM

## 2023-07-18 PROCEDURE — G0439 PPPS, SUBSEQ VISIT: HCPCS | Performed by: FAMILY MEDICINE

## 2023-07-18 PROCEDURE — 99214 OFFICE O/P EST MOD 30 MIN: CPT | Performed by: FAMILY MEDICINE

## 2023-07-18 RX ORDER — SIMVASTATIN 40 MG
TABLET ORAL
COMMUNITY

## 2023-07-18 RX ORDER — ALENDRONATE SODIUM 70 MG/1
TABLET ORAL
COMMUNITY

## 2023-07-18 RX ORDER — LANOLIN ALCOHOL/MO/W.PET/CERES
CREAM (GRAM) TOPICAL
COMMUNITY

## 2023-07-18 RX ORDER — RALOXIFENE HYDROCHLORIDE 60 MG/1
TABLET, FILM COATED ORAL
COMMUNITY
End: 2023-07-18

## 2023-07-18 RX ORDER — ASCORBIC ACID 500 MG
TABLET ORAL
COMMUNITY

## 2023-07-18 RX ORDER — DULOXETIN HYDROCHLORIDE 30 MG/1
CAPSULE, DELAYED RELEASE ORAL
COMMUNITY

## 2023-07-18 RX ORDER — FOLIC ACID 1 MG/1
TABLET ORAL
COMMUNITY

## 2023-07-18 RX ORDER — FERROUS SULFATE 325(65) MG
TABLET ORAL
COMMUNITY

## 2023-07-18 NOTE — PROGRESS NOTES
Assessment and Plan:     Problem List Items Addressed This Visit    None       Preventive health issues were discussed with patient, and age appropriate screening tests were ordered as noted in patient's After Visit Summary. Personalized health advice and appropriate referrals for health education or preventive services given if needed, as noted in patient's After Visit Summary.      History of Present Illness:     Patient presents for a Medicare Wellness Visit    HPI   Patient Care Team:  Guru Perez DO as PCP - General (Family Medicine)     Review of Systems:     Review of Systems     Problem List:     Patient Active Problem List   Diagnosis   • Hiatal hernia   • Mixed hyperlipidemia   • Anxiety and depression   • Hypothyroidism, adult   • Restless leg syndrome, controlled   • Swelling of limb   • Osteoporosis   • GERD (gastroesophageal reflux disease)   • Lesion of radial nerve   • Ductal carcinoma in situ (DCIS) of left breast   • Bilateral pulmonary embolism (HCC)   • Anemia   • Abnormal gait   • Cellulitis and abscess of right lower extremity   • Trochanteric bursitis of left hip   • Personal history of DVT (deep vein thrombosis)   • Depression, recurrent (HCC)   • Hx of adenomatous colonic polyps   • Dysphagia   • History of pulmonary embolus (PE)   • Blood in stool   • Iron deficiency anemia due to chronic blood loss   • Internal hemorrhoids   • GAVE (gastric antral vascular ectasia)   • Spasm, peroneo-extensor   • Pain in joint involving ankle and foot   • Ankle pain   • Moderate obstructive sleep apnea   • Left foot drop   • Hematochezia   • Equinus contracture of ankle   • Acquired pes planus of left foot   • TA (temporal arteritis) (HCC)   • Abscess of forearm, right   • Fatigue   • Screening for blood or protein in urine   • RLS (restless legs syndrome)   • Fibromyalgia   • Examination for, follow-up   • Blunt injury to chest   • Sprain of left ankle   • Left wrist sprain, subsequent encounter   • Sprain of cervical neck, sequela   • Lumbar contusion   • Bilateral malignant neoplasm of breast in female, unspecified estrogen receptor status, unspecified site of breast (HCC)   • Continuous opioid dependence (HCC)   • Dizziness   • Class 1 obesity due to excess calories with body mass index (BMI) of 31.0 to 31.9 in adult   • COVID-19 long hauler manifesting chronic fatigue   • Chest discomfort   • Facial hematoma   • Epistaxis      Past Medical and Surgical History:     Past Medical History:   Diagnosis Date   • Cancer [facility])     left breast cancer   • Cervical herniated disc    • Chronic pain disorder     back pain   • Chronic respiratory failure (HCC)     uses O2 at home with [facility] /5/19 no longer using/just CPAP   • CPAP (continuous positive airway pressure) dependence    • Disease of thyroid gland     hypothyroid   • DVT (deep venous thrombosis) ([address]) 2020    right leg   • Family history of reaction to anesthesia     "son and daughter hard time waking up and also PONV"   • Fibromyalgia, primary    • GERD (gastroesophageal reflux disease)    • Glaucoma    • Hiatal hernia    • History of palpitations    • History of pneumonia    • History of transfusion    • Hyperlipidemia    • Hypertension    • Irregular heart beat    • Migraine    • Myocardial infarction [facility])     pt sees cardilogist Dr Kirill Galeana realize had one, found on EKG before a surgery"   • OAB (overactive bladder)    • Pollen allergies    • Pulmonary embolism ([address]) 2019    Bilateral; on Xarelto   • Restless leg    • Risk for falls    • Sleep apnea    • Use of cane as ambulatory aid     occas   • Uses brace     Mafo/left leg   • Wears glasses      Past Surgical History:   Procedure Laterality Date   • ADENOIDECTOMY     • CARDIAC CATHETERIZATION     • CARPAL TUNNEL RELEASE     • CATARACT EXTRACTION Bilateral    • FOOT SURGERY      pin implanted right toe   • JOINT REPLACEMENT Bilateral     knees   • LAMINECTOMY     • LUMBAR EPIDURAL INJECTION • MASTECTOMY Bilateral     with reconstruction and implants   • NISSEN FUNDOPLICATION     • [place] LNGTH/SHRT TENDON LEG/ANKLE 1 TENDON SPX Left 2021    Procedure: Sectioning peroneal tendons with lengthening/sectioning achilles tendon lower leg and ankle;  Surgeon: Atiya Cotton DPM;  Location: [facility];  Service: Podiatry   • REPLACEMENT TOTAL KNEE     • SPINAL FUSION     • TONSILLECTOMY     • TUBAL LIGATION     • TUMOR EXCISION      right forearm/benign   • VEIN LIGATION Right       Family History:     Family History   Problem Relation Age of Onset   • Cancer Sister       Social History:     Social History     Socioeconomic History   • Marital status:       Spouse name: None   • Number of children: None   • Years of education: None   • Highest education level: None   Occupational History   • None   Tobacco Use   • Smoking status: Never   • Smokeless tobacco: Never   Vaping Use   • Vaping Use: Never used   Substance and Sexual Activity   • Alcohol use: Yes     Comment: socially   • Drug use: Never   • Sexual activity: None     Comment: defer   Other Topics Concern   • None   Social History Narrative    · Most recent tobacco use screenin2019      Social Determinants of Health     Financial Resource Strain: Not on file   Food Insecurity: Not on file   Transportation Needs: Not on file   Physical Activity: Not on file   Stress: Not on file   Social Connections: Not on file   Intimate Partner Violence: Not on file   Housing Stability: Not on file      Medications and Allergies:     Current Outpatient Medications   Medication Sig Dispense Refill   • acetaminophen (TYLENOL) 325 mg tablet Take 650 mg by mouth every 6 (six) hours as needed for mild pain     • amLODIPine (NORVASC) 5 mg tablet Take 1 tablet (5 mg total) by mouth daily 90 tablet 3   • ASPIRIN 81 PO Aspirin EC 81 MG Oral Tablet Delayed Release    Refills: 0       Active     • brimonidine (ALPHAGAN P) 0.15 % ophthalmic solution Administer to the right eye every evening      • cholecalciferol (VITAMIN D3) 1,000 units tablet Take 1,000 Units by mouth daily     • cyclobenzaprine (FLEXERIL) 5 mg tablet Take 1 tablet (5 mg total) by mouth 3 (three) times a day as needed for muscle spasms 30 tablet 0   • desvenlafaxine succinate (PRISTIQ) 50 mg 24 hr tablet Take 1 tablet (50 mg total) by mouth daily 90 tablet 3   • famotidine (PEPCID) 40 MG tablet TAKE 1 TABLET TWICE DAILY 180 tablet 2   • ferrous sulfate 325 (65 Fe) mg tablet 325 mg by oral route. • fexofenadine (ALLEGRA) 180 MG tablet Take 180 mg by mouth as needed      • fluticasone (FLONASE) 50 mcg/act nasal spray 2 sprays into each nostril once OD     • hydrOXYzine HCL (ATARAX) 25 mg tablet Take 25 mg by mouth daily at bedtime as needed for anxiety or itching     • latanoprost (XALATAN) 0.005 % ophthalmic solution Administer 1 drop to both eyes daily at bedtime     • levothyroxine 100 mcg tablet TAKE 1 TABLET EVERY DAY 90 tablet 3   • pregabalin (LYRICA) 150 mg capsule Take 1 capsule (150 mg total) by mouth in the morning 90 capsule 1   • rivaroxaban (Xarelto) 20 mg tablet Take 1 tablet (20 mg total) by mouth every evening 90 tablet 3   • rOPINIRole (REQUIP) 1 mg tablet TAKE 1 TABLET AT 6PM AND TAKE 2 TABLETS AT BEDTIME 270 tablet 3   • senna (SENOKOT) 8.6 MG tablet Take 8.6 mg by mouth in the morning     • torsemide (DEMADEX) 10 mg tablet TAKE 1 TABLET (10 MG TOTAL) BY MOUTH DAILY 90 tablet 3   • traMADol (Ultram) 50 mg tablet Take 1/2 tab at 8AM, 1/2 tab at 1PM, and 1 tab at HS (total 2 tabs/day) (Patient taking differently: Pt taking 1-2 tablets daily) 60 tablet 3   • alendronate (Fosamax) 70 mg tablet Take 1 tablet every week by oral route. (Patient not taking: Reported on 7/18/2023)     • calcium citrate-vitamin D 315 mg-5 mcg tablet  (Patient not taking: Reported on 7/18/2023)     • Chelated Zinc 50 MG TABS 50 mg by oral route.  (Patient not taking: Reported on 7/18/2023)     • DULoxetine (CYMBALTA) 30 mg delayed release capsule total of 90mg (Patient not taking: Reported on 8/74/1013)     • folic acid (FOLVITE) 1 mg tablet 1 mg by oral route. (Patient not taking: Reported on 7/18/2023)     • ipratropium (ATROVENT) 0.03 % nasal spray USE 2 SPRAYS IN EACH NOSTRIL TWICE DAILY AS NEEDED FOR RHINITIS OR RUNNY NOSE (Patient not taking: Reported on 4/11/2023) 60 mL 11   • ondansetron (ZOFRAN) 4 mg tablet Take 1 tablet (4 mg total) by mouth every 8 (eight) hours as needed for nausea or vomiting (Patient not taking: Reported on 7/18/2023) 90 tablet 0   • raloxifene (Evista) 60 mg tablet Take 1 tablet every day by oral route. (Patient not taking: Reported on 7/18/2023)     • simvastatin (ZOCOR) 40 mg tablet Take 1 tablet every day by oral route. (Patient not taking: Reported on 7/18/2023)     • timolol (TIMOPTIC) 0.5 % ophthalmic solution Administer to both eyes 2 (two) times a day        No current facility-administered medications for this visit.      Allergies   Allergen Reactions   • Benzalkonium Chloride Hives     Merthiolate tincture 9/28/2020     • Hydromorphone Hallucinations     disorientation; hallucination; confusion     • Merthiolate  [Thimerosal] Hives   • Quinine Derivatives Other (See Comments) and Tinnitus     tinnitus     • Codeine Nausea Only and Other (See Comments)     nausea     • Medical Tape Rash   • Other Itching   • Pamelor [Nortriptyline] Vomiting     Per pt, itching also   • Pollen Extract Nasal Congestion   • Statins Itching   • Wound Dressing Adhesive Rash      Immunizations:     Immunization History   Administered Date(s) Administered   • COVID-19 MODERNA VACC 0.5 ML IM 01/28/2021, 02/25/2021, 01/11/2022   • INFLUENZA 01/01/2008, 09/15/2011, 10/17/2012, 11/06/2013, 10/21/2014, 10/19/2015, 11/01/2018, 11/20/2020, 10/10/2022   • Influenza Split High Dose Preservative Free IM 11/20/2020   • Influenza, high dose seasonal 0.7 mL 09/16/2020, 11/03/2021, 10/10/2022   • Pneumococcal Conjugate 13-Valent 10/19/2015   • Pneumococcal Polysaccharide PPV23 01/01/2006, 01/01/2008, 09/28/2021   • Tdap 01/01/2006   • Zoster Vaccine Recombinant 09/30/2020, 12/04/2020      Health Maintenance:         Topic Date Due   • Hepatitis C Screening  Completed         Topic Date Due   • COVID-19 Vaccine (4 - Moderna series) 03/08/2022   • Influenza Vaccine (1) 09/01/2023      Medicare Screening Tests and Risk Assessments:     Grace Maguire is here for her Subsequent Wellness visit. Health Risk Assessment:   Patient rates overall health as fair. Patient feels that their physical health rating is slightly worse. Patient is satisfied with their life. Eyesight was rated as same. Hearing was rated as same. Patient feels that their emotional and mental health rating is same. Patients states they are never, rarely angry. Patient states they are always unusually tired/fatigued. Pain experienced in the last 7 days has been a lot. Patient's pain rating has been 10/10. Patient states that she has experienced no weight loss or gain in last 6 months. Depression Screening:   PHQ-9 Score: 9      Fall Risk Screening: In the past year, patient has experienced: history of falling in past year    Number of falls: 2 or more  Injured during fall?: Yes    Feels unsteady when standing or walking?: Yes    Worried about falling?: Yes      Urinary Incontinence Screening:   Patient has leaked urine accidently in the last six months. Wears pads daily     Home Safety:  Patient has trouble with stairs inside or outside of their home. Patient has working smoke alarms and has working carbon monoxide detector. Home safety hazards include: none. Nutrition:   Current diet is Regular and Limited junk food. Medications:   Patient is currently taking over-the-counter supplements. OTC medications include: see medication list. Patient is able to manage medications.      Activities of Daily Living (ADLs)/Instrumental Activities of Daily Living (IADLs):   Walk and transfer into and out of bed and chair?: Yes  Dress and groom yourself?: Yes    Bathe or shower yourself?: Yes    Feed yourself? Yes  Do your laundry/housekeeping?: Yes  Manage your money, pay your bills and track your expenses?: Yes  Make your own meals?: Yes    Do your own shopping?: Yes    Previous Hospitalizations:   Any hospitalizations or ED visits within the last 12 months?: Yes    How many hospitalizations have you had in the last year?: 1-2    Advance Care Planning:   Living will: Yes    Durable POA for healthcare: Yes    Advanced directive: Yes      Cognitive Screening:   Provider or family/friend/caregiver concerned regarding cognition?: No    PREVENTIVE SCREENINGS      Cardiovascular Screening:    General: History Lipid Disorder, Risks and Benefits Discussed and Patient Declines      Diabetes Screening:     General: Screening Current and Risks and Benefits Discussed      Colorectal Cancer Screening:     General: Risks and Benefits Discussed      Breast Cancer Screening:     General: Screening Not Indicated, History Breast Cancer and Risks and Benefits Discussed      Cervical Cancer Screening:    General: Screening Not Indicated      Osteoporosis Screening:    General: Screening Not Indicated, History Osteoporosis and Risks and Benefits Discussed      Lung Cancer Screening:     General: Screening Not Indicated and Risks and Benefits Discussed      Hepatitis C Screening:    General: Screening Current and Risks and Benefits Discussed    Hep C Screening Accepted: No       Preventive Screening Comments: choll 266 - but "statins make me sick" -- itching, all over, face, etc. Cardiologist ) Dr. Kristina Richards, told her to stop    Screening, Brief Intervention, and Referral to Treatment (SBIRT)    Screening  Typical number of drinks in a day: 0  Typical number of drinks in a week: 0  Interpretation: Low risk drinking behavior.     AUDIT-C Screenin) How often did you have a drink containing alcohol in the past year? never  2) How many drinks did you have on a typical day when you were drinking in the past year? 0  3) How often did you have 6 or more drinks on one occasion in the past year? never    AUDIT-C Score: 0  Interpretation: Score 0-2 (female): Negative screen for alcohol misuse    Single Item Drug Screening:  How often have you used an illegal drug (including marijuana) or a prescription medication for non-medical reasons in the past year? never    Single Item Drug Screen Score: 0  Interpretation: Negative screen for possible drug use disorder    Brief Intervention  Alcohol & drug use screenings were reviewed. No concerns regarding substance use disorder identified. Healthy alcohol use/limits discussed. Other Counseling Topics:   Car/seat belt/driving safety, skin self-exam, sunscreen and calcium and vitamin D intake and regular weightbearing exercise. No results found.      Physical Exam:     /80 (BP Location: Left arm, Patient Position: Sitting, Cuff Size: Standard)   Pulse 82   Temp 98.6 °F (37 °C) (Temporal)   Ht 5' 4" (1.626 m)   Wt 83 kg (183 lb)   BMI 31.41 kg/m²     Physical Exam     Clista Samples, DO

## 2023-07-21 DIAGNOSIS — Z79.899 ENCOUNTER FOR LONG-TERM (CURRENT) USE OF MEDICATIONS: ICD-10-CM

## 2023-07-21 DIAGNOSIS — M48.062 SPINAL STENOSIS OF LUMBAR REGION WITH NEUROGENIC CLAUDICATION: ICD-10-CM

## 2023-07-21 DIAGNOSIS — D47.2 MONOCLONAL GAMMOPATHY OF UNDETERMINED SIGNIFICANCE: ICD-10-CM

## 2023-07-21 RX ORDER — TRAMADOL HYDROCHLORIDE 50 MG/1
TABLET ORAL
Qty: 60 TABLET | Refills: 3 | Status: SHIPPED | OUTPATIENT
Start: 2023-07-21

## 2023-07-21 RX ORDER — HYDROXYZINE HYDROCHLORIDE 25 MG/1
25 TABLET, FILM COATED ORAL
Qty: 30 TABLET | Refills: 3 | Status: SHIPPED | OUTPATIENT
Start: 2023-07-21

## 2023-08-16 ENCOUNTER — TELEPHONE (OUTPATIENT)
Dept: FAMILY MEDICINE CLINIC | Facility: CLINIC | Age: 78
End: 2023-08-16

## 2023-08-16 DIAGNOSIS — I26.99 BILATERAL PULMONARY EMBOLISM (HCC): ICD-10-CM

## 2023-08-16 NOTE — TELEPHONE ENCOUNTER
Pt called back -- pt states that desvenlafaxine is not working for her -- she gave it a good trial period but states she is ready to try an alternate medication. She reports crying throughout the day, she has not been herself lately, especially w everything going on w her knee. Pt also states she needs refill on Xarelto -- her last fill was July and was 90 day Rx -- she cannot find her current bottle so believes that she lost it and will need refill. Reviewed w pt the dose, route, and frequency of this medication and pt reports taking as prescribed. Agreed to send 30 day supply to [facility] pharmacy on [address] but was unsure if there would be insurance coverage, and pt acknowledged that it may not be covered.

## 2023-08-16 NOTE — TELEPHONE ENCOUNTER
Patient called she would like something for depression/anxiety   kvng not helping   She is going to have knee surgery in September  All she does is cry  Can something be called in    She also wants the zorelto  Sent to [address]    She is having problems  with her mail order pharmacy   She only has one left

## 2023-08-18 ENCOUNTER — TELEPHONE (OUTPATIENT)
Dept: FAMILY MEDICINE CLINIC | Facility: CLINIC | Age: 78
End: 2023-08-18

## 2023-08-18 NOTE — TELEPHONE ENCOUNTER
Pt. Called in wanted to know if her anxiety meds are going to changed and or increased. Wants a call back from Clinical or

## 2023-08-18 NOTE — TELEPHONE ENCOUNTER
Called pt. Back and left her this mess as per Dr. Melendez , patient needs to come in to discuss medication

## 2023-08-24 ENCOUNTER — TELEPHONE (OUTPATIENT)
Dept: ADMINISTRATIVE | Facility: OTHER | Age: 78
End: 2023-08-24

## 2023-08-24 NOTE — TELEPHONE ENCOUNTER
08/24/23 9:07 AM    Patient contacted (no answer/ line busy) to bring Advance Directive, POLST, or Living Will document to next scheduled pcp visit. Thank you.   Akbar Albert  [facility]

## 2023-08-25 ENCOUNTER — TELEPHONE (OUTPATIENT)
Dept: FAMILY MEDICINE CLINIC | Facility: CLINIC | Age: 78
End: 2023-08-25

## 2023-08-25 ENCOUNTER — OFFICE VISIT (OUTPATIENT)
Dept: FAMILY MEDICINE CLINIC | Facility: CLINIC | Age: 78
End: 2023-08-25
Payer: MEDICARE

## 2023-08-25 VITALS
HEIGHT: 64 IN | BODY MASS INDEX: 31.41 KG/M2 | HEART RATE: 74 BPM | SYSTOLIC BLOOD PRESSURE: 126 MMHG | DIASTOLIC BLOOD PRESSURE: 64 MMHG | TEMPERATURE: 97.1 F | OXYGEN SATURATION: 95 %

## 2023-08-25 DIAGNOSIS — Z13.0 SCREENING, DEFICIENCY ANEMIA, IRON: ICD-10-CM

## 2023-08-25 DIAGNOSIS — Z79.01 CHRONIC ANTICOAGULATION: ICD-10-CM

## 2023-08-25 DIAGNOSIS — I26.99 BILATERAL PULMONARY EMBOLISM (HCC): Primary | ICD-10-CM

## 2023-08-25 DIAGNOSIS — M48.062 SPINAL STENOSIS OF LUMBAR REGION WITH NEUROGENIC CLAUDICATION: ICD-10-CM

## 2023-08-25 DIAGNOSIS — Z01.818 PRE-OP EXAMINATION: ICD-10-CM

## 2023-08-25 DIAGNOSIS — E03.9 HYPOTHYROIDISM, ADULT: ICD-10-CM

## 2023-08-25 DIAGNOSIS — Z79.899 DRUG THERAPY: ICD-10-CM

## 2023-08-25 DIAGNOSIS — Z13.1 SCREENING FOR DIABETES MELLITUS: ICD-10-CM

## 2023-08-25 DIAGNOSIS — M31.6 TA (TEMPORAL ARTERITIS) (HCC): ICD-10-CM

## 2023-08-25 PROCEDURE — 99214 OFFICE O/P EST MOD 30 MIN: CPT | Performed by: NURSE PRACTITIONER

## 2023-08-25 RX ORDER — PREGABALIN 150 MG/1
150 CAPSULE ORAL DAILY
Qty: 90 CAPSULE | Refills: 0 | Status: SHIPPED | OUTPATIENT
Start: 2023-08-25

## 2023-08-25 NOTE — TELEPHONE ENCOUNTER
Left message requesting call back to discuss next option for depression    Per Dr. Chance Owen:   Jenny Madrigal - thanks so much. Like med's tx'ing severe tendonitis, they won't work without PT.  Same thing here, needs psychologist/therapist ALONG with the meds.  She has lots of psych issues/family stress/worry.  Let's try:  Rx Trintellix 5 mg OD  #30  x2  for now. Highly advise refer to psychologist too - I like Dr. Nguyen See a lot (my age, here about 48 yrs, and local - in [place].  He has a 'helper', Dr. Dmitriy Newberry (sp) who is good too.  Would she be willing to see either?  (I can tell you, no med will work without this!)       Will await call back

## 2023-08-25 NOTE — PROGRESS NOTES
[facility] PRE-OPERATIVE EVALUATION  [facility] PHYSICIAN GROUP - [facility] PRIMARY CARE [place]    NAME: Madeleine Sanchez  AGE: 77 y.o. SEX: female  : 1945     DATE: 2023    [facility] Pre-Operative Evaluation      Chief Complaint: Pre-operative Evaluation     Surgery: Right revision total knee  Anticipated Date of Surgery: 23  Surgeon: Dr. Akshat Fatima      History of Present Illness:     HPI    Anesthesia:  regional and spinal  Bleeding Risk: no recent abnormal bleeding, no remote history of abnormal bleeding and use of Ca-channel blockers (see med list)  Current Anti-platelet/anticoagulation medication: Rivaroxaban (Xarelto)    Assessment of Cardiac Risk:  · Denies unstable or severe angina or MI in the last 6 weeks or history of stent placement in the last year   · Denies decompensated heart failure (e.g. New onset heart failure, NYHA functional class IV heart failure, or worsening existing heart failure)  · Denies significant arrhythmias such as high grade AV block, symptomatic ventricular arrhythmia, newly recognized ventricular tachycardia, supraventricular tachycardia with resting heart rate >100, or symptomatic bradycardia  · Denies severe heart valve disease including aortic stenosis or symptomatic mitral stenosis     Exercise Capacity:  · Able to walk 4 blocks without symptoms?: No, exercise capacity limited by advanced knee pain  · Able to walk 2 flights without symptoms?: No, exercise capacity limited by advanced knee pain  ·     Prior Anesthesia Reactions: No, tolerated anesthesia well in past-last anesthesia 2023 back surgery     Personal history of venous thromboembolic disease? Yes, recurrent pulm embolism    History of steroid use for >2 weeks within last year? No         Review of Systems:     Review of Systems   Constitutional: Positive for fatigue. Negative for fever and unexpected weight change. HENT: Negative for trouble swallowing.     Respiratory: Negative for cough and shortness of breath. Cardiovascular: Negative for chest pain, palpitations and leg swelling. Gastrointestinal: Negative for abdominal pain and blood in stool. Endocrine: Negative. Genitourinary: Negative for difficulty urinating and hematuria. Musculoskeletal: Positive for arthralgias, back pain and gait problem. Skin: Negative for pallor. Neurological: Negative for dizziness, tremors, seizures, syncope and weakness. Psychiatric/Behavioral: Positive for dysphoric mood. Negative for confusion. The patient is not nervous/anxious.         Current Problem List:     Patient Active Problem List   Diagnosis   • Hiatal hernia   • Mixed hyperlipidemia   • Anxiety and depression   • Hypothyroidism, adult   • Restless leg syndrome, controlled   • Swelling of limb   • Osteoporosis   • GERD (gastroesophageal reflux disease)   • Lesion of radial nerve   • Ductal carcinoma in situ (DCIS) of left breast   • Bilateral pulmonary embolism (HCC)   • Anemia   • Abnormal gait   • Cellulitis and abscess of right lower extremity   • Trochanteric bursitis of left hip   • Personal history of DVT (deep vein thrombosis)   • Depression, recurrent (HCC)   • Hx of adenomatous colonic polyps   • Dysphagia   • History of pulmonary embolus (PE)   • Blood in stool   • Iron deficiency anemia due to chronic blood loss   • Internal hemorrhoids   • GAVE (gastric antral vascular ectasia)   • Spasm, peroneo-extensor   • Pain in joint involving ankle and foot   • Ankle pain   • Moderate obstructive sleep apnea   • Left foot drop   • Hematochezia   • Equinus contracture of ankle   • Acquired pes planus of left foot   • TA (temporal arteritis) (HCC)   • Abscess of forearm, right   • Fatigue   • Screening for blood or protein in urine   • RLS (restless legs syndrome)   • Fibromyalgia   • Examination for, follow-up   • Blunt injury to chest   • Sprain of left ankle   • Left wrist sprain, subsequent encounter   • Sprain of cervical neck, sequela   • Lumbar contusion   • Bilateral malignant neoplasm of breast in female, unspecified estrogen receptor status, unspecified site of breast (HCC)   • Continuous opioid dependence (HCC)   • Dizziness   • Class 1 obesity due to excess calories with body mass index (BMI) of 31.0 to 31.9 in adult   • COVID-19 long hauler manifesting chronic fatigue   • Chest discomfort   • Facial hematoma   • Epistaxis       Allergies:      Allergies   Allergen Reactions   • Benzalkonium Chloride Hives     Merthiolate tincture 9/28/2020     • Hydromorphone Hallucinations     disorientation; hallucination; confusion     • Merthiolate  [Thimerosal] Hives   • Quinine Derivatives Other (See Comments) and Tinnitus     tinnitus     • Codeine Nausea Only and Other (See Comments)     nausea     • Medical Tape Rash   • Other Itching   • Pamelor [Nortriptyline] Vomiting     Per pt, itching also   • Pollen Extract Nasal Congestion   • Statins Itching   • Wound Dressing Adhesive Rash       Current Medications:       Current Outpatient Medications:   •  acetaminophen (TYLENOL) 325 mg tablet, Take 650 mg by mouth every 6 (six) hours as needed for mild pain, Disp: , Rfl:   •  amLODIPine (NORVASC) 5 mg tablet, Take 1 tablet (5 mg total) by mouth daily, Disp: 90 tablet, Rfl: 3  •  ASPIRIN 81 PO, Aspirin EC 81 MG Oral Tablet Delayed Release   Refills: 0     Active, Disp: , Rfl:   •  brimonidine (ALPHAGAN P) 0.15 % ophthalmic solution, Administer to the right eye every evening , Disp: , Rfl:   •  cholecalciferol (VITAMIN D3) 1,000 units tablet, Take 1,000 Units by mouth daily, Disp: , Rfl:   •  cyclobenzaprine (FLEXERIL) 5 mg tablet, Take 1 tablet (5 mg total) by mouth 3 (three) times a day as needed for muscle spasms, Disp: 30 tablet, Rfl: 0  •  desvenlafaxine succinate (PRISTIQ) 50 mg 24 hr tablet, Take 1 tablet (50 mg total) by mouth daily, Disp: 90 tablet, Rfl: 3  •  famotidine (PEPCID) 40 MG tablet, TAKE 1 TABLET TWICE DAILY, Disp: 180 tablet, Rfl: 2  •  ferrous sulfate 325 (65 Fe) mg tablet, 325 mg by oral route., Disp: , Rfl:   •  fexofenadine (ALLEGRA) 180 MG tablet, Take 180 mg by mouth as needed , Disp: , Rfl:   •  fluticasone (FLONASE) 50 mcg/act nasal spray, 2 sprays into each nostril once OD, Disp: , Rfl:   •  hydrOXYzine HCL (ATARAX) 25 mg tablet, Take 1 tablet (25 mg total) by mouth daily at bedtime as needed for anxiety or itching, Disp: 30 tablet, Rfl: 3  •  latanoprost (XALATAN) 0.005 % ophthalmic solution, Administer 1 drop to both eyes daily at bedtime, Disp: , Rfl:   •  levothyroxine 100 mcg tablet, TAKE 1 TABLET EVERY DAY, Disp: 90 tablet, Rfl: 3  •  pregabalin (LYRICA) 150 mg capsule, Take 1 capsule (150 mg total) by mouth in the morning, Disp: 90 capsule, Rfl: 0  •  rivaroxaban (Xarelto) 20 mg tablet, Take 1 tablet (20 mg total) by mouth every evening, Disp: 30 tablet, Rfl: 0  •  rOPINIRole (REQUIP) 1 mg tablet, TAKE 1 TABLET AT 6PM AND TAKE 2 TABLETS AT BEDTIME, Disp: 270 tablet, Rfl: 3  •  senna (SENOKOT) 8.6 MG tablet, Take 8.6 mg by mouth in the morning, Disp: , Rfl:   •  timolol (TIMOPTIC) 0.5 % ophthalmic solution, Administer to both eyes 2 (two) times a day , Disp: , Rfl:   •  torsemide (DEMADEX) 10 mg tablet, TAKE 1 TABLET (10 MG TOTAL) BY MOUTH DAILY, Disp: 90 tablet, Rfl: 3  •  traMADol (Ultram) 50 mg tablet, Take 1/2 tab at 8AM, 1/2 tab at 1PM, and 1 tab at HS (total 2 tabs/day), Disp: 60 tablet, Rfl: 3  •  alendronate (Fosamax) 70 mg tablet, Take 1 tablet every week by oral route. (Patient not taking: Reported on 7/18/2023), Disp: , Rfl:   •  calcium citrate-vitamin D 315 mg-5 mcg tablet, , Disp: , Rfl:   •  Chelated Zinc 50 MG TABS, 50 mg by oral route. (Patient not taking: Reported on 7/18/2023), Disp: , Rfl:   •  DULoxetine (CYMBALTA) 30 mg delayed release capsule, total of 90mg (Patient not taking: Reported on 7/18/2023), Disp: , Rfl:   •  folic acid (FOLVITE) 1 mg tablet, 1 mg by oral route.  (Patient not taking: Reported on 7/18/2023), Disp: , Rfl:   •  ipratropium (ATROVENT) 0.03 % nasal spray, USE 2 SPRAYS IN EACH NOSTRIL TWICE DAILY AS NEEDED FOR RHINITIS OR RUNNY NOSE (Patient not taking: Reported on 4/11/2023), Disp: 60 mL, Rfl: 11  •  ondansetron (ZOFRAN) 4 mg tablet, Take 1 tablet (4 mg total) by mouth every 8 (eight) hours as needed for nausea or vomiting (Patient not taking: Reported on 7/18/2023), Disp: 90 tablet, Rfl: 0  •  simvastatin (ZOCOR) 40 mg tablet, Take 1 tablet every day by oral route.  (Patient not taking: Reported on 7/18/2023), Disp: , Rfl:     Past Medical History:       Past Medical History:   Diagnosis Date   • Cancer ([address])     left breast cancer   • Cervical herniated disc    • Chronic pain disorder     back pain   • Chronic respiratory failure ([address])     uses O2 at home with [facility] /5/19 no longer using/just CPAP   • CPAP (continuous positive airway pressure) dependence    • Disease of thyroid gland     hypothyroid   • DVT (deep venous thrombosis) ([address]) 2020    right leg   • Family history of reaction to anesthesia     "son and daughter hard time waking up and also PONV"   • Fibromyalgia, primary    • GERD (gastroesophageal reflux disease)    • Glaucoma    • Hiatal hernia    • History of palpitations    • History of pneumonia    • History of transfusion    • Hyperlipidemia    • Hypertension    • Irregular heart beat    • Migraine    • Myocardial infarction [facility])     pt sees cardilogist Dr Kenyetta Escalona realize had one, found on EKG before a surgery"   • OAB (overactive bladder)    • Pollen allergies    • Pulmonary embolism ([address]) 2019    Bilateral; on Xarelto   • Restless leg    • Risk for falls    • Sleep apnea    • Use of cane as ambulatory aid     occas   • Uses brace     Mafo/left leg   • Wears glasses         Past Surgical History:   Procedure Laterality Date   • ADENOIDECTOMY     • CARDIAC CATHETERIZATION     • CARPAL TUNNEL RELEASE     • CATARACT EXTRACTION Bilateral    • FOOT SURGERY pin implanted right toe   • JOINT REPLACEMENT Bilateral     knees   • LAMINECTOMY     • LUMBAR EPIDURAL INJECTION     • MASTECTOMY Bilateral     with reconstruction and implants   • NISSEN FUNDOPLICATION     • [place] LNGTH/SHRT TENDON LEG/ANKLE 1 TENDON SPX Left 2021    Procedure: Sectioning peroneal tendons with lengthening/sectioning achilles tendon lower leg and ankle;  Surgeon: Nae Pritchard DPM;  Location: [facility];  Service: Podiatry   • REPLACEMENT TOTAL KNEE     • SPINAL FUSION     • TONSILLECTOMY     • TUBAL LIGATION     • TUMOR EXCISION      right forearm/benign   • VEIN LIGATION Right         Family History   Problem Relation Age of Onset   • Cancer Sister         Social History     Socioeconomic History   • Marital status:      Spouse name: Not on file   • Number of children: Not on file   • Years of education: Not on file   • Highest education level: Not on file   Occupational History   • Not on file   Tobacco Use   • Smoking status: Never   • Smokeless tobacco: Never   Vaping Use   • Vaping Use: Never used   Substance and Sexual Activity   • Alcohol use: Yes     Comment: socially   • Drug use: Never   • Sexual activity: Not on file     Comment: defer   Other Topics Concern   • Not on file   Social History Narrative    · Most recent tobacco use screenin2019      Social Determinants of Health     Financial Resource Strain: Medium Risk (2023)    Overall Financial Resource Strain (CARDIA)    • Difficulty of Paying Living Expenses: Somewhat hard   Food Insecurity: Not on file   Transportation Needs: No Transportation Needs (2023)    PRAPARE - Transportation    • Lack of Transportation (Medical): No    • Lack of Transportation (Non-Medical):  No   Physical Activity: Not on file   Stress: Not on file   Social Connections: Not on file   Intimate Partner Violence: Not on file   Housing Stability: Not on file        Physical Exam:     /64 (BP Location: Left arm, Patient Position: Sitting, Cuff Size: Standard)   Pulse 74   Temp (!) 97.1 °F (36.2 °C) (Temporal)   Ht 5' 4" (1.626 m)   SpO2 95%   BMI 31.41 kg/m²     Physical Exam  Vitals and nursing note reviewed. Constitutional:       General: She is not in acute distress. Appearance: Normal appearance. HENT:      Head: Normocephalic and atraumatic. Eyes:      Pupils: Pupils are equal, round, and reactive to light. Neck:      Vascular: No carotid bruit. Cardiovascular:      Rate and Rhythm: Normal rate and regular rhythm. No extrasystoles are present. Pulses: Normal pulses. Heart sounds: Normal heart sounds. No gallop. No S3 or S4 sounds. Pulmonary:      Effort: Pulmonary effort is normal.      Breath sounds: Normal breath sounds. Abdominal:      Palpations: Abdomen is soft. Tenderness: There is no abdominal tenderness. Musculoskeletal:      Right lower leg: No edema. Left lower leg: No edema. Comments: Antalgic gait, using walker   Lymphadenopathy:      Cervical: No cervical adenopathy. Skin:     Coloration: Skin is not pale. Neurological:      General: No focal deficit present. Mental Status: She is alert. Motor: No weakness. Gait: Gait abnormal.   Psychiatric:         Mood and Affect: Mood normal.          Data:     Pre-operative work-up    Laboratory Results: ordered     EKG: per cardiology    No results found for this or any previous visit (from the past 672 hour(s)).         Assessment & Recommendations:     Problem List Items Addressed This Visit        Endocrine    Hypothyroidism, adult    Relevant Orders    TSH, 3rd generation with Free T4 reflex       Cardiovascular and Mediastinum    Bilateral pulmonary embolism (HCC) - Primary    Relevant Orders    APTT    Protime-INR    TA (temporal arteritis) ([address])   Other Visit Diagnoses     Pre-op examination        Relevant Orders    CBC and differential    Comprehensive metabolic panel    TSH, 3rd generation with Free T4 reflex    APTT    Protime-INR    Screening, deficiency anemia, iron        Relevant Orders    CBC and differential    Screening for diabetes mellitus        Relevant Orders    Comprehensive metabolic panel    Drug therapy        Relevant Orders    CBC and differential    Comprehensive metabolic panel    TSH, 3rd generation with Free T4 reflex    APTT    Protime-INR    Chronic anticoagulation        Relevant Orders    APTT    Protime-INR    Spinal stenosis of lumbar region with neurogenic claudication        Relevant Medications    pregabalin (LYRICA) 150 mg capsule          Pre-Op Evaluation Assessment  77 y.o. female with planned surgery: Right revision total knee. Known risk factors for perioperative complications: None. Current medications which may produce withdrawal symptoms if withheld perioperatively: none. Pre-Op Evaluation Plan  1. Further preoperative workup as follows:   - cardiology risk assessment; labs:cmp, cbc, tsh, PT/PTT    2. Medication Management/Recommendations:     OK TO HOLD [address] 1 DAY PRIOR TO SURGERY. START DAY AFTER SURGERY. 3. Prophylaxis for cardiac events with perioperative beta-blockers: not indicated. 4. Patient requires further consultation with: Cardiology    Clearance  Patient is DENIED for surgery at this time. Patient requires pre-operative laboratory work-up and consultation with cardiology as outlined above.  Clearance to follow     Willi Vázquez, [address] [facility] CARE [facility] [address]  [place] [address] [place] [id number]  Phone#  [phone]  Fax#  [phone]

## 2023-08-25 NOTE — TELEPHONE ENCOUNTER
Dr. Sriram Vela standing patient of yours today for pre-op clearance. She is not happy with Pristiq. She would like a med change for her depression. She has tried Celexa, Lexapro, Cymbalta, Nortriptyline in past unsuccessfully. Please advise how you want to proceed. I'll ask Tray to call her.      Thanks

## 2023-08-28 NOTE — TELEPHONE ENCOUNTER
Attempted 5 th attempt to return patient's call. It goes to her voice mail.  Will await her call back

## 2023-08-29 ENCOUNTER — TELEPHONE (OUTPATIENT)
Dept: FAMILY MEDICINE CLINIC | Facility: CLINIC | Age: 78
End: 2023-08-29

## 2023-08-29 NOTE — TELEPHONE ENCOUNTER
Rcvd a call from [facility] from Dr. Isaías Rice her # [phone]. She has requested a Clearance for her surgery coming up in September. Please call [facility].

## 2023-08-29 NOTE — TELEPHONE ENCOUNTER
Patient called again she said she would keep the phone with her and answer it I told her all the messages went to voice mail     Her number is  [phone]

## 2023-08-30 DIAGNOSIS — M48.062 SPINAL STENOSIS OF LUMBAR REGION WITH NEUROGENIC CLAUDICATION: ICD-10-CM

## 2023-08-30 RX ORDER — PREGABALIN 150 MG/1
150 CAPSULE ORAL DAILY
Qty: 90 CAPSULE | Refills: 0 | OUTPATIENT
Start: 2023-08-30

## 2023-08-30 NOTE — TELEPHONE ENCOUNTER
Called fernando -- no answer so lvm informing that pt was not cleared at last OV but instead required cardiac clearance. Advised to call back if there are any further questions.

## 2023-08-30 NOTE — TELEPHONE ENCOUNTER
Received fax stating pt has requested her Rx Lyrica be refilled through [address] [place] [address] -- queued and sent to Rx refill pool at this time.

## 2023-08-31 ENCOUNTER — TELEPHONE (OUTPATIENT)
Dept: FAMILY MEDICINE CLINIC | Facility: CLINIC | Age: 78
End: 2023-08-31

## 2023-08-31 DIAGNOSIS — F41.9 ANXIETY AND DEPRESSION: Primary | ICD-10-CM

## 2023-08-31 DIAGNOSIS — F32.A ANXIETY AND DEPRESSION: Primary | ICD-10-CM

## 2023-08-31 NOTE — TELEPHONE ENCOUNTER
Per Dr. Kendra Hopper,     He recommends therapy for refractory anxiety and depression.   Medication unlikely to work without it:   Dr. Charlotte Patel, Dr. Evans Session in [address]    additionally can switch to Trintellix from [address] [place] [address]

## 2023-08-31 NOTE — TELEPHONE ENCOUNTER
Anali Sommers, this is Mrs. Sanchez. I'm returning your call. I've taken the pain medication and fell asleep, so I didn't hear the phone ring. But if you could call me back at [phone]. Thank you.  Venkat.

## 2023-08-31 NOTE — TELEPHONE ENCOUNTER
Called and spoke w pt -- informed of need for therapy re refractory anxiety and depression. Informed that medication is unlikely to work without therapy and pt acknowledged. Wrote down both MD as well as Office number. Advised pt to sit tight while PCP is able to think of best way pt can transition from Pristiq to Trintellix.

## 2023-09-01 NOTE — TELEPHONE ENCOUNTER
Called and spoke w pt re new Rx -- advised will be sent at this time to requested pharmacy and updated pt Rx list to get rid of Pristiq -- sent Trintellix 5 mg OD PO at this time.

## 2023-09-05 DIAGNOSIS — M48.062 SPINAL STENOSIS OF LUMBAR REGION WITH NEUROGENIC CLAUDICATION: ICD-10-CM

## 2023-09-05 NOTE — TELEPHONE ENCOUNTER
Received fax stating that pt is requesting Rx refill on this medication that is to be sent to [address] [place] [address] -- refill was already dispensed 9/3/23 and was 90 day supply.

## 2023-09-08 DIAGNOSIS — M48.062 SPINAL STENOSIS OF LUMBAR REGION WITH NEUROGENIC CLAUDICATION: ICD-10-CM

## 2023-09-08 NOTE — TELEPHONE ENCOUNTER
Received fax from [address] [place] [address] requesting a fill on Pregabalin 150 mg send in for 90 days.

## 2023-09-09 RX ORDER — PREGABALIN 150 MG/1
150 CAPSULE ORAL DAILY
Qty: 90 CAPSULE | Refills: 3 | Status: SHIPPED | OUTPATIENT
Start: 2023-09-09

## 2023-10-04 ENCOUNTER — TELEPHONE (OUTPATIENT)
Age: 78
End: 2023-10-04

## 2023-10-04 NOTE — TELEPHONE ENCOUNTER
Dina Wylie from [facility] is calling to schedule a TCM appointment for [employer]. She will be discharged on 10/6. Please call back at   [phone] ext. [phone]    Thank you!

## 2023-10-13 ENCOUNTER — RA CDI HCC (OUTPATIENT)
Dept: OTHER | Facility: HOSPITAL | Age: 78
End: 2023-10-13

## 2023-10-13 NOTE — PROGRESS NOTES
[facility] coding opportunities       Chart reviewed, no opportunity found: CHART REVIEWED, NO OPPORTUNITY FOUND        Patients Insurance     Medicare Insurance: Medicare

## 2023-10-18 ENCOUNTER — TELEPHONE (OUTPATIENT)
Dept: ADMINISTRATIVE | Facility: OTHER | Age: 78
End: 2023-10-18

## 2023-10-18 NOTE — TELEPHONE ENCOUNTER
10/18/23 3:08 PM    Patient contacted (left message) to bring Advance Directive, POLST, or Living Will document to next scheduled pcp visit. Thank you.   Pia Ruiz MA  [facility]

## 2023-10-19 ENCOUNTER — OFFICE VISIT (OUTPATIENT)
Dept: FAMILY MEDICINE CLINIC | Facility: CLINIC | Age: 78
End: 2023-10-19
Payer: MEDICARE

## 2023-10-19 ENCOUNTER — TELEPHONE (OUTPATIENT)
Age: 78
End: 2023-10-19

## 2023-10-19 VITALS
HEIGHT: 64 IN | BODY MASS INDEX: 31.41 KG/M2 | DIASTOLIC BLOOD PRESSURE: 72 MMHG | HEART RATE: 82 BPM | OXYGEN SATURATION: 96 % | TEMPERATURE: 98.8 F | SYSTOLIC BLOOD PRESSURE: 128 MMHG

## 2023-10-19 DIAGNOSIS — Z79.899 ENCOUNTER FOR LONG-TERM (CURRENT) USE OF MEDICATIONS: ICD-10-CM

## 2023-10-19 DIAGNOSIS — F33.9 DEPRESSION, RECURRENT (HCC): Primary | ICD-10-CM

## 2023-10-19 DIAGNOSIS — Z71.2 ENCOUNTER TO DISCUSS TEST RESULTS: ICD-10-CM

## 2023-10-19 DIAGNOSIS — Z79.01 CHRONIC ANTICOAGULATION: ICD-10-CM

## 2023-10-19 DIAGNOSIS — F32.A ANXIETY AND DEPRESSION: ICD-10-CM

## 2023-10-19 DIAGNOSIS — Z23 ENCOUNTER FOR IMMUNIZATION: ICD-10-CM

## 2023-10-19 DIAGNOSIS — Z79.899 ENCOUNTER FOR LONG-TERM CURRENT USE OF HIGH RISK MEDICATION: ICD-10-CM

## 2023-10-19 DIAGNOSIS — R63.8 INCREASED BMI: ICD-10-CM

## 2023-10-19 DIAGNOSIS — Z79.899 MEDICATION MANAGEMENT: ICD-10-CM

## 2023-10-19 DIAGNOSIS — F41.9 ANXIETY AND DEPRESSION: ICD-10-CM

## 2023-10-19 DIAGNOSIS — Z96.651 S/P TKR (TOTAL KNEE REPLACEMENT), RIGHT: ICD-10-CM

## 2023-10-19 DIAGNOSIS — A15.9 TB (TUBERCULOSIS): ICD-10-CM

## 2023-10-19 PROCEDURE — 99215 OFFICE O/P EST HI 40 MIN: CPT | Performed by: FAMILY MEDICINE

## 2023-10-19 PROCEDURE — G0008 ADMIN INFLUENZA VIRUS VAC: HCPCS

## 2023-10-19 PROCEDURE — 90662 IIV NO PRSV INCREASED AG IM: CPT

## 2023-10-19 RX ORDER — METHOCARBAMOL 500 MG/1
500 TABLET, FILM COATED ORAL 3 TIMES DAILY
Qty: 60 TABLET | Refills: 1 | Status: SHIPPED | OUTPATIENT
Start: 2023-10-19

## 2023-10-19 RX ORDER — OXYCODONE HYDROCHLORIDE 5 MG/1
5 TABLET ORAL
COMMUNITY
Start: 2023-09-14

## 2023-10-19 NOTE — TELEPHONE ENCOUNTER
Douglas Hunter, calling to verify DX for patient for billing purposes, and is requesting a call back. She may be reached @ [phone].     Thank You

## 2023-10-19 NOTE — PROGRESS NOTES
Assessment/Plan:       1. Depression, recurrent ([address])  Comments:  much improved on Trintellix 10  Orders:  -     vortioxetine (TRINTELLIX) 10 MG tablet; Take 1 tablet (10 mg total) by mouth daily    2. Encounter for immunization  -     influenza vaccine, high-dose, PF 0.7 mL (FLUZONE HIGH-DOSE)    3. Medication management  Comments: All meds evaluated and discussed. 4. Encounter for long-term (current) use of medications    5. Chronic anticoagulation  Comments:  on [id number] mg OD for pul embolism 3 yrs ago    6. Anxiety and depression    7. Encounter to discuss test results    8. Increased BMI    9. S/P TKR (total knee replacement), right  Comments:  Dr. Grey Campos, [facility] TKR on Sept 14, 2023  Doing well now  Orders:  -     methocarbamol (ROBAXIN) 500 mg tablet; Take 1 tablet (500 mg total) by mouth 3 (three) times a day    10. Encounter for long-term current use of high risk medication    11. TB (tuberculosis)  Comments:  NEGAT[facility]        1. Medication management. Refilled her Trintellix 10 mg.    2. Encounter status post right knee total arthroplasty. Prescribed methocarbamol 3 times a day. 3. Immunizations. Influenza vaccine given today. Subjective:      Patient ID: Efren Ta is a 77 y.o. female. Krystle Fabian is a 39-year-old female who presents for follow-up accompanied by Corinna Rush. The patient is on Trintellix 10 mg 1 times a day to manage her mood disorder. This medication has proven beneficial by reducing her feelings of sadness and boosting her energy levels. It has also resulted in a noticeable decrease in her mood swings. She suffers from allodynia originating from her back and was recommended to utilize a piece of pool noodle. However, due to the intense pain, she was unable to touch it. She has been utilizing lidocaine and Voltaren to ease her symptoms, but there has not been any progress.  She is also applying ice bags on her knee and leg. She used to take Robaxin while she was in a nursing home and is now wondering if it could alleviate her symptoms. Dr. Em Oliva performed a total arthroplasty on her right knee on 09/14/2023. Due to her previous back surgery, spinal block was not possible. Even morphine was ineffective in managing her pain. She is currently feeling a heat sensation. She needs to remove her socks as they cause her to swell. She struggled to maintain a sound sleep, waking up every two hours. Once, she used to sleep better, but her pain now interferes with that. She had a wart on her head. Right scalp 1/2" keratosis. No rx needed     She had pulmonary embolism, one affecting her lungs and the other her legs. She has been on Xarelto for the treatment of her pulmonary embolism for the past 3 years. She was hospitalized at [facility] on Coumadin and I.V. heparin. She had tramadol at home from a previous prescription but is not currently taking it. Social History  She formerly resided at [facility], a nursing home, for few wks post TKR done 9/14/23. This is healing nicely. Her current medications include Trintellix 10 mg 1 tablet day and Lyrica 150 mg. States Trintellix really working very well     The following portions of the patient's history were reviewed and updated as appropriate: allergies, current medications, past family history, past medical history, past social history, past surgical history, and problem list.    Review of Systems   HENT:  Negative for trouble swallowing. Eyes: Negative. Respiratory:  Negative for shortness of breath. Not much pulmonary reserve and probably would benefit from pulmonary rehab? Cardiovascular:         Denies today chest pain tightness heaviness pressure but does have vague aches and pains and is considerably deconditioned. Gastrointestinal:  Negative for nausea.         Has what appears to be esophageal dysmotility syndrome or something that is affecting the esophagus given GERD and regurgitation and feeling of food impaction. Just completed the Ba swallow and noteable esoph dismotility   Genitourinary: Negative. Musculoskeletal:  Positive for arthralgias, back pain, joint swelling and myalgias. Stopped Cymbalta - not helping   Not herself    Skin:  Positive for pallor. Neurological:  Negative for dizziness and weakness. Hematological: Negative. Psychiatric/Behavioral:  Positive for sleep disturbance (States she sleeps 2 hours then up and sleep is broken). Negative for dysphoric mood. Objective:  /72 (BP Location: Left arm, Patient Position: Sitting, Cuff Size: Standard)   Pulse 82   Temp 98.8 °F (37.1 °C) (Temporal)   Ht 5' 4" (1.626 m)   SpO2 96%   BMI 31.41 kg/m²          Physical Exam  Vitals and nursing note reviewed. Constitutional:       General: She is not in acute distress. Appearance: She is well-developed. She is not diaphoretic. HENT:      Head: Normocephalic and atraumatic. Nose: Nose normal.   Eyes:      Conjunctiva/sclera: Conjunctivae normal.      Pupils: Pupils are equal, round, and reactive to light. Neck:      Thyroid: No thyromegaly. Trachea: No tracheal deviation. Cardiovascular:      Rate and Rhythm: Normal rate and regular rhythm. Heart sounds: Normal heart sounds. Pulmonary:      Effort: No respiratory distress. Breath sounds: Normal breath sounds. No wheezing or rales. Chest:      Chest wall: No tenderness. Abdominal:      General: Bowel sounds are normal. There is no distension. Palpations: Abdomen is soft. There is no mass. Tenderness: There is no abdominal tenderness. There is no guarding or rebound. Musculoskeletal:         General: No tenderness or deformity. Normal range of motion. Cervical back: Normal range of motion and neck supple. Right lower leg: No edema.       Left lower leg: No edema.   Skin:     General: Skin is warm and dry. Coloration: Skin is not pale. Findings: No erythema or rash. Neurological:      General: No focal deficit present. Mental Status: She is alert and oriented to person, place, and time. Mental status is at baseline. Cranial Nerves: No cranial nerve deficit. Psychiatric:         Behavior: Behavior normal.         Thought Content: Thought content normal.         Judgment: Judgment normal.       Physical Exam  Vitals and nursing note reviewed. Constitutional:       General: He is not in acute distress. Appearance: Normal appearance. She is well-developed. HENT:      Head: Normocephalic and atraumatic. Eyes:      General:         Right eye: No discharge. Left eye: No discharge. Neck:      Thyroid: No thyromegaly. Cardiovascular:      Rate and Rhythm: Normal rate and regular rhythm. Pulses: Normal pulses. Heart sounds: Normal heart sounds. No murmur heard. Pulmonary:      Effort: Pulmonary effort is normal.      Breath sounds: Normal breath sounds. No wheezing or rhonchi. Musculoskeletal:      Cervical back: Neck supple. Right lower leg: No edema. Left lower leg: No edema. Lymphadenopathy:      Cervical: No cervical adenopathy. Skin:     General: Skin is warm. Capillary Refill: Capillary refill takes less than 2 seconds. Neurological:      General: No focal deficit present. Mental Status: She is alert and oriented to person, place, and time. Psychiatric:         Mood and Affect: Mood normal.         Behavior: Behavior normal.         Thought Content: Thought content normal.        I personally reviewed the recent (and prior)  lab results, the image studies, pathology, other specialty/physicians consult notes and recommendations, and outside medical records from other institutions, as appropriate. I had a lengthy discussion with the patient and shared the work-up findings.  We discussed the diagnosis and management plan. I spent  47  minutes reviewing the records (labs, clinician notes, outside records, medical history, ordering medicine/tests/procedures, interpreting the imaging/labs previously done) and coordination of care as well as direct time with the patient today, of which greater than 50% of the time was spent in counseling and coordination of care with the patient/family. Transcribed for Sebastien Gilman DO, by Karina Davenport on 10/19/23 at 10:25 PM. Powered by [employer].

## 2023-10-20 NOTE — TELEPHONE ENCOUNTER
Spoke with Geo Wright to clarify what is needed. She states that patient has cellulitis at the surgical incision site and needs to confirm with the doctor that this is a result of surgery? She also needs to know the reason for the revision. I saw that she was seen in the office yesterday. Please advise if you are able to answer these questions or if she should refer to ortho surgeon.

## 2023-10-30 DIAGNOSIS — I26.99 BILATERAL PULMONARY EMBOLISM (HCC): ICD-10-CM

## 2023-11-16 DIAGNOSIS — S30.0XXD LUMBAR CONTUSION, SUBSEQUENT ENCOUNTER: ICD-10-CM

## 2023-11-16 DIAGNOSIS — F41.9 ANXIETY AND DEPRESSION: ICD-10-CM

## 2023-11-16 DIAGNOSIS — M54.9 BACK PAIN, UNSPECIFIED BACK LOCATION, UNSPECIFIED BACK PAIN LATERALITY, UNSPECIFIED CHRONICITY: Primary | ICD-10-CM

## 2023-11-16 DIAGNOSIS — F32.A ANXIETY AND DEPRESSION: ICD-10-CM

## 2023-11-16 RX ORDER — TRAMADOL HYDROCHLORIDE 50 MG/1
TABLET ORAL
Qty: 60 TABLET | Refills: 5 | Status: SHIPPED | OUTPATIENT
Start: 2023-11-16

## 2023-11-16 RX ORDER — BUSPIRONE HYDROCHLORIDE 5 MG/1
5 TABLET ORAL 2 TIMES DAILY
Qty: 60 TABLET | Refills: 5 | Status: SHIPPED | OUTPATIENT
Start: 2023-11-16

## 2023-11-16 NOTE — TELEPHONE ENCOUNTER
Patient called because she needs a refill for tramadol 50mg. She also said she is having some depression and would like to see someone to talk about it. She said she was taking Buspar 5mg in the nursing home but she stopped taking it but thinks she needs to start taking it again. She also wants to let the doctor know that she has been having back pain since she had a knee replacement and is getting an MRI done on November 21st . She would like a call back.

## 2023-11-16 NOTE — TELEPHONE ENCOUNTER
Called pt and she states that she would like to start Buspar 5 mg BID again -- also needs refill on tramadol. She also states she still has back pain and is discussed w another MD in [place] who is apparently following this. MRI back sched for Nov21. I informed her that tramadol was d/c about 1 mo ago and replaced w oxycodone by different provider. Pt states it is ok to d/c oxycodone as she feels it does not do anything for her -- is requesting to restart tramadol as previously Rx: "Take 1/2 tab at 8AM, 1/2 tab at 1PM, and 1 tab at HS (total 2 tabs/day)"    Please advise if it ok for pt to restart Tramadol Rx and Buspar 5 mg BID-- I will queue and forward.

## 2023-11-20 ENCOUNTER — TELEPHONE (OUTPATIENT)
Age: 78
End: 2023-11-20

## 2023-11-20 DIAGNOSIS — Z59.6 PATIENT CANNOT AFFORD MEDICATIONS: ICD-10-CM

## 2023-11-20 DIAGNOSIS — Z59.6 LOW INCOME: Primary | ICD-10-CM

## 2023-11-20 SDOH — ECONOMIC STABILITY - INCOME SECURITY: LOW INCOME: Z59.6

## 2023-11-20 NOTE — TELEPHONE ENCOUNTER
Vortioxetine (TRINTELLIX) 10 MG tablet PA Submitted via [id number]: [id number] / PA Not Required. Pharmacy made aware. Information regarding your request  Available without authorization.

## 2023-11-21 ENCOUNTER — HOSPITAL ENCOUNTER (OUTPATIENT)
Dept: MRI IMAGING | Facility: HOSPITAL | Age: 78
Discharge: HOME | End: 2023-11-21
Payer: MEDICARE

## 2023-11-21 DIAGNOSIS — M54.16 LUMBAR RADICULOPATHY, CHRONIC: ICD-10-CM

## 2023-11-21 PROCEDURE — G1004 CDSM NDSC: HCPCS

## 2023-11-21 PROCEDURE — 72148 MRI LUMBAR SPINE W/O DYE: CPT

## 2023-11-27 NOTE — TELEPHONE ENCOUNTER
Pt called and stated she is now out of pills and spoke to pharmacy who told her they have not heard anything yet re: the PA. I advised pt that PA is not needed per our submission, and the pharmacy was notified. She said they had no idea and are trying to charge her $125, which she can't afford. Please advise.

## 2023-11-29 NOTE — TELEPHONE ENCOUNTER
I called and spoke w pt pharmacy -- they stated w insurance the cost is 124 dollars and w [place] the price of Trintellix is 400+ dollars. Called pt and discussed -- pt does have PACE card but I informed her I am not familiar w PACE and that I would place a ref to social work as they will be able to better assist her w the cost of mediations through [place]. Pt very agreeable w plan and appreciative.

## 2023-11-30 ENCOUNTER — PATIENT OUTREACH (OUTPATIENT)
Dept: FAMILY MEDICINE CLINIC | Facility: CLINIC | Age: 78
End: 2023-11-30

## 2023-11-30 ENCOUNTER — TELEPHONE (OUTPATIENT)
Dept: FAMILY MEDICINE CLINIC | Facility: CLINIC | Age: 78
End: 2023-11-30

## 2023-11-30 NOTE — TELEPHONE ENCOUNTER
Called pt and informed that we recvd her PACE form -- just need her PACE card number to complete form to ideally fax out today. For Rx Trintellix.

## 2023-11-30 NOTE — PROGRESS NOTES
Trintellix rx to pharmacy. TERRI received referral form PCP stating that patient has questions about her PACE. Chart review completed. Patient is stating that she is unable to afford her Trintellix 10mg prescription. FLORA completed research on PACE formulary. Trintellix does not seem to be on the PACE formulary. Trintellix is on patient's Medicare Rx plan as a Tier 4 medication. SW placed phone call to patient who advised that pharmacy told her she needed a pre authorization and the PCP office did not understand this. Patient states that she does not have a supply of these meds at this time. FLORA placed phone call to [facility] and spoke with Romeo Mcfarlane who took patient's information and transferred this SW to Haylie in pharmacy services. Haylie reviewed the prescription and [facility] is requiring a prior authorization with supporting documentation. Haylie faxed paperwork to this SW and same is attached to this note for provider to complete and fax back to [facility].

## 2023-12-14 DIAGNOSIS — M79.89 SWELLING OF LIMB: ICD-10-CM

## 2023-12-14 RX ORDER — TORSEMIDE 10 MG/1
10 TABLET ORAL DAILY
Qty: 90 TABLET | Refills: 1 | Status: SHIPPED | OUTPATIENT
Start: 2023-12-14

## 2023-12-19 DIAGNOSIS — F32.A ANXIETY AND DEPRESSION: Primary | ICD-10-CM

## 2023-12-19 DIAGNOSIS — M48.062 SPINAL STENOSIS OF LUMBAR REGION WITH NEUROGENIC CLAUDICATION: ICD-10-CM

## 2023-12-19 DIAGNOSIS — F41.9 ANXIETY AND DEPRESSION: Primary | ICD-10-CM

## 2023-12-19 RX ORDER — PREGABALIN 150 MG/1
150 CAPSULE ORAL 2 TIMES DAILY
Qty: 90 CAPSULE | Refills: 3 | Status: SHIPPED | OUTPATIENT
Start: 2023-12-19

## 2023-12-19 RX ORDER — HYDROXYZINE HYDROCHLORIDE 10 MG/1
10 TABLET, FILM COATED ORAL 3 TIMES DAILY
Qty: 90 TABLET | Refills: 1 | Status: SHIPPED | OUTPATIENT
Start: 2023-12-19

## 2023-12-19 NOTE — TELEPHONE ENCOUNTER
Reason for call:     Patient requesting to following medication changes:  1)  hydroxyzine hcl 25 MG - patient requesting a lower dose, possible 10 mg, states she had a hard time getting going in the morning after taking this medication, feels like she has a hangover.  GOES TO - [facility] [place]     2). Pregabalin 150mg in the AM - patient requesting the script to be changed to 150 mg 2 times daily    GOES TO - [facility] [facility]        [] Refill   [] Prior Auth  [x] Other:     Office:   [x] PCP/Provider - Dr Collin Yates  [] Specialty/Provider -

## 2023-12-26 DIAGNOSIS — S30.0XXD LUMBAR CONTUSION, SUBSEQUENT ENCOUNTER: ICD-10-CM

## 2023-12-26 DIAGNOSIS — G25.81 RLS (RESTLESS LEGS SYNDROME): ICD-10-CM

## 2023-12-26 DIAGNOSIS — M54.9 BACK PAIN, UNSPECIFIED BACK LOCATION, UNSPECIFIED BACK PAIN LATERALITY, UNSPECIFIED CHRONICITY: ICD-10-CM

## 2023-12-27 ENCOUNTER — APPOINTMENT (OUTPATIENT)
Dept: RADIOLOGY | Facility: HOSPITAL | Age: 78
DRG: 947 | End: 2023-12-27
Payer: MEDICARE

## 2023-12-27 ENCOUNTER — APPOINTMENT (EMERGENCY)
Dept: CT IMAGING | Facility: HOSPITAL | Age: 78
DRG: 947 | End: 2023-12-27
Payer: MEDICARE

## 2023-12-27 ENCOUNTER — HOSPITAL ENCOUNTER (INPATIENT)
Facility: HOSPITAL | Age: 78
LOS: 1 days | Discharge: HOME | DRG: 947 | End: 2023-12-29
Attending: EMERGENCY MEDICINE | Admitting: STUDENT IN AN ORGANIZED HEALTH CARE EDUCATION/TRAINING PROGRAM
Payer: MEDICARE

## 2023-12-27 DIAGNOSIS — R52 GENERALIZED PAIN: ICD-10-CM

## 2023-12-27 DIAGNOSIS — R11.2 INTRACTABLE NAUSEA AND VOMITING: Primary | ICD-10-CM

## 2023-12-27 PROBLEM — R10.9 ABDOMINAL DISCOMFORT: Status: ACTIVE | Noted: 2023-12-27

## 2023-12-27 LAB
2HR DELTA HS TROPONIN: 0 NG/L
4HR DELTA HS TROPONIN: 1 NG/L
ALBUMIN SERPL BCP-MCNC: 4.3 G/DL (ref 3.5–5)
ALP SERPL-CCNC: 121 U/L (ref 34–104)
ALT SERPL W P-5'-P-CCNC: 11 U/L (ref 7–52)
ANION GAP SERPL CALCULATED.3IONS-SCNC: 8 MMOL/L
AST SERPL W P-5'-P-CCNC: 10 U/L (ref 13–39)
ATRIAL RATE: 82 BPM
BASOPHILS # BLD AUTO: 0.02 THOUSANDS/ÂΜL (ref 0–0.1)
BASOPHILS NFR BLD AUTO: 0 % (ref 0–1)
BILIRUB SERPL-MCNC: 0.46 MG/DL (ref 0.2–1)
BILIRUB UR QL STRIP: NEGATIVE
BUN SERPL-MCNC: 20 MG/DL (ref 5–25)
CALCIUM SERPL-MCNC: 9.3 MG/DL (ref 8.4–10.2)
CARDIAC TROPONIN I PNL SERPL HS: 2 NG/L
CARDIAC TROPONIN I PNL SERPL HS: 2 NG/L
CARDIAC TROPONIN I PNL SERPL HS: 3 NG/L
CHLORIDE SERPL-SCNC: 107 MMOL/L (ref 96–108)
CK SERPL-CCNC: 56 U/L (ref 26–192)
CLARITY UR: CLEAR
CO2 SERPL-SCNC: 26 MMOL/L (ref 21–32)
COLOR UR: ABNORMAL
CREAT SERPL-MCNC: 0.69 MG/DL (ref 0.6–1.3)
EOSINOPHIL # BLD AUTO: 0.22 THOUSAND/ÂΜL (ref 0–0.61)
EOSINOPHIL NFR BLD AUTO: 4 % (ref 0–6)
ERYTHROCYTE [DISTWIDTH] IN BLOOD BY AUTOMATED COUNT: 13.4 % (ref 11.6–15.1)
ERYTHROCYTE [SEDIMENTATION RATE] IN BLOOD: 33 MM/HOUR (ref 0–29)
FLUAV RNA RESP QL NAA+PROBE: NEGATIVE
FLUBV RNA RESP QL NAA+PROBE: NEGATIVE
GFR SERPL CREATININE-BSD FRML MDRD: 83 ML/MIN/1.73SQ M
GLUCOSE SERPL-MCNC: 116 MG/DL (ref 65–140)
GLUCOSE UR STRIP-MCNC: NEGATIVE MG/DL
HCT VFR BLD AUTO: 44.3 % (ref 34.8–46.1)
HGB BLD-MCNC: 14.4 G/DL (ref 11.5–15.4)
HGB UR QL STRIP.AUTO: NEGATIVE
IMM GRANULOCYTES # BLD AUTO: 0.03 THOUSAND/UL (ref 0–0.2)
IMM GRANULOCYTES NFR BLD AUTO: 1 % (ref 0–2)
KETONES UR STRIP-MCNC: NEGATIVE MG/DL
LEUKOCYTE ESTERASE UR QL STRIP: NEGATIVE
LIPASE SERPL-CCNC: 67 U/L (ref 11–82)
LYMPHOCYTES # BLD AUTO: 1.76 THOUSANDS/ÂΜL (ref 0.6–4.47)
LYMPHOCYTES NFR BLD AUTO: 29 % (ref 14–44)
MCH RBC QN AUTO: 29.6 PG (ref 26.8–34.3)
MCHC RBC AUTO-ENTMCNC: 32.5 G/DL (ref 31.4–37.4)
MCV RBC AUTO: 91 FL (ref 82–98)
MONOCYTES # BLD AUTO: 0.56 THOUSAND/ÂΜL (ref 0.17–1.22)
MONOCYTES NFR BLD AUTO: 9 % (ref 4–12)
NEUTROPHILS # BLD AUTO: 3.43 THOUSANDS/ÂΜL (ref 1.85–7.62)
NEUTS SEG NFR BLD AUTO: 57 % (ref 43–75)
NITRITE UR QL STRIP: NEGATIVE
NRBC BLD AUTO-RTO: 0 /100 WBCS
P AXIS: 57 DEGREES
PH UR STRIP.AUTO: 6 [PH]
PLATELET # BLD AUTO: 351 THOUSANDS/UL (ref 149–390)
PMV BLD AUTO: 10.2 FL (ref 8.9–12.7)
POTASSIUM SERPL-SCNC: 3.5 MMOL/L (ref 3.5–5.3)
PR INTERVAL: 134 MS
PROT SERPL-MCNC: 7.7 G/DL (ref 6.4–8.4)
PROT UR STRIP-MCNC: NEGATIVE MG/DL
QRS AXIS: 7 DEGREES
QRSD INTERVAL: 88 MS
QT INTERVAL: 380 MS
QTC INTERVAL: 443 MS
RBC # BLD AUTO: 4.86 MILLION/UL (ref 3.81–5.12)
RSV RNA RESP QL NAA+PROBE: NEGATIVE
SARS-COV-2 RNA RESP QL NAA+PROBE: NEGATIVE
SODIUM SERPL-SCNC: 141 MMOL/L (ref 135–147)
SP GR UR STRIP.AUTO: 1.01 (ref 1–1.03)
T WAVE AXIS: 151 DEGREES
UROBILINOGEN UR QL STRIP.AUTO: 0.2 E.U./DL
VENTRICULAR RATE: 82 BPM
WBC # BLD AUTO: 6.02 THOUSAND/UL (ref 4.31–10.16)

## 2023-12-27 PROCEDURE — 83690 ASSAY OF LIPASE: CPT | Performed by: EMERGENCY MEDICINE

## 2023-12-27 PROCEDURE — 99223 1ST HOSP IP/OBS HIGH 75: CPT | Performed by: STUDENT IN AN ORGANIZED HEALTH CARE EDUCATION/TRAINING PROGRAM

## 2023-12-27 PROCEDURE — 81003 URINALYSIS AUTO W/O SCOPE: CPT | Performed by: EMERGENCY MEDICINE

## 2023-12-27 PROCEDURE — 85652 RBC SED RATE AUTOMATED: CPT | Performed by: STUDENT IN AN ORGANIZED HEALTH CARE EDUCATION/TRAINING PROGRAM

## 2023-12-27 PROCEDURE — 71045 X-RAY EXAM CHEST 1 VIEW: CPT

## 2023-12-27 PROCEDURE — 84484 ASSAY OF TROPONIN QUANT: CPT | Performed by: EMERGENCY MEDICINE

## 2023-12-27 PROCEDURE — 96375 TX/PRO/DX INJ NEW DRUG ADDON: CPT

## 2023-12-27 PROCEDURE — 82550 ASSAY OF CK (CPK): CPT | Performed by: STUDENT IN AN ORGANIZED HEALTH CARE EDUCATION/TRAINING PROGRAM

## 2023-12-27 PROCEDURE — 85025 COMPLETE CBC W/AUTO DIFF WBC: CPT | Performed by: EMERGENCY MEDICINE

## 2023-12-27 PROCEDURE — 36415 COLL VENOUS BLD VENIPUNCTURE: CPT | Performed by: EMERGENCY MEDICINE

## 2023-12-27 PROCEDURE — 74177 CT ABD & PELVIS W/CONTRAST: CPT

## 2023-12-27 PROCEDURE — 96374 THER/PROPH/DIAG INJ IV PUSH: CPT

## 2023-12-27 PROCEDURE — 80053 COMPREHEN METABOLIC PANEL: CPT | Performed by: EMERGENCY MEDICINE

## 2023-12-27 PROCEDURE — 99285 EMERGENCY DEPT VISIT HI MDM: CPT | Performed by: EMERGENCY MEDICINE

## 2023-12-27 PROCEDURE — 93005 ELECTROCARDIOGRAM TRACING: CPT

## 2023-12-27 PROCEDURE — 0241U HB NFCT DS VIR RESP RNA 4 TRGT: CPT | Performed by: EMERGENCY MEDICINE

## 2023-12-27 PROCEDURE — 99285 EMERGENCY DEPT VISIT HI MDM: CPT

## 2023-12-27 PROCEDURE — G1004 CDSM NDSC: HCPCS

## 2023-12-27 RX ORDER — FAMOTIDINE 20 MG/1
40 TABLET, FILM COATED ORAL 2 TIMES DAILY
Status: DISCONTINUED | OUTPATIENT
Start: 2023-12-27 | End: 2023-12-29 | Stop reason: HOSPADM

## 2023-12-27 RX ORDER — TORSEMIDE 10 MG/1
10 TABLET ORAL DAILY
Status: DISCONTINUED | OUTPATIENT
Start: 2023-12-27 | End: 2023-12-29 | Stop reason: HOSPADM

## 2023-12-27 RX ORDER — BUSPIRONE HYDROCHLORIDE 5 MG/1
5 TABLET ORAL 2 TIMES DAILY
Status: DISCONTINUED | OUTPATIENT
Start: 2023-12-27 | End: 2023-12-29 | Stop reason: HOSPADM

## 2023-12-27 RX ORDER — ONDANSETRON 2 MG/ML
4 INJECTION INTRAMUSCULAR; INTRAVENOUS ONCE
Status: COMPLETED | OUTPATIENT
Start: 2023-12-27 | End: 2023-12-27

## 2023-12-27 RX ORDER — SODIUM CHLORIDE, SODIUM GLUCONATE, SODIUM ACETATE, POTASSIUM CHLORIDE, MAGNESIUM CHLORIDE, SODIUM PHOSPHATE, DIBASIC, AND POTASSIUM PHOSPHATE .53; .5; .37; .037; .03; .012; .00082 G/100ML; G/100ML; G/100ML; G/100ML; G/100ML; G/100ML; G/100ML
100 INJECTION, SOLUTION INTRAVENOUS CONTINUOUS
Status: DISCONTINUED | OUTPATIENT
Start: 2023-12-27 | End: 2023-12-29

## 2023-12-27 RX ORDER — SENNOSIDES 8.6 MG
8.6 TABLET ORAL DAILY
Status: DISCONTINUED | OUTPATIENT
Start: 2023-12-27 | End: 2023-12-29 | Stop reason: HOSPADM

## 2023-12-27 RX ORDER — TIMOLOL MALEATE 5 MG/ML
1 SOLUTION/ DROPS OPHTHALMIC 2 TIMES DAILY
Status: DISCONTINUED | OUTPATIENT
Start: 2023-12-27 | End: 2023-12-29 | Stop reason: HOSPADM

## 2023-12-27 RX ORDER — TRAMADOL HYDROCHLORIDE 50 MG/1
50 TABLET ORAL EVERY 6 HOURS PRN
Status: DISCONTINUED | OUTPATIENT
Start: 2023-12-27 | End: 2023-12-29 | Stop reason: HOSPADM

## 2023-12-27 RX ORDER — ROPINIROLE 1 MG/1
1 TABLET, FILM COATED ORAL
Status: DISCONTINUED | OUTPATIENT
Start: 2023-12-27 | End: 2023-12-29 | Stop reason: HOSPADM

## 2023-12-27 RX ORDER — LEVOTHYROXINE SODIUM 0.1 MG/1
100 TABLET ORAL DAILY
Status: DISCONTINUED | OUTPATIENT
Start: 2023-12-28 | End: 2023-12-29 | Stop reason: HOSPADM

## 2023-12-27 RX ORDER — ONDANSETRON 2 MG/ML
4 INJECTION INTRAMUSCULAR; INTRAVENOUS EVERY 4 HOURS PRN
Status: DISCONTINUED | OUTPATIENT
Start: 2023-12-27 | End: 2023-12-29 | Stop reason: HOSPADM

## 2023-12-27 RX ORDER — ROPINIROLE 1 MG/1
TABLET, FILM COATED ORAL
Qty: 270 TABLET | Refills: 3 | Status: ON HOLD | OUTPATIENT
Start: 2023-12-27

## 2023-12-27 RX ORDER — BRIMONIDINE TARTRATE 2 MG/ML
1 SOLUTION/ DROPS OPHTHALMIC EVERY EVENING
Status: DISCONTINUED | OUTPATIENT
Start: 2023-12-27 | End: 2023-12-29 | Stop reason: HOSPADM

## 2023-12-27 RX ORDER — AMLODIPINE BESYLATE 5 MG/1
5 TABLET ORAL DAILY
Status: DISCONTINUED | OUTPATIENT
Start: 2023-12-27 | End: 2023-12-29 | Stop reason: HOSPADM

## 2023-12-27 RX ORDER — ACETAMINOPHEN 325 MG/1
650 TABLET ORAL EVERY 6 HOURS PRN
Status: DISCONTINUED | OUTPATIENT
Start: 2023-12-27 | End: 2023-12-28

## 2023-12-27 RX ORDER — TRAMADOL HYDROCHLORIDE 50 MG/1
TABLET ORAL
Qty: 60 TABLET | Refills: 3 | Status: ON HOLD | OUTPATIENT
Start: 2023-12-27

## 2023-12-27 RX ORDER — KETOROLAC TROMETHAMINE 30 MG/ML
30 INJECTION, SOLUTION INTRAMUSCULAR; INTRAVENOUS ONCE
Status: COMPLETED | OUTPATIENT
Start: 2023-12-27 | End: 2023-12-27

## 2023-12-27 RX ORDER — PREGABALIN 75 MG/1
150 CAPSULE ORAL 2 TIMES DAILY
Status: DISCONTINUED | OUTPATIENT
Start: 2023-12-27 | End: 2023-12-29 | Stop reason: HOSPADM

## 2023-12-27 RX ORDER — HYDROXYZINE HYDROCHLORIDE 25 MG/1
25 TABLET, FILM COATED ORAL
Status: DISCONTINUED | OUTPATIENT
Start: 2023-12-27 | End: 2023-12-29 | Stop reason: HOSPADM

## 2023-12-27 RX ORDER — LATANOPROST 50 UG/ML
1 SOLUTION/ DROPS OPHTHALMIC
Status: DISCONTINUED | OUTPATIENT
Start: 2023-12-27 | End: 2023-12-29 | Stop reason: HOSPADM

## 2023-12-27 RX ORDER — ROPINIROLE 1 MG/1
1 TABLET, FILM COATED ORAL 2 TIMES DAILY
Status: DISCONTINUED | OUTPATIENT
Start: 2023-12-27 | End: 2023-12-29 | Stop reason: HOSPADM

## 2023-12-27 RX ADMIN — ACETAMINOPHEN 650 MG: 325 TABLET, FILM COATED ORAL at 17:57

## 2023-12-27 RX ADMIN — ROPINIROLE HYDROCHLORIDE 1 MG: 1 TABLET, FILM COATED ORAL at 21:53

## 2023-12-27 RX ADMIN — ROPINIROLE HYDROCHLORIDE 1 MG: 1 TABLET, FILM COATED ORAL at 21:55

## 2023-12-27 RX ADMIN — TRAMADOL HYDROCHLORIDE 50 MG: 50 TABLET, COATED ORAL at 14:24

## 2023-12-27 RX ADMIN — SODIUM CHLORIDE, SODIUM GLUCONATE, SODIUM ACETATE, POTASSIUM CHLORIDE, MAGNESIUM CHLORIDE, SODIUM PHOSPHATE, DIBASIC, AND POTASSIUM PHOSPHATE 100 ML/HR: .53; .5; .37; .037; .03; .012; .00082 INJECTION, SOLUTION INTRAVENOUS at 14:33

## 2023-12-27 RX ADMIN — TIMOLOL MALEATE 1 DROP: 5 SOLUTION/ DROPS OPHTHALMIC at 17:52

## 2023-12-27 RX ADMIN — BUSPIRONE HYDROCHLORIDE 5 MG: 5 TABLET ORAL at 17:51

## 2023-12-27 RX ADMIN — ONDANSETRON 4 MG: 2 INJECTION INTRAMUSCULAR; INTRAVENOUS at 10:54

## 2023-12-27 RX ADMIN — PREGABALIN 150 MG: 75 CAPSULE ORAL at 17:51

## 2023-12-27 RX ADMIN — BRIMONIDINE TARTRATE 1 DROP: 2 SOLUTION/ DROPS OPHTHALMIC at 17:52

## 2023-12-27 RX ADMIN — ROPINIROLE HYDROCHLORIDE 1 MG: 1 TABLET, FILM COATED ORAL at 14:24

## 2023-12-27 RX ADMIN — IOHEXOL 100 ML: 350 INJECTION, SOLUTION INTRAVENOUS at 11:22

## 2023-12-27 RX ADMIN — FAMOTIDINE 40 MG: 20 TABLET, FILM COATED ORAL at 17:51

## 2023-12-27 RX ADMIN — LATANOPROST 1 DROP: 50 SOLUTION OPHTHALMIC at 21:52

## 2023-12-27 RX ADMIN — KETOROLAC TROMETHAMINE 30 MG: 30 INJECTION, SOLUTION INTRAMUSCULAR at 12:04

## 2023-12-27 NOTE — ASSESSMENT & PLAN NOTE
Patient endorsing mild generalized abdominal pain as part of her overall pain complaint  CT noting large uterine mass or myoma measuring 13 x 10.1 cm, which appears stable from previous imaging.  This is known to patient and she states it is being observed  Could perhaps be culprit for some of patient's abdominal discomfort, but likely not culprit for overall pain syndrome  MRI of abdomen to be considered, could likely be deferred to outpatient at this time, if symptoms continue will consider inpatient study

## 2023-12-27 NOTE — ASSESSMENT & PLAN NOTE
Resume BuSpar 5 mg twice daily and hydroxyzine 25 mg nightly  Patient on Trintellix which we do not have on formulary here, patient can bring own med from home but will hold for now

## 2023-12-27 NOTE — ED PROVIDER NOTES
History  Chief Complaint   Patient presents with    Nausea     Nausea and body aches x3 weeks.  Legs feel like they are on fire. Denies fever.  Generalized weakness     This is a 78-year-old female who presents to the emergency department complaining of pain everywhere.  The patient states that she has had bodyaches for 3 weeks as well as nausea.  She states the nausea and vomiting are worse in the morning.  She states that dissipates throughout the day.  She denies fevers, but states this feels similar to the last time she had COVID.  She denies chest pain or trouble breathing, but states everything hurts.        Prior to Admission Medications   Prescriptions Last Dose Informant Patient Reported? Taking?   ASPIRIN 81 PO 2023 Self Yes Yes   Sig: Aspirin EC 81 MG Oral Tablet Delayed Release    Refills: 0       Active   DULoxetine (CYMBALTA) 30 mg delayed release capsule Not Taking Self Yes No   Patient not taking: Reported on 2023   acetaminophen (TYLENOL) 325 mg tablet  Self Yes No   Sig: Take 650 mg by mouth every 6 (six) hours as needed for mild pain   alendronate (Fosamax) 70 mg tablet Not Taking Self Yes No   Patient not taking: Reported on 2023   amLODIPine (NORVASC) 5 mg tablet 2023 Self No Yes   Sig: Take 1 tablet (5 mg total) by mouth daily   brimonidine (ALPHAGAN P) 0.15 % ophthalmic solution 2023 Self Yes Yes   Sig: Administer to the right eye every evening    busPIRone (BUSPAR) 5 mg tablet 2023  No Yes   Sig: Take 1 tablet (5 mg total) by mouth 2 (two) times a day   famotidine (PEPCID) 40 MG tablet 2023 Self No Yes   Sig: TAKE 1 TABLET TWICE DAILY   fexofenadine (ALLEGRA) 180 MG tablet Not Taking Self Yes No   Sig: Take 180 mg by mouth as needed    Patient not taking: Reported on 2023   fluticasone (FLONASE) 50 mcg/act nasal spray 2023 Self Yes Yes   Si sprays into each nostril once OD   hydrOXYzine HCL (ATARAX) 10 mg tablet 2023  No Yes    Sig: Take 1 tablet (10 mg total) by mouth 3 (three) times a day   hydrOXYzine HCL (ATARAX) 25 mg tablet 12/26/2023 Self No Yes   Sig: Take 1 tablet (25 mg total) by mouth daily at bedtime as needed for anxiety or itching   latanoprost (XALATAN) 0.005 % ophthalmic solution 12/26/2023 Self Yes Yes   Sig: Administer 1 drop to both eyes daily at bedtime   levothyroxine 100 mcg tablet 12/27/2023 Self No Yes   Sig: TAKE 1 TABLET EVERY DAY   methocarbamol (ROBAXIN) 500 mg tablet Not Taking  No No   Sig: Take 1 tablet (500 mg total) by mouth 3 (three) times a day   Patient not taking: Reported on 12/27/2023   pregabalin (LYRICA) 150 mg capsule 12/27/2023  No Yes   Sig: Take 1 capsule (150 mg total) by mouth 2 (two) times a day   rOPINIRole (REQUIP) 1 mg tablet 12/26/2023 Self No Yes   Sig: TAKE 1 TABLET AT 6PM AND TAKE 2 TABLETS AT BEDTIME   rivaroxaban (Xarelto) 20 mg tablet 12/26/2023  No Yes   Sig: Take 1 tablet (20 mg total) by mouth every evening   senna (SENOKOT) 8.6 MG tablet 12/27/2023 Self Yes Yes   Sig: Take 8.6 mg by mouth in the morning   timolol (TIMOPTIC) 0.5 % ophthalmic solution  Self Yes No   Sig: Administer to both eyes 2 (two) times a day    torsemide (DEMADEX) 10 mg tablet 12/27/2023  No Yes   Sig: TAKE 1 TABLET EVERY DAY   traMADol (Ultram) 50 mg tablet 12/26/2023  No Yes   Sig: Take 1/2 tab at 8AM, 1/2 tab at 1PM, and 1 tab at HS (total 2 tabs/day)   vortioxetine (TRINTELLIX) 10 MG tablet 12/27/2023  No Yes   Sig: Take 1 tablet (10 mg total) by mouth daily   Patient taking differently: Take 20 mg by mouth daily      Facility-Administered Medications: None       Past Medical History:   Diagnosis Date    Cancer (HCC)     left breast cancer    Cervical herniated disc     Chronic pain disorder     back pain    Chronic respiratory failure (HCC)     uses O2 at home with NC /5/19 no longer using/just CPAP    CPAP (continuous positive airway pressure) dependence     Disease of thyroid gland     hypothyroid  "   DVT (deep venous thrombosis) ([facility]) 2020    right leg    Family history of reaction to anesthesia     \"son and daughter hard time waking up and also PONV\"    Fibromyalgia, primary     GERD (gastroesophageal reflux disease)     Glaucoma     Hiatal hernia     History of palpitations     History of pneumonia     History of transfusion     Hyperlipidemia     Hypertension     Irregular heart beat     Migraine     Myocardial infarction ([facility])     pt sees cardilogist Dr Bennett [facility]/\"didnt realize had one, found on EKG before a surgery\"    OAB (overactive bladder)     Pollen allergies     Pulmonary embolism ([facility]) 2019    Bilateral; on Xarelto    Restless leg     Risk for falls     Sleep apnea     Use of cane as ambulatory aid     occas    Uses brace     Mafo/left leg    Wears glasses        Past Surgical History:   Procedure Laterality Date    ADENOIDECTOMY      CARDIAC CATHETERIZATION      CARPAL TUNNEL RELEASE      CATARACT EXTRACTION Bilateral     FOOT SURGERY      pin implanted right toe    JOINT REPLACEMENT Bilateral     knees    LAMINECTOMY      LUMBAR EPIDURAL INJECTION      MASTECTOMY Bilateral     with reconstruction and implants    NISSEN FUNDOPLICATION      MN LNGTH/SHRT TENDON LEG/ANKLE 1 TENDON SPX Left 05/24/2021    Procedure: Sectioning peroneal tendons with lengthening/sectioning achilles tendon lower leg and ankle;  Surgeon: Fei Sifuentes DPM;  Location: [facility];  Service: Podiatry    REPLACEMENT TOTAL KNEE      SPINAL FUSION      TONSILLECTOMY      TUBAL LIGATION      TUMOR EXCISION      right forearm/benign    VEIN LIGATION Right        Family History   Problem Relation Age of Onset    Cancer Sister      I have reviewed and agree with the history as documented.    E-Cigarette/Vaping    E-Cigarette Use Never User      E-Cigarette/Vaping Substances    Nicotine No     THC No     CBD No     Flavoring No     Other No     Unknown No      Social History     Tobacco Use    Smoking status: Never    " Smokeless tobacco: Never   Vaping Use    Vaping status: Never Used   Substance Use Topics    Alcohol use: Yes     Comment: socially    Drug use: Never       Review of Systems   All other systems reviewed and are negative.      Physical Exam  Physical Exam  Constitutional:  Vital signs reviewed, patient appears nontoxic, appears uncomfortable  Eyes: Pupils equal round reactive to light and accommodation, extraocular muscles intact  HEENT: trachea midline, no JVD, moist mucous membranes  Respiratory: lung sounds clear throughout, no rhonchi, no rales  Cardiovascular: regular rate rhythm, no murmurs or rubs  Abdomen: soft, nontender, nondistended, no rebound or guarding  Back: no CVA tenderness, normal inspection  Extremities: no edema, pulses equal in all 4 extremities  Neuro: awake, alert, oriented, no focal weakness  Skin: warm, dry, intact, no rashes noted    Vital Signs  ED Triage Vitals [12/27/23 1003]   Temperature Pulse Respirations Blood Pressure SpO2   98.3 °F (36.8 °C) 84 16 131/65 96 %      Temp Source Heart Rate Source Patient Position - Orthostatic VS BP Location FiO2 (%)   Oral Monitor Lying Left arm --      Pain Score       10 - Worst Possible Pain           Vitals:    12/27/23 1100 12/27/23 1130 12/27/23 1200 12/27/23 1332   BP: 118/55 124/62 116/55 103/52   Pulse: 79 75 68 73   Patient Position - Orthostatic VS: Lying   Lying         Visual Acuity      ED Medications  Medications   morphine injection 2 mg (has no administration in time range)   traMADol (ULTRAM) tablet 50 mg (50 mg Oral Given 12/27/23 1424)   acetaminophen (TYLENOL) tablet 650 mg (has no administration in time range)   ondansetron (ZOFRAN) injection 4 mg (has no administration in time range)   timolol (TIMOPTIC) 0.5 % ophthalmic solution 1 drop (has no administration in time range)   latanoprost (XALATAN) 0.005 % ophthalmic solution 1 drop (has no administration in time range)   brimonidine tartrate 0.2 % ophthalmic solution 1 drop  (has no administration in time range)   busPIRone (BUSPAR) tablet 5 mg (has no administration in time range)   rivaroxaban (XARELTO) tablet 20 mg (has no administration in time range)   torsemide (DEMADEX) tablet 10 mg (10 mg Oral Not Given 12/27/23 1410)   senna (SENOKOT) tablet 8.6 mg (8.6 mg Oral Not Given 12/27/23 1426)   rOPINIRole (REQUIP) tablet 1 mg (1 mg Oral Given 12/27/23 1424)   rOPINIRole (REQUIP) tablet 1 mg (has no administration in time range)   levothyroxine tablet 100 mcg (100 mcg Oral Not Given 12/27/23 1411)   pregabalin (LYRICA) capsule 150 mg (has no administration in time range)   hydrOXYzine HCL (ATARAX) tablet 25 mg (has no administration in time range)   famotidine (PEPCID) tablet 40 mg (has no administration in time range)   amLODIPine (NORVASC) tablet 5 mg (5 mg Oral Not Given 12/27/23 1426)   multi-electrolyte (PLASMALYTE-A/ISOLYTE-S PH 7.4) IV solution (100 mL/hr Intravenous New Bag 12/27/23 1433)   ondansetron (ZOFRAN) injection 4 mg (4 mg Intravenous Given 12/27/23 1054)   iohexol (OMNIPAQUE) 350 MG/ML injection (SINGLE-DOSE) 100 mL (100 mL Intravenous Given 12/27/23 1122)   ketorolac (TORADOL) injection 30 mg (30 mg Intravenous Given 12/27/23 1204)       Diagnostic Studies  Results Reviewed       Procedure Component Value Units Date/Time    HS Troponin I 4hr [[id number]]  (Normal) Collected: 12/27/23 1425    Lab Status: Final result Specimen: Blood from Arm, Left Updated: 12/27/23 1511     hs TnI 4hr 3 ng/L      Delta 4hr hsTnI 1 ng/L     CK [[id number]]  (Normal) Collected: 12/27/23 1040    Lab Status: Final result Specimen: Blood from Arm, Right Updated: 12/27/23 1504     Total CK 56 U/L     Sedimentation rate, automated [[id number]]  (Abnormal) Collected: 12/27/23 1040    Lab Status: Final result Specimen: Blood from Arm, Right Updated: 12/27/23 1414     Sed Rate 33 mm/hour     HS Troponin I 2hr [[id number]]  (Normal) Collected: 12/27/23 1239    Lab Status: Final result Specimen:  Blood from Arm, Right Updated: 12/27/23 1312     hs TnI 2hr 2 ng/L      Delta 2hr hsTnI 0 ng/L     FLU/RSV/COVID - if FLU/RSV clinically relevant [[id number]]  (Normal) Collected: 12/27/23 1040    Lab Status: Final result Specimen: Nares from Nose Updated: 12/27/23 1129     SARS-CoV-2 Negative     INFLUENZA A PCR Negative     INFLUENZA B PCR Negative     RSV PCR Negative    Narrative:      FOR PEDIATRIC PATIENTS - copy/paste COVID Guidelines URL to browser: [email or web address]    SARS-CoV-2 assay is a Nucleic Acid Amplification assay intended for the  qualitative detection of nucleic acid from SARS-CoV-2 in nasopharyngeal  swabs. Results are for the presumptive identification of SARS-CoV-2 RNA.    Positive results are indicative of infection with SARS-CoV-2, the virus  causing COVID-19, but do not rule out bacterial infection or co-infection  with other viruses. Laboratories within the [place] and its  territories are required to report all positive results to the appropriate  public health authorities. Negative results do not preclude SARS-CoV-2  infection and should not be used as the sole basis for treatment or other  patient management decisions. Negative results must be combined with  clinical observations, patient history, and epidemiological information.  This test has not been FDA cleared or approved.    This test has been authorized by FDA under an Emergency Use Authorization  (EUA). This test is only authorized for the duration of time the  declaration that circumstances exist justifying the authorization of the  emergency use of an in vitro diagnostic tests for detection of SARS-CoV-2  virus and/or diagnosis of COVID-19 infection under section 564(b)(1) of  the Act, 21 U.S.C. 360bbb-3(b)(1), unless the authorization is terminated  or revoked sooner. The test has been validated but independent review by FDA  and CLIA is pending.    Test performed  using [employer] GeneXpert: This RT-PCR assay targets N2,  a region unique to SARS-CoV-2. A conserved region in the E-gene was chosen  for pan-Sarbecovirus detection which includes SARS-CoV-2.    According to CMS-2020-01-R, this platform meets the definition of high-throughput technology.    HS Troponin 0hr (reflex protocol) [[id number]]  (Normal) Collected: 12/27/23 1040    Lab Status: Final result Specimen: Blood from Arm, Right Updated: 12/27/23 1116     hs TnI 0hr 2 ng/L     CMP [[id number]]  (Abnormal) Collected: 12/27/23 1040    Lab Status: Final result Specimen: Blood from Arm, Right Updated: 12/27/23 1109     Sodium 141 mmol/L      Potassium 3.5 mmol/L      Chloride 107 mmol/L      CO2 26 mmol/L      ANION GAP 8 mmol/L      BUN 20 mg/dL      Creatinine 0.69 mg/dL      Glucose 116 mg/dL      Calcium 9.3 mg/dL      AST 10 U/L      ALT 11 U/L      Alkaline Phosphatase 121 U/L      Total Protein 7.7 g/dL      Albumin 4.3 g/dL      Total Bilirubin 0.46 mg/dL      eGFR 83 ml/min/1.73sq m     Narrative:      National Kidney Disease Foundation guidelines for Chronic Kidney Disease (CKD):     Stage 1 with normal or high GFR (GFR > 90 mL/min/1.73 square meters)    Stage 2 Mild CKD (GFR = 60-89 mL/min/1.73 square meters)    Stage 3A Moderate CKD (GFR = 45-59 mL/min/1.73 square meters)    Stage 3B Moderate CKD (GFR = 30-44 mL/min/1.73 square meters)    Stage 4 Severe CKD (GFR = 15-29 mL/min/1.73 square meters)    Stage 5 End Stage CKD (GFR <15 mL/min/1.73 square meters)  Note: GFR calculation is accurate only with a steady state creatinine    Lipase [[id number]]  (Normal) Collected: 12/27/23 1040    Lab Status: Final result Specimen: Blood from Arm, Right Updated: 12/27/23 1109     Lipase 67 u/L     UA (URINE) with reflex to Scope [[id number]]  (Abnormal) Collected: 12/27/23 1043    Lab Status: Final result Specimen: Urine, Clean Catch Updated: 12/27/23 1050     Color, UA Straw     Clarity, UA Clear     Specific Cecil, UA  1.010     pH, UA 6.0     Leukocytes, UA Negative     Nitrite, UA Negative     Protein, UA Negative mg/dl      Glucose, UA Negative mg/dl      Ketones, UA Negative mg/dl      Urobilinogen, UA 0.2 E.U./dl      Bilirubin, UA Negative     Occult Blood, UA Negative    CBC and differential [[id number]] Collected: 12/27/23 1040    Lab Status: Final result Specimen: Blood from Arm, Right Updated: 12/27/23 1049     WBC 6.02 Thousand/uL      RBC 4.86 Million/uL      Hemoglobin 14.4 g/dL      Hematocrit 44.3 %      MCV 91 fL      MCH 29.6 pg      MCHC 32.5 g/dL      RDW 13.4 %      MPV 10.2 fL      Platelets 351 Thousands/uL      nRBC 0 /100 WBCs      Neutrophils Relative 57 %      Immat GRANS % 1 %      Lymphocytes Relative 29 %      Monocytes Relative 9 %      Eosinophils Relative 4 %      Basophils Relative 0 %      Neutrophils Absolute 3.43 Thousands/µL      Immature Grans Absolute 0.03 Thousand/uL      Lymphocytes Absolute 1.76 Thousands/µL      Monocytes Absolute 0.56 Thousand/µL      Eosinophils Absolute 0.22 Thousand/µL      Basophils Absolute 0.02 Thousands/µL                    XR chest portable   Final Result by Ginger Willis MD (12/27 1612)      No acute cardiopulmonary disease.                  Resident: HERBETR APPIAH I, the attending radiologist, have reviewed the images and agree with the final report above.      Workstation performed: [id number]         CT Abdomen pelvis with contrast   Final Result by Adelina Monroy MD (12/27 6888)   No acute inflammatory stranding   Large uterine mass/myoma measuring 13 x 10.1 cm, stable. Consider nonemergent evaluation with the MRI.   Mild dilatation of the bilateral pelvicalyceal systems right greater than left without any obstructing calculus may be due to mass effect from the enlarged uterus   The study was marked in EPIC for significant notification.            Workstation performed: [id number]         CT head wo contrast    (Results Pending)               Procedures  ECG 12 Lead Documentation Only    Date/Time: 12/27/2023 4:37 PM    Performed by: Travis Rollins DO  Authorized by: Travis Rollins DO    ECG reviewed by me, the ED Provider: yes    Patient location:  ED  Comments:      Normal sinus rhythm, rate 82, normal MO, normal QTc, no STEMI, no acute change compared to EKG dated 12/23/2022, EKG independently interpreted by me           ED Course  ED Course as of 12/27/23 1732   Wed Dec 27, 2023   1731 The patient had lab work that was significant for a troponin of 2.  She had a UA that was unremarkable.  She had a creatinine of 0.69.  This is similar to the patient's baseline.  The patient had a negative viral panel.  She continued to have nausea and diffuse bodyaches on reevaluation.  After nausea medication and Toradol the patient continued to have discomfort.  She was admitted to the hospital for further care and evaluation                               SBIRT 22yo+      Flowsheet Row Most Recent Value   Initial Alcohol Screen: US AUDIT-C     1. How often do you have a drink containing alcohol? 0 Filed at: 12/27/2023 1002   2. How many drinks containing alcohol do you have on a typical day you are drinking?  0 Filed at: 12/27/2023 1002   3b. FEMALE Any Age, or MALE 65+: How often do you have 4 or more drinks on one occassion? 0 Filed at: 12/27/2023 1002   Audit-C Score 0 Filed at: 12/27/2023 1002   CARLITOS: How many times in the past year have you...    Used an illegal drug or used a prescription medication for non-medical reasons? Never Filed at: 12/27/2023 1002                      Medical Decision Making  This is a 78-year-old female who presented to the emergency department complaining of diffuse body aches and nausea and vomiting.  I considered obstruction, diverticulosis/diverticulitis, urinary tract infection, viral illness. These and other diagnoses were considered.         Amount and/or Complexity of Data Reviewed  Labs: ordered.  Radiology:  ordered.    Risk  Prescription drug management.  Decision regarding hospitalization.             Disposition  Final diagnoses:   Intractable nausea and vomiting   Generalized pain     Time reflects when diagnosis was documented in both MDM as applicable and the Disposition within this note       Time User Action Codes Description Comment    12/27/2023 12:14 PM Travis Rollins [R11.2] Intractable nausea and vomiting     12/27/2023 12:14 PM Travis Rollins [R52] Generalized pain           ED Disposition       ED Disposition   Admit    Condition   Stable    Date/Time   Wed Dec 27, 2023 1214    Comment   Case was discussed with Dr Queen and the patient's admission status was agreed to be Admission Status: observation status to the service of Dr. Queen .               Follow-up Information    None         Current Discharge Medication List        CONTINUE these medications which have NOT CHANGED    Details   amLODIPine (NORVASC) 5 mg tablet Take 1 tablet (5 mg total) by mouth daily  Qty: 90 tablet, Refills: 3    Associated Diagnoses: Essential hypertension      ASPIRIN 81 PO Aspirin EC 81 MG Oral Tablet Delayed Release    Refills: 0       Active      brimonidine (ALPHAGAN P) 0.15 % ophthalmic solution Administer to the right eye every evening       busPIRone (BUSPAR) 5 mg tablet Take 1 tablet (5 mg total) by mouth 2 (two) times a day  Qty: 60 tablet, Refills: 5    Associated Diagnoses: Anxiety and depression      famotidine (PEPCID) 40 MG tablet TAKE 1 TABLET TWICE DAILY  Qty: 180 tablet, Refills: 2    Associated Diagnoses: Chronic GERD      fluticasone (FLONASE) 50 mcg/act nasal spray 2 sprays into each nostril once OD      !! hydrOXYzine HCL (ATARAX) 10 mg tablet Take 1 tablet (10 mg total) by mouth 3 (three) times a day  Qty: 90 tablet, Refills: 1    Associated Diagnoses: Anxiety and depression      !! hydrOXYzine HCL (ATARAX) 25 mg tablet Take 1 tablet (25 mg total) by mouth daily at bedtime as needed for  anxiety or itching  Qty: 30 tablet, Refills: 3    Associated Diagnoses: Spinal stenosis of lumbar region with neurogenic claudication      latanoprost (XALATAN) 0.005 % ophthalmic solution Administer 1 drop to both eyes daily at bedtime      levothyroxine 100 mcg tablet TAKE 1 TABLET EVERY DAY  Qty: 90 tablet, Refills: 3    Associated Diagnoses: Hypothyroidism, adult      pregabalin (LYRICA) 150 mg capsule Take 1 capsule (150 mg total) by mouth 2 (two) times a day  Qty: 90 capsule, Refills: 3    Associated Diagnoses: Spinal stenosis of lumbar region with neurogenic claudication      rivaroxaban (Xarelto) 20 mg tablet Take 1 tablet (20 mg total) by mouth every evening  Qty: 90 tablet, Refills: 1    Comments: Patient lost previous Rx bottle -- reviewed with her the Rx dose, route, and frequency and pt has been taking as Rx.  Associated Diagnoses: Bilateral pulmonary embolism (HCC)      rOPINIRole (REQUIP) 1 mg tablet TAKE 1 TABLET AT 6PM AND TAKE 2 TABLETS AT BEDTIME  Qty: 270 tablet, Refills: 3    Associated Diagnoses: RLS (restless legs syndrome)      senna (SENOKOT) 8.6 MG tablet Take 8.6 mg by mouth in the morning      torsemide (DEMADEX) 10 mg tablet TAKE 1 TABLET EVERY DAY  Qty: 90 tablet, Refills: 1    Associated Diagnoses: Swelling of limb      traMADol (Ultram) 50 mg tablet Take 1/2 tab at 8AM, 1/2 tab at 1PM, and 1 tab at HS (total 2 tabs/day)  Qty: 60 tablet, Refills: 5    Associated Diagnoses: Back pain, unspecified back location, unspecified back pain laterality, unspecified chronicity; Lumbar contusion, subsequent encounter      vortioxetine (TRINTELLIX) 10 MG tablet Take 1 tablet (10 mg total) by mouth daily  Qty: 30 tablet, Refills: 5    Associated Diagnoses: Depression, recurrent (HCC)      acetaminophen (TYLENOL) 325 mg tablet Take 650 mg by mouth every 6 (six) hours as needed for mild pain      alendronate (Fosamax) 70 mg tablet       DULoxetine (CYMBALTA) 30 mg delayed release capsule        fexofenadine (ALLEGRA) 180 MG tablet Take 180 mg by mouth as needed       methocarbamol (ROBAXIN) 500 mg tablet Take 1 tablet (500 mg total) by mouth 3 (three) times a day  Qty: 60 tablet, Refills: 1    Associated Diagnoses: S/P TKR (total knee replacement), right      timolol (TIMOPTIC) 0.5 % ophthalmic solution Administer to both eyes 2 (two) times a day        !! - Potential duplicate medications found. Please discuss with provider.          No discharge procedures on file.    PDMP Review         Value Time User    PDMP Reviewed  Yes 11/16/2023  5:30 PM JOE Yates DO            ED Provider  Electronically Signed by             Travis Rollins DO  12/27/23 8260

## 2023-12-27 NOTE — H&P
[facility]  H&P  Name: Madeleine Sanchez 78 y.o. female I MRN: [id number]  Unit/Bed#: -01 I Date of Admission: 12/27/2023   Date of Service: 12/27/2023 I Hospital Day: 0      Assessment/Plan   Abdominal discomfort  Assessment & Plan  Patient endorsing mild generalized abdominal pain as part of her overall pain complaint  CT noting large uterine mass or myoma measuring 13 x 10.1 cm, which appears stable from previous imaging.  This is known to patient and she states it is being observed  Could perhaps be culprit for some of patient's abdominal discomfort, but likely not culprit for overall pain syndrome  MRI of abdomen to be considered, could likely be deferred to outpatient at this time, if symptoms continue will consider inpatient study  Noted isolated elevated alk phos, unclear if related.  Repeat liver profile in morning    Nausea and vomiting  Assessment & Plan  Zofran ordered as needed  Gentle hydration with plasmalyte    Bilateral pulmonary embolism (HCC)  Assessment & Plan  Continue Xarelto 20 mg daily    GERD (gastroesophageal reflux disease)  Assessment & Plan  Resume Pepcid 40 mg twice daily    Hypothyroidism, adult  Assessment & Plan  Resume levothyroxine 100 mcg daily    Anxiety and depression  Assessment & Plan  Resume BuSpar 5 mg twice daily and hydroxyzine 25 mg nightly  Patient on Trintellix which we do not have on formulary here, patient can bring own med from home but will hold for now    * Diffuse pain  Assessment & Plan  Patient reporting 3-week history of diffuse abdominal pain.  He is having difficulty exactly describe the nature of the pain but describes it in multiple ways such as a burning of the legs with muscle aches and joint aches  She has had similar pain to this in the past but never over her entire body.    She is also endorsing headache with sensitivity to light which is worse than normal for her  No obvious medical derangements at this time, add on CK  and ESR to evaluate possible autoimmune etiologies.  Low concern for meningitis/meningismus as there is no confusion or fever, but will scan head given headache which is atypical for patient  Pain control and continued observation         VTE Pharmacologic Prophylaxis: VTE Score: 6 Moderate Risk (Score 3-4) - Pharmacological DVT Prophylaxis Ordered: rivaroxaban (Xarelto).  Code Status: Level 1 - Full Code   Discussion with family: Patient declined call to .     Anticipated Length of Stay: Patient will be admitted on an inpatient basis with an anticipated length of stay of greater than 2 midnights secondary to Intractable pain.    Total Time Spent on Date of Encounter in care of patient: 45 mins. This time was spent on one or more of the following: performing physical exam; counseling and coordination of care; obtaining or reviewing history; documenting in the medical record; reviewing/ordering tests, medications or procedures; communicating with other healthcare professionals and discussing with patient's family/caregivers.    Chief Complaint: Diffuse body pain    History of Present Illness:  Madeleine Sanchez is a 78 y.o. female with a PMH of for myalgia, DVT/PE, history of breast cancer, hypertension among others who presents with diffuse pain.  She does have history of chronic pain and is on tramadol at baseline. She states that for the past 3 weeks or so she has developed pain diffusely throughout her body with associated muscle aches and joint aches.  She has had similar pains in the past but states it has not usually been in this character.  She came to the ED where workup was generally nonrevealing however patient was given Toradol without relief and so was referred for observation.  She also endorses some generalized abdominal pain with nausea.  Denies dysuria, respiratory issues, fever.    Review of Systems:  Review of Systems   Constitutional:  Positive for fatigue. Negative for chills and  "fever.   HENT:  Negative for ear pain and sore throat.    Eyes:  Negative for pain and visual disturbance.   Respiratory:  Negative for cough and shortness of breath.    Cardiovascular:  Negative for chest pain and palpitations.   Gastrointestinal:  Positive for abdominal pain and nausea. Negative for blood in stool, diarrhea and vomiting.   Genitourinary:  Negative for dysuria and hematuria.   Musculoskeletal:  Positive for arthralgias, back pain, myalgias and neck pain.   Skin:  Negative for color change and rash.   Neurological:  Positive for headaches. Negative for seizures and syncope.   All other systems reviewed and are negative.      Past Medical and Surgical History:   Past Medical History:   Diagnosis Date    Cancer ([facility])     left breast cancer    Cervical herniated disc     Chronic pain disorder     back pain    Chronic respiratory failure ([facility])     uses O2 at home with NC /5/19 no longer using/just CPAP    CPAP (continuous positive airway pressure) dependence     Disease of thyroid gland     hypothyroid    DVT (deep venous thrombosis) ([facility]) 2020    right leg    Family history of reaction to anesthesia     \"son and daughter hard time waking up and also PONV\"    Fibromyalgia, primary     GERD (gastroesophageal reflux disease)     Glaucoma     Hiatal hernia     History of palpitations     History of pneumonia     History of transfusion     Hyperlipidemia     Hypertension     Irregular heart beat     Migraine     Myocardial infarction ([facility])     pt sees cardilogist Dr Bennett [facility]/\"didnt realize had one, found on EKG before a surgery\"    OAB (overactive bladder)     Pollen allergies     Pulmonary embolism ([facility]) 2019    Bilateral; on Xarelto    Restless leg     Risk for falls     Sleep apnea     Use of cane as ambulatory aid     occas    Uses brace     Mafo/left leg    Wears glasses        Past Surgical History:   Procedure Laterality Date    ADENOIDECTOMY      CARDIAC CATHETERIZATION      CARPAL TUNNEL RELEASE "      CATARACT EXTRACTION Bilateral     FOOT SURGERY      pin implanted right toe    JOINT REPLACEMENT Bilateral     knees    LAMINECTOMY      LUMBAR EPIDURAL INJECTION      MASTECTOMY Bilateral     with reconstruction and implants    NISSEN FUNDOPLICATION      [place] LNGTH/SHRT TENDON LEG/ANKLE 1 TENDON SPX Left 05/24/2021    Procedure: Sectioning peroneal tendons with lengthening/sectioning achilles tendon lower leg and ankle;  Surgeon: Fei Sifuentes DPM;  Location: [facility];  Service: Podiatry    REPLACEMENT TOTAL KNEE      SPINAL FUSION      TONSILLECTOMY      TUBAL LIGATION      TUMOR EXCISION      right forearm/benign    VEIN LIGATION Right        Meds/Allergies:  Prior to Admission medications    Medication Sig Start Date End Date Taking? Authorizing Provider   amLODIPine (NORVASC) 5 mg tablet Take 1 tablet (5 mg total) by mouth daily 2/26/21  Yes JOE Yates DO   ASPIRIN 81 PO Aspirin EC 81 MG Oral Tablet Delayed Release    Refills: 0       Active   Yes Historical Provider, MD   brimonidine (ALPHAGAN P) 0.15 % ophthalmic solution Administer to the right eye every evening  4/13/21  Yes Historical Provider, MD   busPIRone (BUSPAR) 5 mg tablet Take 1 tablet (5 mg total) by mouth 2 (two) times a day 11/16/23  Yes JOE Yates DO   famotidine (PEPCID) 40 MG tablet TAKE 1 TABLET TWICE DAILY 7/11/23  Yes JACK Gasca   fluticasone (FLONASE) 50 mcg/act nasal spray 2 sprays into each nostril once OD   Yes Historical Provider, MD   hydrOXYzine HCL (ATARAX) 10 mg tablet Take 1 tablet (10 mg total) by mouth 3 (three) times a day 12/19/23  Yes JOE Yates DO   hydrOXYzine HCL (ATARAX) 25 mg tablet Take 1 tablet (25 mg total) by mouth daily at bedtime as needed for anxiety or itching 7/21/23  Yes JOE Yates DO   latanoprost (XALATAN) 0.005 % ophthalmic solution Administer 1 drop to both eyes daily at bedtime 5/22/20  Yes Historical Provider, MD   levothyroxine 100 mcg tablet TAKE 1 TABLET EVERY  DAY 5/8/23  Yes JOE Yates DO   pregabalin (LYRICA) 150 mg capsule Take 1 capsule (150 mg total) by mouth 2 (two) times a day 12/19/23  Yes JOE Yates DO   rivaroxaban (Xarelto) 20 mg tablet Take 1 tablet (20 mg total) by mouth every evening 10/30/23 4/27/24 Yes JOE Yates DO   rOPINIRole (REQUIP) 1 mg tablet TAKE 1 TABLET AT 6PM AND TAKE 2 TABLETS AT BEDTIME 12/7/22  Yes JOE Yates DO   senna (SENOKOT) 8.6 MG tablet Take 8.6 mg by mouth in the morning 4/7/23  Yes Historical Provider, MD   torsemide (DEMADEX) 10 mg tablet TAKE 1 TABLET EVERY DAY 12/14/23  Yes JOE Yates DO   traMADol (Ultram) 50 mg tablet Take 1/2 tab at 8AM, 1/2 tab at 1PM, and 1 tab at HS (total 2 tabs/day) 11/16/23  Yes JOE Yates DO   vortioxetine (TRINTELLIX) 10 MG tablet Take 1 tablet (10 mg total) by mouth daily  Patient taking differently: Take 20 mg by mouth daily 10/19/23 4/16/24 Yes JOE Yates DO   acetaminophen (TYLENOL) 325 mg tablet Take 650 mg by mouth every 6 (six) hours as needed for mild pain    Historical Provider, MD   alendronate (Fosamax) 70 mg tablet     Historical Provider, MD   DULoxetine (CYMBALTA) 30 mg delayed release capsule     Historical Provider, MD   fexofenadine (ALLEGRA) 180 MG tablet Take 180 mg by mouth as needed   Patient not taking: Reported on 12/27/2023    Historical Provider, MD   methocarbamol (ROBAXIN) 500 mg tablet Take 1 tablet (500 mg total) by mouth 3 (three) times a day  Patient not taking: Reported on 12/27/2023 10/19/23   JOE Yates DO   timolol (TIMOPTIC) 0.5 % ophthalmic solution Administer to both eyes 2 (two) times a day  4/13/21   Historical Provider, MD RAMÍREZ have reviewed home medications with patient personally.    Allergies:   Allergies   Allergen Reactions    Benzalkonium Chloride Hives     Merthiolate tincture 9/28/2020      Hydromorphone Hallucinations     disorientation; hallucination; confusion      Merthiolate  [Thimerosal (Thiomersal)] Hives    Quinine  "Derivatives Other (See Comments) and Tinnitus     tinnitus      Codeine Nausea Only and Other (See Comments)     nausea      Medical Tape Rash    Other Itching    Pamelor [Nortriptyline] Vomiting     Per pt, itching also    Pollen Extract Nasal Congestion    Statins Itching    Wound Dressing Adhesive Rash       Social History:  Marital Status:    Occupation: Not on file  Patient Pre-hospital Living Situation: Home  Patient Pre-hospital Level of Mobility: walks  Patient Pre-hospital Diet Restrictions: None  Substance Use History:   Social History     Substance and Sexual Activity   Alcohol Use Yes    Comment: socially     Social History     Tobacco Use   Smoking Status Never   Smokeless Tobacco Never     Social History     Substance and Sexual Activity   Drug Use Never       Family History:  Family History   Problem Relation Age of Onset    Cancer Sister        Physical Exam:     Vitals:   Blood Pressure: 103/52 (12/27/23 1332)  Pulse: 73 (12/27/23 1332)  Temperature: 97.8 °F (36.6 °C) (12/27/23 1332)  Temp Source: Oral (12/27/23 1332)  Respirations: 16 (12/27/23 1332)  Height: 5' 3\" (160 cm) (12/27/23 1332)  Weight - Scale: 82.6 kg (182 lb) (12/27/23 1332)  SpO2: 94 % (12/27/23 1332)    Physical Exam  Vitals reviewed.   Constitutional:       General: She is not in acute distress.     Appearance: She is not ill-appearing.   HENT:      Head: Normocephalic.      Mouth/Throat:      Mouth: Mucous membranes are moist.   Eyes:      General: No scleral icterus.     Extraocular Movements: Extraocular movements intact.   Cardiovascular:      Rate and Rhythm: Normal rate and regular rhythm.      Pulses: Normal pulses.      Heart sounds: No murmur heard.     No friction rub. No gallop.   Pulmonary:      Effort: Pulmonary effort is normal. No respiratory distress.      Breath sounds: No wheezing, rhonchi or rales.   Abdominal:      General: Abdomen is flat. Bowel sounds are normal. There is no distension.      Palpations: " Abdomen is soft.      Tenderness: There is abdominal tenderness. There is no guarding or rebound.      Comments: Mild generalized discomfort   Musculoskeletal:      Right lower leg: No edema.      Left lower leg: No edema.   Skin:     General: Skin is warm.      Capillary Refill: Capillary refill takes less than 2 seconds.      Coloration: Skin is not jaundiced.      Findings: No rash.   Neurological:      General: No focal deficit present.      Mental Status: She is alert and oriented to person, place, and time.      Sensory: No sensory deficit.      Motor: No weakness.   Psychiatric:         Mood and Affect: Mood normal.         Behavior: Behavior normal.          Additional Data:     Lab Results:  Results from last 7 days   Lab Units 12/27/23  1040   WBC Thousand/uL 6.02   HEMOGLOBIN g/dL 14.4   HEMATOCRIT % 44.3   PLATELETS Thousands/uL 351   NEUTROS PCT % 57   LYMPHS PCT % 29   MONOS PCT % 9   EOS PCT % 4     Results from last 7 days   Lab Units 12/27/23  1040   SODIUM mmol/L 141   POTASSIUM mmol/L 3.5   CHLORIDE mmol/L 107   CO2 mmol/L 26   BUN mg/dL 20   CREATININE mg/dL 0.69   ANION GAP mmol/L 8   CALCIUM mg/dL 9.3   ALBUMIN g/dL 4.3   TOTAL BILIRUBIN mg/dL 0.46   ALK PHOS U/L 121*   ALT U/L 11   AST U/L 10*   GLUCOSE RANDOM mg/dL 116                       Lines/Drains:  Invasive Devices       Peripheral Intravenous Line  Duration             Peripheral IV 12/27/23 Right Antecubital <1 day                        Imaging: Reviewed radiology reports from this admission including: abdominal/pelvic CT  CT Abdomen pelvis with contrast   Final Result by Adelina Monroy MD (12/27 1148)   No acute inflammatory stranding   Large uterine mass/myoma measuring 13 x 10.1 cm, stable. Consider nonemergent evaluation with the MRI.   Mild dilatation of the bilateral pelvicalyceal systems right greater than left without any obstructing calculus may be due to mass effect from the enlarged uterus   The study was marked in EPIC  for significant notification.            Workstation performed: [id number]         XR chest portable    (Results Pending)   CT head wo contrast    (Results Pending)       EKG and Other Studies Reviewed on Admission:   EKG: NSR. HR 82.    ** Please Note: This note has been constructed using a voice recognition system. **

## 2023-12-27 NOTE — PLAN OF CARE
Problem: GASTROINTESTINAL - ADULT  Goal: Minimal or absence of nausea and/or vomiting  Description: INTERVENTIONS:  - Administer IV fluids if ordered to ensure adequate hydration  - Maintain NPO status until nausea and vomiting are resolved  - Nasogastric tube if ordered  - Administer ordered antiemetic medications as needed  - Provide nonpharmacologic comfort measures as appropriate  - Advance diet as tolerated, if ordered  - Consider nutrition services referral to assist patient with adequate nutrition and appropriate food choices  Outcome: Progressing

## 2023-12-27 NOTE — ASSESSMENT & PLAN NOTE
Patient reporting 3-week history of diffuse abdominal pain.  He is having difficulty exactly describe the nature of the pain but describes it in multiple ways such as a burning of the legs with muscle aches and joint aches  She has had similar pain to this in the past but never over her entire body.    She is also endorsing headache with sensitivity to light which is worse than normal for her  No obvious medical derangements at this time, add on CK and ESR to evaluate possible autoimmune etiologies.  Low concern for meningitis/meningismus as there is no confusion or fever, but will scan head given headache which is atypical for patient  Pain control and continued observation

## 2023-12-28 ENCOUNTER — APPOINTMENT (OUTPATIENT)
Dept: CT IMAGING | Facility: HOSPITAL | Age: 78
DRG: 947 | End: 2023-12-28
Payer: MEDICARE

## 2023-12-28 LAB
2HR DELTA HS TROPONIN: -1 NG/L
4HR DELTA HS TROPONIN: 0 NG/L
ALBUMIN SERPL BCP-MCNC: 3.5 G/DL (ref 3.5–5)
ALP SERPL-CCNC: 99 U/L (ref 34–104)
ALT SERPL W P-5'-P-CCNC: 9 U/L (ref 7–52)
ANION GAP SERPL CALCULATED.3IONS-SCNC: 6 MMOL/L
AST SERPL W P-5'-P-CCNC: 8 U/L (ref 13–39)
ATRIAL RATE: 64 BPM
BILIRUB DIRECT SERPL-MCNC: 0.08 MG/DL (ref 0–0.2)
BILIRUB SERPL-MCNC: 0.52 MG/DL (ref 0.2–1)
BUN SERPL-MCNC: 17 MG/DL (ref 5–25)
CALCIUM SERPL-MCNC: 8.7 MG/DL (ref 8.4–10.2)
CARDIAC TROPONIN I PNL SERPL HS: 3 NG/L
CARDIAC TROPONIN I PNL SERPL HS: 4 NG/L
CARDIAC TROPONIN I PNL SERPL HS: 4 NG/L
CHLORIDE SERPL-SCNC: 103 MMOL/L (ref 96–108)
CO2 SERPL-SCNC: 29 MMOL/L (ref 21–32)
CREAT SERPL-MCNC: 0.7 MG/DL (ref 0.6–1.3)
ERYTHROCYTE [DISTWIDTH] IN BLOOD BY AUTOMATED COUNT: 13.5 % (ref 11.6–15.1)
EST. AVERAGE GLUCOSE BLD GHB EST-MCNC: 131 MG/DL
GFR SERPL CREATININE-BSD FRML MDRD: 82 ML/MIN/1.73SQ M
GLUCOSE SERPL-MCNC: 101 MG/DL (ref 65–140)
HBA1C MFR BLD: 6.2 %
HCT VFR BLD AUTO: 38.6 % (ref 34.8–46.1)
HGB BLD-MCNC: 12.4 G/DL (ref 11.5–15.4)
MAGNESIUM SERPL-MCNC: 2.1 MG/DL (ref 1.9–2.7)
MCH RBC QN AUTO: 29.7 PG (ref 26.8–34.3)
MCHC RBC AUTO-ENTMCNC: 32.1 G/DL (ref 31.4–37.4)
MCV RBC AUTO: 93 FL (ref 82–98)
P AXIS: 9 DEGREES
PLATELET # BLD AUTO: 295 THOUSANDS/UL (ref 149–390)
PMV BLD AUTO: 10.4 FL (ref 8.9–12.7)
POTASSIUM SERPL-SCNC: 3.5 MMOL/L (ref 3.5–5.3)
PR INTERVAL: 136 MS
PROT SERPL-MCNC: 6.2 G/DL (ref 6.4–8.4)
QRS AXIS: -26 DEGREES
QRSD INTERVAL: 84 MS
QT INTERVAL: 416 MS
QTC INTERVAL: 429 MS
RBC # BLD AUTO: 4.17 MILLION/UL (ref 3.81–5.12)
SODIUM SERPL-SCNC: 138 MMOL/L (ref 135–147)
T WAVE AXIS: 142 DEGREES
VENTRICULAR RATE: 64 BPM
WBC # BLD AUTO: 5.81 THOUSAND/UL (ref 4.31–10.16)

## 2023-12-28 PROCEDURE — 99232 SBSQ HOSP IP/OBS MODERATE 35: CPT

## 2023-12-28 PROCEDURE — 83735 ASSAY OF MAGNESIUM: CPT | Performed by: STUDENT IN AN ORGANIZED HEALTH CARE EDUCATION/TRAINING PROGRAM

## 2023-12-28 PROCEDURE — 80048 BASIC METABOLIC PNL TOTAL CA: CPT | Performed by: STUDENT IN AN ORGANIZED HEALTH CARE EDUCATION/TRAINING PROGRAM

## 2023-12-28 PROCEDURE — 97167 OT EVAL HIGH COMPLEX 60 MIN: CPT

## 2023-12-28 PROCEDURE — 93005 ELECTROCARDIOGRAM TRACING: CPT

## 2023-12-28 PROCEDURE — 83036 HEMOGLOBIN GLYCOSYLATED A1C: CPT

## 2023-12-28 PROCEDURE — G1004 CDSM NDSC: HCPCS

## 2023-12-28 PROCEDURE — 70450 CT HEAD/BRAIN W/O DYE: CPT

## 2023-12-28 PROCEDURE — 80076 HEPATIC FUNCTION PANEL: CPT | Performed by: STUDENT IN AN ORGANIZED HEALTH CARE EDUCATION/TRAINING PROGRAM

## 2023-12-28 PROCEDURE — 97163 PT EVAL HIGH COMPLEX 45 MIN: CPT

## 2023-12-28 PROCEDURE — 85027 COMPLETE CBC AUTOMATED: CPT | Performed by: STUDENT IN AN ORGANIZED HEALTH CARE EDUCATION/TRAINING PROGRAM

## 2023-12-28 PROCEDURE — 84484 ASSAY OF TROPONIN QUANT: CPT

## 2023-12-28 PROCEDURE — 97116 GAIT TRAINING THERAPY: CPT

## 2023-12-28 RX ORDER — METOCLOPRAMIDE 10 MG/1
5 TABLET ORAL
Status: DISCONTINUED | OUTPATIENT
Start: 2023-12-28 | End: 2023-12-29 | Stop reason: HOSPADM

## 2023-12-28 RX ORDER — MAGNESIUM SULFATE 1 G/100ML
1 INJECTION INTRAVENOUS ONCE
Status: COMPLETED | OUTPATIENT
Start: 2023-12-28 | End: 2023-12-28

## 2023-12-28 RX ORDER — ACETAMINOPHEN 325 MG/1
650 TABLET ORAL EVERY 8 HOURS SCHEDULED
Status: DISCONTINUED | OUTPATIENT
Start: 2023-12-28 | End: 2023-12-29 | Stop reason: HOSPADM

## 2023-12-28 RX ADMIN — RIVAROXABAN 20 MG: 20 TABLET, FILM COATED ORAL at 08:12

## 2023-12-28 RX ADMIN — SODIUM CHLORIDE, SODIUM GLUCONATE, SODIUM ACETATE, POTASSIUM CHLORIDE, MAGNESIUM CHLORIDE, SODIUM PHOSPHATE, DIBASIC, AND POTASSIUM PHOSPHATE 100 ML/HR: .53; .5; .37; .037; .03; .012; .00082 INJECTION, SOLUTION INTRAVENOUS at 00:48

## 2023-12-28 RX ADMIN — FAMOTIDINE 40 MG: 20 TABLET, FILM COATED ORAL at 17:04

## 2023-12-28 RX ADMIN — PREGABALIN 150 MG: 75 CAPSULE ORAL at 17:04

## 2023-12-28 RX ADMIN — ROPINIROLE HYDROCHLORIDE 1 MG: 1 TABLET, FILM COATED ORAL at 08:11

## 2023-12-28 RX ADMIN — BRIMONIDINE TARTRATE 1 DROP: 2 SOLUTION/ DROPS OPHTHALMIC at 17:03

## 2023-12-28 RX ADMIN — LATANOPROST 1 DROP: 50 SOLUTION OPHTHALMIC at 21:01

## 2023-12-28 RX ADMIN — BUSPIRONE HYDROCHLORIDE 5 MG: 5 TABLET ORAL at 08:11

## 2023-12-28 RX ADMIN — BUSPIRONE HYDROCHLORIDE 5 MG: 5 TABLET ORAL at 17:04

## 2023-12-28 RX ADMIN — VORTIOXETINE 20 MG: 10 TABLET, FILM COATED ORAL at 17:03

## 2023-12-28 RX ADMIN — SENNOSIDES 8.6 MG: 8.6 TABLET, FILM COATED ORAL at 08:12

## 2023-12-28 RX ADMIN — TRAMADOL HYDROCHLORIDE 50 MG: 50 TABLET, COATED ORAL at 20:35

## 2023-12-28 RX ADMIN — FAMOTIDINE 40 MG: 20 TABLET, FILM COATED ORAL at 08:11

## 2023-12-28 RX ADMIN — TRAMADOL HYDROCHLORIDE 50 MG: 50 TABLET, COATED ORAL at 05:11

## 2023-12-28 RX ADMIN — MAGNESIUM SULFATE HEPTAHYDRATE 1 G: 1 INJECTION, SOLUTION INTRAVENOUS at 15:19

## 2023-12-28 RX ADMIN — TIMOLOL MALEATE 1 DROP: 5 SOLUTION/ DROPS OPHTHALMIC at 17:03

## 2023-12-28 RX ADMIN — TRAMADOL HYDROCHLORIDE 50 MG: 50 TABLET, COATED ORAL at 14:07

## 2023-12-28 RX ADMIN — LEVOTHYROXINE SODIUM 100 MCG: 100 TABLET ORAL at 05:02

## 2023-12-28 RX ADMIN — ROPINIROLE HYDROCHLORIDE 1 MG: 1 TABLET, FILM COATED ORAL at 20:36

## 2023-12-28 RX ADMIN — PREGABALIN 150 MG: 75 CAPSULE ORAL at 08:12

## 2023-12-28 RX ADMIN — ACETAMINOPHEN 650 MG: 325 TABLET, FILM COATED ORAL at 15:17

## 2023-12-28 RX ADMIN — METOCLOPRAMIDE 5 MG: 10 TABLET ORAL at 15:17

## 2023-12-28 RX ADMIN — AMLODIPINE BESYLATE 5 MG: 5 TABLET ORAL at 08:12

## 2023-12-28 RX ADMIN — ACETAMINOPHEN 650 MG: 325 TABLET, FILM COATED ORAL at 05:02

## 2023-12-28 RX ADMIN — ROPINIROLE HYDROCHLORIDE 1 MG: 1 TABLET, FILM COATED ORAL at 21:00

## 2023-12-28 RX ADMIN — ACETAMINOPHEN 650 MG: 325 TABLET, FILM COATED ORAL at 21:01

## 2023-12-28 RX ADMIN — TIMOLOL MALEATE 1 DROP: 5 SOLUTION/ DROPS OPHTHALMIC at 08:12

## 2023-12-28 NOTE — PHYSICAL THERAPY NOTE
"PHYSICAL THERAPY Evaluation and Treatment  DATE: 12/28/23  TIME: 8157-9738    NAME:  Madeleine Sanchez  AGE:   78 y.o.  Mrn:   [id number]  Length Of Stay: 0    ADMIT DX:  Nausea [R11.0]  Generalized pain [R52]  Intractable nausea and vomiting [R11.2]    Past Medical History:   Diagnosis Date    Cancer (HCC)     left breast cancer    Cervical herniated disc     Chronic pain disorder     back pain    Chronic respiratory failure ([facility])     uses O2 at home with NC /5/19 no longer using/just CPAP    CPAP (continuous positive airway pressure) dependence     Disease of thyroid gland     hypothyroid    DVT (deep venous thrombosis) ([facility]) 2020    right leg    Family history of reaction to anesthesia     \"son and daughter hard time waking up and also PONV\"    Fibromyalgia, primary     GERD (gastroesophageal reflux disease)     Glaucoma     Hiatal hernia     History of palpitations     History of pneumonia     History of transfusion     Hyperlipidemia     Hypertension     Irregular heart beat     Migraine     Myocardial infarction ([facility])     pt sees cardilogist Dr Bennett [facility]/\"didnt realize had one, found on EKG before a surgery\"    OAB (overactive bladder)     Pollen allergies     Pulmonary embolism ([facility]) 2019    Bilateral; on Xarelto    Restless leg     Risk for falls     Sleep apnea     Use of cane as ambulatory aid     occas    Uses brace     Mafo/left leg    Wears glasses      Past Surgical History:   Procedure Laterality Date    ADENOIDECTOMY      CARDIAC CATHETERIZATION      CARPAL TUNNEL RELEASE      CATARACT EXTRACTION Bilateral     FOOT SURGERY      pin implanted right toe    JOINT REPLACEMENT Bilateral     knees    LAMINECTOMY      LUMBAR EPIDURAL INJECTION      MASTECTOMY Bilateral     with reconstruction and implants    NISSEN FUNDOPLICATION      [place] LNGTH/SHRT TENDON LEG/ANKLE 1 TENDON SPX Left 05/24/2021    Procedure: Sectioning peroneal tendons with lengthening/sectioning achilles tendon lower leg and ankle;  " Surgeon: Fei Sifuentes DPM;  Location: [facility];  Service: Podiatry    REPLACEMENT TOTAL KNEE      SPINAL FUSION      TONSILLECTOMY      TUBAL LIGATION      TUMOR EXCISION      right forearm/benign    VEIN LIGATION Right         12/28/23 1414   PT Last Visit   PT Visit Date 12/28/23   Note Type   Note type Evaluation   Pain Assessment   Pain Assessment Tool 0-10   Pain Score 8   Pain Location/Orientation Location: Generalized   Hospital Pain Intervention(s) Ambulation/increased activity;Repositioned   Restrictions/Precautions   Weight Bearing Precautions Per Order No   Other Precautions Pain;O2;Chair Alarm;Bed Alarm;Fall Risk  (left AFO)   Home Living   Additional Comments Pt lives with daughter and grandson in 2-level house, 6 steps to enter (right and left hand rail).  lee hand rail to second floor.  flat bed.  shower/tub with chair and bar.  standard toilet.  DME: SPC, [facility]   Prior Function   Comments Prior to admission: mod I with ADL, ambulates with SPC, assist with IADLs,  drive (+).   General   Additional Pertinent History 77 y/o presents with abdominal pain, N/V, diffuse pain, fatigue.   Family/Caregiver Present Yes   Cognition   Overall Cognitive Status WFL   Arousal/Participation Alert   Orientation Level Oriented X4   Subjective   Subjective Pt reporting feeling better now that she got something for pain.  Pt continues to report some SOB or fatigue with activity, requiring periodic rest breaks   RUE Assessment   RUE Assessment WFL   LUE Assessment   LUE Assessment WFL   RLE Assessment   RLE Assessment WFL   LLE Assessment   LLE Assessment WFL   Bed Mobility   Supine to Sit 6  Modified independent   Additional items Bedrails;HOB elevated;Increased time required   Transfers   Sit to Stand 6  Modified independent   Additional items Increased time required  (heavy use of UEs)   Stand to Sit 6  Modified independent   Additional items Increased time required  (heavy use of UEs)   Ambulation/Elevation    Gait Assistance 5  Supervision   Assistive Device SPC   Distance 50'   Ambulation/Elevation Additional Comments Pt ambulated with short shuffled reciprocal gait using SPC.  displays flexed posturing with decreased step clearance.   Balance   Static Sitting Normal   Dynamic Sitting Good   Static Standing Fair +   Dynamic Standing Fair   Ambulatory Fair  (SPC)   Endurance Deficit   Endurance Deficit Yes   Endurance Deficit Description Fatigues easily with activity (donning shoes) or ambulating hallway.  extra rest breaks needed.  SpO2 appeared to dip during activity (90%), but returned with rest.  supplemental O2 returned following session.   Activity Tolerance   Activity Tolerance Patient limited by fatigue;Patient limited by pain   Medical Staff Made Aware collaborated with OT   Nurse Made Aware Nursing aware of patient's performance   Assessment   Prognosis Fair   Problem List Decreased strength;Decreased endurance;Impaired balance;Decreased mobility;Pain   Assessment Orders for PT eval and treat received. Pt's PMHx: large uterine mass/myoma, lee PE, anxiety/depression, defuse pain, breast cx, cervical HNP, chronic LBP, DVT, MI, lee TKA, left foot drop, carpal tunnel release, back sx.  Pt exhibits physical deficits noted in problem list above.  Deficits listed contribute to functional limitations that are significant from the patient PLOF and include: unsafe/inefficient ambulation, fall risk, and unable to safely manage stairs/steps/ramp    During today's session, formal PT evaluation performed.  Progressed mobility and ambulation as able.  Monitored vitals closely and weaned O2 as able.  Therapeutic rest breaks provided.  Pt tolerated activity, but seemed to fatigue to a greater degree than her baseline.     The AM-PAC & Barthel Index outcome tools were used to assist in determining pt safety w/ mobility/self care & appropriate d/c recommendations, see above for scores. Patient's clinical presentation is  evolving due to increased supplemental O2 demands compared to baseline and ongoing medical management needs.     Considering the patient's PLOF, co-morbidities, acute functional limitations, functional outcome measures, and/or goal to progress functional independence; this patient would benefit from skilled Physical Therapy intervention in the acute care setting.   Barriers to Discharge None   Goals   Patient Goals improve strength and ability to ambulate safely   STG Expiration Date 01/07/24   Short Term Goal #1 1: Pt will perform rolling to either side in flat bed, independently.  2: Pt will perform sit<>supine on flat bed, independently.  3: Pt will perform stand pivot transfer without/LRAD, independently.  4: Pt will ambulate 150' with SPC, mod I. 5: Pt will perform written HEP, with cues. 6: Pt will ascend/descend 1 flight of stairs with rail/SPC, mod I.   Plan   Treatment/Interventions Functional transfer training;LE strengthening/ROM;Elevations;Therapeutic exercise;Endurance training;Bed mobility;Gait training   PT Frequency 2-3x/wk   Discharge Recommendation   Rehab Resource Intensity Level, PT III (Minimum Resource Intensity)   AM-PAC Basic Mobility Inpatient   Turning in Flat Bed Without Bedrails 4   Lying on Back to Sitting on Edge of Flat Bed Without Bedrails 4   Moving Bed to Chair 3   Standing Up From Chair Using Arms 3   Walk in Room 3   Climb 3-5 Stairs With Railing 2   Basic Mobility Inpatient Raw Score 19   Basic Mobility Standardized Score 42.48   Highest Level Of Mobility   JH-HLM Goal 6: Walk 10 steps or more   JH-HLM Achieved 7: Walk 25 feet or more   Barthel Index   Feeding 10   Bathing 0   Grooming Score 5   Dressing Score 10   Bladder Score 10   Bowels Score 10   Toilet Use Score 5   Transfers (Bed/Chair) Score 10   Mobility (Level Surface) Score 0   Additional Treatment Session   Start Time 1429   End Time 1437   Treatment Assessment During today's session, formal PT evaluation performed.  Progressed mobility and ambulation as able. Monitored vitals closely and weaned O2 as able. Therapeutic rest breaks provided. Pt tolerated activity, but seemed to fatigue to a greater degree than her baseline.   End of Consult   Patient Position at End of Consult Bedside chair;Bed/Chair alarm activated;All needs within reach   The patient's AM-PAC Basic Mobility Inpatient Short Form Raw Score is 19. A Raw score of greater than or equal to 16 suggests the patient may benefit from discharge to home. Please also refer to the recommendation of the Physical Therapist for safe discharge planning.    Iggy Smith, PT, DPT  [place] Licensure #[id number]

## 2023-12-28 NOTE — PROGRESS NOTES
[facility]  Progress Note  Name: Madeleine Sanchez I  MRN: [id number]  Unit/Bed#: -01 I Date of Admission: 12/27/2023   Date of Service: 12/28/2023 I Hospital Day: 0    Assessment/Plan   * Diffuse pain  Assessment & Plan  Patient reporting 3-week history of diffuse abdominal pain.  He is having difficulty exactly describe the nature of the pain but describes it in multiple ways such as a burning of the legs with muscle aches and joint aches  She has had similar pain to this in the past but never over her entire body.    She is also endorsing headache with sensitivity to light which is worse than normal for her  Consider Neuro evaluation if persists/worsens  No obvious medical derangements at this time   CK negative but ESR mildly elevated  Low concern for meningitis/meningismus as there is no confusion or fever  CTH with no acute abnormalities  Continue supportive care  Pain management   May benefit from outpatient follow up with Pain specialist   PT/OT eval: Pending    Abdominal discomfort  Assessment & Plan  Patient endorsing mild generalized abdominal pain as part of her overall pain complaint  CT noting large uterine mass or myoma measuring 13 x 10.1 cm, which appears stable from previous imaging.  This is known to patient and she states it is being observed  Could perhaps be culprit for some of patient's abdominal discomfort, but likely not culprit for overall pain syndrome  Recommend outpatient follow up with GI and OB/Gyn   May benefit from outpatient MRI Abd, will defer inpatient workup at this time    Nausea and vomiting  Assessment & Plan  Patient with complaints of persistent nausea and decreased appetite  Continue Zofran ordered as needed  Gentle hydration with plasmalyte  EKG: QTC WNL  Will trial oral reglan TID with meals  Continue supportive care    Bilateral pulmonary embolism (HCC)  Assessment & Plan  Continue Xarelto 20 mg daily    GERD (gastroesophageal reflux  disease)  Assessment & Plan  Continue Pepcid 40 mg twice daily    Hypothyroidism, adult  Assessment & Plan  Continue levothyroxine 100 mcg daily    Anxiety and depression  Assessment & Plan  Continue BuSpar 5 mg twice daily and hydroxyzine 25 mg nightly  Patient on Trintellix which we do not have on formulary here  Patient can bring own med from home but will hold for now               VTE Pharmacologic Prophylaxis: VTE Score: 6 High Risk (Score >/= 5) - Pharmacological DVT Prophylaxis Ordered: rivaroxaban (Xarelto). Sequential Compression Devices Ordered.    Mobility:   Basic Mobility Inpatient Raw Score: 20  JH-HLM Goal: 6: Walk 10 steps or more  JH-HLM Achieved: 6: Walk 10 steps or more  HLM Goal achieved. Continue to encourage appropriate mobility.    Patient Centered Rounds: I performed bedside rounds with nursing staff today.   Discussions with Specialists or Other Care Team Provider: PT/OT    Education and Discussions with Family / Patient: Patient declined call to .     Total Time Spent on Date of Encounter in care of patient: 30 mins. This time was spent on one or more of the following: performing physical exam; counseling and coordination of care; obtaining or reviewing history; documenting in the medical record; reviewing/ordering tests, medications or procedures; communicating with other healthcare professionals and discussing with patient's family/caregivers.    Current Length of Stay: 0 day(s)  Current Patient Status: Inpatient   Certification Statement: The patient will continue to require additional inpatient hospital stay due to persistent pain, headache, and intermittent nausea/vomiting, requiring medication adjustment and close monitoring   Discharge Plan: Anticipate discharge in 24-48 hrs to discharge location to be determined pending rehab evaluations.    Code Status: Level 1 - Full Code    Subjective:   Patient complaining of persistent right facial pain/swelling that is different  then her typical migraines. Also complaining of intermittent nausea along with epigastric pain. Patient also noted chronic back pain which is at baseline.     Objective:     Vitals:   Temp (24hrs), Av.9 °F (36.6 °C), Min:97.5 °F (36.4 °C), Max:98.3 °F (36.8 °C)    Temp:  [97.5 °F (36.4 °C)-98.3 °F (36.8 °C)] 97.5 °F (36.4 °C)  HR:  [61-64] 61  Resp:  [16-18] 16  BP: (108-110)/(54-60) 108/60  SpO2:  [95 %] 95 %  Body mass index is 32.24 kg/m².     Input and Output Summary (last 24 hours):     Intake/Output Summary (Last 24 hours) at 2023 1405  Last data filed at 2023 0501  Gross per 24 hour   Intake 1380 ml   Output --   Net 1380 ml       Physical Exam:   Physical Exam  Vitals and nursing note reviewed.   Constitutional:       General: She is not in acute distress.  HENT:      Head: Normocephalic.      Nose: Nose normal. No congestion.      Mouth/Throat:      Mouth: Mucous membranes are moist.      Pharynx: Oropharynx is clear.   Cardiovascular:      Rate and Rhythm: Normal rate and regular rhythm.      Pulses: Normal pulses.      Heart sounds: No murmur heard.  Pulmonary:      Effort: Pulmonary effort is normal. No respiratory distress.   Abdominal:      General: Bowel sounds are normal. There is no distension.      Palpations: Abdomen is soft.      Tenderness: There is abdominal tenderness.   Musculoskeletal:         General: Normal range of motion.      Cervical back: Normal range of motion.      Right lower leg: No edema.      Left lower leg: No edema.   Skin:     General: Skin is warm and dry.      Capillary Refill: Capillary refill takes less than 2 seconds.   Neurological:      Mental Status: She is alert and oriented to person, place, and time. Mental status is at baseline.      Motor: No weakness.   Psychiatric:         Mood and Affect: Mood is anxious.         Speech: Speech normal.         Behavior: Behavior is cooperative.         Judgment: Judgment is impulsive.          Additional Data:      Labs:  Results from last 7 days   Lab Units 12/28/23  0501 12/27/23  1040   WBC Thousand/uL 5.81 6.02   HEMOGLOBIN g/dL 12.4 14.4   HEMATOCRIT % 38.6 44.3   PLATELETS Thousands/uL 295 351   NEUTROS PCT %  --  57   LYMPHS PCT %  --  29   MONOS PCT %  --  9   EOS PCT %  --  4     Results from last 7 days   Lab Units 12/28/23  0501   SODIUM mmol/L 138   POTASSIUM mmol/L 3.5   CHLORIDE mmol/L 103   CO2 mmol/L 29   BUN mg/dL 17   CREATININE mg/dL 0.70   ANION GAP mmol/L 6   CALCIUM mg/dL 8.7   ALBUMIN g/dL 3.5   TOTAL BILIRUBIN mg/dL 0.52   ALK PHOS U/L 99   ALT U/L 9   AST U/L 8*   GLUCOSE RANDOM mg/dL 101                       Lines/Drains:  Invasive Devices       Peripheral Intravenous Line  Duration             Peripheral IV 12/27/23 Right Antecubital 1 day                          Imaging: Reviewed radiology reports from this admission including: CT head    Recent Cultures (last 7 days):         Last 24 Hours Medication List:   Current Facility-Administered Medications   Medication Dose Route Frequency Provider Last Rate    acetaminophen  650 mg Oral Q8H RUBY JACK Villalpando      amLODIPine  5 mg Oral Daily Leeroy Queen      brimonidine tartrate  1 drop Right Eye QPM Leeroy Queen      busPIRone  5 mg Oral BID Leeroy Queen      famotidine  40 mg Oral BID Leeroy Queen      hydrOXYzine HCL  25 mg Oral HS PRN Leeroy Queen      latanoprost  1 drop Right Eye HS Leeroy Queen      levothyroxine  100 mcg Oral Daily Leeroy Queen      magnesium sulfate  1 g Intravenous Once JCAK Villalpando      metoclopramide  5 mg Oral TID AC JACK Villalpando      morphine injection  2 mg Intravenous Q3H PRN Leeroy Queen      multi-electrolyte  100 mL/hr Intravenous Continuous Leeroy Queen 100 mL/hr (12/28/23 0048)    ondansetron  4 mg Intravenous Q4H PRN Leeroy Queen      pregabalin  150 mg Oral BID Leeroy Queen      rivaroxaban  20 mg Oral Daily With Breakfast Leeroy De Leon  Bret      rOPINIRole  1 mg Oral BID Leeroy Queen      rOPINIRole  1 mg Oral HS Leeroy Queen      senna  8.6 mg Oral Daily Leeroy Queen      timolol  1 drop Both Eyes BID Leeroy Queen      torsemide  10 mg Oral Daily Leeroy Queen      traMADol  50 mg Oral Q6H PRN Leeroy Queen          Today, Patient Was Seen By: JACK Villalpando    **Please Note: This note may have been constructed using a voice recognition system.**

## 2023-12-28 NOTE — CASE MANAGEMENT
Case Management Assessment    Patient name Madeleine HAWK [place]  Location /-01 MRN [id number]  : 1945 Date 2023       Current Admission Date: 2023  Current Admission Diagnosis:Diffuse pain   Patient Active Problem List    Diagnosis Date Noted    Nausea and vomiting 2023    Abdominal discomfort 2023    Diffuse pain 2023    Facial hematoma 2022    Epistaxis 2022    Chest discomfort 09/15/2022    Class 1 obesity due to excess calories with body mass index (BMI) of 31.0 to 31.9 in adult 2022    COVID-19 long hauler manifesting chronic fatigue 2022    Dizziness 07/15/2022    Continuous opioid dependence (HCC) 2022    Bilateral malignant neoplasm of breast in female, unspecified estrogen receptor status, unspecified site of breast (HCC) 2022    Examination for, follow-up 2021    Blunt injury to chest 2021    Sprain of left ankle 2021    Left wrist sprain, subsequent encounter 2021    Sprain of cervical neck, sequela 2021    Lumbar contusion 2021    Fatigue 2021    Screening for blood or protein in urine 2021    RLS (restless legs syndrome) 2021    Fibromyalgia 2021    Abscess of forearm, right 2021    TA (temporal arteritis) (HCC) 2021    Iron deficiency anemia due to chronic blood loss 2021    Internal hemorrhoids 2021    GAVE (gastric antral vascular ectasia) 2021    Spasm, peroneo-extensor 2021    Equinus contracture of ankle 2021    Moderate obstructive sleep apnea 2021    Blood in stool 2021    Pain in joint involving ankle and foot 2021    Ankle pain 2021    Left foot drop 2021    Hematochezia 2021    Acquired pes planus of left foot 2021    Hx of adenomatous colonic polyps 03/10/2021    Dysphagia 03/10/2021    Depression, recurrent (HCC) 2021    History of pulmonary embolus (PE)  02/04/2021    Cellulitis and abscess of right lower extremity 10/23/2020    Trochanteric bursitis of left hip 10/23/2020    Personal history of DVT (deep vein thrombosis) 10/23/2020    Abnormal gait 10/13/2020    Ductal carcinoma in situ (DCIS) of left breast 09/28/2020    Mixed hyperlipidemia 09/16/2020    Anxiety and depression 09/16/2020    Hypothyroidism, adult 09/16/2020    Restless leg syndrome, controlled 09/16/2020    Hiatal hernia     Swelling of limb 01/08/2020    Bilateral pulmonary embolism (HCC) 08/25/2019    Lesion of radial nerve 05/31/2017    GERD (gastroesophageal reflux disease) 03/08/2016    Anemia 07/10/2014    Osteoporosis 03/02/2010      LOS (days): 0  Geometric Mean LOS (GMLOS) (days):   Days to GMLOS:     OBJECTIVE:              Current admission status: Inpatient       Preferred Pharmacy:   [facility] [address] [place] [id number]  Phone: [phone] Fax: [phone]    [facility] [address]  [address] [facility] [address]  Phone: [phone] Fax: [phone]    Primary Care Provider: JOE Yates DO    Primary Insurance: MEDICARE  Secondary Insurance: HU[employer]    ASSESSMENT:  Active Health Care Proxies    There are no active Health Care Proxies on file.                 Readmission Root Cause  30 Day Readmission: No    Patient Information  Admitted from:: Home  Mental Status: Alert  During Assessment patient was accompanied by: Not accompanied during assessment  Assessment information provided by:: Patient  Support Systems: Daughter, Family members  Home entry access options. Select all that apply.: Stairs  Number of steps to enter home.: 5  Type of Current Residence: 2 story home  Upon entering residence, is there a bedroom on the main floor (no further steps)?: No  A bedroom is located on the following floor levels of residence (select all that apply):: 2nd Floor  Upon  entering residence, is there a bathroom on the main floor (no further steps)?: No  Indicate which floors of current residence have a bathroom (select all the apply):: 2nd Floor  Number of steps to 2nd floor from main floor: One Flight  Living Arrangements: Lives w/ Daughter, Lives w/ Extended Family    Activities of Daily Living Prior to Admission  Functional Status: Independent  Completes ADLs independently?: Yes  Ambulates independently?: Yes  Does patient use assisted devices?: Yes  Assisted Devices (DME) used: Bedside Commode, Walker, Straight Cane, CPAP  Does patient currently own DME?: Yes  What DME does the patient currently own?: Bedside Commode, CPAP, Straight Cane, Walker  Does patient have a history of Outpatient Therapy (PT/OT)?: Yes  Does the patient have a history of Short-Term Rehab?: Yes ([facility])  Does patient have a history of HHC?: No  Does patient currently have HHC?: No         Patient Information Continued  Income Source: Pension/long term  Does patient have prescription coverage?: Yes  Does patient receive dialysis treatments?: No  Does patient have a history of substance abuse?: No  Does patient have a history of Mental Health Diagnosis?: Yes  Is patient receiving treatment for mental health?: Yes  Has patient received inpatient treatment related to mental health in the last 2 years?: No    PHQ 2/9 Screening   Reviewed PHQ 2/9 Depression Screening Score?: No    Means of Transportation  Means of Transport to Appts:: Family transport      Housing Stability: Unknown (12/28/2023)    Housing Stability Vital Sign     Unable to Pay for Housing in the Last Year: No     Number of Places Lived in the Last Year: Not on file     Unstable Housing in the Last Year: No   Food Insecurity: No Food Insecurity (12/28/2023)    Hunger Vital Sign     Worried About Running Out of Food in the Last Year: Never true     Ran Out of Food in the Last Year: Never true   Transportation Needs: No Transportation  Needs (12/28/2023)    PRAPARE - Transportation     Lack of Transportation (Medical): No     Lack of Transportation (Non-Medical): No   Utilities: Not At Risk (12/28/2023)    [facility] Utilities     Threatened with loss of utilities: No

## 2023-12-28 NOTE — ASSESSMENT & PLAN NOTE
Patient endorsing mild generalized abdominal pain as part of her overall pain complaint  CT noting large uterine mass or myoma measuring 13 x 10.1 cm, which appears stable from previous imaging.  This is known to patient and she states it is being observed  Could perhaps be culprit for some of patient's abdominal discomfort, but likely not culprit for overall pain syndrome  Recommend outpatient follow up with GI and OB/Gyn   May benefit from outpatient MRI Abd, will defer inpatient workup at this time

## 2023-12-28 NOTE — PLAN OF CARE
Problem: OCCUPATIONAL THERAPY ADULT  Goal: Performs self-care activities at highest level of function for planned discharge setting.  See evaluation for individualized goals.  Description: Treatment Interventions: ADL retraining, Functional transfer training, Patient/family training, Compensatory technique education, Endurance training          See flowsheet documentation for full assessment, interventions and recommendations.   Note: Limitation: Decreased ADL status, Decreased self-care trans, Decreased high-level ADLs, Decreased endurance     Assessment: Pt is a 78 y.o. female seen for OT evaluation at [facility], admitted 12/27/2023 w/ Diffuse pain.  Comorbidities affecting pt's functional performance at time of assessment include: left foot drop, bilateral PE, GERD, anxiety and depression, etc (see chart).  Prior to admission, pt was living with daughter and grandson in house with steps to manage.  Pt was I w/  ADLS and IADLS, (+) drove, & required cane PTA. Upon evaluation, pt appears to be performing below baseline functional status. Pt currently requires mod I for bed mobility, sup-mod I for functional mobility/transfers, sup for UB ADLs and sup for LB ADLS 2* the following deficits impacting occupational performance: weakness, decreased balance, and decreased tolerance. Full objective findings from OT assessment regarding body systems outlined above. Personal factors affecting pt at time of IE include:steps to enter environment. These impairments, as well as risk for falls  limit pt's ability to safely engage in all baseline areas of occupation and mobility. Pt to benefit from continued skilled OT tx while in the hospital to address deficits as defined above and maximize level of functional independence w ADL's and functional mobility. Occupational Performance areas to address include: bathing/shower, toilet hygiene, dressing, and functional mobility.  This evaluation required an extensive  review of medical and/or therapy records and additional review of physical, cognitive and psychosocial history related to functional performance. Based upon functional performance deficits and assessments, this evaluation has been identified as a high complexity evaluation.  At this time, OT recommendations at time of discharge are level home with no OT needs. Pt to f/u with level 3 PT services.     Rehab Resource Intensity Level, OT: No post-acute rehabilitation needs

## 2023-12-28 NOTE — OCCUPATIONAL THERAPY NOTE
"    Occupational Therapy Evaluation     Patient Name: Madeleine Sanchez  Today's Date: 12/28/2023  Problem List  Principal Problem:    Diffuse pain  Active Problems:    Anxiety and depression    Hypothyroidism, adult    GERD (gastroesophageal reflux disease)    Bilateral pulmonary embolism (HCC)    Nausea and vomiting    Abdominal discomfort    Past Medical History  Past Medical History:   Diagnosis Date    Cancer (HCC)     left breast cancer    Cervical herniated disc     Chronic pain disorder     back pain    Chronic respiratory failure (HCC)     uses O2 at home with NC /5/19 no longer using/just CPAP    CPAP (continuous positive airway pressure) dependence     Disease of thyroid gland     hypothyroid    DVT (deep venous thrombosis) (HCC) 2020    right leg    Family history of reaction to anesthesia     \"son and daughter hard time waking up and also PONV\"    Fibromyalgia, primary     GERD (gastroesophageal reflux disease)     Glaucoma     Hiatal hernia     History of palpitations     History of pneumonia     History of transfusion     Hyperlipidemia     Hypertension     Irregular heart beat     Migraine     Myocardial infarction (HCC)     pt sees cardilogist Dr Bennett [facility]/\"didnt realize had one, found on EKG before a surgery\"    OAB (overactive bladder)     Pollen allergies     Pulmonary embolism (HCC) 2019    Bilateral; on Xarelto    Restless leg     Risk for falls     Sleep apnea     Use of cane as ambulatory aid     occas    Uses brace     Mafo/left leg    Wears glasses      Past Surgical History  Past Surgical History:   Procedure Laterality Date    ADENOIDECTOMY      CARDIAC CATHETERIZATION      CARPAL TUNNEL RELEASE      CATARACT EXTRACTION Bilateral     FOOT SURGERY      pin implanted right toe    JOINT REPLACEMENT Bilateral     knees    LAMINECTOMY      LUMBAR EPIDURAL INJECTION      MASTECTOMY Bilateral     with reconstruction and implants    NISSEN FUNDOPLICATION      NV LNGTH/SHRT TENDON LEG/ANKLE 1 " TENDON SPX Left 05/24/2021    Procedure: Sectioning peroneal tendons with lengthening/sectioning achilles tendon lower leg and ankle;  Surgeon: Fei Sifuentes DPM;  Location: [facility];  Service: Podiatry    REPLACEMENT TOTAL KNEE      SPINAL FUSION      TONSILLECTOMY      TUBAL LIGATION      TUMOR EXCISION      right forearm/benign    VEIN LIGATION Right          12/28/23 1430   OT Last Visit   OT Visit Date 12/28/23   Note Type   Note type Evaluation   Pain Assessment   Pain Assessment Tool 0-10   Pain Score 8   Pain Location/Orientation Location: Generalized   Restrictions/Precautions   Weight Bearing Precautions Per Order No   Braces or Orthoses   (MAFO for LLE)   Other Precautions Fall Risk;Pain;O2;Chair Alarm;Bed Alarm   Home Living   Type of Home House   Home Layout Two level;Stairs to enter with rails   Bathroom Shower/Tub Walk-in shower   Bathroom Toilet Standard   Bathroom Equipment Grab bars in shower;Shower chair   Prior Function   Level of [place] Independent with ADLs;Independent with functional mobility;Independent with IADLS   Lives With Daughter;Other (Comment)  (grandson)   IADLs Independent with driving;Independent with meal prep;Independent with medication management   Lifestyle   Intrinsic Gratification Enjoys [place]   Subjective   Subjective Pt received in supine. Pt pleasant and agreeable to work with therapy.   ADL   Eating Assistance 7  Independent   Grooming Assistance 7  Independent   UB Bathing Assistance 5  Supervision/Setup   LB Bathing Assistance 5  Supervision/Setup   UB Dressing Assistance 5  Supervision/Setup   LB Dressing Assistance 5  Supervision/Setup; pt able to don socks/MAFO while seated in chair.   Toileting Assistance  5  Supervision/Setup   Bed Mobility   Supine to Sit 6  Modified independent   Additional Comments Pt remained seated in recliner by end of session.   Transfers   Sit to Stand 6  Modified independent   Additional items Increased time  required   Stand to Sit 6  Modified independent   Additional items Increased time required   Functional Mobility   Functional Mobility 5  Supervision   Additional Comments Cane, functional household distance   Balance   Static Sitting Normal   Dynamic Sitting Good   Static Standing Fair +   Dynamic Standing Fair   Activity Tolerance   Activity Tolerance Patient limited by fatigue   Medical Staff Made Aware PT Faustino   Nurse Made Aware SCOTT TALAVERA Assessment   RUE Assessment WFL   LUE Assessment   LUE Assessment WFL   Cognition   Overall Cognitive Status WFL   Arousal/Participation Alert   Attention Within functional limits   Orientation Level Oriented X4   Memory Within functional limits   Following Commands Follows all commands and directions without difficulty   Assessment   Limitation Decreased ADL status;Decreased self-care trans;Decreased high-level ADLs;Decreased endurance   Assessment Pt is a 78 y.o. female seen for OT evaluation at [facility], admitted 12/27/2023 w/ Diffuse pain.  Comorbidities affecting pt's functional performance at time of assessment include: left foot drop, bilateral PE, GERD, anxiety and depression, etc (see chart).  Prior to admission, pt was living with daughter and grandson in house with steps to manage.  Pt was I w/  ADLS and IADLS, (+) drove, & required cane PTA. Upon evaluation, pt appears to be performing below baseline functional status. Pt currently requires mod I for bed mobility, sup-mod I for functional mobility/transfers, sup for UB ADLs and sup for LB ADLS 2* the following deficits impacting occupational performance: weakness, decreased balance, and decreased tolerance. Full objective findings from OT assessment regarding body systems outlined above. Personal factors affecting pt at time of IE include:steps to enter environment. These impairments, as well as risk for falls  limit pt's ability to safely engage in all baseline areas of occupation and  mobility. Pt to benefit from continued skilled OT tx while in the hospital to address deficits as defined above and maximize level of functional independence w ADL's and functional mobility. Occupational Performance areas to address include: bathing/shower, toilet hygiene, dressing, and functional mobility.  This evaluation required an extensive review of medical and/or therapy records and additional review of physical, cognitive and psychosocial history related to functional performance. Based upon functional performance deficits and assessments, this evaluation has been identified as a high complexity evaluation.  At this time, OT recommendations at time of discharge are level home with no OT needs. Pt to f/u with level 3 PT services.   Goals   Patient Goals Pt wishes to get better   Plan   Treatment Interventions ADL retraining;Functional transfer training;Patient/family training;Compensatory technique education;Endurance training   Goal Expiration Date 01/07/24   OT Treatment Day 0   OT Frequency 2-3x/wk   Discharge Recommendation   Rehab Resource Intensity Level, OT No post-acute rehabilitation needs   Additional Comments  The patient's raw score on the AM-PAC Daily Activity inpatient short form is 21, standardized score is 44.27, greater than 39.4. Patients at this level are likely to benefit from DC to home. However please refer to therapist recommendation for discharge planning given other factors that may influence destination.   AM-PAC Daily Activity Inpatient   Lower Body Dressing 3   Bathing 3   Toileting 3   Upper Body Dressing 4   Grooming 4   Eating 4   Daily Activity Raw Score 21   Daily Activity Standardized Score (Calc for Raw Score >=11) 44.27   AM-PAC Applied Cognition Inpatient   Following a Speech/Presentation 4   Understanding Ordinary Conversation 4   Taking Medications 4   Remembering Where Things Are Placed or Put Away 4   Remembering List of 4-5 Errands 4   Taking Care of Complicated Tasks  4   Applied Cognition Raw Score 24   Applied Cognition Standardized Score 62.21   End of Consult   Education Provided Yes;Family or social support of family present for education by provider   Patient Position at End of Consult Bedside chair;Bed/Chair alarm activated;All needs within reach   Nurse Communication Nurse aware of consult     Pt will achieve the following goals within 10 days.    *Pt will complete UB bathing and dressing with mod I.    *Pt will complete LB bathing and dressing with mod I .    * Pt will complete toileting w/ mod I w/ G hygiene/thoroughness using DME PRN    *Pt will increase standing tolerance x 5  minutes for inc'd tolerance with standing purposeful tasks.    *Pt will increase static/dynamic standing balance to fair- to improve postural stability and decrease fall risk during standing ADLS and transfers.       *Pt will improve functional mobility during ADL/IADL/leisure tasks to mod I using DME as needed w/ G balance/safety.       Sujata Fournier, OTR/L

## 2023-12-28 NOTE — PLAN OF CARE
Problem: PHYSICAL THERAPY ADULT  Goal: Performs mobility at highest level of function for planned discharge setting.  See evaluation for individualized goals.  Description: Treatment/Interventions: Functional transfer training, LE strengthening/ROM, Elevations, Therapeutic exercise, Endurance training, Bed mobility, Gait training          See flowsheet documentation for full assessment, interventions and recommendations.  Outcome: Progressing  Note: Prognosis: Fair  Problem List: Decreased strength, Decreased endurance, Impaired balance, Decreased mobility, Pain  Assessment: Orders for PT eval and treat received. Pt's PMHx: large uterine mass/myoma, lee PE, anxiety/depression, defuse pain, breast cx, cervical HNP, chronic LBP, DVT, MI, lee TKA, left foot drop, carpal tunnel release, back sx.  Pt exhibits physical deficits noted in problem list above.  Deficits listed contribute to functional limitations that are significant from the patient PLOF and include: unsafe/inefficient ambulation, fall risk, and unable to safely manage stairs/steps/ramp    During today's session, formal PT evaluation performed.  Progressed mobility and ambulation as able.  Monitored vitals closely and weaned O2 as able.  Therapeutic rest breaks provided.  Pt tolerated activity, but seemed to fatigue to a greater degree than her baseline.     The AM-PAC & Barthel Index outcome tools were used to assist in determining pt safety w/ mobility/self care & appropriate d/c recommendations, see above for scores. Patient's clinical presentation is evolving due to increased supplemental O2 demands compared to baseline and ongoing medical management needs.     Considering the patient's PLOF, co-morbidities, acute functional limitations, functional outcome measures, and/or goal to progress functional independence; this patient would benefit from skilled Physical Therapy intervention in the acute care setting.  Barriers to Discharge: None     Rehab  Resource Intensity Level, PT: III (Minimum Resource Intensity)    See flowsheet documentation for full assessment.

## 2023-12-28 NOTE — ASSESSMENT & PLAN NOTE
Patient with complaints of persistent nausea and decreased appetite  Continue Zofran ordered as needed  Gentle hydration with plasmalyte  EKG: QTC WNL  Will trial oral reglan TID with meals  Continue supportive care

## 2023-12-28 NOTE — ASSESSMENT & PLAN NOTE
Patient reporting 3-week history of diffuse abdominal pain.  He is having difficulty exactly describe the nature of the pain but describes it in multiple ways such as a burning of the legs with muscle aches and joint aches  She has had similar pain to this in the past but never over her entire body.    She is also endorsing headache with sensitivity to light which is worse than normal for her  Consider Neuro evaluation if persists/worsens  No obvious medical derangements at this time   CK negative but ESR mildly elevated  Low concern for meningitis/meningismus as there is no confusion or fever  CTH with no acute abnormalities  Continue supportive care  Pain management   May benefit from outpatient follow up with Pain specialist   PT/OT eval: Pending

## 2023-12-28 NOTE — PLAN OF CARE
Problem: Potential for Falls  Goal: Patient will remain free of falls  Description: INTERVENTIONS:  - Educate patient/family on patient safety including physical limitations  - Instruct patient to call for assistance with activity   - Consult OT/PT to assist with strengthening/mobility   - Keep Call bell within reach  - Keep bed low and locked with side rails adjusted as appropriate  - Keep care items and personal belongings within reach  - Initiate and maintain comfort rounds  - Make Fall Risk Sign visible to staff  - Offer Toileting every 2 Hours, in advance of need  - Initiate/Maintain bed alarm  - Obtain necessary fall risk management equipment:   - Apply yellow socks and bracelet for high fall risk patients  - Consider moving patient to room near nurses station  Outcome: Progressing     Problem: GASTROINTESTINAL - ADULT  Goal: Minimal or absence of nausea and/or vomiting  Description: INTERVENTIONS:  - Administer IV fluids if ordered to ensure adequate hydration  - Maintain NPO status until nausea and vomiting are resolved  - Nasogastric tube if ordered  - Administer ordered antiemetic medications as needed  - Provide nonpharmacologic comfort measures as appropriate  - Advance diet as tolerated, if ordered  - Consider nutrition services referral to assist patient with adequate nutrition and appropriate food choices  Outcome: Progressing

## 2023-12-28 NOTE — ASSESSMENT & PLAN NOTE
Continue BuSpar 5 mg twice daily and hydroxyzine 25 mg nightly  Patient on Trintellix which we do not have on formulary here  Patient can bring own med from home but will hold for now

## 2023-12-29 VITALS
DIASTOLIC BLOOD PRESSURE: 66 MMHG | BODY MASS INDEX: 32.25 KG/M2 | SYSTOLIC BLOOD PRESSURE: 131 MMHG | TEMPERATURE: 97.6 F | RESPIRATION RATE: 16 BRPM | HEART RATE: 68 BPM | HEIGHT: 63 IN | OXYGEN SATURATION: 90 % | WEIGHT: 182 LBS

## 2023-12-29 LAB
ALBUMIN SERPL BCP-MCNC: 3.6 G/DL (ref 3.5–5)
ALP SERPL-CCNC: 116 U/L (ref 34–104)
ALT SERPL W P-5'-P-CCNC: 11 U/L (ref 7–52)
ANION GAP SERPL CALCULATED.3IONS-SCNC: 6 MMOL/L
AST SERPL W P-5'-P-CCNC: 12 U/L (ref 13–39)
BILIRUB SERPL-MCNC: 0.5 MG/DL (ref 0.2–1)
BUN SERPL-MCNC: 13 MG/DL (ref 5–25)
CALCIUM SERPL-MCNC: 8.7 MG/DL (ref 8.4–10.2)
CHLORIDE SERPL-SCNC: 104 MMOL/L (ref 96–108)
CO2 SERPL-SCNC: 29 MMOL/L (ref 21–32)
CREAT SERPL-MCNC: 0.68 MG/DL (ref 0.6–1.3)
ERYTHROCYTE [DISTWIDTH] IN BLOOD BY AUTOMATED COUNT: 13.3 % (ref 11.6–15.1)
ERYTHROCYTE [SEDIMENTATION RATE] IN BLOOD: 15 MM/HOUR (ref 0–29)
GFR SERPL CREATININE-BSD FRML MDRD: 83 ML/MIN/1.73SQ M
GLUCOSE SERPL-MCNC: 95 MG/DL (ref 65–140)
HCT VFR BLD AUTO: 40.1 % (ref 34.8–46.1)
HGB BLD-MCNC: 12.7 G/DL (ref 11.5–15.4)
MCH RBC QN AUTO: 29.5 PG (ref 26.8–34.3)
MCHC RBC AUTO-ENTMCNC: 31.7 G/DL (ref 31.4–37.4)
MCV RBC AUTO: 93 FL (ref 82–98)
PLATELET # BLD AUTO: 300 THOUSANDS/UL (ref 149–390)
PMV BLD AUTO: 11.1 FL (ref 8.9–12.7)
POTASSIUM SERPL-SCNC: 3.9 MMOL/L (ref 3.5–5.3)
PROT SERPL-MCNC: 6.3 G/DL (ref 6.4–8.4)
RBC # BLD AUTO: 4.3 MILLION/UL (ref 3.81–5.12)
SODIUM SERPL-SCNC: 139 MMOL/L (ref 135–147)
WBC # BLD AUTO: 5.7 THOUSAND/UL (ref 4.31–10.16)

## 2023-12-29 PROCEDURE — 80053 COMPREHEN METABOLIC PANEL: CPT

## 2023-12-29 PROCEDURE — 85652 RBC SED RATE AUTOMATED: CPT

## 2023-12-29 PROCEDURE — 99239 HOSP IP/OBS DSCHRG MGMT >30: CPT

## 2023-12-29 PROCEDURE — 85027 COMPLETE CBC AUTOMATED: CPT

## 2023-12-29 RX ORDER — METOCLOPRAMIDE 5 MG/1
5 TABLET ORAL
Qty: 21 TABLET | Refills: 0 | Status: SHIPPED | OUTPATIENT
Start: 2023-12-29 | End: 2024-01-05

## 2023-12-29 RX ADMIN — LEVOTHYROXINE SODIUM 100 MCG: 100 TABLET ORAL at 05:37

## 2023-12-29 RX ADMIN — TRAMADOL HYDROCHLORIDE 50 MG: 50 TABLET, COATED ORAL at 03:00

## 2023-12-29 RX ADMIN — BUSPIRONE HYDROCHLORIDE 5 MG: 5 TABLET ORAL at 08:49

## 2023-12-29 RX ADMIN — RIVAROXABAN 20 MG: 20 TABLET, FILM COATED ORAL at 08:49

## 2023-12-29 RX ADMIN — TORSEMIDE 10 MG: 10 TABLET ORAL at 08:49

## 2023-12-29 RX ADMIN — AMLODIPINE BESYLATE 5 MG: 5 TABLET ORAL at 08:49

## 2023-12-29 RX ADMIN — METOCLOPRAMIDE 5 MG: 10 TABLET ORAL at 11:58

## 2023-12-29 RX ADMIN — SENNOSIDES 8.6 MG: 8.6 TABLET, FILM COATED ORAL at 08:49

## 2023-12-29 RX ADMIN — PREGABALIN 150 MG: 75 CAPSULE ORAL at 08:49

## 2023-12-29 RX ADMIN — ACETAMINOPHEN 650 MG: 325 TABLET, FILM COATED ORAL at 05:37

## 2023-12-29 RX ADMIN — SODIUM CHLORIDE, SODIUM GLUCONATE, SODIUM ACETATE, POTASSIUM CHLORIDE, MAGNESIUM CHLORIDE, SODIUM PHOSPHATE, DIBASIC, AND POTASSIUM PHOSPHATE 100 ML/HR: .53; .5; .37; .037; .03; .012; .00082 INJECTION, SOLUTION INTRAVENOUS at 02:06

## 2023-12-29 RX ADMIN — METOCLOPRAMIDE 5 MG: 10 TABLET ORAL at 08:52

## 2023-12-29 RX ADMIN — ROPINIROLE HYDROCHLORIDE 1 MG: 1 TABLET, FILM COATED ORAL at 08:49

## 2023-12-29 RX ADMIN — FAMOTIDINE 40 MG: 20 TABLET, FILM COATED ORAL at 08:49

## 2023-12-29 RX ADMIN — VORTIOXETINE 20 MG: 10 TABLET, FILM COATED ORAL at 08:53

## 2023-12-29 RX ADMIN — TIMOLOL MALEATE 1 DROP: 5 SOLUTION/ DROPS OPHTHALMIC at 08:53

## 2023-12-29 NOTE — PLAN OF CARE
Problem: Potential for Falls  Goal: Patient will remain free of falls  Description: INTERVENTIONS:  - Educate patient/family on patient safety including physical limitations  - Instruct patient to call for assistance with activity   - Consult OT/PT to assist with strengthening/mobility   - Keep Call bell within reach  - Keep bed low and locked with side rails adjusted as appropriate  - Keep care items and personal belongings within reach  - Initiate and maintain comfort rounds  - Make Fall Risk Sign visible to staff  - Offer Toileting every  Hours, in advance of need  - Initiate/Maintain alarm  - Obtain necessary fall risk management equipment:   - Apply yellow socks and bracelet for high fall risk patients  - Consider moving patient to room near nurses station  12/29/2023 1046 by Samira Salvador RN  Outcome: Progressing  12/29/2023 1046 by Samira Salvador RN  Outcome: Progressing     Problem: GASTROINTESTINAL - ADULT  Goal: Minimal or absence of nausea and/or vomiting  Description: INTERVENTIONS:  - Administer IV fluids if ordered to ensure adequate hydration  - Maintain NPO status until nausea and vomiting are resolved  - Nasogastric tube if ordered  - Administer ordered antiemetic medications as needed  - Provide nonpharmacologic comfort measures as appropriate  - Advance diet as tolerated, if ordered  - Consider nutrition services referral to assist patient with adequate nutrition and appropriate food choices  12/29/2023 1046 by Samira Salvador RN  Outcome: Progressing  12/29/2023 1046 by Samira Salvador RN  Outcome: Progressing     Problem: PAIN - ADULT  Goal: Verbalizes/displays adequate comfort level or baseline comfort level  Description: Interventions:  - Encourage patient to monitor pain and request assistance  - Assess pain using appropriate pain scale  - Administer analgesics based on type and severity of pain and evaluate response  - Implement non-pharmacological measures as appropriate and evaluate  response  - Consider cultural and social influences on pain and pain management  - Notify physician/advanced practitioner if interventions unsuccessful or patient reports new pain  Outcome: Progressing     Problem: INFECTION - ADULT  Goal: Absence or prevention of progression during hospitalization  Description: INTERVENTIONS:  - Assess and monitor for signs and symptoms of infection  - Monitor lab/diagnostic results  - Monitor all insertion sites, i.e. indwelling lines, tubes, and drains  - Monitor endotracheal if appropriate and nasal secretions for changes in amount and color  - [place] appropriate cooling/warming therapies per order  - Administer medications as ordered  - Instruct and encourage patient and family to use good hand hygiene technique  - Identify and instruct in appropriate isolation precautions for identified infection/condition  Outcome: Progressing     Problem: SAFETY ADULT  Goal: Patient will remain free of falls  Description: INTERVENTIONS:  - Educate patient/family on patient safety including physical limitations  - Instruct patient to call for assistance with activity   - Consult OT/PT to assist with strengthening/mobility   - Keep Call bell within reach  - Keep bed low and locked with side rails adjusted as appropriate  - Keep care items and personal belongings within reach  - Initiate and maintain comfort rounds  - Make Fall Risk Sign visible to staff  - Offer Toileting every  Hours, in advance of need  - Initiate/Maintain alarm  - Obtain necessary fall risk management equipment:   - Apply yellow socks and bracelet for high fall risk patients  - Consider moving patient to room near nurses station  12/29/2023 1046 by Samira Salvador RN  Outcome: Progressing  12/29/2023 1046 by Samira Salvador RN  Outcome: Progressing

## 2023-12-29 NOTE — PLAN OF CARE
Problem: GASTROINTESTINAL - ADULT  Goal: Minimal or absence of nausea and/or vomiting  Description: INTERVENTIONS:  - Administer IV fluids if ordered to ensure adequate hydration  - Maintain NPO status until nausea and vomiting are resolved  - Nasogastric tube if ordered  - Administer ordered antiemetic medications as needed  - Provide nonpharmacologic comfort measures as appropriate  - Advance diet as tolerated, if ordered  - Consider nutrition services referral to assist patient with adequate nutrition and appropriate food choices  Outcome: Progressing      Problem: PAIN - ADULT  Goal: Verbalizes/displays adequate comfort level or baseline comfort level  Description: Interventions:  - Encourage patient to monitor pain and request assistance  - Assess pain using appropriate pain scale  - Administer analgesics based on type and severity of pain and evaluate response  - Implement non-pharmacological measures as appropriate and evaluate response  - Consider cultural and social influences on pain and pain management  - Notify physician/advanced practitioner if interventions unsuccessful or patient reports new pain  Outcome: Progressing      Problem: SAFETY ADULT  Goal: Patient will remain free of falls  Description: INTERVENTIONS:  - Educate patient/family on patient safety including physical limitations  - Instruct patient to call for assistance with activity   - Consult OT/PT to assist with strengthening/mobility   - Keep Call bell within reach  - Keep bed low and locked with side rails adjusted as appropriate  - Keep care items and personal belongings within reach  - Initiate and maintain comfort rounds  - Make Fall Risk Sign visible to staff  - Initiate/Maintain bed alarm  - Obtain necessary fall risk management equipment:   - Apply yellow socks and bracelet for high fall risk patients  - Consider moving patient to room near nurses station  Outcome: Progressing      Problem: SAFETY ADULT  Goal: Maintain or return  to baseline ADL function  Description: INTERVENTIONS:  -  Assess patient's ability to carry out ADLs; assess patient's baseline for ADL function and identify physical deficits which impact ability to perform ADLs (bathing, care of mouth/teeth, toileting, grooming, dressing, etc.)  - Assess/evaluate cause of self-care deficits   - Assess range of motion  - Assess patient's mobility; develop plan if impaired  - Assess patient's need for assistive devices and provide as appropriate  - Encourage maximum independence but intervene and supervise when necessary  - Involve family in performance of ADLs  - Assess for home care needs following discharge   - Consider OT consult to assist with ADL evaluation and planning for discharge  - Provide patient education as appropriate  Outcome: Progressing     Problem: DISCHARGE PLANNING  Goal: Discharge to home or other facility with appropriate resources  Description: INTERVENTIONS:  - Identify barriers to discharge w/patient and caregiver  - Arrange for needed discharge resources and transportation as appropriate  - Identify discharge learning needs (meds, wound care, etc.)  - Arrange for interpretive services to assist at discharge as needed  - Refer to Case Management Department for coordinating discharge planning if the patient needs post-hospital services based on physician/advanced practitioner order or complex needs related to functional status, cognitive ability, or social support system  Outcome: Progressing     Problem: Knowledge Deficit  Goal: Patient/family/caregiver demonstrates understanding of disease process, treatment plan, medications, and discharge instructions  Description: Complete learning assessment and assess knowledge base.  Interventions:  - Provide teaching at level of understanding  - Provide teaching via preferred learning methods  Outcome: Progressing

## 2023-12-29 NOTE — DISCHARGE SUMMARY
[facility]  Discharge- Madeleine Sanchez 1945, 78 y.o. female MRN: [id number]  Unit/Bed#: -[facility] Encounter: [id number]  Primary Care Provider: JOE Yates DO   Date and time admitted to hospital: 12/27/2023 10:00 AM    * Diffuse pain  Assessment & Plan  Patient reporting 3-week history of diffuse abdominal pain.  He is having difficulty exactly describe the nature of the pain but describes it in multiple ways such as a burning of the legs with muscle aches and joint aches  She has had similar pain to this in the past but never over her entire body.    She is also endorsing headache with sensitivity to light which is worse than normal for her  Consider Neuro evaluation if persists/worsens  No obvious medical derangements at this time   CK negative but ESR mildly elevated-> No resolved  Low concern for meningitis/meningismus as there is no confusion or fever  CTH with no acute abnormalities  Continue supportive care  May benefit from outpatient follow up with Pain specialist   PT/OT eval: No acute rehab needs at this time    Abdominal discomfort  Assessment & Plan  Patient endorsing mild generalized abdominal pain as part of her overall pain complaint  CT noting large uterine mass or myoma measuring 13 x 10.1 cm, which appears stable from previous imaging.  This is known to patient and she states it is being observed  Could perhaps be culprit for some of patient's abdominal discomfort, but likely not culprit for overall pain syndrome  Recommend outpatient follow up with GI and OB/Gyn   May benefit from outpatient MRI Abd    Nausea and vomiting  Assessment & Plan  Patient with complaints of persistent nausea and decreased appetite-> Now improved  Continue Zofran ordered as needed  S/p IV hydration with plasmalyte  EKG: QTC WNL  C/w  reglan TID with meals  Continue supportive care  Recommend outpatient follow up with GI upon dishcarge    Bilateral pulmonary embolism (HCC)  Assessment &  Plan  Continue Xarelto 20 mg daily    GERD (gastroesophageal reflux disease)  Assessment & Plan  Continue Pepcid 40 mg twice daily    Hypothyroidism, adult  Assessment & Plan  Continue levothyroxine 100 mcg daily    Anxiety and depression  Assessment & Plan  Continue Home medication: BuSpar 5 mg twice daily, hydroxyzine 25 mg nightly and Trintellix upon discharge        Medical Problems       Resolved Problems  Date Reviewed: 12/29/2023   None       Discharging Physician / Practitioner: JACK Villalpando  PCP: JOE Yates DO  Admission Date:   Admission Orders (From admission, onward)       Ordered        12/28/23 1317  Inpatient Admission  Once            12/27/23 1214  Place in Observation  Once                          Discharge Date: 12/29/23    Consultations During Hospital Stay:  None    Procedures Performed:   None    Significant Findings / Test Results:   CT A/P: Mild dilatation of the bilateral pelvicalyceal systems right greater than left without any obstructing calculus may be due to mass effect from the enlarged uterus     Incidental Findings:   CT A/P: Large uterine mass/myoma measuring 13 x 10.1 cm, stable. Consider nonemergent evaluation with the MRI.   I reviewed the above mentioned incidental findings with the patient and/or family and they expressed understanding.    Test Results Pending at Discharge (will require follow up):   None     Outpatient Tests Requested:  Recommend outpatient follow up with PCP  Recommend outpatient follow up with OB/Gyn  Recommend outpatient follow up with Pain Specialist  Recommend outpatient follow up with Gi    Complications:  None    Reason for Admission: Generalized Pain    Hospital Course:   Madeleine Sanchez is a 78 y.o. female patient who originally presented to the hospital on 12/27/2023 due to ***      Please see above list of diagnoses and related plan for additional information.     Condition at Discharge: stable    Discharge Day Visit / Exam:   Subjective:  " Patient stated she feels so much better than on admission. Patient with mild abdominal discomfort and nausea but improved. Patient with HA but also improved. Patient denies any chest pain or shortness of breath.     Vitals: Blood Pressure: 131/66 (12/29/23 0842)  Pulse: 68 (12/29/23 0842)  Temperature: 97.6 °F (36.4 °C) (12/29/23 0842)  Temp Source: Oral (12/29/23 0842)  Respirations: 16 (12/29/23 0842)  Height: 5' 3\" (160 cm) (12/27/23 1332)  Weight - Scale: 82.6 kg (182 lb) (12/27/23 1332)  SpO2: 90 % (12/29/23 0842)  Exam:   Physical Exam  Vitals and nursing note reviewed.   Constitutional:       General: She is not in acute distress.     Appearance: Normal appearance.   HENT:      Head: Normocephalic.      Nose: Nose normal. No congestion.      Mouth/Throat:      Mouth: Mucous membranes are moist.      Pharynx: Oropharynx is clear.   Cardiovascular:      Rate and Rhythm: Normal rate and regular rhythm.      Pulses: Normal pulses.      Heart sounds: No murmur heard.  Pulmonary:      Effort: Pulmonary effort is normal. No respiratory distress.      Breath sounds: Normal breath sounds.   Abdominal:      General: Bowel sounds are normal. There is no distension.      Palpations: Abdomen is soft.   Musculoskeletal:         General: Normal range of motion.      Cervical back: Normal range of motion.      Right lower leg: No edema.      Left lower leg: No edema.   Skin:     General: Skin is warm and dry.      Capillary Refill: Capillary refill takes less than 2 seconds.   Neurological:      Mental Status: She is alert and oriented to person, place, and time. Mental status is at baseline.      Motor: No weakness.   Psychiatric:         Mood and Affect: Mood is anxious.         Speech: Speech normal.         Behavior: Behavior is cooperative.          Discussion with Family: Updated  (daughter) via phone.    Discharge instructions/Information to patient and family:   See after visit summary for information " provided to patient and family.      Provisions for Follow-Up Care:  See after visit summary for information related to follow-up care and any pertinent home health orders.      Mobility at time of Discharge:   Basic Mobility Inpatient Raw Score: 19  JH-HLM Goal: 6: Walk 10 steps or more  JH-HLM Achieved: 7: Walk 25 feet or more  HLM Goal achieved. Continue to encourage appropriate mobility.     Disposition:   Home    Planned Readmission: No     Discharge Statement:  I spent 60 minutes discharging the patient. This time was spent on the day of discharge. I had direct contact with the patient on the day of discharge. Greater than 50% of the total time was spent examining patient, answering all patient questions, arranging and discussing plan of care with patient as well as directly providing post-discharge instructions.  Additional time then spent on discharge activities.    Discharge Medications:  See after visit summary for reconciled discharge medications provided to patient and/or family.      **Please Note: This note may have been constructed using a voice recognition system**       discharging the patient. This time was spent on the day of discharge. I had direct contact with the patient on the day of discharge. Greater than 50% of the total time was spent examining patient, answering all patient questions, arranging and discussing plan of care with patient as well as directly providing post-discharge instructions.  Additional time then spent on discharge activities.    Discharge Medications:  See after visit summary for reconciled discharge medications provided to patient and/or family.      **Please Note: This note may have been constructed using a voice recognition system**

## 2023-12-29 NOTE — ASSESSMENT & PLAN NOTE
Patient reporting 3-week history of diffuse abdominal pain.  He is having difficulty exactly describe the nature of the pain but describes it in multiple ways such as a burning of the legs with muscle aches and joint aches  She has had similar pain to this in the past but never over her entire body.    She is also endorsing headache with sensitivity to light which is worse than normal for her  Consider Neuro evaluation if persists/worsens  No obvious medical derangements at this time   CK negative but ESR mildly elevated-> No resolved  Low concern for meningitis/meningismus as there is no confusion or fever  CTH with no acute abnormalities  Continue supportive care  May benefit from outpatient follow up with Pain specialist   PT/OT eval: No acute rehab needs at this time

## 2023-12-29 NOTE — INCIDENTAL FINDINGS
The following findings require follow up:  Radiographic finding   Finding: CT A/P: CT Abdomen pelvis with contrast: No acute inflammatory stranding, Large uterine mass/myoma measuring 13 x 10.1 cm, stable. Consider nonemergent evaluation with the MRI., Mild dilatation of the bilateral pelvicalyceal systems right greater than left without any obstructing calculus may be due to mass effect from the enlarged uterus    Follow up required: Non Emergent MRI    Follow up should be done within 2-3 week(s)    Please notify the following clinician to assist with the follow up:   Dr. Collin Yates DO

## 2023-12-29 NOTE — ASSESSMENT & PLAN NOTE
Patient with complaints of persistent nausea and decreased appetite-> Now improved  Continue Zofran ordered as needed  S/p IV hydration with plasmalyte  EKG: QTC WNL  C/w  reglan TID with meals  Continue supportive care  Recommend outpatient follow up with GI upon dishcarge

## 2023-12-29 NOTE — ASSESSMENT & PLAN NOTE
Patient endorsing mild generalized abdominal pain as part of her overall pain complaint  CT noting large uterine mass or myoma measuring 13 x 10.1 cm, which appears stable from previous imaging.  This is known to patient and she states it is being observed  Could perhaps be culprit for some of patient's abdominal discomfort, but likely not culprit for overall pain syndrome  Recommend outpatient follow up with GI and OB/Gyn   May benefit from outpatient MRI Abd

## 2023-12-29 NOTE — ASSESSMENT & PLAN NOTE
Continue Home medication: BuSpar 5 mg twice daily, hydroxyzine 25 mg nightly and Trintellix upon discharge

## 2024-01-02 ENCOUNTER — TRANSITIONAL CARE MANAGEMENT (OUTPATIENT)
Dept: FAMILY MEDICINE CLINIC | Facility: CLINIC | Age: 79
End: 2024-01-02

## 2024-01-02 ENCOUNTER — TELEPHONE (OUTPATIENT)
Dept: ADMINISTRATIVE | Facility: HOSPITAL | Age: 79
End: 2024-01-02

## 2024-01-02 ENCOUNTER — TELEPHONE (OUTPATIENT)
Age: 79
End: 2024-01-02

## 2024-01-02 NOTE — TELEPHONE ENCOUNTER
Pt called in to make TCM appointment as she was in the hospital for 3 days. I scheduled pt appointment however she mentioned on her discharge paperwork for pt to have diabetic education. I asked if pt was a diabetic but she stated no she just needs help with working on her diet.     Do we have anything to provide pt regarding diet that we can send her? Otherwise she is coming in on the 16th.

## 2024-01-02 NOTE — TELEPHONE ENCOUNTER
Patients GI provider:  Dr. Christensen    Number to return call: [phone]    Reason for call: Pt calling to schedule Hosp FU. Nothing available in next 4-6 weeks, Transferred to triage nurse.    Scheduled procedure/appointment date if applicable: NA

## 2024-01-07 ENCOUNTER — HOSPITAL ENCOUNTER (OUTPATIENT)
Facility: HOSPITAL | Age: 79
Setting detail: OBSERVATION
Discharge: HOME | End: 2024-01-08
Attending: EMERGENCY MEDICINE | Admitting: INTERNAL MEDICINE
Payer: MEDICARE

## 2024-01-07 ENCOUNTER — APPOINTMENT (EMERGENCY)
Dept: RADIOLOGY | Facility: HOSPITAL | Age: 79
End: 2024-01-07
Payer: MEDICARE

## 2024-01-07 DIAGNOSIS — U07.1 COVID-19: Primary | ICD-10-CM

## 2024-01-07 DIAGNOSIS — J96.01 ACUTE HYPOXEMIC RESPIRATORY FAILURE (HCC): ICD-10-CM

## 2024-01-07 LAB
ALBUMIN SERPL BCP-MCNC: 3.9 G/DL (ref 3.5–5)
ALP SERPL-CCNC: 138 U/L (ref 34–104)
ALT SERPL W P-5'-P-CCNC: 18 U/L (ref 7–52)
ANION GAP SERPL CALCULATED.3IONS-SCNC: 10 MMOL/L
AST SERPL W P-5'-P-CCNC: 17 U/L (ref 13–39)
BASOPHILS # BLD AUTO: 0.03 THOUSANDS/ÂΜL (ref 0–0.1)
BASOPHILS NFR BLD AUTO: 0 % (ref 0–1)
BILIRUB SERPL-MCNC: 0.56 MG/DL (ref 0.2–1)
BNP SERPL-MCNC: 187 PG/ML (ref 0–100)
BUN SERPL-MCNC: 15 MG/DL (ref 5–25)
CALCIUM SERPL-MCNC: 8.9 MG/DL (ref 8.4–10.2)
CARDIAC TROPONIN I PNL SERPL HS: 3 NG/L
CHLORIDE SERPL-SCNC: 104 MMOL/L (ref 96–108)
CO2 SERPL-SCNC: 23 MMOL/L (ref 21–32)
CREAT SERPL-MCNC: 0.67 MG/DL (ref 0.6–1.3)
D DIMER PPP FEU-MCNC: 1.16 UG/ML FEU
EOSINOPHIL # BLD AUTO: 0.01 THOUSAND/ÂΜL (ref 0–0.61)
EOSINOPHIL NFR BLD AUTO: 0 % (ref 0–6)
ERYTHROCYTE [DISTWIDTH] IN BLOOD BY AUTOMATED COUNT: 13.4 % (ref 11.6–15.1)
FLUAV RNA RESP QL NAA+PROBE: NEGATIVE
FLUBV RNA RESP QL NAA+PROBE: NEGATIVE
GFR SERPL CREATININE-BSD FRML MDRD: 83 ML/MIN/1.73SQ M
GLUCOSE SERPL-MCNC: 123 MG/DL (ref 65–140)
HCT VFR BLD AUTO: 41 % (ref 34.8–46.1)
HGB BLD-MCNC: 13.3 G/DL (ref 11.5–15.4)
IMM GRANULOCYTES # BLD AUTO: 0.05 THOUSAND/UL (ref 0–0.2)
IMM GRANULOCYTES NFR BLD AUTO: 1 % (ref 0–2)
LYMPHOCYTES # BLD AUTO: 1.54 THOUSANDS/ÂΜL (ref 0.6–4.47)
LYMPHOCYTES NFR BLD AUTO: 17 % (ref 14–44)
MCH RBC QN AUTO: 29.4 PG (ref 26.8–34.3)
MCHC RBC AUTO-ENTMCNC: 32.4 G/DL (ref 31.4–37.4)
MCV RBC AUTO: 91 FL (ref 82–98)
MONOCYTES # BLD AUTO: 0.95 THOUSAND/ÂΜL (ref 0.17–1.22)
MONOCYTES NFR BLD AUTO: 10 % (ref 4–12)
NEUTROPHILS # BLD AUTO: 6.67 THOUSANDS/ÂΜL (ref 1.85–7.62)
NEUTS SEG NFR BLD AUTO: 72 % (ref 43–75)
NRBC BLD AUTO-RTO: 0 /100 WBCS
PLATELET # BLD AUTO: 289 THOUSANDS/UL (ref 149–390)
PMV BLD AUTO: 10.5 FL (ref 8.9–12.7)
POTASSIUM SERPL-SCNC: 3.6 MMOL/L (ref 3.5–5.3)
PROCALCITONIN SERPL-MCNC: 0.17 NG/ML
PROT SERPL-MCNC: 7.3 G/DL (ref 6.4–8.4)
RBC # BLD AUTO: 4.53 MILLION/UL (ref 3.81–5.12)
RSV RNA RESP QL NAA+PROBE: NEGATIVE
SARS-COV-2 RNA RESP QL NAA+PROBE: POSITIVE
SODIUM SERPL-SCNC: 137 MMOL/L (ref 135–147)
TSH SERPL DL<=0.05 MIU/L-ACNC: 1 UIU/ML (ref 0.45–4.5)
WBC # BLD AUTO: 9.25 THOUSAND/UL (ref 4.31–10.16)

## 2024-01-07 PROCEDURE — 36415 COLL VENOUS BLD VENIPUNCTURE: CPT | Performed by: PHYSICIAN ASSISTANT

## 2024-01-07 PROCEDURE — 99222 1ST HOSP IP/OBS MODERATE 55: CPT | Performed by: INTERNAL MEDICINE

## 2024-01-07 PROCEDURE — 84443 ASSAY THYROID STIM HORMONE: CPT | Performed by: INTERNAL MEDICINE

## 2024-01-07 PROCEDURE — 99284 EMERGENCY DEPT VISIT MOD MDM: CPT

## 2024-01-07 PROCEDURE — 71045 X-RAY EXAM CHEST 1 VIEW: CPT

## 2024-01-07 PROCEDURE — 83880 ASSAY OF NATRIURETIC PEPTIDE: CPT | Performed by: PHYSICIAN ASSISTANT

## 2024-01-07 PROCEDURE — 93005 ELECTROCARDIOGRAM TRACING: CPT

## 2024-01-07 PROCEDURE — 0241U HB NFCT DS VIR RESP RNA 4 TRGT: CPT | Performed by: PHYSICIAN ASSISTANT

## 2024-01-07 PROCEDURE — 85025 COMPLETE CBC W/AUTO DIFF WBC: CPT | Performed by: PHYSICIAN ASSISTANT

## 2024-01-07 PROCEDURE — 84145 PROCALCITONIN (PCT): CPT | Performed by: PHYSICIAN ASSISTANT

## 2024-01-07 PROCEDURE — 84484 ASSAY OF TROPONIN QUANT: CPT | Performed by: PHYSICIAN ASSISTANT

## 2024-01-07 PROCEDURE — 96374 THER/PROPH/DIAG INJ IV PUSH: CPT

## 2024-01-07 PROCEDURE — 80053 COMPREHEN METABOLIC PANEL: CPT | Performed by: PHYSICIAN ASSISTANT

## 2024-01-07 PROCEDURE — 85379 FIBRIN DEGRADATION QUANT: CPT | Performed by: INTERNAL MEDICINE

## 2024-01-07 PROCEDURE — 96375 TX/PRO/DX INJ NEW DRUG ADDON: CPT

## 2024-01-07 PROCEDURE — 99285 EMERGENCY DEPT VISIT HI MDM: CPT | Performed by: PHYSICIAN ASSISTANT

## 2024-01-07 RX ORDER — MAGNESIUM HYDROXIDE/ALUMINUM HYDROXICE/SIMETHICONE 120; 1200; 1200 MG/30ML; MG/30ML; MG/30ML
30 SUSPENSION ORAL EVERY 6 HOURS PRN
Status: DISCONTINUED | OUTPATIENT
Start: 2024-01-07 | End: 2024-01-08 | Stop reason: HOSPADM

## 2024-01-07 RX ORDER — LATANOPROST 50 UG/ML
1 SOLUTION/ DROPS OPHTHALMIC
Status: DISCONTINUED | OUTPATIENT
Start: 2024-01-07 | End: 2024-01-08 | Stop reason: HOSPADM

## 2024-01-07 RX ORDER — BUSPIRONE HYDROCHLORIDE 5 MG/1
5 TABLET ORAL 2 TIMES DAILY
Status: DISCONTINUED | OUTPATIENT
Start: 2024-01-07 | End: 2024-01-08 | Stop reason: HOSPADM

## 2024-01-07 RX ORDER — HYDROXYZINE HYDROCHLORIDE 25 MG/1
25 TABLET, FILM COATED ORAL
Status: DISCONTINUED | OUTPATIENT
Start: 2024-01-07 | End: 2024-01-08 | Stop reason: HOSPADM

## 2024-01-07 RX ORDER — BRIMONIDINE TARTRATE 2 MG/ML
1 SOLUTION/ DROPS OPHTHALMIC EVERY EVENING
Status: DISCONTINUED | OUTPATIENT
Start: 2024-01-07 | End: 2024-01-08 | Stop reason: HOSPADM

## 2024-01-07 RX ORDER — AMLODIPINE BESYLATE 5 MG/1
5 TABLET ORAL
Status: DISCONTINUED | OUTPATIENT
Start: 2024-01-07 | End: 2024-01-08 | Stop reason: HOSPADM

## 2024-01-07 RX ORDER — ACETAMINOPHEN 325 MG/1
650 TABLET ORAL EVERY 6 HOURS PRN
Status: DISCONTINUED | OUTPATIENT
Start: 2024-01-07 | End: 2024-01-08 | Stop reason: HOSPADM

## 2024-01-07 RX ORDER — LEVOTHYROXINE SODIUM 0.1 MG/1
100 TABLET ORAL
Status: DISCONTINUED | OUTPATIENT
Start: 2024-01-07 | End: 2024-01-08 | Stop reason: HOSPADM

## 2024-01-07 RX ORDER — ASPIRIN 81 MG/1
81 TABLET, CHEWABLE ORAL DAILY
Status: DISCONTINUED | OUTPATIENT
Start: 2024-01-07 | End: 2024-01-07

## 2024-01-07 RX ORDER — ROPINIROLE 0.25 MG/1
0.5 TABLET, FILM COATED ORAL
Status: DISCONTINUED | OUTPATIENT
Start: 2024-01-07 | End: 2024-01-08 | Stop reason: HOSPADM

## 2024-01-07 RX ORDER — FAMOTIDINE 20 MG/1
20 TABLET, FILM COATED ORAL 2 TIMES DAILY
Status: DISCONTINUED | OUTPATIENT
Start: 2024-01-07 | End: 2024-01-08 | Stop reason: HOSPADM

## 2024-01-07 RX ORDER — AMLODIPINE BESYLATE 5 MG/1
5 TABLET ORAL DAILY
Status: DISCONTINUED | OUTPATIENT
Start: 2024-01-07 | End: 2024-01-07

## 2024-01-07 RX ORDER — ASPIRIN 81 MG/1
81 TABLET, CHEWABLE ORAL
Status: DISCONTINUED | OUTPATIENT
Start: 2024-01-07 | End: 2024-01-08 | Stop reason: HOSPADM

## 2024-01-07 RX ORDER — ONDANSETRON 2 MG/ML
4 INJECTION INTRAMUSCULAR; INTRAVENOUS ONCE
Status: COMPLETED | OUTPATIENT
Start: 2024-01-07 | End: 2024-01-07

## 2024-01-07 RX ORDER — FLUTICASONE PROPIONATE 50 MCG
2 SPRAY, SUSPENSION (ML) NASAL ONCE
Status: COMPLETED | OUTPATIENT
Start: 2024-01-07 | End: 2024-01-07

## 2024-01-07 RX ORDER — PREGABALIN 75 MG/1
150 CAPSULE ORAL 2 TIMES DAILY
Status: DISCONTINUED | OUTPATIENT
Start: 2024-01-07 | End: 2024-01-08 | Stop reason: HOSPADM

## 2024-01-07 RX ORDER — TIMOLOL MALEATE 5 MG/ML
1 SOLUTION/ DROPS OPHTHALMIC 2 TIMES DAILY
Status: DISCONTINUED | OUTPATIENT
Start: 2024-01-07 | End: 2024-01-08 | Stop reason: HOSPADM

## 2024-01-07 RX ORDER — DEXAMETHASONE SODIUM PHOSPHATE 10 MG/ML
6 INJECTION, SOLUTION INTRAMUSCULAR; INTRAVENOUS EVERY 24 HOURS
Status: DISCONTINUED | OUTPATIENT
Start: 2024-01-07 | End: 2024-01-08 | Stop reason: HOSPADM

## 2024-01-07 RX ORDER — ONDANSETRON 2 MG/ML
4 INJECTION INTRAMUSCULAR; INTRAVENOUS EVERY 6 HOURS PRN
Status: DISCONTINUED | OUTPATIENT
Start: 2024-01-07 | End: 2024-01-08 | Stop reason: HOSPADM

## 2024-01-07 RX ORDER — SODIUM CHLORIDE 9 MG/ML
75 INJECTION, SOLUTION INTRAVENOUS CONTINUOUS
Status: DISCONTINUED | OUTPATIENT
Start: 2024-01-07 | End: 2024-01-08 | Stop reason: HOSPADM

## 2024-01-07 RX ORDER — ACETAMINOPHEN 10 MG/ML
1000 INJECTION, SOLUTION INTRAVENOUS ONCE
Status: COMPLETED | OUTPATIENT
Start: 2024-01-07 | End: 2024-01-07

## 2024-01-07 RX ADMIN — BRIMONIDINE TARTRATE 1 DROP: 2 SOLUTION/ DROPS OPHTHALMIC at 20:13

## 2024-01-07 RX ADMIN — LATANOPROST 1 DROP: 50 SOLUTION OPHTHALMIC at 22:43

## 2024-01-07 RX ADMIN — DEXAMETHASONE SODIUM PHOSPHATE 6 MG: 10 INJECTION INTRAMUSCULAR; INTRAVENOUS at 16:19

## 2024-01-07 RX ADMIN — REMDESIVIR 200 MG: 100 INJECTION, POWDER, LYOPHILIZED, FOR SOLUTION INTRAVENOUS at 15:17

## 2024-01-07 RX ADMIN — ROPINIROLE 0.5 MG: 0.25 TABLET, FILM COATED ORAL at 22:44

## 2024-01-07 RX ADMIN — SODIUM CHLORIDE 75 ML/HR: 0.9 INJECTION, SOLUTION INTRAVENOUS at 15:20

## 2024-01-07 RX ADMIN — AMLODIPINE BESYLATE 5 MG: 5 TABLET ORAL at 22:45

## 2024-01-07 RX ADMIN — FAMOTIDINE 20 MG: 20 TABLET ORAL at 18:42

## 2024-01-07 RX ADMIN — FLUTICASONE PROPIONATE 2 SPRAY: 50 SPRAY, METERED NASAL at 15:08

## 2024-01-07 RX ADMIN — ACETAMINOPHEN 650 MG: 325 TABLET, FILM COATED ORAL at 20:14

## 2024-01-07 RX ADMIN — ASPIRIN 81 MG CHEWABLE TABLET 81 MG: 81 TABLET CHEWABLE at 22:44

## 2024-01-07 RX ADMIN — ACETAMINOPHEN 1000 MG: 10 INJECTION INTRAVENOUS at 10:54

## 2024-01-07 RX ADMIN — RIVAROXABAN 20 MG: 20 TABLET, FILM COATED ORAL at 18:42

## 2024-01-07 RX ADMIN — BUSPIRONE HYDROCHLORIDE 5 MG: 5 TABLET ORAL at 18:42

## 2024-01-07 RX ADMIN — PREGABALIN 150 MG: 75 CAPSULE ORAL at 18:42

## 2024-01-07 RX ADMIN — ONDANSETRON 4 MG: 2 INJECTION INTRAMUSCULAR; INTRAVENOUS at 10:48

## 2024-01-07 RX ADMIN — TIMOLOL MALEATE 1 DROP: 5 SOLUTION/ DROPS OPHTHALMIC at 20:13

## 2024-01-07 NOTE — H&P
[facility]  H&P  Name: Madeleine Sanchez 78 y.o. female I MRN: [id number]  Unit/Bed#: ED 12 I Date of Admission: 1/7/2024   Date of Service: 1/7/2024 I Hospital Day: 0      Assessment/Plan   * COVID-19  Assessment & Plan  Patient presents with fever and generalized bodyaches and cough, tested positive for COVID-19 on 1/7/2024,  Chest x-ray shows no acute infiltrate,  Will check D-dimer  Needed 2 L of oxygen by nasal cannula for O2 saturation of 88% on room air, will continue to supplement and monitor O2 saturation,  COVID 19 pathway on mild protocol with remdesivir and IV steroid, with Decadron,  Patient is on Xarelto will follow-up on D-dimer level,    Fibromyalgia  Assessment & Plan  Continue pregabalin and Requip and antidepressant,    Depression, recurrent (HCC)  Assessment & Plan  Continue buspirone and Trintellix,    Personal history of DVT (deep vein thrombosis)  Assessment & Plan  History of DVT and PE, on Xarelto,    Bilateral pulmonary embolism (HCC)  Assessment & Plan      Hypothyroidism, adult  Assessment & Plan  Continue levothyroxine,    Mixed hyperlipidemia  Assessment & Plan  Not on statin due to side effects,           VTE Pharmacologic Prophylaxis:   Moderate Risk (Score 3-4) - Pharmacological DVT Prophylaxis Ordered: rivaroxaban (Xarelto).  Code Status: Level 1 - Full Code   Discussion with family: Updated  (daughter in law) via phone.    Anticipated Length of Stay: Patient will be admitted on an inpatient basis with an anticipated length of stay of greater than 2 midnights secondary to covid 1-9 and hypoxia .    Total Time Spent on Date of Encounter in care of patient: 45 mins. This time was spent on one or more of the following: performing physical exam; counseling and coordination of care; obtaining or reviewing history; documenting in the medical record; reviewing/ordering tests, medications or procedures; communicating with other healthcare professionals  "and discussing with patient's family/caregivers.    Chief Complaint: generalised weakness    History of Present Illness:  Madeleine Sanchez is a 78 y.o. female with a PMH of DVT/PE, history of breast cancer, hypertension, myalgia presents with generalized weakness, also generalized bodyaches.  Patient was admitted last week for nausea and vomiting and generalized weakness, COVID testing was negative last week on 12/27/23.  Patient states she has been having cough and fever at home for the past 3 to 4 days.  Decreased appetite and decreased p.o. intake.  Denies of vomiting but has nausea, no diarrhea noted, denies of abdominal pain or chest pain but has congestion and cough which is dry.  Patient noted to be mildly hypoxic with O2 saturation of 88% and needed 2 L of oxygen by nasal cannula.  Chest x-ray shows no acute infiltrate.  Patient is admitted for further evaluation and management,    Review of Systems:  Review of Systems   Constitutional:  Positive for activity change, appetite change, fatigue and fever.   HENT:  Positive for congestion.    Respiratory:  Positive for cough.    Gastrointestinal:  Positive for nausea.   All other systems reviewed and are negative.      Past Medical and Surgical History:   Past Medical History:   Diagnosis Date    Cancer (HCC)     left breast cancer    Cervical herniated disc     Chronic pain disorder     back pain    Chronic respiratory failure (HCC)     uses O2 at home with NC /5/19 no longer using/just CPAP    CPAP (continuous positive airway pressure) dependence     Disease of thyroid gland     hypothyroid    DVT (deep venous thrombosis) (HCC) 2020    right leg    Family history of reaction to anesthesia     \"son and daughter hard time waking up and also PONV\"    Fibromyalgia, primary     GERD (gastroesophageal reflux disease)     Glaucoma     Hiatal hernia     History of palpitations     History of pneumonia     History of transfusion     Hyperlipidemia     Hypertension     " "Irregular heart beat     Migraine     Myocardial infarction (HCC)     pt sees cardilogist Dr Bennett [facility]/\"didnt realize had one, found on EKG before a surgery\"    OAB (overactive bladder)     Pollen allergies     Pulmonary embolism (HCC) 2019    Bilateral; on Xarelto    Restless leg     Risk for falls     Sleep apnea     Use of cane as ambulatory aid     occas    Uses brace     Mafo/left leg    Wears glasses        Past Surgical History:   Procedure Laterality Date    ADENOIDECTOMY      CARDIAC CATHETERIZATION      CARPAL TUNNEL RELEASE      CATARACT EXTRACTION Bilateral     FOOT SURGERY      pin implanted right toe    JOINT REPLACEMENT Bilateral     knees    LAMINECTOMY      LUMBAR EPIDURAL INJECTION      MASTECTOMY Bilateral     with reconstruction and implants    NISSEN FUNDOPLICATION      WI LNGTH/SHRT TENDON LEG/ANKLE 1 TENDON SPX Left 05/24/2021    Procedure: Sectioning peroneal tendons with lengthening/sectioning achilles tendon lower leg and ankle;  Surgeon: Fei Sifuentes DPM;  Location: [facility];  Service: Podiatry    REPLACEMENT TOTAL KNEE      SPINAL FUSION      TONSILLECTOMY      TUBAL LIGATION      TUMOR EXCISION      right forearm/benign    VEIN LIGATION Right        Meds/Allergies:  Prior to Admission medications    Medication Sig Start Date End Date Taking? Authorizing Provider   acetaminophen (TYLENOL) 325 mg tablet Take 650 mg by mouth every 6 (six) hours as needed for mild pain    Historical Provider, MD   alendronate (Fosamax) 70 mg tablet     Historical Provider, MD   amLODIPine (NORVASC) 5 mg tablet Take 1 tablet (5 mg total) by mouth daily 2/26/21   JOE Yates, DO   ASPIRIN 81 PO Aspirin EC 81 MG Oral Tablet Delayed Release    Refills: 0       Active    Historical Provider, MD   brimonidine (ALPHAGAN P) 0.15 % ophthalmic solution Administer to the right eye every evening  4/13/21   Historical Provider, MD   busPIRone (BUSPAR) 5 mg tablet Take 1 tablet (5 mg total) by mouth 2 (two) " times a day 11/16/23    Collin Yates, DO   DULoxetine (CYMBALTA) 30 mg delayed release capsule     Historical Provider, MD   famotidine (PEPCID) 40 MG tablet TAKE 1 TABLET TWICE DAILY 7/11/23   JACK Gasca   fluticasone (FLONASE) 50 mcg/act nasal spray 2 sprays into each nostril once OD    Historical Provider, MD   hydrOXYzine HCL (ATARAX) 10 mg tablet Take 1 tablet (10 mg total) by mouth 3 (three) times a day 12/19/23    Collin Yates, DO   hydrOXYzine HCL (ATARAX) 25 mg tablet Take 1 tablet (25 mg total) by mouth daily at bedtime as needed for anxiety or itching 7/21/23    Collin Yates, DO   latanoprost (XALATAN) 0.005 % ophthalmic solution Administer 1 drop to both eyes daily at bedtime 5/22/20   Historical Provider, MD   levothyroxine 100 mcg tablet TAKE 1 TABLET EVERY DAY 5/8/23    Collin Yates, DO   pregabalin (LYRICA) 150 mg capsule Take 1 capsule (150 mg total) by mouth 2 (two) times a day 12/19/23    Collin Yates, DO   rivaroxaban (Xarelto) 20 mg tablet Take 1 tablet (20 mg total) by mouth every evening 10/30/23 4/27/24   Collin Yates, DO   rOPINIRole (REQUIP) 1 mg tablet TAKE 1 TABLET AT 6PM AND TAKE 2 TABLETS AT BEDTIME 12/27/23    Collin Yates, DO   senna (SENOKOT) 8.6 MG tablet Take 8.6 mg by mouth in the morning 4/7/23   Historical Provider, MD   timolol (TIMOPTIC) 0.5 % ophthalmic solution Administer to both eyes 2 (two) times a day  4/13/21   Historical Provider, MD   torsemide (DEMADEX) 10 mg tablet TAKE 1 TABLET EVERY DAY 12/14/23    Collin Yates, DO   traMADol (ULTRAM) 50 mg tablet TAKE 1/2 TABLET AT 8AM, TAKE 1/2 TABLET AT 1PM AND 1 TABLET AT BEDTIME 12/27/23    Collin Yates, DO   vortioxetine (TRINTELLIX) 10 MG tablet Take 1 tablet (10 mg total) by mouth daily  Patient taking differently: Take 20 mg by mouth daily 10/19/23 4/16/24  JOE Yates, DO     I have reviewed home medications with patient personally.    Allergies:   Allergies   Allergen Reactions    Benzalkonium Chloride  Hives     Merthiolate tincture 9/28/2020      Hydromorphone Hallucinations     disorientation; hallucination; confusion      Merthiolate  [Thimerosal (Thiomersal)] Hives    Quinine Derivatives Other (See Comments) and Tinnitus     tinnitus      Codeine Nausea Only and Other (See Comments)     nausea      Medical Tape Rash    Other Itching    Pamelor [Nortriptyline] Vomiting     Per pt, itching also    Pollen Extract Nasal Congestion    Statins Itching    Wound Dressing Adhesive Rash       Social History:  Marital Status:    Occupation: retired  Patient Pre-hospital Living Situation: Home  Patient Pre-hospital Level of Mobility: walks with walker  Patient Pre-hospital Diet Restrictions: nil  Substance Use History:   Social History     Substance and Sexual Activity   Alcohol Use Yes    Comment: socially     Social History     Tobacco Use   Smoking Status Never   Smokeless Tobacco Never     Social History     Substance and Sexual Activity   Drug Use Never       Family History:  Family History   Problem Relation Age of Onset    Cancer Sister        Physical Exam:     Vitals:   Blood Pressure: 105/56 (01/07/24 1345)  Pulse: 65 (01/07/24 1345)  Temperature: 98.1 °F (36.7 °C) (01/07/24 1023)  Temp Source: Oral (01/07/24 1023)  Respirations: 20 (01/07/24 1345)  SpO2: 95 % (01/07/24 1345)    Physical Exam   HEENT-PERRLA, moist oral mucosa  Neck-supple, no JVD elevation   Respiratory-equal air entry bilaterally, no rales or rhonchi  Cardiovascular system-S1, S2 heard, no murmur or gallops or rubs  Abdomen-soft, nontender, no guarding or rigidity, bowel sounds heard  Extremities-no pedal edema  Peripheral pulses palpable  Musculoskeletal-no contractures, right leg is more swollen than the left leg which is chronic,  Central nervous system-no acute focal neurological deficit ,no sensory or motor deficit noted.  Skin-no rash noted       Additional Data:     Lab Results:  Results from last 7 days   Lab Units 01/07/24  1045    WBC Thousand/uL 9.25   HEMOGLOBIN g/dL 13.3   HEMATOCRIT % 41.0   PLATELETS Thousands/uL 289   NEUTROS PCT % 72   LYMPHS PCT % 17   MONOS PCT % 10   EOS PCT % 0     Results from last 7 days   Lab Units 01/07/24  1043   SODIUM mmol/L 137   POTASSIUM mmol/L 3.6   CHLORIDE mmol/L 104   CO2 mmol/L 23   BUN mg/dL 15   CREATININE mg/dL 0.67   ANION GAP mmol/L 10   CALCIUM mg/dL 8.9   ALBUMIN g/dL 3.9   TOTAL BILIRUBIN mg/dL 0.56   ALK PHOS U/L 138*   ALT U/L 18   AST U/L 17   GLUCOSE RANDOM mg/dL 123                 Results from last 7 days   Lab Units 01/07/24  1043   PROCALCITONIN ng/ml 0.17       Lines/Drains:  Invasive Devices       Peripheral Intravenous Line  Duration             Peripheral IV 01/07/24 Proximal;Right;Ventral (anterior) Forearm <1 day                        Imaging: Personally reviewed the following imaging: chest xray  XR chest 1 view portable   Final Result by Vazquez Ward MD (01/07 6597)      No acute cardiopulmonary disease.                  Workstation performed: [id number]             EKG and Other Studies Reviewed on Admission:   EKG: NSR. HR no acute ST-T changes noted QTc of 460 ms..    ** Please Note: This note has been constructed using a voice recognition system. **

## 2024-01-07 NOTE — PLAN OF CARE
Problem: PAIN - ADULT  Goal: Verbalizes/displays adequate comfort level or baseline comfort level  Description: Interventions:  - Encourage patient to monitor pain and request assistance  - Assess pain using appropriate pain scale  - Administer analgesics based on type and severity of pain and evaluate response  - Implement non-pharmacological measures as appropriate and evaluate response  - Consider cultural and social influences on pain and pain management  - Notify physician/advanced practitioner if interventions unsuccessful or patient reports new pain  Outcome: Progressing     Problem: INFECTION - ADULT  Goal: Absence or prevention of progression during hospitalization  Description: INTERVENTIONS:  - Assess and monitor for signs and symptoms of infection  - Monitor lab/diagnostic results  - Monitor all insertion sites, i.e. indwelling lines, tubes, and drains  - Monitor endotracheal if appropriate and nasal secretions for changes in amount and color  - [address] appropriate cooling/warming therapies per order  - Administer medications as ordered  - Instruct and encourage patient and family to use good hand hygiene technique  - Identify and instruct in appropriate isolation precautions for identified infection/condition  Outcome: Progressing     Problem: INFECTION - ADULT  Goal: Absence of fever/infection during neutropenic period  Description: INTERVENTIONS:  - Monitor WBC    Outcome: Progressing

## 2024-01-07 NOTE — ASSESSMENT & PLAN NOTE
Patient presents with fever and generalized bodyaches and cough, tested positive for COVID-19 on 1/7/2024,  Chest x-ray shows no acute infiltrate,  Will check D-dimer  Needed 2 L of oxygen by nasal cannula for O2 saturation of 88% on room air, will continue to supplement and monitor O2 saturation,  COVID 19 pathway on mild protocol with remdesivir and IV steroid, with Decadron,  Patient is on Xarelto will follow-up on D-dimer level,

## 2024-01-08 VITALS
HEART RATE: 64 BPM | WEIGHT: 187.2 LBS | RESPIRATION RATE: 20 BRPM | OXYGEN SATURATION: 98 % | HEIGHT: 63 IN | BODY MASS INDEX: 33.17 KG/M2 | SYSTOLIC BLOOD PRESSURE: 99 MMHG | TEMPERATURE: 98.4 F | DIASTOLIC BLOOD PRESSURE: 52 MMHG

## 2024-01-08 PROBLEM — I26.99 BILATERAL PULMONARY EMBOLISM (HCC): Status: RESOLVED | Noted: 2019-08-25 | Resolved: 2024-01-08

## 2024-01-08 LAB
ANION GAP SERPL CALCULATED.3IONS-SCNC: 8 MMOL/L
BUN SERPL-MCNC: 14 MG/DL (ref 5–25)
CALCIUM SERPL-MCNC: 8.9 MG/DL (ref 8.4–10.2)
CHLORIDE SERPL-SCNC: 107 MMOL/L (ref 96–108)
CO2 SERPL-SCNC: 23 MMOL/L (ref 21–32)
CREAT SERPL-MCNC: 0.61 MG/DL (ref 0.6–1.3)
D DIMER PPP FEU-MCNC: 0.49 UG/ML FEU
ERYTHROCYTE [DISTWIDTH] IN BLOOD BY AUTOMATED COUNT: 13.5 % (ref 11.6–15.1)
GFR SERPL CREATININE-BSD FRML MDRD: 86 ML/MIN/1.73SQ M
GLUCOSE SERPL-MCNC: 131 MG/DL (ref 65–140)
HCT VFR BLD AUTO: 38.2 % (ref 34.8–46.1)
HGB BLD-MCNC: 12 G/DL (ref 11.5–15.4)
MCH RBC QN AUTO: 29.3 PG (ref 26.8–34.3)
MCHC RBC AUTO-ENTMCNC: 31.4 G/DL (ref 31.4–37.4)
MCV RBC AUTO: 93 FL (ref 82–98)
PLATELET # BLD AUTO: 270 THOUSANDS/UL (ref 149–390)
PMV BLD AUTO: 11.3 FL (ref 8.9–12.7)
POTASSIUM SERPL-SCNC: 4 MMOL/L (ref 3.5–5.3)
RBC # BLD AUTO: 4.1 MILLION/UL (ref 3.81–5.12)
SODIUM SERPL-SCNC: 138 MMOL/L (ref 135–147)
WBC # BLD AUTO: 7.13 THOUSAND/UL (ref 4.31–10.16)

## 2024-01-08 PROCEDURE — 85379 FIBRIN DEGRADATION QUANT: CPT | Performed by: INTERNAL MEDICINE

## 2024-01-08 PROCEDURE — 99238 HOSP IP/OBS DSCHRG MGMT 30/<: CPT | Performed by: INTERNAL MEDICINE

## 2024-01-08 PROCEDURE — 80048 BASIC METABOLIC PNL TOTAL CA: CPT | Performed by: INTERNAL MEDICINE

## 2024-01-08 PROCEDURE — 85027 COMPLETE CBC AUTOMATED: CPT | Performed by: INTERNAL MEDICINE

## 2024-01-08 PROCEDURE — 97163 PT EVAL HIGH COMPLEX 45 MIN: CPT

## 2024-01-08 RX ADMIN — LEVOTHYROXINE SODIUM 100 MCG: 100 TABLET ORAL at 05:13

## 2024-01-08 RX ADMIN — RIVAROXABAN 20 MG: 20 TABLET, FILM COATED ORAL at 17:06

## 2024-01-08 RX ADMIN — BUSPIRONE HYDROCHLORIDE 5 MG: 5 TABLET ORAL at 11:08

## 2024-01-08 RX ADMIN — ACETAMINOPHEN 650 MG: 325 TABLET, FILM COATED ORAL at 05:13

## 2024-01-08 RX ADMIN — PREGABALIN 150 MG: 75 CAPSULE ORAL at 17:06

## 2024-01-08 RX ADMIN — DEXAMETHASONE SODIUM PHOSPHATE 6 MG: 10 INJECTION INTRAMUSCULAR; INTRAVENOUS at 17:04

## 2024-01-08 RX ADMIN — FAMOTIDINE 20 MG: 20 TABLET ORAL at 11:08

## 2024-01-08 RX ADMIN — BRIMONIDINE TARTRATE 1 DROP: 2 SOLUTION/ DROPS OPHTHALMIC at 17:04

## 2024-01-08 RX ADMIN — TIMOLOL MALEATE 1 DROP: 5 SOLUTION/ DROPS OPHTHALMIC at 17:19

## 2024-01-08 RX ADMIN — BUSPIRONE HYDROCHLORIDE 5 MG: 5 TABLET ORAL at 17:06

## 2024-01-08 RX ADMIN — ACETAMINOPHEN 650 MG: 325 TABLET, FILM COATED ORAL at 17:06

## 2024-01-08 RX ADMIN — FAMOTIDINE 20 MG: 20 TABLET ORAL at 17:06

## 2024-01-08 RX ADMIN — TIMOLOL MALEATE 1 DROP: 5 SOLUTION/ DROPS OPHTHALMIC at 11:09

## 2024-01-08 RX ADMIN — REMDESIVIR 100 MG: 100 INJECTION, POWDER, LYOPHILIZED, FOR SOLUTION INTRAVENOUS at 17:04

## 2024-01-08 RX ADMIN — PREGABALIN 150 MG: 75 CAPSULE ORAL at 11:08

## 2024-01-08 NOTE — PLAN OF CARE
Problem: PAIN - ADULT  Goal: Verbalizes/displays adequate comfort level or baseline comfort level  Description: Interventions:  - Encourage patient to monitor pain and request assistance  - Assess pain using appropriate pain scale  - Administer analgesics based on type and severity of pain and evaluate response  - Implement non-pharmacological measures as appropriate and evaluate response  - Consider cultural and social influences on pain and pain management  - Notify physician/advanced practitioner if interventions unsuccessful or patient reports new pain  1/8/2024 0139 by Codie Stubbs LPN  Outcome: Progressing  1/8/2024 0138 by Codie Stubbs LPN  Outcome: Progressing     Problem: INFECTION - ADULT  Goal: Absence or prevention of progression during hospitalization  Description: INTERVENTIONS:  - Assess and monitor for signs and symptoms of infection  - Monitor lab/diagnostic results  - Monitor all insertion sites, i.e. indwelling lines, tubes, and drains  - Monitor endotracheal if appropriate and nasal secretions for changes in amount and color  - Yulan appropriate cooling/warming therapies per order  - Administer medications as ordered  - Instruct and encourage patient and family to use good hand hygiene technique  - Identify and instruct in appropriate isolation precautions for identified infection/condition  1/8/2024 0139 by Codie Stubbs LPN  Outcome: Progressing  1/8/2024 0138 by Codie Stubbs LPN  Outcome: Progressing     Problem: SAFETY ADULT  Goal: Patient will remain free of falls  Description: INTERVENTIONS:  - Educate patient/family on patient safety including physical limitations  - Instruct patient to call for assistance with activity   - Consult OT/PT to assist with strengthening/mobility   - Keep Call bell within reach  - Keep bed low and locked with side rails adjusted as appropriate  - Keep care items and personal belongings within reach  - Initiate and maintain comfort  rounds  - Make Fall Risk Sign visible to staff  - Offer Toileting every 4 Hours, in advance of need  - Initiate/Maintain bed alarm  - Apply yellow socks and bracelet for high fall risk patients  - Consider moving patient to room near nurses station  1/8/2024 0139 by Codie Stubbs LPN  Outcome: Progressing  1/8/2024 0138 by Codie Stubbs LPN  Outcome: Progressing     Problem: DISCHARGE PLANNING  Goal: Discharge to home or other facility with appropriate resources  Description: INTERVENTIONS:  - Identify barriers to discharge w/patient and caregiver  - Arrange for needed discharge resources and transportation as appropriate  - Identify discharge learning needs (meds, wound care, etc.)  - Arrange for interpretive services to assist at discharge as needed  - Refer to Case Management Department for coordinating discharge planning if the patient needs post-hospital services based on physician/advanced practitioner order or complex needs related to functional status, cognitive ability, or social support system  1/8/2024 0139 by Codie Stubbs LPN  Outcome: Progressing  1/8/2024 0138 by Codie Stubbs LPN  Outcome: Progressing     Problem: Knowledge Deficit  Goal: Patient/family/caregiver demonstrates understanding of disease process, treatment plan, medications, and discharge instructions  Description: Complete learning assessment and assess knowledge base.  Interventions:  - Provide teaching at level of understanding  - Provide teaching via preferred learning methods  1/8/2024 0139 by Codie Stubbs LPN  Outcome: Progressing  1/8/2024 0138 by Codie Stubbs LPN  Outcome: Progressing     Problem: RESPIRATORY - ADULT  Goal: Achieves optimal ventilation and oxygenation  Description: INTERVENTIONS:  - Assess for changes in respiratory status  - Assess for changes in mentation and behavior  - Position to facilitate oxygenation and minimize respiratory effort  - Oxygen administered by appropriate delivery if  ordered  - Initiate smoking cessation education as indicated  - Encourage broncho-pulmonary hygiene including cough, deep breathe, Incentive Spirometry  - Assess the need for suctioning and aspirate as needed  - Assess and instruct to report SOB or any respiratory difficulty  - Respiratory Therapy support as indicated  Outcome: Progressing

## 2024-01-08 NOTE — ASSESSMENT & PLAN NOTE
"    9/22/2021         RE: Milagro Ferrera  [address] [place] [address] [address]  [facility] [id number]        Dear Colleague,    Thank you for referring your patient, Milagro Ferrera, to the [facility]. Please see a copy of my visit note below.    SUBJECTIVE:  Milagro Ferrera is a 32 year old female who is seen in consultation at the request of Dr Jazmin Rojas for right shoulder pain x years.  On and off episodes every 3-4 months that lasts a week.  Cause: unknown  Worsened pain about 3-4 weeks, ago, loss of motion.  So thiis is abnormal.  Chronically gets clicking in shoulder, but has worsened  Thinks from maybe sitting in same position.  Some neck pain with this.    Present symptoms: no weakness  Pain under clavicle, ?midshaft area, with clicking, and anterior shoulder.  Feels like it's going to \"dislocate\"    Associated symptoms:   ? Episode of radiating pain down arm to hand, no numbness/tingling that lasted 2 days and went away.    Treatment up to this point: stretches,shoulder brace.  \"no findings on exam\", so was referred to ortho.  nsaids and mr's which helped.       Past Medical History:   Diagnosis Date     Coronary artery disease 2010    usually caused from anxiety     Depressive disorder 2004    seen by 3 different psychs, no conclusive diagnosis       Past Surgical History:   Procedure Laterality Date     COSMETIC SURGERY  late 90s    mole removal: groin area and neck       REVIEW OF SYSTEMS:  CONSTITUTIONAL:  NEGATIVE for fever, chills, change in weight  INTEGUMENTARY/SKIN:  NEGATIVE for worrisome rashes, moles or lesions  EYES:  NEGATIVE for vision changes or irritation  ENT/MOUTH:  NEGATIVE for ear, mouth and throat problems  RESP:  NEGATIVE for significant cough or SOB  BREAST:  NEGATIVE for masses, tenderness or discharge  CV:  NEGATIVE for chest pain, palpitations or peripheral edema  GI:  NEGATIVE for nausea, abdominal pain, heartburn, or change in bowel habits  :  Negative " Continue levothyroxine,     MUSCULOSKELETAL:  See HPI above  NEURO:  NEGATIVE for weakness, dizziness or paresthesias  ENDOCRINE:  NEGATIVE for temperature intolerance, skin/hair changes  HEME/ALLERGY/IMMUNE:  NEGATIVE for bleeding problems  PSYCHIATRIC:  NEGATIVE for changes in mood or affect    OBJECTIVE:  /74   Pulse 104   Wt 93.4 kg (206 lb)   SpO2 97%   BMI 31.96 kg/m       GENERAL: healthy, alert and no distress  GAIT: normal   SKIN: no suspicious lesions or rashes  NEURO: Normal strength and tone, mentation intact and speech normal  VASCULAR:  normal pulses and cappillary refill   PSYCH:  mentation appears normal and affect normal/bright    MUSCULOSKELETAL:    NECK:  Cervical range of motion: full,  painfree, and does not cause shoulder pain or reproduce shoulder pain., causes pain in neck  Sensory deficits:  none  Motor deficits:  None    SHOULDER:  Shoulder Inspection: no swelling, bruising, discoloration, or obvious deformity or asymmetry  no atrophy  Tender: proximal bicep tendon and greater tuberosity  Non-tender: AC joint  Range of Motion:   Active:all normal   Passive: all normal, there is some clicking around the ? Biceps area  Impingement: Ramirez: 0, Neer: 0 and AER: 0  Strength: forward flexion 5/5, External rotation 5/5  Liftoff: Able Bear Hug: Negative    Special tests: Sulcus: 0  [place]'s: Positive  Speed: Negative  Anterior Drawer: 0  Posterior Drawer: 0  O'Aarti (SLAP/biceps):  Negative  Adsons normal     X-RAY INTERPRETATION  Reviewed with patient showed: XR SHOULDER RIGHT G/E 3 VIEWS 9/15/2021 12:58 PM   Negative exam.  Type 2 acromion    MRI: None    ASSESSMENT    ICD-10-CM    1. Chronic right shoulder pain  M25.511     G89.29    possible subluxing biceps, possible SLAP tear    PLAN:   referral to physical therapy  I reassured her that I didn't think she has significant shoulder instability or rotator cuff compromise.  Follow up 2 months if no improvement.  MRI-A maybe warranted at that point.      JADEN  Urban Amaya MD  Dept. Orthopedic Surgery  [facility]      Again, thank you for allowing me to participate in the care of your patient.        Sincerely,        Meng Amaya MD

## 2024-01-08 NOTE — DISCHARGE SUMMARY
[facility]  Discharge- Madeleine Sanchez 1945, 78 y.o. female MRN: [id number]  Unit/Bed#: -01 Encounter: [id number]  Primary Care Provider: JOE Yates DO   Date and time admitted to hospital: 1/7/2024 10:24 AM    * COVID-19  Assessment & Plan  Patient presents with fever and generalized bodyaches and cough, tested positive for COVID-19 on 1/7/2024,  Chest x-ray shows no acute infiltrate,  Will check D-dimer  Needed 2 L of oxygen by nasal cannula for O2 saturation of 88% on room air, will continue to supplement and monitor O2 saturation,  COVID 19 pathway on mild protocol with remdesivir and IV steroid, with Decadron,  Patient is on Xarelto will follow-up on D-dimer level,    Fibromyalgia  Assessment & Plan  Continue pregabalin and Requip and antidepressant,    Depression, recurrent (HCC)  Assessment & Plan  Continue buspirone and Trintellix,    Personal history of DVT (deep vein thrombosis)  Assessment & Plan  History of DVT and PE, on Xarelto,    Hypothyroidism, adult  Assessment & Plan  Continue levothyroxine,    Mixed hyperlipidemia  Assessment & Plan  Not on statin due to side effects,    Bilateral pulmonary embolism (HCC)-resolved as of 1/8/2024  Assessment & Plan                Medical Problems       Resolved Problems  Date Reviewed: 1/8/2024            Resolved    Bilateral pulmonary embolism (HCC) 1/8/2024     Resolved by  Debra Zhang MD          Admission Date:   Admission Orders (From admission, onward)       Ordered        01/07/24 1201  Place in Observation  Once                            Admitting Diagnosis: Acute hypoxemic respiratory failure (HCC) [J96.01]  COVID-19 [U07.1]      Procedures Performed:   Orders Placed This Encounter   Procedures    ED ECG Documentation Only       Summary of Hospital Course:   pt presents with fever and generalized bodyaches and cough and tested positive for COVID-19 in 1/7/2024.  Chest x-ray showed no acute  infiltrate, needed 2 L of oxygen by nasal cannula for oxygen saturation of 88% on room air and was admitted for COVID-19 mild protocol with IV remdesivir and IV steroid with Decadron.  Patient was admitted recently for generalized weakness and bodyaches as well.  Patient currently feels better clinically improved has been afebrile.  Patient was walked in the room on room air saturating 95%.  Able to ambulate independently with walker.  Plan was discussed in length with the patient's son at bedside.  Agreeable for discharge and follow-up with PCP.  Significant Findings, Care, Treatment and Services Provided: iv remdesevir, steroid ,02 supp    Complications: nil    Condition at Discharge: fair         Discharge instructions/Information to patient and family:   See after visit summary for information provided to patient and family.      Provisions for Follow-Up Care:  See after visit summary for information related to follow-up care and any pertinent home health orders.      PCP: JOE Yates DO    Disposition: Home    Planned Readmission: No    Discharge Statement   I spent 45 minutes discharging the patient. This time was spent on the day of discharge. I had direct contact with the patient on the day of discharge. Additional documentation is required if more than 30 minutes were spent on discharge.     Discharge Medications:  See after visit summary for reconciled discharge medications provided to patient and family.

## 2024-01-08 NOTE — ED PROVIDER NOTES
History  Chief Complaint   Patient presents with    Fever     Body aches, tested positive for covid last night     This is a 78-year-old female with past medical history significant for fibromyalgia, DVT, pulmonary emboli on anticoagulation, hypertension, hyperlipidemia, and myocardial infarction presenting to the emergency department today for body aches associated with shortness of breath.  Symptoms began last evening.  It is associated with a nonproductive cough.  She has associated nasal congestion and rhinorrhea.  She does not have a fever.  She denies any associated chest pain or palpitations.  She tested positive for COVID-19 at home.  Positive sick contacts.  She denies any nausea, vomiting, diarrhea, or constipation.  The patient is already on anticoagulation.  She denies any neck pain.  The patient denies other complaints at this time.      History provided by:  Patient   used: No    Flu Symptoms  Presenting symptoms: cough, fatigue, myalgias, rhinorrhea and shortness of breath    Presenting symptoms: no diarrhea, no fever, no headaches, no nausea, no sore throat and no vomiting    Severity:  Moderate  Onset quality:  Gradual  Duration:  1 day  Progression:  Worsening  Chronicity:  New  Relieved by:  Nothing  Worsened by:  Movement  Ineffective treatments:  None tried  Associated symptoms: nasal congestion    Associated symptoms: no chills, no decreased appetite, no decrease in physical activity, no ear pain, no mental status change, no neck stiffness and no syncope        Prior to Admission Medications   Prescriptions Last Dose Informant Patient Reported? Taking?   ASPIRIN 81 PO 1/6/2024 Self Yes Yes   Sig: Aspirin EC 81 MG Oral Tablet Delayed Release    Refills: 0       Active   acetaminophen (TYLENOL) 325 mg tablet  Self Yes No   Sig: Take 650 mg by mouth every 6 (six) hours as needed for mild pain   amLODIPine (NORVASC) 5 mg tablet 1/7/2024 Self No Yes   Sig: Take 1 tablet (5 mg  total) by mouth daily   brimonidine (ALPHAGAN P) 0.15 % ophthalmic solution 2024 Self Yes Yes   Sig: Administer to the right eye every evening    busPIRone (BUSPAR) 5 mg tablet 2024  No Yes   Sig: Take 1 tablet (5 mg total) by mouth 2 (two) times a day   famotidine (PEPCID) 40 MG tablet 2024 Self No Yes   Sig: TAKE 1 TABLET TWICE DAILY   fluticasone (FLONASE) 50 mcg/act nasal spray 2024 Self Yes Yes   Si sprays into each nostril once OD   hydrOXYzine HCL (ATARAX) 10 mg tablet 2024  No Yes   Sig: Take 1 tablet (10 mg total) by mouth 3 (three) times a day   Patient taking differently: Take 5 mg by mouth daily at bedtime   latanoprost (XALATAN) 0.005 % ophthalmic solution 2024 Self Yes Yes   Sig: Administer 1 drop to both eyes daily at bedtime   levothyroxine 100 mcg tablet 2024 Self No Yes   Sig: TAKE 1 TABLET EVERY DAY   pregabalin (LYRICA) 150 mg capsule   No Yes   Sig: Take 1 capsule (150 mg total) by mouth 2 (two) times a day   rOPINIRole (REQUIP) 1 mg tablet 2024  No Yes   Sig: TAKE 1 TABLET AT 6PM AND TAKE 2 TABLETS AT BEDTIME   rivaroxaban (Xarelto) 20 mg tablet 2024  No Yes   Sig: Take 1 tablet (20 mg total) by mouth every evening   senna (SENOKOT) 8.6 MG tablet 2024 Self Yes Yes   Sig: Take 8.6 mg by mouth in the morning   timolol (TIMOPTIC) 0.5 % ophthalmic solution 2024 Self Yes Yes   Sig: Administer to both eyes 2 (two) times a day    torsemide (DEMADEX) 10 mg tablet 2024  No Yes   Sig: TAKE 1 TABLET EVERY DAY   traMADol (ULTRAM) 50 mg tablet 2024  No Yes   Sig: TAKE 1/2 TABLET AT 8AM, TAKE 1/2 TABLET AT 1PM AND 1 TABLET AT BEDTIME   vortioxetine (TRINTELLIX) 10 MG tablet   No Yes   Sig: Take 1 tablet (10 mg total) by mouth daily   Patient taking differently: Take 20 mg by mouth daily      Facility-Administered Medications: None       Past Medical History:   Diagnosis Date    Cancer (HCC)     left breast cancer    Cervical herniated disc     Chronic  "pain disorder     back pain    Chronic respiratory failure (HCC)     uses O2 at home with NC /5/19 no longer using/just CPAP    CPAP (continuous positive airway pressure) dependence     Disease of thyroid gland     hypothyroid    DVT (deep venous thrombosis) (HCC) 2020    right leg    Family history of reaction to anesthesia     \"son and daughter hard time waking up and also PONV\"    Fibromyalgia, primary     GERD (gastroesophageal reflux disease)     Glaucoma     Hiatal hernia     History of palpitations     History of pneumonia     History of transfusion     Hyperlipidemia     Hypertension     Irregular heart beat     Migraine     Myocardial infarction (HCC)     pt sees cardilogist Dr Bennett [facility]/\"didnt realize had one, found on EKG before a surgery\"    OAB (overactive bladder)     Pollen allergies     Pulmonary embolism (HCC) 2019    Bilateral; on Xarelto    Restless leg     Risk for falls     Sleep apnea     Use of cane as ambulatory aid     occas    Uses brace     Mafo/left leg    Wears glasses        Past Surgical History:   Procedure Laterality Date    ADENOIDECTOMY      CARDIAC CATHETERIZATION      CARPAL TUNNEL RELEASE      CATARACT EXTRACTION Bilateral     FOOT SURGERY      pin implanted right toe    JOINT REPLACEMENT Bilateral     knees    LAMINECTOMY      LUMBAR EPIDURAL INJECTION      MASTECTOMY Bilateral     with reconstruction and implants    NISSEN FUNDOPLICATION      NE LNGTH/SHRT TENDON LEG/ANKLE 1 TENDON SPX Left 05/24/2021    Procedure: Sectioning peroneal tendons with lengthening/sectioning achilles tendon lower leg and ankle;  Surgeon: Fei Sifuentes DPM;  Location: [facility];  Service: Podiatry    REPLACEMENT TOTAL KNEE      SPINAL FUSION      TONSILLECTOMY      TUBAL LIGATION      TUMOR EXCISION      right forearm/benign    VEIN LIGATION Right        Family History   Problem Relation Age of Onset    Cancer Sister      I have reviewed and agree with the history as " documented.    E-Cigarette/Vaping    E-Cigarette Use Never User      E-Cigarette/Vaping Substances    Nicotine No     THC No     CBD No     Flavoring No     Other No     Unknown No      Social History     Tobacco Use    Smoking status: Never    Smokeless tobacco: Never   Vaping Use    Vaping status: Never Used   Substance Use Topics    Alcohol use: Yes     Comment: socially    Drug use: Never       Review of Systems   Constitutional:  Positive for fatigue. Negative for appetite change, chills, decreased appetite, diaphoresis and fever.   HENT:  Positive for congestion and rhinorrhea. Negative for ear discharge, ear pain, facial swelling, sinus pressure, sinus pain, sore throat and trouble swallowing.    Respiratory:  Positive for cough and shortness of breath. Negative for chest tightness and wheezing.    Cardiovascular:  Negative for chest pain, palpitations and leg swelling.   Gastrointestinal:  Negative for abdominal pain, constipation, diarrhea, nausea and vomiting.   Genitourinary:  Negative for dysuria.   Musculoskeletal:  Positive for myalgias. Negative for neck stiffness.   Skin:  Negative for rash and wound.   Neurological:  Negative for dizziness, seizures, syncope, weakness, light-headedness, numbness and headaches.   Psychiatric/Behavioral:  Negative for confusion and suicidal ideas.    All other systems reviewed and are negative.      Physical Exam  Physical Exam  Vitals and nursing note reviewed.   Constitutional:       General: She is not in acute distress.     Appearance: Normal appearance. She is normal weight. She is not ill-appearing, toxic-appearing or diaphoretic.   HENT:      Head: Normocephalic and atraumatic.      Right Ear: Tympanic membrane, ear canal and external ear normal. There is no impacted cerumen.      Left Ear: Tympanic membrane, ear canal and external ear normal. There is no impacted cerumen.      Nose: Congestion and rhinorrhea present.      Mouth/Throat:      Mouth: Mucous  membranes are moist.      Pharynx: No oropharyngeal exudate or posterior oropharyngeal erythema.   Eyes:      General: No scleral icterus.        Right eye: No discharge.         Left eye: No discharge.      Extraocular Movements: Extraocular movements intact.      Pupils: Pupils are equal, round, and reactive to light.   Neck:      Comments: Nonmeningeal  Cardiovascular:      Rate and Rhythm: Normal rate and regular rhythm.      Pulses: Normal pulses.      Heart sounds: Normal heart sounds. No murmur heard.     No friction rub. No gallop.   Pulmonary:      Effort: Pulmonary effort is normal. No respiratory distress.      Breath sounds: No stridor. No wheezing, rhonchi or rales.      Comments: Decreased breath sounds to the bilateral lung fields  Chest:      Chest wall: No tenderness.   Musculoskeletal:         General: Normal range of motion.      Cervical back: Normal range of motion. No tenderness.      Right lower leg: Edema (trace) present.      Left lower leg: No edema.      Comments: Negative Homans' sign bilaterally; the patient notes right lower extremity is chronically more swollen than left lower extremity and the patient notes she is at her baseline   Skin:     General: Skin is warm and dry.      Capillary Refill: Capillary refill takes less than 2 seconds.      Coloration: Skin is not jaundiced or pale.   Neurological:      General: No focal deficit present.      Mental Status: She is alert and oriented to person, place, and time. Mental status is at baseline.   Psychiatric:         Mood and Affect: Mood normal.         Behavior: Behavior normal.         Vital Signs  ED Triage Vitals [01/07/24 1023]   Temperature Pulse Respirations Blood Pressure SpO2   98.1 °F (36.7 °C) 75 20 117/57 94 %      Temp Source Heart Rate Source Patient Position - Orthostatic VS BP Location FiO2 (%)   Oral Monitor Sitting Right arm --      Pain Score       4           Vitals:    01/07/24 1345 01/07/24 1700 01/07/24 1843  01/07/24 2000   BP: 105/56 132/61 116/60 134/60   Pulse: 65 68 74 77   Patient Position - Orthostatic VS:   Lying Lying         Visual Acuity      ED Medications  Medications   sodium chloride 0.9 % infusion (75 mL/hr Intravenous New Bag 1/7/24 1520)   acetaminophen (TYLENOL) tablet 650 mg (650 mg Oral Given 1/7/24 2014)   aluminum-magnesium hydroxide-simethicone (MAALOX) oral suspension 30 mL (has no administration in time range)   ondansetron (ZOFRAN) injection 4 mg (has no administration in time range)   brimonidine tartrate 0.2 % ophthalmic solution 1 drop (1 drop Right Eye Given 1/7/24 2013)   busPIRone (BUSPAR) tablet 5 mg (5 mg Oral Given 1/7/24 1842)   famotidine (PEPCID) tablet 20 mg (20 mg Oral Given 1/7/24 1842)   hydrOXYzine HCL (ATARAX) tablet 25 mg (has no administration in time range)   latanoprost (XALATAN) 0.005 % ophthalmic solution 1 drop (has no administration in time range)   levothyroxine tablet 100 mcg (100 mcg Oral Not Given 1/7/24 1507)   pregabalin (LYRICA) capsule 150 mg (150 mg Oral Given 1/7/24 1842)   rivaroxaban (XARELTO) tablet 20 mg (20 mg Oral Given 1/7/24 1842)   rOPINIRole (REQUIP) tablet 0.5 mg (has no administration in time range)   timolol (TIMOPTIC) 0.5 % ophthalmic solution 1 drop (1 drop Both Eyes Given 1/7/24 2013)   vortioxetine (TRINTELLIX) tablet 10 mg (10 mg Oral Not Given 1/7/24 1631)   dexamethasone (PF) (DECADRON) injection 6 mg (6 mg Intravenous Given 1/7/24 1619)   remdesivir (Veklury) 200 mg in sodium chloride 0.9 % 290 mL IVPB (0 mg Intravenous Stopped 1/7/24 1619)     Followed by   remdesivir (Veklury) 100 mg in sodium chloride 0.9 % 270 mL IVPB (has no administration in time range)   amLODIPine (NORVASC) tablet 5 mg (has no administration in time range)   aspirin chewable tablet 81 mg (has no administration in time range)   acetaminophen (Ofirmev) injection 1,000 mg (0 mg Intravenous Stopped 1/7/24 1410)   ondansetron (ZOFRAN) injection 4 mg (4 mg Intravenous  Given 1/7/24 1048)   fluticasone (FLONASE) 50 mcg/act nasal spray 2 spray (2 sprays Nasal Given 1/7/24 1508)       Diagnostic Studies  Results Reviewed       Procedure Component Value Units Date/Time    D-dimer, quantitative [[id number]]  (Abnormal) Collected: 01/07/24 1043    Lab Status: Final result Specimen: Blood from Arm, Right Updated: 01/07/24 1632     D-Dimer, Quant 1.16 ug/ml FEU     Narrative:      In the evaluation for possible pulmonary embolism, in the appropriate (Well's Score of 4 or less) patient, the age adjusted d-dimer cutoff for this patient can be calculated as:    Age x 0.01 (in ug/mL) for Age-adjusted D-dimer exclusion threshold for a patient over 50 years.    TSH, 3rd generation with Free T4 reflex [[id number]]  (Normal) Collected: 01/07/24 1043    Lab Status: Final result Specimen: Blood from Arm, Right Updated: 01/07/24 1553     TSH 3RD GENERATON 1.003 uIU/mL     FLU/RSV/COVID - if FLU/RSV clinically relevant [[id number]]  (Abnormal) Collected: 01/07/24 1043    Lab Status: Final result Specimen: Nares from Nose Updated: 01/07/24 1136     SARS-CoV-2 Positive     INFLUENZA A PCR Negative     INFLUENZA B PCR Negative     RSV PCR Negative    Narrative:      FOR PEDIATRIC PATIENTS - copy/paste COVID Guidelines URL to browser: [email or web address]    SARS-CoV-2 assay is a Nucleic Acid Amplification assay intended for the  qualitative detection of nucleic acid from SARS-CoV-2 in nasopharyngeal  swabs. Results are for the presumptive identification of SARS-CoV-2 RNA.    Positive results are indicative of infection with SARS-CoV-2, the virus  causing COVID-19, but do not rule out bacterial infection or co-infection  with other viruses. Laboratories within the [place] and its  territories are required to report all positive results to the appropriate  public health authorities. Negative results do not preclude SARS-CoV-2  infection and should  not be used as the sole basis for treatment or other  patient management decisions. Negative results must be combined with  clinical observations, patient history, and epidemiological information.  This test has not been FDA cleared or approved.    This test has been authorized by FDA under an Emergency Use Authorization  (EUA). This test is only authorized for the duration of time the  declaration that circumstances exist justifying the authorization of the  emergency use of an in vitro diagnostic tests for detection of SARS-CoV-2  virus and/or diagnosis of COVID-19 infection under section 564(b)(1) of  the Act, 21 U.S.C. 360bbb-3(b)(1), unless the authorization is terminated  or revoked sooner. The test has been validated but independent review by FDA  and CLIA is pending.    Test performed using [employer] GeneXpert: This RT-PCR assay targets N2,  a region unique to SARS-CoV-2. A conserved region in the E-gene was chosen  for pan-Sarbecovirus detection which includes SARS-CoV-2.    According to CMS-2020-01-R, this platform meets the definition of high-throughput technology.    Procalcitonin [[id number]]  (Normal) Collected: 01/07/24 1043    Lab Status: Final result Specimen: Blood from Arm, Right Updated: 01/07/24 1120     Procalcitonin 0.17 ng/ml     B-Type Natriuretic Peptide(BNP) [[id number]]  (Abnormal) Collected: 01/07/24 1043    Lab Status: Final result Specimen: Blood from Arm, Right Updated: 01/07/24 1119      pg/mL     HS Troponin 0hr (reflex protocol) [[id number]]  (Normal) Collected: 01/07/24 1043    Lab Status: Final result Specimen: Blood from Arm, Right Updated: 01/07/24 1118     hs TnI 0hr 3 ng/L     Comprehensive metabolic panel [[id number]]  (Abnormal) Collected: 01/07/24 1043    Lab Status: Final result Specimen: Blood from Arm, Right Updated: 01/07/24 1114     Sodium 137 mmol/L      Potassium 3.6 mmol/L      Chloride 104 mmol/L      CO2 23 mmol/L      ANION GAP 10 mmol/L      BUN 15 mg/dL       Creatinine 0.67 mg/dL      Glucose 123 mg/dL      Calcium 8.9 mg/dL      AST 17 U/L      ALT 18 U/L      Alkaline Phosphatase 138 U/L      Total Protein 7.3 g/dL      Albumin 3.9 g/dL      Total Bilirubin 0.56 mg/dL      eGFR 83 ml/min/1.73sq m     Narrative:      National Kidney Disease Foundation guidelines for Chronic Kidney Disease (CKD):     Stage 1 with normal or high GFR (GFR > 90 mL/min/1.73 square meters)    Stage 2 Mild CKD (GFR = 60-89 mL/min/1.73 square meters)    Stage 3A Moderate CKD (GFR = 45-59 mL/min/1.73 square meters)    Stage 3B Moderate CKD (GFR = 30-44 mL/min/1.73 square meters)    Stage 4 Severe CKD (GFR = 15-29 mL/min/1.73 square meters)    Stage 5 End Stage CKD (GFR <15 mL/min/1.73 square meters)  Note: GFR calculation is accurate only with a steady state creatinine    CBC and differential [[id number]] Collected: 01/07/24 1043    Lab Status: Final result Specimen: Blood from Arm, Right Updated: 01/07/24 1052     WBC 9.25 Thousand/uL      RBC 4.53 Million/uL      Hemoglobin 13.3 g/dL      Hematocrit 41.0 %      MCV 91 fL      MCH 29.4 pg      MCHC 32.4 g/dL      RDW 13.4 %      MPV 10.5 fL      Platelets 289 Thousands/uL      nRBC 0 /100 WBCs      Neutrophils Relative 72 %      Immat GRANS % 1 %      Lymphocytes Relative 17 %      Monocytes Relative 10 %      Eosinophils Relative 0 %      Basophils Relative 0 %      Neutrophils Absolute 6.67 Thousands/µL      Immature Grans Absolute 0.05 Thousand/uL      Lymphocytes Absolute 1.54 Thousands/µL      Monocytes Absolute 0.95 Thousand/µL      Eosinophils Absolute 0.01 Thousand/µL      Basophils Absolute 0.03 Thousands/µL                    XR chest 1 view portable   Final Result by Vazquez Ward MD (01/07 2393)      No acute cardiopulmonary disease.                  Workstation performed: [id number]                    Procedures  ECG 12 Lead Documentation Only    Date/Time: 1/7/2024 10:51 AM    Performed by: Viral Spain,  ZHOU  Authorized by: Viral Spain PA-C    Indications / Diagnosis:  Shortness of Breath  Patient location:  ED  Previous ECG:     Previous ECG:  Compared to current    Comparison ECG info:  12/28/2023    Similarity:  No change  Interpretation:     Interpretation: non-specific    Rate:     ECG rate:  78    ECG rate assessment: normal    Rhythm:     Rhythm: sinus rhythm    Ectopy:     Ectopy: none    QRS:     QRS axis:  Left  Conduction:     Conduction: normal    ST segments:     ST segments:  Normal  T waves:     T waves: normal    Comments:      Normal sinus rhythm with a rate of 79.  Left axis deviation.  No ectopy.  No STEMI.           ED Course  ED Course as of 01/07/24 2022   Sun Jan 07, 2024   1138 SARS-COV-2(!): Positive                                             Medical Decision Making  This is a 78-year-old female presenting to the emergency department today for myalgias, shortness of breath, and a nonproductive cough.  She tested positive for COVID-19.  Symptoms began yesterday.  Vital signs initially stable.  On physical examination, the patient has nasal congestion and rhinorrhea.  Right lower extremity is chronically more swollen than left lower extremity but the patient has a negative Homans' sign bilaterally.  Decreased breath sounds bilaterally.  EKG shows normal sinus rhythm with a rate of 78 on my independent interpretation.  Labs show mild elevation to BNP at 187.  Initial troponin is 3.  Patient tested positive for COVID-19.  CBC is reassuring.  Chest x-ray shows no acute cardiopulmonary disease on my independent interpretation.  The patient's oxygen saturation dropped to 86 to 88% on room air and therefore was placed on 2 L nasal cannula.  The patient does not wear oxygen at home.  Based upon new oxygen requirement in the setting of COVID-19, will plan for admission.  Case was discussed with Dr. Zhang who accepts the patient for admission.  Strict return precautions were  "given.  Recommend PCP follow-up as soon as possible. The patient and/or patient's proxy verify their understanding and agree to the plan at this time.  All questions answered to the patient and/or their proxy's satisfaction.  All labs reviewed and utilized in the medical decision making process (if labs were ordered).  Portions of the record may have been created with voice recognition software.  Occasional wrong word or \"sound a like\" substitutions may have occurred due to the inherent limitations of voice recognition software.  Read the chart carefully and recognize, using context, where substitutions have occurred.    I reviewed prior EKG.  Case discussed with family member at bedside.    Problems Addressed:  Acute hypoxemic respiratory failure (HCC): undiagnosed new problem with uncertain prognosis  COVID-19: undiagnosed new problem with uncertain prognosis    Amount and/or Complexity of Data Reviewed  Independent Historian: caregiver     Details: Family Member  External Data Reviewed: ECG.  Labs: ordered. Decision-making details documented in ED Course.  Radiology: ordered and independent interpretation performed. Decision-making details documented in ED Course.     Details: CXR  ECG/medicine tests: ordered and independent interpretation performed. Decision-making details documented in ED Course.  Discussion of management or test interpretation with external provider(s): Dr. Vicente SANTIAGO    [facility]  Prescription drug management.  Decision regarding hospitalization.             Disposition  Final diagnoses:   COVID-19   Acute hypoxemic respiratory failure (HCC)     Time reflects when diagnosis was documented in both MDM as applicable and the Disposition within this note       Time User Action Codes Description Comment    1/7/2024 12:01 PM Viral Spain Add [U07.1] COVID-19     1/7/2024 12:01 PM Viral Spain Add [J96.01] Acute hypoxemic respiratory failure (HCC)           ED Disposition  "      ED Disposition   Admit    Condition   Stable    Date/Time   Sun Jan 7, 2024 12:01 PM    Comment   Case was discussed with Dr. Zhang and the patient's admission status was agreed to be Admission Status: observation status to the service of Dr. Zhang.               Follow-up Information    None         Current Discharge Medication List        CONTINUE these medications which have NOT CHANGED    Details   amLODIPine (NORVASC) 5 mg tablet Take 1 tablet (5 mg total) by mouth daily  Qty: 90 tablet, Refills: 3    Associated Diagnoses: Essential hypertension      ASPIRIN 81 PO Aspirin EC 81 MG Oral Tablet Delayed Release    Refills: 0       Active      brimonidine (ALPHAGAN P) 0.15 % ophthalmic solution Administer to the right eye every evening       busPIRone (BUSPAR) 5 mg tablet Take 1 tablet (5 mg total) by mouth 2 (two) times a day  Qty: 60 tablet, Refills: 5    Associated Diagnoses: Anxiety and depression      famotidine (PEPCID) 40 MG tablet TAKE 1 TABLET TWICE DAILY  Qty: 180 tablet, Refills: 2    Associated Diagnoses: Chronic GERD      fluticasone (FLONASE) 50 mcg/act nasal spray 2 sprays into each nostril once OD      hydrOXYzine HCL (ATARAX) 10 mg tablet Take 1 tablet (10 mg total) by mouth 3 (three) times a day  Qty: 90 tablet, Refills: 1    Associated Diagnoses: Anxiety and depression      latanoprost (XALATAN) 0.005 % ophthalmic solution Administer 1 drop to both eyes daily at bedtime      levothyroxine 100 mcg tablet TAKE 1 TABLET EVERY DAY  Qty: 90 tablet, Refills: 3    Associated Diagnoses: Hypothyroidism, adult      pregabalin (LYRICA) 150 mg capsule Take 1 capsule (150 mg total) by mouth 2 (two) times a day  Qty: 90 capsule, Refills: 3    Associated Diagnoses: Spinal stenosis of lumbar region with neurogenic claudication      rivaroxaban (Xarelto) 20 mg tablet Take 1 tablet (20 mg total) by mouth every evening  Qty: 90 tablet, Refills: 1    Comments: Patient lost previous Rx bottle --  reviewed with her the Rx dose, route, and frequency and pt has been taking as Rx.  Associated Diagnoses: Bilateral pulmonary embolism (HCC)      rOPINIRole (REQUIP) 1 mg tablet TAKE 1 TABLET AT 6PM AND TAKE 2 TABLETS AT BEDTIME  Qty: 270 tablet, Refills: 3    Associated Diagnoses: RLS (restless legs syndrome)      senna (SENOKOT) 8.6 MG tablet Take 8.6 mg by mouth in the morning      timolol (TIMOPTIC) 0.5 % ophthalmic solution Administer to both eyes 2 (two) times a day       torsemide (DEMADEX) 10 mg tablet TAKE 1 TABLET EVERY DAY  Qty: 90 tablet, Refills: 1    Associated Diagnoses: Swelling of limb      traMADol (ULTRAM) 50 mg tablet TAKE 1/2 TABLET AT 8AM, TAKE 1/2 TABLET AT 1PM AND 1 TABLET AT BEDTIME  Qty: 60 tablet, Refills: 3    Associated Diagnoses: Back pain, unspecified back location, unspecified back pain laterality, unspecified chronicity; Lumbar contusion, subsequent encounter      vortioxetine (TRINTELLIX) 10 MG tablet Take 1 tablet (10 mg total) by mouth daily  Qty: 30 tablet, Refills: 5    Associated Diagnoses: Depression, recurrent (HCC)      acetaminophen (TYLENOL) 325 mg tablet Take 650 mg by mouth every 6 (six) hours as needed for mild pain             No discharge procedures on file.    PDMP Review         Value Time User    PDMP Reviewed  Yes 12/27/2023 11:10 PM JOE Yates DO            ED Provider  Electronically Signed by             Viral Spain PA-C  01/07/24 2022

## 2024-01-08 NOTE — PLAN OF CARE
Problem: PAIN - ADULT  Goal: Verbalizes/displays adequate comfort level or baseline comfort level  Description: Interventions:  - Encourage patient to monitor pain and request assistance  - Assess pain using appropriate pain scale  - Administer analgesics based on type and severity of pain and evaluate response  - Implement non-pharmacological measures as appropriate and evaluate response  - Consider cultural and social influences on pain and pain management  - Notify physician/advanced practitioner if interventions unsuccessful or patient reports new pain  Outcome: Progressing     Problem: INFECTION - ADULT  Goal: Absence or prevention of progression during hospitalization  Description: INTERVENTIONS:  - Assess and monitor for signs and symptoms of infection  - Monitor lab/diagnostic results  - Monitor all insertion sites, i.e. indwelling lines, tubes, and drains  - Monitor endotracheal if appropriate and nasal secretions for changes in amount and color  - [place] appropriate cooling/warming therapies per order  - Administer medications as ordered  - Instruct and encourage patient and family to use good hand hygiene technique  - Identify and instruct in appropriate isolation precautions for identified infection/condition  Outcome: Progressing    Problem: DISCHARGE PLANNING  Goal: Discharge to home or other facility with appropriate resources  Description: INTERVENTIONS:  - Identify barriers to discharge w/patient and caregiver  - Arrange for needed discharge resources and transportation as appropriate  - Identify discharge learning needs (meds, wound care, etc.)  - Arrange for interpretive services to assist at discharge as needed  - Refer to Case Management Department for coordinating discharge planning if the patient needs post-hospital services based on physician/advanced practitioner order or complex needs related to functional status, cognitive ability, or social support system  Outcome: Progressing

## 2024-01-08 NOTE — PHYSICAL THERAPY NOTE
"PHYSICAL THERAPY Evaluation  DATE: 01/08/24  TIME: 0900-0918    NAME:  Madeleine Sanchez  AGE:   78 y.o.  Mrn:   [id number]  Length Of Stay: 0    ADMIT DX:  Acute hypoxemic respiratory failure (HCC) [J96.01]  COVID-19 [U07.1]    Past Medical History:   Diagnosis Date    Bilateral pulmonary embolism (HCC)     Cancer (HCC)     left breast cancer    Cervical herniated disc     Chronic pain disorder     back pain    Chronic respiratory failure (HCC)     uses O2 at home with NC /5/19 no longer using/just CPAP    CPAP (continuous positive airway pressure) dependence     Disease of thyroid gland     hypothyroid    DVT (deep venous thrombosis) (HCC) 2020    right leg    Family history of reaction to anesthesia     \"son and daughter hard time waking up and also PONV\"    Fibromyalgia, primary     GERD (gastroesophageal reflux disease)     Glaucoma     Hiatal hernia     History of palpitations     History of pneumonia     History of transfusion     Hyperlipidemia     Hypertension     Irregular heart beat     Migraine     Myocardial infarction (HCC)     pt sees cardilogist Dr Bennett [facility]/\"didnt realize had one, found on EKG before a surgery\"    OAB (overactive bladder)     Pollen allergies     Pulmonary embolism (HCC) 2019    Bilateral; on Xarelto    Restless leg     Risk for falls     Sleep apnea     Use of cane as ambulatory aid     occas    Uses brace     Mafo/left leg    Wears glasses      Past Surgical History:   Procedure Laterality Date    ADENOIDECTOMY      CARDIAC CATHETERIZATION      CARPAL TUNNEL RELEASE      CATARACT EXTRACTION Bilateral     FOOT SURGERY      pin implanted right toe    JOINT REPLACEMENT Bilateral     knees    LAMINECTOMY      LUMBAR EPIDURAL INJECTION      MASTECTOMY Bilateral     with reconstruction and implants    NISSEN FUNDOPLICATION      IA LNGTH/SHRT TENDON LEG/ANKLE 1 TENDON SPX Left 05/24/2021    Procedure: Sectioning peroneal tendons with lengthening/sectioning achilles tendon lower leg and " ankle;  Surgeon: Fei Sifuentes DPM;  Location: [facility];  Service: Podiatry    REPLACEMENT TOTAL KNEE      SPINAL FUSION      TONSILLECTOMY      TUBAL LIGATION      TUMOR EXCISION      right forearm/benign    VEIN LIGATION Right         01/08/24 0900   PT Last Visit   PT Visit Date 01/08/24   Note Type   Note type Evaluation   Pain Assessment   Pain Assessment Tool 0-10   Pain Score No Pain   Restrictions/Precautions   Weight Bearing Precautions Per Order No   Other Precautions Fall Risk   Home Living   Additional Comments Pt lives with daughter and grandson in 2-level house, 6 steps to enter (right and left hand rail).  lee hand rail to second floor.  flat bed.  shower/tub with chair and bar.  standard toilet.  DME: [facility], [facility]   Prior Function   Comments Prior to admission: mod I with ADL, ambulates with SPC, assist with IADLs,  drive (+).   General   Additional Pertinent History 79 y/o presents due to body aches, SOB, nonproductive cough, nasal congestion, rhinorrhea, fever, fatigue.  Upon assessment: COVID (+), hypoxic on RA.  Dx: acute respiratory failure with hypoxemia.  Recent admission 12/27: 79 y/o presents with abdominal pain, N/V, diffuse pain, fatigue.   Family/Caregiver Present No   Cognition   Overall Cognitive Status WFL   Arousal/Participation Alert   Orientation Level Oriented X4   Subjective   Subjective Pt reports she feels weak and unsure of herself, feeling as though she can't take care of herself   RUE Assessment   RUE Assessment WFL   LUE Assessment   LUE Assessment WFL   RLE Assessment   RLE Assessment WFL   LLE Assessment   LLE Assessment   (DF -2/5. all other WFL)   Coordination   Movements are Fluid and Coordinated 1   Transfers   Sit to Stand 5  Supervision   Additional items Armrests;Increased time required  (close supervision)   Stand to Sit 5  Supervision   Additional items Increased time required;Verbal cues;Bedrails   Ambulation/Elevation   Gait Assistance 4  Minimal assist    Additional items Verbal cues;Tactile cues   Assistive Device Rolling walker;SPC   Distance 12' (SPC), 30' (RW)   Ambulation/Elevation Additional Comments Pt ambualted initially with SPC, exhibiting path deviations and increased instability, having to reach for grab supports/walls.  with RW, pt displays improved stabillity, pace, and step cooridnation.   Balance   Static Sitting Good   Dynamic Sitting Fair +   Static Standing Fair   Dynamic Standing Fair -   Ambulatory Fair -  (SPC)   Endurance Deficit   Endurance Deficit Yes   Endurance Deficit Description Pt reports fatigue after ambulating short distance to bathroom sink and around the room.  Needing seated rest breaks   Activity Tolerance   Activity Tolerance Patient limited by fatigue   Nurse Made Aware Nursing aware of patient's performance   Assessment   Prognosis Fair   Problem List Decreased strength;Decreased endurance;Decreased mobility;Impaired balance   Assessment Orders for PT eval and treat received. PMHx: large uterine mass/myoma, lee PE, anxiety/depression, fibromyalgia, defuse pain, breast cx, cervical HNP, chronic LBP, DVT, MI, lee TKA, left foot drop, carpal tunnel release, back sx. Pt exhibits physical deficits noted in problem list above.  Deficits listed contribute to functional limitations that are significant from the patient PLOF and include: impaired standing balance, inability to perform safe transfers, tolerance for OOB functional activities, unsafe/inefficient ambulation, and fall risk    During today's session, formal PT evaluation performed and vitals monitored. Pt continues to exhibit ability to mobilize and transfer under own strength, but has displayed worsening stability/balance and endurance.  Pt would benefit from use of RW at this time, due to greater fall risk.  Pt appears to require more assistance compared to her baseline to manage self care activities.     The AM-PAC & Barthel Index outcome tools were used to assist in  determining pt safety w/ mobility/self care & appropriate d/c recommendations, see above for scores. Patient's clinical presentation is evolving due to significant need for care assistance compared to baseline and ongoing medical management needs.     Considering the patient's PLOF, co-morbidities, acute functional limitations, functional outcome measures, and/or goal to progress functional independence; this patient would benefit from skilled Physical Therapy intervention in the acute care setting.   Barriers to Discharge None   Goals   Patient Goals Improve tolerance/endurance and strength   STG Expiration Date 01/18/24   Short Term Goal #1 1: Pt will perform rolling to either side in flat bed, independently.  2: Pt will perform sit<>supine on flat bed, independently.  3: Pt will perform stand pivot transfer without/LRAD, mod I.  4: Pt will ambulate 150' with SPC, sup A. 5: Pt will perform written HEP, with cues. 6: Pt will ascend/descend 1 flight of stairs with SPC/rail, mod I.   Plan   Treatment/Interventions Functional transfer training;LE strengthening/ROM;Elevations;Endurance training;Therapeutic exercise;Equipment eval/education;Bed mobility;Gait training   PT Frequency 2-3x/wk   Discharge Recommendation   Rehab Resource Intensity Level, PT III (Minimum Resource Intensity)   AM-PAC Basic Mobility Inpatient   Turning in Flat Bed Without Bedrails 4   Lying on Back to Sitting on Edge of Flat Bed Without Bedrails 4   Moving Bed to Chair 3   Standing Up From Chair Using Arms 3   Walk in Room 3   Climb 3-5 Stairs With Railing 3   Basic Mobility Inpatient Raw Score 20   Basic Mobility Standardized Score 43.99   Highest Level Of Mobility   JH-HLM Goal 6: Walk 10 steps or more   JH-HLM Achieved 7: Walk 25 feet or more   Barthel Index   Feeding 10   Bathing 0   Grooming Score 5   Dressing Score 10   Bladder Score 10   Bowels Score 10   Toilet Use Score 5   Transfers (Bed/Chair) Score 10   Mobility (Level Surface)  Score 0   Stairs Score 5   Barthel Index Score 65   End of Consult   Patient Position at End of Consult Supine;Bed/Chair alarm activated;All needs within reach   The patient's AM-PAC Basic Mobility Inpatient Short Form Raw Score is 20. A Raw score of greater than or equal to 16 suggests the patient may benefit from discharge to home. Please also refer to the recommendation of the Physical Therapist for safe discharge planning.    Iggy Smith, PT, DPT  [place] Licensure #[id number]

## 2024-01-09 ENCOUNTER — PATIENT OUTREACH (OUTPATIENT)
Dept: FAMILY MEDICINE CLINIC | Facility: CLINIC | Age: 79
End: 2024-01-09

## 2024-01-10 ENCOUNTER — TRANSITIONAL CARE MANAGEMENT (OUTPATIENT)
Dept: FAMILY MEDICINE CLINIC | Facility: CLINIC | Age: 79
End: 2024-01-10

## 2024-01-12 ENCOUNTER — TELEPHONE (OUTPATIENT)
Dept: ADMINISTRATIVE | Facility: OTHER | Age: 79
End: 2024-01-12

## 2024-01-12 LAB
ATRIAL RATE: 78 BPM
P AXIS: 44 DEGREES
PR INTERVAL: 142 MS
QRS AXIS: -9 DEGREES
QRSD INTERVAL: 78 MS
QT INTERVAL: 404 MS
QTC INTERVAL: 460 MS
T WAVE AXIS: 131 DEGREES
VENTRICULAR RATE: 78 BPM

## 2024-01-12 NOTE — TELEPHONE ENCOUNTER
01/12/24 2:24 PM    Patient contacted (spoke with patient) to bring Advance Directive, POLST, or Living Will document to next scheduled pcp visit.    Thank you.  Micheline Cantor MA  [facility]

## 2024-01-22 ENCOUNTER — OFFICE VISIT (OUTPATIENT)
Dept: FAMILY MEDICINE CLINIC | Facility: CLINIC | Age: 79
End: 2024-01-22
Payer: MEDICARE

## 2024-01-22 VITALS
HEART RATE: 83 BPM | DIASTOLIC BLOOD PRESSURE: 84 MMHG | WEIGHT: 187 LBS | TEMPERATURE: 97.4 F | HEIGHT: 63 IN | OXYGEN SATURATION: 94 % | BODY MASS INDEX: 33.13 KG/M2 | SYSTOLIC BLOOD PRESSURE: 134 MMHG

## 2024-01-22 DIAGNOSIS — C50.911 BILATERAL MALIGNANT NEOPLASM OF BREAST IN FEMALE, UNSPECIFIED ESTROGEN RECEPTOR STATUS, UNSPECIFIED SITE OF BREAST (HCC): ICD-10-CM

## 2024-01-22 DIAGNOSIS — R73.03 PREDIABETES: ICD-10-CM

## 2024-01-22 DIAGNOSIS — J01.40 ACUTE NON-RECURRENT PANSINUSITIS: Primary | ICD-10-CM

## 2024-01-22 DIAGNOSIS — G25.81 RLS (RESTLESS LEGS SYNDROME): ICD-10-CM

## 2024-01-22 DIAGNOSIS — F11.20 OPIOID DEPENDENCE, UNCOMPLICATED (HCC): ICD-10-CM

## 2024-01-22 DIAGNOSIS — M31.6 TA (TEMPORAL ARTERITIS) (HCC): ICD-10-CM

## 2024-01-22 DIAGNOSIS — C50.912 BILATERAL MALIGNANT NEOPLASM OF BREAST IN FEMALE, UNSPECIFIED ESTROGEN RECEPTOR STATUS, UNSPECIFIED SITE OF BREAST (HCC): ICD-10-CM

## 2024-01-22 PROCEDURE — 99495 TRANSJ CARE MGMT MOD F2F 14D: CPT | Performed by: FAMILY MEDICINE

## 2024-01-22 RX ORDER — AMOXICILLIN AND CLAVULANATE POTASSIUM 875; 125 MG/1; MG/1
1 TABLET, FILM COATED ORAL EVERY 12 HOURS SCHEDULED
Qty: 20 TABLET | Refills: 0 | Status: SHIPPED | OUTPATIENT
Start: 2024-01-22 | End: 2024-02-01

## 2024-01-22 RX ORDER — DEXTROMETHORPHAN HYDROBROMIDE AND PROMETHAZINE HYDROCHLORIDE 15; 6.25 MG/5ML; MG/5ML
5 SYRUP ORAL 4 TIMES DAILY PRN
Qty: 180 ML | Refills: 0 | Status: SHIPPED | OUTPATIENT
Start: 2024-01-22

## 2024-01-22 RX ORDER — BUPROPION HYDROCHLORIDE 100 MG/1
100 TABLET, EXTENDED RELEASE ORAL
COMMUNITY
Start: 2024-01-18

## 2024-01-22 NOTE — ASSESSMENT & PLAN NOTE
Her hemoglobin A1c was 6.2 percent on 12/28/2023. She is prediabetic. She was recommended to avoid sweet foods like cookies, pies, and cakes. It is not neccessary to  follow a diabetic diet.

## 2024-01-22 NOTE — PROGRESS NOTES
Assessment & Plan     1. Acute non-recurrent pansinusitis  -     promethazine-dextromethorphan (PHENERGAN-DM) 6.25-15 mg/5 mL oral syrup; Take 5 mL by mouth 4 (four) times a day as needed for cough  -     amoxicillin-clavulanate (AUGMENTIN) 875-125 mg per tablet; Take 1 tablet by mouth every 12 (twelve) hours for 10 days    2. RLS (restless legs syndrome)    3. Prediabetes  Comments:  late Dec: a1c 6.2  Assessment & Plan:  Her hemoglobin A1c was 6.2 percent on 12/28/2023. She is prediabetic. She was recommended to avoid sweet foods like cookies, pies, and cakes. It is not neccessary to  follow a diabetic diet.      4. TA (temporal arteritis) (HCC)    5. Opioid dependence, uncomplicated (HCC)    6. Bilateral malignant neoplasm of breast in female, unspecified estrogen receptor status, unspecified site of breast (HCC)    This is TCM actually of 2 hospitalizations recently in January:       Primary Care Provider: JOE Yates DO   Date and time admitted to hospital: 1/7/2024 10:24 AM     * COVID-19  Assessment & Plan  Patient presents with fever and generalized bodyaches and cough, tested positive for COVID-19 on 1/7/2024,  Chest x-ray shows no acute infiltrate,  Will check D-dimer  Needed 2 L of oxygen by nasal cannula for O2 saturation of 88% on room air, will continue to supplement and monitor O2 saturation,  COVID 19 pathway on mild protocol with remdesivir and IV steroid, with Decadron,  Patient is on Xarelto will follow-up on D-dimer level,     Fibromyalgia  Assessment & Plan  Continue pregabalin and Requip and antidepressant,     Depression, recurrent (HCC)  Assessment & Plan  Continue buspirone and Trintellix,     Personal history of DVT (deep vein thrombosis)  Assessment & Plan  History of DVT and PE, on Xarelto,     Hypothyroidism, adult  Assessment & Plan  Continue levothyroxine,     Mixed hyperlipidemia  Assessment & Plan  Not on statin due to side effects,     Bilateral pulmonary embolism (HCC)-resolved  as of 1/8/2024  Assessment & Plan  1. Subacute frontal sinusitis.  I will prescribe Augmentin twice a day. I will prescribe promethazine DM 1 teaspoon 4 times a day. If she cannot sleep, she can take 2 teaspoons at bedtime. She can take Mucinex as needed. Advised her to increase fluid intake.    Follow-up  The patient will follow up in 3 months.     Subjective     Transitional Care Management Review:   Madeleine Sanchez is a 78 y.o. female here for TCM follow up.     During the TCM phone call patient stated:  TCM Call     Date and time call was made  1/10/2024  6:19 PM    Hospital care reviewed  Records reviewed    Patient was hospitialized at  [facility]    Date of Admission  01/07/24    Date of discharge  01/08/24    Diagnosis  COVID    Disposition  Home    Were the patients medications reviewed and updated  No    Current Symptoms  Cough  nasal congestion -- yellow phlegm    Cough Severity  Mild    Weakness severity  Mild    Neausea severity  Mild      TCM Call     Post hospital issues  Poor ADL (Activities of Daily Living) performance    Should patient be enrolled in anticoag monitoring?  No    Scheduled for follow up?  Yes    Did you obtain your prescribed medications  Yes    Do you need help managing your prescriptions or medications  No    Is transportation to your appointment needed  No    I have advised the patient to call PCP with any new or worsening symptoms  De Garcia --  MA    Living Arrangements  Alone    Support System  Family    The type of support provided  Emotional; Financial; Physical    Do you have social support  Yes, as much as I need    Are you recieving any outpatient services  No    Are you recieving home care services  No    Are you using any community resources  No    Current waiver services  No    Have you fallen in the last 12 months  No    Interperter language line needed  No    Counseling  Patient    Counseling topics  Importance of RX compliance; Risk factor reduction         Madeleine Sanchez is a 78-year-old female who presents for evaluation of sinus congestion and cough.    She has been self-medicating with Mucinex during the day and NyQuil at night to manage sleep disturbances caused by persistent coughing. She presents with rhinorrhea characterized by yellow nasal discharge and a cough producing clear sputum. No other treatments for her symptoms have been initiated. This scheduled appointment, which was supposed to be last week, was disrupted due to inclement weather. She continues to experience persistent cough and head congestion. However, there is a noted improvement in her condition. She also reports feeling fatigued. She was hospitalized on two separate occasions. She experienced illness throughout the week. She developed a fever on Friday and remained symptomatic until Saturday. She was taken to the hospital by Josie because she could not breathe. Her oxygen saturation levels were slightly below the normal range, and she was given oxygen supplemental at 2 liters and medicated with prednisone. The emergency department was occupied and was eventually sent home.     She tested positive for COVID on 01/07/2024 and a chest x-ray revealed no significant abnormalities. She tested herself for COVID-19 at home, so she was aware of it before she came to the hospital. She is currently on Xarelto. Her condition has moderately improved since then. She has been maintaining hydration and taking Tylenol for symptom management. She reports a sore throat and experiences lightheadedness. She adheres to wearing mask while in a public place such as grocery store.    She was advised, as per her discharge instructions, to adhere to a diabetic diet. Her blood glucose level was recorded at 108 mg/dL. Her dietary habits include the addition of a teaspoon of sugar to her tea, but she abstains from consuming ice cream and other sweet foods.  Her physical activity is limited due to her inability to  "walk. She plans to go back to physical therapy.     She is currently on Wellbutrin  mg and Trintellix. She is not sure if the Trintellix is helping. She was on 10 mg and then increased it to 20 mg.    She had a knee arthoplasty. She has fallen on it twice. She is not in pain.    She is not allergic to any antibiotics.    Her blood glucose taken in 01/08/2024 was at 131 mg/dL.    Objective     /84 (BP Location: Left arm, Patient Position: Sitting, Cuff Size: Standard)   Pulse 83   Temp (!) 97.4 °F (36.3 °C) (Temporal)   Ht 5' 3\" (1.6 m)   Wt 84.8 kg (187 lb)   SpO2 94%   BMI 33.13 kg/m²      Physical Exam  Vitals and nursing note reviewed.   Constitutional:       General: He is not in acute distress.     Appearance: Normal appearance. He is well-developed.   HENT:      Head: Normocephalic and atraumatic.   Eyes:      General:         Right eye: No discharge.         Left eye: No discharge.   Neck:      Thyroid: No thyromegaly.   Cardiovascular:      Rate and Rhythm: Normal rate and regular rhythm.      Pulses: Normal pulses.      Heart sounds: Normal heart sounds. No murmur heard.  Pulmonary:      Effort: Pulmonary effort is normal.      Breath sounds: Normal breath sounds. No wheezing or rhonchi.   Musculoskeletal:      Cervical back: Neck supple.      Right lower leg: No edema.      Left lower leg: No edema.   Lymphadenopathy:      Cervical: No cervical adenopathy.   Skin:     General: Skin is warm.      Capillary Refill: Capillary refill takes less than 2 seconds.   Neurological:      General: No focal deficit present.      Mental Status: He is alert and oriented to person, place, and time.   Psychiatric:         Mood and Affect: Mood normal.         Behavior: Behavior normal.         Thought Content: Thought content normal.    Medications have been reviewed by provider in current encounter      I personally reviewed the recent (and prior)  lab results, the image studies, pathology, other " specialty/physicians consult notes and recommendations, and outside medical records from other institutions, as appropriate. I had a lengthy discussion with the patient and shared the work-up findings. We discussed the diagnosis and management plan.  I spent  35  minutes reviewing the records (labs, clinician notes, outside records, medical history, ordering medicine/tests/procedures, interpreting the imaging/labs previously done) and coordination of care as well as direct time with the patient today, of which greater than 50% of the time was spent in counseling and coordination of care with the patient/family.    JOE Yates DO     Transcribed for JOE Yates DO, by Cookie Taylor on 01/28/24 at 6:24 PM. Powered by Dragon [employer].

## 2024-01-30 ENCOUNTER — TELEPHONE (OUTPATIENT)
Dept: GASTROENTEROLOGY | Facility: CLINIC | Age: 79
End: 2024-01-30

## 2024-01-30 ENCOUNTER — OFFICE VISIT (OUTPATIENT)
Dept: GASTROENTEROLOGY | Facility: CLINIC | Age: 79
End: 2024-01-30
Payer: MEDICARE

## 2024-01-30 VITALS
HEART RATE: 82 BPM | BODY MASS INDEX: 32.99 KG/M2 | WEIGHT: 186.2 LBS | DIASTOLIC BLOOD PRESSURE: 77 MMHG | SYSTOLIC BLOOD PRESSURE: 117 MMHG | HEIGHT: 63 IN

## 2024-01-30 DIAGNOSIS — D50.0 IRON DEFICIENCY ANEMIA DUE TO CHRONIC BLOOD LOSS: ICD-10-CM

## 2024-01-30 DIAGNOSIS — Z98.890 S/P LAPAROSCOPIC FUNDOPLICATION: ICD-10-CM

## 2024-01-30 DIAGNOSIS — R13.10 DYSPHAGIA, UNSPECIFIED TYPE: ICD-10-CM

## 2024-01-30 DIAGNOSIS — K21.00 GASTROESOPHAGEAL REFLUX DISEASE WITH ESOPHAGITIS WITHOUT HEMORRHAGE: Primary | ICD-10-CM

## 2024-01-30 DIAGNOSIS — Z12.11 COLON CANCER SCREENING: ICD-10-CM

## 2024-01-30 DIAGNOSIS — K44.9 HIATAL HERNIA: ICD-10-CM

## 2024-01-30 DIAGNOSIS — Z86.010 HX OF ADENOMATOUS COLONIC POLYPS: ICD-10-CM

## 2024-01-30 PROCEDURE — 99214 OFFICE O/P EST MOD 30 MIN: CPT | Performed by: NURSE PRACTITIONER

## 2024-01-30 RX ORDER — PANTOPRAZOLE SODIUM 40 MG/1
40 TABLET, DELAYED RELEASE ORAL DAILY
Qty: 90 TABLET | Refills: 2 | Status: SHIPPED | OUTPATIENT
Start: 2024-01-30 | End: 2024-04-29

## 2024-01-30 NOTE — PATIENT INSTRUCTIONS
Decrease famotidine to daily at bedtime once you start the pantoprazole daily in the morning  Continue to follow antireflux diet and measures.  Avoid coffee, caffeine, carbonated beverages, fried fatty spicy foods, and tomato based products.  When you eat stay sitting upright for 1 hour after meals do not eat for 3 hours before you go to bed and sleep with your head of bed elevated.

## 2024-01-30 NOTE — PROGRESS NOTES
[facility] - Outpatient Follow-up Note  Madeleine Sanchez 78 y.o. female MRN: [id number]  Encounter: [id number]          ASSESSMENT AND PLAN:      1. Gastroesophageal reflux disease with esophagitis without hemorrhage  2. Hiatal hernia  3. S/P laparoscopic fundoplication  78-year-old female with a history of GERD status post fundoplication approximately 8 years ago at [facility].  Patient is currently on Pepcid and has been experiencing acid reflux on a daily basis.  Patient also reports intermittent nausea but is currently on antibiotics for upper respiratory infection.  Patient denies vomiting, heartburn, epigastric or abdominal pain.  Patient reports that her GERD is not currently well-controlled on current medication.  -Decrease famotidine to 40 mg daily at bedtime  - Start pantoprazole (PROTONIX) 40 mg tablet; Take 1 tablet (40 mg total) by mouth daily  Dispense: 90 tablet; Refill: 2  - EGD; scheduled for EGD.  Prep and procedure explained to patient in detail.  Further recommendations pending results of EGD.    4. Hx of adenomatous colonic polyps  5. Colon cancer screening  Patient has history of adenomatous. Colon cancer screening up-to-date  -Surveillance colonoscopy due March 2028    6. Iron deficiency anemia due to chronic blood loss  Patient has history of iron deficient anemia with chronic blood loss.  Patient reports continued intermittent bleeding from hemorrhoids.  Patient is due for last hemorrhoidal banding and this will be scheduled with physician.  Hemoglobin 12.0 and within normal meds. On 1/8/2024.    7. Dysphagia, unspecified type  She reports continued intermittent dysphagia to solid food.  Patient had previous food impaction in  2022.Patient denies dysphagia with liquids.  EGD done on 9/26/2022 showed Nissen Fundoplication.  3 cm hiatal hernia.  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 Status post dilation of distal  esophagus.  - EGD; scheduled for EGD.  Prep and procedure explained to patient in detail.  Further recommendations pending results of EGD.    ______________________________________________________________________    SUBJECTIVE: This is a 8-year-old dents to office for follow-up.She has history of GERD status post fundoplication approximately 8 years ago at [facility].  Patient is currently on Pepcid and has been experiencing acid reflux on a daily basis.  Patient also reports intermittent nausea but is currently on antibiotics for upper respiratory infection.  Patient denies vomiting, heartburn, epigastric or abdominal pain.  Patient reports that her GERD is not currently well-controlled on current medication.  Bowel patterns are regular.  Denies blood in stool, blood from rectal area, or black tarry.  Abdomen exam benign no abdominal tenderness or guarding.  Patient does report dysphagia.  Patient has history of food impaction in 2022 and previous esophageal dilation.  Patient reports dysphagia to solid foods only denies dysphagia to liquids.    EGD done on 9/26/2022 showed Nissen Fundoplication.  3 cm hiatal hernia.  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 Status post dilation of distal esophagus.  Skippy done 4/one 3 mm polyp removed.  Remainder of detailed exam within normal limits.  Biopsy showed portions of polypoid colonic mucosa.  Patient drinks alcohol socially.  Patient does not smoke.  Patient denies illicit drug use or marijuana use.  No family history of gastric or colon cancer.        REVIEW OF SYSTEMS IS OTHERWISE NEGATIVE.      Historical Information   Past Medical History:   Diagnosis Date    Bilateral pulmonary embolism (HCC)     Cancer (HCC)     left breast cancer    Cervical herniated disc     Chronic pain disorder     back pain    Chronic respiratory failure (HCC)     uses O2 at home with NC /5/19 no longer using/just CPAP    Colon polyp     CPAP  "(continuous positive airway pressure) dependence     Disease of thyroid gland     hypothyroid    DVT (deep venous thrombosis) ([facility]) 2020    right leg    Family history of reaction to anesthesia     \"son and daughter hard time waking up and also PONV\"    Fibromyalgia, primary     GERD (gastroesophageal reflux disease)     Glaucoma     Hiatal hernia     History of palpitations     History of pneumonia     History of transfusion     Hyperlipidemia     Hypertension     Irregular heart beat     Migraine     Myocardial infarction ([facility])     pt sees cardilogist Dr Bennett [facility]/\"didnt realize had one, found on EKG before a surgery\"    OAB (overactive bladder)     Pollen allergies     Pulmonary embolism ([facility]) 2019    Bilateral; on Xarelto    Restless leg     Risk for falls     Sleep apnea     Use of cane as ambulatory aid     occas    Uses brace     Mafo/left leg    Wears glasses      Past Surgical History:   Procedure Laterality Date    ADENOIDECTOMY      BAND HEMORRHOIDECTOMY      CARDIAC CATHETERIZATION      CARPAL TUNNEL RELEASE      CATARACT EXTRACTION Bilateral     COLONOSCOPY      FOOT SURGERY      pin implanted right toe    JOINT REPLACEMENT Bilateral     knees    LAMINECTOMY      LUMBAR EPIDURAL INJECTION      MASTECTOMY Bilateral     with reconstruction and implants    NISSEN FUNDOPLICATION      ND LNGTH/SHRT TENDON LEG/ANKLE 1 TENDON SPX Left 05/24/2021    Procedure: Sectioning peroneal tendons with lengthening/sectioning achilles tendon lower leg and ankle;  Surgeon: Fei Sifuentes DPM;  Location: [facility];  Service: Podiatry    REPLACEMENT TOTAL KNEE      SPINAL FUSION      TONSILLECTOMY      TUBAL LIGATION      TUMOR EXCISION      right forearm/benign    UPPER GASTROINTESTINAL ENDOSCOPY      VEIN LIGATION Right      Social History   Social History     Substance and Sexual Activity   Alcohol Use Not Currently    Comment: socially     Social History     Substance and Sexual Activity   Drug Use Never " "    Social History     Tobacco Use   Smoking Status Never   Smokeless Tobacco Never     Family History   Problem Relation Age of Onset    Cancer Sister        Meds/Allergies       Current Outpatient Medications:     acetaminophen (TYLENOL) 325 mg tablet    amLODIPine (NORVASC) 5 mg tablet    amoxicillin-clavulanate (AUGMENTIN) 875-125 mg per tablet    ASPIRIN 81 PO    brimonidine (ALPHAGAN P) 0.15 % ophthalmic solution    buPROPion (WELLBUTRIN SR) 100 mg 12 hr tablet    busPIRone (BUSPAR) 5 mg tablet    famotidine (PEPCID) 40 MG tablet    fluticasone (FLONASE) 50 mcg/act nasal spray    latanoprost (XALATAN) 0.005 % ophthalmic solution    levothyroxine 100 mcg tablet    pantoprazole (PROTONIX) 40 mg tablet    pregabalin (LYRICA) 150 mg capsule    promethazine-dextromethorphan (PHENERGAN-DM) 6.25-15 mg/5 mL oral syrup    rivaroxaban (Xarelto) 20 mg tablet    rOPINIRole (REQUIP) 1 mg tablet    senna (SENOKOT) 8.6 MG tablet    timolol (TIMOPTIC) 0.5 % ophthalmic solution    torsemide (DEMADEX) 10 mg tablet    vortioxetine (TRINTELLIX) 10 MG tablet    traMADol (ULTRAM) 50 mg tablet    Allergies   Allergen Reactions    Benzalkonium Chloride Hives     Merthiolate tincture 9/28/2020      Hydromorphone Hallucinations     disorientation; hallucination; confusion      Merthiolate  [Thimerosal (Thiomersal)] Hives    Quinine Derivatives Other (See Comments) and Tinnitus     tinnitus      Codeine Nausea Only and Other (See Comments)     nausea      Medical Tape Rash    Other Itching    Pamelor [Nortriptyline] Vomiting     Per pt, itching also    Pollen Extract Nasal Congestion    Statins Itching    Wound Dressing Adhesive Rash           Objective     Blood pressure 117/77, pulse 82, height 5' 3\" (1.6 m), weight 84.5 kg (186 lb 3.2 oz). Body mass index is 32.98 kg/m².      PHYSICAL EXAM:      General Appearance:   Alert, cooperative, no distress   HEENT:   Normocephalic, atraumatic, anicteric.     Neck:  Supple, symmetrical, " trachea midline   Lungs:   Clear to auscultation bilaterally; no rales, rhonchi or wheezing; respirations unlabored    Heart::   Regular rate and rhythm; no murmur, rub, or gallop.   Abdomen:   Soft, non-tender, non-distended; normal bowel sounds; no masses, no organomegaly    Genitalia:   Deferred    Rectal:   Deferred    Extremities:  No cyanosis, clubbing or edema    Pulses:  2+ and symmetric    Skin:  No jaundice, rashes, or lesions    Lymph nodes:  No palpable cervical lymphadenopathy        Lab Results:   No visits with results within 1 Day(s) from this visit.   Latest known visit with results is:   Admission on 01/07/2024, Discharged on 01/08/2024   Component Date Value    WBC 01/07/2024 9.25     RBC 01/07/2024 4.53     Hemoglobin 01/07/2024 13.3     Hematocrit 01/07/2024 41.0     MCV 01/07/2024 91     MCH 01/07/2024 29.4     MCHC 01/07/2024 32.4     RDW 01/07/2024 13.4     MPV 01/07/2024 10.5     Platelets 01/07/2024 289     nRBC 01/07/2024 0     Neutrophils Relative 01/07/2024 72     Immat GRANS % 01/07/2024 1     Lymphocytes Relative 01/07/2024 17     Monocytes Relative 01/07/2024 10     Eosinophils Relative 01/07/2024 0     Basophils Relative 01/07/2024 0     Neutrophils Absolute 01/07/2024 6.67     Immature Grans Absolute 01/07/2024 0.05     Lymphocytes Absolute 01/07/2024 1.54     Monocytes Absolute 01/07/2024 0.95     Eosinophils Absolute 01/07/2024 0.01     Basophils Absolute 01/07/2024 0.03     Sodium 01/07/2024 137     Potassium 01/07/2024 3.6     Chloride 01/07/2024 104     CO2 01/07/2024 23     ANION GAP 01/07/2024 10     BUN 01/07/2024 15     Creatinine 01/07/2024 0.67     Glucose 01/07/2024 123     Calcium 01/07/2024 8.9     AST 01/07/2024 17     ALT 01/07/2024 18     Alkaline Phosphatase 01/07/2024 138 (H)     Total Protein 01/07/2024 7.3     Albumin 01/07/2024 3.9     Total Bilirubin 01/07/2024 0.56     eGFR 01/07/2024 83     hs TnI 0hr 01/07/2024 3     BNP 01/07/2024 187 (H)     SARS-CoV-2  01/07/2024 Positive (A)     INFLUENZA A PCR 01/07/2024 Negative     INFLUENZA B PCR 01/07/2024 Negative     RSV PCR 01/07/2024 Negative     Procalcitonin 01/07/2024 0.17     Ventricular Rate 01/07/2024 78     Atrial Rate 01/07/2024 78     MT Interval 01/07/2024 142     QRSD Interval 01/07/2024 78     QT Interval 01/07/2024 404     QTC Interval 01/07/2024 460     P Axis 01/07/2024 44     QRS [place] 01/07/2024 -9     T Wave [place] 01/07/2024 131     D-Dimer, Quant 01/07/2024 1.16 (H)     TSH 3RD GENERATON 01/07/2024 1.003     Sodium 01/08/2024 138     Potassium 01/08/2024 4.0     Chloride 01/08/2024 107     CO2 01/08/2024 23     ANION GAP 01/08/2024 8     BUN 01/08/2024 14     Creatinine 01/08/2024 0.61     Glucose 01/08/2024 131     Calcium 01/08/2024 8.9     eGFR 01/08/2024 86     WBC 01/08/2024 7.13     RBC 01/08/2024 4.10     Hemoglobin 01/08/2024 12.0     Hematocrit 01/08/2024 38.2     MCV 01/08/2024 93     MCH 01/08/2024 29.3     MCHC 01/08/2024 31.4     RDW 01/08/2024 13.5     Platelets 01/08/2024 270     MPV 01/08/2024 11.3     D-Dimer, Quant 01/08/2024 0.49          Radiology Results:   XR chest 1 view portable    Result Date: 1/7/2024  Narrative: CHEST INDICATION:   cough. COMPARISON: Chest radiograph December 27, 2023 EXAM PERFORMED/VIEWS:  XR CHEST PORTABLE FINDINGS: Cardiomediastinal silhouette appears unremarkable. The lungs are clear.  No pneumothorax or pleural effusion. Osseous structures appear within normal limits for patient age.     Impression: No acute cardiopulmonary disease. Workstation performed: [id number]

## 2024-01-30 NOTE — TELEPHONE ENCOUNTER
Procedure: EGD  Scheduled date of procedure (as of today):03/18/24  Physician performing procedure:Dr. Mckeon  Location of procedure:[facility]  Instructions reviewed with patient by: ti  Clearances: Dr. Bennett-marshal Borrego 2 days prior to her procedure. Will send in a few weeks once it gets closer.

## 2024-02-19 ENCOUNTER — PATIENT OUTREACH (OUTPATIENT)
Dept: FAMILY MEDICINE CLINIC | Facility: CLINIC | Age: 79
End: 2024-02-19

## 2024-02-19 NOTE — PROGRESS NOTES
[employer] pre auth was submitted to [facility] by provider.  No further needs for SWCM.  Referral closed.

## 2024-02-21 PROBLEM — Z12.11 COLON CANCER SCREENING: Status: RESOLVED | Noted: 2021-09-28 | Resolved: 2024-02-21

## 2024-03-02 ENCOUNTER — HOSPITAL ENCOUNTER (EMERGENCY)
Facility: HOSPITAL | Age: 79
Discharge: HOME | End: 2024-03-02
Attending: EMERGENCY MEDICINE
Payer: MEDICARE

## 2024-03-02 VITALS
OXYGEN SATURATION: 95 % | DIASTOLIC BLOOD PRESSURE: 58 MMHG | HEART RATE: 75 BPM | RESPIRATION RATE: 16 BRPM | TEMPERATURE: 97.5 F | SYSTOLIC BLOOD PRESSURE: 127 MMHG

## 2024-03-02 DIAGNOSIS — S61.209A FINGER AVULSION, INITIAL ENCOUNTER: Primary | ICD-10-CM

## 2024-03-02 PROCEDURE — 99282 EMERGENCY DEPT VISIT SF MDM: CPT

## 2024-03-02 RX ORDER — CEPHALEXIN 500 MG/1
500 CAPSULE ORAL EVERY 8 HOURS SCHEDULED
Qty: 15 CAPSULE | Refills: 0 | Status: SHIPPED | OUTPATIENT
Start: 2024-03-02 | End: 2024-03-07

## 2024-03-02 RX ORDER — CEPHALEXIN 250 MG/1
500 CAPSULE ORAL ONCE
Status: COMPLETED | OUTPATIENT
Start: 2024-03-02 | End: 2024-03-02

## 2024-03-02 RX ADMIN — CEPHALEXIN 500 MG: 250 CAPSULE ORAL at 13:24

## 2024-03-02 NOTE — ED PROVIDER NOTES
History  Chief Complaint   Patient presents with    Laceration     L 2nd digit laceration on a circular  last night at 7pm, pt takes Xarelto     Right-handed 78-year-old female previous history of of MI, DVT/PE on Xarelto, pneumonia, hypertension, breast cancer, chronic respiratory failure.    Patient presents for laceration to the left index finger.  Patient was using a /roller.  Yesterday approximately 15 hours ago.  She ran the roller across the radial portion of her left index finger cutting off a piece of tissue.    She believes her tetanus is up-to-date.  She has no allergies to anesthetics or numbing medications.  She is on Xarelto..    States that she has not lost significant of blood.  She wrapped the wound tightly after suffering a.  Reports changing the bandage approximately 4 times since yesterday.  Bleeding is generally been controlled but every time she unwrapped it it starts bleeding again.    She notes that the fingertip where she cut off the edge feels slightly decreased sensation compared to the contralateral hand.      Finger Laceration  Location:  Finger  Finger laceration location:  L index finger  Length:  2  Depth:  Through dermis  Quality: avulsion    Bleeding: venous    Time since incident:  1 day  Laceration mechanism:  Metal edge  Pain details:     Quality:  Aching    Timing:  Constant    Progression:  Unchanged  Relieved by:  Nothing  Worsened by:  Nothing  Ineffective treatments:  Pressure      Prior to Admission Medications   Prescriptions Last Dose Informant Patient Reported? Taking?   ASPIRIN 81 PO  Self Yes No   Sig: Aspirin EC 81 MG Oral Tablet Delayed Release    Refills: 0       Active   acetaminophen (TYLENOL) 325 mg tablet  Self Yes No   Sig: Take 650 mg by mouth every 6 (six) hours as needed for mild pain   amLODIPine (NORVASC) 5 mg tablet  Self No No   Sig: Take 1 tablet (5 mg total) by mouth daily   brimonidine (ALPHAGAN P) 0.15 % ophthalmic solution   Self Yes No   Sig: Administer to the right eye every evening    buPROPion (WELLBUTRIN SR) 100 mg 12 hr tablet  Self Yes No   Sig: Take 100 mg by mouth daily after breakfast   busPIRone (BUSPAR) 5 mg tablet  Self No No   Sig: Take 1 tablet (5 mg total) by mouth 2 (two) times a day   famotidine (PEPCID) 40 MG tablet  Self No No   Sig: TAKE 1 TABLET TWICE DAILY   fluticasone (FLONASE) 50 mcg/act nasal spray  Self Yes No   Si sprays into each nostril once OD   latanoprost (XALATAN) 0.005 % ophthalmic solution  Self Yes No   Sig: Administer 1 drop to both eyes daily at bedtime   levothyroxine 100 mcg tablet  Self No No   Sig: TAKE 1 TABLET EVERY DAY   pantoprazole (PROTONIX) 40 mg tablet   No No   Sig: Take 1 tablet (40 mg total) by mouth daily   pregabalin (LYRICA) 150 mg capsule  Self No No   Sig: Take 1 capsule (150 mg total) by mouth 2 (two) times a day   promethazine-dextromethorphan (PHENERGAN-DM) 6.25-15 mg/5 mL oral syrup  Self No No   Sig: Take 5 mL by mouth 4 (four) times a day as needed for cough   rOPINIRole (REQUIP) 1 mg tablet  Self No No   Sig: TAKE 1 TABLET AT 6PM AND TAKE 2 TABLETS AT BEDTIME   rivaroxaban (Xarelto) 20 mg tablet  Self No No   Sig: Take 1 tablet (20 mg total) by mouth every evening   senna (SENOKOT) 8.6 MG tablet  Self Yes No   Sig: Take 8.6 mg by mouth in the morning   timolol (TIMOPTIC) 0.5 % ophthalmic solution  Self Yes No   Sig: Administer to both eyes 2 (two) times a day    torsemide (DEMADEX) 10 mg tablet  Self No No   Sig: TAKE 1 TABLET EVERY DAY   traMADol (ULTRAM) 50 mg tablet  Self No No   Sig: TAKE 1/2 TABLET AT 8AM, TAKE 1/2 TABLET AT 1PM AND 1 TABLET AT BEDTIME   Patient not taking: Reported on 2024   vortioxetine (TRINTELLIX) 10 MG tablet  Self No No   Sig: Take 1 tablet (10 mg total) by mouth daily   Patient taking differently: Take 20 mg by mouth daily      Facility-Administered Medications: None       Past Medical History:   Diagnosis Date    Bilateral pulmonary  "embolism (HCC)     Cancer (HCC)     left breast cancer    Cervical herniated disc     Chronic pain disorder     back pain    Chronic respiratory failure (HCC)     uses O2 at home with NC /5/19 no longer using/just CPAP    Colon polyp     CPAP (continuous positive airway pressure) dependence     Disease of thyroid gland     hypothyroid    DVT (deep venous thrombosis) (HCC) 2020    right leg    Family history of reaction to anesthesia     \"son and daughter hard time waking up and also PONV\"    Fibromyalgia, primary     GERD (gastroesophageal reflux disease)     Glaucoma     Hiatal hernia     History of palpitations     History of pneumonia     History of transfusion     Hyperlipidemia     Hypertension     Irregular heart beat     Migraine     Myocardial infarction (HCC)     pt sees cardilogist Dr Bennett [facility]/\"didnt realize had one, found on EKG before a surgery\"    OAB (overactive bladder)     Pollen allergies     Pulmonary embolism (HCC) 2019    Bilateral; on Xarelto    Restless leg     Risk for falls     Sleep apnea     Use of cane as ambulatory aid     occas    Uses brace     Mafo/left leg    Wears glasses        Past Surgical History:   Procedure Laterality Date    ADENOIDECTOMY      BAND HEMORRHOIDECTOMY      CARDIAC CATHETERIZATION      CARPAL TUNNEL RELEASE      CATARACT EXTRACTION Bilateral     COLONOSCOPY      FOOT SURGERY      pin implanted right toe    JOINT REPLACEMENT Bilateral     knees    LAMINECTOMY      LUMBAR EPIDURAL INJECTION      MASTECTOMY Bilateral     with reconstruction and implants    NISSEN FUNDOPLICATION      VT LNGTH/SHRT TENDON LEG/ANKLE 1 TENDON SPX Left 05/24/2021    Procedure: Sectioning peroneal tendons with lengthening/sectioning achilles tendon lower leg and ankle;  Surgeon: Fei Sifuentes DPM;  Location: [facility];  Service: Podiatry    REPLACEMENT TOTAL KNEE      SPINAL FUSION      TONSILLECTOMY      TUBAL LIGATION      TUMOR EXCISION      right forearm/benign    UPPER " GASTROINTESTINAL ENDOSCOPY      VEIN LIGATION Right        Family History   Problem Relation Age of Onset    Cancer Sister      I have reviewed and agree with the history as documented.    E-Cigarette/Vaping    E-Cigarette Use Never User      E-Cigarette/Vaping Substances    Nicotine No     THC No     CBD No     Flavoring No     Other No     Unknown No      Social History     Tobacco Use    Smoking status: Never    Smokeless tobacco: Never   Vaping Use    Vaping status: Never Used   Substance Use Topics    Alcohol use: Not Currently     Comment: socially    Drug use: Never       Review of Systems   Skin:  Positive for wound.   All other systems reviewed and are negative.      Physical Exam  Physical Exam  Vitals and nursing note reviewed.   Constitutional:       General: She is not in acute distress.     Appearance: Normal appearance. She is not ill-appearing.   HENT:      Head: Normocephalic and atraumatic.      Right Ear: External ear normal.      Left Ear: External ear normal.      Nose: Nose normal.      Mouth/Throat:      Mouth: Mucous membranes are moist.   Eyes:      General:         Right eye: No discharge.         Left eye: No discharge.      Conjunctiva/sclera: Conjunctivae normal.   Cardiovascular:      Rate and Rhythm: Normal rate and regular rhythm.      Pulses: Normal pulses.      Heart sounds: No murmur heard.  Pulmonary:      Effort: Pulmonary effort is normal.      Breath sounds: Normal breath sounds.   Abdominal:      General: Abdomen is flat. There is no distension.      Tenderness: There is no abdominal tenderness.   Musculoskeletal:         General: Signs of injury present. Normal range of motion.      Cervical back: Normal range of motion.      Comments: Avulsion injury approximately 2 cm in length by 1 cm in width.  No skin flap remaining.  This avulsion is located on the radial aspect of the left index finger.  There is no involvement of the nailbed.  Venous oozing after unwrapping the wound.    Skin:     General: Skin is warm.      Capillary Refill: Capillary refill takes less than 2 seconds.      Coloration: Skin is not pale.      Findings: No rash.   Neurological:      General: No focal deficit present.      Mental Status: She is alert. Mental status is at baseline.      Comments: Sensation intact light touch throughout the left index finger.   Psychiatric:         Mood and Affect: Mood normal.         Behavior: Behavior normal.         Vital Signs  ED Triage Vitals   Temperature Pulse Respirations Blood Pressure SpO2   03/02/24 1211 03/02/24 1209 03/02/24 1209 03/02/24 1209 03/02/24 1209   97.5 °F (36.4 °C) 75 16 127/58 95 %      Temp Source Heart Rate Source Patient Position - Orthostatic VS BP Location FiO2 (%)   03/02/24 1211 03/02/24 1209 03/02/24 1209 03/02/24 1209 --   Oral Monitor Sitting Right arm       Pain Score       --                  Vitals:    03/02/24 1209   BP: 127/58   Pulse: 75   Patient Position - Orthostatic VS: Sitting         Visual Acuity      ED Medications  Medications   cephalexin (KEFLEX) capsule 500 mg (500 mg Oral Given 3/2/24 1324)       Diagnostic Studies  Results Reviewed       None                   No orders to display              Procedures  Procedures         ED Course  ED Course as of 03/02/24 1348   Sat Mar 02, 2024   1317 Wound does not appear to be bleeding.  Will discharge.  Strict return precautions were discussed with the patient.                               SBIRT 22yo+      Flowsheet Row Most Recent Value   Initial Alcohol Screen: US AUDIT-C     1. How often do you have a drink containing alcohol? 0 Filed at: 03/02/2024 1210   Audit-C Score 0 Filed at: 03/02/2024 1210   CARLITOS: How many times in the past year have you...    Used an illegal drug or used a prescription medication for non-medical reasons? Never Filed at: 03/02/2024 1210                      Medical Decision Making  78-year-old female with an avulsion injury to the radial aspect of the left  index finger.    Up-to-date on tetanus.    Wound was copiously irrigated in the emergency department by me with NS.  There is no flap to close.    Has venous oozing.  Attempted lidocaine with epinephrine injection into the wound.  Bleeding was minimized but still slight bleeding.      I applied Surgicel to the wound and applied compression.  Patient was monitored for greater than 30 minutes in the emergency department.  No bleeding noted.  Will discharge home.  Return precautions discussed.  Recommended not touching the wound for at least 2 days.        Will    Problems Addressed:  Finger avulsion, initial encounter: acute illness or injury    Risk  Prescription drug management.             Disposition  Final diagnoses:   Finger avulsion, initial encounter     Time reflects when diagnosis was documented in both MDM as applicable and the Disposition within this note       Time User Action Codes Description Comment    3/2/2024  1:18 PM Marvin Mckeon Add [S61.209A] Finger avulsion, initial encounter           ED Disposition       ED Disposition   Discharge    Condition   Stable    Date/Time   Sat Mar 2, 2024 1318    Comment   Madeleine Sanchez discharge to home/self care.                   Follow-up Information       Follow up With Specialties Details Why Contact Info Additional Information    JOE Yates, DO Family Medicine Schedule an appointment as soon as possible for a visit  For re-evaluation as soon as possible [address] [facility]  [address]  [facility] [address] [phone]       [facility] Emergency Medicine  If symptoms worsen [address] [address]  [phone] [facility], [address] [address]            Discharge Medication List as of 3/2/2024  1:19 PM        START taking these medications    Details   cephalexin (KEFLEX) 500 mg capsule Take 1 capsule (500 mg total) by mouth every 8 (eight)  hours for 5 days, Starting Sat 3/2/2024, Until Thu 3/7/2024, Normal           CONTINUE these medications which have NOT CHANGED    Details   acetaminophen (TYLENOL) 325 mg tablet Take 650 mg by mouth every 6 (six) hours as needed for mild pain, Historical Med      amLODIPine (NORVASC) 5 mg tablet Take 1 tablet (5 mg total) by mouth daily, Starting Fri 2/26/2021, No Print      ASPIRIN 81 PO Aspirin EC 81 MG Oral Tablet Delayed Release    Refills: 0       Active, Historical Med      brimonidine (ALPHAGAN P) 0.15 % ophthalmic solution Administer to the right eye every evening , Starting Tue 4/13/2021, Historical Med      buPROPion (WELLBUTRIN SR) 100 mg 12 hr tablet Take 100 mg by mouth daily after breakfast, Starting Thu 1/18/2024, Historical Med      busPIRone (BUSPAR) 5 mg tablet Take 1 tablet (5 mg total) by mouth 2 (two) times a day, Starting Thu 11/16/2023, Normal      famotidine (PEPCID) 40 MG tablet TAKE 1 TABLET TWICE DAILY, Normal      fluticasone (FLONASE) 50 mcg/act nasal spray 2 sprays into each nostril once OD, Historical Med      latanoprost (XALATAN) 0.005 % ophthalmic solution Administer 1 drop to both eyes daily at bedtime, Starting Fri 5/22/2020, Historical Med      levothyroxine 100 mcg tablet TAKE 1 TABLET EVERY DAY, Normal      pantoprazole (PROTONIX) 40 mg tablet Take 1 tablet (40 mg total) by mouth daily, Starting Tue 1/30/2024, Until Mon 4/29/2024, Normal      pregabalin (LYRICA) 150 mg capsule Take 1 capsule (150 mg total) by mouth 2 (two) times a day, Starting Tue 12/19/2023, Normal      promethazine-dextromethorphan (PHENERGAN-DM) 6.25-15 mg/5 mL oral syrup Take 5 mL by mouth 4 (four) times a day as needed for cough, Starting Mon 1/22/2024, Normal      rivaroxaban (Xarelto) 20 mg tablet Take 1 tablet (20 mg total) by mouth every evening, Starting Mon 10/30/2023, Until Sat 4/27/2024, Normal      rOPINIRole (REQUIP) 1 mg tablet TAKE 1 TABLET AT 6PM AND TAKE 2 TABLETS AT BEDTIME, Normal       senna (SENOKOT) 8.6 MG tablet Take 8.6 mg by mouth in the morning, Starting Fri 4/7/2023, Historical Med      timolol (TIMOPTIC) 0.5 % ophthalmic solution Administer to both eyes 2 (two) times a day , Starting Tue 4/13/2021, Historical Med      torsemide (DEMADEX) 10 mg tablet TAKE 1 TABLET EVERY DAY, Starting Thu 12/14/2023, Normal      traMADol (ULTRAM) 50 mg tablet TAKE 1/2 TABLET AT 8AM, TAKE 1/2 TABLET AT 1PM AND 1 TABLET AT BEDTIME, Normal      vortioxetine (TRINTELLIX) 10 MG tablet Take 1 tablet (10 mg total) by mouth daily, Starting Thu 10/19/2023, Until Tue 4/16/2024, Normal             No discharge procedures on file.    PDMP Review         Value Time User    PDMP Reviewed  Yes 12/27/2023 11:10 PM JOE Yates DO            ED Provider  Electronically Signed by             Marvin Mckeon DO  03/02/24 6705

## 2024-03-02 NOTE — DISCHARGE INSTRUCTIONS
Leave the wound dressing over your finger for at least 2 days before removing.    Come back if the wound starts bleeding again, fevers, spreading redness, purple lines coming up your finger, new or worsening symptoms.    Follow-up with your primary care provider.  Take the antibiotics as prescribed.

## 2024-03-11 ENCOUNTER — TELEPHONE (OUTPATIENT)
Dept: PSYCHIATRY | Facility: CLINIC | Age: 79
End: 2024-03-11

## 2024-03-11 ENCOUNTER — TELEPHONE (OUTPATIENT)
Dept: GASTROENTEROLOGY | Facility: CLINIC | Age: 79
End: 2024-03-11

## 2024-03-11 ENCOUNTER — NURSE TRIAGE (OUTPATIENT)
Age: 79
End: 2024-03-11

## 2024-03-11 DIAGNOSIS — R11.0 NAUSEA: Primary | ICD-10-CM

## 2024-03-11 RX ORDER — ONDANSETRON 4 MG/1
4 TABLET, ORALLY DISINTEGRATING ORAL EVERY 8 HOURS SCHEDULED
Qty: 60 TABLET | Refills: 1 | Status: SHIPPED | OUTPATIENT
Start: 2024-03-11

## 2024-03-11 NOTE — TELEPHONE ENCOUNTER
Last ov 1/30/24. EGD 3/18  C/o nausea for a week, vomiting x2 days. Per pt is staying hydrated. Is on clear liquid diet but nausea gets worse after eating.

## 2024-03-11 NOTE — TELEPHONE ENCOUNTER
"Behavioral Health Outpatient Intake Questions    Referred By   : self    Please advise interviewee that they need to answer all questions truthfully to allow for best care, and any misrepresentations of information may affect their ability to be seen at this clinic   => Was this discussed? Yes     If Minor Child (under age 18)    Who is/are the legal guardian(s) of the child?     Is there a custody agreement?      If \"YES\"- Custody orders must be obtained prior to scheduling the first appointment  In addition, Consent to Treatment must be signed by all legal guardians prior to scheduling the first appointment    If \"NO\"- Consent to Treatment must be signed by all legal guardians prior to scheduling the first appointment    Behavioral Health Outpatient Intake History -     Presenting Problem (in patient's own words): depression.     Are there any communication barriers for this patient?     No                                               If yes, please describe barriers:   If there is a unique situation, please refer to Jorge Velazquez/Zainab Johnson for final determination.    Are you taking any psychiatric medications? Yes     If \"YES\" -What are they Wellbutrin SR, Trintellix, Buspar, Atarax     If \"YES\" -Who prescribes?   Dr. Scott Perdomo office.    Has the Patient previously received outpatient Talk Therapy or Medication Management from [facility]  No        If \"YES\"- When, Where and with Whom?         If \"NO\" -Has Patient received these services elsewhere?       If \"YES\" -When, Where, and with Whom?    Has the Patient abused alcohol or other substances in the last 6 months ? No  No concerns of substance abuse are reported.     If \"YES\" -What substance, How much, How often?     If illegal substance: Refer to [facility] (for [facility]) or [facility].   If Alcohol in excess of 10 drinks per week:  Refer to [facility] (for [facility]) or [facility] Off[facility]    Legal History-     Is this treatment court ordered? No   If " "\"yes \"send to :  Talk Therapy : Send to Jorge Velazquez/Zainab Johnson for final determination   Med Management: Send to Dr Rojas for final determination     Has the Patient been convicted of a felony?  No   If \"Yes\" send to -When, What?  Talk Therapy : Send to Jorge Velazquez/Zainab Johnson for final determination   Med Management: Send to Dr Rojas for final determination     ACCEPTED as a patient Yes  If \"Yes\" Appointment Date:   Nupur Dixon 3/29 @ 1p   Irma Batista 4/8 @ 1p     Referred Elsewhere? No  If “Yes” - (Where? Ex: [facility], [facility], [facility], [facility], etc.)       Name of Insurance Co:medicare a and b / humana  Insurance ID#[id number] / [id number]   Insurance Phone #  If ins is primary or secondary?  If patient is a minor, parents information such as Name, D.O.B of guarantor.  "

## 2024-03-11 NOTE — TELEPHONE ENCOUNTER
Patient contacted intake dept in regards to scheduling appt. Spoke w. Patient whom sounded distress and was able to schedule for both med mgmt and talk therapy. Writer offered to contact crisis for assistance at this time patient declined stated their daughter was on their way and they are able to help her. Writer notified patient in need of crisis to go to nearest ER for immediate assistance.

## 2024-03-11 NOTE — TELEPHONE ENCOUNTER
Please call and inform patient I send a prescription to her pharmacy for Zofran 4 mg every 8 hours as needed for nausea or vomiting.  Patient should go up to a bland diet make sure she stays well-hydrated.  Recommend Gatorade and can slowly reintroduce foods once symptoms are controlled.  Thank you

## 2024-03-11 NOTE — TELEPHONE ENCOUNTER
----- Message from Roz Tello MA sent at 3/11/2024  4:30 PM EDT -----  Pt called with nausea and vomitting for the past week. Pt asking for a call back to see what can be done prior to procedure.

## 2024-03-11 NOTE — TELEPHONE ENCOUNTER
I spoke  with pt for procedure notification. She has her instructions and a . Eh to call with her arrival time. Pt aware to hold eliquis starting 3/19/24. Sb

## 2024-03-17 ENCOUNTER — ANESTHESIA (OUTPATIENT)
Dept: ANESTHESIOLOGY | Facility: HOSPITAL | Age: 79
End: 2024-03-17

## 2024-03-17 ENCOUNTER — ANESTHESIA EVENT (OUTPATIENT)
Dept: ANESTHESIOLOGY | Facility: HOSPITAL | Age: 79
End: 2024-03-17

## 2024-03-17 NOTE — ANESTHESIA PREPROCEDURE EVALUATION
Procedure:  PRE-OP ONLY  Esophageal stenosis prior balloon dilation prior Nissen fundoplication     TTE: LVEF 60-65%, RV wnl  Stress test - no evidence of ischemia     Cardiac meds: Xarelto, torsemide, amlodipine  ASHLEY  Relevant Problems   CARDIO   (+) Mixed hyperlipidemia      ENDO   (+) Hypothyroidism, adult      GI/HEPATIC   (+) Dysphagia   (+) GERD (gastroesophageal reflux disease)   (+) Hiatal hernia      GYN   (+) Bilateral malignant neoplasm of breast in female, unspecified estrogen receptor status, unspecified site of breast (HCC)      HEMATOLOGY   (+) Anemia   (+) Iron deficiency anemia due to chronic blood loss      MUSCULOSKELETAL   (+) Fibromyalgia   (+) Hiatal hernia      NEURO/PSYCH   (+) Anxiety and depression   (+) Continuous opioid dependence (HCC)   (+) Depression, recurrent (HCC)   (+) Fibromyalgia      PULMONARY   (+) Moderate obstructive sleep apnea             Anesthesia Plan  ASA Score- 3     Anesthesia Type- IV sedation with anesthesia with ASA Monitors.         Additional Monitors:     Airway Plan:            Plan Factors-    Chart reviewed. EKG reviewed.  Existing labs reviewed. Patient summary reviewed.                  Induction-     Postoperative Plan-     Informed Consent-

## 2024-03-18 ENCOUNTER — ANESTHESIA EVENT (OUTPATIENT)
Dept: GASTROENTEROLOGY | Facility: HOSPITAL | Age: 79
End: 2024-03-18

## 2024-03-18 ENCOUNTER — HOSPITAL ENCOUNTER (OUTPATIENT)
Dept: GASTROENTEROLOGY | Facility: HOSPITAL | Age: 79
Setting detail: OUTPATIENT SURGERY
Discharge: HOME | End: 2024-03-18
Admitting: INTERNAL MEDICINE
Payer: MEDICARE

## 2024-03-18 ENCOUNTER — ANESTHESIA (OUTPATIENT)
Dept: GASTROENTEROLOGY | Facility: HOSPITAL | Age: 79
End: 2024-03-18

## 2024-03-18 VITALS
RESPIRATION RATE: 14 BRPM | DIASTOLIC BLOOD PRESSURE: 67 MMHG | TEMPERATURE: 97.8 F | SYSTOLIC BLOOD PRESSURE: 106 MMHG | HEIGHT: 63 IN | BODY MASS INDEX: 33.13 KG/M2 | OXYGEN SATURATION: 96 % | WEIGHT: 187 LBS | HEART RATE: 65 BPM

## 2024-03-18 DIAGNOSIS — R13.10 DYSPHAGIA, UNSPECIFIED TYPE: ICD-10-CM

## 2024-03-18 DIAGNOSIS — K21.00 GASTROESOPHAGEAL REFLUX DISEASE WITH ESOPHAGITIS WITHOUT HEMORRHAGE: ICD-10-CM

## 2024-03-18 DIAGNOSIS — R68.81 EARLY SATIETY: Primary | ICD-10-CM

## 2024-03-18 RX ORDER — FENTANYL CITRATE/PF 50 MCG/ML
25 SYRINGE (ML) INJECTION
Status: CANCELLED | OUTPATIENT
Start: 2024-03-18

## 2024-03-18 RX ORDER — ALBUTEROL SULFATE 2.5 MG/3ML
2.5 SOLUTION RESPIRATORY (INHALATION) ONCE AS NEEDED
Status: CANCELLED | OUTPATIENT
Start: 2024-03-18

## 2024-03-18 RX ORDER — PROPOFOL 10 MG/ML
INJECTION, EMULSION INTRAVENOUS AS NEEDED
Status: DISCONTINUED | OUTPATIENT
Start: 2024-03-18 | End: 2024-03-18

## 2024-03-18 RX ORDER — SODIUM CHLORIDE, SODIUM LACTATE, POTASSIUM CHLORIDE, CALCIUM CHLORIDE 600; 310; 30; 20 MG/100ML; MG/100ML; MG/100ML; MG/100ML
INJECTION, SOLUTION INTRAVENOUS CONTINUOUS PRN
Status: DISCONTINUED | OUTPATIENT
Start: 2024-03-18 | End: 2024-03-18

## 2024-03-18 RX ORDER — ONDANSETRON 2 MG/ML
4 INJECTION INTRAMUSCULAR; INTRAVENOUS ONCE AS NEEDED
Status: CANCELLED | OUTPATIENT
Start: 2024-03-18

## 2024-03-18 RX ORDER — LIDOCAINE HYDROCHLORIDE 10 MG/ML
INJECTION, SOLUTION EPIDURAL; INFILTRATION; INTRACAUDAL; PERINEURAL AS NEEDED
Status: DISCONTINUED | OUTPATIENT
Start: 2024-03-18 | End: 2024-03-18

## 2024-03-18 RX ADMIN — LIDOCAINE HYDROCHLORIDE 100 MG: 10 INJECTION, SOLUTION EPIDURAL; INFILTRATION; INTRACAUDAL at 11:43

## 2024-03-18 RX ADMIN — PROPOFOL 40 MG: 10 INJECTION, EMULSION INTRAVENOUS at 11:51

## 2024-03-18 RX ADMIN — PROPOFOL 30 MG: 10 INJECTION, EMULSION INTRAVENOUS at 11:47

## 2024-03-18 RX ADMIN — SODIUM CHLORIDE, SODIUM LACTATE, POTASSIUM CHLORIDE, AND CALCIUM CHLORIDE: .6; .31; .03; .02 INJECTION, SOLUTION INTRAVENOUS at 11:40

## 2024-03-18 RX ADMIN — PROPOFOL 100 MG: 10 INJECTION, EMULSION INTRAVENOUS at 11:43

## 2024-03-18 NOTE — ANESTHESIA POSTPROCEDURE EVALUATION
Post-Op Assessment Note    CV Status:  Stable    Pain management: adequate       Mental Status:  Sleepy   Hydration Status:  Euvolemic   PONV Controlled:  Controlled   Airway Patency:  Patent  Two or more mitigation strategies used for obstructive sleep apnea   Post Op Vitals Reviewed: Yes    No anethesia notable event occurred.    Staff: Anesthesiologist, CRNA               BP 94/55 (03/18/24 1159)    Temp 98.4 °F (36.9 °C) (03/18/24 1159)    Pulse 61 (03/18/24 1159)   Resp 16 (03/18/24 1159)    SpO2 97 % (03/18/24 1159)

## 2024-03-18 NOTE — ANESTHESIA PREPROCEDURE EVALUATION
Procedure:  EGD  Esophageal stenosis prior balloon dilation prior Nissen fundoplication      TTE: LVEF 60-65%, RV wnl  Stress test - no evidence of ischemia      Cardiac meds: Xarelto, torsemide, amlodipine - last took xarelto Friday 2/15  ASHLEY - wears CPAP    Denies the following: CP/SOB with exertion, asthma, COPD, stroke/TIA, seizure    Relevant Problems   CARDIO   (+) Mixed hyperlipidemia      ENDO   (+) Hypothyroidism, adult      GI/HEPATIC   (+) Dysphagia   (+) GERD (gastroesophageal reflux disease)   (+) Hiatal hernia      GYN   (+) Bilateral malignant neoplasm of breast in female, unspecified estrogen receptor status, unspecified site of breast (HCC)      HEMATOLOGY   (+) Anemia   (+) Iron deficiency anemia due to chronic blood loss      MUSCULOSKELETAL   (+) Fibromyalgia   (+) Hiatal hernia      NEURO/PSYCH   (+) Anxiety and depression   (+) Continuous opioid dependence (HCC)   (+) Depression, recurrent (HCC)   (+) Fibromyalgia      PULMONARY   (+) Moderate obstructive sleep apnea        Physical Exam    Airway    Mallampati score: II  TM Distance: >3 FB  Neck ROM: full     Dental   No notable dental hx     Cardiovascular      Pulmonary      Other Findings  post-pubertal.      Anesthesia Plan  ASA Score- 3     Anesthesia Type- IV sedation with anesthesia with ASA Monitors.         Additional Monitors:     Airway Plan:            Plan Factors-Exercise tolerance (METS): >4 METS.    Chart reviewed. EKG reviewed.  Existing labs reviewed. Patient summary reviewed.    Patient is not a current smoker.      Obstructive sleep apnea risk education given perioperatively.        Induction-     Postoperative Plan-     Informed Consent- Anesthetic plan and risks discussed with patient.  I personally reviewed this patient with the CRNA. Discussed and agreed on the Anesthesia Plan with the CRNA..

## 2024-03-21 ENCOUNTER — TELEPHONE (OUTPATIENT)
Dept: GASTROENTEROLOGY | Facility: CLINIC | Age: 79
End: 2024-03-21

## 2024-03-21 NOTE — TELEPHONE ENCOUNTER
This is a PACE form.  PACE is pt assistance and the GI office handles.    Will attempt a PA through [employer]{img}    PA for ondansetron    Submitted via    [x]CMM-KEY  [id number]  []Surescripts-Case ID #   []Faxed to plan   []Other website   []Phone call Case ID #     Office notes sent, clinical questions answered. Awaiting determination    Turnaround time for your insurance to make a decision on your Prior Authorization can take 7-21 business days.

## 2024-03-21 NOTE — TELEPHONE ENCOUNTER
Received fax from [employer] my meds for prior auth for zofran 4mg tablets. Key [id number]. I will scan to [employer].

## 2024-03-22 ENCOUNTER — RA CDI HCC (OUTPATIENT)
Dept: OTHER | Facility: HOSPITAL | Age: 79
End: 2024-03-22

## 2024-03-22 ENCOUNTER — TELEPHONE (OUTPATIENT)
Age: 79
End: 2024-03-22

## 2024-03-22 PROBLEM — G25.81 RLS (RESTLESS LEGS SYNDROME): Status: ACTIVE | Noted: 2020-09-16

## 2024-03-22 PROBLEM — Z09 EXAMINATION FOR, FOLLOW-UP: Status: RESOLVED | Noted: 2021-11-03 | Resolved: 2024-03-22

## 2024-03-22 NOTE — TELEPHONE ENCOUNTER
PA for ondansetron Approved   Date(s) approved until 12/31/2024  Case #    Patient advised by [x] [employer] Message                      [] Phone call       Pharmacy advised by [x]Fax                                     []Phone call    Approval letter scanned into [employer] Yes

## 2024-03-28 ENCOUNTER — OFFICE VISIT (OUTPATIENT)
Dept: FAMILY MEDICINE CLINIC | Facility: CLINIC | Age: 79
End: 2024-03-28
Payer: MEDICARE

## 2024-03-28 VITALS
HEART RATE: 72 BPM | TEMPERATURE: 99.2 F | DIASTOLIC BLOOD PRESSURE: 76 MMHG | HEIGHT: 63 IN | WEIGHT: 178 LBS | SYSTOLIC BLOOD PRESSURE: 114 MMHG | OXYGEN SATURATION: 94 % | BODY MASS INDEX: 31.54 KG/M2

## 2024-03-28 DIAGNOSIS — H40.9 GLAUCOMA OF RIGHT EYE, UNSPECIFIED GLAUCOMA TYPE: ICD-10-CM

## 2024-03-28 DIAGNOSIS — M17.11 PRIMARY OSTEOARTHRITIS OF RIGHT KNEE: ICD-10-CM

## 2024-03-28 DIAGNOSIS — J00 ACUTE RHINITIS: Primary | ICD-10-CM

## 2024-03-28 PROCEDURE — G2211 COMPLEX E/M VISIT ADD ON: HCPCS | Performed by: FAMILY MEDICINE

## 2024-03-28 PROCEDURE — 99214 OFFICE O/P EST MOD 30 MIN: CPT | Performed by: FAMILY MEDICINE

## 2024-03-28 RX ORDER — BUPROPION HYDROCHLORIDE 300 MG/1
300 TABLET ORAL EVERY MORNING
COMMUNITY
Start: 2024-03-27

## 2024-03-28 RX ORDER — BRIMONIDINE TARTRATE 1.5 MG/ML
1 SOLUTION/ DROPS OPHTHALMIC 2 TIMES DAILY
Qty: 10 ML | Refills: 1 | Status: SHIPPED | OUTPATIENT
Start: 2024-03-28

## 2024-03-28 RX ORDER — FLUTICASONE PROPIONATE 50 MCG
2 SPRAY, SUSPENSION (ML) NASAL ONCE
Qty: 9.9 ML | Refills: 2 | Status: SHIPPED | OUTPATIENT
Start: 2024-03-28 | End: 2024-04-01 | Stop reason: SDUPTHER

## 2024-03-28 NOTE — PROGRESS NOTES
Assessment/Plan:          1. Acute rhinitis  Comments:  Is using Flonase and ophth is not concerned re nasal steroids  Assessment & Plan:  She will continue with Flonase. She was advised she can try Zyrtec.    Orders:  -     fluticasone (FLONASE) 50 mcg/act nasal spray; 2 sprays into each nostril once for 1 dose OD    2. Glaucoma of right eye, unspecified glaucoma type  Comments:  Seeing Dr. Lomas, but she not returing calls.  Assessment & Plan:  She is seeing an ophthalmologist. She will continue using Alphagan eyedrops.    Orders:  -     brimonidine (ALPHAGAN P) 0.15 % ophthalmic solution; Administer 1 drop to the right eye 2 (two) times a day    3. Primary osteoarthritis of right knee  Comments:  Had TKR in Sept, still pain and RROM  Assessment & Plan:  She is status post right total knee replacement which was done in 09/2023.    Orders:  -     Ambulatory referral to Physical Therapy; Future; Expected date: 04/04/2024        Chronic back pain   I will place a prescription for physical therapy.    Follow-up  The patient will follow up with me in 3 months or as needed.                  Subjective:       Patient ID: Madeleine Sanchez is a 79 y.o. female well-known to me for many years presents for follow up and evaluation of the following medical issues:     She has sinus congestion. She has a drippy nose all the time. She took Allegra, but it did not do much.    She requested an order for physical therapy for her back and right knee. She goes to [facility] for physical therapy. She can do water therapy. She does some exercises at home, but she needs more range of motion and strength. She had a right total knee replacement in 09/2023.    She cut her finger when she had the endoscopy. She put clotting stuff on it. She had to keep her hand up for a few days, which happened on a Friday. She wrapped it up good. She thought it would stop bleeding. On Saturday, she took the dressing off and the blood  "was bad. She had no pain postop. She was medicated for 3 days. She had a cardiac workup and a scan of her spine. Her stomach had presence of food during her endoscopy. She has a hiatal hernia. She had a swallowing test, which was normal.     She needs a refill prescription on famotidine.     She was prescribed Alphagan eyedrops. She uses Flonase as needed.     The following portions of the patient's history were reviewed and updated as appropriate: allergies, current medications, past family history, past medical history, past social history, past surgical history, and problem list.            Objective:       /76 (BP Location: Left arm, Patient Position: Sitting, Cuff Size: Standard)   Pulse 72   Temp 99.2 °F (37.3 °C) (Temporal)   Ht 5' 3\" (1.6 m)   Wt 80.7 kg (178 lb)   SpO2 94%   BMI 31.53 kg/m²          Physical Exam  Vitals and nursing note reviewed.   Constitutional:       General: He is not in acute distress.     Appearance: Normal appearance. He is well-developed.   HENT:      Head: Normocephalic and atraumatic.   Eyes:      General:         Right eye: No discharge.         Left eye: No discharge.   Neck:      Thyroid: No thyromegaly.   Cardiovascular:      Rate and Rhythm: Normal rate and regular rhythm.      Pulses: Normal pulses.      Heart sounds: Normal heart sounds. No murmur heard.  Pulmonary:      Effort: Pulmonary effort is normal.      Breath sounds: Normal breath sounds. No wheezing or rhonchi.   Musculoskeletal:      Cervical back: Neck supple.      Right lower leg: No edema.      Left lower leg: No edema.   Lymphadenopathy:      Cervical: No cervical adenopathy.   Skin:     General: Skin is warm.      Capillary Refill: Capillary refill takes less than 2 seconds.   Neurological:      General: No focal deficit present.      Mental Status: He is alert and oriented to person, place, and time.   Psychiatric:         Mood and Affect: Mood normal.         Behavior: Behavior normal.         " Thought Content: Thought content normal.        I personally reviewed the recent (and prior)  lab results, the image studies, pathology, other specialty/physicians consult notes and recommendations, and outside medical records from other institutions, as appropriate. I had a lengthy discussion with the patient and shared the work-up findings. We discussed the diagnosis and management plan.  I spent  30  minutes reviewing the records (labs, clinician notes, outside records, medical history, ordering medicine/tests/procedures, interpreting the imaging/labs previously done) and coordination of care as well as direct time with the patient today, of which greater than 50% of the time was spent in counseling and coordination of care with the patient/family.    Transcribed for JOE Yates DO, by Hiral Roy on 03/31/24 at 10:21 PM. Powered by Dragon [employer].

## 2024-03-29 ENCOUNTER — TELEMEDICINE (OUTPATIENT)
Dept: PSYCHIATRY | Facility: CLINIC | Age: 79
End: 2024-03-29

## 2024-03-29 DIAGNOSIS — F33.2 SEVERE EPISODE OF RECURRENT MAJOR DEPRESSIVE DISORDER, WITHOUT PSYCHOTIC FEATURES (HCC): Primary | ICD-10-CM

## 2024-03-29 DIAGNOSIS — F41.9 ANXIETY: ICD-10-CM

## 2024-03-29 NOTE — PSYCH
Behavioral Health Psychotherapy Assessment    Date of Initial Psychotherapy Assessment: 03/29/24  Referral Source: self  Has a release of information been signed for the referral source? NA    Preferred Name: Madeleine Sanchez  Preferred Pronouns: She/her  YOB: 1945 Age: 79 y.o.  Sex assigned at birth: female   Gender Identity: female  Race:   Preferred Language: English    Emergency Contact:  Full Name: Josie Callahan  Relationship to Client: Daughter  Contact information: [phone]     Primary Care Physician:  JOE Yates DO  [address] [facility] [address]  [place] [address]  [phone]  Has a release of information been signed? NA    Physical Health History:  Past surgical procedures: Back surgery (3), knee surgery (both knees)  Do you have a history of any of the following: other hypothyroidism  Do you have any mobility issues? Yes, describe: knee and back problems    Relevant Family History:    Madeleine says her daughter and grandson have been living with her since the start of Covid. Pt reports that it's been stressful, but says she gets along with her daughter even though she doesn't always agree with her choices. She reports that her grandson is good and he does not cause problems. Madeleine also has another daughter who she has a good relationship with. She says she's a private person. Madeleine says she has two sons and has a good relationships with them both. Madeleine says her  drank a lot at the beginning of their marriage, but stopped. She says he was a jealous person. Madeleine says he had a lot of health problems and she helped take care of him. She says he had a difficult death and it was prolonged. Madeleine says she has a sister that lives in [place] and a brother that lives in [place]. She says she has a good relationship with her sister, but doesn't talk to her brother much. When her parents were alive, Madeleine says she had great relationships with them both. Madeleine reports that her  "one of her daughters is depressed and one of her sons has dealt with depression.     Presenting Problem (What brings you in?)    Madeleine Sanchez is an 79-year-old  female who presents for an initial assessment today. Madeleine reports that she's had a lot of health issues. Pt says she had back surgery last year, as well as a knee replacement. She explains that the surgeries have limited her mobility. In addition to her surgeries last year, she says she had Covid three years ago. Madeleine reports that she has not felt like herself since those occurrences. She reports that it's hard to get up in the morning and she cannot walk very far. She explains that she is going to start physical therapy soon. Prior to her surgeries and having Covid, she says she never struggled with depression this badly. She says her   10 years ago and she experienced depression due to grief. Madeleine reports that she cries often, isn't happy with how things are, struggles to focus, doesn't want to go out and do things, doesn't like to talk to anyone, and says she's very heller. Pt reports that she is generally tired of doing things. \"I just have no interest in things. I could sit in front of the television all day.\", she says.  Madeleine says she has trouble staying asleep and falling asleep. She says it takes about 1 hour to fall asleep, but she wakes up every three hours. She says she only sleeps well once per week. Madeleine says she takes Hydroxyzine to sleep. Sometimes it works and sometimes it doesn't, per her report. Pt also takes Wellbutrin ans Buspar. She says she expected to feel like herself while taking the medications, but she still doesn't. Madeleine denies SI and HI. \"I'm just angry at myself for feeling this way.\", she expresses.     Mental Health Advance Directive:  Do you currently have a Mental Health Advance Directive?no    Diagnosis:   Diagnosis ICD-10-CM Associated Orders   1. 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 Anxiety  F41.9           Initial Assessment:     Current Mental Status:    Appearance: appropriate      Behavior/Manner: cooperative and tearful      Affect/Mood:  Depressed    Speech:  Normal    Sleep:  Insomnia, hypersomnia and interrupted    Oriented to: oriented to self, oriented to place and oriented to time       Clinical Symptoms    Depression: yes      Anxiety: yes      Depression Symptoms: depressed mood, serious loss of interest in things, excessive crying, social isolation, fatigue, indecision, poor concentration, recent weight loss, weight gain, sleep disturbance, hypersomnia, insomnia and irritable      Anxiety Symptoms: excessive worry, irritable and nervous/anxious      Have you ever been assaultive to others or the environment: No      Have you ever been self-injurious: No      Counseling History:  Previous Counseling or Treatment  (Mental Health or Drug & Alcohol): Yes    Previous Counseling Details:  Madeleine says she saw a therapist once in the past when one of her son's was having problems in his life.   Have you previously taken psychiatric medications: Yes    Previous Medications Attempted:  N/A (Currently taking Buspar, Wellbutrin, and Hydroxyzine)    Suicide Risk Assessment  Have you ever had a suicide attempt: No    Have you had incidents of suicidal ideation: No    Are you currently experiencing suicidal thoughts: No      Substance Abuse/Addiction Assessment:  Alcohol: No    Heroin: No    Fentanyl: No    Opiates: No    Cocaine: No    Amphetamines: No    Hallucinogens: No    Club Drugs: No    Benzodiazepines: No    Other Rx Meds: No    Marijuana: No    Tobacco/Nicotine: No    Have you experienced blackouts as a result of substance use: No    Have you had any periods of abstinence: No    Have you experienced symptoms of withdrawal: No    Have you ever overdosed on any substances?: No    Are you currently using any Medication Assisted  Treatment for Substance Use: No      Compulsive Behaviors:  Compulsive Behavior Information:  N/A    Disordered Eating History:  Do you have a history of disordered eating: No      Social Determinants of Health:    SDOH:  Stress    Trauma and Abuse History:    Have you ever been abused: No      Legal History:    Have you ever been arrested  or had a DUI: No      Have you been incarcerated: No      Are you currently on parole/probation: No      Any current Children and Youth involvement: No      Any pending legal charges: No      Relationship History:    Current marital status:       Natural Supports:  Other    Other natural supports:  Children    Relationship History:  Madeleine says she typically doesn't go to anyone for support, but explains that her children make up her support system.         Employment History    Are you currently employed: No      Currently seeking employment: No      Longest period of employment:  35 years    Future work goals:  N/A    Sources of income/financial support:  custodial SSA     History:      Status: no history of  duty  Educational History:     Have you ever been diagnosed with a learning disability: No      Highest level of education:  Other    Other education: 3 year nursing program    School attended/attending:  [employer]    Have you ever had an IEP or 504-plan: No      Do you need assistance with reading or writing: No      Recommended Treatment:     Psychotherapy:  Individual sessions and medication management    Frequency:  1 time    Session frequency:  Weekly      Visit start and stop times:    03/29/24  Start Time: 1311  Stop Time: 1349  Total Visit Time: 38 minutes  Virtual Regular Visit    Verification of patient location:    Patient is located at Home in the following state in which I hold an active license [place]      Assessment/Plan:    Problem List Items Addressed This Visit          Behavioral Health    Anxiety    Severe episode of  "recurrent major depressive disorder, without psychotic features (HCC) - Primary       Goals addressed in session: Treatment plan will be created during follow up session.          Reason for visit is No chief complaint on file.       Encounter provider Nupur Holden LCSW    Provider located at [address]  [place] [address] [phone]      Recent Visits  Date Type Provider Dept   03/28/24 Office Visit JOE Yates DO  [facility]   Showing recent visits within past 7 days and meeting all other requirements  Future Appointments  No visits were found meeting these conditions.  Showing future appointments within next 150 days and meeting all other requirements       The patient was identified by name and date of birth. Madeleine Sanchez was informed that this is a telemedicine visit and that the visit is being conducted throughthe [employer] platform. She agrees to proceed..  My office door was closed. No one else was in the room.  She acknowledged consent and understanding of privacy and security of the video platform. The patient has agreed to participate and understands they can discontinue the visit at any time.    Patient is aware this is a billable service.     Subjective  Madeleine Sanchez is a 79 y.o. female who presents for an initial assessment today .      HPI     Past Medical History:   Diagnosis Date    Bilateral pulmonary embolism (HCC)     Cancer (HCC)     left breast cancer    Cervical herniated disc     Chronic pain disorder     back pain    Chronic respiratory failure (HCC)     uses O2 at home with NC /5/19 no longer using/just CPAP    Colon polyp     CPAP (continuous positive airway pressure) dependence     Disease of thyroid gland     hypothyroid    DVT (deep venous thrombosis) (HCC) 2020    right leg    Examination for, follow-up 11/03/2021    Family history of reaction to anesthesia     \"son " "and daughter hard time waking up and also PONV\"    Fibromyalgia, primary     GERD (gastroesophageal reflux disease)     Glaucoma     Hiatal hernia     History of palpitations     History of pneumonia     History of transfusion     Hyperlipidemia     Hypertension     Irregular heart beat     Migraine     Myocardial infarction (HCC)     pt sees cardilogist Dr Bennett [facility]/\"didnt realize had one, found on EKG before a surgery\"    OAB (overactive bladder)     Pollen allergies     Pulmonary embolism (HCC) 2019    Bilateral; on Xarelto    Restless leg     Risk for falls     Sleep apnea     Use of cane as ambulatory aid     occas    Uses brace     Mafo/left leg    Wears glasses        Past Surgical History:   Procedure Laterality Date    ADENOIDECTOMY      BAND HEMORRHOIDECTOMY      CARDIAC CATHETERIZATION      CARPAL TUNNEL RELEASE      CATARACT EXTRACTION Bilateral     COLONOSCOPY      FOOT SURGERY      pin implanted right toe    JOINT REPLACEMENT Bilateral     knees    LAMINECTOMY      LUMBAR EPIDURAL INJECTION      MASTECTOMY Bilateral     with reconstruction and implants    NISSEN FUNDOPLICATION      DE LNGTH/SHRT TENDON LEG/ANKLE 1 TENDON SPX Left 05/24/2021    Procedure: Sectioning peroneal tendons with lengthening/sectioning achilles tendon lower leg and ankle;  Surgeon: Fei Sifuentes DPM;  Location: [facility];  Service: Podiatry    REPLACEMENT TOTAL KNEE      SPINAL FUSION      TONSILLECTOMY      TUBAL LIGATION      TUMOR EXCISION      right forearm/benign    UPPER GASTROINTESTINAL ENDOSCOPY      VEIN LIGATION Right        Current Outpatient Medications   Medication Sig Dispense Refill    acetaminophen (TYLENOL) 325 mg tablet Take 650 mg by mouth every 6 (six) hours as needed for mild pain      amLODIPine (NORVASC) 5 mg tablet Take 1 tablet (5 mg total) by mouth daily (Patient taking differently: Take 5 mg by mouth every morning) 90 tablet 3    ASPIRIN 81 PO Aspirin EC 81 MG Oral Tablet Delayed Release    " Refills: 0       Active      brimonidine (ALPHAGAN P) 0.15 % ophthalmic solution Administer 1 drop to the right eye 2 (two) times a day 10 mL 1    buPROPion (WELLBUTRIN XL) 300 mg 24 hr tablet Take 300 mg by mouth every morning      busPIRone (BUSPAR) 5 mg tablet Take 1 tablet (5 mg total) by mouth 2 (two) times a day 60 tablet 5    famotidine (PEPCID) 40 MG tablet TAKE 1 TABLET TWICE DAILY (Patient taking differently: Take 40 mg by mouth daily) 180 tablet 2    fluticasone (FLONASE) 50 mcg/act nasal spray 2 sprays into each nostril once for 1 dose OD 9.9 mL 2    hydrOXYzine HCL (ATARAX) 10 mg tablet Take 10 mg by mouth daily at bedtime      latanoprost (XALATAN) 0.005 % ophthalmic solution Administer 1 drop to both eyes daily at bedtime      levothyroxine 100 mcg tablet TAKE 1 TABLET EVERY DAY (Patient taking differently: Take 100 mcg by mouth every morning) 90 tablet 3    ondansetron (ZOFRAN-ODT) 4 mg disintegrating tablet Take 1 tablet (4 mg total) by mouth every 8 (eight) hours 60 tablet 1    pantoprazole (PROTONIX) 40 mg tablet Take 1 tablet (40 mg total) by mouth daily 90 tablet 2    pregabalin (LYRICA) 150 mg capsule Take 1 capsule (150 mg total) by mouth 2 (two) times a day 90 capsule 3    rivaroxaban (Xarelto) 20 mg tablet Take 1 tablet (20 mg total) by mouth every evening 90 tablet 1    rOPINIRole (REQUIP) 1 mg tablet TAKE 1 TABLET AT 6PM AND TAKE 2 TABLETS AT BEDTIME (Patient taking differently: Take 1 mg by mouth daily at bedtime TAKE 1 TABLET AT 6PM AND TAKE 2 TABLETS AT BEDTIME) 270 tablet 3    timolol (TIMOPTIC) 0.5 % ophthalmic solution Administer to both eyes 2 (two) times a day       torsemide (DEMADEX) 10 mg tablet TAKE 1 TABLET EVERY DAY 90 tablet 1    vortioxetine (TRINTELLIX) 10 MG tablet Take 1 tablet (10 mg total) by mouth daily (Patient taking differently: Take 20 mg by mouth every morning) 30 tablet 5     No current facility-administered medications for this visit.        Allergies    Allergen Reactions    Benzalkonium Chloride Hives     Merthiolate tincture 9/28/2020      Hydromorphone Hallucinations     disorientation; hallucination; confusion      Merthiolate  [Thimerosal (Thiomersal)] Hives    Quinine Derivatives Other (See Comments) and Tinnitus     tinnitus      Codeine Nausea Only and Other (See Comments)     nausea      Medical Tape Rash    Other Itching    Pamelor [Nortriptyline] Vomiting     Per pt, itching also    Pollen Extract Nasal Congestion    Statins Itching    Wound Dressing Adhesive Rash       Review of Systems   Constitutional:  Positive for fatigue and weight gain.   Psychiatric/Behavioral:  Positive for sleep disturbance. The patient is nervous/anxious and has insomnia.        Video Exam    There were no vitals filed for this visit.    Physical Exam  Psychiatric:         Mood and Affect: Affect is tearful.         Behavior: Behavior is cooperative.          Visit Time    03/29/24  Start Time: 1311  Stop Time: 1349  Total Visit Time: 38 minutes

## 2024-04-01 ENCOUNTER — TELEMEDICINE (OUTPATIENT)
Dept: PSYCHIATRY | Facility: CLINIC | Age: 79
End: 2024-04-01
Payer: MEDICARE

## 2024-04-01 DIAGNOSIS — J00 ACUTE RHINITIS: ICD-10-CM

## 2024-04-01 DIAGNOSIS — F43.23 ADJUSTMENT DISORDER WITH MIXED ANXIETY AND DEPRESSED MOOD: Primary | ICD-10-CM

## 2024-04-01 PROBLEM — F43.21 ADJUSTMENT DISORDER WITH DEPRESSED MOOD: Status: ACTIVE | Noted: 2024-04-01

## 2024-04-01 PROCEDURE — 90834 PSYTX W PT 45 MINUTES: CPT | Performed by: SOCIAL WORKER

## 2024-04-01 RX ORDER — FLUTICASONE PROPIONATE 50 MCG
2 SPRAY, SUSPENSION (ML) NASAL ONCE
Qty: 48 ML | Refills: 1 | Status: SHIPPED | OUTPATIENT
Start: 2024-04-01 | End: 2024-04-01

## 2024-04-01 NOTE — PSYCH
DATA: Met with Madeleine for scheduled individual session. Topics of discussion included family stressors and mood regulation and symptoms. Client shows evidence of utilizing  medication management  to manage mental health symptoms. During this session, this clinician used the following therapeutic modalities: engagement strategies, supportive psychotherapy, and client-centered therapy.  Madeleine reports that she's been okay since her last session. She tells Therapist that she had a good holiday weekend with her family. Today, Therapist and Madeleine create her treatment plan and safety plan. After completing, Therapist and pt discuss her depression symptoms. After today's discussion, Therapist re-assessed Madeleine's symptoms and she meets the criteria for adjustment disorder with mixed anxiety and depressed mood. Therapist informs Madeleine of this change. Between sessions, Therapist assigns Madeleine a self-care assessment today complete.  .    ASSESSMENT: Madeleine presents with a normal, depressed mood. Her affect is tearful. Madeleine exhibits growing therapeutic rapport with this clinician. Madeleine continues to exhibit willingness to work on treatment goals and objectives. Madeleine presents with a minimal risk of suicide, minimal risk of self-harm, and minimal risk of harm to others.       PLAN: Madeleine will return in 2 weeks for the next scheduled session. Between sessions, Madeleine will complete self-care assessment and will report back during the next session re: successes and barriers. At the next session, this clinician will use engagement strategies, supportive psychotherapy, and client-centered therapy to address Madeleine's depression, in an effort to assist Madeleine with meeting treatment goals.   Virtual Regular Visit    Verification of patient location:    Patient is located at Home in the following state in which I hold an active license [place]      Assessment/Plan:    Problem List Items Addressed This Visit          Behavioral Health     Adjustment disorder with mixed anxiety and depressed mood - Primary       Goals addressed in session: Goal 1          Reason for visit is No chief complaint on file.       Encounter provider Nupur Holden LCSW    Provider [facility]  [address]  [place] [id number] [phone]      Recent Visits  Date Type Provider Dept   03/29/24 [facility]medicine [facility]   03/28/24 Office Visit JOE Yates DO [facility]   Showing recent visits within past 7 days and meeting all other requirements  Future Appointments  No visits were found meeting these conditions.  Showing future appointments within next 150 days and meeting all other requirements       The patient was identified by name and date of birth. Madeleine Sanchez was informed that this is a telemedicine visit and that the visit is being conducted throughthe [employer] platform. She agrees to proceed..  My office door was closed. No one else was in the room.  She acknowledged consent and understanding of privacy and security of the video platform. The patient has agreed to participate and understands they can discontinue the visit at any time.    Patient is aware this is a billable service.     Subjective  Madeleine Sanchez is a 79 y.o. female who presents for an individual therapy session today .      HPI     Past Medical History:   Diagnosis Date    Bilateral pulmonary embolism (HCC)     Cancer (HCC)     left breast cancer    Cervical herniated disc     Chronic pain disorder     back pain    Chronic respiratory failure (HCC)     uses O2 at home with NC /5/19 no longer using/just CPAP    Colon polyp     CPAP (continuous positive airway pressure) dependence     Disease of thyroid gland     hypothyroid    DVT (deep venous thrombosis) (HCC) 2020    right leg    Examination for, follow-up 11/03/2021    Family history of  "reaction to anesthesia     \"son and daughter hard time waking up and also PONV\"    Fibromyalgia, primary     GERD (gastroesophageal reflux disease)     Glaucoma     Hiatal hernia     History of palpitations     History of pneumonia     History of transfusion     Hyperlipidemia     Hypertension     Irregular heart beat     Migraine     Myocardial infarction (HCC)     pt sees cardilogist Dr Bennett [facility]/\"didnt realize had one, found on EKG before a surgery\"    OAB (overactive bladder)     Pollen allergies     Pulmonary embolism (HCC) 2019    Bilateral; on Xarelto    Restless leg     Risk for falls     Sleep apnea     Use of cane as ambulatory aid     occas    Uses brace     Mafo/left leg    Wears glasses        Past Surgical History:   Procedure Laterality Date    ADENOIDECTOMY      BAND HEMORRHOIDECTOMY      CARDIAC CATHETERIZATION      CARPAL TUNNEL RELEASE      CATARACT EXTRACTION Bilateral     COLONOSCOPY      FOOT SURGERY      pin implanted right toe    JOINT REPLACEMENT Bilateral     knees    LAMINECTOMY      LUMBAR EPIDURAL INJECTION      MASTECTOMY Bilateral     with reconstruction and implants    NISSEN FUNDOPLICATION      FL LNGTH/SHRT TENDON LEG/ANKLE 1 TENDON SPX Left 05/24/2021    Procedure: Sectioning peroneal tendons with lengthening/sectioning achilles tendon lower leg and ankle;  Surgeon: Fei Sifuentes DPM;  Location: [facility];  Service: Podiatry    REPLACEMENT TOTAL KNEE      SPINAL FUSION      TONSILLECTOMY      TUBAL LIGATION      TUMOR EXCISION      right forearm/benign    UPPER GASTROINTESTINAL ENDOSCOPY      VEIN LIGATION Right        Current Outpatient Medications   Medication Sig Dispense Refill    acetaminophen (TYLENOL) 325 mg tablet Take 650 mg by mouth every 6 (six) hours as needed for mild pain      amLODIPine (NORVASC) 5 mg tablet Take 1 tablet (5 mg total) by mouth daily (Patient taking differently: Take 5 mg by mouth every morning) 90 tablet 3    ASPIRIN 81 PO Aspirin EC 81 MG " Oral Tablet Delayed Release    Refills: 0       Active      brimonidine (ALPHAGAN P) 0.15 % ophthalmic solution Administer 1 drop to the right eye 2 (two) times a day 10 mL 1    buPROPion (WELLBUTRIN XL) 300 mg 24 hr tablet Take 300 mg by mouth every morning      busPIRone (BUSPAR) 5 mg tablet Take 1 tablet (5 mg total) by mouth 2 (two) times a day 60 tablet 5    famotidine (PEPCID) 40 MG tablet TAKE 1 TABLET TWICE DAILY (Patient taking differently: Take 40 mg by mouth daily) 180 tablet 2    fluticasone (FLONASE) 50 mcg/act nasal spray 2 sprays into each nostril once for 1 dose OD 48 mL 1    hydrOXYzine HCL (ATARAX) 10 mg tablet Take 10 mg by mouth daily at bedtime      latanoprost (XALATAN) 0.005 % ophthalmic solution Administer 1 drop to both eyes daily at bedtime      levothyroxine 100 mcg tablet TAKE 1 TABLET EVERY DAY (Patient taking differently: Take 100 mcg by mouth every morning) 90 tablet 3    ondansetron (ZOFRAN-ODT) 4 mg disintegrating tablet Take 1 tablet (4 mg total) by mouth every 8 (eight) hours 60 tablet 1    pantoprazole (PROTONIX) 40 mg tablet Take 1 tablet (40 mg total) by mouth daily 90 tablet 2    pregabalin (LYRICA) 150 mg capsule Take 1 capsule (150 mg total) by mouth 2 (two) times a day 90 capsule 3    rivaroxaban (Xarelto) 20 mg tablet Take 1 tablet (20 mg total) by mouth every evening 90 tablet 1    rOPINIRole (REQUIP) 1 mg tablet TAKE 1 TABLET AT 6PM AND TAKE 2 TABLETS AT BEDTIME (Patient taking differently: Take 1 mg by mouth daily at bedtime TAKE 1 TABLET AT 6PM AND TAKE 2 TABLETS AT BEDTIME) 270 tablet 3    timolol (TIMOPTIC) 0.5 % ophthalmic solution Administer to both eyes 2 (two) times a day       torsemide (DEMADEX) 10 mg tablet TAKE 1 TABLET EVERY DAY 90 tablet 1    vortioxetine (TRINTELLIX) 10 MG tablet Take 1 tablet (10 mg total) by mouth daily (Patient taking differently: Take 20 mg by mouth every morning) 30 tablet 5     No current facility-administered medications for this  visit.        Allergies   Allergen Reactions    Benzalkonium Chloride Hives     Merthiolate tincture 9/28/2020      Hydromorphone Hallucinations     disorientation; hallucination; confusion      Merthiolate  [Thimerosal (Thiomersal)] Hives    Quinine Derivatives Other (See Comments) and Tinnitus     tinnitus      Codeine Nausea Only and Other (See Comments)     nausea      Medical Tape Rash    Other Itching    Pamelor [Nortriptyline] Vomiting     Per pt, itching also    Pollen Extract Nasal Congestion    Statins Itching    Wound Dressing Adhesive Rash       Review of Systems    Video Exam    There were no vitals filed for this visit.    Physical Exam     Visit Time    04/01/24  Start Time: 1504  Stop Time: 1544  Total Visit Time: 40 minutes

## 2024-04-01 NOTE — BH TREATMENT PLAN
"Outpatient Behavioral Health Psychotherapy Treatment Plan    Madeleine Sanchez  1945     Date of Initial Psychotherapy Assessment: 3/29/2024   Date of Current Treatment Plan: 04/01/24  Treatment Plan Target Date: 10/1/2024  Treatment Plan Expiration Date: 10/1/2024    Diagnosis:   1. Adjustment disorder with mixed anxiety and depressed mood            Area(s) of Need: Madeleine reports that she wants to work on feeling like herself more. She explains that she cannot get herself together and leave home. She says she would like to work towards getting out more.    Long Term Goal 1 (in the client's own words): \"I want to feel more like myself so I can get out and do things.\"     Stage of Change: Preparation    Target Date for completion: 10/1/2024     Anticipated therapeutic modalities: Insight-oriented therapy, person-centered approach, talk therapy, CBT techniques, supportive therapy, and solution-focused approach.      People identified to complete this goal: Client and Therapist      Objective 1: (identify the means of measuring success in meeting the objective): Madeleine will identify and replace cognitive self-talk that is engaged in to support anxiety and depression.      Objective 2: (identify the means of measuring success in meeting the objective): Madeleine will utilize behavioral strategies to overcome depression.        Objective 3: (identify the means of measuring success in meeting the objective): Madeleine will learn and implement problem-solving and/or conflict resolution skills to resolve interpersonal problems.     Objective 4: (identify the means of measuring success in meeting the objective): Madeleine will increasingly verbalize hopeful and positive statements regarding self, others, and the future    I am currently under the care of a [facility] psychiatric provider: no    My [facility] psychiatric provider is: N/A    I am currently taking psychiatric medications: Yes, as prescribed    I feel that I will be " "ready for discharge from mental health care when I reach the following (measurable goal/objective): \"Just having a clearer head and some objectives.\"    For children and adults who have a legal guardian:   Has there been any change to custody orders and/or guardianship status? NA. If yes, attach updated documentation.    I have created my Crisis Plan and have been offered a copy of this plan    Behavioral Health Treatment Plan [facility]: Diagnosis and Treatment Plan explained to Madeleine Sanchez acknowledges an understanding of their diagnosis. Madeleine Sanchez agrees to this treatment plan.    I have been offered a copy of this Treatment Plan. yes      Madeleine Sanchez, 1945, actively participated in the creation of this treatment plan during a virtual session, using the [employer] platform.   Madeleine Sanchez  provided verbal consent on 4/1/2024 at 3:14 PM. The treatment plan was transcribed into the Electronic Health Record at a later time.                                                         "

## 2024-04-02 NOTE — BH CRISIS PLAN
Client Name: Madeleine Sanchez       Client YOB: 1945    [employer] Safety Plan      Creation Date: 4/1/24 Update Date: 4/1/25   Created By: Nupur Holden LCSW       Step 1: Warning Signs:   Warning Signs   I can't think straight            Step 2: Internal Coping Strategies:   Internal Coping Strategies   Turn the tv on            Step 3: People and social settings that provide distraction:   Name Contact Information   Fiorella (Sister) [phone]    [facility]           Step 4: People whom I can ask for help during a crisis:      Name Contact Information    Gustabo (Son) [phone]    Josie (daughter) [phone]      Step 5: Professionals or agencies I can contact during a crisis:      [employer]ican/Agency Name Phone Emergency Contact    Nupur Holden [phone] N/A      Local Emergency Department Emergency Department Phone Emergency Department [place]    [place] [address] [phone]        Crisis Phone Numbers:   Suicide Prevention Lifeline: Call or Text  941 Crisis Text Line: Text HOME to 635-291   Please note: Some [facility] do not have a separate number for Child/Adolescent specific crisis. If your county is not listed under Child/Adolescent, please call the adult number for your county      Adult Crisis Numbers: Child/Adolescent Crisis Numbers   [facility]: [phone] [facility]: [phone]   [facility]: [phone] [facility]: [phone]   [facility]: [phone] [place]: [phone]   [facility]: [phone] [place]: [phone]   [place]: [phone]   [facility]: [phone]   [facility]: [phone]   [facility]: [phone] (daytime) [phone] (after hours, weekends, holidays)      Step 6: Making the environment safer (plan for lethal means safety):   Patient did not identify any lethal methods: Yes     Optional: What is  "most important to me and worth living for?   \"I just want to keep on living. I still want to do things. I don't want to miss out on things. I love my children and I want to see them get on with their lives. I'd like to see my grandsons get . I want to get back to doing what I need to do.\"     [facility] Safety Plan. Erin Santana and Joe Galvan. Used with permission of the authors.           "

## 2024-04-08 ENCOUNTER — OFFICE VISIT (OUTPATIENT)
Dept: PSYCHIATRY | Facility: CLINIC | Age: 79
End: 2024-04-08

## 2024-04-08 DIAGNOSIS — F32.1 CURRENT MODERATE EPISODE OF MAJOR DEPRESSIVE DISORDER WITHOUT PRIOR EPISODE (HCC): Primary | ICD-10-CM

## 2024-04-08 DIAGNOSIS — F41.1 GAD (GENERALIZED ANXIETY DISORDER): ICD-10-CM

## 2024-04-08 RX ORDER — VORTIOXETINE 20 MG/1
20 TABLET, FILM COATED ORAL DAILY
COMMUNITY
Start: 2024-04-07

## 2024-04-08 RX ORDER — BUSPIRONE HYDROCHLORIDE 15 MG/1
15 TABLET ORAL 2 TIMES DAILY
COMMUNITY
Start: 2024-03-27
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
## 2024-04-15 ENCOUNTER — TELEMEDICINE (OUTPATIENT)
Dept: PSYCHIATRY | Facility: CLINIC | Age: 79
End: 2024-04-15
Payer: MEDICARE

## 2024-04-15 DIAGNOSIS — F43.23 ADJUSTMENT DISORDER WITH MIXED ANXIETY AND DEPRESSED MOOD: Primary | ICD-10-CM

## 2024-04-15 PROCEDURE — 90832 PSYTX W PT 30 MINUTES: CPT | Performed by: SOCIAL WORKER

## 2024-04-15 NOTE — PSYCH
DATA: Met with Madeleine for scheduled individual session. Topics of discussion included mood regulation and symptoms. Client shows evidence of utilizing  medication management  to manage mental health symptoms. During this session, this clinician used the following therapeutic modalities: engagement strategies, supportive psychotherapy, and client-centered therapy. Madeleine reports that she hasn't been doing too well since her last session. She reports that she had been sick for the past couple of days, but she is feeling a little better now. Madeleine tells therapist that she hasn't been motivated to do things. She says she's made herself do things but still didn't feel good doing them. Madeleine also says her medication has not been effective. Madeleine says she forgot to complete the self-care assessment because she did not check MyChart. Today therapist in Madeleine talk about her depression symptoms and her lack of motivation. She explains she does not know why she does not want to do things but expresses this is unlike her. Therapist introduces the idea of group therapy to Madeleine, and she says she's open to trying it. Therapist will complete referral for Madeleine to start group therapy. Between sessions therapist encourages Madeleine to complete self-care assessment..    ASSESSMENT: Madeleine presents with a depressed mood. Her affect is normal range and intensity, appropriate. Madeleine exhibits growing therapeutic rapport with this clinician. Madeleine continues to exhibit willingness to work on treatment goals and objectives. Madeleine presents with a minimal risk of suicide, minimal risk of self-harm, and minimal risk of harm to others.       PLAN: Madeleine will return in 18 days for the next scheduled session. Between sessions, Madeleine will complete self-care assessment and will report back during the next session re: successes and barriers. At the next session, this clinician will use engagement strategies, supportive psychotherapy,  client-centered therapy, and Motivational Interviewing to address Madeleine's mood management, in an effort to assist Madeleine with meeting treatment goals.   Virtual Regular Visit    Verification of patient location:    Patient is located at Home in the following state in which I hold an active license [place]      Assessment/Plan:    Problem List Items Addressed This Visit          Behavioral Health    Adjustment disorder with mixed anxiety and depressed mood - Primary       Goals addressed in session: Goal 1          Reason for visit is No chief complaint on file.       Encounter provider Nupur Holden LCSW    Provider [facility] [address]  [facility] [place] [address] [phone]      Recent Visits  No visits were found meeting these conditions.  Showing recent visits within past 7 days and meeting all other requirements  Future Appointments  No visits were found meeting these conditions.  Showing future appointments within next 150 days and meeting all other requirements       The patient was identified by name and date of birth. Madeleine Sanchez was informed that this is a telemedicine visit and that the visit is being conducted throughthe [employer] platform. She agrees to proceed..  My office door was closed. No one else was in the room.  She acknowledged consent and understanding of privacy and security of the video platform. The patient has agreed to participate and understands they can discontinue the visit at any time.    Patient is aware this is a billable service.     Subjective  Madeleine Sanchez is a 79 y.o. female who presents for an individual therapy session today .      HPI     Past Medical History:   Diagnosis Date    Bilateral pulmonary embolism (HCC)     Cancer (HCC)     left breast cancer    Cervical herniated disc     Chronic pain disorder     back pain    Chronic respiratory failure (HCC)     uses O2 at home with NC  "/5/19 no longer using/just CPAP    Colon polyp     CPAP (continuous positive airway pressure) dependence     Disease of thyroid gland     hypothyroid    DVT (deep venous thrombosis) ([facility]) 2020    right leg    Examination for, follow-up 11/03/2021    Family history of reaction to anesthesia     \"son and daughter hard time waking up and also PONV\"    Fibromyalgia, primary     GERD (gastroesophageal reflux disease)     Glaucoma     Hiatal hernia     History of palpitations     History of pneumonia     History of transfusion     Hyperlipidemia     Hypertension     Irregular heart beat     Migraine     Myocardial infarction (HCC)     pt sees cardilogist Dr Bennett [facility]/\"didnt realize had one, found on EKG before a surgery\"    OAB (overactive bladder)     Pollen allergies     Pulmonary embolism (HCC) 2019    Bilateral; on Xarelto    Restless leg     Risk for falls     Sleep apnea     Use of cane as ambulatory aid     occas    Uses brace     Mafo/left leg    Wears glasses        Past Surgical History:   Procedure Laterality Date    ADENOIDECTOMY      BAND HEMORRHOIDECTOMY      CARDIAC CATHETERIZATION      CARPAL TUNNEL RELEASE      CATARACT EXTRACTION Bilateral     COLONOSCOPY      FOOT SURGERY      pin implanted right toe    JOINT REPLACEMENT Bilateral     knees    LAMINECTOMY      LUMBAR EPIDURAL INJECTION      MASTECTOMY Bilateral     with reconstruction and implants    NISSEN FUNDOPLICATION      GA LNGTH/SHRT TENDON LEG/ANKLE 1 TENDON SPX Left 05/24/2021    Procedure: Sectioning peroneal tendons with lengthening/sectioning achilles tendon lower leg and ankle;  Surgeon: Fei Sifuentes DPM;  Location: [facility];  Service: Podiatry    REPLACEMENT TOTAL KNEE      SPINAL FUSION      TONSILLECTOMY      TUBAL LIGATION      TUMOR EXCISION      right forearm/benign    UPPER GASTROINTESTINAL ENDOSCOPY      VEIN LIGATION Right        Current Outpatient Medications   Medication Sig Dispense Refill    acetaminophen (TYLENOL) " 325 mg tablet Take 650 mg by mouth every 6 (six) hours as needed for mild pain      amLODIPine (NORVASC) 5 mg tablet Take 1 tablet (5 mg total) by mouth daily (Patient taking differently: Take 5 mg by mouth every morning) 90 tablet 3    ASPIRIN 81 PO Aspirin EC 81 MG Oral Tablet Delayed Release    Refills: 0       Active      brimonidine (ALPHAGAN P) 0.15 % ophthalmic solution Administer 1 drop to the right eye 2 (two) times a day 10 mL 1    buPROPion (WELLBUTRIN XL) 300 mg 24 hr tablet Take 300 mg by mouth every morning      busPIRone (BUSPAR) 15 mg tablet Take 15 mg by mouth 2 (two) times a day      famotidine (PEPCID) 40 MG tablet TAKE 1 TABLET TWICE DAILY (Patient taking differently: Take 40 mg by mouth daily) 180 tablet 2    fluticasone (FLONASE) 50 mcg/act nasal spray 2 sprays into each nostril once for 1 dose OD 48 mL 1    hydrOXYzine HCL (ATARAX) 10 mg tablet Take 30 mg by mouth daily at bedtime      latanoprost (XALATAN) 0.005 % ophthalmic solution Administer 1 drop to both eyes daily at bedtime      levothyroxine 100 mcg tablet TAKE 1 TABLET EVERY DAY (Patient taking differently: Take 100 mcg by mouth every morning) 90 tablet 3    ondansetron (ZOFRAN-ODT) 4 mg disintegrating tablet Take 1 tablet (4 mg total) by mouth every 8 (eight) hours 60 tablet 1    pantoprazole (PROTONIX) 40 mg tablet Take 1 tablet (40 mg total) by mouth daily 90 tablet 2    pregabalin (LYRICA) 150 mg capsule Take 1 capsule (150 mg total) by mouth 2 (two) times a day 90 capsule 3    rivaroxaban (Xarelto) 20 mg tablet Take 1 tablet (20 mg total) by mouth every evening 90 tablet 1    rOPINIRole (REQUIP) 1 mg tablet TAKE 1 TABLET AT 6PM AND TAKE 2 TABLETS AT BEDTIME (Patient taking differently: Take 1 mg by mouth daily at bedtime TAKE 1 TABLET AT 6PM AND TAKE 2 TABLETS AT BEDTIME) 270 tablet 3    timolol (TIMOPTIC) 0.5 % ophthalmic solution Administer to both eyes 2 (two) times a day       torsemide (DEMADEX) 10 mg tablet TAKE 1 TABLET  EVERY DAY 90 tablet 1    Trintellix 20 MG tablet Take 20 mg by mouth daily       No current facility-administered medications for this visit.        Allergies   Allergen Reactions    Benzalkonium Chloride Hives     Merthiolate tincture 9/28/2020      Hydromorphone Hallucinations     disorientation; hallucination; confusion      Merthiolate  [Thimerosal (Thiomersal)] Hives    Quinine Derivatives Other (See Comments) and Tinnitus     tinnitus      Codeine Nausea Only and Other (See Comments)     nausea      Medical Tape Rash    Other Itching    Pamelor [Nortriptyline] Vomiting     Per pt, itching also    Pollen Extract Nasal Congestion    Statins Itching    Wound Dressing Adhesive Rash       Review of Systems    Video Exam    There were no vitals filed for this visit.    Physical Exam     Visit Time    04/15/24  Start Time: 1504  Stop Time: 1527  Total Visit Time: 23 minutes

## 2024-04-17 ENCOUNTER — TELEPHONE (OUTPATIENT)
Dept: ADMINISTRATIVE | Facility: OTHER | Age: 79
End: 2024-04-17

## 2024-04-17 NOTE — TELEPHONE ENCOUNTER
04/17/24 2:48 PM    Patient contacted (left message) to bring Advance Directive, POLST, or Living Will document to next scheduled pcp visit.    Thank you.  Tamar Owens  [facility]

## 2024-05-02 ENCOUNTER — OFFICE VISIT (OUTPATIENT)
Dept: GASTROENTEROLOGY | Facility: CLINIC | Age: 79
End: 2024-05-02
Payer: MEDICARE

## 2024-05-02 VITALS
WEIGHT: 188 LBS | HEART RATE: 74 BPM | HEIGHT: 63 IN | BODY MASS INDEX: 33.31 KG/M2 | DIASTOLIC BLOOD PRESSURE: 68 MMHG | SYSTOLIC BLOOD PRESSURE: 125 MMHG

## 2024-05-02 DIAGNOSIS — K64.8 INTERNAL HEMORRHOIDS: ICD-10-CM

## 2024-05-02 DIAGNOSIS — Z98.890 S/P LAPAROSCOPIC FUNDOPLICATION: ICD-10-CM

## 2024-05-02 DIAGNOSIS — R13.10 DYSPHAGIA, UNSPECIFIED TYPE: Primary | ICD-10-CM

## 2024-05-02 DIAGNOSIS — K62.5 RECTAL BLEEDING: ICD-10-CM

## 2024-05-02 DIAGNOSIS — R68.81 EARLY SATIETY: ICD-10-CM

## 2024-05-02 PROCEDURE — 99213 OFFICE O/P EST LOW 20 MIN: CPT | Performed by: INTERNAL MEDICINE

## 2024-05-02 PROCEDURE — 46221 LIGATION OF HEMORRHOID(S): CPT | Performed by: INTERNAL MEDICINE

## 2024-05-02 NOTE — PROGRESS NOTES
[facility] - Outpatient Follow-up Note  Madeleine Sanchez 79 y.o. female MRN: [id number]  Encounter: [id number]          ASSESSMENT AND PLAN:      1. Dysphagia, unspecified type  2. Early satiety  3. S/P laparoscopic fundoplication  Ongoing symptoms may be related to hiatal hernia though gastroparesis may also be contributing.  Will continue to eat slowly and we will evaluate further with gastric emptying scan as scheduled    4. Internal hemorrhoids  5. Rectal bleeding    Lower Endoscopy    Date/Time: 5/2/2024 9:40 AM    Performed by: Viral Mckeon MD  Authorized by: Viral Mckeon MD    Verbal consent obtained?: Yes    Scope type:  Anoscope  External exam performed: Yes    Perianal induration: No    Perianal erythema: No    External hemorrhoids: No    Digital exam performed: Yes    Internal hemorrhoids: Yes    Abscess: No    Anal Excision Procedures    Performed by: Viral Mckeon MD  Authorized by: Viral Mckeon MD    Universal Protocol:     Verbal consent obtained?: Yes      Risks and benefits: Risks, benefits and alternatives were discussed      Consent given by:  Patient    Patient states understanding of procedure being performed: Yes      Patient's understanding of procedure matches consent: Yes      Procedure consent matches procedure scheduled: Yes      Relevant documents present and verified: Yes      Patient identity confirmed:  Verbally with patient  A time out verifies correct patient, procedure, equipment, support staff and site/side marked as required:   Procedure Type: hemorrhoidectomy    Hemorrhoidectomy Details:   Hemorrhoid type: internal    Internal position:  Nine o'clock  Number of columns/groups removed:  1  Single rubber band ligation    Patient tolerance:  Patient tolerated the procedure well with no immediate complications  Additiional Procedure Intervention Details:  Right  "posterior        ______________________________________________________________________    SUBJECTIVE: Underwent recent EGD for empiric dilation as this has been helpful to some degree in the past for her dysphagia but was noted to have a significant amount of retained food in her hiatal hernia sac and this was aborted due to risk of aspiration.  Continues to have epigastric fullness and early satiety primarily with solid foods.  Scheduled to undergo gastric emptying scan.      REVIEW OF SYSTEMS:    ROS       Historical Information   Past Medical History:   Diagnosis Date    Bilateral pulmonary embolism (HCC)     Cancer (HCC)     left breast cancer    Cervical herniated disc     Chronic pain disorder     back pain    Chronic respiratory failure (HCC)     uses O2 at home with NC /5/19 no longer using/just CPAP    Colon polyp     CPAP (continuous positive airway pressure) dependence     Disease of thyroid gland     hypothyroid    DVT (deep venous thrombosis) ([facility]) 2020    right leg    Examination for, follow-up 11/03/2021    Family history of reaction to anesthesia     \"son and daughter hard time waking up and also PONV\"    Fibromyalgia, primary     GERD (gastroesophageal reflux disease)     Glaucoma     Hiatal hernia     History of palpitations     History of pneumonia     History of transfusion     Hyperlipidemia     Hypertension     Irregular heart beat     Migraine     Myocardial infarction (HCC)     pt sees cardilogist Dr Bennett [facility]/\"didnt realize had one, found on EKG before a surgery\"    OAB (overactive bladder)     Pollen allergies     Pulmonary embolism (HCC) 2019    Bilateral; on Xarelto    Restless leg     Risk for falls     Sleep apnea     Use of cane as ambulatory aid     occas    Uses brace     Mafo/left leg    Wears glasses      Past Surgical History:   Procedure Laterality Date    ADENOIDECTOMY      BAND HEMORRHOIDECTOMY      CARDIAC CATHETERIZATION      CARPAL TUNNEL RELEASE      CATARACT EXTRACTION " Bilateral     COLONOSCOPY      FOOT SURGERY      pin implanted right toe    JOINT REPLACEMENT Bilateral     knees    LAMINECTOMY      LUMBAR EPIDURAL INJECTION      MASTECTOMY Bilateral     with reconstruction and implants    NISSEN FUNDOPLICATION      AK LNGTH/SHRT TENDON LEG/ANKLE 1 TENDON SPX Left 05/24/2021    Procedure: Sectioning peroneal tendons with lengthening/sectioning achilles tendon lower leg and ankle;  Surgeon: Fei Sifuentes DPM;  Location: [facility];  Service: Podiatry    REPLACEMENT TOTAL KNEE      SPINAL FUSION      TONSILLECTOMY      TUBAL LIGATION      TUMOR EXCISION      right forearm/benign    UPPER GASTROINTESTINAL ENDOSCOPY      VEIN LIGATION Right      Social History   Social History     Substance and Sexual Activity   Alcohol Use Not Currently    Comment: socially     Social History     Substance and Sexual Activity   Drug Use Never     Social History     Tobacco Use   Smoking Status Never   Smokeless Tobacco Never     Family History   Problem Relation Age of Onset    Cancer Sister        Meds/Allergies       Current Outpatient Medications:     acetaminophen (TYLENOL) 325 mg tablet    amLODIPine (NORVASC) 5 mg tablet    ASPIRIN 81 PO    brimonidine (ALPHAGAN P) 0.15 % ophthalmic solution    buPROPion (WELLBUTRIN XL) 300 mg 24 hr tablet    busPIRone (BUSPAR) 15 mg tablet    famotidine (PEPCID) 40 MG tablet    fluticasone (FLONASE) 50 mcg/act nasal spray    hydrOXYzine HCL (ATARAX) 10 mg tablet    latanoprost (XALATAN) 0.005 % ophthalmic solution    levothyroxine 100 mcg tablet    ondansetron (ZOFRAN-ODT) 4 mg disintegrating tablet    pantoprazole (PROTONIX) 40 mg tablet    pregabalin (LYRICA) 150 mg capsule    rivaroxaban (Xarelto) 20 mg tablet    rOPINIRole (REQUIP) 1 mg tablet    timolol (TIMOPTIC) 0.5 % ophthalmic solution    torsemide (DEMADEX) 10 mg tablet    Trintellix 20 MG tablet    Allergies   Allergen Reactions    Benzalkonium Chloride Hives     Merthiolate tincture  "9/28/2020      Hydromorphone Hallucinations     disorientation; hallucination; confusion      Merthiolate  [Thimerosal (Thiomersal)] Hives    Quinine Derivatives Other (See Comments) and Tinnitus     tinnitus      Codeine Nausea Only and Other (See Comments)     nausea      Medical Tape Rash    Other Itching    Pamelor [Nortriptyline] Vomiting     Per pt, itching also    Pollen Extract Nasal Congestion    Statins Itching    Wound Dressing Adhesive Rash           Objective     Blood pressure 125/68, pulse 74, height 5' 3\" (1.6 m), weight 85.3 kg (188 lb). Body mass index is 33.3 kg/m².      PHYSICAL EXAM:      Physical Exam     Lab Results:   No visits with results within 1 Day(s) from this visit.   Latest known visit with results is:   Admission on 01/07/2024, Discharged on 01/08/2024   Component Date Value    WBC 01/07/2024 9.25     RBC 01/07/2024 4.53     Hemoglobin 01/07/2024 13.3     Hematocrit 01/07/2024 41.0     MCV 01/07/2024 91     MCH 01/07/2024 29.4     MCHC 01/07/2024 32.4     RDW 01/07/2024 13.4     MPV 01/07/2024 10.5     Platelets 01/07/2024 289     nRBC 01/07/2024 0     Segmented % 01/07/2024 72     Immature Grans % 01/07/2024 1     Lymphocytes % 01/07/2024 17     Monocytes % 01/07/2024 10     Eosinophils Relative 01/07/2024 0     Basophils Relative 01/07/2024 0     Absolute Neutrophils 01/07/2024 6.67     Absolute Immature Grans 01/07/2024 0.05     Absolute Lymphocytes 01/07/2024 1.54     Absolute Monocytes 01/07/2024 0.95     Eosinophils Absolute 01/07/2024 0.01     Basophils Absolute 01/07/2024 0.03     Sodium 01/07/2024 137     Potassium 01/07/2024 3.6     Chloride 01/07/2024 104     CO2 01/07/2024 23     ANION GAP 01/07/2024 10     BUN 01/07/2024 15     Creatinine 01/07/2024 0.67     Glucose 01/07/2024 123     Calcium 01/07/2024 8.9     AST 01/07/2024 17     ALT 01/07/2024 18     Alkaline Phosphatase 01/07/2024 138 (H)     Total Protein 01/07/2024 7.3     Albumin 01/07/2024 3.9     Total " Bilirubin 01/07/2024 0.56     eGFR 01/07/2024 83     hs TnI 0hr 01/07/2024 3     BNP 01/07/2024 187 (H)     SARS-CoV-2 01/07/2024 Positive (A)     INFLUENZA A PCR 01/07/2024 Negative     INFLUENZA B PCR 01/07/2024 Negative     RSV PCR 01/07/2024 Negative     Procalcitonin 01/07/2024 0.17     Ventricular Rate 01/07/2024 78     Atrial Rate 01/07/2024 78     KS Interval 01/07/2024 142     QRSD Interval 01/07/2024 78     QT Interval 01/07/2024 404     QTC Interval 01/07/2024 460     P Axis 01/07/2024 44     QRS [place] 01/07/2024 -9     T Wave [place] 01/07/2024 131     D-Dimer, Quant 01/07/2024 1.16 (H)     TSH 3RD GENERATON 01/07/2024 1.003     Sodium 01/08/2024 138     Potassium 01/08/2024 4.0     Chloride 01/08/2024 107     CO2 01/08/2024 23     ANION GAP 01/08/2024 8     BUN 01/08/2024 14     Creatinine 01/08/2024 0.61     Glucose 01/08/2024 131     Calcium 01/08/2024 8.9     eGFR 01/08/2024 86     WBC 01/08/2024 7.13     RBC 01/08/2024 4.10     Hemoglobin 01/08/2024 12.0     Hematocrit 01/08/2024 38.2     MCV 01/08/2024 93     MCH 01/08/2024 29.3     MCHC 01/08/2024 31.4     RDW 01/08/2024 13.5     Platelets 01/08/2024 270     MPV 01/08/2024 11.3     D-Dimer, Quant 01/08/2024 0.49          Radiology Results:   No results found.

## 2024-05-03 ENCOUNTER — TELEMEDICINE (OUTPATIENT)
Dept: PSYCHIATRY | Facility: CLINIC | Age: 79
End: 2024-05-03

## 2024-05-03 DIAGNOSIS — Z91.199 NO-SHOW FOR APPOINTMENT: Primary | ICD-10-CM

## 2024-05-03 NOTE — PSYCH
No Call. No Show. No Charge    Madeleine Sanchez no showed 05/03/24 appointment , staff called and left message to reschedule appointment     Treatment Plan not due at this session.

## 2024-05-06 ENCOUNTER — OFFICE VISIT (OUTPATIENT)
Dept: PSYCHIATRY | Facility: CLINIC | Age: 79
End: 2024-05-06
Payer: MEDICARE

## 2024-05-06 DIAGNOSIS — F41.1 GAD (GENERALIZED ANXIETY DISORDER): ICD-10-CM

## 2024-05-06 DIAGNOSIS — I26.99 BILATERAL PULMONARY EMBOLISM (HCC): ICD-10-CM

## 2024-05-06 DIAGNOSIS — F43.23 ADJUSTMENT DISORDER WITH MIXED ANXIETY AND DEPRESSED MOOD: Primary | ICD-10-CM

## 2024-05-06 PROCEDURE — G2211 COMPLEX E/M VISIT ADD ON: HCPCS | Performed by: PHYSICIAN ASSISTANT

## 2024-05-06 PROCEDURE — 99214 OFFICE O/P EST MOD 30 MIN: CPT | Performed by: PHYSICIAN ASSISTANT

## 2024-05-06 RX ORDER — BUPROPION HYDROCHLORIDE 300 MG/1
300 TABLET ORAL EVERY MORNING
Qty: 30 TABLET | Refills: 1 | Status: SHIPPED | OUTPATIENT
Start: 2024-05-06

## 2024-05-06 NOTE — PSYCH
"This note was not shared with the patient due to reasonable likelihood of causing patient harm    PROGRESS NOTE        [facility]      Name and Date of Birth:  Madeleine Sanchez 79 y.o. 1945 MRN: [id number]    Insurance: Payor: MEDICARE / Plan: MEDICARE A AND B / Product Type: Medicare A & B Fee for Service /     Date of Visit: May 6, 2024    Reason for Visit:   Chief Complaint   Patient presents with    Follow-up    Medication Management       SUBJECTIVE:    Madeleine Sanchez is a sean 79 y.o. female with a history of Major Depressive Disorder and Generalized Anxiety Disorder who presents today for follow-up and medication management. Since her last visit she feels that she is slightly less \"out of it\" since decreasing the Trintellix dose back to 20 mg qd. However, she does not feel the nausea has changed at all. Her mood also has not changed and she continues to feel down. She is feeling groggy in the AM and is wondering if it is due to the hydroxyzine. She notes her sleep has been variable. Due to the nausea the appetite has also been poor. She also reports low energy. She has a test tomorrow to see if there are any GI issues but her GI doc did mention the nausea may be related to a hiatal hernia.     She denies any suicidal ideation, intent or plan at present; denies any homicidal ideation, intent or plan at present.    She denies any auditory hallucinations, denies any visual hallucinations, denies any delusions.    She still reports nausea.    HPI ROS Appetite Changes and Sleep:     She reports fluctuating sleep pattern, decreased appetite, low energy    Current Rating Scores:     None completed today.    Review Of Systems:    Mood depression   Behavior appropriate and cooperative   Thought Content negative thoughts   General emotional problems, sleep disturbances, and appetite disturbances   Personality normal   Other Psych Symptoms normal   Constitutional " as noted in HPI   ENT as noted in HPI   Cardiovascular as noted in HPI   Respiratory as noted in HPI   Gastrointestinal as noted in HPI   Genitourinary as noted in HPI   Musculoskeletal as noted in HPI   Integumentary as noted in HPI   Neurological as noted in HPI   Endocrine negative   Other Symptoms none, all other systems are negative     Family Psychiatric History:     Family History   Problem Relation Age of Onset    Cancer Sister        Social/Substance Abuse History:    Social History     Socioeconomic History    Marital status:      Spouse name: Not on file    Number of children: Not on file    Years of education: Not on file    Highest education level: Not on file   Occupational History    Not on file   Tobacco Use    Smoking status: Never    Smokeless tobacco: Never   Vaping Use    Vaping status: Never Used   Substance and Sexual Activity    Alcohol use: Not Currently     Comment: socially    Drug use: Never    Sexual activity: Not on file     Comment: defer   Other Topics Concern    Not on file   Social History Narrative    · Most recent tobacco use screenin2019      Social Determinants of Health     Financial Resource Strain: Low Risk  (2023)    Received from [facility]    Overall Financial Resource Strain (CARDIA)     Difficulty of Paying Living Expenses: Not very hard   Recent Concern: Financial Resource Strain - Medium Risk (2023)    Overall Financial Resource Strain (CARDIA)     Difficulty of Paying Living Expenses: Somewhat hard   Food Insecurity: No Food Insecurity (2023)    Hunger Vital Sign     Worried About Running Out of Food in the Last Year: Never true     Ran Out of Food in the Last Year: Never true   Transportation Needs: No Transportation Needs (2023)    PRAPARE - Transportation     Lack of Transportation (Medical): No     Lack of Transportation (Non-Medical): No   Physical Activity: Not on file   Stress: Not on file   Social  Connections: Not on file   Intimate Partner Violence: Not At Risk (9/14/2023)    Received from [facility]    Humiliation, Afraid, Rape, and Kick questionnaire     Fear of Current or Ex-Partner: No     Emotionally Abused: No     Physically Abused: No     Sexually Abused: No   Housing Stability: Unknown (12/28/2023)    Housing Stability Vital Sign     Unable to Pay for Housing in the Last Year: No     Number of Places Lived in the Last Year: Not on file     Unstable Housing in the Last Year: No       The following portions of the patient's history were reviewed and updated as appropriate: past family history, past medical history, past social history, past surgical history and problem list.    OBJECTIVE:     Mental Status Evaluation:  Appearance:  dressed appropriately, adequate grooming, looks stated age   Behavior:  pleasant, cooperative, calm, interacts appropriately with this writer   Speech:  normal rate, normal volume, normal pitch   Mood:  depressed   Affect:  constricted   Thought Process:  organized, logical, coherent   Associations: intact associations   Thought Content:  no overt delusions, no paranoia noted on exam   Perceptual Disturbances: no auditory hallucinations, no visual hallucinations   Risk Potential: Suicidal ideation - None  Homicidal ideation - None  Potential for aggression - No   Sensorium:  oriented to person, place, and time/date   Memory:  recent and remote memory grossly intact   Consciousness:  alert and awake   Attention/Concentration: attention span and concentration are age appropriate   Insight:  age appropriate   Judgment: age appropriate   Gait/Station: unstable gait, uses cane   Motor Activity: no abnormal movements     Laboratory Results: I have personally reviewed all pertinent laboratory/tests results    Suicide/Homicide Risk Assessment:    Risk of Harm to Self:  The following ratings are based on assessment at the time of the interview  Based on today's  assessment, Madeleine presents the following risk of harm to self: none    Risk of Harm to Others:  The following ratings are based on assessment at the time of the interview  Based on today's assessment, Madeleine presents the following risk of harm to others: none    The following interventions are recommended: no intervention changes needed, not applicable    Assessment/Plan:      Since her last visit she has been fair but is still struggling with nausea. I have advised decreasing the Trintellix further to 10 mg qd. She also notes she is feeling very groggy in the AM so she can decrease the hydroxyzine to 20 mg qhs. She will continue to work with Nupur Holden LCSW, as well. We have discussed their safety plan and pt agrees that if they experience unsafe thoughts that they will reach out to their supports including this office, the suicide hotline, and emergency services if necessary. Patient is aware of non-emergent and emergent mental health resources. They are able to contract for their own safety at this time.    Will follow up in 1 months. Patient is aware to call the office if questions or concerns arise sooner.      Diagnoses and all orders for this visit:    Adjustment disorder with mixed anxiety and depressed mood  -     vortioxetine (Trintellix) 10 MG tablet; Take 1 tablet (10 mg total) by mouth daily  -     buPROPion (WELLBUTRIN XL) 300 mg 24 hr tablet; Take 1 tablet (300 mg total) by mouth every morning    BRINDA (generalized anxiety disorder)  -     vortioxetine (Trintellix) 10 MG tablet; Take 1 tablet (10 mg total) by mouth daily  -     buPROPion (WELLBUTRIN XL) 300 mg 24 hr tablet; Take 1 tablet (300 mg total) by mouth every morning          Treatment Recommendations/Precautions:    Continue current medications:     - buspirone 15 mg BID     - bupropion  mg qAM     Decrease medications:     - Trintellix 20 mg qd to 10 mg qd    - hydroxyzine 30 mg qhs to 20 mg qhs    Medication management every 4  weeks  Aware of 24 hour and weekend coverage for urgent situations accessed by calling [facility] main practice number  I am scheduling this patient out for greater than 3 months: No    Medications Risks/Benefits      Risks, Benefits And Possible Side Effects Of Medications:    Risks, benefits, and possible side effects of medications explained to Madeleine and she verbalizes understanding and agreement for treatment.    Controlled Medication Discussion:     Not applicable    Psychotherapy Provided:     Individual psychotherapy provided: No    Treatment Plan:    Completed and signed during the session: Not applicable - Treatment Plan to be completed by [facility] therapist    Visit Time    Visit Start Time:  1:30 PM  Visit End Time:  1:55 PM  Total Visit Duration:  25 minutes    Irma Batista PA-C 05/06/24

## 2024-05-07 ENCOUNTER — TELEPHONE (OUTPATIENT)
Dept: PSYCHIATRY | Facility: CLINIC | Age: 79
End: 2024-05-07

## 2024-05-07 ENCOUNTER — HOSPITAL ENCOUNTER (OUTPATIENT)
Dept: RADIOLOGY | Facility: HOSPITAL | Age: 79
Discharge: HOME | End: 2024-05-07
Attending: INTERNAL MEDICINE
Payer: MEDICARE

## 2024-05-07 DIAGNOSIS — R68.81 EARLY SATIETY: ICD-10-CM

## 2024-05-07 PROCEDURE — A9541 TC99M SULFUR COLLOID: HCPCS

## 2024-05-07 PROCEDURE — 78264 GASTRIC EMPTYING IMG STUDY: CPT

## 2024-05-07 RX ORDER — RIVAROXABAN 20 MG/1
20 TABLET, FILM COATED ORAL EVERY EVENING
Qty: 90 TABLET | Refills: 1 | Status: SHIPPED | OUTPATIENT
Start: 2024-05-07

## 2024-05-09 DIAGNOSIS — M79.89 SWELLING OF LIMB: ICD-10-CM

## 2024-05-09 RX ORDER — TORSEMIDE 10 MG/1
10 TABLET ORAL DAILY
Qty: 90 TABLET | Refills: 1 | Status: SHIPPED | OUTPATIENT
Start: 2024-05-09

## 2024-05-23 DIAGNOSIS — F41.1 GAD (GENERALIZED ANXIETY DISORDER): Primary | ICD-10-CM

## 2024-05-23 RX ORDER — BUSPIRONE HYDROCHLORIDE 15 MG/1
15 TABLET ORAL 2 TIMES DAILY
Qty: 60 TABLET | Refills: 1 | Status: SHIPPED | OUTPATIENT
Start: 2024-05-23 | End: 2024-07-22

## 2024-05-23 NOTE — TELEPHONE ENCOUNTER
Medication Refill Request     Name of Medication   busPIRone (BUSPAR) 15 mg tablet   Dose/Frequency   Take 15 mg by mouth 2 (two) times a day   Quantity  15  Verified pharmacy   [x]  Verified ordering Provider   [x]  Does patient have enough for the next 3 days? Yes [] No [x]  Does patient have a follow-up appointment scheduled? Yes [x] No [] Pt of Irma   If so when is appointment: 06/18/24 @ 2 pm

## 2024-05-31 ENCOUNTER — TELEPHONE (OUTPATIENT)
Age: 79
End: 2024-05-31

## 2024-05-31 NOTE — TELEPHONE ENCOUNTER
Patient needs a order for a order for  Repair of the Leg Brace.      Fax order to [facility] at  [phone]     Please advise

## 2024-06-04 NOTE — TELEPHONE ENCOUNTER
Called [facility] at [phone] to get additional information. Advised they call back to verify if they will be faxing office an order form for pt repair of leg brace or if we will have to fax a paper copy to their office first (and what exactly is to be written on paper Rx?) Left callback number x 2

## 2024-06-05 ENCOUNTER — APPOINTMENT (EMERGENCY)
Dept: RADIOLOGY | Facility: HOSPITAL | Age: 79
DRG: 641 | End: 2024-06-05
Payer: MEDICARE

## 2024-06-05 ENCOUNTER — APPOINTMENT (EMERGENCY)
Dept: CT IMAGING | Facility: HOSPITAL | Age: 79
DRG: 641 | End: 2024-06-05
Payer: MEDICARE

## 2024-06-05 ENCOUNTER — HOSPITAL ENCOUNTER (INPATIENT)
Facility: HOSPITAL | Age: 79
LOS: 2 days | Discharge: HOME | DRG: 641 | End: 2024-06-08
Attending: EMERGENCY MEDICINE | Admitting: INTERNAL MEDICINE
Payer: MEDICARE

## 2024-06-05 ENCOUNTER — NURSE TRIAGE (OUTPATIENT)
Age: 79
End: 2024-06-05

## 2024-06-05 DIAGNOSIS — E53.8 VITAMIN B 12 DEFICIENCY: ICD-10-CM

## 2024-06-05 DIAGNOSIS — R55 SYNCOPE: Primary | ICD-10-CM

## 2024-06-05 PROBLEM — R19.7 DIARRHEA: Status: ACTIVE | Noted: 2024-06-05

## 2024-06-05 PROBLEM — R74.8 ELEVATED LIPASE: Status: ACTIVE | Noted: 2024-06-05

## 2024-06-05 PROBLEM — R51.9 HEADACHE: Status: ACTIVE | Noted: 2024-06-05

## 2024-06-05 LAB
2HR DELTA HS TROPONIN: 0 NG/L
ALBUMIN SERPL BCP-MCNC: 3.8 G/DL (ref 3.5–5)
ALP SERPL-CCNC: 123 U/L (ref 34–104)
ALT SERPL W P-5'-P-CCNC: 16 U/L (ref 7–52)
ANION GAP SERPL CALCULATED.3IONS-SCNC: 9 MMOL/L (ref 4–13)
APTT PPP: 35 SECONDS (ref 23–37)
AST SERPL W P-5'-P-CCNC: 14 U/L (ref 13–39)
BACTERIA UR QL AUTO: NORMAL /HPF
BASOPHILS # BLD AUTO: 0.03 THOUSANDS/ÂΜL (ref 0–0.1)
BASOPHILS NFR BLD AUTO: 0 % (ref 0–1)
BILIRUB SERPL-MCNC: 0.63 MG/DL (ref 0.2–1)
BILIRUB UR QL STRIP: NEGATIVE
BNP SERPL-MCNC: 204 PG/ML (ref 0–100)
BUN SERPL-MCNC: 12 MG/DL (ref 5–25)
CALCIUM SERPL-MCNC: 8.7 MG/DL (ref 8.4–10.2)
CARDIAC TROPONIN I PNL SERPL HS: 3 NG/L
CARDIAC TROPONIN I PNL SERPL HS: 3 NG/L
CHLORIDE SERPL-SCNC: 107 MMOL/L (ref 96–108)
CLARITY UR: CLEAR
CO2 SERPL-SCNC: 25 MMOL/L (ref 21–32)
COLOR UR: YELLOW
CREAT SERPL-MCNC: 0.77 MG/DL (ref 0.6–1.3)
EOSINOPHIL # BLD AUTO: 0.3 THOUSAND/ÂΜL (ref 0–0.61)
EOSINOPHIL NFR BLD AUTO: 4 % (ref 0–6)
ERYTHROCYTE [DISTWIDTH] IN BLOOD BY AUTOMATED COUNT: 13.3 % (ref 11.6–15.1)
FLUAV RNA RESP QL NAA+PROBE: NEGATIVE
FLUBV RNA RESP QL NAA+PROBE: NEGATIVE
GFR SERPL CREATININE-BSD FRML MDRD: 73 ML/MIN/1.73SQ M
GLUCOSE SERPL-MCNC: 96 MG/DL (ref 65–140)
GLUCOSE UR STRIP-MCNC: NEGATIVE MG/DL
HCT VFR BLD AUTO: 41.8 % (ref 34.8–46.1)
HGB BLD-MCNC: 13.6 G/DL (ref 11.5–15.4)
HGB UR QL STRIP.AUTO: NEGATIVE
IMM GRANULOCYTES # BLD AUTO: 0.03 THOUSAND/UL (ref 0–0.2)
IMM GRANULOCYTES NFR BLD AUTO: 0 % (ref 0–2)
INR PPP: 1.64 (ref 0.84–1.19)
KETONES UR STRIP-MCNC: NEGATIVE MG/DL
LACTATE SERPL-SCNC: 1.2 MMOL/L (ref 0.5–2)
LEUKOCYTE ESTERASE UR QL STRIP: ABNORMAL
LIPASE SERPL-CCNC: 415 U/L (ref 11–82)
LYMPHOCYTES # BLD AUTO: 1.53 THOUSANDS/ÂΜL (ref 0.6–4.47)
LYMPHOCYTES NFR BLD AUTO: 20 % (ref 14–44)
MCH RBC QN AUTO: 29.8 PG (ref 26.8–34.3)
MCHC RBC AUTO-ENTMCNC: 32.5 G/DL (ref 31.4–37.4)
MCV RBC AUTO: 92 FL (ref 82–98)
MONOCYTES # BLD AUTO: 0.74 THOUSAND/ÂΜL (ref 0.17–1.22)
MONOCYTES NFR BLD AUTO: 10 % (ref 4–12)
NEUTROPHILS # BLD AUTO: 4.87 THOUSANDS/ÂΜL (ref 1.85–7.62)
NEUTS SEG NFR BLD AUTO: 66 % (ref 43–75)
NITRITE UR QL STRIP: NEGATIVE
NON-SQ EPI CELLS URNS QL MICRO: NORMAL /HPF
NRBC BLD AUTO-RTO: 0 /100 WBCS
PH UR STRIP.AUTO: 6 [PH]
PLATELET # BLD AUTO: 304 THOUSANDS/UL (ref 149–390)
PMV BLD AUTO: 10.4 FL (ref 8.9–12.7)
POTASSIUM SERPL-SCNC: 3.6 MMOL/L (ref 3.5–5.3)
PROCALCITONIN SERPL-MCNC: 0.16 NG/ML
PROT SERPL-MCNC: 7 G/DL (ref 6.4–8.4)
PROT UR STRIP-MCNC: NEGATIVE MG/DL
PROTHROMBIN TIME: 19.1 SECONDS (ref 11.6–14.5)
RBC # BLD AUTO: 4.56 MILLION/UL (ref 3.81–5.12)
RBC #/AREA URNS AUTO: NORMAL /HPF
RSV RNA RESP QL NAA+PROBE: NEGATIVE
SARS-COV-2 RNA RESP QL NAA+PROBE: NEGATIVE
SODIUM SERPL-SCNC: 141 MMOL/L (ref 135–147)
SP GR UR STRIP.AUTO: 1.01 (ref 1–1.03)
TSH SERPL DL<=0.05 MIU/L-ACNC: 0.79 UIU/ML (ref 0.45–4.5)
UROBILINOGEN UR QL STRIP.AUTO: 0.2 E.U./DL
WBC # BLD AUTO: 7.5 THOUSAND/UL (ref 4.31–10.16)
WBC #/AREA URNS AUTO: NORMAL /HPF

## 2024-06-05 PROCEDURE — 84443 ASSAY THYROID STIM HORMONE: CPT

## 2024-06-05 PROCEDURE — 94760 N-INVAS EAR/PLS OXIMETRY 1: CPT

## 2024-06-05 PROCEDURE — 99285 EMERGENCY DEPT VISIT HI MDM: CPT

## 2024-06-05 PROCEDURE — 84484 ASSAY OF TROPONIN QUANT: CPT | Performed by: EMERGENCY MEDICINE

## 2024-06-05 PROCEDURE — 71045 X-RAY EXAM CHEST 1 VIEW: CPT

## 2024-06-05 PROCEDURE — 83690 ASSAY OF LIPASE: CPT | Performed by: EMERGENCY MEDICINE

## 2024-06-05 PROCEDURE — 85610 PROTHROMBIN TIME: CPT | Performed by: EMERGENCY MEDICINE

## 2024-06-05 PROCEDURE — 36415 COLL VENOUS BLD VENIPUNCTURE: CPT | Performed by: EMERGENCY MEDICINE

## 2024-06-05 PROCEDURE — 74177 CT ABD & PELVIS W/CONTRAST: CPT

## 2024-06-05 PROCEDURE — 99223 1ST HOSP IP/OBS HIGH 75: CPT

## 2024-06-05 PROCEDURE — 80053 COMPREHEN METABOLIC PANEL: CPT | Performed by: EMERGENCY MEDICINE

## 2024-06-05 PROCEDURE — 83880 ASSAY OF NATRIURETIC PEPTIDE: CPT | Performed by: EMERGENCY MEDICINE

## 2024-06-05 PROCEDURE — 0241U HB NFCT DS VIR RESP RNA 4 TRGT: CPT | Performed by: EMERGENCY MEDICINE

## 2024-06-05 PROCEDURE — 99285 EMERGENCY DEPT VISIT HI MDM: CPT | Performed by: EMERGENCY MEDICINE

## 2024-06-05 PROCEDURE — 71260 CT THORAX DX C+: CPT

## 2024-06-05 PROCEDURE — 83605 ASSAY OF LACTIC ACID: CPT | Performed by: EMERGENCY MEDICINE

## 2024-06-05 PROCEDURE — 93005 ELECTROCARDIOGRAM TRACING: CPT

## 2024-06-05 PROCEDURE — 81001 URINALYSIS AUTO W/SCOPE: CPT | Performed by: EMERGENCY MEDICINE

## 2024-06-05 PROCEDURE — 70450 CT HEAD/BRAIN W/O DYE: CPT

## 2024-06-05 PROCEDURE — 87040 BLOOD CULTURE FOR BACTERIA: CPT | Performed by: EMERGENCY MEDICINE

## 2024-06-05 PROCEDURE — 85025 COMPLETE CBC W/AUTO DIFF WBC: CPT | Performed by: EMERGENCY MEDICINE

## 2024-06-05 PROCEDURE — 85730 THROMBOPLASTIN TIME PARTIAL: CPT | Performed by: EMERGENCY MEDICINE

## 2024-06-05 PROCEDURE — 84145 PROCALCITONIN (PCT): CPT | Performed by: EMERGENCY MEDICINE

## 2024-06-05 PROCEDURE — 87154 CUL TYP ID BLD PTHGN 6+ TRGT: CPT | Performed by: EMERGENCY MEDICINE

## 2024-06-05 PROCEDURE — 94660 CPAP INITIATION&MGMT: CPT

## 2024-06-05 RX ORDER — BUPROPION HYDROCHLORIDE 150 MG/1
300 TABLET ORAL EVERY MORNING
Status: DISCONTINUED | OUTPATIENT
Start: 2024-06-06 | End: 2024-06-08 | Stop reason: HOSPADM

## 2024-06-05 RX ORDER — MAGNESIUM SULFATE HEPTAHYDRATE 40 MG/ML
2 INJECTION, SOLUTION INTRAVENOUS EVERY 24 HOURS
Status: COMPLETED | OUTPATIENT
Start: 2024-06-05 | End: 2024-06-07

## 2024-06-05 RX ORDER — FAMOTIDINE 20 MG/1
40 TABLET, FILM COATED ORAL 2 TIMES DAILY
Status: DISCONTINUED | OUTPATIENT
Start: 2024-06-05 | End: 2024-06-05

## 2024-06-05 RX ORDER — ROPINIROLE 1 MG/1
1 TABLET, FILM COATED ORAL
Status: DISCONTINUED | OUTPATIENT
Start: 2024-06-05 | End: 2024-06-08 | Stop reason: HOSPADM

## 2024-06-05 RX ORDER — ACETAMINOPHEN 325 MG/1
650 TABLET ORAL ONCE
Status: COMPLETED | OUTPATIENT
Start: 2024-06-05 | End: 2024-06-05

## 2024-06-05 RX ORDER — LEVOTHYROXINE SODIUM 0.1 MG/1
100 TABLET ORAL DAILY
Status: DISCONTINUED | OUTPATIENT
Start: 2024-06-06 | End: 2024-06-08 | Stop reason: HOSPADM

## 2024-06-05 RX ORDER — HYDROXYZINE HYDROCHLORIDE 10 MG/1
30 TABLET, FILM COATED ORAL
Status: DISCONTINUED | OUTPATIENT
Start: 2024-06-05 | End: 2024-06-06

## 2024-06-05 RX ORDER — HEPARIN SODIUM 5000 [USP'U]/ML
5000 INJECTION, SOLUTION INTRAVENOUS; SUBCUTANEOUS EVERY 8 HOURS SCHEDULED
Status: DISCONTINUED | OUTPATIENT
Start: 2024-06-06 | End: 2024-06-05

## 2024-06-05 RX ORDER — LATANOPROST 50 UG/ML
1 SOLUTION/ DROPS OPHTHALMIC
Status: DISCONTINUED | OUTPATIENT
Start: 2024-06-05 | End: 2024-06-08 | Stop reason: HOSPADM

## 2024-06-05 RX ORDER — TIMOLOL MALEATE 5 MG/ML
1 SOLUTION/ DROPS OPHTHALMIC 2 TIMES DAILY
Status: DISCONTINUED | OUTPATIENT
Start: 2024-06-05 | End: 2024-06-08 | Stop reason: HOSPADM

## 2024-06-05 RX ORDER — BRIMONIDINE TARTRATE 2 MG/ML
1 SOLUTION/ DROPS OPHTHALMIC 2 TIMES DAILY
Status: DISCONTINUED | OUTPATIENT
Start: 2024-06-06 | End: 2024-06-08 | Stop reason: HOSPADM

## 2024-06-05 RX ORDER — PREGABALIN 75 MG/1
150 CAPSULE ORAL 2 TIMES DAILY
Status: DISCONTINUED | OUTPATIENT
Start: 2024-06-05 | End: 2024-06-08 | Stop reason: HOSPADM

## 2024-06-05 RX ORDER — ACETAMINOPHEN 325 MG/1
650 TABLET ORAL EVERY 6 HOURS PRN
Status: DISCONTINUED | OUTPATIENT
Start: 2024-06-05 | End: 2024-06-08 | Stop reason: HOSPADM

## 2024-06-05 RX ORDER — METOCLOPRAMIDE HYDROCHLORIDE 5 MG/ML
10 INJECTION INTRAMUSCULAR; INTRAVENOUS EVERY 8 HOURS SCHEDULED
Status: DISCONTINUED | OUTPATIENT
Start: 2024-06-05 | End: 2024-06-08 | Stop reason: HOSPADM

## 2024-06-05 RX ORDER — ASPIRIN 81 MG/1
81 TABLET, CHEWABLE ORAL DAILY
Status: DISCONTINUED | OUTPATIENT
Start: 2024-06-06 | End: 2024-06-08 | Stop reason: HOSPADM

## 2024-06-05 RX ORDER — FLUTICASONE PROPIONATE 50 MCG
2 SPRAY, SUSPENSION (ML) NASAL ONCE
Status: COMPLETED | OUTPATIENT
Start: 2024-06-05 | End: 2024-06-05

## 2024-06-05 RX ORDER — KETOROLAC TROMETHAMINE 30 MG/ML
15 INJECTION, SOLUTION INTRAMUSCULAR; INTRAVENOUS ONCE
Status: COMPLETED | OUTPATIENT
Start: 2024-06-05 | End: 2024-06-05

## 2024-06-05 RX ORDER — AMLODIPINE BESYLATE 5 MG/1
5 TABLET ORAL DAILY
Status: DISCONTINUED | OUTPATIENT
Start: 2024-06-06 | End: 2024-06-07

## 2024-06-05 RX ORDER — TORSEMIDE 10 MG/1
10 TABLET ORAL DAILY
Status: DISCONTINUED | OUTPATIENT
Start: 2024-06-06 | End: 2024-06-08 | Stop reason: HOSPADM

## 2024-06-05 RX ORDER — DIPHENHYDRAMINE HYDROCHLORIDE 50 MG/ML
25 INJECTION INTRAMUSCULAR; INTRAVENOUS EVERY 8 HOURS SCHEDULED
Status: DISCONTINUED | OUTPATIENT
Start: 2024-06-05 | End: 2024-06-06

## 2024-06-05 RX ORDER — FAMOTIDINE 20 MG/1
40 TABLET, FILM COATED ORAL
Status: DISCONTINUED | OUTPATIENT
Start: 2024-06-06 | End: 2024-06-08 | Stop reason: HOSPADM

## 2024-06-05 RX ORDER — SODIUM CHLORIDE 9 MG/ML
3 INJECTION INTRAVENOUS EVERY 8 HOURS SCHEDULED
Status: DISCONTINUED | OUTPATIENT
Start: 2024-06-05 | End: 2024-06-05

## 2024-06-05 RX ORDER — PANTOPRAZOLE SODIUM 40 MG/1
40 TABLET, DELAYED RELEASE ORAL DAILY
Status: DISCONTINUED | OUTPATIENT
Start: 2024-06-06 | End: 2024-06-08 | Stop reason: HOSPADM

## 2024-06-05 RX ORDER — SODIUM CHLORIDE, SODIUM LACTATE, POTASSIUM CHLORIDE, CALCIUM CHLORIDE 600; 310; 30; 20 MG/100ML; MG/100ML; MG/100ML; MG/100ML
75 INJECTION, SOLUTION INTRAVENOUS CONTINUOUS
Status: DISCONTINUED | OUTPATIENT
Start: 2024-06-05 | End: 2024-06-07

## 2024-06-05 RX ADMIN — MAGNESIUM SULFATE HEPTAHYDRATE 2 G: 40 INJECTION, SOLUTION INTRAVENOUS at 22:08

## 2024-06-05 RX ADMIN — PREGABALIN 150 MG: 75 CAPSULE ORAL at 21:55

## 2024-06-05 RX ADMIN — RIVAROXABAN 20 MG: 20 TABLET, FILM COATED ORAL at 21:56

## 2024-06-05 RX ADMIN — METOCLOPRAMIDE 10 MG: 5 INJECTION, SOLUTION INTRAMUSCULAR; INTRAVENOUS at 22:07

## 2024-06-05 RX ADMIN — SODIUM CHLORIDE, SODIUM LACTATE, POTASSIUM CHLORIDE, AND CALCIUM CHLORIDE 75 ML/HR: .6; .31; .03; .02 INJECTION, SOLUTION INTRAVENOUS at 20:58

## 2024-06-05 RX ADMIN — FLUTICASONE PROPIONATE 2 SPRAY: 50 SPRAY, METERED NASAL at 22:00

## 2024-06-05 RX ADMIN — BUSPIRONE HYDROCHLORIDE 15 MG: 10 TABLET ORAL at 21:55

## 2024-06-05 RX ADMIN — IOHEXOL 100 ML: 350 INJECTION, SOLUTION INTRAVENOUS at 15:02

## 2024-06-05 RX ADMIN — DIPHENHYDRAMINE HYDROCHLORIDE 25 MG: 50 INJECTION, SOLUTION INTRAMUSCULAR; INTRAVENOUS at 22:05

## 2024-06-05 RX ADMIN — HYDROXYZINE HYDROCHLORIDE 30 MG: 10 TABLET ORAL at 21:55

## 2024-06-05 RX ADMIN — ROPINIROLE HYDROCHLORIDE 1 MG: 1 TABLET, FILM COATED ORAL at 21:55

## 2024-06-05 RX ADMIN — ACETAMINOPHEN 650 MG: 325 TABLET, FILM COATED ORAL at 15:26

## 2024-06-05 RX ADMIN — KETOROLAC TROMETHAMINE 15 MG: 30 INJECTION, SOLUTION INTRAMUSCULAR at 22:04

## 2024-06-05 NOTE — PLAN OF CARE
Problem: NEUROSENSORY - ADULT  Goal: Achieves stable or improved neurological status  Description: INTERVENTIONS  - Monitor and report changes in neurological status  - Monitor vital signs such as temperature, blood pressure, glucose, and any other labs ordered   - Initiate measures to prevent increased intracranial pressure  - Monitor for seizure activity and implement precautions if appropriate      Outcome: Progressing  Goal: Achieves maximal functionality and self care  Description: INTERVENTIONS  - Monitor swallowing and airway patency with patient fatigue and changes in neurological status  - Encourage and assist patient to increase activity and self care.   - Encourage visually impaired, hearing impaired and aphasic patients to use assistive/communication devices  Outcome: Progressing     Problem: CARDIOVASCULAR - ADULT  Goal: Maintains optimal cardiac output and hemodynamic stability  Description: INTERVENTIONS:  - Monitor I/O, vital signs and rhythm  - Monitor for S/S and trends of decreased cardiac output  - Administer and titrate ordered vasoactive medications to optimize hemodynamic stability  - Assess quality of pulses, skin color and temperature  - Assess for signs of decreased coronary artery perfusion  - Instruct patient to report change in severity of symptoms  Outcome: Progressing

## 2024-06-05 NOTE — TELEPHONE ENCOUNTER
"Called [facility] again at number listed in previous message. Spoke w representative and they stated she is actually a pt of the [place] location but informed me paper Rx must be sent stating \"Repair and/or adjust brace\" -- is to be faxed to [phone]. Discussed w PCP and paper Rx faxed at this time w cover sheet. Confirmation received at this time.   "

## 2024-06-05 NOTE — ED PROVIDER NOTES
History  Chief Complaint   Patient presents with    Hypotension     Pt daughter reports episodes of hypotension and episodes of syncope over last couple days;      This is an 79-year-old female who presents to the emergency department for syncope.  As per her daughter the patient had a few episodes of passing out today.  She states that the patient has not been feeling well.  The patient states that she has been more lethargic lately.  She denies fevers but has had chills.  She has been eating and drinking well.  She has had minor chest pain.  She denies changes in bowel movements.  She does have more frequent urination.  She denies nausea or vomiting        Prior to Admission Medications   Prescriptions Last Dose Informant Patient Reported? Taking?   ASPIRIN 81 PO 2024 Self Yes Yes   Sig: Aspirin EC 81 MG Oral Tablet Delayed Release    Refills: 0       Active   Xarelto 20 MG tablet 2024  No Yes   Sig: TAKE ONE TABLET BY MOUTH EVERY EVENING   acetaminophen (TYLENOL) 325 mg tablet  Self Yes No   Sig: Take 650 mg by mouth every 6 (six) hours as needed for mild pain   amLODIPine (NORVASC) 5 mg tablet 2024 Self No Yes   Sig: Take 1 tablet (5 mg total) by mouth daily   Patient taking differently: Take 5 mg by mouth every morning   brimonidine (ALPHAGAN P) 0.15 % ophthalmic solution 2024  No Yes   Sig: Administer 1 drop to the right eye 2 (two) times a day   buPROPion (WELLBUTRIN XL) 300 mg 24 hr tablet 2024  No Yes   Sig: Take 1 tablet (300 mg total) by mouth every morning   busPIRone (BUSPAR) 15 mg tablet 2024  No Yes   Sig: Take 1 tablet (15 mg total) by mouth 2 (two) times a day   famotidine (PEPCID) 40 MG tablet 2024 Self No Yes   Sig: TAKE 1 TABLET TWICE DAILY   Patient taking differently: Take 40 mg by mouth daily   fluticasone (FLONASE) 50 mcg/act nasal spray   No No   Si sprays into each nostril once for 1 dose OD   hydrOXYzine HCL (ATARAX) 10 mg tablet 2024 Self Yes Yes   Sig:  "Take 30 mg by mouth daily at bedtime   latanoprost (XALATAN) 0.005 % ophthalmic solution 6/4/2024 Self Yes Yes   Sig: Administer 1 drop to both eyes daily at bedtime   levothyroxine 100 mcg tablet 6/5/2024 Self No Yes   Sig: TAKE 1 TABLET EVERY DAY   Patient taking differently: Take 100 mcg by mouth every morning   ondansetron (ZOFRAN-ODT) 4 mg disintegrating tablet 6/5/2024 Self No Yes   Sig: Take 1 tablet (4 mg total) by mouth every 8 (eight) hours   pantoprazole (PROTONIX) 40 mg tablet  Self No No   Sig: Take 1 tablet (40 mg total) by mouth daily   pregabalin (LYRICA) 150 mg capsule 6/5/2024 Self No Yes   Sig: Take 1 capsule (150 mg total) by mouth 2 (two) times a day   rOPINIRole (REQUIP) 1 mg tablet 6/4/2024 Self No Yes   Sig: TAKE 1 TABLET AT 6PM AND TAKE 2 TABLETS AT BEDTIME   Patient taking differently: Take 1 mg by mouth daily at bedtime TAKE 1 TABLET AT 6PM AND TAKE 2 TABLETS AT BEDTIME   timolol (TIMOPTIC) 0.5 % ophthalmic solution 6/4/2024 Self Yes Yes   Sig: Administer to both eyes 2 (two) times a day    torsemide (DEMADEX) 10 mg tablet 6/5/2024  No Yes   Sig: TAKE 1 TABLET EVERY DAY   vortioxetine (Trintellix) 10 MG tablet 6/5/2024  No Yes   Sig: Take 1 tablet (10 mg total) by mouth daily      Facility-Administered Medications: None       Past Medical History:   Diagnosis Date    Bilateral pulmonary embolism (HCC)     Cancer (HCC)     left breast cancer    Cervical herniated disc     Chronic pain disorder     back pain    Chronic respiratory failure (HCC)     uses O2 at home with NC /5/19 no longer using/just CPAP    Colon polyp     CPAP (continuous positive airway pressure) dependence     Disease of thyroid gland     hypothyroid    DVT (deep venous thrombosis) ([facility]) 2020    right leg    Examination for, follow-up 11/03/2021    Family history of reaction to anesthesia     \"son and daughter hard time waking up and also PONV\"    Fibromyalgia, primary     GERD (gastroesophageal reflux disease)     " "Glaucoma     Hiatal hernia     History of palpitations     History of pneumonia     History of transfusion     Hyperlipidemia     Hypertension     Irregular heart beat     Migraine     Myocardial infarction (HCC)     pt sees cardilogist Dr Bennett [facility]/\"didnt realize had one, found on EKG before a surgery\"    OAB (overactive bladder)     Pollen allergies     Pulmonary embolism (HCC) 2019    Bilateral; on Xarelto    Restless leg     Risk for falls     Sleep apnea     Use of cane as ambulatory aid     occas    Uses brace     Mafo/left leg    Wears glasses        Past Surgical History:   Procedure Laterality Date    ADENOIDECTOMY      BAND HEMORRHOIDECTOMY      CARDIAC CATHETERIZATION      CARPAL TUNNEL RELEASE      CATARACT EXTRACTION Bilateral     COLONOSCOPY      FOOT SURGERY      pin implanted right toe    JOINT REPLACEMENT Bilateral     knees    LAMINECTOMY      LUMBAR EPIDURAL INJECTION      MASTECTOMY Bilateral     with reconstruction and implants    NISSEN FUNDOPLICATION      OH LNGTH/SHRT TENDON LEG/ANKLE 1 TENDON SPX Left 05/24/2021    Procedure: Sectioning peroneal tendons with lengthening/sectioning achilles tendon lower leg and ankle;  Surgeon: Fei Sifuentes DPM;  Location: [facility];  Service: Podiatry    REPLACEMENT TOTAL KNEE      SPINAL FUSION      TONSILLECTOMY      TUBAL LIGATION      TUMOR EXCISION      right forearm/benign    UPPER GASTROINTESTINAL ENDOSCOPY      VEIN LIGATION Right        Family History   Problem Relation Age of Onset    Cancer Sister      I have reviewed and agree with the history as documented.    E-Cigarette/Vaping    E-Cigarette Use Never User      E-Cigarette/Vaping Substances    Nicotine No     THC No     CBD No     Flavoring No     Other No     Unknown No      Social History     Tobacco Use    Smoking status: Never    Smokeless tobacco: Never   Vaping Use    Vaping status: Never Used   Substance Use Topics    Alcohol use: Not Currently     Comment: socially    Drug " use: Never       Review of Systems   All other systems reviewed and are negative.      Physical Exam  Physical Exam  Constitutional:  Vital signs reviewed, patient appears nontoxic, appears tired  Eyes: Pupils equal round reactive to light and accommodation, extraocular muscles intact  HEENT: trachea midline, no JVD, moist mucous membranes  Respiratory: lung sounds clear throughout, no rhonchi, no rales  Cardiovascular: regular rate rhythm, no murmurs or rubs  Abdomen: soft, mild diffuse abdominal tenderness, nondistended, no rebound or guarding  Back: no CVA tenderness, normal inspection  Extremities: no edema, pulses equal in all 4 extremities  Neuro: awake, alert, oriented, no focal weakness  Skin: warm, dry, intact, no rashes noted    Vital Signs  ED Triage Vitals   Temperature Pulse Respirations Blood Pressure SpO2   06/05/24 1338 06/05/24 1336 06/05/24 1336 06/05/24 1336 06/05/24 1336   98.6 °F (37 °C) 77 18 129/60 92 %      Temp Source Heart Rate Source Patient Position - Orthostatic VS BP Location FiO2 (%)   06/05/24 1336 06/05/24 1336 06/05/24 1336 06/05/24 1336 --   Oral Monitor Lying Left arm       Pain Score       06/05/24 1336       No Pain           Vitals:    06/05/24 1600 06/05/24 1634 06/05/24 1700 06/05/24 1812   BP: 98/53 119/57 129/56 100/56   Pulse: 68 70 68 67   Patient Position - Orthostatic VS: Lying Lying Lying Lying         Visual Acuity      ED Medications  Medications   sodium chloride (PF) 0.9 % injection 3 mL (3 mL Intravenous Not Given 6/5/24 1502)   lactated ringers infusion (has no administration in time range)   iohexol (OMNIPAQUE) 350 MG/ML injection (SINGLE-DOSE) 100 mL (100 mL Intravenous Given 6/5/24 1502)   acetaminophen (TYLENOL) tablet 650 mg (650 mg Oral Given 6/5/24 1526)       Diagnostic Studies  Results Reviewed       Procedure Component Value Units Date/Time    HS Troponin I 2hr [[id number]]  (Normal) Collected: 06/05/24 1601    Lab Status: Final result Specimen: Blood  from Arm, Right Updated: 06/05/24 1627     hs TnI 2hr 3 ng/L      Delta 2hr hsTnI 0 ng/L     HS Troponin I 4hr [[id number]]     Lab Status: No result Specimen: Blood     Urine Microscopic [[id number]]  (Normal) Collected: 06/05/24 1452    Lab Status: Final result Specimen: Urine, Clean Catch Updated: 06/05/24 1514     RBC, UA None Seen /hpf      WBC, UA 0-5 /hpf      Epithelial Cells None Seen /hpf      Bacteria, UA Occasional /hpf     FLU/RSV/COVID - if FLU/RSV clinically relevant [[id number]]  (Normal) Collected: 06/05/24 1421    Lab Status: Final result Specimen: Nares from Nose Updated: 06/05/24 1504     SARS-CoV-2 Negative     INFLUENZA A PCR Negative     INFLUENZA B PCR Negative     RSV PCR Negative    Narrative:      FOR PEDIATRIC PATIENTS - copy/paste COVID Guidelines URL to browser: [email or web address]    SARS-CoV-2 assay is a Nucleic Acid Amplification assay intended for the  qualitative detection of nucleic acid from SARS-CoV-2 in nasopharyngeal  swabs. Results are for the presumptive identification of SARS-CoV-2 RNA.    Positive results are indicative of infection with SARS-CoV-2, the virus  causing COVID-19, but do not rule out bacterial infection or co-infection  with other viruses. Laboratories within the [place] and its  territories are required to report all positive results to the appropriate  public health authorities. Negative results do not preclude SARS-CoV-2  infection and should not be used as the sole basis for treatment or other  patient management decisions. Negative results must be combined with  clinical observations, patient history, and epidemiological information.  This test has not been FDA cleared or approved.    This test has been authorized by FDA under an Emergency Use Authorization  (EUA). This test is only authorized for the duration of time the  declaration that circumstances exist justifying the authorization of  the  emergency use of an in vitro diagnostic tests for detection of SARS-CoV-2  virus and/or diagnosis of COVID-19 infection under section 564(b)(1) of  the Act, 21 U.S.C. 360bbb-3(b)(1), unless the authorization is terminated  or revoked sooner. The test has been validated but independent review by FDA  and CLIA is pending.    Test performed using [email or web address] GeneXpert: This RT-PCR assay targets N2,  a region unique to SARS-CoV-2. A conserved region in the E-gene was chosen  for pan-Sarbecovirus detection which includes SARS-CoV-2.    According to CMS-2020-01-R, this platform meets the definition of high-throughput technology.    UA w Reflex to Microscopic w Reflex to Culture [[id number]]  (Abnormal) Collected: 06/05/24 1452    Lab Status: Final result Specimen: Urine, Clean Catch Updated: 06/05/24 1457     Color, UA Yellow     Clarity, UA Clear     Specific Gravity, UA 1.015     pH, UA 6.0     Leukocytes, UA Trace     Nitrite, UA Negative     Protein, UA Negative mg/dl      Glucose, UA Negative mg/dl      Ketones, UA Negative mg/dl      Urobilinogen, UA 0.2 E.U./dl      Bilirubin, UA Negative     Occult Blood, UA Negative    B-Type Natriuretic Peptide(BNP) [[id number]]  (Abnormal) Collected: 06/05/24 1407    Lab Status: Final result Specimen: Blood from Arm, Right Updated: 06/05/24 1447      pg/mL     Procalcitonin [[id number]]  (Normal) Collected: 06/05/24 1407    Lab Status: Final result Specimen: Blood from Arm, Right Updated: 06/05/24 1445     Procalcitonin 0.16 ng/ml     HS Troponin 0hr (reflex protocol) [[id number]]  (Normal) Collected: 06/05/24 1407    Lab Status: Final result Specimen: Blood from Arm, Right Updated: 06/05/24 1442     hs TnI 0hr 3 ng/L     Comprehensive metabolic panel [[id number]]  (Abnormal) Collected: 06/05/24 1407    Lab Status: Final result Specimen: Blood from Arm, Right Updated: 06/05/24 1438     Sodium 141 mmol/L      Potassium 3.6 mmol/L      Chloride 107 mmol/L      CO2 25 mmol/L       ANION GAP 9 mmol/L      BUN 12 mg/dL      Creatinine 0.77 mg/dL      Glucose 96 mg/dL      Calcium 8.7 mg/dL      AST 14 U/L      ALT 16 U/L      Alkaline Phosphatase 123 U/L      Total Protein 7.0 g/dL      Albumin 3.8 g/dL      Total Bilirubin 0.63 mg/dL      eGFR 73 ml/min/1.73sq m     Narrative:      National Kidney Disease Foundation guidelines for Chronic Kidney Disease (CKD):     Stage 1 with normal or high GFR (GFR > 90 mL/min/1.73 square meters)    Stage 2 Mild CKD (GFR = 60-89 mL/min/1.73 square meters)    Stage 3A Moderate CKD (GFR = 45-59 mL/min/1.73 square meters)    Stage 3B Moderate CKD (GFR = 30-44 mL/min/1.73 square meters)    Stage 4 Severe CKD (GFR = 15-29 mL/min/1.73 square meters)    Stage 5 End Stage CKD (GFR <15 mL/min/1.73 square meters)  Note: GFR calculation is accurate only with a steady state creatinine    Lipase [[id number]]  (Abnormal) Collected: 06/05/24 1407    Lab Status: Final result Specimen: Blood from Arm, Right Updated: 06/05/24 1438     Lipase 415 u/L     Protime-INR [[id number]]  (Abnormal) Collected: 06/05/24 1407    Lab Status: Final result Specimen: Blood from Arm, Right Updated: 06/05/24 1436     Protime 19.1 seconds      INR 1.64    APTT [[id number]]  (Normal) Collected: 06/05/24 1407    Lab Status: Final result Specimen: Blood from Arm, Right Updated: 06/05/24 1436     PTT 35 seconds     Lactic acid [[id number]]  (Normal) Collected: 06/05/24 1407    Lab Status: Final result Specimen: Blood from Arm, Right Updated: 06/05/24 1435     LACTIC ACID 1.2 mmol/L     Narrative:      Result may be elevated if tourniquet was used during collection.    Blood culture #1 [[id number]] Collected: 06/05/24 1420    Lab Status: In process Specimen: Blood from Arm, Left Updated: 06/05/24 1424    CBC and differential [[id number]] Collected: 06/05/24 1407    Lab Status: Final result Specimen: Blood from Arm, Right Updated: 06/05/24 1418     WBC 7.50 Thousand/uL      RBC 4.56 Million/uL       Hemoglobin 13.6 g/dL      Hematocrit 41.8 %      MCV 92 fL      MCH 29.8 pg      MCHC 32.5 g/dL      RDW 13.3 %      MPV 10.4 fL      Platelets 304 Thousands/uL      nRBC 0 /100 WBCs      Segmented % 66 %      Immature Grans % 0 %      Lymphocytes % 20 %      Monocytes % 10 %      Eosinophils Relative 4 %      Basophils Relative 0 %      Absolute Neutrophils 4.87 Thousands/µL      Absolute Immature Grans 0.03 Thousand/uL      Absolute Lymphocytes 1.53 Thousands/µL      Absolute Monocytes 0.74 Thousand/µL      Eosinophils Absolute 0.30 Thousand/µL      Basophils Absolute 0.03 Thousands/µL     Blood culture #2 [[id number]] Collected: 06/05/24 1407    Lab Status: In process Specimen: Blood from Arm, Right Updated: 06/05/24 1415                   CT chest abdomen pelvis w contrast   Final Result by Leeroy Wheatley MD (06/05 1635)      Gaseous distention and thin-walled viscus associated with the gastric fundus in the hiatal hernia sac raising suspicion of gastric diverticulum or ulcer within the stomach in the hiatal hernia.      Otherwise no significant interval change from previous examination. Hepatomegaly and hepatic steatosis. Reidentified 12.4 cm uterine mass, probably representing myoma and compressing right mid ureter resulting an unchanged mild right hydronephrosis.               Workstation performed: [id number]         CT head without contrast   Final Result by Maranda Castillo MD (06/05 6815)      No acute intracranial abnormality.                  Workstation performed: [id number]         XR chest portable   ED Interpretation by Travis Rollins DO (06/05 1093)   No pneumonia or pneumothorax                 Procedures  ECG 12 Lead Documentation Only    Date/Time: 6/5/2024 7:41 PM    Performed by: Travis Rollins DO  Authorized by: Travis Rollins DO    ECG reviewed by me, the ED Provider: yes    Patient location:  ED  Comments:      Normal sinus rhythm, rate of 75, normal MO, normal QTc, no STEMI, no acute  change compared to EKG dated January 7, 2024           ED Course  ED Course as of 06/05/24 1945 Wed Jun 05, 2024   1427 The patient had a chest x-ray that was independently interpreted by me that shows no pneumonia or pneumothorax.   1944 The patient had lab work that was significant for a white count of 7.5.  She had a lipase of 415.  She had a CAT scan that showed a possible gastric diverticulum.  I do not feel this is related to the patient's visit at this time.  She had no signs of arrhythmia, had a nonischemic EKG, and a troponin of 3 with a delta of 0.  The patient will be admitted to the hospital for further care and evaluation.                                             Medical Decision Making  This is a 79-year-old female presented to the emergency department complaining of lethargy and syncope.  I considered ACS, arrhythmia, infection, electrolyte abnormality. These and other diagnoses were considered.         Amount and/or Complexity of Data Reviewed  Independent Historian: caregiver  Labs: ordered. Decision-making details documented in ED Course.  Radiology: ordered and independent interpretation performed.  ECG/medicine tests: ordered and independent interpretation performed. Decision-making details documented in ED Course.    Risk  OTC drugs.  Prescription drug management.  Decision regarding hospitalization.             Disposition  Final diagnoses:   Syncope     Time reflects when diagnosis was documented in both MDM as applicable and the Disposition within this note       Time User Action Codes Description Comment    6/5/2024  5:35 PM Travis Rollins Add [R55] Syncope           ED Disposition       ED Disposition   Admit    Condition   Stable    Date/Time   Wed Jun 5, 2024  5:35 PM    Comment   Case was discussed with Dr Zhang and the patient's admission status was agreed to be Admission Status: observation status to the service of Dr. Zhang .               Follow-up Information     None         Current Discharge Medication List        CONTINUE these medications which have NOT CHANGED    Details   amLODIPine (NORVASC) 5 mg tablet Take 1 tablet (5 mg total) by mouth daily  Qty: 90 tablet, Refills: 3    Associated Diagnoses: Essential hypertension      ASPIRIN 81 PO Aspirin EC 81 MG Oral Tablet Delayed Release    Refills: 0       Active      brimonidine (ALPHAGAN P) 0.15 % ophthalmic solution Administer 1 drop to the right eye 2 (two) times a day  Qty: 10 mL, Refills: 1    Associated Diagnoses: Glaucoma of right eye, unspecified glaucoma type      buPROPion (WELLBUTRIN XL) 300 mg 24 hr tablet Take 1 tablet (300 mg total) by mouth every morning  Qty: 30 tablet, Refills: 1    Associated Diagnoses: Adjustment disorder with mixed anxiety and depressed mood; BRINDA (generalized anxiety disorder)      busPIRone (BUSPAR) 15 mg tablet Take 1 tablet (15 mg total) by mouth 2 (two) times a day  Qty: 60 tablet, Refills: 1    Associated Diagnoses: BRINDA (generalized anxiety disorder)      famotidine (PEPCID) 40 MG tablet TAKE 1 TABLET TWICE DAILY  Qty: 180 tablet, Refills: 2    Associated Diagnoses: Chronic GERD      hydrOXYzine HCL (ATARAX) 10 mg tablet Take 30 mg by mouth daily at bedtime      latanoprost (XALATAN) 0.005 % ophthalmic solution Administer 1 drop to both eyes daily at bedtime      levothyroxine 100 mcg tablet TAKE 1 TABLET EVERY DAY  Qty: 90 tablet, Refills: 3    Associated Diagnoses: Hypothyroidism, adult      ondansetron (ZOFRAN-ODT) 4 mg disintegrating tablet Take 1 tablet (4 mg total) by mouth every 8 (eight) hours  Qty: 60 tablet, Refills: 1    Associated Diagnoses: Nausea      pregabalin (LYRICA) 150 mg capsule Take 1 capsule (150 mg total) by mouth 2 (two) times a day  Qty: 90 capsule, Refills: 3    Associated Diagnoses: Spinal stenosis of lumbar region with neurogenic claudication      rOPINIRole (REQUIP) 1 mg tablet TAKE 1 TABLET AT 6PM AND TAKE 2 TABLETS AT BEDTIME  Qty: 270 tablet,  Refills: 3    Associated Diagnoses: RLS (restless legs syndrome)      timolol (TIMOPTIC) 0.5 % ophthalmic solution Administer to both eyes 2 (two) times a day       torsemide (DEMADEX) 10 mg tablet TAKE 1 TABLET EVERY DAY  Qty: 90 tablet, Refills: 1    Associated Diagnoses: Swelling of limb      vortioxetine (Trintellix) 10 MG tablet Take 1 tablet (10 mg total) by mouth daily  Qty: 30 tablet, Refills: 1    Associated Diagnoses: Adjustment disorder with mixed anxiety and depressed mood; BRINDA (generalized anxiety disorder)      Xarelto 20 MG tablet TAKE ONE TABLET BY MOUTH EVERY EVENING  Qty: 90 tablet, Refills: 1    Associated Diagnoses: Bilateral pulmonary embolism (HCC)      acetaminophen (TYLENOL) 325 mg tablet Take 650 mg by mouth every 6 (six) hours as needed for mild pain      fluticasone (FLONASE) 50 mcg/act nasal spray 2 sprays into each nostril once for 1 dose OD  Qty: 48 mL, Refills: 1    Associated Diagnoses: Acute rhinitis      pantoprazole (PROTONIX) 40 mg tablet Take 1 tablet (40 mg total) by mouth daily  Qty: 90 tablet, Refills: 2    Associated Diagnoses: Gastroesophageal reflux disease with esophagitis without hemorrhage             No discharge procedures on file.    PDMP Review         Value Time User    PDMP Reviewed  Yes 12/27/2023 11:10 PM JOE Yates DO            ED Provider  Electronically Signed by             Travis Rollins DO  06/05/24 1945

## 2024-06-05 NOTE — TELEPHONE ENCOUNTER
"Patient calls in with extreme fatigue.  Can not stay awake and has some disorientation.  Chest \"feels funny\".  + for dizziness, weakness, headache, nausea and unsteady gait. Daughter with patient at home.  Spoke with daughter Josie and advised 911 to the ED now.  Josie states that her brother is on his way and will take her.      Reason for Disposition   Difficult to awaken or acting confused (e.g., disoriented, slurred speech)    Answer Assessment - Initial Assessment Questions  1. SYMPTOM: \"What is the main symptom you are concerned about?\" (e.g., weakness, numbness)      Fatigue and can't stay awake. To weak to walk.   2. ONSET: \"When did this start?\" (minutes, hours, days; while sleeping)      5/31  3. LAST NORMAL: \"When was the last time you were normal (no symptoms)?\"      5/31  4. PATTERN \"Does this come and go, or has it been constant since it started?\"  \"Is it present now?\"      Constant  5. CARDIAC SYMPTOMS: \"Have you had any of the following symptoms: chest pain, difficulty breathing, palpitations?\"      \"Funny feeling in chest\"  6. NEUROLOGIC SYMPTOMS: \"Have you had any of the following symptoms: headache, dizziness, vision loss, double vision, changes in speech, unsteady on your feet?\"      Headache, weak, dizziness  7. OTHER SYMPTOMS: \"Do you have any other symptoms?\"      Nausea /60  8. PREGNANCY: \"Is there any chance you are pregnant?\" \"When was your last menstrual period?\"      N/A    Protocols used: Neurologic Deficit-ADULT-[place]    "

## 2024-06-06 ENCOUNTER — APPOINTMENT (OUTPATIENT)
Dept: MRI IMAGING | Facility: HOSPITAL | Age: 79
DRG: 641 | End: 2024-06-06
Payer: MEDICARE

## 2024-06-06 PROBLEM — R78.81 POSITIVE BLOOD CULTURE: Status: ACTIVE | Noted: 2024-06-06

## 2024-06-06 PROBLEM — E53.8 VITAMIN B 12 DEFICIENCY: Status: ACTIVE | Noted: 2024-06-06

## 2024-06-06 LAB
25(OH)D3 SERPL-MCNC: 24.2 NG/ML (ref 30–100)
ALBUMIN SERPL BCP-MCNC: 3.4 G/DL (ref 3.5–5)
ALP SERPL-CCNC: 106 U/L (ref 34–104)
ALT SERPL W P-5'-P-CCNC: 13 U/L (ref 7–52)
ANION GAP SERPL CALCULATED.3IONS-SCNC: 9 MMOL/L (ref 4–13)
AST SERPL W P-5'-P-CCNC: 13 U/L (ref 13–39)
ATRIAL RATE: 75 BPM
BASOPHILS # BLD AUTO: 0.03 THOUSANDS/ÂΜL (ref 0–0.1)
BASOPHILS NFR BLD AUTO: 0 % (ref 0–1)
BILIRUB SERPL-MCNC: 0.76 MG/DL (ref 0.2–1)
BUN SERPL-MCNC: 10 MG/DL (ref 5–25)
CALCIUM ALBUM COR SERPL-MCNC: 8.8 MG/DL (ref 8.3–10.1)
CALCIUM SERPL-MCNC: 8.3 MG/DL (ref 8.4–10.2)
CHLORIDE SERPL-SCNC: 105 MMOL/L (ref 96–108)
CO2 SERPL-SCNC: 28 MMOL/L (ref 21–32)
CREAT SERPL-MCNC: 0.77 MG/DL (ref 0.6–1.3)
EOSINOPHIL # BLD AUTO: 0.49 THOUSAND/ÂΜL (ref 0–0.61)
EOSINOPHIL NFR BLD AUTO: 7 % (ref 0–6)
ERYTHROCYTE [DISTWIDTH] IN BLOOD BY AUTOMATED COUNT: 13.6 % (ref 11.6–15.1)
GFR SERPL CREATININE-BSD FRML MDRD: 73 ML/MIN/1.73SQ M
GLUCOSE SERPL-MCNC: 91 MG/DL (ref 65–140)
HCT VFR BLD AUTO: 38.9 % (ref 34.8–46.1)
HGB BLD-MCNC: 12.3 G/DL (ref 11.5–15.4)
IMM GRANULOCYTES # BLD AUTO: 0.03 THOUSAND/UL (ref 0–0.2)
IMM GRANULOCYTES NFR BLD AUTO: 0 % (ref 0–2)
LYMPHOCYTES # BLD AUTO: 1.54 THOUSANDS/ÂΜL (ref 0.6–4.47)
LYMPHOCYTES NFR BLD AUTO: 21 % (ref 14–44)
MCH RBC QN AUTO: 29.6 PG (ref 26.8–34.3)
MCHC RBC AUTO-ENTMCNC: 31.6 G/DL (ref 31.4–37.4)
MCV RBC AUTO: 94 FL (ref 82–98)
MONOCYTES # BLD AUTO: 1.02 THOUSAND/ÂΜL (ref 0.17–1.22)
MONOCYTES NFR BLD AUTO: 14 % (ref 4–12)
NEUTROPHILS # BLD AUTO: 4.35 THOUSANDS/ÂΜL (ref 1.85–7.62)
NEUTS SEG NFR BLD AUTO: 58 % (ref 43–75)
NRBC BLD AUTO-RTO: 0 /100 WBCS
P AXIS: 37 DEGREES
PLATELET # BLD AUTO: 288 THOUSANDS/UL (ref 149–390)
PMV BLD AUTO: 10.5 FL (ref 8.9–12.7)
POTASSIUM SERPL-SCNC: 3.2 MMOL/L (ref 3.5–5.3)
PR INTERVAL: 142 MS
PROT SERPL-MCNC: 6.1 G/DL (ref 6.4–8.4)
QRS AXIS: -11 DEGREES
QRSD INTERVAL: 84 MS
QT INTERVAL: 384 MS
QTC INTERVAL: 428 MS
RBC # BLD AUTO: 4.16 MILLION/UL (ref 3.81–5.12)
SODIUM SERPL-SCNC: 142 MMOL/L (ref 135–147)
T WAVE AXIS: 71 DEGREES
VENTRICULAR RATE: 75 BPM
VIT B12 SERPL-MCNC: 157 PG/ML (ref 180–914)
WBC # BLD AUTO: 7.46 THOUSAND/UL (ref 4.31–10.16)

## 2024-06-06 PROCEDURE — 82306 VITAMIN D 25 HYDROXY: CPT

## 2024-06-06 PROCEDURE — 93010 ELECTROCARDIOGRAM REPORT: CPT | Performed by: INTERNAL MEDICINE

## 2024-06-06 PROCEDURE — 94760 N-INVAS EAR/PLS OXIMETRY 1: CPT

## 2024-06-06 PROCEDURE — 82607 VITAMIN B-12: CPT

## 2024-06-06 PROCEDURE — 94660 CPAP INITIATION&MGMT: CPT

## 2024-06-06 PROCEDURE — 80053 COMPREHEN METABOLIC PANEL: CPT

## 2024-06-06 PROCEDURE — 70551 MRI BRAIN STEM W/O DYE: CPT

## 2024-06-06 PROCEDURE — 85025 COMPLETE CBC W/AUTO DIFF WBC: CPT

## 2024-06-06 PROCEDURE — 87040 BLOOD CULTURE FOR BACTERIA: CPT | Performed by: INTERNAL MEDICINE

## 2024-06-06 PROCEDURE — 99232 SBSQ HOSP IP/OBS MODERATE 35: CPT | Performed by: INTERNAL MEDICINE

## 2024-06-06 RX ORDER — CYANOCOBALAMIN 1000 UG/ML
1000 INJECTION, SOLUTION INTRAMUSCULAR; SUBCUTANEOUS WEEKLY
Status: DISCONTINUED | OUTPATIENT
Start: 2024-06-06 | End: 2024-06-08 | Stop reason: HOSPADM

## 2024-06-06 RX ORDER — VANCOMYCIN HYDROCHLORIDE 1 G/200ML
12.5 INJECTION, SOLUTION INTRAVENOUS EVERY 12 HOURS
Status: DISCONTINUED | OUTPATIENT
Start: 2024-06-06 | End: 2024-06-06

## 2024-06-06 RX ORDER — POTASSIUM CHLORIDE 20 MEQ/1
40 TABLET, EXTENDED RELEASE ORAL ONCE
Status: COMPLETED | OUTPATIENT
Start: 2024-06-06 | End: 2024-06-06

## 2024-06-06 RX ADMIN — PANTOPRAZOLE SODIUM 40 MG: 40 TABLET, DELAYED RELEASE ORAL at 09:08

## 2024-06-06 RX ADMIN — POTASSIUM CHLORIDE 40 MEQ: 1500 TABLET, EXTENDED RELEASE ORAL at 20:05

## 2024-06-06 RX ADMIN — METOCLOPRAMIDE 10 MG: 5 INJECTION, SOLUTION INTRAMUSCULAR; INTRAVENOUS at 14:01

## 2024-06-06 RX ADMIN — ACETAMINOPHEN 650 MG: 325 TABLET, FILM COATED ORAL at 14:01

## 2024-06-06 RX ADMIN — TIMOLOL MALEATE 1 DROP: 5 SOLUTION/ DROPS OPHTHALMIC at 17:12

## 2024-06-06 RX ADMIN — METOCLOPRAMIDE 10 MG: 5 INJECTION, SOLUTION INTRAMUSCULAR; INTRAVENOUS at 05:51

## 2024-06-06 RX ADMIN — BUPROPION HYDROCHLORIDE 300 MG: 150 TABLET, EXTENDED RELEASE ORAL at 09:08

## 2024-06-06 RX ADMIN — LEVOTHYROXINE SODIUM 100 MCG: 100 TABLET ORAL at 09:07

## 2024-06-06 RX ADMIN — AMLODIPINE BESYLATE 5 MG: 5 TABLET ORAL at 09:07

## 2024-06-06 RX ADMIN — ASPIRIN 81 MG 81 MG: 81 TABLET ORAL at 09:08

## 2024-06-06 RX ADMIN — RIVAROXABAN 20 MG: 20 TABLET, FILM COATED ORAL at 17:11

## 2024-06-06 RX ADMIN — ROPINIROLE HYDROCHLORIDE 1 MG: 1 TABLET, FILM COATED ORAL at 21:30

## 2024-06-06 RX ADMIN — BRIMONIDINE TARTRATE 1 DROP: 2 SOLUTION/ DROPS OPHTHALMIC at 20:19

## 2024-06-06 RX ADMIN — FAMOTIDINE 40 MG: 20 TABLET, FILM COATED ORAL at 21:30

## 2024-06-06 RX ADMIN — TORSEMIDE 10 MG: 10 TABLET ORAL at 09:08

## 2024-06-06 RX ADMIN — METOCLOPRAMIDE 10 MG: 5 INJECTION, SOLUTION INTRAMUSCULAR; INTRAVENOUS at 21:30

## 2024-06-06 RX ADMIN — BUSPIRONE HYDROCHLORIDE 15 MG: 10 TABLET ORAL at 17:11

## 2024-06-06 RX ADMIN — TIMOLOL MALEATE 1 DROP: 5 SOLUTION/ DROPS OPHTHALMIC at 09:15

## 2024-06-06 RX ADMIN — LATANOPROST 1 DROP: 50 SOLUTION OPHTHALMIC at 21:29

## 2024-06-06 RX ADMIN — BUSPIRONE HYDROCHLORIDE 15 MG: 10 TABLET ORAL at 09:08

## 2024-06-06 RX ADMIN — DIPHENHYDRAMINE HYDROCHLORIDE 25 MG: 50 INJECTION, SOLUTION INTRAMUSCULAR; INTRAVENOUS at 14:01

## 2024-06-06 RX ADMIN — CYANOCOBALAMIN 1000 MCG: 1000 INJECTION, SOLUTION INTRAMUSCULAR; SUBCUTANEOUS at 20:05

## 2024-06-06 RX ADMIN — MAGNESIUM SULFATE HEPTAHYDRATE 2 G: 40 INJECTION, SOLUTION INTRAVENOUS at 20:10

## 2024-06-06 RX ADMIN — BRIMONIDINE TARTRATE 1 DROP: 2 SOLUTION/ DROPS OPHTHALMIC at 09:16

## 2024-06-06 RX ADMIN — ACETAMINOPHEN 650 MG: 325 TABLET, FILM COATED ORAL at 05:51

## 2024-06-06 RX ADMIN — VANCOMYCIN HYDROCHLORIDE 1750 MG: 1 INJECTION, POWDER, LYOPHILIZED, FOR SOLUTION INTRAVENOUS at 15:56

## 2024-06-06 RX ADMIN — PREGABALIN 150 MG: 75 CAPSULE ORAL at 09:07

## 2024-06-06 RX ADMIN — PREGABALIN 150 MG: 75 CAPSULE ORAL at 17:11

## 2024-06-06 NOTE — ASSESSMENT & PLAN NOTE
Patient reports x 5 days of loose watery nonbloody stools.  She denies any changes in dietary intake, recent travel, or sick contacts.  She has been trying to stay hydrated.  Will check stool enteric panel and C. Difficile  Monitor intake/output  Continue IVF.

## 2024-06-06 NOTE — ASSESSMENT & PLAN NOTE
Noted and patient history she follows with GI as outpatient  Had EGD performed on 3/24 showing 3 cm hiatal hernia without Dax lesion present.  Empiric dilatation not performed due to risk of aspiration  GI recommending possible surgical consultation for hernia repair continue to follow-up as outpatient  CT appears unchanged from prior  Continue soft diet and continue to eat slowly.  Has gastric emptying scan scheduled as OP  Continue clear liquid diet advance as tolerated

## 2024-06-06 NOTE — ASSESSMENT & PLAN NOTE
Staph epidermidis detected on the blood culture 1 of 2 set, will follow-up final results, likely contaminant, currently on vancomycin,  Repeat blood culture from 6/7/2024 pending.

## 2024-06-06 NOTE — ASSESSMENT & PLAN NOTE
Patient reports x 5 days of loose watery nonbloody stools.  She denies any changes in dietary intake, recent travel, or sick contacts.  She has been trying to stay hydrated.  Now resolved since admission.

## 2024-06-06 NOTE — ASSESSMENT & PLAN NOTE
Noted and patient history she follows with GI as outpatient  Had EGD performed on 3/24 showing 3 cm hiatal hernia without Dax lesion present.  Empiric dilatation not performed due to risk of aspiration  GI recommending possible surgical consultation for hernia repair continue to follow-up as outpatient  CT appears unchanged from prior  Continue soft diet and continue to eat slowly.  Has gastric emptying scan scheduled as OP  Tolerated clear liquid diet, patient requesting to advance her diet, will order regular diet and see.

## 2024-06-06 NOTE — ASSESSMENT & PLAN NOTE
Patient reports headache x 5 days that she describes as pressure across the top of her scalp.  This is nonradiating without any palliating or provoking factors or visual disturbance.  Does have history of migraines and has been taking Tylenol twice daily to help alleviate symptoms.  She reports that this has been ongoing fluctuating in severity.  She reports after administration of Tylenol in the ED pain is at 7 out of 10 which is improved from prior.  CT head without acute intracranial abnormalities  Suspect tension type headache versus migraine  Will give one time migraine cocktail and evaluate response continue Tylenol as needed

## 2024-06-06 NOTE — ASSESSMENT & PLAN NOTE
Lipase on admission 415; currently denies any abdominal pain  CT abdomen does not show acute pancreatitis at this time    No concern for acute pancreatitis.  Patient currently asymptomatic

## 2024-06-06 NOTE — ASSESSMENT & PLAN NOTE
Patient reports overall feeling fatigued having to sleep more often.  Stated she was doing work outside on Sunday and was very sleepy on Monday and Tuesday.  She reports that she keeps feeling like she has to go to sleep.  States she is having ongoing headache, diarrhea x 5 days, and episode of vomiting.  She reports that she was trying to stay hydrated drinking plenty of fluids.  Currently infectious workup is negative  Will check vitamin D, B12, TSH  Continue supportive care

## 2024-06-06 NOTE — PLAN OF CARE
Problem: INFECTION - ADULT  Goal: Absence or prevention of progression during hospitalization  Description: INTERVENTIONS:  - Assess and monitor for signs and symptoms of infection  - Monitor lab/diagnostic results  - Monitor all insertion sites, i.e. indwelling lines, tubes, and drains  - Monitor endotracheal if appropriate and nasal secretions for changes in amount and color  - [place] appropriate cooling/warming therapies per order  - Administer medications as ordered  - Instruct and encourage patient and family to use good hand hygiene technique  - Identify and instruct in appropriate isolation precautions for identified infection/condition  Outcome: Progressing

## 2024-06-06 NOTE — PLAN OF CARE
Problem: NEUROSENSORY - ADULT  Goal: Achieves stable or improved neurological status  Description: INTERVENTIONS  - Monitor and report changes in neurological status  - Monitor vital signs such as temperature, blood pressure, glucose, and any other labs ordered   - Initiate measures to prevent increased intracranial pressure  - Monitor for seizure activity and implement precautions if appropriate      Outcome: Progressing  Goal: Remains free of injury related to seizures activity  Description: INTERVENTIONS  - Maintain airway, patient safety  and administer oxygen as ordered  - Monitor patient for seizure activity, document and report duration and description of seizure to physician/advanced practitioner  - If seizure occurs,  ensure patient safety during seizure  - Reorient patient post seizure  - Seizure pads on all 4 side rails  - Instruct patient/family to notify RN of any seizure activity including if an aura is experienced  - Instruct patient/family to call for assistance with activity based on nursing assessment  - Administer anti-seizure medications if ordered    Outcome: Progressing  Goal: Achieves maximal functionality and self care  Description: INTERVENTIONS  - Monitor swallowing and airway patency with patient fatigue and changes in neurological status  - Encourage and assist patient to increase activity and self care.   - Encourage visually impaired, hearing impaired and aphasic patients to use assistive/communication devices  Outcome: Progressing     Problem: CARDIOVASCULAR - ADULT  Goal: Maintains optimal cardiac output and hemodynamic stability  Description: INTERVENTIONS:  - Monitor I/O, vital signs and rhythm  - Monitor for S/S and trends of decreased cardiac output  - Administer and titrate ordered vasoactive medications to optimize hemodynamic stability  - Assess quality of pulses, skin color and temperature  - Assess for signs of decreased coronary artery perfusion  - Instruct patient to  report change in severity of symptoms  Outcome: Progressing  Goal: Absence of cardiac dysrhythmias or at baseline rhythm  Description: INTERVENTIONS:  - Continuous cardiac monitoring, vital signs, obtain 12 lead EKG if ordered  - Administer antiarrhythmic and heart rate control medications as ordered  - Monitor electrolytes and administer replacement therapy as ordered  Outcome: Progressing     Problem: RESPIRATORY - ADULT  Goal: Achieves optimal ventilation and oxygenation  Description: INTERVENTIONS:  - Assess for changes in respiratory status  - Assess for changes in mentation and behavior  - Position to facilitate oxygenation and minimize respiratory effort  - Oxygen administered by appropriate delivery if ordered  - Initiate smoking cessation education as indicated  - Encourage broncho-pulmonary hygiene including cough, deep breathe, Incentive Spirometry  - Assess the need for suctioning and aspirate as needed  - Assess and instruct to report SOB or any respiratory difficulty  - Respiratory Therapy support as indicated  Outcome: Progressing     Problem: GASTROINTESTINAL - ADULT  Goal: Minimal or absence of nausea and/or vomiting  Description: INTERVENTIONS:  - Administer IV fluids if ordered to ensure adequate hydration  - Maintain NPO status until nausea and vomiting are resolved  - Nasogastric tube if ordered  - Administer ordered antiemetic medications as needed  - Provide nonpharmacologic comfort measures as appropriate  - Advance diet as tolerated, if ordered  - Consider nutrition services referral to assist patient with adequate nutrition and appropriate food choices  Outcome: Progressing  Goal: Maintains or returns to baseline bowel function  Description: INTERVENTIONS:  - Assess bowel function  - Encourage oral fluids to ensure adequate hydration  - Administer IV fluids if ordered to ensure adequate hydration  - Administer ordered medications as needed  - Encourage mobilization and activity  - Consider  nutritional services referral to assist patient with adequate nutrition and appropriate food choices  Outcome: Progressing  Goal: Maintains adequate nutritional intake  Description: INTERVENTIONS:  - Monitor percentage of each meal consumed  - Identify factors contributing to decreased intake, treat as appropriate  - Assist with meals as needed  - Monitor I&O, weight, and lab values if indicated  - Obtain nutrition services referral as needed  Outcome: Progressing  Goal: Establish and maintain optimal ostomy function  Description: INTERVENTIONS:  - Assess bowel function  - Encourage oral fluids to ensure adequate hydration  - Administer IV fluids if ordered to ensure adequate hydration   - Administer ordered medications as needed  - Encourage mobilization and activity  - Nutrition services referral to assist patient with appropriate food choices  - Assess stoma site  - Consider wound care consult   Outcome: Progressing  Goal: Oral mucous membranes remain intact  Description: INTERVENTIONS  - Assess oral mucosa and hygiene practices  - Implement preventative oral hygiene regimen  - Implement oral medicated treatments as ordered  - Initiate Nutrition services referral as needed  Outcome: Progressing     Problem: GENITOURINARY - ADULT  Goal: Maintains or returns to baseline urinary function  Description: INTERVENTIONS:  - Assess urinary function  - Encourage oral fluids to ensure adequate hydration if ordered  - Administer IV fluids as ordered to ensure adequate hydration  - Administer ordered medications as needed  - Offer frequent toileting  - Follow urinary retention protocol if ordered  Outcome: Progressing  Goal: Absence of urinary retention  Description: INTERVENTIONS:  - Assess patient’s ability to void and empty bladder  - Monitor I/O  - Bladder scan as needed  - Discuss with physician/AP medications to alleviate retention as needed  - Discuss catheterization for long term situations as appropriate  Outcome:  Progressing  Goal: Urinary catheter remains patent  Description: INTERVENTIONS:  - Assess patency of urinary catheter  - If patient has a chronic ríos, consider changing catheter if non-functioning  - Follow guidelines for intermittent irrigation of non-functioning urinary catheter  Outcome: Progressing     Problem: METABOLIC, FLUID AND ELECTROLYTES - ADULT  Goal: Electrolytes maintained within normal limits  Description: INTERVENTIONS:  - Monitor labs and assess patient for signs and symptoms of electrolyte imbalances  - Administer electrolyte replacement as ordered  - Monitor response to electrolyte replacements, including repeat lab results as appropriate  - Instruct patient on fluid and nutrition as appropriate  Outcome: Progressing  Goal: Fluid balance maintained  Description: INTERVENTIONS:  - Monitor labs   - Monitor I/O and WT  - Instruct patient on fluid and nutrition as appropriate  - Assess for signs & symptoms of volume excess or deficit  Outcome: Progressing  Goal: Glucose maintained within target range  Description: INTERVENTIONS:  - Monitor Blood Glucose as ordered  - Assess for signs and symptoms of hyperglycemia and hypoglycemia  - Administer ordered medications to maintain glucose within target range  - Assess nutritional intake and initiate nutrition service referral as needed  Outcome: Progressing     Problem: SKIN/TISSUE INTEGRITY - ADULT  Goal: Skin Integrity remains intact(Skin Breakdown Prevention)  Description: Assess:  -Perform [place] assessment every shift  -Clean and moisturize skin every shift  -Inspect skin when repositioning, toileting, and assisting with ADLS  -Assess under medical devices   -Assess extremities for adequate circulation and sensation     Bed Management:  -Have minimal linens on bed & keep smooth, unwrinkled  -Change linens as needed when moist or perspiring  -Avoid sitting or lying in one position for more than 2 hours while in bed  -Keep HOB at 30 degrees      Toileting:  -Offer bedside commode  -Assess for incontinence every shift  -Use incontinent care products after each incontinent episode such as cream    Activity:  -Mobilize patient 3 times a day  -Encourage activity and walks on unit  -Encourage or provide ROM exercises   -Turn and reposition patient every 2 Hours  -Use appropriate equipment to lift or move patient in bed  -Instruct/ Assist with weight shifting every 2 hours when out of bed in chair  -Consider limitation of chair time 2 hour intervals    Skin Care:  -Avoid use of baby powder, tape, friction and shearing, hot water or constrictive clothing  -Relieve pressure over bony prominences using cushion  -Do not massage red bony areas    Next Steps:  -Teach patient strategies to minimize risks such as fall and infection   -Consider consults to  interdisciplinary teams such as PT/OT  Outcome: Progressing  Goal: Incision(s), wounds(s) or drain site(s) healing without S/S of infection  Description: INTERVENTIONS  - Assess and document dressing, incision, wound bed, drain sites and surrounding tissue  - Provide patient and family education  - Perform skin care/dressing changes every shift  Outcome: Progressing  Goal: Pressure injury heals and does not worsen  Description: Interventions:  - Implement low air loss mattress or specialty surface (Criteria met)  - Apply silicone foam dressing  - Instruct/assist with weight shifting every 120 minutes when in chair   - Limit chair time to 2 hour intervals  - Use special pressure reducing interventions such as 2 hour when in chair   - Apply fecal or urinary incontinence containment device   - Perform passive or active ROM every shift  - Turn and reposition patient & offload bony prominences every 2 hours   - Utilize friction reducing device or surface for transfers   - Consider consults to  interdisciplinary teams such as PT/OT  - Use incontinent care products after each incontinent episode such as cream  - Consider  nutrition services referral as needed  Outcome: Progressing     Problem: HEMATOLOGIC - ADULT  Goal: Maintains hematologic stability  Description: INTERVENTIONS  - Assess for signs and symptoms of bleeding or hemorrhage  - Monitor labs  - Administer supportive blood products/factors as ordered and appropriate  Outcome: Progressing     Problem: MUSCULOSKELETAL - ADULT  Goal: Maintain or return mobility to safest level of function  Description: INTERVENTIONS:  - Assess patient's ability to carry out ADLs; assess patient's baseline for ADL function and identify physical deficits which impact ability to perform ADLs (bathing, care of mouth/teeth, toileting, grooming, dressing, etc.)  - Assess/evaluate cause of self-care deficits   - Assess range of motion  - Assess patient's mobility  - Assess patient's need for assistive devices and provide as appropriate  - Encourage maximum independence but intervene and supervise when necessary  - Involve family in performance of ADLs  - Assess for home care needs following discharge   - Consider OT consult to assist with ADL evaluation and planning for discharge  - Provide patient education as appropriate  Outcome: Progressing  Goal: Maintain proper alignment of affected body part  Description: INTERVENTIONS:  - Support, maintain and protect limb and body alignment  - Provide patient/ family with appropriate education  Outcome: Progressing     Problem: PAIN - ADULT  Goal: Verbalizes/displays adequate comfort level or baseline comfort level  Description: Interventions:  - Encourage patient to monitor pain and request assistance  - Assess pain using appropriate pain scale  - Administer analgesics based on type and severity of pain and evaluate response  - Implement non-pharmacological measures as appropriate and evaluate response  - Consider cultural and social influences on pain and pain management  - Notify physician/advanced practitioner if interventions unsuccessful or patient  reports new pain  Outcome: Progressing     Problem: INFECTION - ADULT  Goal: Absence or prevention of progression during hospitalization  Description: INTERVENTIONS:  - Assess and monitor for signs and symptoms of infection  - Monitor lab/diagnostic results  - Monitor all insertion sites, i.e. indwelling lines, tubes, and drains  - Monitor endotracheal if appropriate and nasal secretions for changes in amount and color  - [place] appropriate cooling/warming therapies per order  - Administer medications as ordered  - Instruct and encourage patient and family to use good hand hygiene technique  - Identify and instruct in appropriate isolation precautions for identified infection/condition  Outcome: Progressing  Goal: Absence of fever/infection during neutropenic period  Description: INTERVENTIONS:  - Monitor WBC    Outcome: Progressing     Problem: SAFETY ADULT  Goal: Patient will remain free of falls  Description: INTERVENTIONS:  - Educate patient/family on patient safety including physical limitations  - Instruct patient to call for assistance with activity   - Consult OT/PT to assist with strengthening/mobility   - Keep Call bell within reach  - Keep bed low and locked with side rails adjusted as appropriate  - Keep care items and personal belongings within reach  - Initiate and maintain comfort rounds  - Make Fall Risk Sign visible to staff  - Offer Toileting every 2 Hours, in advance of need  - Initiate/Maintain bed alarm  - Obtain necessary fall risk management equipment:   - Apply yellow socks and bracelet for high fall risk patients  - Consider moving patient to room near nurses station  Outcome: Progressing  Goal: Maintain or return to baseline ADL function  Description: INTERVENTIONS:  -  Assess patient's ability to carry out ADLs; assess patient's baseline for ADL function and identify physical deficits which impact ability to perform ADLs (bathing, care of mouth/teeth, toileting, grooming, dressing,  etc.)  - Assess/evaluate cause of self-care deficits   - Assess range of motion  - Assess patient's mobility; develop plan if impaired  - Assess patient's need for assistive devices and provide as appropriate  - Encourage maximum independence but intervene and supervise when necessary  - Involve family in performance of ADLs  - Assess for home care needs following discharge   - Consider OT consult to assist with ADL evaluation and planning for discharge  - Provide patient education as appropriate  Outcome: Progressing  Goal: Maintains/Returns to pre admission functional level  Description: INTERVENTIONS:  - Perform AM-PAC 6 Click Basic Mobility/ Daily Activity assessment daily.  - Set and communicate daily mobility goal to care team and patient/family/caregiver.   - Collaborate with rehabilitation services on mobility goals if consulted  - Perform Range of Motion 3 times a day.  - Reposition patient every 2 hours.  - Dangle patient 3 times a day  - Stand patient 3 times a day  - Ambulate patient 3 times a day  - Out of bed to chair 3 times a day   - Out of bed for meals 3 times a day  - Out of bed for toileting  - Record patient progress and toleration of activity level   Outcome: Progressing     Problem: DISCHARGE PLANNING  Goal: Discharge to home or other facility with appropriate resources  Description: INTERVENTIONS:  - Identify barriers to discharge w/patient and caregiver  - Arrange for needed discharge resources and transportation as appropriate  - Identify discharge learning needs (meds, wound care, etc.)  - Arrange for interpretive services to assist at discharge as needed  - Refer to Case Management Department for coordinating discharge planning if the patient needs post-hospital services based on physician/advanced practitioner order or complex needs related to functional status, cognitive ability, or social support system  Outcome: Progressing     Problem: Knowledge Deficit  Goal:  Patient/family/caregiver demonstrates understanding of disease process, treatment plan, medications, and discharge instructions  Description: Complete learning assessment and assess knowledge base.  Interventions:  - Provide teaching at level of understanding  - Provide teaching via preferred learning methods  Outcome: Progressing

## 2024-06-06 NOTE — ASSESSMENT & PLAN NOTE
Patient reports overall feeling fatigued having to sleep more often.  Stated she was doing work outside on Sunday and was very sleepy on Monday and Tuesday.  She reports that she keeps feeling like she has to go to sleep.  States she is having ongoing headache, diarrhea x 5 days, and episode of vomiting.  She reports that she was trying to stay hydrated drinking plenty of fluids.      Patient has not had diarrhea since admission, no further episodes of vomiting, discontinue IV fluids, discontinue C. difficile testing as she was not able to provide any sample.  Advance diet, monitor another 24 hours.  PT and OT evaluation

## 2024-06-06 NOTE — ASSESSMENT & PLAN NOTE
Patient reports headache x 5 days that she describes as pressure across the top of her scalp.  This is nonradiating without any palliating or provoking factors or visual disturbance.  Does have history of migraines and has been taking Tylenol twice daily to help alleviate symptoms.  She reports that this has been ongoing fluctuating in severity.  She reports after administration of Tylenol in the ED pain is at 7 out of 10 which is improved from prior.  CT head without acute intracranial abnormalities  Suspect tension type headache versus migraine  Will give one time migraine cocktail and evaluate response  Continue IV hydration

## 2024-06-06 NOTE — ASSESSMENT & PLAN NOTE
Patient reports syncopal episode while in the kitchen with her daughter.  She was sitting in chair and had lost consciousness.  She had no prodromal symptoms, no seizure-like events, no loss of bowel or bladder.  Does have history of syncopal episodes was seen by cardiology in 12/22 for similar events was recommended for Holter monitor as OP.  She reports feeling generalized fatigue and tiredness.  Has been falling asleep frequently unable to recall if she had syncopal episode versus just fell asleep but states she had lost consciousness.  States she did have a fall last week where she lost balance and landed on a chair denies any overt injuries or LOC at that time  CT head without acute intracranial abnormalities.  EKG showing normal sinus rhythm  Troponins negative thus far  Suspect etiology in the setting of dehydration with ongoing diarrhea and nausea vomiting.  Doubt cardiac etiology.  Plan  Will check orthostatics  Monitor on telemetry  Continue fall precautions  Continue IV hydration  Monitor intake/output    MRI of the brain was done as suspicion for TIA/stroke, and shows no acute or chronic infarct.  But mild microangiopathic changes noted,  Benadryl and Atarax which patient uses at night discontinued.  No acute events over the night, patient still complains of lightheadedness, blood pressure on the lower side, she is on amlodipine at home.  This will be discontinued.  Check orthostatic vital signs.  PT and OT evaluation

## 2024-06-06 NOTE — ASSESSMENT & PLAN NOTE
Patient reports syncopal episode while in the kitchen with her daughter.  She was sitting in chair and had lost consciousness.  She had no prodromal symptoms, no seizure-like events, no loss of bowel or bladder.  Does have history of syncopal episodes was seen by cardiology in 12/22 for similar events was recommended for Holter monitor as OP.  She reports feeling generalized fatigue and tiredness.  Has been falling asleep frequently unable to recall if she had syncopal episode versus just fell asleep but states she had lost consciousness.  States she did have a fall last week where she lost balance and landed on a chair denies any overt injuries or LOC at that time  CT head without acute intracranial abnormalities.  EKG showing normal sinus rhythm  Troponins negative thus far  Suspect etiology in the setting of dehydration with ongoing diarrhea and nausea vomiting.  Doubt cardiac etiology.  Plan  Will check orthostatics  Monitor on telemetry  Continue fall precautions  Continue IV hydration  Monitor intake/output

## 2024-06-06 NOTE — PROGRESS NOTES
Positive blood culture  Assessment & Plan  Staph epidermidis detected on the blood culture 1 of 2 set, will follow-up final results, likely contaminant, currently on vancomycin,    Elevated lipase  Assessment & Plan   Lipase on admission 415; currently denies any abdominal pain  CT abdomen does not show acute pancreatitis at this time  Checked elevation in the setting of nausea/vomiting/dehydration  Will give IV fluids overnight and recheck CMP in the morning    Headache  Assessment & Plan  Patient reports headache x 5 days that she describes as pressure across the top of her scalp.  This is nonradiating without any palliating or provoking factors or visual disturbance.  Does have history of migraines and has been taking Tylenol twice daily to help alleviate symptoms.  She reports that this has been ongoing fluctuating in severity.  She reports after administration of Tylenol in the ED pain is at 7 out of 10 which is improved from prior.  CT head without acute intracranial abnormalities  Suspect tension type headache versus migraine  Will give one time migraine cocktail and evaluate response  Continue IV hydration      Diarrhea  Assessment & Plan  Patient reports x 5 days of loose watery nonbloody stools.  She denies any changes in dietary intake, recent travel, or sick contacts.  She has been trying to stay hydrated.  Will check stool enteric panel and C. Difficile  Monitor intake/output  Continue IVF.    Fatigue  Assessment & Plan  Patient reports overall feeling fatigued having to sleep more often.  Stated she was doing work outside on Sunday and was very sleepy on Monday and Tuesday.  She reports that she keeps feeling like she has to go to sleep.  States she is having ongoing headache, diarrhea x 5 days, and episode of vomiting.  She reports that she was trying to stay hydrated drinking plenty of fluids.  Currently infectious workup is negative  Will check vitamin D, B12, TSH  Continue supportive  care    Personal history of DVT (deep vein thrombosis)  Assessment & Plan  Continue home dose Xarelto daily    GERD (gastroesophageal reflux disease)  Assessment & Plan  Continue daily PPI and Pepcid    Hypothyroidism, adult  Assessment & Plan  Continue home dose levothyroxine  Will recheck TSH due to patient's fatigue    Hiatal hernia  Assessment & Plan  Noted and patient history she follows with GI as outpatient  Had EGD performed on 3/24 showing 3 cm hiatal hernia without Dax lesion present.  Empiric dilatation not performed due to risk of aspiration  GI recommending possible surgical consultation for hernia repair continue to follow-up as outpatient  CT appears unchanged from prior  Continue soft diet and continue to eat slowly.  Has gastric emptying scan scheduled as OP  Continue clear liquid diet advance as tolerated    * Syncope  Assessment & Plan  Patient reports syncopal episode while in the kitchen with her daughter.  She was sitting in chair and had lost consciousness.  She had no prodromal symptoms, no seizure-like events, no loss of bowel or bladder.  Does have history of syncopal episodes was seen by cardiology in 12/22 for similar events was recommended for Holter monitor as OP.  She reports feeling generalized fatigue and tiredness.  Has been falling asleep frequently unable to recall if she had syncopal episode versus just fell asleep but states she had lost consciousness.  States she did have a fall last week where she lost balance and landed on a chair denies any overt injuries or LOC at that time  CT head without acute intracranial abnormalities.  EKG showing normal sinus rhythm  Troponins negative thus far  Suspect etiology in the setting of dehydration with ongoing diarrhea and nausea vomiting.  Doubt cardiac etiology.  Plan  Will check orthostatics  Monitor on telemetry  Continue fall precautions  Continue IV hydration  Monitor intake/output  6/6-unable to treat meant with the patient has  had real syncopal episode or if she is falling asleep during daytime intermittently, telemetry shows no acute arrhythmia, patient has had these episodes in the past, uses CPAP at night and daughter reports that she has been compliant with that.    MRI of the brain was done as suspicion for TIA/stroke, and shows no acute or chronic infarct.  But mild microangiopathic changes noted,  Also patient is noted to have been on Benadryl and Atarax at nighttime, which will be discontinued which could contribute to daytime sleepiness and fall risk especially patient being on anticoagulation with Xarelto  All to consider to titrate down Lyrica        VTE Pharmacologic Prophylaxis:   Pharmacologic: Rivaroxaban (Xarelto)      Mechanical VTE Prophylaxis in Place: Yes    Patient Centered Rounds: I have performed bedside rounds with nursing staff today.    Discussions with Specialists or Other Care Team Provider: d/w staff    Education and Discussions with Family / Patient: d/w daughter     Time Spent for Care: 45 minutes.  More than 50% of total time spent on counseling and coordination of care as described above.    Current Length of Stay: 0 day(s)    Current Patient Status: Inpatient   Certification Statement: The patient will continue to require additional inpatient hospital stay due to AMS , syncope     Discharge Plan: home     Code Status: Level 1 - Full Code      Subjective:   Patient was more sleepy this morning but in the afternoon she woke up and was eating her lunch.  As per the daughter patient falls asleep during the day however she does not sleep in the night time wakes up intermittently.  Uses CPAP on a regular basis.  Patient has had episodes of falling asleep versus syncopal episode in the past 2 years where when she went hit her head near the eye About 2 years ago.    Objective:     Vitals:   Temp (24hrs), Av.5 °F (36.9 °C), Min:97.8 °F (36.6 °C), Max:99.3 °F (37.4 °C)    Temp:  [97.8 °F (36.6 °C)-99.3 °F (37.4  °C)] 98.6 °F (37 °C)  HR:  [58-65] 58  Resp:  [16-18] 17  BP: ()/(48-62) 111/62  SpO2:  [97 %-100 %] 97 %  Body mass index is 32.67 kg/m².     Input and Output Summary (last 24 hours):       Intake/Output Summary (Last 24 hours) at 6/6/2024 1825  Last data filed at 6/6/2024 1700  Gross per 24 hour   Intake 1060 ml   Output 2125 ml   Net -1065 ml       Physical Exam:     HEENT-PERRLA, moist oral mucosa  Neck-supple, no JVD elevation   Respiratory-equal air entry bilaterally, no rales or rhonchi  Cardiovascular system-S1, S2 heard, systolic murmur   Abdomen-soft, nontender, no guarding or rigidity, bowel sounds heard  Extremities-no pedal edema  Peripheral pulses palpable  Musculoskeletal-no contractures  Central nervous system-no acute focal neurological deficit ,no sensory or motor deficit noted.  Skin-no rash noted    Additional Data:     Labs:    Results from last 7 days   Lab Units 06/06/24  0509   WBC Thousand/uL 7.46   HEMOGLOBIN g/dL 12.3   HEMATOCRIT % 38.9   PLATELETS Thousands/uL 288   SEGS PCT % 58   LYMPHO PCT % 21   MONO PCT % 14*   EOS PCT % 7*     Results from last 7 days   Lab Units 06/06/24  0509   SODIUM mmol/L 142   POTASSIUM mmol/L 3.2*   CHLORIDE mmol/L 105   CO2 mmol/L 28   BUN mg/dL 10   CREATININE mg/dL 0.77   ANION GAP mmol/L 9   CALCIUM mg/dL 8.3*   ALBUMIN g/dL 3.4*   TOTAL BILIRUBIN mg/dL 0.76   ALK PHOS U/L 106*   ALT U/L 13   AST U/L 13   GLUCOSE RANDOM mg/dL 91     Results from last 7 days   Lab Units 06/05/24  1407   INR  1.64*             Results from last 7 days   Lab Units 06/05/24  1407   LACTIC ACID mmol/L 1.2   PROCALCITONIN ng/ml 0.16           * I Have Reviewed All Lab Data Listed Above.  * Additional Pertinent Lab Tests Reviewed: All Labs For Current Hospital Admission Reviewed    Imaging:    Imaging Reports Reviewed Today Include: I have personally reviewed pertinent films in PACS.    Imaging Personally Reviewed by Myself Includes:  Same as above    Recent Cultures  (last 7 days):     Results from last 7 days   Lab Units 06/05/24  1420 06/05/24  1407   BLOOD CULTURE  Received in Microbiology Lab. Culture in Progress.  --    GRAM STAIN RESULT   --  Gram positive cocci in clusters*       Last 24 Hours Medication List:   Current Facility-Administered Medications   Medication Dose Route Frequency Provider Last Rate    acetaminophen  650 mg Oral Q6H PRN Ashwin Ta Meaoff-Nickum, CRNP      amLODIPine  5 mg [facility] Daily [facility]      aspirin  81 mg Oral Daily [facility] Chayitooff-Nickum, CRNP      brimonidine tartrate  1 drop Right Eye BID [facility] Chayitooff-Nickum, CRNP      buPROPion  300 mg Oral QAM [facility] Chayitooff-Nickum, CRNP      busPIRone  15 mg Oral BID [facility] Chayitooff-Nickum, CRNP      famotidine  40 mg Oral HS [facility] Chayitooff-Nickum, CRNP      lactated ringers  75 mL/hr Intravenous Continuous [facility] Chayitooff-Alfredoum, CRNP 75 mL/hr (06/05/24 2058)    latanoprost  1 drop Both Eyes HS [facility] Chayitooff-Nickum, CRNP      levothyroxine  100 mcg Oral Daily [facility] Chayitooff-Nickum, CRNP      magnesium sulfate  2 g Intravenous Q24H [facility] Chayitolindy-Nickum, CRNP 2 g (06/05/24 2208)    metoclopramide  10 mg Intravenous Q8H [facility] Chayitooff-Nickum, CRNP      pantoprazole  40 mg Oral Daily [facility], CRNP      potassium chloride  40 mEq Oral Once Debra Zhang MD      pregabalin  150 mg Oral BID [facility] CR[facility]      rivaroxaban  20 mg Oral QPM [facility] Chayitooff-Nickum, CRNP      rOPINIRole  1 mg Oral HS [facility] Chayitooff-Nickum, CRNP      timolol  1 drop Both Eyes BID [facility] CR[facility]      torsemide  10 mg Oral Daily [facility] Chayitooff-Nickum, CRNP      [START ON 6/7/2024] vancomycin  22 mg/kg (Adjusted) Intravenous Q24H Debra Zhang MD      vortioxetine  10 mg Oral Daily JACK Cotton          Today, Patient Was Seen By: Debra  Kevin Zhang MD    ** Please Note: Dictation voice to text software may have been used in the creation of this document. **

## 2024-06-06 NOTE — H&P
[facility]  H&P  Name: Madeleine Sanchez 79 y.o. female I MRN: [id number]  Unit/Bed#: -01 I Date of Admission: 6/5/2024   Date of Service: 6/5/2024 I Hospital Day: 0      Assessment & Plan   * Syncope  Assessment & Plan  Patient reports syncopal episode while in the kitchen with her daughter.  She was sitting in chair and had lost consciousness.  She had no prodromal symptoms, no seizure-like events, no loss of bowel or bladder.  Does have history of syncopal episodes was seen by cardiology in 12/22 for similar events was recommended for Holter monitor as OP.  She reports feeling generalized fatigue and tiredness.  Has been falling asleep frequently unable to recall if she had syncopal episode versus just fell asleep but states she had lost consciousness.  States she did have a fall last week where she lost balance and landed on a chair denies any overt injuries or LOC at that time  CT head without acute intracranial abnormalities.  EKG showing normal sinus rhythm  Troponins negative thus far  Suspect etiology in the setting of dehydration with ongoing diarrhea and nausea vomiting.  Doubt cardiac etiology.  Plan  Will check orthostatics  Monitor on telemetry  Continue fall precautions  Continue IV hydration  Monitor intake/output    Headache  Assessment & Plan  Patient reports headache x 5 days that she describes as pressure across the top of her scalp.  This is nonradiating without any palliating or provoking factors or visual disturbance.  Does have history of migraines and has been taking Tylenol twice daily to help alleviate symptoms.  She reports that this has been ongoing fluctuating in severity.  She reports after administration of Tylenol in the ED pain is at 7 out of 10 which is improved from prior.  CT head without acute intracranial abnormalities  Suspect tension type headache versus migraine  Will give one time migraine cocktail and evaluate response  Continue IV  hydration      Fatigue  Assessment & Plan  Patient reports overall feeling fatigued having to sleep more often.  Stated she was doing work outside on Sunday and was very sleepy on Monday and Tuesday.  She reports that she keeps feeling like she has to go to sleep.  States she is having ongoing headache, diarrhea x 5 days, and episode of vomiting.  She reports that she was trying to stay hydrated drinking plenty of fluids.  Currently infectious workup is negative  Will check vitamin D, B12, TSH  Continue supportive care    Elevated lipase  Assessment & Plan   Lipase on admission 415; currently denies any abdominal pain  CT abdomen does not show acute pancreatitis at this time  Checked elevation in the setting of nausea/vomiting/dehydration  Will give IV fluids overnight and recheck CMP in the morning    Diarrhea  Assessment & Plan  Patient reports x 5 days of loose watery nonbloody stools.  She denies any changes in dietary intake, recent travel, or sick contacts.  She has been trying to stay hydrated.  Will check stool enteric panel and C. Difficile  Monitor intake/output  Continue IVF.    Personal history of DVT (deep vein thrombosis)  Assessment & Plan  Continue home dose Xarelto daily    GERD (gastroesophageal reflux disease)  Assessment & Plan  Continue daily PPI and Pepcid    Hypothyroidism, adult  Assessment & Plan  Continue home dose levothyroxine  Will recheck TSH due to patient's fatigue    Hiatal hernia  Assessment & Plan  Noted and patient history she follows with GI as outpatient  Had EGD performed on 3/24 showing 3 cm hiatal hernia without Dax lesion present.  Empiric dilatation not performed due to risk of aspiration  GI recommending possible surgical consultation for hernia repair continue to follow-up as outpatient  CT appears unchanged from prior  Continue soft diet and continue to eat slowly.  Has gastric emptying scan scheduled as OP  Continue clear liquid diet advance as tolerated       VTE  Pharmacologic Prophylaxis: VTE Score: 3 Moderate Risk (Score 3-4) - Pharmacological DVT Prophylaxis Ordered: rivaroxaban (Xarelto).  Code Status: Level 1 - Full Code   Discussion with family: Updated  (son) via phone.    Anticipated Length of Stay: Patient will be admitted on an observation basis with an anticipated length of stay of less than 2 midnights secondary to syncope.    Total Time Spent on Date of Encounter in care of patient: 75 mins. This time was spent on one or more of the following: performing physical exam; counseling and coordination of care; obtaining or reviewing history; documenting in the medical record; reviewing/ordering tests, medications or procedures; communicating with other healthcare professionals and discussing with patient's family/caregivers.    Chief Complaint: Syncope    History of Present Illness:  Madeleine Sanchez is a 79 y.o. female with a PMH of PE, headache, fatigue, ASHLEY, hiatal hernia, GERD, breast cancer, anemia, iron deficiency, fibromyalgia, prediabetes, glaucoma who presents with syncope.    Patient was encouraged to come in by her daughter-in-law who is an EMT.  History is obtained speaking to the patient.  Patient reports that she was having headache that started 5 days ago with increasing lethargy as she felt she needed to sleep constantly.  She states that the headache described as a pressure across the top of her head that is localized to the front.  The pain is nonradiating and she has no associated photophobia or weakness.  She states that this has been waxing and waning.  She has been taking Tylenol 500 mg twice daily to help with the pain.  She reports that she had feelings of lethargy she states that she did work outside on Sunday and has felt the need to sleep all day Monday and Tuesday with associated low blood pressures in the 90s systolically which is abnormal for her.  She has been eating and drinking okay but reports she did throw up on Sunday.   She has been having diarrhea x 5 days.  She denies any sick contacts, recent travel, or dietary changes.  This morning she states that she was sitting at her kitchen table and lost consciousness at her chair.  No head strike.  She is unclear if she fell asleep or if she had a syncopal episode.  She reports during that time she had no associated symptoms she denied any chest pain, nausea, vomiting, dizziness, palpitations during that time.  Her daughter-in-law attempted to wake her up multiple times she finally came to and came to the emergency department.  Additionally patient reports that she had a fall 1 week ago where she lost her balance and landed on a chair.  She denies any overt injuries at that time.  She does use a cane at baseline and follows with physical therapy twice weekly as she has had knee surgery in September and back surgery last April.  She denies any illicit drug use, tobacco use, with occasional alcohol use.    Review of Systems:  Review of Systems   Constitutional:  Positive for activity change and fatigue. Negative for appetite change, chills, fever and unexpected weight change.   HENT:  Negative for ear pain, rhinorrhea and sore throat.    Eyes:  Negative for pain and visual disturbance.   Respiratory:  Negative for cough, choking, chest tightness, shortness of breath and wheezing.    Cardiovascular:  Positive for leg swelling. Negative for chest pain and palpitations.   Gastrointestinal:  Positive for diarrhea and nausea. Negative for abdominal distention, abdominal pain, blood in stool and vomiting.   Genitourinary:  Negative for difficulty urinating, dysuria and hematuria.   Musculoskeletal:  Negative for arthralgias, back pain, gait problem, joint swelling, neck pain and neck stiffness.   Skin:  Negative for color change and rash.   Neurological:  Positive for light-headedness and headaches. Negative for dizziness, seizures, syncope, facial asymmetry, speech difficulty, weakness and  "numbness.   All other systems reviewed and are negative.      Past Medical and Surgical History:   Past Medical History:   Diagnosis Date    Bilateral pulmonary embolism (HCC)     Cancer (HCC)     left breast cancer    Cervical herniated disc     Chronic pain disorder     back pain    Chronic respiratory failure (HCC)     uses O2 at home with NC /5/19 no longer using/just CPAP    Colon polyp     CPAP (continuous positive airway pressure) dependence     Disease of thyroid gland     hypothyroid    DVT (deep venous thrombosis) (HCC) 2020    right leg    Examination for, follow-up 11/03/2021    Family history of reaction to anesthesia     \"son and daughter hard time waking up and also PONV\"    Fibromyalgia, primary     GERD (gastroesophageal reflux disease)     Glaucoma     Hiatal hernia     History of palpitations     History of pneumonia     History of transfusion     Hyperlipidemia     Hypertension     Irregular heart beat     Migraine     Myocardial infarction (HCC)     pt sees cardilogist Dr Bennett [facility]/\"didnt realize had one, found on EKG before a surgery\"    OAB (overactive bladder)     Pollen allergies     Pulmonary embolism (HCC) 2019    Bilateral; on Xarelto    Restless leg     Risk for falls     Sleep apnea     Use of cane as ambulatory aid     occas    Uses brace     Mafo/left leg    Wears glasses        Past Surgical History:   Procedure Laterality Date    ADENOIDECTOMY      BAND HEMORRHOIDECTOMY      CARDIAC CATHETERIZATION      CARPAL TUNNEL RELEASE      CATARACT EXTRACTION Bilateral     COLONOSCOPY      FOOT SURGERY      pin implanted right toe    JOINT REPLACEMENT Bilateral     knees    LAMINECTOMY      LUMBAR EPIDURAL INJECTION      MASTECTOMY Bilateral     with reconstruction and implants    NISSEN FUNDOPLICATION      WA LNGTH/SHRT TENDON LEG/ANKLE 1 TENDON SPX Left 05/24/2021    Procedure: Sectioning peroneal tendons with lengthening/sectioning achilles tendon lower leg and ankle;  Surgeon: Fei" Liang Sifuentes DPM;  Location: [facility] OR;  Service: Podiatry    REPLACEMENT TOTAL KNEE      SPINAL FUSION      TONSILLECTOMY      TUBAL LIGATION      TUMOR EXCISION      right forearm/benign    UPPER GASTROINTESTINAL ENDOSCOPY      VEIN LIGATION Right        Meds/Allergies:  Prior to Admission medications    Medication Sig Start Date End Date Taking? Authorizing Provider   amLODIPine (NORVASC) 5 mg tablet Take 1 tablet (5 mg total) by mouth daily  Patient taking differently: Take 5 mg by mouth every morning 2/26/21  Yes JOE Yates DO   ASPIRIN 81 PO Aspirin EC 81 MG Oral Tablet Delayed Release    Refills: 0       Active   Yes Historical Provider, MD   brimonidine (ALPHAGAN P) 0.15 % ophthalmic solution Administer 1 drop to the right eye 2 (two) times a day 3/28/24  Yes JOE Yates DO   buPROPion (WELLBUTRIN XL) 300 mg 24 hr tablet Take 1 tablet (300 mg total) by mouth every morning 5/6/24  Yes Irma Batista   busPIRone (BUSPAR) 15 mg tablet Take 1 tablet (15 mg total) by mouth 2 (two) times a day 5/23/24 7/22/24 Yes Courtney German PA-C   famotidine (PEPCID) 40 MG tablet TAKE 1 TABLET TWICE DAILY  Patient taking differently: Take 40 mg by mouth daily 7/11/23  Yes JACK Gasca   hydrOXYzine HCL (ATARAX) 10 mg tablet Take 30 mg by mouth daily at bedtime 3/6/24  Yes Historical Provider, MD   latanoprost (XALATAN) 0.005 % ophthalmic solution Administer 1 drop to both eyes daily at bedtime 5/22/20  Yes Historical Provider, MD   levothyroxine 100 mcg tablet TAKE 1 TABLET EVERY DAY  Patient taking differently: Take 100 mcg by mouth every morning 5/8/23  Yes JOE Yates DO   ondansetron (ZOFRAN-ODT) 4 mg disintegrating tablet Take 1 tablet (4 mg total) by mouth every 8 (eight) hours 3/11/24  Yes JACK Ortega   pregabalin (LYRICA) 150 mg capsule Take 1 capsule (150 mg total) by mouth 2 (two) times a day 12/19/23  Yes JOE Yates DO   rOPINIRole (REQUIP) 1 mg tablet TAKE 1  TABLET AT 6PM AND TAKE 2 TABLETS AT BEDTIME  Patient taking differently: Take 1 mg by mouth daily at bedtime TAKE 1 TABLET AT 6PM AND TAKE 2 TABLETS AT BEDTIME 12/27/23  Yes JOE Yates DO   timolol (TIMOPTIC) 0.5 % ophthalmic solution Administer to both eyes 2 (two) times a day  4/13/21  Yes Historical Provider, MD   torsemide (DEMADEX) 10 mg tablet TAKE 1 TABLET EVERY DAY 5/9/24  Yes JOE Yates DO   vortioxetine (Trintellix) 10 MG tablet Take 1 tablet (10 mg total) by mouth daily 5/6/24  Yes Irma Batista   Xarelto 20 MG tablet TAKE ONE TABLET BY MOUTH EVERY EVENING 5/7/24  Yes JOE Yates DO   acetaminophen (TYLENOL) 325 mg tablet Take 650 mg by mouth every 6 (six) hours as needed for mild pain    Historical Provider, MD   fluticasone (FLONASE) 50 mcg/act nasal spray 2 sprays into each nostril once for 1 dose OD 4/1/24 5/2/24  JOE Yates DO   pantoprazole (PROTONIX) 40 mg tablet Take 1 tablet (40 mg total) by mouth daily 1/30/24 5/2/24  JACK Ortega     I have reviewed home medications with patient personally.    Allergies:   Allergies   Allergen Reactions    Benzalkonium Chloride Hives     Merthiolate tincture 9/28/2020      Hydromorphone Hallucinations     disorientation; hallucination; confusion      Merthiolate  [Thimerosal (Thiomersal)] Hives    Quinine Derivatives Other (See Comments) and Tinnitus     tinnitus      Codeine Nausea Only and Other (See Comments)     nausea      Medical Tape Rash    Other Itching    Pamelor [Nortriptyline] Vomiting     Per pt, itching also    Pollen Extract Nasal Congestion    Statins Itching    Wound Dressing Adhesive Rash       Social History:  Marital Status:    Occupation: Retired  Patient Pre-hospital Living Situation: Home  Patient Pre-hospital Level of Mobility: walks with cane  Patient Pre-hospital Diet Restrictions: None  Substance Use History:   Social History     Substance and Sexual Activity   Alcohol Use Not Currently     "Comment: socially     Social History     Tobacco Use   Smoking Status Never   Smokeless Tobacco Never     Social History     Substance and Sexual Activity   Drug Use Never       Family History:  Family History   Problem Relation Age of Onset    Cancer Sister        Physical Exam:     Vitals:   Blood Pressure: 100/56 (06/05/24 1812)  Pulse: 67 (06/05/24 1812)  Temperature: 98 °F (36.7 °C) (06/05/24 1812)  Temp Source: Oral (06/05/24 1812)  Respirations: 18 (06/05/24 1812)  Height: 5' 3.5\" (161.3 cm) (06/05/24 1812)  Weight - Scale: 85 kg (187 lb 6 oz) (06/05/24 1812)  SpO2: 97 % (06/05/24 1812)    Physical Exam  Vitals and nursing note reviewed.   Constitutional:       General: She is not in acute distress.     Appearance: She is well-developed. She is not ill-appearing, toxic-appearing or diaphoretic.   HENT:      Head: Normocephalic and atraumatic.   Eyes:      General: No visual field deficit or scleral icterus.     Extraocular Movements: Extraocular movements intact.      Conjunctiva/sclera: Conjunctivae normal.      Pupils: Pupils are equal, round, and reactive to light.   Cardiovascular:      Rate and Rhythm: Normal rate and regular rhythm.      Heart sounds: No murmur heard.  Pulmonary:      Effort: Pulmonary effort is normal. No tachypnea, accessory muscle usage or respiratory distress.      Breath sounds: Normal breath sounds. No wheezing or rales.   Abdominal:      General: Bowel sounds are normal. There is no distension.      Palpations: Abdomen is soft.      Tenderness: There is no abdominal tenderness. There is no guarding.   Musculoskeletal:         General: No swelling.      Cervical back: Neck supple.      Right lower leg: Edema (chronic per patient) present.      Left lower leg: No edema.   Skin:     General: Skin is warm and dry.      Capillary Refill: Capillary refill takes less than 2 seconds.   Neurological:      Mental Status: She is alert and oriented to person, place, and time.      GCS: GCS " eye subscore is 4. GCS verbal subscore is 5. GCS motor subscore is 6.      Cranial Nerves: No dysarthria or facial asymmetry.      Sensory: Sensation is intact.      Motor: No weakness or tremor.      Comments: Patient is alert, awake, following commands and oriented to person, place, time, and situation.  She has equal strength in her upper and lower extremities 4 out of 5.  Sensation intact to light touch bilaterally.   Psychiatric:         Attention and Perception: Attention and perception normal. She is attentive.         Mood and Affect: Mood and affect normal.         Speech: Speech normal.         Behavior: Behavior normal. Behavior is cooperative.         Thought Content: Thought content normal. Thought content is not paranoid or delusional.         Cognition and Memory: Cognition normal.          Additional Data:     Lab Results:  Results from last 7 days   Lab Units 06/05/24  1407   WBC Thousand/uL 7.50   HEMOGLOBIN g/dL 13.6   HEMATOCRIT % 41.8   PLATELETS Thousands/uL 304   SEGS PCT % 66   LYMPHO PCT % 20   MONO PCT % 10   EOS PCT % 4     Results from last 7 days   Lab Units 06/05/24  1407   SODIUM mmol/L 141   POTASSIUM mmol/L 3.6   CHLORIDE mmol/L 107   CO2 mmol/L 25   BUN mg/dL 12   CREATININE mg/dL 0.77   ANION GAP mmol/L 9   CALCIUM mg/dL 8.7   ALBUMIN g/dL 3.8   TOTAL BILIRUBIN mg/dL 0.63   ALK PHOS U/L 123*   ALT U/L 16   AST U/L 14   GLUCOSE RANDOM mg/dL 96     Results from last 7 days   Lab Units 06/05/24  1407   INR  1.64*             Results from last 7 days   Lab Units 06/05/24  1407   LACTIC ACID mmol/L 1.2   PROCALCITONIN ng/ml 0.16       Lines/Drains:  Invasive Devices       Peripheral Intravenous Line  Duration             Peripheral IV 06/05/24 Right Antecubital <1 day                        Imaging: Reviewed radiology reports from this admission including: abdominal/pelvic CT and CT head  CT chest abdomen pelvis w contrast   Final Result by Leeroy Wheatley MD (06/05 3775)       Gaseous distention and thin-walled viscus associated with the gastric fundus in the hiatal hernia sac raising suspicion of gastric diverticulum or ulcer within the stomach in the hiatal hernia.      Otherwise no significant interval change from previous examination. Hepatomegaly and hepatic steatosis. Reidentified 12.4 cm uterine mass, probably representing myoma and compressing right mid ureter resulting an unchanged mild right hydronephrosis.               Workstation performed: [id number]         CT head without contrast   Final Result by Maranda Castillo MD (06/05 9458)      No acute intracranial abnormality.                  Workstation performed: [id number]         XR chest portable   ED Interpretation by Travis Rollins DO (06/05 0410)   No pneumonia or pneumothorax          EKG and Other Studies Reviewed on Admission:   EKG: NSR. HR  .    ** Please Note: This note has been constructed using a voice recognition system. **

## 2024-06-06 NOTE — PROGRESS NOTES
Madeleine Sanchez is a 79 y.o. female who is currently ordered Vancomycin IV with management by the Pharmacy Consult service.  Relevant clinical data and objective / subjective history reviewed.  Vancomycin Assessment:  Indication and Goal AUC/Trough: Bacteremia (goal -600, trough >10)  Clinical Status:  New  Micro:     Renal Function:  SCr: 0.89 mg/dL  CrCl: 52.8 mL/min  Renal replacement: Not on dialysis  Days of Therapy: 1  Current Dose: 1000 mg IV q12h  Vancomycin Plan:  New Dosin mg IV loading dose followed by 1500 mg IV q24h  Estimated AUC: 506 mcg*hr/mL  Estimated Trough: 12.9 mcg/mL  Next Level: 24 with AM labs  Renal Function Monitoring: Daily BMP and UOP  Pharmacy will continue to follow closely for s/sx of nephrotoxicity, infusion reactions and appropriateness of therapy.  BMP and CBC will be ordered per protocol. We will continue to follow the patient’s culture results and clinical progress daily.    Brissa Boles, Pharmacist

## 2024-06-06 NOTE — CASE MANAGEMENT
Case Management Assessment & Discharge Planning Note    Patient name Madeleine Sanchez  Location /-01 MRN [id number]  : 1945 Date 2024       Current Admission Date: 2024  Current Admission Diagnosis:Syncope   Patient Active Problem List    Diagnosis Date Noted Date Diagnosed    Syncope 2024     Diarrhea 2024     Headache 2024     Elevated lipase 2024     Adjustment disorder with mixed anxiety and depressed mood 2024     Severe episode of recurrent major depressive disorder, without psychotic features (HCC) 2024     Acute rhinitis 2024     Glaucoma of right eye 2024     Primary osteoarthritis of right knee 2024     S/P laparoscopic fundoplication 2024     Prediabetes 2024     COVID-19 2024     Nausea and vomiting 2023     Abdominal discomfort 2023     Diffuse pain 2023     Facial hematoma 2022     Epistaxis 2022     Chest discomfort 09/15/2022     Class 1 obesity due to excess calories with body mass index (BMI) of 31.0 to 31.9 in adult 2022     COVID-19 long hauler manifesting chronic fatigue 2022     Dizziness 07/15/2022     Continuous opioid dependence (HCC) 2022     Bilateral malignant neoplasm of breast in female, unspecified estrogen receptor status, unspecified site of breast (HCC) 2022     Blunt injury to chest 2021     Sprain of left ankle 2021     Left wrist sprain, subsequent encounter 2021     Sprain of cervical neck, sequela 2021     Lumbar contusion 2021     Fatigue 2021     No-show for appointment 2021     Fibromyalgia 2021     Abscess of forearm, right 2021     TA (temporal arteritis) ([facility]) 2021     Iron deficiency anemia due to chronic blood loss 2021     Internal hemorrhoids 2021     GAVE (gastric antral vascular ectasia) 2021     Spasm, peroneo-extensor 2021      Equinus contracture of ankle 05/13/2021     Moderate obstructive sleep apnea 03/23/2021     Blood in stool 03/11/2021     Pain in joint involving ankle and foot 03/11/2021     Ankle pain 03/11/2021     Left foot drop 03/11/2021     Hematochezia 03/11/2021     Acquired pes planus of left foot 03/11/2021     Hx of adenomatous colonic polyps 03/10/2021     Dysphagia 03/10/2021     Depression, recurrent (HCC) 02/26/2021     History of pulmonary embolus (PE) 02/04/2021     Cellulitis and abscess of right lower extremity 10/23/2020     Trochanteric bursitis of left hip 10/23/2020     Personal history of DVT (deep vein thrombosis) 10/23/2020     Abnormal gait 10/13/2020     Ductal carcinoma in situ (DCIS) of left breast 09/28/2020     Mixed hyperlipidemia 09/16/2020     Anxiety 09/16/2020     Hypothyroidism, adult 09/16/2020     RLS (restless legs syndrome) 09/16/2020     Hiatal hernia      Swelling of limb 01/08/2020     Lesion of radial nerve 05/31/2017     GERD (gastroesophageal reflux disease) 03/08/2016     Anemia 07/10/2014     Osteoporosis 03/02/2010       LOS (days): 0  Geometric Mean LOS (GMLOS) (days):   Days to GMLOS:     OBJECTIVE:              Current admission status: Observation       Preferred Pharmacy:   [facility] [address] [place] [id number]  Phone: [phone] Fax: [phone]    [facility] - [facility] [address] [facility]  [address] [facility] [address]  Phone: [phone] Fax: [phone]    Primary Care Provider: JOE Yates DO    Primary Insurance: MEDICARE  Secondary Insurance: HUMANA    ASSESSMENT:  Active Health Care Proxies       Josie Callahan [facility] Representative - Daughter   Primary Phone: [phone] (Mobile)                           Readmission Root Cause  30 Day Readmission: No    Patient Information  Admitted from:: Home  Mental Status: Alert  During Assessment patient was  accompanied by: Daughter  Assessment information provided by:: Daughter  Primary Caregiver: Self  Support Systems: Family members  County of Residence: [place]  What city do you live in?: [place]  Home entry access options. Select all that apply.: Stairs  Number of steps to enter home.: 7  Do the steps have railings?: Yes  Type of Current Residence: 2 story home  Upon entering residence, is there a bedroom on the main floor (no further steps)?: No  A bedroom is located on the following floor levels of residence (select all that apply):: 2nd Floor  Upon entering residence, is there a bathroom on the main floor (no further steps)?: No  Indicate which floors of current residence have a bathroom (select all the apply):: 2nd Floor  Number of steps to 2nd floor from main floor: One Flight  Living Arrangements: Lives w/ Daughter  Is patient a ?: No    Activities of Daily Living Prior to Admission  Functional Status: Independent  Completes ADLs independently?: Yes  Ambulates independently?: Yes  Does patient use assisted devices?: Yes  Assisted Devices (DME) used: CPAP, Straight Cane  DME Company Name (respiratory supplies): [employer]  Does patient currently own DME?: Yes  What DME does the patient currently own?: CPAP, Straight Cane, Walker  Does patient have a history of Outpatient Therapy (PT/OT)?: No  Does the patient have a history of Short-Term Rehab?: Yes ([facility] in October 2023)  Does patient have a history of HHC?: Yes  Does patient currently have HHC?: No         Patient Information Continued  Income Source: SSI/SSD  Does patient have prescription coverage?: Yes  Does patient receive dialysis treatments?: No  Does patient have a history of substance abuse?: No  Does patient have a history of Mental Health Diagnosis?: Yes  Is patient receiving treatment for mental health?: Yes  Has patient received inpatient treatment related to mental health in the last 2 years?: No         Means of  Transportation  Means of Transport to Appts:: Drives Self      Social Determinants of Health (SDOH)      Flowsheet Row Most Recent Value   Housing Stability    In the last 12 months, was there a time when you were not able to pay the mortgage or rent on time? N   At any time in the past 12 months, were you homeless or living in a shelter (including now)? N   Transportation Needs    In the past 12 months, has lack of transportation kept you from medical appointments or from getting medications? no   In the past 12 months, has lack of transportation kept you from meetings, work, or from getting things needed for daily living? No   Food Insecurity    Within the past 12 months, you worried that your food would run out before you got the money to buy more. Never true   Within the past 12 months, the food you bought just didn't last and you didn't have money to get more. Never true   Utilities    In the past 12 months has the electric, gas, oil, or water company threatened to shut off services in your home? No            DISCHARGE DETAILS:    Discharge planning discussed with:: Patient and pt's daughter, Elysia, at bedside  [place] of Choice: Yes  Comments - Freedom of Choice: No PT/OT consulted at this time.  CM contacted family/caregiver?: Yes  Were Treatment Team discharge recommendations reviewed with patient/caregiver?: Yes  Did patient/caregiver verbalize understanding of patient care needs?: Yes  Were patient/caregiver advised of the risks associated with not following Treatment Team discharge recommendations?: Yes    Contacts  Patient Contacts: Elysia-pt's daughter  Relationship to Patient:: Family  Contact Method: In Person  Reason/Outcome: Continuity of Care, Emergency Contact, Discharge Planning    Requested Home Health Care         Is the patient interested in HHC at discharge?: No    DME Referral Provided  Referral made for DME?: No    Other Referral/Resources/Interventions Provided:  Referral Comments: No  referrals made at this time. No PT/OT consulted    Additional Comments: CM met with pt and pt's daughter at bedside and introduced self and role. Pt was asleep through most of the assessment, so her daughter, Elysia, answered the assessment questions. Pt resides in a 2-story home and her one daughter resides with her. Pt is fully independent with ADL's and ambulation. Pt uses a cane for ambulation. Pt also owns a walker, but does not use it. Pt has a cpap through [employer]. Pt has h[facility] of [facility]. Pt was last in STR at [facility] in October 2023. Pt has depression dx and is compliant with medication management. Pt transports herself at this time. No PT/OT consults at this time. CM department to continue to follow for discharge planning needs throughout this admission.

## 2024-06-07 LAB
ANION GAP SERPL CALCULATED.3IONS-SCNC: 9 MMOL/L (ref 4–13)
ATRIAL RATE: 61 BPM
BUN SERPL-MCNC: 8 MG/DL (ref 5–25)
CALCIUM SERPL-MCNC: 8.1 MG/DL (ref 8.4–10.2)
CHLORIDE SERPL-SCNC: 111 MMOL/L (ref 96–108)
CO2 SERPL-SCNC: 20 MMOL/L (ref 21–32)
CREAT SERPL-MCNC: 0.62 MG/DL (ref 0.6–1.3)
ERYTHROCYTE [DISTWIDTH] IN BLOOD BY AUTOMATED COUNT: 13.8 % (ref 11.6–15.1)
GFR SERPL CREATININE-BSD FRML MDRD: 85 ML/MIN/1.73SQ M
GLUCOSE SERPL-MCNC: 105 MG/DL (ref 65–140)
HCT VFR BLD AUTO: 36.7 % (ref 34.8–46.1)
HGB BLD-MCNC: 11.2 G/DL (ref 11.5–15.4)
MCH RBC QN AUTO: 29.5 PG (ref 26.8–34.3)
MCHC RBC AUTO-ENTMCNC: 30.5 G/DL (ref 31.4–37.4)
MCV RBC AUTO: 97 FL (ref 82–98)
P AXIS: 47 DEGREES
PLATELET # BLD AUTO: 240 THOUSANDS/UL (ref 149–390)
PMV BLD AUTO: 10.5 FL (ref 8.9–12.7)
POTASSIUM SERPL-SCNC: 4.4 MMOL/L (ref 3.5–5.3)
PR INTERVAL: 152 MS
QRS AXIS: -11 DEGREES
QRSD INTERVAL: 82 MS
QT INTERVAL: 422 MS
QTC INTERVAL: 424 MS
RBC # BLD AUTO: 3.8 MILLION/UL (ref 3.81–5.12)
SODIUM SERPL-SCNC: 140 MMOL/L (ref 135–147)
T WAVE AXIS: 35 DEGREES
VENTRICULAR RATE: 61 BPM
WBC # BLD AUTO: 5.1 THOUSAND/UL (ref 4.31–10.16)

## 2024-06-07 PROCEDURE — 80048 BASIC METABOLIC PNL TOTAL CA: CPT | Performed by: INTERNAL MEDICINE

## 2024-06-07 PROCEDURE — 94760 N-INVAS EAR/PLS OXIMETRY 1: CPT

## 2024-06-07 PROCEDURE — 99232 SBSQ HOSP IP/OBS MODERATE 35: CPT | Performed by: INTERNAL MEDICINE

## 2024-06-07 PROCEDURE — 97166 OT EVAL MOD COMPLEX 45 MIN: CPT

## 2024-06-07 PROCEDURE — 94660 CPAP INITIATION&MGMT: CPT

## 2024-06-07 PROCEDURE — 97116 GAIT TRAINING THERAPY: CPT

## 2024-06-07 PROCEDURE — 93005 ELECTROCARDIOGRAM TRACING: CPT

## 2024-06-07 PROCEDURE — 97163 PT EVAL HIGH COMPLEX 45 MIN: CPT

## 2024-06-07 PROCEDURE — 93010 ELECTROCARDIOGRAM REPORT: CPT | Performed by: INTERNAL MEDICINE

## 2024-06-07 PROCEDURE — 85027 COMPLETE CBC AUTOMATED: CPT | Performed by: INTERNAL MEDICINE

## 2024-06-07 RX ADMIN — LEVOTHYROXINE SODIUM 100 MCG: 100 TABLET ORAL at 09:57

## 2024-06-07 RX ADMIN — ACETAMINOPHEN 650 MG: 325 TABLET, FILM COATED ORAL at 15:44

## 2024-06-07 RX ADMIN — METOCLOPRAMIDE 10 MG: 5 INJECTION, SOLUTION INTRAMUSCULAR; INTRAVENOUS at 05:41

## 2024-06-07 RX ADMIN — ASPIRIN 81 MG 81 MG: 81 TABLET ORAL at 09:58

## 2024-06-07 RX ADMIN — TORSEMIDE 10 MG: 10 TABLET ORAL at 09:57

## 2024-06-07 RX ADMIN — METOCLOPRAMIDE 10 MG: 5 INJECTION, SOLUTION INTRAMUSCULAR; INTRAVENOUS at 15:42

## 2024-06-07 RX ADMIN — SODIUM CHLORIDE, SODIUM LACTATE, POTASSIUM CHLORIDE, AND CALCIUM CHLORIDE 75 ML/HR: .6; .31; .03; .02 INJECTION, SOLUTION INTRAVENOUS at 05:46

## 2024-06-07 RX ADMIN — PREGABALIN 150 MG: 75 CAPSULE ORAL at 09:58

## 2024-06-07 RX ADMIN — ACETAMINOPHEN 650 MG: 325 TABLET, FILM COATED ORAL at 10:13

## 2024-06-07 RX ADMIN — TIMOLOL MALEATE 1 DROP: 5 SOLUTION/ DROPS OPHTHALMIC at 19:05

## 2024-06-07 RX ADMIN — BUSPIRONE HYDROCHLORIDE 15 MG: 10 TABLET ORAL at 09:58

## 2024-06-07 RX ADMIN — BRIMONIDINE TARTRATE 1 DROP: 2 SOLUTION/ DROPS OPHTHALMIC at 10:00

## 2024-06-07 RX ADMIN — VANCOMYCIN HYDROCHLORIDE 1500 MG: 750 INJECTION, POWDER, LYOPHILIZED, FOR SOLUTION INTRAVENOUS at 02:11

## 2024-06-07 RX ADMIN — BUSPIRONE HYDROCHLORIDE 15 MG: 10 TABLET ORAL at 19:05

## 2024-06-07 RX ADMIN — ACETAMINOPHEN 650 MG: 325 TABLET, FILM COATED ORAL at 23:52

## 2024-06-07 RX ADMIN — PREGABALIN 150 MG: 75 CAPSULE ORAL at 19:05

## 2024-06-07 RX ADMIN — ROPINIROLE HYDROCHLORIDE 1 MG: 1 TABLET, FILM COATED ORAL at 21:40

## 2024-06-07 RX ADMIN — BRIMONIDINE TARTRATE 1 DROP: 2 SOLUTION/ DROPS OPHTHALMIC at 21:40

## 2024-06-07 RX ADMIN — FAMOTIDINE 40 MG: 20 TABLET, FILM COATED ORAL at 21:42

## 2024-06-07 RX ADMIN — RIVAROXABAN 20 MG: 20 TABLET, FILM COATED ORAL at 19:05

## 2024-06-07 RX ADMIN — PANTOPRAZOLE SODIUM 40 MG: 40 TABLET, DELAYED RELEASE ORAL at 09:58

## 2024-06-07 RX ADMIN — METOCLOPRAMIDE 10 MG: 5 INJECTION, SOLUTION INTRAMUSCULAR; INTRAVENOUS at 21:40

## 2024-06-07 RX ADMIN — MAGNESIUM SULFATE HEPTAHYDRATE 2 G: 40 INJECTION, SOLUTION INTRAVENOUS at 20:17

## 2024-06-07 RX ADMIN — LATANOPROST 1 DROP: 50 SOLUTION OPHTHALMIC at 21:41

## 2024-06-07 RX ADMIN — TIMOLOL MALEATE 1 DROP: 5 SOLUTION/ DROPS OPHTHALMIC at 10:00

## 2024-06-07 RX ADMIN — BUPROPION HYDROCHLORIDE 300 MG: 150 TABLET, EXTENDED RELEASE ORAL at 09:58

## 2024-06-07 NOTE — OCCUPATIONAL THERAPY NOTE
"    Occupational Therapy Evaluation     Patient Name: Madeleine Sanchez  Today's Date: 6/7/2024  Problem List  Principal Problem:    Syncope  Active Problems:    Hiatal hernia    Hypothyroidism, adult    GERD (gastroesophageal reflux disease)    Personal history of DVT (deep vein thrombosis)    Fatigue    Diarrhea    Headache    Elevated lipase    Positive blood culture    Vitamin B 12 deficiency    Past Medical History  Past Medical History:   Diagnosis Date    Bilateral pulmonary embolism (HCC)     Cancer (HCC)     left breast cancer    Cervical herniated disc     Chronic pain disorder     back pain    Chronic respiratory failure (HCC)     uses O2 at home with NC /5/19 no longer using/just CPAP    Colon polyp     CPAP (continuous positive airway pressure) dependence     Disease of thyroid gland     hypothyroid    DVT (deep venous thrombosis) (HCC) 2020    right leg    Examination for, follow-up 11/03/2021    Family history of reaction to anesthesia     \"son and daughter hard time waking up and also PONV\"    Fibromyalgia, primary     GERD (gastroesophageal reflux disease)     Glaucoma     Hiatal hernia     History of palpitations     History of pneumonia     History of transfusion     Hyperlipidemia     Hypertension     Irregular heart beat     Migraine     Myocardial infarction (HCC)     pt sees cardilogist Dr Bennett [facility]/\"didnt realize had one, found on EKG before a surgery\"    OAB (overactive bladder)     Pollen allergies     Pulmonary embolism (HCC) 2019    Bilateral; on Xarelto    Restless leg     Risk for falls     Sleep apnea     Use of cane as ambulatory aid     occas    Uses brace     Mafo/left leg    Wears glasses      Past Surgical History  Past Surgical History:   Procedure Laterality Date    ADENOIDECTOMY      BAND HEMORRHOIDECTOMY      CARDIAC CATHETERIZATION      CARPAL TUNNEL RELEASE      CATARACT EXTRACTION Bilateral     COLONOSCOPY      FOOT SURGERY      pin implanted right toe    JOINT REPLACEMENT " "Bilateral     knees    LAMINECTOMY      LUMBAR EPIDURAL INJECTION      MASTECTOMY Bilateral     with reconstruction and implants    NISSEN FUNDOPLICATION      TX LNGTH/SHRT TENDON LEG/ANKLE 1 TENDON SPX Left 05/24/2021    Procedure: Sectioning peroneal tendons with lengthening/sectioning achilles tendon lower leg and ankle;  Surgeon: Fei Sifuentes DPM;  Location: [facility];  Service: Podiatry    REPLACEMENT TOTAL KNEE      SPINAL FUSION      TONSILLECTOMY      TUBAL LIGATION      TUMOR EXCISION      right forearm/benign    UPPER GASTROINTESTINAL ENDOSCOPY      VEIN LIGATION Right          06/07/24 1341   OT Last Visit   OT Visit Date 06/07/24   Note Type   Note type Evaluation   Pain Assessment   Pain Assessment Tool 0-10   Pain Score 5   Pain Location/Orientation Location: Head   Pain Onset/Description Onset: Ongoing   Patient's Stated Pain Goal No pain   Hospital Pain Intervention(s) Rest;Repositioned   Restrictions/Precautions   Weight Bearing Precautions Per Order No   Braces or Orthoses   (LLE MAFO at baseline)   Other Precautions Fall Risk;Bed Alarm;Chair Alarm   Home Living   Type of Home House   Home Layout Two level;Bed/bath upstairs   Bathroom Shower/Tub Tub/shower unit   Bathroom Toilet Standard   Bathroom Equipment Shower chair;Grab bars in shower   Home Equipment Cane   Prior Function   Level of [place] Independent with ADLs;Independent with functional mobility;Independent with IADLS   Lives With Daughter   Receives Help From Family   IADLs Independent with driving;Independent with meal prep;Independent with medication management   Falls in the last 6 months 1 to 4   Subjective   Subjective \"I still don't feel right\"   ADL   Eating Assistance 7  Independent   Grooming Assistance 7  Independent   UB Bathing Assistance 5  Supervision/Setup   LB Bathing Assistance 5  Supervision/Setup   UB Dressing Assistance 5  Supervision/Setup   LB Dressing Assistance 5  Supervision/Setup   Toileting " Assistance  5  Supervision/Setup   Bed Mobility   Additional Comments Pt received seated EOB   Transfers   Sit to Stand 4  Minimal assistance   Additional items Verbal cues   Stand to Sit 5  Supervision   Additional items Verbal cues   Functional Mobility   Functional Mobility   (CGAx 1 with cane as pt endorsing lightheadedness)   Balance   Static Sitting Good   Dynamic Sitting Fair +   Static Standing Fair   Dynamic Standing Fair -   Activity Tolerance   Activity Tolerance Patient limited by fatigue  (Pt endorsing some lightheadedness. BP sitting /60; Standing 122/45; BP after returning from walk and sitting 113/51)   Medical Staff Made Aware PT Megan   RUE Assessment   RUE Assessment WFL   LUE Assessment   LUE Assessment WFL   Cognition   Overall Cognitive Status WFL   Arousal/Participation Alert   Attention Within functional limits   Orientation Level Oriented X4   Memory Within functional limits   Following Commands Follows multistep commands without difficulty   Assessment   Limitation Decreased ADL status;Decreased self-care trans;Decreased high-level ADLs;Decreased endurance   Prognosis Good   Assessment Pt is a 79 y.o. female seen for OT evaluation at [facility], admitted 6/5/2024 w/ Syncope.  Comorbidities affecting pt's functional performance at time of assessment include:  PE, headache, fatigue, ASHLEY, hiatal hernia, GERD, breast cancer, anemia, iron deficiency, fibromyalgia, prediabetes, glaucoma.  Prior to admission, pt was living with daughter in a house with steps to manage.  Pt was I w/  ADLS and IADLS, (+) drove, & required cane PTA. Upon evaluation, pt appears to be performing below baseline functional status. Pt currently requires sup-min A x 1 for functional mobility/transfers, sup for UB ADLs and sup for LB ADLS 2* the following deficits impacting occupational performance: decreased strength, decreased balance, and decreased tolerance. Full objective findings from OT assessment  regarding body systems outlined above. Personal factors affecting pt at time of IE include:steps to enter environment, difficulty performing IADLS , and decreased functional mobility . These impairments, as well as  risk for falls  limit pt's ability to safely engage in all baseline areas of occupation and mobility. Pt to benefit from continued skilled OT tx while in the hospital to address deficits as defined above and maximize level of functional independence w ADL's and functional mobility. Occupational Performance areas to address include: bathing/shower, toilet hygiene, dressing, and functional mobility.  This evaluation required an expanded review of medical and/or therapy records and additional review of physical, cognitive and psychosocial history related to functional performance. Based upon functional performance deficits and assessments, this evaluation has been identified as a mod complexity evaluation.  At this time, OT recommendations at time of discharge are home with no OT needs.   Goals   Patient Goals Pt wishes to get home   Plan   Treatment Interventions ADL retraining;Functional transfer training;Endurance training;Patient/family training;Compensatory technique education;Equipment evaluation/education   Goal Expiration Date 06/17/24   OT Treatment Day 0   OT Frequency 2-3x/wk   Discharge Recommendation   Rehab Resource Intensity Level, OT No post-acute rehabilitation needs   Additional Comments  The patient's raw score on the -PAC Daily Activity inpatient short form is 24, standardized score is 57.54, greater than 39.4. Patients at this level are likely to benefit from DC to home. However please refer to therapist recommendation for discharge planning given other factors that may influence destination.   AM-PAC Daily Activity Inpatient   Lower Body Dressing 4   Bathing 4   Toileting 4   Upper Body Dressing 4   Grooming 4   Eating 4   Daily Activity Raw Score 24   Daily Activity Standardized  Score (Calc for Raw Score >=11) 57.54   AM-PAC Applied Cognition Inpatient   Following a Speech/Presentation 4   Understanding Ordinary Conversation 4   Taking Medications 4   Remembering Where Things Are Placed or Put Away 4   Remembering List of 4-5 Errands 4   Taking Care of Complicated Tasks 4   Applied Cognition Raw Score 24   Applied Cognition Standardized Score 62.21   End of Consult   Education Provided Yes   Patient Position at End of Consult Bedside chair;Bed/Chair alarm activated;All needs within reach   Nurse Communication Nurse aware of consult         Pt will achieve the following goals within 10 days.    *Pt will complete UB bathing and dressing with mod i.    *Pt will complete LB bathing and dressing with mod I .    * Pt will complete toileting w/ mod I w/ G hygiene/thoroughness using DME PRN    *Pt will complete bed mobility with mod I, with bed flat and no side rail to prep for purposeful tasks    *Pt will perform functional transfers with on/off all surfaces with mod I using DME as needed w/ G balance/safety.    *Pt will increase standing tolerance x 5  minutes for inc'd tolerance with standing purposeful tasks.    *Pt will complete item retrieval and light home management with mod I while demonstrating good safety.    *Patient will verbalize 3 safety awareness/ principles to prevent falls in the home setting.     *Pt will improve functional mobility during ADL/IADL/leisure tasks to mod I using DME as needed w/ G balance/safety.       Sujata Fournier, OTR/L

## 2024-06-07 NOTE — PLAN OF CARE
Problem: PHYSICAL THERAPY ADULT  Goal: Performs mobility at highest level of function for planned discharge setting.  See evaluation for individualized goals.  Description: Treatment/Interventions: ADL retraining, Functional transfer training, LE strengthening/ROM, Elevations, Therapeutic exercise, Endurance training, Patient/family training, Equipment eval/education, Bed mobility, Gait training, Compensatory technique education, Spoke to case management, OT          See flowsheet documentation for full assessment, interventions and recommendations.  Note:    Problem List: Decreased strength, Decreased range of motion, Decreased endurance, Impaired balance, Decreased mobility, Decreased coordination, Decreased safety awareness, Pain, Impaired tone  Assessment: Patient seen for Physical Therapy evaluation. Patient admitted with Syncope.  Comorbidities affecting patient's physical performance include: DVT, HA, Vit B12, HLD, osteoporosis, anemia, PE L foot drop, ASHLEY, OA R knee, gluacoma, COVID-19.  Personal factors affecting patient at time of initial evaluation include: lives in two story house, ambulating with assistive device, stairs to enter home, inability to navigate community distances, inability to navigate level surfaces without external assistance, inability to perform dynamic tasks in community, and positive fall history. Prior to admission, patient was independent with functional mobility with cane, independent with ADLS, independent with IADLS, living with dtr in a two level home with 5 steps to enter, and ambulating household distance.  Please find objective findings from Physical Therapy assessment regarding body systems outlined above with impairments and limitations including weakness, impaired balance, decreased endurance, impaired coordination, gait deviations, pain, decreased activity tolerance, decreased functional mobility tolerance, decreased safety awareness, fall risk, and impaired tone.  The  Barthel Index was used as a functional outcome tool presenting with a score of Barthel Index Score: 55 today indicating marked limitations of functional mobility and ADLS.  Patient's clinical presentation is currently unstable/unpredictable as seen in patient's presentation of vital sign response, changing level of pain, increased fall risk, new onset of impairment of functional mobility, decreased endurance, and new onset of weakness. Pt would benefit from continued Physical Therapy treatment to address deficits as defined above and maximize level of functional mobility. As demonstrated by objective findings, the assigned level of complexity for this evaluation is high.The patient's -[facility] Basic Mobility Inpatient Short Form Raw Score is 18. A Raw score of greater than 16 suggests the patient may benefit from discharge to home. Please also refer to the recommendation of the Physical Therapist for safe discharge planning.        Rehab Resource Intensity Level, PT: III (Minimum Resource Intensity)    See flowsheet documentation for full assessment.

## 2024-06-07 NOTE — PLAN OF CARE
Problem: NEUROSENSORY - ADULT  Goal: Achieves stable or improved neurological status  Description: INTERVENTIONS  - Monitor and report changes in neurological status  - Monitor vital signs such as temperature, blood pressure, glucose, and any other labs ordered   - Initiate measures to prevent increased intracranial pressure  - Monitor for seizure activity and implement precautions if appropriate      Outcome: Progressing  Goal: Remains free of injury related to seizures activity  Description: INTERVENTIONS  - Maintain airway, patient safety  and administer oxygen as ordered  - Monitor patient for seizure activity, document and report duration and description of seizure to physician/advanced practitioner  - If seizure occurs,  ensure patient safety during seizure  - Reorient patient post seizure  - Seizure pads on all 4 side rails  - Instruct patient/family to notify RN of any seizure activity including if an aura is experienced  - Instruct patient/family to call for assistance with activity based on nursing assessment  - Administer anti-seizure medications if ordered    Outcome: Progressing  Goal: Achieves maximal functionality and self care  Description: INTERVENTIONS  - Monitor swallowing and airway patency with patient fatigue and changes in neurological status  - Encourage and assist patient to increase activity and self care.   - Encourage visually impaired, hearing impaired and aphasic patients to use assistive/communication devices  Outcome: Progressing     Problem: CARDIOVASCULAR - ADULT  Goal: Maintains optimal cardiac output and hemodynamic stability  Description: INTERVENTIONS:  - Monitor I/O, vital signs and rhythm  - Monitor for S/S and trends of decreased cardiac output  - Administer and titrate ordered vasoactive medications to optimize hemodynamic stability  - Assess quality of pulses, skin color and temperature  - Assess for signs of decreased coronary artery perfusion  - Instruct patient to  report change in severity of symptoms  Outcome: Progressing  Goal: Absence of cardiac dysrhythmias or at baseline rhythm  Description: INTERVENTIONS:  - Continuous cardiac monitoring, vital signs, obtain 12 lead EKG if ordered  - Administer antiarrhythmic and heart rate control medications as ordered  - Monitor electrolytes and administer replacement therapy as ordered  Outcome: Progressing     Problem: RESPIRATORY - ADULT  Goal: Achieves optimal ventilation and oxygenation  Description: INTERVENTIONS:  - Assess for changes in respiratory status  - Assess for changes in mentation and behavior  - Position to facilitate oxygenation and minimize respiratory effort  - Oxygen administered by appropriate delivery if ordered  - Initiate smoking cessation education as indicated  - Encourage broncho-pulmonary hygiene including cough, deep breathe, Incentive Spirometry  - Assess the need for suctioning and aspirate as needed  - Assess and instruct to report SOB or any respiratory difficulty  - Respiratory Therapy support as indicated  Outcome: Progressing     Problem: GASTROINTESTINAL - ADULT  Goal: Minimal or absence of nausea and/or vomiting  Description: INTERVENTIONS:  - Administer IV fluids if ordered to ensure adequate hydration  - Maintain NPO status until nausea and vomiting are resolved  - Nasogastric tube if ordered  - Administer ordered antiemetic medications as needed  - Provide nonpharmacologic comfort measures as appropriate  - Advance diet as tolerated, if ordered  - Consider nutrition services referral to assist patient with adequate nutrition and appropriate food choices  Outcome: Progressing  Goal: Maintains or returns to baseline bowel function  Description: INTERVENTIONS:  - Assess bowel function  - Encourage oral fluids to ensure adequate hydration  - Administer IV fluids if ordered to ensure adequate hydration  - Administer ordered medications as needed  - Encourage mobilization and activity  - Consider  nutritional services referral to assist patient with adequate nutrition and appropriate food choices  Outcome: Progressing  Goal: Maintains adequate nutritional intake  Description: INTERVENTIONS:  - Monitor percentage of each meal consumed  - Identify factors contributing to decreased intake, treat as appropriate  - Assist with meals as needed  - Monitor I&O, weight, and lab values if indicated  - Obtain nutrition services referral as needed  Outcome: Progressing  Goal: Establish and maintain optimal ostomy function  Description: INTERVENTIONS:  - Assess bowel function  - Encourage oral fluids to ensure adequate hydration  - Administer IV fluids if ordered to ensure adequate hydration   - Administer ordered medications as needed  - Encourage mobilization and activity  - Nutrition services referral to assist patient with appropriate food choices  - Assess stoma site  - Consider wound care consult   Outcome: Progressing  Goal: Oral mucous membranes remain intact  Description: INTERVENTIONS  - Assess oral mucosa and hygiene practices  - Implement preventative oral hygiene regimen  - Implement oral medicated treatments as ordered  - Initiate Nutrition services referral as needed  Outcome: Progressing     Problem: GENITOURINARY - ADULT  Goal: Maintains or returns to baseline urinary function  Description: INTERVENTIONS:  - Assess urinary function  - Encourage oral fluids to ensure adequate hydration if ordered  - Administer IV fluids as ordered to ensure adequate hydration  - Administer ordered medications as needed  - Offer frequent toileting  - Follow urinary retention protocol if ordered  Outcome: Progressing  Goal: Absence of urinary retention  Description: INTERVENTIONS:  - Assess patient’s ability to void and empty bladder  - Monitor I/O  - Bladder scan as needed  - Discuss with physician/AP medications to alleviate retention as needed  - Discuss catheterization for long term situations as appropriate  Outcome:  Progressing  Goal: Urinary catheter remains patent  Description: INTERVENTIONS:  - Assess patency of urinary catheter  - If patient has a chronic ríos, consider changing catheter if non-functioning  - Follow guidelines for intermittent irrigation of non-functioning urinary catheter  Outcome: Progressing     Problem: METABOLIC, FLUID AND ELECTROLYTES - ADULT  Goal: Electrolytes maintained within normal limits  Description: INTERVENTIONS:  - Monitor labs and assess patient for signs and symptoms of electrolyte imbalances  - Administer electrolyte replacement as ordered  - Monitor response to electrolyte replacements, including repeat lab results as appropriate  - Instruct patient on fluid and nutrition as appropriate  Outcome: Progressing  Goal: Fluid balance maintained  Description: INTERVENTIONS:  - Monitor labs   - Monitor I/O and WT  - Instruct patient on fluid and nutrition as appropriate  - Assess for signs & symptoms of volume excess or deficit  Outcome: Progressing  Goal: Glucose maintained within target range  Description: INTERVENTIONS:  - Monitor Blood Glucose as ordered  - Assess for signs and symptoms of hyperglycemia and hypoglycemia  - Administer ordered medications to maintain glucose within target range  - Assess nutritional intake and initiate nutrition service referral as needed  Outcome: Progressing    Problem: HEMATOLOGIC - ADULT  Goal: Maintains hematologic stability  Description: INTERVENTIONS  - Assess for signs and symptoms of bleeding or hemorrhage  - Monitor labs  - Administer supportive blood products/factors as ordered and appropriate  Outcome: Progressing     Problem: MUSCULOSKELETAL - ADULT  Goal: Maintain or return mobility to safest level of function  Description: INTERVENTIONS:  - Assess patient's ability to carry out ADLs; assess patient's baseline for ADL function and identify physical deficits which impact ability to perform ADLs (bathing, care of mouth/teeth, toileting, grooming,  dressing, etc.)  - Assess/evaluate cause of self-care deficits   - Assess range of motion  - Assess patient's mobility  - Assess patient's need for assistive devices and provide as appropriate  - Encourage maximum independence but intervene and supervise when necessary  - Involve family in performance of ADLs  - Assess for home care needs following discharge   - Consider OT consult to assist with ADL evaluation and planning for discharge  - Provide patient education as appropriate  Outcome: Progressing  Goal: Maintain proper alignment of affected body part  Description: INTERVENTIONS:  - Support, maintain and protect limb and body alignment  - Provide patient/ family with appropriate education  Outcome: Progressing     Problem: PAIN - ADULT  Goal: Verbalizes/displays adequate comfort level or baseline comfort level  Description: Interventions:  - Encourage patient to monitor pain and request assistance  - Assess pain using appropriate pain scale  - Administer analgesics based on type and severity of pain and evaluate response  - Implement non-pharmacological measures as appropriate and evaluate response  - Consider cultural and social influences on pain and pain management  - Notify physician/advanced practitioner if interventions unsuccessful or patient reports new pain  Outcome: Progressing     Problem: INFECTION - ADULT  Goal: Absence or prevention of progression during hospitalization  Description: INTERVENTIONS:  - Assess and monitor for signs and symptoms of infection  - Monitor lab/diagnostic results  - Monitor all insertion sites, i.e. indwelling lines, tubes, and drains  - Monitor endotracheal if appropriate and nasal secretions for changes in amount and color  - [place] appropriate cooling/warming therapies per order  - Administer medications as ordered  - Instruct and encourage patient and family to use good hand hygiene technique  - Identify and instruct in appropriate isolation precautions for  identified infection/condition  Outcome: Progressing  Goal: Absence of fever/infection during neutropenic period  Description: INTERVENTIONS:  - Monitor WBC    Outcome: Progressing     Problem: SAFETY ADULT  Goal: Patient will remain free of falls  Description: INTERVENTIONS:  - Educate patient/family on patient safety including physical limitations  - Instruct patient to call for assistance with activity   - Consult OT/PT to assist with strengthening/mobility   - Keep Call bell within reach  - Keep bed low and locked with side rails adjusted as appropriate  - Keep care items and personal belongings within reach  - Initiate and maintain comfort rounds  - Make Fall Risk Sign visible to staff  - Offer Toileting every 2 Hours, in advance of need  - Initiate/Maintain bed alarm  - Obtain necessary fall risk management equipment: walker  - Apply yellow socks and bracelet for high fall risk patients  - Consider moving patient to room near nurses station  Outcome: Progressing  Goal: Maintain or return to baseline ADL function  Description: INTERVENTIONS:  -  Assess patient's ability to carry out ADLs; assess patient's baseline for ADL function and identify physical deficits which impact ability to perform ADLs (bathing, care of mouth/teeth, toileting, grooming, dressing, etc.)  - Assess/evaluate cause of self-care deficits   - Assess range of motion  - Assess patient's mobility; develop plan if impaired  - Assess patient's need for assistive devices and provide as appropriate  - Encourage maximum independence but intervene and supervise when necessary  - Involve family in performance of ADLs  - Assess for home care needs following discharge   - Consider OT consult to assist with ADL evaluation and planning for discharge  - Provide patient education as appropriate  Outcome: Progressing    Problem: DISCHARGE PLANNING  Goal: Discharge to home or other facility with appropriate resources  Description: INTERVENTIONS:  - Identify  barriers to discharge w/patient and caregiver  - Arrange for needed discharge resources and transportation as appropriate  - Identify discharge learning needs (meds, wound care, etc.)  - Arrange for interpretive services to assist at discharge as needed  - Refer to Case Management Department for coordinating discharge planning if the patient needs post-hospital services based on physician/advanced practitioner order or complex needs related to functional status, cognitive ability, or social support system  Outcome: Progressing     Problem: Knowledge Deficit  Goal: Patient/family/caregiver demonstrates understanding of disease process, treatment plan, medications, and discharge instructions  Description: Complete learning assessment and assess knowledge base.  Interventions:  - Provide teaching at level of understanding  - Provide teaching via preferred learning methods  Outcome: Progressing

## 2024-06-07 NOTE — PLAN OF CARE
Problem: OCCUPATIONAL THERAPY ADULT  Goal: Performs self-care activities at highest level of function for planned discharge setting.  See evaluation for individualized goals.  Description: Treatment Interventions: ADL retraining, Functional transfer training, Endurance training, Patient/family training, Compensatory technique education, Equipment evaluation/education          See flowsheet documentation for full assessment, interventions and recommendations.   Note: Limitation: Decreased ADL status, Decreased self-care trans, Decreased high-level ADLs, Decreased endurance  Prognosis: Good  Assessment: Pt is a 79 y.o. female seen for OT evaluation at [facility], admitted 6/5/2024 w/ Syncope.  Comorbidities affecting pt's functional performance at time of assessment include:  PE, headache, fatigue, ASHLEY, hiatal hernia, GERD, breast cancer, anemia, iron deficiency, fibromyalgia, prediabetes, glaucoma.  Prior to admission, pt was living with daughter in a house with steps to manage.  Pt was I w/  ADLS and IADLS, (+) drove, & required cane PTA. Upon evaluation, pt appears to be performing below baseline functional status. Pt currently requires sup-min A x 1 for functional mobility/transfers, sup for UB ADLs and sup for LB ADLS 2* the following deficits impacting occupational performance: decreased strength, decreased balance, and decreased tolerance. Full objective findings from OT assessment regarding body systems outlined above. Personal factors affecting pt at time of IE include:steps to enter environment, difficulty performing IADLS , and decreased functional mobility . These impairments, as well as  risk for falls  limit pt's ability to safely engage in all baseline areas of occupation and mobility. Pt to benefit from continued skilled OT tx while in the hospital to address deficits as defined above and maximize level of functional independence w ADL's and functional mobility. Occupational Performance  areas to address include: bathing/shower, toilet hygiene, dressing, and functional mobility.  This evaluation required an expanded review of medical and/or therapy records and additional review of physical, cognitive and psychosocial history related to functional performance. Based upon functional performance deficits and assessments, this evaluation has been identified as a mod complexity evaluation.  At this time, OT recommendations at time of discharge are home with no OT needs.     Rehab Resource Intensity Level, OT: No post-acute rehabilitation needs

## 2024-06-07 NOTE — PLAN OF CARE
Problem: NEUROSENSORY - ADULT  Goal: Achieves stable or improved neurological status  Description: INTERVENTIONS  - Monitor and report changes in neurological status  - Monitor vital signs such as temperature, blood pressure, glucose, and any other labs ordered   - Initiate measures to prevent increased intracranial pressure  - Monitor for seizure activity and implement precautions if appropriate      Outcome: Progressing  Goal: Remains free of injury related to seizures activity  Description: INTERVENTIONS  - Maintain airway, patient safety  and administer oxygen as ordered  - Monitor patient for seizure activity, document and report duration and description of seizure to physician/advanced practitioner  - If seizure occurs,  ensure patient safety during seizure  - Reorient patient post seizure  - Seizure pads on all 4 side rails  - Instruct patient/family to notify RN of any seizure activity including if an aura is experienced  - Instruct patient/family to call for assistance with activity based on nursing assessment  - Administer anti-seizure medications if ordered    Outcome: Progressing  Goal: Achieves maximal functionality and self care  Description: INTERVENTIONS  - Monitor swallowing and airway patency with patient fatigue and changes in neurological status  - Encourage and assist patient to increase activity and self care.   - Encourage visually impaired, hearing impaired and aphasic patients to use assistive/communication devices  Outcome: Progressing     Problem: CARDIOVASCULAR - ADULT  Goal: Maintains optimal cardiac output and hemodynamic stability  Description: INTERVENTIONS:  - Monitor I/O, vital signs and rhythm  - Monitor for S/S and trends of decreased cardiac output  - Administer and titrate ordered vasoactive medications to optimize hemodynamic stability  - Assess quality of pulses, skin color and temperature  - Assess for signs of decreased coronary artery perfusion  - Instruct patient to  report change in severity of symptoms  Outcome: Progressing  Goal: Absence of cardiac dysrhythmias or at baseline rhythm  Description: INTERVENTIONS:  - Continuous cardiac monitoring, vital signs, obtain 12 lead EKG if ordered  - Administer antiarrhythmic and heart rate control medications as ordered  - Monitor electrolytes and administer replacement therapy as ordered  Outcome: Progressing     Problem: RESPIRATORY - ADULT  Goal: Achieves optimal ventilation and oxygenation  Description: INTERVENTIONS:  - Assess for changes in respiratory status  - Assess for changes in mentation and behavior  - Position to facilitate oxygenation and minimize respiratory effort  - Oxygen administered by appropriate delivery if ordered  - Initiate smoking cessation education as indicated  - Encourage broncho-pulmonary hygiene including cough, deep breathe, Incentive Spirometry  - Assess the need for suctioning and aspirate as needed  - Assess and instruct to report SOB or any respiratory difficulty  - Respiratory Therapy support as indicated  Outcome: Progressing     Problem: GASTROINTESTINAL - ADULT  Goal: Minimal or absence of nausea and/or vomiting  Description: INTERVENTIONS:  - Administer IV fluids if ordered to ensure adequate hydration  - Maintain NPO status until nausea and vomiting are resolved  - Nasogastric tube if ordered  - Administer ordered antiemetic medications as needed  - Provide nonpharmacologic comfort measures as appropriate  - Advance diet as tolerated, if ordered  - Consider nutrition services referral to assist patient with adequate nutrition and appropriate food choices  Outcome: Progressing  Goal: Maintains or returns to baseline bowel function  Description: INTERVENTIONS:  - Assess bowel function  - Encourage oral fluids to ensure adequate hydration  - Administer IV fluids if ordered to ensure adequate hydration  - Administer ordered medications as needed  - Encourage mobilization and activity  - Consider  nutritional services referral to assist patient with adequate nutrition and appropriate food choices  Outcome: Progressing  Goal: Maintains adequate nutritional intake  Description: INTERVENTIONS:  - Monitor percentage of each meal consumed  - Identify factors contributing to decreased intake, treat as appropriate  - Assist with meals as needed  - Monitor I&O, weight, and lab values if indicated  - Obtain nutrition services referral as needed  Outcome: Progressing  Goal: Establish and maintain optimal ostomy function  Description: INTERVENTIONS:  - Assess bowel function  - Encourage oral fluids to ensure adequate hydration  - Administer IV fluids if ordered to ensure adequate hydration   - Administer ordered medications as needed  - Encourage mobilization and activity  - Nutrition services referral to assist patient with appropriate food choices  - Assess stoma site  - Consider wound care consult   Outcome: Progressing  Goal: Oral mucous membranes remain intact  Description: INTERVENTIONS  - Assess oral mucosa and hygiene practices  - Implement preventative oral hygiene regimen  - Implement oral medicated treatments as ordered  - Initiate Nutrition services referral as needed  Outcome: Progressing     Problem: GENITOURINARY - ADULT  Goal: Maintains or returns to baseline urinary function  Description: INTERVENTIONS:  - Assess urinary function  - Encourage oral fluids to ensure adequate hydration if ordered  - Administer IV fluids as ordered to ensure adequate hydration  - Administer ordered medications as needed  - Offer frequent toileting  - Follow urinary retention protocol if ordered  Outcome: Progressing  Goal: Absence of urinary retention  Description: INTERVENTIONS:  - Assess patient’s ability to void and empty bladder  - Monitor I/O  - Bladder scan as needed  - Discuss with physician/AP medications to alleviate retention as needed  - Discuss catheterization for long term situations as appropriate  Outcome:  Progressing  Goal: Urinary catheter remains patent  Description: INTERVENTIONS:  - Assess patency of urinary catheter  - If patient has a chronic ríos, consider changing catheter if non-functioning  - Follow guidelines for intermittent irrigation of non-functioning urinary catheter  Outcome: Progressing     Problem: METABOLIC, FLUID AND ELECTROLYTES - ADULT  Goal: Electrolytes maintained within normal limits  Description: INTERVENTIONS:  - Monitor labs and assess patient for signs and symptoms of electrolyte imbalances  - Administer electrolyte replacement as ordered  - Monitor response to electrolyte replacements, including repeat lab results as appropriate  - Instruct patient on fluid and nutrition as appropriate  Outcome: Progressing  Goal: Fluid balance maintained  Description: INTERVENTIONS:  - Monitor labs   - Monitor I/O and WT  - Instruct patient on fluid and nutrition as appropriate  - Assess for signs & symptoms of volume excess or deficit  Outcome: Progressing  Goal: Glucose maintained within target range  Description: INTERVENTIONS:  - Monitor Blood Glucose as ordered  - Assess for signs and symptoms of hyperglycemia and hypoglycemia  - Administer ordered medications to maintain glucose within target range  - Assess nutritional intake and initiate nutrition service referral as needed  Outcome: Progressing     Problem: SKIN/TISSUE INTEGRITY - ADULT  Goal: Skin Integrity remains intact(Skin Breakdown Prevention)  Description: Assess:  -Perform [place] assessment every shift  -Clean and moisturize skin every   -Inspect skin when repositioning, toileting, and assisting with ADLS  -Assess under medical devices such as  every   -Assess extremities for adequate circulation and sensation     Bed Management:  -Have minimal linens on bed & keep smooth, unwrinkled  -Change linens as needed when moist or perspiring  -Avoid sitting or lying in one position for more than 2 hours while in bed  -Keep HOB at 30 degrees      Toileting:  -Offer bedside commode  -Assess for incontinence every   -Use incontinent care products after each incontinent episode such as     Activity:  -Mobilize patient 4 times a day  -Encourage activity and walks on unit  -Encourage or provide ROM exercises   -Turn and reposition patient every 2 Hours  -Use appropriate equipment to lift or move patient in bed  -Instruct/ Assist with weight shifting every  when out of bed in chair  -Consider limitation of chair time 2 hour intervals    Skin Care:  -Avoid use of baby powder, tape, friction and shearing, hot water or constrictive clothing  -Relieve pressure over bony prominences using   -Do not massage red bony areas    Next Steps:  -Teach patient strategies to minimize risks such as    -Consider consults to  interdisciplinary teams such as   Outcome: Progressing  Goal: Incision(s), wounds(s) or drain site(s) healing without S/S of infection  Description: INTERVENTIONS  - Assess and document dressing, incision, wound bed, drain sites and surrounding tissue  - Provide patient and family education  - Perform skin care/dressing changes every   Outcome: Progressing  Goal: Pressure injury heals and does not worsen  Description: Interventions:  - Implement low air loss mattress or specialty surface (Criteria met)  - Apply silicone foam dressing  - Instruct/assist with weight shifting every  minutes when in chair   - Limit chair time to 2 hour intervals  - Use special pressure reducing interventions such as cushion when in chair   - Apply fecal or urinary incontinence containment device   - Perform passive or active ROM every   - Turn and reposition patient & offload bony prominences every 2 hours   - Utilize friction reducing device or surface for transfers   - Consider consults to  interdisciplinary teams such as   - Use incontinent care products after each incontinent episode such as   - Consider nutrition services referral as needed  Outcome: Progressing      Problem: HEMATOLOGIC - ADULT  Goal: Maintains hematologic stability  Description: INTERVENTIONS  - Assess for signs and symptoms of bleeding or hemorrhage  - Monitor labs  - Administer supportive blood products/factors as ordered and appropriate  Outcome: Progressing     Problem: MUSCULOSKELETAL - ADULT  Goal: Maintain or return mobility to safest level of function  Description: INTERVENTIONS:  - Assess patient's ability to carry out ADLs; assess patient's baseline for ADL function and identify physical deficits which impact ability to perform ADLs (bathing, care of mouth/teeth, toileting, grooming, dressing, etc.)  - Assess/evaluate cause of self-care deficits   - Assess range of motion  - Assess patient's mobility  - Assess patient's need for assistive devices and provide as appropriate  - Encourage maximum independence but intervene and supervise when necessary  - Involve family in performance of ADLs  - Assess for home care needs following discharge   - Consider OT consult to assist with ADL evaluation and planning for discharge  - Provide patient education as appropriate  Outcome: Progressing  Goal: Maintain proper alignment of affected body part  Description: INTERVENTIONS:  - Support, maintain and protect limb and body alignment  - Provide patient/ family with appropriate education  Outcome: Progressing     Problem: PAIN - ADULT  Goal: Verbalizes/displays adequate comfort level or baseline comfort level  Description: Interventions:  - Encourage patient to monitor pain and request assistance  - Assess pain using appropriate pain scale  - Administer analgesics based on type and severity of pain and evaluate response  - Implement non-pharmacological measures as appropriate and evaluate response  - Consider cultural and social influences on pain and pain management  - Notify physician/advanced practitioner if interventions unsuccessful or patient reports new pain  Outcome: Progressing     Problem: INFECTION -  ADULT  Goal: Absence or prevention of progression during hospitalization  Description: INTERVENTIONS:  - Assess and monitor for signs and symptoms of infection  - Monitor lab/diagnostic results  - Monitor all insertion sites, i.e. indwelling lines, tubes, and drains  - Monitor endotracheal if appropriate and nasal secretions for changes in amount and color  - [place] appropriate cooling/warming therapies per order  - Administer medications as ordered  - Instruct and encourage patient and family to use good hand hygiene technique  - Identify and instruct in appropriate isolation precautions for identified infection/condition  Outcome: Progressing     Problem: SAFETY ADULT  Goal: Patient will remain free of falls  Description: INTERVENTIONS:  - Educate patient/family on patient safety including physical limitations  - Instruct patient to call for assistance with activity   - Consult OT/PT to assist with strengthening/mobility   - Keep Call bell within reach  - Keep bed low and locked with side rails adjusted as appropriate  - Keep care items and personal belongings within reach  - Initiate and maintain comfort rounds  - Make Fall Risk Sign visible to staff  - Offer Toileting every 2 Hours, in advance of need  - Initiate/Maintain alarm  - Obtain necessary fall risk management equipment:   - Apply yellow socks and bracelet for high fall risk patients  - Consider moving patient to room near nurses station  Outcome: Progressing  Goal: Maintain or return to baseline ADL function  Description: INTERVENTIONS:  -  Assess patient's ability to carry out ADLs; assess patient's baseline for ADL function and identify physical deficits which impact ability to perform ADLs (bathing, care of mouth/teeth, toileting, grooming, dressing, etc.)  - Assess/evaluate cause of self-care deficits   - Assess range of motion  - Assess patient's mobility; develop plan if impaired  - Assess patient's need for assistive devices and provide as  appropriate  - Encourage maximum independence but intervene and supervise when necessary  - Involve family in performance of ADLs  - Assess for home care needs following discharge   - Consider OT consult to assist with ADL evaluation and planning for discharge  - Provide patient education as appropriate  Outcome: Progressing  Goal: Maintains/Returns to pre admission functional level  Description: INTERVENTIONS:  - Perform AM-PAC 6 Click Basic Mobility/ Daily Activity assessment daily.  - Set and communicate daily mobility goal to care team and patient/family/caregiver.   - Collaborate with rehabilitation services on mobility goals if consulted  - Perform Range of Motion 4 times a day.  - Reposition patient every 2 hours.  - Dangle patient 3 times a day  - Stand patient 3 times a day  - Ambulate patient 3 times a day  - Out of bed to chair 3 times a day   - Out of bed for meals 3 times a day  - Out of bed for toileting  - Record patient progress and toleration of activity level   Outcome: Progressing     Problem: DISCHARGE PLANNING  Goal: Discharge to home or other facility with appropriate resources  Description: INTERVENTIONS:  - Identify barriers to discharge w/patient and caregiver  - Arrange for needed discharge resources and transportation as appropriate  - Identify discharge learning needs (meds, wound care, etc.)  - Arrange for interpretive services to assist at discharge as needed  - Refer to Case Management Department for coordinating discharge planning if the patient needs post-hospital services based on physician/advanced practitioner order or complex needs related to functional status, cognitive ability, or social support system  Outcome: Progressing     Problem: Knowledge Deficit  Goal: Patient/family/caregiver demonstrates understanding of disease process, treatment plan, medications, and discharge instructions  Description: Complete learning assessment and assess knowledge base.  Interventions:  -  Provide teaching at level of understanding  - Provide teaching via preferred learning methods  Outcome: Progressing

## 2024-06-07 NOTE — PROGRESS NOTES
[facility]  Progress Note  Name: Madeleine Sanchez I  MRN: [id number]  Unit/Bed#: -01 I Date of Admission: 6/5/2024   Date of Service: 6/7/2024 I Hospital Day: 1    Assessment & Plan   * Syncope  Assessment & Plan  Patient reports syncopal episode while in the kitchen with her daughter.  She was sitting in chair and had lost consciousness.  She had no prodromal symptoms, no seizure-like events, no loss of bowel or bladder.  Does have history of syncopal episodes was seen by cardiology in 12/22 for similar events was recommended for Holter monitor as OP.  She reports feeling generalized fatigue and tiredness.  Has been falling asleep frequently unable to recall if she had syncopal episode versus just fell asleep but states she had lost consciousness.  States she did have a fall last week where she lost balance and landed on a chair denies any overt injuries or LOC at that time  CT head without acute intracranial abnormalities.  EKG showing normal sinus rhythm  Troponins negative thus far  Suspect etiology in the setting of dehydration with ongoing diarrhea and nausea vomiting.  Doubt cardiac etiology.  Plan  Will check orthostatics  Monitor on telemetry  Continue fall precautions  Continue IV hydration  Monitor intake/output    MRI of the brain was done as suspicion for TIA/stroke, and shows no acute or chronic infarct.  But mild microangiopathic changes noted,  Benadryl and Atarax which patient uses at night discontinued.  No acute events over the night, patient still complains of lightheadedness, blood pressure on the lower side, she is on amlodipine at home.  This will be discontinued.  Check orthostatic vital signs.  PT and OT evaluation    Vitamin B 12 deficiency  Assessment & Plan  B12 deficiency noted ,will give IM dose q. weekly.    Positive blood culture  Assessment & Plan  Staph epidermidis detected on the blood culture 1 of 2 set, will follow-up final results, likely  contaminant, currently on vancomycin,  Repeat blood culture from 6/7/2024 pending.    Elevated lipase  Assessment & Plan   Lipase on admission 415; currently denies any abdominal pain  CT abdomen does not show acute pancreatitis at this time    No concern for acute pancreatitis.  Patient currently asymptomatic    Headache  Assessment & Plan  Patient reports headache x 5 days that she describes as pressure across the top of her scalp.  This is nonradiating without any palliating or provoking factors or visual disturbance.  Does have history of migraines and has been taking Tylenol twice daily to help alleviate symptoms.  She reports that this has been ongoing fluctuating in severity.  She reports after administration of Tylenol in the ED pain is at 7 out of 10 which is improved from prior.  CT head without acute intracranial abnormalities  Suspect tension type headache versus migraine  Will give one time migraine cocktail and evaluate response continue Tylenol as needed    Diarrhea  Assessment & Plan  Patient reports x 5 days of loose watery nonbloody stools.  She denies any changes in dietary intake, recent travel, or sick contacts.  She has been trying to stay hydrated.  Now resolved since admission.    Fatigue  Assessment & Plan  Patient reports overall feeling fatigued having to sleep more often.  Stated she was doing work outside on Sunday and was very sleepy on Monday and Tuesday.  She reports that she keeps feeling like she has to go to sleep.  States she is having ongoing headache, diarrhea x 5 days, and episode of vomiting.  She reports that she was trying to stay hydrated drinking plenty of fluids.      Patient has not had diarrhea since admission, no further episodes of vomiting, discontinue IV fluids, discontinue C. difficile testing as she was not able to provide any sample.  Advance diet, monitor another 24 hours.  PT and OT evaluation    Personal history of DVT (deep vein thrombosis)  Assessment &  Plan  Continue home dose Xarelto daily    GERD (gastroesophageal reflux disease)  Assessment & Plan  Continue daily PPI and Pepcid    Hypothyroidism, adult  Assessment & Plan  Continue home dose levothyroxine  TSH within normal limit.    Hiatal hernia  Assessment & Plan  Noted and patient history she follows with GI as outpatient  Had EGD performed on 3/24 showing 3 cm hiatal hernia without Dax lesion present.  Empiric dilatation not performed due to risk of aspiration  GI recommending possible surgical consultation for hernia repair continue to follow-up as outpatient  CT appears unchanged from prior  Continue soft diet and continue to eat slowly.  Has gastric emptying scan scheduled as OP  Tolerated clear liquid diet, patient requesting to advance her diet, will order regular diet and see.             VTE Pharmacologic Prophylaxis:   Pharmacologic: Rivaroxaban (Xarelto)  Mechanical VTE Prophylaxis in Place: Yes    Patient Centered Rounds: I have performed bedside rounds with nursing staff today.      Education and Discussions with Family / Patient: Patient    Time Spent for Care: 30 minutes.  More than 50% of total time spent on counseling and coordination of care as described above.    Current Length of Stay: 1 day(s)    Current Patient Status: Inpatient   Certification Statement: The patient will continue to require additional inpatient hospital stay due to lightheadedness, headache, weakness    Discharge Plan: 24 to 48 hours    Code Status: Level 1 - Full Code      Subjective:   Patient complains of mild lightheadedness, mild headache improved from yesterday.  No worsening of dizziness upon standing.  Denies any chest pain, shortness of breath.  Denies any diarrhea, vomiting.    Objective:     Vitals:   Temp (24hrs), Av °F (36.7 °C), Min:97.4 °F (36.3 °C), Max:98.6 °F (37 °C)    Temp:  [97.4 °F (36.3 °C)-98.6 °F (37 °C)] 98.2 °F (36.8 °C)  HR:  [58-76] 76  Resp:  [16-18] 17  BP: (104-139)/(54-82)  139/82  SpO2:  [91 %-97 %] 93 %  Body mass index is 32.67 kg/m².     Input and Output Summary (last 24 hours):       Intake/Output Summary (Last 24 hours) at 6/7/2024 1241  Last data filed at 6/7/2024 0601  Gross per 24 hour   Intake 1260 ml   Output 2065 ml   Net -805 ml       Physical Exam:     Physical Exam  General appearance:  Patient not in acute distress  Eyes:  Pupils equal reacting to light  ENT:  Moist oral mucous membranes  CVS:  S1-S2 heard, regular rate and rhythm, no pedal edema  Chest:  Bilateral air entry present, clear to auscultation  Abdomen:  Soft, nontender, bowel sounds present  CNS:  No focal neurological deficits  Genitourinary: deferred  Skin:  No acute rash   psychiatric:  No psychosis  Musculoskeletal:  No joint deformities       Additional Data:     Labs:    Results from last 7 days   Lab Units 06/07/24  0555 06/06/24  0509   WBC Thousand/uL 5.10 7.46   HEMOGLOBIN g/dL 11.2* 12.3   HEMATOCRIT % 36.7 38.9   PLATELETS Thousands/uL 240 288   SEGS PCT %  --  58   LYMPHO PCT %  --  21   MONO PCT %  --  14*   EOS PCT %  --  7*     Results from last 7 days   Lab Units 06/07/24  0555 06/06/24  0509   SODIUM mmol/L 140 142   POTASSIUM mmol/L 4.4 3.2*   CHLORIDE mmol/L 111* 105   CO2 mmol/L 20* 28   BUN mg/dL 8 10   CREATININE mg/dL 0.62 0.77   ANION GAP mmol/L 9 9   CALCIUM mg/dL 8.1* 8.3*   ALBUMIN g/dL  --  3.4*   TOTAL BILIRUBIN mg/dL  --  0.76   ALK PHOS U/L  --  106*   ALT U/L  --  13   AST U/L  --  13   GLUCOSE RANDOM mg/dL 105 91     Results from last 7 days   Lab Units 06/05/24  1407   INR  1.64*             Results from last 7 days   Lab Units 06/05/24  1407   LACTIC ACID mmol/L 1.2   PROCALCITONIN ng/ml 0.16           * I Have Reviewed All Lab Data Listed Above.  * Additional Pertinent Lab Tests Reviewed: All Labs For Current Hospital Admission Reviewed    Recent Cultures (last 7 days):     Results from last 7 days   Lab Units 06/06/24  1635 06/05/24  1420 06/05/24  1407   BLOOD CULTURE   Received in Microbiology Lab. Culture in Progress.  Received in Microbiology Lab. Culture in Progress. No Growth at 24 hrs.  --    GRAM STAIN RESULT   --   --  Gram positive cocci in clusters*       Last 24 Hours Medication List:   Current Facility-Administered Medications   Medication Dose Route Frequency Provider Last Rate    acetaminophen  650 mg Oral Q6H PRN Ashwin Cely Mearhoff-Nickum, CRNP      aspirin  81 mg Oral Daily Ashwin [facility] Meaoff-Nickum, CRNP      brimonidine tartrate  1 drop Right Eye BID Ashwin [facility] Meaoff-Nickum, CRNP      buPROPion  300 mg Oral QAM [facility] Meaoff-Nickum, CRNP      busPIRone  15 mg Oral BID Ashwin [facility] Meaoff-Nickum, CRNP      cyanocobalamin  1,000 mcg Intramuscular Weekly Debra Zhang MD      famotidine  40 mg Oral HS Ashwin [facility] Meaoff-Nickum, CRNP      latanoprost  1 drop Both Eyes HS [facility] Meaoff-Nickum, CRNP      levothyroxine  100 mcg Oral Daily [facility] Meaoff-Nickum, CRNP      magnesium sulfate  2 g Intravenous Q24H [facility] Meaoff-Nickum, CRNP Stopped (06/06/24 2115)    metoclopramide  10 mg Intravenous Q8H RUBY [facility] Meaoff-Nickum, CRNP      pantoprazole  40 mg Oral Daily [facility] Meaoff-Nickum, CRNP      pregabalin  150 mg Oral BID [facility] Meaoff-Nickum, CRNP      rivaroxaban  20 mg Oral QPM [facility] Meaoff-Nickum, CRNP      rOPINIRole  1 mg Oral HS [facility] Meaoff-Nickum, CRNP      timolol  1 drop Both Eyes BID [facility] Meaoff-Nickum, CRNP      torsemide  10 mg Oral Daily [facility] Meaoff-Nickum, CRNP      vancomycin  22 mg/kg (Adjusted) Intravenous Q24H Debra Zhang MD 1,500 mg (06/07/24 0211)    vortioxetine  10 mg Oral Daily [facility] Meaoff-Nickum, CRNP          Today, Patient Was Seen By: Benita Chatterjee MD    ** Please Note: Dictation voice to text software may have been used in the creation of this document. **

## 2024-06-07 NOTE — PROGRESS NOTES
Madeleine Sanchez is a 79 y.o. female who is currently ordered Vancomycin IV with management by the Pharmacy Consult service.  Relevant clinical data and objective / subjective history reviewed.  Vancomycin Assessment:  Indication and Goal AUC/Trough: Bacteremia (goal -600, trough >10), -600, trough >10  Clinical Status: stable  Micro:     Renal Function:  SCr: 0.73 mg/dL  CrCl: 64.4 mL/min  Renal replacement: Not on dialysis  Days of Therapy: 2  Current Dose: Vancomycin 1500mg IV q24hrs  Vancomycin Plan:  New Dosing: Vancomycin 1500mg IV q24hrs  Estimated AUC: 435 mcg*hr/mL  Estimated Trough: 10 mcg/mL  Next Level: 6/8 AM labs   Renal Function Monitoring: Daily BMP and UOP  Pharmacy will continue to follow closely for s/sx of nephrotoxicity, infusion reactions and appropriateness of therapy.  BMP and CBC will be ordered per protocol. We will continue to follow the patient’s culture results and clinical progress daily.    Sarah Carreon, Pharmacist

## 2024-06-07 NOTE — PHYSICAL THERAPY NOTE
"       PHYSICAL THERAPY EVALUATION/TREATMENT    NAME:  Madeleine Sanchez  AGE:   79 y.o.  Mrn:   [id number]  Length Of Stay: 1    ADMIT DX:  Syncope [R55]  Hypotension [I95.9]    Past Medical History:   Diagnosis Date    Bilateral pulmonary embolism (HCC)     Cancer (HCC)     left breast cancer    Cervical herniated disc     Chronic pain disorder     back pain    Chronic respiratory failure (HCC)     uses O2 at home with NC /5/19 no longer using/just CPAP    Colon polyp     CPAP (continuous positive airway pressure) dependence     Disease of thyroid gland     hypothyroid    DVT (deep venous thrombosis) (HCC) 2020    right leg    Examination for, follow-up 11/03/2021    Family history of reaction to anesthesia     \"son and daughter hard time waking up and also PONV\"    Fibromyalgia, primary     GERD (gastroesophageal reflux disease)     Glaucoma     Hiatal hernia     History of palpitations     History of pneumonia     History of transfusion     Hyperlipidemia     Hypertension     Irregular heart beat     Migraine     Myocardial infarction (HCC)     pt sees cardilogist Dr Bennett [facility]/\"didnt realize had one, found on EKG before a surgery\"    OAB (overactive bladder)     Pollen allergies     Pulmonary embolism (HCC) 2019    Bilateral; on Xarelto    Restless leg     Risk for falls     Sleep apnea     Use of cane as ambulatory aid     occas    Uses brace     Mafo/left leg    Wears glasses      Past Surgical History:   Procedure Laterality Date    ADENOIDECTOMY      BAND HEMORRHOIDECTOMY      CARDIAC CATHETERIZATION      CARPAL TUNNEL RELEASE      CATARACT EXTRACTION Bilateral     COLONOSCOPY      FOOT SURGERY      pin implanted right toe    JOINT REPLACEMENT Bilateral     knees    LAMINECTOMY      LUMBAR EPIDURAL INJECTION      MASTECTOMY Bilateral     with reconstruction and implants    NISSEN FUNDOPLICATION      MI LNGTH/SHRT TENDON LEG/ANKLE 1 TENDON SPX Left 05/24/2021    Procedure: Sectioning peroneal tendons with " lengthening/sectioning achilles tendon lower leg and ankle;  Surgeon: Fei Sifuentes DPM;  Location: [facility];  Service: Podiatry    REPLACEMENT TOTAL KNEE      SPINAL FUSION      TONSILLECTOMY      TUBAL LIGATION      TUMOR EXCISION      right forearm/benign    UPPER GASTROINTESTINAL ENDOSCOPY      VEIN LIGATION Right       06/07/24 1346   PT Last Visit   PT Visit Date 06/07/24   Note Type   Note type Evaluation   Pain Assessment   Pain Assessment Tool 0-10   Pain Score 5   Pain Location/Orientation Location: Head  (headache)   Pain Onset/Description Onset: Ongoing   Effect of Pain on Daily Activities limits comfort and activity tolerance   Patient's Stated Pain Goal No pain   Hospital Pain Intervention(s) Repositioned;Ambulation/increased activity;Emotional support;Rest   Restrictions/Precautions   Other Precautions Fall Risk;Bed Alarm;Chair Alarm;Pain;Telemetry   Home Living   Type of Home House   Home Layout Two level;Bed/bath upstairs;Stairs to enter with rails  (5 ALEXANDER)   Bathroom Shower/Tub Tub/shower unit   Bathroom Toilet Standard   Bathroom Equipment Shower chair;Grab bars in shower;Commode   Bathroom Accessibility Accessible   Home Equipment Cane  (RW;MAFO Left)   Prior Function   Level of [place] Independent with ADLs;Independent with functional mobility;Independent with IADLS   Lives With Daughter  (and grandson)   Receives Help From Family   IADLs Independent with driving;Independent with meal prep;Independent with medication management  (drives short distance)   Falls in the last 6 months 1 to 4  (1 fall ~2wks ago in yard)   Comments Pt normally amb with a cane at all times PTA - may take a few steps w/out cane in the kitchen while holding counter tops   General   Additional Pertinent History Pt is adm with syncope, LOC and recent lethargy.   Family/Caregiver Present No   Cognition   Overall Cognitive Status WFL   Arousal/Participation Cooperative   Orientation Level Oriented X4   Memory  "Within functional limits   Following Commands Follows multistep commands without difficulty   Comments At least 2 pt identifers including name and    Subjective   Subjective \"I feel a little lightheaded and dizzy right now, a little HA\"   RLE Assessment   RLE Assessment WFL  (3+ to 4-/5)   LLE Assessment   LLE Assessment WFL  (3+ to 4-/5 except L foot drop 0/5)   Light Touch   RLE Light Touch Grossly intact   LLE Light Touch Grossly intact   Bed Mobility   Additional Comments Pt received sitting EOB   Transfers   Sit to Stand 4  Minimal assistance   Additional items Assist x 1;Verbal cues;Increased time required   Stand to Sit 4  Minimal assistance   Additional items Assist x 1;Verbal cues;Increased time required   Ambulation/Elevation   Gait pattern Short stride;Step through pattern;Decreased foot clearance;Decreased heel strike;Foward flexed;Redundant gait at times  (mild, generalized unsteadiness)   Gait Assistance 4  Minimal assist   Additional items Assist x 1;Verbal cues;Tactile cues   Assistive Device SPC   Distance 20 feet with change in direction;BP sitting /60;standing 122/45;BP after returning from walk and sitting 113/51   Balance   Static Sitting Good   Static Standing Fair  (w cane)   Ambulatory Fair -  (w cane)   Endurance Deficit   Endurance Deficit Yes   Activity Tolerance   Activity Tolerance Patient limited by fatigue;Patient limited by pain   Assessment   Problem List Decreased strength;Decreased range of motion;Decreased endurance;Impaired balance;Decreased mobility;Decreased coordination;Decreased safety awareness;Pain;Impaired tone   Assessment Patient seen for Physical Therapy evaluation. Patient admitted with Syncope.  Comorbidities affecting patient's physical performance include: DVT, HA, Vit B12, HLD, osteoporosis, anemia, PE L foot drop, ASHLEY, OA R knee, gluacoma, COVID-19.  Personal factors affecting patient at time of initial evaluation include: lives in two story house, " ambulating with assistive device, stairs to enter home, inability to navigate community distances, inability to navigate level surfaces without external assistance, inability to perform dynamic tasks in community, and positive fall history. Prior to admission, patient was independent with functional mobility with cane, independent with ADLS, independent with IADLS, living with dtr in a two level home with 5 steps to enter, and ambulating household distance.  Please find objective findings from Physical Therapy assessment regarding body systems outlined above with impairments and limitations including weakness, impaired balance, decreased endurance, impaired coordination, gait deviations, pain, decreased activity tolerance, decreased functional mobility tolerance, decreased safety awareness, fall risk, and impaired tone.  The Barthel Index was used as a functional outcome tool presenting with a score of Barthel Index Score: 55 today indicating marked limitations of functional mobility and ADLS.  Patient's clinical presentation is currently unstable/unpredictable as seen in patient's presentation of vital sign response, changing level of pain, increased fall risk, new onset of impairment of functional mobility, decreased endurance, and new onset of weakness. Pt would benefit from continued Physical Therapy treatment to address deficits as defined above and maximize level of functional mobility. As demonstrated by objective findings, the assigned level of complexity for this evaluation is high.    The patient's -[facility] Basic Mobility Inpatient Short Form Raw Score is 18. A Raw score of greater than 16 suggests the patient may benefit from discharge to home. Please also refer to the recommendation of the Physical Therapist for safe discharge planning.   Goals   Patient Goals to go home   STG Expiration Date 06/17/24   Short Term Goal #1 Patient will: Increase bilateral LE strength 1/2 grade to facilitate independent  mobility, Perform all bed mobility tasks independently to improve pt's independence w/ repositioning for decrease risk of skin breakdown, Perform all transfers independently consistently from various height surfaces in order to improve I w/ engagement w/ real-world environments/situations, Ambulate at least 250 ft. with single point cane independently w/o LOB to facilitate return and engagement w/ previous living environment, Navigate flight of stairs independently with unilateral handrail to either improve independence w/ entering home and/or so patient can fully access living areas in home, Increase all balance 1 grade to decrease risk for falls, Tolerate at least 15 consecutive minutes of activity to demonstrate improved activity tolerance and endurance, and Tolerate 3 hr OOB to faciliate upright tolerance   Plan   Treatment/Interventions ADL retraining;Functional transfer training;LE strengthening/ROM;Elevations;Therapeutic exercise;Endurance training;Patient/family training;Equipment eval/education;Bed mobility;Gait training;Compensatory technique education;Spoke to case management;OT   PT Frequency 3-5x/wk   Discharge Recommendation   Rehab Resource Intensity Level, PT III (Minimum Resource Intensity)   AM-PAC Basic Mobility Inpatient   Turning in Flat Bed Without Bedrails 4   Lying on Back to Sitting on Edge of Flat Bed Without Bedrails 3   Moving Bed to Chair 3   Standing Up From Chair Using Arms 3   Walk in Room 3   Climb 3-5 Stairs With Railing 2   Basic Mobility Inpatient Raw Score 18   Basic Mobility Standardized Score 41.05   [facility] Highest Level Of Mobility   -HLM Goal 6: Walk 10 steps or more   -HLM Achieved 6: Walk 10 steps or more   Barthel Index   Feeding 10   Bathing 0   Grooming Score 0   Dressing Score 5   Bladder Score 10   Bowels Score 10   Toilet Use Score 5   Transfers (Bed/Chair) Score 10   Mobility (Level Surface) Score 0   Stairs Score 5   Barthel Index Score 55   Additional  Treatment Session   Start Time 1346   End Time 1359   Treatment Assessment Pt agreeable to additional ambulation. Pt trans with CGA and amb with cane 50 feet with change in direction with CGA. Prior to returning to sitting in chair /70. Pt trans stand to sit with CGA. A: Pt with limited tolerance to PT eval and treat 2/2 to HA, lightheadedness and dizziness. While adm, pt will benefit from cont skilled PT services to return pt to her PLOF which was Ind with a cane. When medically stable for dc, pt is appropriate for Level III Minimum Resource Intensity.   End of Consult   Patient Position at End of Consult Bed/Chair alarm activated;Bedside chair;All needs within reach   Licensure   [place] License Number  Lakshmi Guthrie PT

## 2024-06-08 VITALS
DIASTOLIC BLOOD PRESSURE: 72 MMHG | WEIGHT: 187.38 LBS | RESPIRATION RATE: 18 BRPM | BODY MASS INDEX: 31.99 KG/M2 | HEART RATE: 70 BPM | SYSTOLIC BLOOD PRESSURE: 117 MMHG | TEMPERATURE: 97.2 F | HEIGHT: 64 IN | OXYGEN SATURATION: 95 %

## 2024-06-08 LAB
ANION GAP SERPL CALCULATED.3IONS-SCNC: 8 MMOL/L (ref 4–13)
ATRIAL RATE: 55 BPM
BACTERIA BLD CULT: ABNORMAL
BASOPHILS # BLD AUTO: 0.04 THOUSANDS/ÂΜL (ref 0–0.1)
BASOPHILS NFR BLD AUTO: 1 % (ref 0–1)
BUN SERPL-MCNC: 14 MG/DL (ref 5–25)
CALCIUM SERPL-MCNC: 8.5 MG/DL (ref 8.4–10.2)
CHLORIDE SERPL-SCNC: 109 MMOL/L (ref 96–108)
CO2 SERPL-SCNC: 25 MMOL/L (ref 21–32)
CREAT SERPL-MCNC: 0.73 MG/DL (ref 0.6–1.3)
EOSINOPHIL # BLD AUTO: 0.42 THOUSAND/ÂΜL (ref 0–0.61)
EOSINOPHIL NFR BLD AUTO: 8 % (ref 0–6)
ERYTHROCYTE [DISTWIDTH] IN BLOOD BY AUTOMATED COUNT: 13.5 % (ref 11.6–15.1)
GFR SERPL CREATININE-BSD FRML MDRD: 78 ML/MIN/1.73SQ M
GLUCOSE SERPL-MCNC: 100 MG/DL (ref 65–140)
GRAM STN SPEC: ABNORMAL
HCT VFR BLD AUTO: 37.5 % (ref 34.8–46.1)
HGB BLD-MCNC: 12 G/DL (ref 11.5–15.4)
IMM GRANULOCYTES # BLD AUTO: 0.03 THOUSAND/UL (ref 0–0.2)
IMM GRANULOCYTES NFR BLD AUTO: 1 % (ref 0–2)
LIPASE SERPL-CCNC: 40 U/L (ref 11–82)
LYMPHOCYTES # BLD AUTO: 1.83 THOUSANDS/ÂΜL (ref 0.6–4.47)
LYMPHOCYTES NFR BLD AUTO: 36 % (ref 14–44)
MAGNESIUM SERPL-MCNC: 2.5 MG/DL (ref 1.9–2.7)
MCH RBC QN AUTO: 29.6 PG (ref 26.8–34.3)
MCHC RBC AUTO-ENTMCNC: 32 G/DL (ref 31.4–37.4)
MCV RBC AUTO: 93 FL (ref 82–98)
MONOCYTES # BLD AUTO: 0.74 THOUSAND/ÂΜL (ref 0.17–1.22)
MONOCYTES NFR BLD AUTO: 15 % (ref 4–12)
NEUTROPHILS # BLD AUTO: 2.01 THOUSANDS/ÂΜL (ref 1.85–7.62)
NEUTS SEG NFR BLD AUTO: 39 % (ref 43–75)
NRBC BLD AUTO-RTO: 0 /100 WBCS
P AXIS: 60 DEGREES
PLATELET # BLD AUTO: 307 THOUSANDS/UL (ref 149–390)
PMV BLD AUTO: 10.8 FL (ref 8.9–12.7)
POTASSIUM SERPL-SCNC: 3.8 MMOL/L (ref 3.5–5.3)
PR INTERVAL: 148 MS
QRS AXIS: 3 DEGREES
QRSD INTERVAL: 84 MS
QT INTERVAL: 448 MS
QTC INTERVAL: 428 MS
RBC # BLD AUTO: 4.05 MILLION/UL (ref 3.81–5.12)
S EPIDERMIDIS DNA BLD POS QL NAA+NON-PRB: DETECTED
SODIUM SERPL-SCNC: 142 MMOL/L (ref 135–147)
T WAVE AXIS: 62 DEGREES
VANCOMYCIN SERPL-MCNC: 32.8 UG/ML (ref 10–20)
VENTRICULAR RATE: 55 BPM
WBC # BLD AUTO: 5.07 THOUSAND/UL (ref 4.31–10.16)

## 2024-06-08 PROCEDURE — 99239 HOSP IP/OBS DSCHRG MGMT >30: CPT | Performed by: INTERNAL MEDICINE

## 2024-06-08 PROCEDURE — 94761 N-INVAS EAR/PLS OXIMETRY MLT: CPT

## 2024-06-08 PROCEDURE — 80048 BASIC METABOLIC PNL TOTAL CA: CPT | Performed by: INTERNAL MEDICINE

## 2024-06-08 PROCEDURE — 85025 COMPLETE CBC W/AUTO DIFF WBC: CPT | Performed by: INTERNAL MEDICINE

## 2024-06-08 PROCEDURE — 94760 N-INVAS EAR/PLS OXIMETRY 1: CPT

## 2024-06-08 PROCEDURE — 80202 ASSAY OF VANCOMYCIN: CPT | Performed by: INTERNAL MEDICINE

## 2024-06-08 PROCEDURE — 93010 ELECTROCARDIOGRAM REPORT: CPT | Performed by: INTERNAL MEDICINE

## 2024-06-08 PROCEDURE — 83735 ASSAY OF MAGNESIUM: CPT | Performed by: INTERNAL MEDICINE

## 2024-06-08 PROCEDURE — 83690 ASSAY OF LIPASE: CPT | Performed by: INTERNAL MEDICINE

## 2024-06-08 PROCEDURE — 93005 ELECTROCARDIOGRAM TRACING: CPT

## 2024-06-08 RX ORDER — CYANOCOBALAMIN 1000 UG/ML
1000 INJECTION, SOLUTION INTRAMUSCULAR; SUBCUTANEOUS WEEKLY
Qty: 3 ML | Refills: 0 | Status: SHIPPED | OUTPATIENT
Start: 2024-06-13

## 2024-06-08 RX ADMIN — BRIMONIDINE TARTRATE 1 DROP: 2 SOLUTION/ DROPS OPHTHALMIC at 09:17

## 2024-06-08 RX ADMIN — METOCLOPRAMIDE 10 MG: 5 INJECTION, SOLUTION INTRAMUSCULAR; INTRAVENOUS at 05:19

## 2024-06-08 RX ADMIN — PANTOPRAZOLE SODIUM 40 MG: 40 TABLET, DELAYED RELEASE ORAL at 09:14

## 2024-06-08 RX ADMIN — BUSPIRONE HYDROCHLORIDE 15 MG: 10 TABLET ORAL at 09:14

## 2024-06-08 RX ADMIN — PREGABALIN 150 MG: 75 CAPSULE ORAL at 09:14

## 2024-06-08 RX ADMIN — BUPROPION HYDROCHLORIDE 300 MG: 150 TABLET, EXTENDED RELEASE ORAL at 09:14

## 2024-06-08 RX ADMIN — LEVOTHYROXINE SODIUM 100 MCG: 100 TABLET ORAL at 09:14

## 2024-06-08 RX ADMIN — ASPIRIN 81 MG 81 MG: 81 TABLET ORAL at 09:14

## 2024-06-08 RX ADMIN — TIMOLOL MALEATE 1 DROP: 5 SOLUTION/ DROPS OPHTHALMIC at 09:17

## 2024-06-08 RX ADMIN — VANCOMYCIN HYDROCHLORIDE 1500 MG: 750 INJECTION, POWDER, LYOPHILIZED, FOR SOLUTION INTRAVENOUS at 02:11

## 2024-06-08 NOTE — PLAN OF CARE
Problem: DISCHARGE PLANNING  Goal: Discharge to home or other facility with appropriate resources  Description: INTERVENTIONS:  - Identify barriers to discharge w/patient and caregiver  - Arrange for needed discharge resources and transportation as appropriate  - Identify discharge learning needs (meds, wound care, etc.)  - Arrange for interpretive services to assist at discharge as needed  - Refer to Case Management Department for coordinating discharge planning if the patient needs post-hospital services based on physician/advanced practitioner order or complex needs related to functional status, cognitive ability, or social support system  Outcome: Adequate for Discharge     Problem: Knowledge Deficit  Goal: Patient/family/caregiver demonstrates understanding of disease process, treatment plan, medications, and discharge instructions  Description: Complete learning assessment and assess knowledge base.  Interventions:  - Provide teaching at level of understanding  - Provide teaching via preferred learning methods  Outcome: Adequate for Discharge

## 2024-06-08 NOTE — CONSULTS
Vancomycin IV Pharmacy-to-Dose Consultation     Vancomycin has been discontinued.  Pharmacy will sign off.  Please contact or re-consult with questions.    Brissa Boles, Pharmacist

## 2024-06-08 NOTE — DISCHARGE SUMMARY
Vitamin B 12 deficiency  Assessment & Plan  B12 deficiency noted ,will give IM dose q. weekly.    Positive blood culture  Assessment & Plan  Staph epidermidis detected on the blood culture 1 of 2 set, will follow-up final results, likely contaminant, currently on vancomycin,  Repeat blood culture from 6/7/2024 pending.    Elevated lipase  Assessment & Plan   Lipase on admission 415; currently denies any abdominal pain  CT abdomen does not show acute pancreatitis at this time    No concern for acute pancreatitis.  Patient currently asymptomatic    Headache  Assessment & Plan  Patient reports headache x 5 days that she describes as pressure across the top of her scalp.  This is nonradiating without any palliating or provoking factors or visual disturbance.  Does have history of migraines and has been taking Tylenol twice daily to help alleviate symptoms.  She reports that this has been ongoing fluctuating in severity.  She reports after administration of Tylenol in the ED pain is at 7 out of 10 which is improved from prior.  CT head without acute intracranial abnormalities  Suspect tension type headache versus migraine  Will give one time migraine cocktail and evaluate response continue Tylenol as needed    Diarrhea  Assessment & Plan  Patient reports x 5 days of loose watery nonbloody stools.  She denies any changes in dietary intake, recent travel, or sick contacts.  She has been trying to stay hydrated.  Now resolved since admission.    Fatigue  Assessment & Plan  Patient reports overall feeling fatigued having to sleep more often.  Stated she was doing work outside on Sunday and was very sleepy on Monday and Tuesday.  She reports that she keeps feeling like she has to go to sleep.  States she is having ongoing headache, diarrhea x 5 days, and episode of vomiting.  She reports that she was trying to stay hydrated drinking plenty of fluids.      Patient has not had diarrhea since admission, no further episodes of  vomiting, discontinue IV fluids, discontinue C. difficile testing as she was not able to provide any sample.  Advance diet, monitor another 24 hours.  PT and OT evaluation    Personal history of DVT (deep vein thrombosis)  Assessment & Plan  Continue home dose Xarelto daily    GERD (gastroesophageal reflux disease)  Assessment & Plan  Continue daily PPI and Pepcid    Hypothyroidism, adult  Assessment & Plan  Continue home dose levothyroxine  TSH within normal limit.    Hiatal hernia  Assessment & Plan  Noted and patient history she follows with GI as outpatient  Had EGD performed on 3/24 showing 3 cm hiatal hernia without Dax lesion present.  Empiric dilatation not performed due to risk of aspiration  GI recommending possible surgical consultation for hernia repair continue to follow-up as outpatient  CT appears unchanged from prior  Continue soft diet and continue to eat slowly.  Has gastric emptying scan scheduled as OP  Tolerated clear liquid diet, patient requesting to advance her diet, will order regular diet and see.    * Syncope  Assessment & Plan  Patient reports syncopal episode while in the kitchen with her daughter.  She was sitting in chair and had lost consciousness.  She had no prodromal symptoms, no seizure-like events, no loss of bowel or bladder.  Does have history of syncopal episodes was seen by cardiology in 12/22 for similar events was recommended for Holter monitor as OP.  She reports feeling generalized fatigue and tiredness.  Has been falling asleep frequently unable to recall if she had syncopal episode versus just fell asleep but states she had lost consciousness.  States she did have a fall last week where she lost balance and landed on a chair denies any overt injuries or LOC at that time  CT head without acute intracranial abnormalities.  EKG showing normal sinus rhythm  Troponins negative thus far  Suspect etiology in the setting of dehydration with ongoing diarrhea and nausea  vomiting.  Doubt cardiac etiology.  Plan  Will check orthostatics  Monitor on telemetry  Continue fall precautions  Continue IV hydration  Monitor intake/output    MRI of the brain was done as suspicion for TIA/stroke, and shows no acute or chronic infarct.  But mild microangiopathic changes noted,  Benadryl and Atarax which patient uses at night discontinued.  No acute events over the night, patient still complains of lightheadedness, blood pressure on the lower side, she is on amlodipine at home.  This will be discontinued.  Check orthostatic vital signs.  PT and OT evaluation        Discharging Physician / Practitioner: Debra Zhang MD  PCP: JOE Yates DO  Admission Date:   Admission Orders (From admission, onward)       Ordered        06/06/24 1515  INPATIENT ADMISSION  Once            06/05/24 1740  Place in Observation  Once                          Discharge Date: 06/08/24    Medical Problems       Resolved Problems  Date Reviewed: 6/7/2024   None         Consultations During Hospital Stay:  nil    Significant Findings / Test Results:   CT chest ,abdomen ,pelvis- Gaseous distention and thin-walled viscus associated with the gastric fundus in the hiatal hernia sac raising suspicion of gastric diverticulum or ulcer within the stomach in the hiatal hernia.     Otherwise no significant interval change from previous examination. Hepatomegaly and hepatic steatosis. Reidentified 12.4 cm uterine mass, probably representing myoma and compressing right mid ureter resulting an unchanged mild right hydronephrosis.  MRI- brain -No acute infarct. Mild microangiopathic change.    Incidental Findings:   nil     Test Results Pending at Discharge (will require follow up):   nil     Outpatient Tests Requested:  nil    Complications:  None    Reason for Admission: nil    Hospital Course:     Madeleine Sanchez is a 79 y.o. female patient who originally presented to the hospital on 6/5/2024 due to syncope.   Patient was sitting in the chair and had loss consciousness as per the daughter.  However patient on further questioning and the daughter who lives with her stated that she has been sleeping more so during the daytime and has been having trouble to sleep during the nighttime and has been taking Atarax and Requip at nighttime to help her with sleep.  Patient states she has had generalized fatigue and tiredness.  She denies of fall this time but however last week she had a episode of fall where she lost balance and landed on a chair denies of any head injury.  CT of the head was done showed no acute intracranial abnormalities EKG showed sinus bradycardia with heart rate of 55, QTc within normal limit, also noted to have blood pressure on the borderline low side with systolic in the range of 100/40, orthostatic vitals were negative.  Amlodipine was discontinued.  No evidence of active infection noted, blood culture 1 of 2 set was positive for Staph epidermidis likely contaminant repeat blood cultures were negative.  CT of the chest abdomen and pelvis with IV contrast showed gaseous distention and thin-walled meniscus associated with gastric fundus and the hiatal hernia sac raising suspicion of gastric diverticulum or ulcer within the stomach and the hiatal hernia.  Recommend GI follow-up also noted to have 12.4 cm uterine mass probably representing myoma compressing mild mid ureter resulting in unchanged mild right hydronephrosis.  Patient has been recommended to follow-up with GYN ,as per the daughter she is not keen to be under anesthesia for hysterectomy.  Patient had MRI of the brain for suspicion of stroke and no acute infarct noted but mild microangiopathic changes noted.  Patient feels better today.  Denies of any new complaints awake alert and answering all my questions appropriately and home oxygen evaluation was also obtained which she did not meet criteria for oxygen supply at home.  2D echocardiogram from  "April 2023 was reviewed showed EF of 60 to 65%, indeterminate diastolic function, no major valvular abnormalities noted right ventricular systolic pressure of 30 mmHg.  Will get 2D echocardiogram as out patient and place order for Zio patch.  Patient will be advised to follow-up with PCP regarding medication management and follow-up with psychiatrist.  Please see above list of diagnoses and related plan for additional information.     Condition at Discharge: good     Discharge Day Visit / Exam:     Subjective: Patient is better.  Denies of any chest pain or abdominal pain or nausea or vomiting.  Vitals: Blood Pressure: (!) 100/40 (06/08/24 0905)  Pulse: 60 (06/08/24 0905)  Temperature: 97.8 °F (36.6 °C) (06/08/24 0905)  Temp Source: Oral (06/08/24 0905)  Respirations: 18 (06/08/24 0905)  Height: 5' 3.5\" (161.3 cm) (06/05/24 1812)  Weight - Scale: 85 kg (187 lb 6 oz) (06/05/24 1812)  SpO2: 95 % (06/08/24 0905)  Exam:       HEENT-PERRLA, moist oral mucosa  Neck-supple, no JVD elevation   Respiratory-equal air entry bilaterally, no rales or rhonchi  Cardiovascular system-S1, S2 heard, no murmur or gallops or rubs  Abdomen-soft, nontender, no guarding or rigidity, bowel sounds heard  Extremities-no pedal edema  Peripheral pulses palpable  Musculoskeletal-no contractures  Central nervous system-no acute focal neurological deficit ,no sensory or motor deficit noted.  Skin-no rash noted        Discussion with Family: d/w daughter     Discharge instructions/Information to patient and family:   See after visit summary for information provided to patient and family.      Provisions for Follow-Up Care:  See after visit summary for information related to follow-up care and any pertinent home health orders.      Disposition:     Home    Planned Readmission: None     Discharge Statement:  I spent 45 minutes discharging the patient. This time was spent on the day of discharge. I had direct contact with the patient on the day of " discharge. Greater than 50% of the total time was spent examining patient, answering all patient questions, arranging and discussing plan of care with patient as well as directly providing post-discharge instructions.  Additional time then spent on discharge activities.    Discharge Medications:  See after visit summary for reconciled discharge medications provided to patient and family.      ** Please Note: This note has been constructed using a voice recognition system **

## 2024-06-08 NOTE — RESPIRATORY THERAPY NOTE
Home Oxygen Qualifying Test     Patient name: Madeleine Sanchez        : 1945   Date of Test:  2024  Diagnosis:    Home Oxygen Test:    **Medicare Guidelines require item(s) 1-5 on all ambulatory patients or 1 and 2 on non-ambulatory patients.    1. Baseline SPO2 on Room Air at rest 93 %   If <= 88% on Room Air add O2 via NC to obtain SpO2 >=88%. If LPM needed, document LPM  needed to reach =>88%    SPO2 during exertion on Room Air 95  %  During exertion monitor SPO2. If SPO2 increases >=89%, do not add supplemental oxygen    SPO2 on Oxygen at Rest N/A % at N/A LPM    SPO2 during exertion on Oxygen N/A % at N/A LPM    Test performed during exertion activity. Walking     []  Supplemental Home Oxygen is indicated.    [x]  Client does not qualify for home oxygen.    Respiratory Additional Notes- Found pt on RA at rest with a SPO2 of 93%. Pt was walked on RA. Pt walked using her walking cane.  Pt's oxygen saturations increased to 95% while walking. Pt was able to maintain oxygen saturations on RA. Walked pt back to the bed. Pt does not qualify for home oxygen.     Devaughn Weller, RT

## 2024-06-08 NOTE — PLAN OF CARE
Problem: NEUROSENSORY - ADULT  Goal: Achieves maximal functionality and self care  Description: INTERVENTIONS  - Monitor swallowing and airway patency with patient fatigue and changes in neurological status  - Encourage and assist patient to increase activity and self care.   - Encourage visually impaired, hearing impaired and aphasic patients to use assistive/communication devices  Outcome: Progressing         Problem: NEUROSENSORY - ADULT  Goal: Achieves stable or improved neurological status  Description: INTERVENTIONS  - Monitor and report changes in neurological status  - Monitor vital signs such as temperature, blood pressure, glucose, and any other labs ordered   - Initiate measures to prevent increased intracranial pressure  - Monitor for seizure activity and implement precautions if appropriate      Outcome: Progressing        Problem: CARDIOVASCULAR - ADULT  Goal: Maintains optimal cardiac output and hemodynamic stability  Description: INTERVENTIONS:  - Monitor I/O, vital signs and rhythm  - Monitor for S/S and trends of decreased cardiac output  - Administer and titrate ordered vasoactive medications to optimize hemodynamic stability  - Assess quality of pulses, skin color and temperature  - Assess for signs of decreased coronary artery perfusion  - Instruct patient to report change in severity of symptoms  Outcome: Progressing  Goal: Absence of cardiac dysrhythmias or at baseline rhythm  Description: INTERVENTIONS:  - Continuous cardiac monitoring, vital signs, obtain 12 lead EKG if ordered  - Administer antiarrhythmic and heart rate control medications as ordered  - Monitor electrolytes and administer replacement therapy as ordered  Outcome: Progressing         Problem: RESPIRATORY - ADULT  Goal: Achieves optimal ventilation and oxygenation  Description: INTERVENTIONS:  - Assess for changes in respiratory status  - Assess for changes in mentation and behavior  - Position to facilitate oxygenation and  minimize respiratory effort  - Oxygen administered by appropriate delivery if ordered  - Initiate smoking cessation education as indicated  - Encourage broncho-pulmonary hygiene including cough, deep breathe, Incentive Spirometry  - Assess the need for suctioning and aspirate as needed  - Assess and instruct to report SOB or any respiratory difficulty  - Respiratory Therapy support as indicated  Outcome: Progressing        Problem: METABOLIC, FLUID AND ELECTROLYTES - ADULT  Goal: Electrolytes maintained within normal limits  Description: INTERVENTIONS:  - Monitor labs and assess patient for signs and symptoms of electrolyte imbalances  - Administer electrolyte replacement as ordered  - Monitor response to electrolyte replacements, including repeat lab results as appropriate  - Instruct patient on fluid and nutrition as appropriate  Outcome: Progressing      Problem: MUSCULOSKELETAL - ADULT  Goal: Maintain or return mobility to safest level of function  Description: INTERVENTIONS:  - Assess patient's ability to carry out ADLs; assess patient's baseline for ADL function and identify physical deficits which impact ability to perform ADLs (bathing, care of mouth/teeth, toileting, grooming, dressing, etc.)  - Assess/evaluate cause of self-care deficits   - Assess range of motion  - Assess patient's mobility  - Assess patient's need for assistive devices and provide as appropriate  - Encourage maximum independence but intervene and supervise when necessary  - Involve family in performance of ADLs  - Assess for home care needs following discharge   - Consider OT consult to assist with ADL evaluation and planning for discharge  - Provide patient education as appropriate  Outcome: Progressing  Goal: Maintain proper alignment of affected body part  Description: INTERVENTIONS:  - Support, maintain and protect limb and body alignment  - Provide patient/ family with appropriate education  Outcome: Progressing      Problem: PAIN  - ADULT  Goal: Verbalizes/displays adequate comfort level or baseline comfort level  Description: Interventions:  - Encourage patient to monitor pain and request assistance  - Assess pain using appropriate pain scale  - Administer analgesics based on type and severity of pain and evaluate response  - Implement non-pharmacological measures as appropriate and evaluate response  - Consider cultural and social influences on pain and pain management  - Notify physician/advanced practitioner if interventions unsuccessful or patient reports new pain  Outcome: Progressing

## 2024-06-08 NOTE — DISCHARGE INSTR - AVS FIRST PAGE
Dear Madeleine Sanchez,     [facility]  It is our hope that we were always able to exceed the expected standards for your care during your stay.  You were hospitalized due to lethargic ,increased daytime sleepiness .  You were cared for on the medsur  floor under the service of Debra Zhang MD with the [facility] who covers for your primary care physician (PCP), JOE Yates DO, while you were hospitalized.  If you have any questions or concerns related to this hospitalization, you may contact us at .  For follow up as well as medication refills, we recommend that you follow up with your primary care physician.  A registered nurse will reach out to you by phone within a few days after your discharge to answer any additional questions that you may have after going home.  However, at this time we provide for you here, the most important instructions / recommendations at discharge:     Notable Medication Adjustments -   D/c atarax and avoid benadryl  Testing Required after Discharge -   nil  ** Please contact your PCP to request testing orders for any of the testing recommended here **  Important follow up information -   CT abdomen-Reidentified 12.4 cm uterine mass, probably representing myoma and compressing right mid ureter resulting an unchanged mild right hydronephrosis.     Other Instructions -   F/u gyn , pcp and   Please review this entire after visit summary as additional general instructions including medication list, appointments, activity, diet, any pertinent wound care, and other additional recommendations from your care team that may be provided for you.      Sincerely,     Debra Zhang MD

## 2024-06-08 NOTE — NURSING NOTE
Discharge to home no home service at this time.  Review discharge information and follow up care. Information on changes review with the pt.  Pt denies any pain at this time.  Pt has all belongings with her.

## 2024-06-10 ENCOUNTER — PATIENT OUTREACH (OUTPATIENT)
Dept: FAMILY MEDICINE CLINIC | Facility: CLINIC | Age: 79
End: 2024-06-10

## 2024-06-10 ENCOUNTER — TRANSITIONAL CARE MANAGEMENT (OUTPATIENT)
Dept: FAMILY MEDICINE CLINIC | Facility: CLINIC | Age: 79
End: 2024-06-10

## 2024-06-10 ENCOUNTER — TELEPHONE (OUTPATIENT)
Age: 79
End: 2024-06-10

## 2024-06-10 DIAGNOSIS — Z71.89 COORDINATION OF COMPLEX CARE: Primary | ICD-10-CM

## 2024-06-10 LAB — BACTERIA BLD CULT: NORMAL

## 2024-06-10 NOTE — PROGRESS NOTES
In basket message received with patient referral from the HRR report. Chart reviewed.  Patient was admitted to [facility] 6/5-6/8 with syncope,hiatal hernia hypothyroidism, GERD,history of DVT,diarrhea headache, positive blood culture and vitamin B 12 deficiency.    I spoke with Madeleine who reports she has fatigue. She denies diarrhea or abdominal pain.She denies syncope since discharge.  Her daughter lives with her and is supportive. Patient drives self or her other daughter drives her.  We reviewed all medications and upcoming appointments. She has no needs at this time.She took my contact information and did consent to another call.

## 2024-06-10 NOTE — TELEPHONE ENCOUNTER
Patient is in need of a TCM appointment after being discharged from [facility] on Saturday 06/08    Staff was unavailable to transfer for further assistance    Please call to schedule

## 2024-06-11 LAB
BACTERIA BLD CULT: NORMAL
BACTERIA BLD CULT: NORMAL

## 2024-06-11 NOTE — TELEPHONE ENCOUNTER
Pt called to make TCM appt. I tried to warm transfer to  staff but they were unavailable.     Please call pt to set up appt.  Please advise.

## 2024-06-12 ENCOUNTER — TELEPHONE (OUTPATIENT)
Dept: ADMINISTRATIVE | Facility: OTHER | Age: 79
End: 2024-06-12

## 2024-06-12 NOTE — TELEPHONE ENCOUNTER
06/12/24 11:02 AM    Patient contacted to bring Advance Directive, POLST, or Living Will document to next scheduled pcp visit.[facility] Department spoke with patient.    Thank you.  Soraida Estrella  [facility]

## 2024-06-17 ENCOUNTER — OFFICE VISIT (OUTPATIENT)
Dept: FAMILY MEDICINE CLINIC | Facility: CLINIC | Age: 79
End: 2024-06-17
Payer: MEDICARE

## 2024-06-17 VITALS
HEART RATE: 73 BPM | HEIGHT: 64 IN | SYSTOLIC BLOOD PRESSURE: 124 MMHG | OXYGEN SATURATION: 96 % | TEMPERATURE: 98.5 F | DIASTOLIC BLOOD PRESSURE: 70 MMHG | BODY MASS INDEX: 32.1 KG/M2 | WEIGHT: 188 LBS

## 2024-06-17 DIAGNOSIS — Z71.2 ENCOUNTER TO DISCUSS TEST RESULTS: ICD-10-CM

## 2024-06-17 DIAGNOSIS — M17.11 PRIMARY OSTEOARTHRITIS OF RIGHT KNEE: ICD-10-CM

## 2024-06-17 DIAGNOSIS — F41.9 ANXIETY AND DEPRESSION: ICD-10-CM

## 2024-06-17 DIAGNOSIS — R53.1 WEAKNESS GENERALIZED: Primary | ICD-10-CM

## 2024-06-17 DIAGNOSIS — F32.A ANXIETY AND DEPRESSION: ICD-10-CM

## 2024-06-17 DIAGNOSIS — R63.8 INCREASED BMI: ICD-10-CM

## 2024-06-17 DIAGNOSIS — Z79.899 MEDICATION MANAGEMENT: ICD-10-CM

## 2024-06-17 PROCEDURE — 99496 TRANSJ CARE MGMT HIGH F2F 7D: CPT | Performed by: FAMILY MEDICINE

## 2024-06-17 NOTE — PROGRESS NOTES
Transition of Care Visit  Name: Madeleine Sanchez      : 1945      MRN: [id number]  Encounter Provider: JOE Yates DO  Encounter Date: 2024   Encounter department: [facility] [place]    Assessment & Plan  Below is the disch summary of the [facility] stay  thru 24:  Discharge Summary  Debra Zhang MD (Physician)  Hospitalist  Vitamin B 12 deficiency  Assessment & Plan  B12 deficiency noted ,will give IM dose q. weekly.     Positive blood culture  Assessment & Plan  Staph epidermidis detected on the blood culture 1 of 2 set, will follow-up final results, likely contaminant, currently on vancomycin,  Repeat blood culture from 2024 pending.     Elevated lipase  Assessment & Plan   Lipase on admission 415; currently denies any abdominal pain  CT abdomen does not show acute pancreatitis at this time     No concern for acute pancreatitis.  Patient currently asymptomatic     Headache  Assessment & Plan  Patient reports headache x 5 days that she describes as pressure across the top of her scalp.  This is nonradiating without any palliating or provoking factors or visual disturbance.  Does have history of migraines and has been taking Tylenol twice daily to help alleviate symptoms.  She reports that this has been ongoing fluctuating in severity.  She reports after administration of Tylenol in the ED pain is at 7 out of 10 which is improved from prior.  CT head without acute intracranial abnormalities  Suspect tension type headache versus migraine  Will give one time migraine cocktail and evaluate response continue Tylenol as needed     Diarrhea  Assessment & Plan  Patient reports x 5 days of loose watery nonbloody stools.  She denies any changes in dietary intake, recent travel, or sick contacts.  She has been trying to stay hydrated.  Now resolved since admission.     Fatigue  Assessment & Plan  Patient reports overall feeling fatigued having to sleep more often.   Stated she was doing work outside on Sunday and was very sleepy on Monday and Tuesday.  She reports that she keeps feeling like she has to go to sleep.  States she is having ongoing headache, diarrhea x 5 days, and episode of vomiting.  She reports that she was trying to stay hydrated drinking plenty of fluids.        Patient has not had diarrhea since admission, no further episodes of vomiting, discontinue IV fluids, discontinue C. difficile testing as she was not able to provide any sample.  Advance diet, monitor another 24 hours.  PT and OT evaluation     Personal history of DVT (deep vein thrombosis)  Assessment & Plan  Continue home dose Xarelto daily     GERD (gastroesophageal reflux disease)  Assessment & Plan  Continue daily PPI and Pepcid     Hypothyroidism, adult  Assessment & Plan  Continue home dose levothyroxine  TSH within normal limit.     Hiatal hernia  Assessment & Plan  Noted and patient history she follows with GI as outpatient  Had EGD performed on 3/24 showing 3 cm hiatal hernia without Dax lesion present.  Empiric dilatation not performed due to risk of aspiration  GI recommending possible surgical consultation for hernia repair continue to follow-up as outpatient  CT appears unchanged from prior  Continue soft diet and continue to eat slowly.  Has gastric emptying scan scheduled as OP  Tolerated clear liquid diet, patient requesting to advance her diet, will order regular diet and see.     * Syncope  Assessment & Plan  Patient reports syncopal episode while in the kitchen with her daughter.  She was sitting in chair and had lost consciousness.  She had no prodromal symptoms, no seizure-like events, no loss of bowel or bladder.  Does have history of syncopal episodes was seen by cardiology in 12/22 for similar events was recommended for Holter monitor as OP.  She reports feeling generalized fatigue and tiredness.  Has been falling asleep frequently unable to recall if she had syncopal  episode versus just fell asleep but states she had lost consciousness.  States she did have a fall last week where she lost balance and landed on a chair denies any overt injuries or LOC at that time  CT head without acute intracranial abnormalities.  EKG showing normal sinus rhythm  Troponins negative thus far  Suspect etiology in the setting of dehydration with ongoing diarrhea and nausea vomiting.  Doubt cardiac etiology.  Plan  Will check orthostatics  Monitor on telemetry  Continue fall precautions  Continue IV hydration  Monitor intake/output     MRI of the brain was done as suspicion for TIA/stroke, and shows no acute or chronic infarct.  But mild microangiopathic changes noted,  Benadryl and Atarax which patient uses at night discontinued.  No acute events over the night, patient still complains of lightheadedness, blood pressure on the lower side, she is on amlodipine at home.  This will be discontinued.  Check orthostatic vital signs.  PT and OT evaluation            1. TCM follow-up.  The patient's condition is stable. The patient is advised to maintain her current regimen of Trintellix 10 mg and Wellbutrin 300 mg.    Follow-up  A follow-up appointment is scheduled for two months from now.  No orders of the defined types were placed in this encounter.         History of Present Illness     Transitional Care Management Review:   Madeleine Sanchez is a 79 y.o. female here for TCM follow up.     During the TCM phone call patient stated:  TCM Call       Date and time call was made  6/10/2024  7:16 PM    Hospital care reviewed  Records reviewed    Patient was hospitialized at  [facility]    Date of Admission  06/05/24    Date of discharge  06/08/24    Diagnosis  Syncope    Disposition  Home    Were the patients medications reviewed and updated  No    Current Symptoms  Fatigue    Cough Severity  Mild    Weakness severity  Mild    Neausea severity  Mild    Fatigue severity  Mild          TCM Call       Post  hospital issues  Reduced activity; Poor ADL (Activities of Daily Living) performance    Should patient be enrolled in anticoag monitoring?  No    Scheduled for follow up?  Yes    Patients specialists  Cardiologist    Did you obtain your prescribed medications  Yes    Do you need help managing your prescriptions or medications  No    Is transportation to your appointment needed  No    I have advised the patient to call PCP with any new or worsening symptoms  De Garcia -- MA    Living Arrangements  Children    Support System  Family; Children    The type of support provided  Emotional; Financial; Physical    Do you have social support  Yes, some    Are you recieving any outpatient services  Yes    What type of services  Out-pt PT    Are you recieving home care services  No    Are you using any community resources  No    Current waiver services  No    Have you fallen in the last 12 months  Yes    How many times  1    Interperter language line needed  No    Counseling  Patient    Counseling topics  Importance of RX compliance          History of Present Illness  The patient is an 79-year-old female who presents for TCM follow-up.    The patient was hospitalized from 06/05/2023 to 06/08/2023, during which she experienced near syncope, fatigue, and generalized failure to thrive. She is currently on a regimen of Trintellix, BuSpar, bupropion, and Lyrica, the latter of which she believes is contributing to her symptoms. She is scheduled to consult with a psychiatrist, Dr. Batista, tomorrow. Initially under the care of Dr. Chavez, she was prescribed several medications, including an increased dosage, which she declined. Consequently, she sought a second opinion. Currently, she is on a regimen of Trintellix 10 mg and Wellbutrin 300 mg.  Review of Systems   Constitutional:  Negative for activity change, appetite change, chills, diaphoresis, fatigue and fever.   HENT:  Negative for congestion, ear discharge, ear pain,  "facial swelling, nosebleeds, sore throat, tinnitus, trouble swallowing and voice change.    Eyes:  Negative for photophobia, pain, discharge, redness, itching and visual disturbance.   Respiratory:  Negative for apnea, cough, choking, chest tightness and shortness of breath.    Cardiovascular:  Negative for chest pain, palpitations and leg swelling.   Gastrointestinal:  Negative for abdominal distention, abdominal pain, blood in stool, constipation, diarrhea, nausea and vomiting.   Endocrine: Negative for cold intolerance, heat intolerance, polydipsia, polyphagia and polyuria.   Genitourinary:  Negative for decreased urine volume, difficulty urinating, dysuria, enuresis, frequency, hematuria, pelvic pain, urgency and vaginal bleeding.   Musculoskeletal:  Negative for arthralgias, back pain, gait problem, joint swelling, neck pain and neck stiffness.   Skin:  Negative for color change, pallor and rash.   Allergic/Immunologic: Negative for immunocompromised state.   Neurological:  Negative for dizziness, seizures, facial asymmetry, light-headedness, numbness and headaches.   Hematological:  Negative for adenopathy.   Psychiatric/Behavioral:  Negative for agitation, behavioral problems, confusion, decreased concentration, dysphoric mood and hallucinations.      Objective     /70 (BP Location: Left arm, Patient Position: Sitting, Cuff Size: Standard)   Pulse 73   Temp 98.5 °F (36.9 °C) (Temporal)   Ht 5' 3.5\" (1.613 m)   Wt 85.3 kg (188 lb)   SpO2 96%   BMI 32.78 kg/m²     Physical Exam    Physical Exam  Vitals and nursing note reviewed.   Constitutional:       General: She is not in acute distress.     Appearance: She is well-developed. She is not diaphoretic.   HENT:      Head: Normocephalic and atraumatic.      Nose: Nose normal.   Eyes:      Conjunctiva/sclera: Conjunctivae normal.      Pupils: Pupils are equal, round, and reactive to light.   Neck:      Thyroid: No thyromegaly.      Trachea: No tracheal " deviation.   Cardiovascular:      Rate and Rhythm: Normal rate and regular rhythm.      Heart sounds: Normal heart sounds.   Pulmonary:      Effort: No respiratory distress.      Breath sounds: Normal breath sounds. No wheezing or rales.   Chest:      Chest wall: No tenderness.   Abdominal:      General: Bowel sounds are normal. There is no distension.      Palpations: Abdomen is soft. There is no mass.      Tenderness: There is no abdominal tenderness. There is no guarding or rebound.   Musculoskeletal:         General: No tenderness or deformity. Normal range of motion.      Cervical back: Normal range of motion and neck supple.   Skin:     General: Skin is warm and dry.      Coloration: Skin is not pale.      Findings: No erythema or rash.   Neurological:      General: No focal deficit present.      Mental Status: She is alert and oriented to person, place, and time. Mental status is at baseline.      Cranial Nerves: No cranial nerve deficit.   Psychiatric:         Behavior: Behavior normal.         Thought Content: Thought content normal.         Judgment: Judgment normal.       Medications have been reviewed by provider in current encounter    Administrative Statements   I have spent a total time of 30 minutes on 06/17/24 In caring for this patient including Diagnostic results, Prognosis, Risks and benefits of tx options, Instructions for management, Patient and family education, Importance of tx compliance, Risk factor reductions, Impressions, Counseling / Coordination of care, Documenting in the medical record, Reviewing / ordering tests, medicine, procedures  , Obtaining or reviewing history  , and Communicating with other healthcare professionals .

## 2024-06-18 ENCOUNTER — OFFICE VISIT (OUTPATIENT)
Dept: PSYCHIATRY | Facility: CLINIC | Age: 79
End: 2024-06-18
Payer: MEDICARE

## 2024-06-18 DIAGNOSIS — F41.1 GAD (GENERALIZED ANXIETY DISORDER): ICD-10-CM

## 2024-06-18 DIAGNOSIS — F43.23 ADJUSTMENT DISORDER WITH MIXED ANXIETY AND DEPRESSED MOOD: Primary | ICD-10-CM

## 2024-06-18 PROCEDURE — G2211 COMPLEX E/M VISIT ADD ON: HCPCS | Performed by: PHYSICIAN ASSISTANT

## 2024-06-18 PROCEDURE — 99214 OFFICE O/P EST MOD 30 MIN: CPT | Performed by: PHYSICIAN ASSISTANT

## 2024-06-18 NOTE — PSYCH
This note was not shared with the patient due to reasonable likelihood of causing patient harm    PROGRESS NOTE        [facility]      Name and Date of Birth:  Madeleine Sanchez 79 y.o. 1945 MRN: [id number]    Insurance: Payor: MEDICARE / Plan: MEDICARE A AND B / Product Type: Medicare A & B Fee for Service /     Date of Visit: June 18, 2024    Reason for Visit:   Chief Complaint   Patient presents with    Follow-up    Medication Management       SUBJECTIVE:    Madeleine Sanchez is a sean 79 y.o. female with a history of Adjustment Disorder and Generalized Anxiety Disorder who presents today for follow-up and medication management. Since her last visit she has continued to struggle with nausea and fatigue as well as feeling down and anxious. She doesn't feel the decrease in Trintellix made any difference. She was taken off hydroxyzine at a recent hospitalization for dizziness. She reports she is not sleeping well anymore. She is feeling more irritable as well and has a short temper.     She denies any suicidal ideation, intent or plan at present; denies any homicidal ideation, intent or plan at present.    She denies any auditory hallucinations, denies any visual hallucinations, denies any delusions.    She reports nausea and sedation.    HPI ROS Appetite Changes and Sleep:     She reports difficulty falling asleep, adequate appetite, adequate energy level    Current Rating Scores:     None completed today.    Review Of Systems:    Mood anxiety and depression   Behavior appropriate, cooperative, and calm   Thought Content daily worries and negative thoughts   General emotional problems and decreased functioning   Personality no change in personality   Other Psych Symptoms normal   Constitutional as noted in HPI   ENT as noted in HPI   Cardiovascular as noted in HPI   Respiratory as noted in HPI   Gastrointestinal as noted in HPI   Genitourinary as noted in HPI    Musculoskeletal as noted in HPI   Integumentary as noted in HPI   Neurological as noted in HPI   Endocrine negative   Other Symptoms none, all other systems are negative     Family Psychiatric History:     Family History   Problem Relation Age of Onset    Cancer Sister        Social/Substance Abuse History:    Social History     Socioeconomic History    Marital status:      Spouse name: Not on file    Number of children: Not on file    Years of education: Not on file    Highest education level: Not on file   Occupational History    Not on file   Tobacco Use    Smoking status: Never    Smokeless tobacco: Never   Vaping Use    Vaping status: Never Used   Substance and Sexual Activity    Alcohol use: Not Currently     Comment: socially    Drug use: Never    Sexual activity: Not on file     Comment: defer   Other Topics Concern    Not on file   Social History Narrative    · Most recent tobacco use screenin2019      Social Determinants of Health     Financial Resource Strain: Low Risk  (2023)    [facility]    Overall Financial Resource Strain (CARDIA)     Difficulty of Paying Living Expenses: Not very hard   Recent Concern: Financial Resource Strain - Medium Risk (2023)    Overall Financial Resource Strain (CARDIA)     Difficulty of Paying Living Expenses: Somewhat hard   Food Insecurity: No Food Insecurity (2024)    Hunger Vital Sign     Worried About Running Out of Food in the Last Year: Never true     Ran Out of Food in the Last Year: Never true   Transportation Needs: No Transportation Needs (2024)    PRAPARE - Transportation     Lack of Transportation (Medical): No     Lack of Transportation (Non-Medical): No   Physical Activity: Not on file   Stress: Not on file   Social Connections: Not on file   Intimate Partner Violence: Not At Risk (2023)    [facility] from [facility]    Humiliation, Afraid, Rape, and Kick questionnaire     Fear of Current or Ex-Partner: No     Emotionally Abused: No     Physically Abused: No     Sexually Abused: No   Housing Stability: Unknown (6/6/2024)    Housing Stability Vital Sign     Unable to Pay for Housing in the Last Year: No     Number of Times Moved in the Last Year: Not on file     Homeless in the Last Year: No       The following portions of the patient's history were reviewed and updated as appropriate: past family history, past medical history, past social history, past surgical history and problem list.    OBJECTIVE:     Mental Status Evaluation:  Appearance:  dressed appropriately, adequate grooming, looks stated age   Behavior:  pleasant, cooperative, calm, interacts appropriately with this writer   Speech:  normal rate, normal volume, normal pitch   Mood:  depressed   Affect:  mood-congruent   Thought Process:  organized, logical, coherent   Associations: intact associations   Thought Content:  no overt delusions, no paranoia noted on exam   Perceptual Disturbances: no auditory hallucinations, no visual hallucinations   Risk Potential: Suicidal ideation - None  Homicidal ideation - None  Potential for aggression - No   Sensorium:  oriented to person, place, and time/date   Memory:  recent and remote memory grossly intact   Consciousness:  alert and awake   Attention/Concentration: attention span and concentration are age appropriate   Insight:  age appropriate   Judgment: age appropriate   Gait/Station: unstable gait, uses cane   Motor Activity: no abnormal movements     Laboratory Results: I have personally reviewed all pertinent laboratory/tests results    Suicide/Homicide Risk Assessment:    Risk of Harm to Self:  The following ratings are based on assessment at the time of the interview  Based on today's assessment, Madeleine presents the following risk of harm to self: none    Risk of Harm to Others:  The following ratings are based  on assessment at the time of the interview  Based on today's assessment, Madeleine presents the following risk of harm to others: none    The following interventions are recommended: no intervention changes needed    Assessment/Plan:      She is still feeling very nauseous and fatigued during the day. Given the interaction between bupropion and Trintellix I have advised stopping the Trintellix entirely to see if there is improvement in the sx. If the depression and anxiety worsen I would suggest starting on escitalopram 5 mg qd - she will call me next week with an update. PARQ for SSRI completed including serotonin syndrome, SIADH, worsening depression, suicidality, induction of olya, GI upset, headaches, activation, sexual side effects, sedation, potential drug interactions, and others. The other medications will remain unchanged. If the escitalopram doesn't work out I may try something with some mood stabilizing properties since she reports being irritable often. She will continue to work with Nupur Holden LCSW, as well. We have discussed her safety plan and she agrees that if she experience unsafe thoughts that she will reach out to her supports including this office, the suicide hotline, and emergency services if necessary. Madeleine is aware of non-emergent and emergent mental health resources. They are able to contract for their own safety at this time.    Will follow up in 1-2 months. Patient is aware to call the office if questions or concerns arise sooner.      Diagnoses and all orders for this visit:    Adjustment disorder with mixed anxiety and depressed mood    BRINDA (generalized anxiety disorder)        Treatment Recommendations/Precautions:    Continue current medications:     - buspirone 15 mg BID     - bupropion  mg qAM     Stop medications:     - Trintellix 10 mg qd    Medication management every 1 months  Continue psychotherapy with [facility] therapist Nupur Holden LCSW  Aware of 24 hour and weekend  coverage for urgent situations accessed by calling [facility] main practice number  I am scheduling this patient out for greater than 3 months: No    Medications Risks/Benefits      Risks, Benefits And Possible Side Effects Of Medications:    Risks, benefits, and possible side effects of medications explained to Madeleine and she verbalizes understanding and agreement for treatment.    Controlled Medication Discussion:     Not applicable    Psychotherapy Provided:     Individual psychotherapy provided: No    Treatment Plan:    Completed and signed during the session: Not applicable - Treatment Plan to be completed by [facility] therapist    Visit Time    Visit Start Time:  2:05 PM  Visit End Time:  2:25 PM  Total Visit Duration:  20 minutes    Irma Batista 06/18/24

## 2024-06-19 ENCOUNTER — PATIENT OUTREACH (OUTPATIENT)
Dept: FAMILY MEDICINE CLINIC | Facility: CLINIC | Age: 79
End: 2024-06-19

## 2024-06-19 ENCOUNTER — TELEPHONE (OUTPATIENT)
Dept: PSYCHIATRY | Facility: CLINIC | Age: 79
End: 2024-06-19

## 2024-06-19 DIAGNOSIS — E53.8 VITAMIN B 12 DEFICIENCY: Primary | ICD-10-CM

## 2024-06-19 RX ORDER — SYRINGE, DISPOSABLE, 3 ML
SYRINGE, EMPTY DISPOSABLE MISCELLANEOUS WEEKLY
Qty: 50 EACH | Refills: 0 | Status: SHIPPED | OUTPATIENT
Start: 2024-06-19

## 2024-06-19 RX ORDER — NEEDLES, DISPOSABLE 25GX5/8"
NEEDLE, DISPOSABLE MISCELLANEOUS WEEKLY
Qty: 50 EACH | Refills: 6 | Status: SHIPPED | OUTPATIENT
Start: 2024-06-19

## 2024-06-25 ENCOUNTER — PATIENT OUTREACH (OUTPATIENT)
Dept: FAMILY MEDICINE CLINIC | Facility: CLINIC | Age: 79
End: 2024-06-25

## 2024-06-25 NOTE — PROGRESS NOTES
"I spoke with Mrs. Sanchez who reports she is not \"feeling like myself\". She has fatigue and feels she could lay down and sleep at any time.   She saw her PCP 6/16 for her TCM appointment. She also had a psychiatry appointment and Trintellix was stopped.  She reports she is keeping busy at home. She is staying hydrated and checking her blood pressure.  She has a PCP appointment 6/27.  "

## 2024-07-02 ENCOUNTER — TELEPHONE (OUTPATIENT)
Age: 79
End: 2024-07-02

## 2024-07-02 ENCOUNTER — PATIENT OUTREACH (OUTPATIENT)
Dept: FAMILY MEDICINE CLINIC | Facility: CLINIC | Age: 79
End: 2024-07-02

## 2024-07-02 DIAGNOSIS — M17.11 PRIMARY OSTEOARTHRITIS OF RIGHT KNEE: Primary | ICD-10-CM

## 2024-07-02 NOTE — TELEPHONE ENCOUNTER
Pt called to request to have an order placed for physical therapy.  She had been going to [facility] twice a week.  She will watch her MyChart for the order.    Pt also asked why she had received a copy of the ambulatory referral to complex care management program and what was she supposed to do with it.  Spoke with Tray in Clinical, who advised to explain to pt that it is for the nurses to check in on her and offer any assistance she may need.  Pt stated she had received a few calls already from nurses who were checking on her and that they had been very nice.

## 2024-07-03 ENCOUNTER — HOSPITAL ENCOUNTER (OUTPATIENT)
Dept: NON INVASIVE DIAGNOSTICS | Facility: HOSPITAL | Age: 79
Discharge: HOME | End: 2024-07-03
Attending: INTERNAL MEDICINE
Payer: MEDICARE

## 2024-07-03 VITALS
HEART RATE: 73 BPM | SYSTOLIC BLOOD PRESSURE: 124 MMHG | HEIGHT: 63 IN | BODY MASS INDEX: 33.31 KG/M2 | DIASTOLIC BLOOD PRESSURE: 70 MMHG | WEIGHT: 188 LBS

## 2024-07-03 DIAGNOSIS — R55 SYNCOPE: ICD-10-CM

## 2024-07-03 LAB
AORTIC ROOT: 2.6 CM
APICAL FOUR CHAMBER EJECTION FRACTION: 64 %
BSA FOR ECHO PROCEDURE: 1.88 M2
E WAVE DECELERATION TIME: 331 MS
E/A RATIO: 0.59
FRACTIONAL SHORTENING: 32 (ref 28–44)
INTERVENTRICULAR SEPTUM IN DIASTOLE (PARASTERNAL SHORT AXIS VIEW): 1 CM
INTERVENTRICULAR SEPTUM: 1 CM (ref 0.6–1.1)
LAAS-AP2: 19.7 CM2
LAAS-AP4: 17.8 CM2
LEFT ATRIUM SIZE: 2.8 CM
LEFT ATRIUM VOLUME (MOD BIPLANE): 53 ML
LEFT ATRIUM VOLUME INDEX (MOD BIPLANE): 28 ML/M2
LEFT INTERNAL DIMENSION IN SYSTOLE: 2.8 CM (ref 2.1–4)
LEFT VENTRICULAR INTERNAL DIMENSION IN DIASTOLE: 4.1 CM (ref 3.5–6)
LEFT VENTRICULAR POSTERIOR WALL IN END DIASTOLE: 1 CM
LEFT VENTRICULAR STROKE VOLUME: 47 ML
LVSV (TEICH): 47 ML
MV E'TISSUE VEL-SEP: 7 CM/S
MV PEAK A VEL: 1.16 M/S
MV PEAK E VEL: 68 CM/S
MV STENOSIS PRESSURE HALF TIME: 96 MS
MV VALVE AREA P 1/2 METHOD: 2.29
RA PRESSURE ESTIMATED: 3 MMHG
RIGHT ATRIAL 2D VOLUME: 34 ML
RIGHT ATRIUM AREA SYSTOLE A4C: 13.9 CM2
RIGHT VENTRICLE ID DIMENSION: 3.2 CM
RV PSP: 30 MMHG
SL CV LEFT ATRIUM LENGTH A2C: 5.4 CM
SL CV LV EF: 65
SL CV PED ECHO LEFT VENTRICLE DIASTOLIC VOLUME (MOD BIPLANE) 2D: 75 ML
SL CV PED ECHO LEFT VENTRICLE SYSTOLIC VOLUME (MOD BIPLANE) 2D: 29 ML
TR MAX PG: 27 MMHG
TR PEAK VELOCITY: 2.6 M/S
TRICUSPID ANNULAR PLANE SYSTOLIC EXCURSION: 2.2 CM
TRICUSPID VALVE PEAK REGURGITATION VELOCITY: 2.6 M/S

## 2024-07-03 PROCEDURE — 93306 TTE W/DOPPLER COMPLETE: CPT | Performed by: INTERNAL MEDICINE

## 2024-07-03 PROCEDURE — 93306 TTE W/DOPPLER COMPLETE: CPT

## 2024-07-09 DIAGNOSIS — E03.9 HYPOTHYROIDISM, ADULT: ICD-10-CM

## 2024-07-09 RX ORDER — LEVOTHYROXINE SODIUM 0.1 MG/1
TABLET ORAL
Qty: 90 TABLET | Refills: 1 | Status: SHIPPED | OUTPATIENT
Start: 2024-07-09

## 2024-07-09 NOTE — TELEPHONE ENCOUNTER
Referral Rx faxed at this time w electronic signature attached at bottom of first page. NFA needed at this time.

## 2024-07-09 NOTE — TELEPHONE ENCOUNTER
[facility] called to say the the fax they received was not the actual script. She is  requesting the actual script showing Dr Yates electronic signature to please be faxed to [phone].    Please advise    Thank you

## 2024-07-09 NOTE — TELEPHONE ENCOUNTER
[facility] called requesting PT referral to be faxed to her at [phone]. Patient has an appointment today at 2:00pm. Thank you.

## 2024-07-17 ENCOUNTER — PATIENT OUTREACH (OUTPATIENT)
Dept: FAMILY MEDICINE CLINIC | Facility: CLINIC | Age: 79
End: 2024-07-17

## 2024-07-22 ENCOUNTER — PATIENT OUTREACH (OUTPATIENT)
Dept: CASE MANAGEMENT | Facility: OTHER | Age: 79
End: 2024-07-22

## 2024-07-22 ENCOUNTER — PATIENT OUTREACH (OUTPATIENT)
Dept: FAMILY MEDICINE CLINIC | Facility: CLINIC | Age: 79
End: 2024-07-22

## 2024-07-22 NOTE — LETTER
Date: 07/22/24    Dear Madeleine Sanchez,   My name is Yesenia, I am a registered nurse care manager working with [facility].   I have not been able to reach you and would like to set a time that I can talk with you over the phone.  My work is to help patients that have complex medical conditions get the care they need. This includes patients who may have been in the hospital or emergency room.   Please call me with any questions you may have. I look forward to speaking with you.  Sincerely,  Yesenia Zamarripa RN  [phone]  Outpatient Care Manager

## 2024-07-30 ENCOUNTER — OFFICE VISIT (OUTPATIENT)
Dept: PSYCHIATRY | Facility: CLINIC | Age: 79
End: 2024-07-30
Payer: MEDICARE

## 2024-07-30 DIAGNOSIS — F43.23 ADJUSTMENT DISORDER WITH MIXED ANXIETY AND DEPRESSED MOOD: Primary | ICD-10-CM

## 2024-07-30 DIAGNOSIS — F41.1 GAD (GENERALIZED ANXIETY DISORDER): ICD-10-CM

## 2024-07-30 DIAGNOSIS — M48.062 SPINAL STENOSIS OF LUMBAR REGION WITH NEUROGENIC CLAUDICATION: ICD-10-CM

## 2024-07-30 DIAGNOSIS — F43.23 ADJUSTMENT DISORDER WITH MIXED ANXIETY AND DEPRESSED MOOD: ICD-10-CM

## 2024-07-30 PROCEDURE — G2211 COMPLEX E/M VISIT ADD ON: HCPCS | Performed by: PHYSICIAN ASSISTANT

## 2024-07-30 PROCEDURE — 99214 OFFICE O/P EST MOD 30 MIN: CPT | Performed by: PHYSICIAN ASSISTANT

## 2024-07-30 RX ORDER — MAGNESIUM 30 MG
30 TABLET ORAL 2 TIMES DAILY
COMMUNITY

## 2024-07-30 RX ORDER — PREGABALIN 150 MG/1
150 CAPSULE ORAL 2 TIMES DAILY
Qty: 90 CAPSULE | Refills: 3 | Status: SHIPPED | OUTPATIENT
Start: 2024-07-30

## 2024-07-30 RX ORDER — ESCITALOPRAM OXALATE 5 MG/1
TABLET ORAL
Qty: 42 TABLET | Refills: 0 | Status: SHIPPED | OUTPATIENT
Start: 2024-07-30 | End: 2024-08-27

## 2024-07-30 RX ORDER — FAMOTIDINE 20 MG/1
20 TABLET, FILM COATED ORAL 3 TIMES DAILY
COMMUNITY

## 2024-07-30 NOTE — PSYCH
"This note was not shared with the patient due to reasonable likelihood of causing patient harm    PROGRESS NOTE        [facility]      Name and Date of Birth:  Madeleine Sanchez 79 y.o. 1945 MRN: [id number]    Insurance: Payor: MEDICARE / Plan: MEDICARE A AND B / Product Type: Medicare A & B Fee for Service /     Date of Visit: July 30, 2024    Reason for Visit:   Chief Complaint   Patient presents with    Follow-up    Medication Management       SUBJECTIVE:    Madeleine Sanchez is a sean 79 y.o. female with a history of Adjustment Disorder and Generalized Anxiety Disorder who presents today for follow-up and medication management. She did stop the Trintellix since last visit and while she has noticed no change in mood, she does feel the nausea has improved some. She also notes that her fatigue is unchanged. She still feels quite down and feels she needs \"something\". She is having more pain in her hip today after PT.     She denies any suicidal ideation, intent or plan at present; denies any homicidal ideation, intent or plan at present.    She denies any auditory hallucinations, denies any visual hallucinations, denies any delusions.    She reports sedation.    HPI ROS Appetite Changes and Sleep:     She reports difficulty falling asleep, adequate appetite, adequate energy level    Current Rating Scores:     None completed today.    Review Of Systems:    Mood anxiety and depression   Behavior appropriate, cooperative, and calm   Thought Content daily worries and negative thoughts   General emotional problems and decreased functioning   Personality no change in personality   Other Psych Symptoms normal   Constitutional as noted in HPI   ENT as noted in HPI   Cardiovascular as noted in HPI   Respiratory as noted in HPI   Gastrointestinal as noted in HPI   Genitourinary as noted in HPI   Musculoskeletal as noted in HPI   Integumentary as noted in HPI   Neurological " as noted in HPI   Endocrine negative   Other Symptoms none, all other systems are negative     Family Psychiatric History:     Family History   Problem Relation Age of Onset    Cancer Sister        Social/Substance Abuse History:    Social History     Socioeconomic History    Marital status:      Spouse name: Not on file    Number of children: Not on file    Years of education: Not on file    Highest education level: Not on file   Occupational History    Not on file   Tobacco Use    Smoking status: Never    Smokeless tobacco: Never   Vaping Use    Vaping status: Never Used   Substance and Sexual Activity    Alcohol use: Not Currently     Comment: socially    Drug use: Never    Sexual activity: Not on file     Comment: defer   Other Topics Concern    Not on file   Social History Narrative    · Most recent tobacco use screenin2019      Social Determinants of Health     Financial Resource Strain: Low Risk  (2023)    [facility]    Overall Financial Resource Strain (CARDIA)     Difficulty of Paying Living Expenses: Not very hard   Recent Concern: Financial Resource Strain - Medium Risk (2023)    Overall Financial Resource Strain (CARDIA)     Difficulty of Paying Living Expenses: Somewhat hard   Food Insecurity: No Food Insecurity (2024)    Hunger Vital Sign     Worried About Running Out of Food in the Last Year: Never true     Ran Out of Food in the Last Year: Never true   Transportation Needs: No Transportation Needs (2024)    PRAPARE - Transportation     Lack of Transportation (Medical): No     Lack of Transportation (Non-Medical): No   Physical Activity: Not on file   Stress: Not on file   Social Connections: Not on file   Intimate Partner Violence: Not At Risk (2023)    [facility]    Humiliation, Afraid, Rape, and Kick questionnaire     Fear of Current or  Ex-Partner: No     Emotionally Abused: No     Physically Abused: No     Sexually Abused: No   Housing Stability: Unknown (6/6/2024)    Housing Stability Vital Sign     Unable to Pay for Housing in the Last Year: No     Number of Times Moved in the Last Year: Not on file     Homeless in the Last Year: No       The following portions of the patient's history were reviewed and updated as appropriate: past family history, past medical history, past social history, past surgical history and problem list.    OBJECTIVE:     Mental Status Evaluation:  Appearance:  dressed appropriately, adequate grooming, looks stated age   Behavior:  pleasant, cooperative, calm, interacts appropriately with this writer   Speech:  normal rate, normal volume, normal pitch   Mood:  depressed   Affect:  constricted   Thought Process:  organized, logical, coherent   Associations: intact associations   Thought Content:  no overt delusions, no paranoia noted on exam   Perceptual Disturbances: no auditory hallucinations, no visual hallucinations   Risk Potential: Suicidal ideation - None  Homicidal ideation - None  Potential for aggression - No   Sensorium:  oriented to person, place, and time/date   Memory:  recent and remote memory grossly intact   Consciousness:  alert and awake   Attention/Concentration: attention span and concentration are age appropriate   Insight:  age appropriate   Judgment: age appropriate   Gait/Station: unstable gait, uses cane   Motor Activity: no abnormal movements     Laboratory Results: I have personally reviewed all pertinent laboratory/tests results    Suicide/Homicide Risk Assessment:    Risk of Harm to Self:  The following ratings are based on assessment at the time of the interview  Based on today's assessmentMadeleine presents the following risk of harm to self: none    Risk of Harm to Others:  The following ratings are based on assessment at the time of the interview  Based on today's assessmentMadeleine  presents the following risk of harm to others: none    The following interventions are recommended: no intervention changes needed    Assessment/Plan:      She does feel the nausea improved somewhat after stopping the Trintellix and the mood has not changed at all. She is still feeling quite down so I have advised starting on escitalopram 5 mg qd x 2 weeks, then increasing to 10 mg qd if tolerated. PARQ for SSRI completed including serotonin syndrome, SIADH, worsening depression, suicidality, induction of olya, GI upset, headaches, activation, sexual side effects, sedation, potential drug interactions, and others. Her other medications will remain unchanged. Unfortunately she has not figured out what happened with her therapy appts with Nupur, so I have reached out to our intake department to try and figure this out as she would like to re-initiate therapy. We have discussed her safety plan and she agrees that if she experience unsafe thoughts that she will reach out to her supports including this office, the suicide hotline, and emergency services if necessary. Madeleine is aware of non-emergent and emergent mental health resources. They are able to contract for their own safety at this time.    Will follow up in 1-2 months. Patient is aware to call the office if questions or concerns arise sooner.      Diagnoses and all orders for this visit:    Adjustment disorder with mixed anxiety and depressed mood  -     escitalopram (LEXAPRO) 5 mg tablet; Take 1 tablet (5 mg total) by mouth daily for 14 days, THEN 2 tablets (10 mg total) daily for 14 days.    BRINDA (generalized anxiety disorder)  -     escitalopram (LEXAPRO) 5 mg tablet; Take 1 tablet (5 mg total) by mouth daily for 14 days, THEN 2 tablets (10 mg total) daily for 14 days.    Other orders  -     famotidine (PEPCID) 20 mg tablet; Take 20 mg by mouth 3 (three) times a day  -     magnesium 30 MG tablet; Take 30 mg by mouth 2 (two) times a day        Treatment  Recommendations/Precautions:    Continue current medications:     - buspirone 15 mg BID     - bupropion  mg qAM     Start medications:     - escitalopram 5 mg qd x 2 weeks, then increase to 10 mg qd if tolerated    Medication management every 1 months  Continue psychotherapy with [facility] therapist Nupur Holden LCSW  Aware of 24 hour and weekend coverage for urgent situations accessed by calling [facility] main practice number  I am scheduling this patient out for greater than 3 months: No    Medications Risks/Benefits      Risks, Benefits And Possible Side Effects Of Medications:    Risks, benefits, and possible side effects of medications explained to Madeleine and she verbalizes understanding and agreement for treatment.    Controlled Medication Discussion:     Not applicable    Psychotherapy Provided:     Individual psychotherapy provided: No    Treatment Plan:    Completed and signed during the session: Not applicable - Treatment Plan to be completed by [facility] therapist    Visit Time    Visit Start Time:  1:35 PM  Visit End Time:  2:00 PM  Total Visit Duration:  25 minutes    Irma Batista 07/30/24

## 2024-07-31 ENCOUNTER — TELEPHONE (OUTPATIENT)
Age: 79
End: 2024-07-31

## 2024-07-31 DIAGNOSIS — F41.1 GAD (GENERALIZED ANXIETY DISORDER): ICD-10-CM

## 2024-07-31 DIAGNOSIS — E53.8 VITAMIN B 12 DEFICIENCY: ICD-10-CM

## 2024-07-31 RX ORDER — CYANOCOBALAMIN 1000 UG/ML
1000 INJECTION, SOLUTION INTRAMUSCULAR; SUBCUTANEOUS WEEKLY
Qty: 4 ML | Refills: 1 | Status: SHIPPED | OUTPATIENT
Start: 2024-07-31

## 2024-07-31 RX ORDER — BUPROPION HYDROCHLORIDE 300 MG/1
300 TABLET ORAL DAILY
Qty: 30 TABLET | Refills: 1 | Status: SHIPPED | OUTPATIENT
Start: 2024-07-31

## 2024-07-31 RX ORDER — BUSPIRONE HYDROCHLORIDE 15 MG/1
15 TABLET ORAL 2 TIMES DAILY
Qty: 60 TABLET | Refills: 0 | Status: SHIPPED | OUTPATIENT
Start: 2024-07-31 | End: 2024-09-29

## 2024-07-31 NOTE — TELEPHONE ENCOUNTER
Patient called and had a question about her cyanocobalamin 1,000 mcg/mL  injections. She wants to know if this needs to be injected once a week, or monthly. Please call patient back to discuss.

## 2024-07-31 NOTE — TELEPHONE ENCOUNTER
Reason for call:   [x] Refill   [] Prior Auth  [] Other:     Office:   [] PCP/Provider -   [x] Specialty/Provider - [facility]  Authorized By: Courtney German PA-C    Medication: busPIRone (BUSPAR) 15 mg tablet     Dose/Frequency: Take 1 tablet (15 mg total) by mouth 2 (two) times a day,     Quantity: 60 tablet     Pharmacy: [address]     Does the patient have enough for 3 days?   [] Yes   [x] No - Send as HP to POD

## 2024-07-31 NOTE — TELEPHONE ENCOUNTER
Called and spoke w pt -- she was advised to continue taking B12 inj as prescribed once weekly per ED discharge notes. Pt is due for a refill so queued that as well in separate encounter. Please advise if there are any changes to be made re at home B12 inj.

## 2024-08-05 ENCOUNTER — PATIENT OUTREACH (OUTPATIENT)
Dept: CASE MANAGEMENT | Facility: OTHER | Age: 79
End: 2024-08-05

## 2024-08-05 NOTE — PROGRESS NOTES
Pt due to close for care management. Pt was sent UTR 7/22 with no return response. Episode closed and CM removed.     Note routed to Yesenia Zamarripa RN CM.

## 2024-08-13 DIAGNOSIS — E53.8 VITAMIN B 12 DEFICIENCY: ICD-10-CM

## 2024-08-13 RX ORDER — CYANOCOBALAMIN 1000 UG/ML
1000 INJECTION, SOLUTION INTRAMUSCULAR; SUBCUTANEOUS WEEKLY
Qty: 4 ML | Refills: 1 | Status: SHIPPED | OUTPATIENT
Start: 2024-08-13

## 2024-08-28 DIAGNOSIS — H40.9 GLAUCOMA OF RIGHT EYE, UNSPECIFIED GLAUCOMA TYPE: ICD-10-CM

## 2024-08-29 ENCOUNTER — OFFICE VISIT (OUTPATIENT)
Dept: PSYCHIATRY | Facility: CLINIC | Age: 79
End: 2024-08-29
Payer: MEDICARE

## 2024-08-29 DIAGNOSIS — F41.1 GAD (GENERALIZED ANXIETY DISORDER): ICD-10-CM

## 2024-08-29 DIAGNOSIS — F32.1 CURRENT MODERATE EPISODE OF MAJOR DEPRESSIVE DISORDER WITHOUT PRIOR EPISODE (HCC): Primary | ICD-10-CM

## 2024-08-29 PROCEDURE — G2211 COMPLEX E/M VISIT ADD ON: HCPCS | Performed by: PHYSICIAN ASSISTANT

## 2024-08-29 PROCEDURE — 99214 OFFICE O/P EST MOD 30 MIN: CPT | Performed by: PHYSICIAN ASSISTANT

## 2024-08-29 RX ORDER — BRIMONIDINE TARTRATE 1.5 MG/ML
1 SOLUTION/ DROPS OPHTHALMIC 2 TIMES DAILY
Qty: 10 ML | Refills: 3 | Status: SHIPPED | OUTPATIENT
Start: 2024-08-29

## 2024-08-30 PROBLEM — F41.1 GAD (GENERALIZED ANXIETY DISORDER): Status: ACTIVE | Noted: 2020-09-16

## 2024-08-30 PROBLEM — F32.1 CURRENT MODERATE EPISODE OF MAJOR DEPRESSIVE DISORDER WITHOUT PRIOR EPISODE (HCC): Status: ACTIVE | Noted: 2024-08-30

## 2024-08-30 PROBLEM — F41.1 GAD (GENERALIZED ANXIETY DISORDER): Status: ACTIVE | Noted: 2024-08-30

## 2024-08-30 PROBLEM — F32.1 CURRENT MODERATE EPISODE OF MAJOR DEPRESSIVE DISORDER WITHOUT PRIOR EPISODE (HCC): Status: ACTIVE | Noted: 2021-02-26

## 2024-08-30 PROBLEM — F33.2 SEVERE EPISODE OF RECURRENT MAJOR DEPRESSIVE DISORDER, WITHOUT PSYCHOTIC FEATURES (HCC): Status: ACTIVE | Noted: 2021-02-26

## 2024-08-30 NOTE — PSYCH
This note was not shared with the patient due to reasonable likelihood of causing patient harm    PROGRESS NOTE        [facility]      Name and Date of Birth:  Madeleine Sanchez 79 y.o. 1945 MRN: [id number]    Insurance: Payor: MEDICARE / Plan: MEDICARE A AND B / Product Type: Medicare A & B Fee for Service /     Date of Visit: August 30, 2024    Reason for Visit:   Chief Complaint   Patient presents with    Follow-up    Medication Management       SUBJECTIVE:    Madeleine Sanchez is a sean 79 y.o. female with a history of Adjustment Disorder and Generalized Anxiety Disorder who presents today for follow-up and medication management. She tried the escitalopram since her last visit but unfortunately even at 5 mg this caused sedation and dizziness so it was stopped. She is tired of trying things and them not working or causing side effects. She has not heard anything back about re-establishing therapy.     She denies any suicidal ideation, intent or plan at present; denies any homicidal ideation, intent or plan at present.    She denies any auditory hallucinations, denies any visual hallucinations, denies any delusions.    She denies any side effects from current psychiatric medications.    HPI ROS Appetite Changes and Sleep:     She reports difficulty falling asleep, adequate appetite, adequate energy level    Current Rating Scores:     None completed today.    Review Of Systems:    Mood anxiety and depression   Behavior appropriate, cooperative, and calm   Thought Content daily worries and negative thoughts   General emotional problems and decreased functioning   Personality no change in personality   Other Psych Symptoms normal   Constitutional as noted in HPI   ENT as noted in HPI   Cardiovascular as noted in HPI   Respiratory as noted in HPI   Gastrointestinal as noted in HPI   Genitourinary as noted in HPI   Musculoskeletal as noted in HPI   Integumentary as  noted in HPI   Neurological as noted in HPI   Endocrine negative   Other Symptoms none, all other systems are negative     Family Psychiatric History:     Family History   Problem Relation Age of Onset    Cancer Sister      Social/Substance Abuse History:    Social History     Socioeconomic History    Marital status:      Spouse name: Not on file    Number of children: Not on file    Years of education: Not on file    Highest education level: Not on file   Occupational History    Not on file   Tobacco Use    Smoking status: Never    Smokeless tobacco: Never   Vaping Use    Vaping status: Never Used   Substance and Sexual Activity    Alcohol use: Not Currently     Comment: socially    Drug use: Never    Sexual activity: Not on file     Comment: defer   Other Topics Concern    Not on file   Social History Narrative    · Most recent tobacco use screenin2019      Social Determinants of Health     Financial Resource Strain: Low Risk  (2023)    [facility]    Overall Financial Resource Strain (CARDIA)     Difficulty of Paying Living Expenses: Not very hard   Recent Concern: Financial Resource Strain - Medium Risk (2023)    Overall Financial Resource Strain (CARDIA)     Difficulty of Paying Living Expenses: Somewhat hard   Food Insecurity: No Food Insecurity (2024)    Hunger Vital Sign     Worried About Running Out of Food in the Last Year: Never true     Ran Out of Food in the Last Year: Never true   Transportation Needs: No Transportation Needs (2024)    PRAPARE - Transportation     Lack of Transportation (Medical): No     Lack of Transportation (Non-Medical): No   Physical Activity: Not on file   Stress: Not on file   Social Connections: Not on file   Intimate Partner Violence: Not At Risk (2023)    [facility]    Humiliation, Afraid, Rape, and Kick  questionnaire     Fear of Current or Ex-Partner: No     Emotionally Abused: No     Physically Abused: No     Sexually Abused: No   Housing Stability: Unknown (6/6/2024)    Housing Stability Vital Sign     Unable to Pay for Housing in the Last Year: No     Number of Times Moved in the Last Year: Not on file     Homeless in the Last Year: No     The following portions of the patient's history were reviewed and updated as appropriate: past family history, past medical history, past social history, past surgical history and problem list.    OBJECTIVE:     Mental Status Evaluation:  Appearance:  dressed appropriately, adequate grooming, looks stated age   Behavior:  pleasant, cooperative, calm, interacts appropriately with this writer   Speech:  normal rate, normal volume, normal pitch   Mood:  depressed   Affect:  constricted   Thought Process:  organized, logical, coherent   Associations: intact associations   Thought Content:  no overt delusions, no paranoia noted on exam   Perceptual Disturbances: no auditory hallucinations, no visual hallucinations   Risk Potential: Suicidal ideation - None  Homicidal ideation - None  Potential for aggression - No   Sensorium:  oriented to person, place, and time/date   Memory:  recent and remote memory grossly intact   Consciousness:  alert and awake   Attention/Concentration: attention span and concentration are age appropriate   Insight:  age appropriate   Judgment: age appropriate   Gait/Station: unstable gait, uses cane   Motor Activity: no abnormal movements     Laboratory Results: I have personally reviewed all pertinent laboratory/tests results    Suicide/Homicide Risk Assessment:    Risk of Harm to Self:  The following ratings are based on assessment at the time of the interview  Based on today's assessment, Madeleine presents the following risk of harm to self: none    Risk of Harm to Others:  The following ratings are based on assessment at the time of the interview  Based  on today's assessment, Madeleine presents the following risk of harm to others: none    The following interventions are recommended: no intervention changes needed    Assessment/Plan:      She did try the escitalopram but even at the 5 mg she felt it was causing fatigue and dizziness, so this was stopped. She is tired of trying things and them not working. She was interested in GeneSight testing so a sample was collected today and sent off. No medication changes advised and we will follow up once we have the results. She is still trying to get back into therapy. We have discussed her safety plan and she agrees that if she experience unsafe thoughts that she will reach out to her supports including this office, the suicide hotline, and emergency services if necessary. Madeleine is aware of non-emergent and emergent mental health resources. They are able to contract for their own safety at this time.    Will follow up in 2 weeks. Patient is aware to call the office if questions or concerns arise sooner.      Diagnoses and all orders for this visit:    Current moderate episode of major depressive disorder without prior episode (HCC)    BRINDA (generalized anxiety disorder)        Treatment Recommendations/Precautions:    Continue current medications:     - buspirone 15 mg BID     - bupropion  mg qAM     GeneSight Testing    Medication management every 2 weeks  On a waiting list for individual psychotherapy at [facility]  Aware of 24 hour and weekend coverage for urgent situations accessed by calling [facility] main practice number  I am scheduling this patient out for greater than 3 months: No    Medications Risks/Benefits      Risks, Benefits And Possible Side Effects Of Medications:    Risks, benefits, and possible side effects of medications explained to Madeleine and she verbalizes understanding and agreement for treatment.    Controlled Medication Discussion:     Not  applicable    Psychotherapy Provided:     Individual psychotherapy provided: No    Treatment Plan:    Completed and signed during the session: Not applicable - Treatment Plan to be completed by  [facility] therapist    Visit Time    Visit Start Time:  3:00 PM  Visit End Time:  3:20 PM  Total Visit Duration:  20 minutes    Irma Batista 08/30/24

## 2024-09-01 DIAGNOSIS — F41.1 GAD (GENERALIZED ANXIETY DISORDER): ICD-10-CM

## 2024-09-02 RX ORDER — BUSPIRONE HYDROCHLORIDE 15 MG/1
15 TABLET ORAL 2 TIMES DAILY
Qty: 60 TABLET | Refills: 0 | Status: SHIPPED | OUTPATIENT
Start: 2024-09-02

## 2024-09-17 ENCOUNTER — RA CDI HCC (OUTPATIENT)
Dept: OTHER | Facility: HOSPITAL | Age: 79
End: 2024-09-17

## 2024-09-24 ENCOUNTER — TELEPHONE (OUTPATIENT)
Dept: FAMILY MEDICINE CLINIC | Facility: CLINIC | Age: 79
End: 2024-09-24

## 2024-09-24 ENCOUNTER — OFFICE VISIT (OUTPATIENT)
Dept: PSYCHIATRY | Facility: CLINIC | Age: 79
End: 2024-09-24
Payer: MEDICARE

## 2024-09-24 DIAGNOSIS — F32.1 CURRENT MODERATE EPISODE OF MAJOR DEPRESSIVE DISORDER WITHOUT PRIOR EPISODE (HCC): Primary | ICD-10-CM

## 2024-09-24 DIAGNOSIS — F41.1 GAD (GENERALIZED ANXIETY DISORDER): ICD-10-CM

## 2024-09-24 PROBLEM — F11.20 CONTINUOUS OPIOID DEPENDENCE (HCC): Status: RESOLVED | Noted: 2022-05-04 | Resolved: 2024-09-24

## 2024-09-24 PROCEDURE — G2211 COMPLEX E/M VISIT ADD ON: HCPCS | Performed by: PHYSICIAN ASSISTANT

## 2024-09-24 PROCEDURE — 99214 OFFICE O/P EST MOD 30 MIN: CPT | Performed by: PHYSICIAN ASSISTANT

## 2024-09-24 RX ORDER — MIRTAZAPINE 7.5 MG/1
7.5 TABLET, FILM COATED ORAL
Qty: 30 TABLET | Refills: 1 | Status: SHIPPED | OUTPATIENT
Start: 2024-09-24

## 2024-09-24 NOTE — ASSESSMENT & PLAN NOTE
"Not at goal - add mirtazapine 7.5 mg qhs (\"green\" on GeneSight) to see if improves sleep and depression/anxiety; continue bupropion  mg qAM and buspirone 15 mg BID; get re-established with therapy  "

## 2024-09-24 NOTE — PSYCH
"This note was not shared with the patient due to reasonable likelihood of causing patient harm    PROGRESS NOTE        [facility]      Name and Date of Birth:  Madeleine Sanchez 79 y.o. 1945 MRN: [id number]    Insurance: Payor: MEDICARE / Plan: MEDICARE A AND B / Product Type: Medicare A & B Fee for Service /     Date of Visit: September 24, 2024    Reason for Visit:   Chief Complaint   Patient presents with    Follow-up    Medication Management     Assessment & Plan  Current moderate episode of major depressive disorder without prior episode (HCC)  Not at goal - add mirtazapine 7.5 mg qhs (\"green\" on [employer]) to see if improves sleep and depression/anxiety; continue bupropion  mg qAM and buspirone 15 mg BID; get re-established with therapy    Orders:    mirtazapine (REMERON) 7.5 MG tablet; Take 1 tablet (7.5 mg total) by mouth daily at bedtime    BRINDA (generalized anxiety disorder)  Not at goal - add mirtazapine 7.5 mg qhs (\"green\" on [employer]) to see if improves sleep and depression/anxiety; continue bupropion  mg qAM and buspirone 15 mg BID; get re-established with therapy    Orders:    mirtazapine (REMERON) 7.5 MG tablet; Take 1 tablet (7.5 mg total) by mouth daily at bedtime       SUBJECTIVE:    Madeleine Sanchez is a sean 79 y.o. female with a history of Adjustment Disorder and Generalized Anxiety Disorder who presents today for follow-up and medication management. [employer] testing came back and results on file. Pt reports continued depression and anxiety. Reports even worse sleep, in part from RLS. She is nervous about medications but also states \"something needs to be done, I'm just in a rut\".     She denies any suicidal ideation, intent or plan at present; denies any homicidal ideation, intent or plan at present.    She denies any auditory hallucinations, denies any visual hallucinations, denies any delusions.    She denies any side " effects from current psychiatric medications.    HPI ROS Appetite Changes and Sleep:     She reports difficulty falling asleep, adequate appetite, adequate energy level    Current Rating Scores:     Current PHQ-9   PHQ-2/9 Depression Screening    Little interest or pleasure in doing things: 3 - nearly every day  Feeling down, depressed, or hopeless: 2 - more than half the days  Trouble falling or staying asleep, or sleeping too much: 2 - more than half the days  Feeling tired or having little energy: 3 - nearly every day  Poor appetite or overeatin - more than half the days  Feeling bad about yourself - or that you are a failure or have let yourself or your family down: 2 - more than half the days  Trouble concentrating on things, such as reading the newspaper or watching television: 2 - more than half the days  Moving or speaking so slowly that other people could have noticed. Or the opposite - being so fidgety or restless that you have been moving around a lot more than usual: 2 - more than half the days  Thoughts that you would be better off dead, or of hurting yourself in some way: 0 - not at all  PHQ-9 Score: 18  PHQ-9 Interpretation: Moderately severe depression         Review Of Systems:    Mood anxiety and depression   Behavior appropriate, cooperative, and calm   Thought Content daily worries and negative thoughts   General emotional problems and decreased functioning   Personality no change in personality   Other Psych Symptoms normal   Constitutional as noted in HPI   ENT as noted in HPI   Cardiovascular as noted in HPI   Respiratory as noted in HPI   Gastrointestinal as noted in HPI   Genitourinary as noted in HPI   Musculoskeletal as noted in HPI   Integumentary as noted in HPI   Neurological as noted in HPI   Endocrine negative   Other Symptoms none, all other systems are negative     Family Psychiatric History:     Family History   Problem Relation Age of Onset    Cancer Sister       Social/Substance Abuse History:    Social History     Socioeconomic History    Marital status:      Spouse name: Not on file    Number of children: Not on file    Years of education: Not on file    Highest education level: Not on file   Occupational History    Not on file   Tobacco Use    Smoking status: Never    Smokeless tobacco: Never   Vaping Use    Vaping status: Never Used   Substance and Sexual Activity    Alcohol use: Not Currently     Comment: socially    Drug use: Never    Sexual activity: Not on file     Comment: defer   Other Topics Concern    Not on file   Social History Narrative    · Most recent tobacco use screenin2019      Social Determinants of Health     Financial Resource Strain: Low Risk  (2023)    Received from [facility], [facility]    Overall Financial Resource Strain (CARDIA)     Difficulty of Paying Living Expenses: Not very hard   Recent Concern: Financial Resource Strain - Medium Risk (2023)    Overall Financial Resource Strain (CARDIA)     Difficulty of Paying Living Expenses: Somewhat hard   Food Insecurity: No Food Insecurity (2024)    Hunger Vital Sign     Worried About Running Out of Food in the Last Year: Never true     Ran Out of Food in the Last Year: Never true   Transportation Needs: No Transportation Needs (2024)    PRAPARE - Transportation     Lack of Transportation (Medical): No     Lack of Transportation (Non-Medical): No   Physical Activity: Not on file   Stress: Not on file   Social Connections: Not on file   Intimate Partner Violence: Not At Risk (2023)    Received from [facility], [facility]    Humiliation, Afraid, Rape, and Kick questionnaire     Fear of Current or Ex-Partner: No     Emotionally Abused: No     Physically Abused: No     Sexually Abused: No   Housing Stability: Unknown (2024)    Housing Stability Vital Sign     Unable to Pay for Housing  "in the Last Year: No     Number of Times Moved in the Last Year: Not on file     Homeless in the Last Year: No     The following portions of the patient's history were reviewed and updated as appropriate: past family history, past medical history, past social history, past surgical history and problem list.    OBJECTIVE:     Mental Status Evaluation:  Appearance:  dressed appropriately, adequate grooming, looks stated age   Behavior:  pleasant, cooperative, calm, interacts appropriately with this writer   Speech:  normal rate, normal volume, normal pitch   Mood:  depressed   Affect:  constricted   Thought Process:  organized, logical, coherent   Associations: intact associations   Thought Content:  no overt delusions, no paranoia noted on exam   Perceptual Disturbances: no auditory hallucinations, no visual hallucinations   Risk Potential: Suicidal ideation - None  Homicidal ideation - None  Potential for aggression - No   Sensorium:  oriented to person, place, and time/date   Memory:  recent and remote memory grossly intact   Consciousness:  alert and awake   Attention/Concentration: attention span and concentration are age appropriate   Insight:  age appropriate   Judgment: age appropriate   Gait/Station: unstable gait, uses cane   Motor Activity: no abnormal movements     Laboratory Results: I have personally reviewed all pertinent laboratory/tests results    Suicide/Homicide Risk Assessment:    Risk of Harm to Self:  The following ratings are based on assessment at the time of the interview  Based on today's assessment, Madeleine presents the following risk of harm to self: none    Risk of Harm to Others:  The following ratings are based on assessment at the time of the interview  Based on today's assessment, Madeleine presents the following risk of harm to others: none    The following interventions are recommended: no intervention changes needed    Assessment/Plan:      [employer] testing showed a couple of \"green\" " antidepressant options including desvenlafaxine, duloxetine, vilazodone, and mirtazapine. She complains of significant difficulties with sleep currently so advised starting mirtazapine 7.5 mg qhs. Keep other meds unchanged for now. PARQ completed including serotonin syndrome, induction of olya for those at risk, worsening depression and suicidality, sedation, appetite increase/weight gain, dizziness, confusion, hypotension, rare allergic reactions, and others. She is still trying to get back into therapy. We have discussed her safety plan and she agrees that if she experience unsafe thoughts that she will reach out to her supports including this office, the suicide hotline, and emergency services if necessary. Madeleine is aware of non-emergent and emergent mental health resources. They are able to contract for their own safety at this time.    Will follow up in 4 weeks. Patient is aware to call the office if questions or concerns arise sooner.      Diagnoses and all orders for this visit:    Current moderate episode of major depressive disorder without prior episode (HCC)  -     mirtazapine (REMERON) 7.5 MG tablet; Take 1 tablet (7.5 mg total) by mouth daily at bedtime    BRINDA (generalized anxiety disorder)  -     mirtazapine (REMERON) 7.5 MG tablet; Take 1 tablet (7.5 mg total) by mouth daily at bedtime        Treatment Recommendations/Precautions:    Continue current medications:     - buspirone 15 mg BID     - bupropion  mg qAM     Start new medication:    - mirtazapine 7.5 mg qhs    Aware of 24 hour and weekend coverage for urgent situations accessed by calling [facility] main practice number  Medication management every 1 month  On a waiting list for individual psychotherapy at [facility]  I am scheduling this patient out for greater than 3 months: No    Medications Risks/Benefits      Risks, Benefits And Possible Side Effects Of Medications:    Risks, benefits,  and possible side effects of medications explained to Madeleine and she verbalizes understanding and agreement for treatment.    Controlled Medication Discussion:     Not applicable    Psychotherapy Provided:     Individual psychotherapy provided: No    Treatment Plan:    Completed and signed during the session: Not applicable - Treatment Plan to be completed by [facility] therapist    Visit Time    Visit Start Time:  3:00 PM  Visit End Time:  3:25 PM  Total Visit Duration:  25 minutes    Irma Batista 09/24/24

## 2024-09-26 DIAGNOSIS — E53.8 VITAMIN B 12 DEFICIENCY: ICD-10-CM

## 2024-09-26 DIAGNOSIS — S30.0XXD LUMBAR CONTUSION, SUBSEQUENT ENCOUNTER: Primary | ICD-10-CM

## 2024-09-26 RX ORDER — NEEDLES, DISPOSABLE 25GX5/8"
NEEDLE, DISPOSABLE MISCELLANEOUS WEEKLY
Qty: 50 EACH | Refills: 6 | Status: SHIPPED | OUTPATIENT
Start: 2024-09-26

## 2024-09-26 NOTE — TELEPHONE ENCOUNTER
Called and spoke w pt she has multiple questions.     Firstly, curious as to whether or not she should be taking Folic Acid -- she does not currently but wonders if she should initiate 1 mg Folic Acid Supplementation.     Secondly, she is in need of new Physical therapy referral. -- Will be placed at this time.     Thirdly, needs refill on syringes for B12 injections. Queued and routed at this time.     Fourthly, she states she has been on remeron for quite some time and is curious if this medication will lose effectiveness over time.         She was scheduled for OV at soonest available -- November 11.

## 2024-09-26 NOTE — TELEPHONE ENCOUNTER
Madeleine called and stated that she is in need of an appt with dr becerra, she stated she has a lot of issues she needs to speak to him about     Tray please advise where he can see her

## 2024-09-30 NOTE — TELEPHONE ENCOUNTER
Called pt x 3 times to schedule appt for today at 4PM as there was cancellation. Will await to hear back from patient as to whether or not she will take this spot, but cannot reserve slot.

## 2024-10-02 NOTE — TELEPHONE ENCOUNTER
Called pt -- again no answer -- curious if she would be able to schedule appt for next Wednesday as provider is opening schedule. Advised she call office back to choose time.

## 2024-10-03 DIAGNOSIS — M79.89 SWELLING OF LIMB: ICD-10-CM

## 2024-10-03 RX ORDER — TORSEMIDE 10 MG/1
10 TABLET ORAL DAILY
Qty: 90 TABLET | Refills: 1 | Status: SHIPPED | OUTPATIENT
Start: 2024-10-03

## 2024-10-04 DIAGNOSIS — F41.1 GAD (GENERALIZED ANXIETY DISORDER): ICD-10-CM

## 2024-10-04 DIAGNOSIS — F43.23 ADJUSTMENT DISORDER WITH MIXED ANXIETY AND DEPRESSED MOOD: ICD-10-CM

## 2024-10-04 RX ORDER — BUSPIRONE HYDROCHLORIDE 15 MG/1
15 TABLET ORAL 2 TIMES DAILY
Qty: 60 TABLET | Refills: 0 | Status: SHIPPED | OUTPATIENT
Start: 2024-10-04

## 2024-10-04 RX ORDER — BUPROPION HYDROCHLORIDE 300 MG/1
300 TABLET ORAL DAILY
Qty: 30 TABLET | Refills: 1 | Status: SHIPPED | OUTPATIENT
Start: 2024-10-04

## 2024-10-07 ENCOUNTER — TELEPHONE (OUTPATIENT)
Dept: PSYCHIATRY | Facility: CLINIC | Age: 79
End: 2024-10-07

## 2024-10-07 NOTE — TELEPHONE ENCOUNTER
Madeleine was transferred to this writer from the [place] center. She said she was started on remeron 7.5 mg daily at bedtime. She said it hasn't helped her mood at all, and most importantly is has impacted her sleep. She is restless, tosses and turns all night and is up throughout. She said her back hurts now too. She spoke with the pharmacist when she went to  other medications and he told her to try to take it 30 minutes to an hour before bed. She tried and that did not work either. Forwarding to covering provider as well as primary provider for review upon return. Clinical will follow up as advised.

## 2024-10-07 NOTE — TELEPHONE ENCOUNTER
LVM for pt  to come in and see Harriet 10/8/2024 11:30 am due to med issues. Waiting for call back to confirm the appt

## 2024-10-07 NOTE — TELEPHONE ENCOUNTER
VM left requesting a call back to inform her to stop the remeron and reach out to her PCP if back pain worsens or continues.

## 2024-10-07 NOTE — TELEPHONE ENCOUNTER
Madeleine called back and I informed her to stop the remeron and look out for a call from clerical to schedule a sooner appointment.

## 2024-10-08 ENCOUNTER — OFFICE VISIT (OUTPATIENT)
Dept: PSYCHIATRY | Facility: CLINIC | Age: 79
End: 2024-10-08
Payer: MEDICARE

## 2024-10-08 DIAGNOSIS — F41.1 GAD (GENERALIZED ANXIETY DISORDER): ICD-10-CM

## 2024-10-08 DIAGNOSIS — F32.1 CURRENT MODERATE EPISODE OF MAJOR DEPRESSIVE DISORDER WITHOUT PRIOR EPISODE (HCC): Primary | ICD-10-CM

## 2024-10-08 PROCEDURE — 99214 OFFICE O/P EST MOD 30 MIN: CPT | Performed by: PHYSICIAN ASSISTANT

## 2024-10-08 PROCEDURE — G2211 COMPLEX E/M VISIT ADD ON: HCPCS | Performed by: PHYSICIAN ASSISTANT

## 2024-10-08 NOTE — PSYCH
This note was not shared with the patient due to reasonable likelihood of causing patient harm    PROGRESS NOTE        [facility]      Name and Date of Birth:  Madeleine Sanchez 79 y.o. 1945 MRN: [id number]    Insurance: Payor: MEDICARE / Plan: MEDICARE A AND B / Product Type: Medicare A & B Fee for Service /     Date of Visit: October 8, 2024    Reason for Visit:   Chief Complaint   Patient presents with    Follow-up    Medication Management     Assessment & Plan  Current moderate episode of major depressive disorder without prior episode (HCC)  Not at goal - stopped mirtazapine last night due to lightheadedness and restlessness; stay off x 1 week, then call if wanting to start Pristiq, Cymbalta, or Viibryd. Continue bupropion  mg qAM and buspirone 15 mg BID. Working on getting re-established with therapy.          BRINDA (generalized anxiety disorder)  Not at goal - stopped mirtazapine last night due to lightheadedness and restlessness; stay off x 1 week, then call if wanting to start Pristiq, Cymbalta, or Viibryd. Continue bupropion  mg qAM and buspirone 15 mg BID. Working on getting re-established with therapy.             SUBJECTIVE:    Madeleine Sanchez is a sean 79 y.o. female with a history of Adjustment Disorder and Generalized Anxiety Disorder who presents today for follow-up and medication management. Unfortunately she states that she mirtazapine didn't go well. She feels that it caused some lightheadedness and restlessness when taken before bed. She stuck with it for about 2 weeks and tried various timings. She was advised by a covering provider to stop it so she stopped it last night. There was no benefit for her mood. She is still not in therapy again.     She denies any suicidal ideation, intent or plan at present; denies any homicidal ideation, intent or plan at present.    She denies any auditory hallucinations, denies any visual  hallucinations, denies any delusions.    She denies any side effects from current psychiatric medications.    HPI ROS Appetite Changes and Sleep:     She reports difficulty falling asleep, adequate appetite, adequate energy level    Current Rating Scores:     Current PHQ-9   PHQ-2/9 Depression Screening    Little interest or pleasure in doing things: 3 - nearly every day  Feeling down, depressed, or hopeless: 3 - nearly every day  Trouble falling or staying asleep, or sleeping too much: 3 - nearly every day  Feeling tired or having little energy: 3 - nearly every day  Poor appetite or overeating: 3 - nearly every day  Feeling bad about yourself - or that you are a failure or have let yourself or your family down: 2 - more than half the days  Trouble concentrating on things, such as reading the newspaper or watching television: 2 - more than half the days  Moving or speaking so slowly that other people could have noticed. Or the opposite - being so fidgety or restless that you have been moving around a lot more than usual: 2 - more than half the days  Thoughts that you would be better off dead, or of hurting yourself in some way: 0 - not at all  PHQ-9 Score: 21  PHQ-9 Interpretation: Severe depression       Review Of Systems:    Mood anxiety and depression   Behavior appropriate, cooperative, and calm   Thought Content daily worries and negative thoughts   General emotional problems and decreased functioning   Personality no change in personality   Other Psych Symptoms normal   Constitutional as noted in HPI   ENT as noted in HPI   Cardiovascular as noted in HPI   Respiratory as noted in HPI   Gastrointestinal as noted in HPI   Genitourinary as noted in HPI   Musculoskeletal as noted in HPI   Integumentary as noted in HPI   Neurological as noted in HPI   Endocrine negative   Other Symptoms none, all other systems are negative     Family Psychiatric History:     Family History   Problem Relation Age of Onset    Cancer  Sister      Social/Substance Abuse History:    Social History     Socioeconomic History    Marital status:      Spouse name: Not on file    Number of children: Not on file    Years of education: Not on file    Highest education level: Not on file   Occupational History    Not on file   Tobacco Use    Smoking status: Never    Smokeless tobacco: Never   Vaping Use    Vaping status: Never Used   Substance and Sexual Activity    Alcohol use: Not Currently     Comment: socially    Drug use: Never    Sexual activity: Not on file     Comment: defer   Other Topics Concern    Not on file   Social History Narrative    · Most recent tobacco use screenin2019      Social Determinants of Health     Financial Resource Strain: Low Risk  (2023)    Received from [facility], [facility]    Overall Financial Resource Strain (CARDIA)     Difficulty of Paying Living Expenses: Not very hard   Recent Concern: Financial Resource Strain - Medium Risk (2023)    Overall Financial Resource Strain (CARDIA)     Difficulty of Paying Living Expenses: Somewhat hard   Food Insecurity: No Food Insecurity (2024)    Hunger Vital Sign     Worried About Running Out of Food in the Last Year: Never true     Ran Out of Food in the Last Year: Never true   Transportation Needs: No Transportation Needs (2024)    PRAPARE - Transportation     Lack of Transportation (Medical): No     Lack of Transportation (Non-Medical): No   Physical Activity: Not on file   Stress: Not on file   Social Connections: Not on file   Intimate Partner Violence: Not At Risk (2023)    Received from [facility], [facility]    Humiliation, Afraid, Rape, and Kick questionnaire     Fear of Current or Ex-Partner: No     Emotionally Abused: No     Physically Abused: No     Sexually Abused: No   Housing Stability: Unknown (2024)    Housing Stability Vital Sign     Unable to Pay for  Housing in the Last Year: No     Number of Times Moved in the Last Year: Not on file     Homeless in the Last Year: No     The following portions of the patient's history were reviewed and updated as appropriate: past family history, past medical history, past social history, past surgical history and problem list.    OBJECTIVE:     Mental Status Evaluation:  Appearance:  dressed appropriately, adequate grooming, looks stated age   Behavior:  pleasant, cooperative, calm, interacts appropriately with this writer   Speech:  normal rate, normal volume, normal pitch   Mood:  depressed   Affect:  constricted   Thought Process:  organized, logical, coherent   Associations: intact associations   Thought Content:  no overt delusions, no paranoia noted on exam   Perceptual Disturbances: no auditory hallucinations, no visual hallucinations   Risk Potential: Suicidal ideation - None  Homicidal ideation - None  Potential for aggression - No   Sensorium:  oriented to person, place, and time/date   Memory:  recent and remote memory grossly intact   Consciousness:  alert and awake   Attention/Concentration: attention span and concentration are age appropriate   Insight:  age appropriate   Judgment: age appropriate   Gait/Station: unstable gait, uses cane   Motor Activity: no abnormal movements     Laboratory Results: I have personally reviewed all pertinent laboratory/tests results    Suicide/Homicide Risk Assessment:    Risk of Harm to Self:  The following ratings are based on assessment at the time of the interview  Based on today's assessment, Madeleine presents the following risk of harm to self: none    Risk of Harm to Others:  The following ratings are based on assessment at the time of the interview  Based on today's assessment, Madeleine presents the following risk of harm to others: none    The following interventions are recommended: no intervention changes needed    Assessment/Plan:      Unfortunately the mirtazapine was not  tolerated due to lightheadedness and restlessness. I have advised staying off of this for at least a week before considering something new. Options include Pristiq, Cymbalta, or Viibryd. She wants to discuss with her PCP tomorrow and get back to me. No other med changes advised. PARQ for SNRI completed including serotonin syndrome, SIADH, worsened depression/suicidality, induction of olya, blood pressure changes and GI distress, weight gain, sexual side effects, insomnia, sedation, potential for drug interactions, and others. PARQ Viibryd (Vilazodone) including deprerssion, suicidality, olya, serotonin syndrome, SIADH, rare bleeding and seizures, Concerns related to Angle Closure glaucoma, GI upset and N/V/D, dizzinesse, xerostomia, sexual side effects, weight gain, and others discussed as well as potential for drug interactions. She is still trying to get back into therapy. We have discussed her safety plan and she agrees that if she experience unsafe thoughts that she will reach out to her supports including this office, the suicide hotline, and emergency services if necessary. Madeleine is aware of non-emergent and emergent mental health resources. They are able to contract for their own safety at this time.    Will follow up in 4 weeks. Patient is aware to call the office if questions or concerns arise sooner.      Diagnoses and all orders for this visit:    Current moderate episode of major depressive disorder without prior episode (HCC)    BRINDA (generalized anxiety disorder)        Treatment Recommendations/Precautions:    Continue current medications:     - buspirone 15 mg BID     - bupropion  mg qAM    Aware of 24 hour and weekend coverage for urgent situations accessed by calling [facility] main practice number  Medication management every 1 month  On a waiting list for individual psychotherapy at [facility]  I am scheduling this patient out for greater than  3 months: No    Medications Risks/Benefits      Risks, Benefits And Possible Side Effects Of Medications:    Risks, benefits, and possible side effects of medications explained to Madeleine and she verbalizes understanding and agreement for treatment.    Controlled Medication Discussion:     Not applicable    Psychotherapy Provided:     Individual psychotherapy provided: No    Treatment Plan:    Completed and signed during the session: Not applicable - Treatment Plan to be completed by  [facility] therapist    Visit Time    Visit Start Time:  11:30 AM  Visit End Time:  11:55 AM  Total Visit Duration:  25 minutes    Irma Batista 10/08/24

## 2024-10-08 NOTE — ASSESSMENT & PLAN NOTE
Not at goal - stopped mirtazapine last night due to lightheadedness and restlessness; stay off x 1 week, then call if wanting to start Pristiq, Cymbalta, or Viibryd. Continue bupropion  mg qAM and buspirone 15 mg BID. Working on getting re-established with therapy.

## 2024-10-09 ENCOUNTER — OFFICE VISIT (OUTPATIENT)
Dept: FAMILY MEDICINE CLINIC | Facility: CLINIC | Age: 79
End: 2024-10-09
Payer: MEDICARE

## 2024-10-09 VITALS
HEIGHT: 63 IN | SYSTOLIC BLOOD PRESSURE: 134 MMHG | HEART RATE: 72 BPM | BODY MASS INDEX: 33.3 KG/M2 | OXYGEN SATURATION: 95 % | TEMPERATURE: 97.9 F | DIASTOLIC BLOOD PRESSURE: 78 MMHG

## 2024-10-09 DIAGNOSIS — Z79.899 MEDICATION MANAGEMENT: ICD-10-CM

## 2024-10-09 DIAGNOSIS — F41.9 ANXIETY AND DEPRESSION: ICD-10-CM

## 2024-10-09 DIAGNOSIS — M79.7 FIBROMYALGIA: ICD-10-CM

## 2024-10-09 DIAGNOSIS — Z79.899 ENCOUNTER FOR LONG-TERM CURRENT USE OF HIGH RISK MEDICATION: ICD-10-CM

## 2024-10-09 DIAGNOSIS — F51.01 PRIMARY INSOMNIA: Primary | ICD-10-CM

## 2024-10-09 DIAGNOSIS — E78.2 MIXED HYPERLIPIDEMIA: ICD-10-CM

## 2024-10-09 DIAGNOSIS — Z00.00 ENCOUNTER FOR MEDICARE ANNUAL WELLNESS EXAM: ICD-10-CM

## 2024-10-09 DIAGNOSIS — R26.9 ABNORMAL GAIT: ICD-10-CM

## 2024-10-09 DIAGNOSIS — F32.A ANXIETY AND DEPRESSION: ICD-10-CM

## 2024-10-09 PROCEDURE — G0439 PPPS, SUBSEQ VISIT: HCPCS | Performed by: FAMILY MEDICINE

## 2024-10-09 PROCEDURE — 99214 OFFICE O/P EST MOD 30 MIN: CPT | Performed by: FAMILY MEDICINE

## 2024-10-09 RX ORDER — TEMAZEPAM 15 MG/1
15 CAPSULE ORAL
Qty: 15 CAPSULE | Refills: 0 | Status: SHIPPED | OUTPATIENT
Start: 2024-10-09

## 2024-10-09 NOTE — ASSESSMENT & PLAN NOTE
Discussed Cymbalta and it being 105 FDA approved indications for fibromyalgia she has been on this in the past will consider in the future as well

## 2024-10-09 NOTE — PROGRESS NOTES
"Ambulatory Visit  Name: Madeleine Sanchez      : 1945      MRN: [id number]  Encounter Provider: JOE Yates DO  Encounter Date: 10/9/2024   Encounter department: [facility] PRIMARY [facility] [place]    Assessment & Plan  Primary insomnia  See below comments.  This may be genetic but she has significant sites of psychosocial issues with anxiety and depression overriding family issues etc. Long discussion  Orders:    temazepam (RESTORIL) 15 mg capsule; Take 1 capsule (15 mg total) by mouth daily at bedtime as needed for sleep    Fibromyalgia  Discussed Cymbalta and it being 105 FDA approved indications for fibromyalgia she has been on this in the past will consider in the future as well       Mixed hyperlipidemia  Will monitor although not on statin therapy at present       Encounter for Medicare annual wellness exam  Completed as an add-on today       Abnormal gait  Walks with a cane as she has had significant back surgery and a left foot drop       Encounter for long-term current use of high risk medication  All medications reviewed for safety efficacy and tolerance considerable time spent discussing and my thoughts on the \"5 mind altering medications\"       Anxiety and depression  Pros and cons of various medications discussed especially the 3 that her psychiatrist asked me to consider--Viibryd, Pristiq, and duloxetine.         Medication management  Medications that are ineffective i.e. Lyrica and mirtazapine will be or are discontinued and all other medications reviewed and deemed         Falls Plan of Care: balance, strength, and gait training instructions were provided and referral to physical therapy. Recommended assistive device to help with gait and balance. Medications that increase falls were reviewed. Vitamin D supplementation was recommended. Home safety evaluation by OT recommended. Home safety education provided. Tripped at home over vaccum  and fell on knees. Didn't see the .     Pt " continues with PT        History of Present Illness     History of Present Illness  The patient is an 79-year-old female who presents for evaluation of multiple medical concerns.    She reports severe back pain, which limits her mobility to approximately 10 minutes at a time. Despite this, she has been attending physical therapy sessions twice a week and has been prescribed medication for her back pain. She also engages in stationary cycling but struggles with walking. Her knee condition has improved, although she experienced bruising after therapy and persistent swelling on one side, which she manages with ice application. She had a fall two months ago, resulting in bruising on both big toes and one leg, but these have since resolved. She uses a cane for mobility and takes vitamin D supplements.    She expresses feelings of depression, which she attributes to her chronic fatigue and sleep disturbances. She has been under the care of a psychiatrist, Dr. Libby Batista, for a few months and has been prescribed bupropion, BuSpar, and Trintellix. She discontinued Remeron due to lack of effectiveness. She wakes up every 1 to 2 hours at night and has not been prescribed Restoril. She was advised against taking Atarax or tramadol during her last hospital visit. She is currently taking Lyrica 150 mg twice daily for fibromyalgia but does not find it helpful. She has previously been prescribed Cymbalta. She also takes ropinirole 1 mg for restless legs syndrome, which was diagnosed through a sleep study. She reports that her legs feel heavy and numb by evening, and she often stays awake until 3:00 am. She has been experiencing excessive crying and has never had mood swings before. She finds bupropion helpful but notes that it takes time to take effect.  PLAN: Her insomnia will be hard to overcome given her age and duration.  It may well be genetic as well however she has significant psychosocial issues as well as depression.   "She is already on 5 \"mind altering medication\" which concerns me.  Her psychiatrist suggest perhaps adding Viibryd and a depressant, or Pristiq, or duloxetine.  All of these were discussed pros and cons I think at this point my plan will be to keep everything the same, discontinue Lyrica as it does not seem to be helping and add Restoril 15 mg at bedtime she will report to me in 1 week how she is doing.  I still think this will be difficult to evoke a change in her insomnia but Restoril is very effective.  Should we need to change will consider giving up bupropion and adding Viibryd?    She has been diagnosed with sleep apnea but struggles to use her CPAP machine due to a persistent runny nose and a scab in her nose that bleeds frequently. She has tried applying Vaseline and Neosporin without success. She reports feeling rested when she is able to use her CPAP machine.    She reports a weight of 188 pounds, which was also recorded during her recent hospital visit for knee issues.    Supplemental Information:  She is not battling breast cancer anymore.    SOCIAL HISTORY  She does not smoke. She drinks a glass of wine occasionally.     Review of Systems   Constitutional:  Negative for activity change, appetite change, chills, diaphoresis, fatigue and fever.   HENT:  Negative for congestion, ear discharge, ear pain, facial swelling, nosebleeds, sore throat, tinnitus, trouble swallowing and voice change.    Eyes:  Negative for photophobia, pain, discharge, redness, itching and visual disturbance.   Respiratory:  Negative for apnea, cough, choking, chest tightness and shortness of breath.    Cardiovascular:  Negative for chest pain, palpitations and leg swelling.   Gastrointestinal:  Negative for abdominal distention, abdominal pain, blood in stool, constipation, diarrhea, nausea and vomiting.   Endocrine: Negative for cold intolerance, heat intolerance, polydipsia, polyphagia and polyuria.   Genitourinary:  Negative for " "decreased urine volume, difficulty urinating, dysuria, enuresis, frequency, hematuria, pelvic pain, urgency and vaginal bleeding.   Musculoskeletal:  Positive for arthralgias, back pain, gait problem and myalgias. Negative for joint swelling, neck pain and neck stiffness.   Skin:  Negative for color change, pallor and rash.   Allergic/Immunologic: Negative for immunocompromised state.   Neurological:  Positive for tremors and weakness. Negative for dizziness, seizures, facial asymmetry, light-headedness, numbness and headaches.        Restless leg syndrome actually periodic limb movements   Hematological:  Negative for adenopathy.   Psychiatric/Behavioral:  Positive for dysphoric mood. Negative for agitation, behavioral problems, confusion, decreased concentration and hallucinations. The patient is nervous/anxious.         Very depressed, near tears in office.  Ch fatigue, not sleeping well.  Seeing psych Dr. PEARL Batista in [facility]. Started with her few mo's ago - she put her on Remeon but that \"totally didn' work\".       Objective     /78 (BP Location: Left arm, Patient Position: Sitting, Cuff Size: Standard)   Pulse 72   Temp 97.9 °F (36.6 °C) (Temporal)   Ht 5' 3\" (1.6 m)   SpO2 95%   BMI 33.30 kg/m²     Physical Exam  Vital Signs  Weight is 188 pounds.  Physical Exam  Vitals and nursing note reviewed.   Constitutional:       General: She is not in acute distress.     Appearance: Normal appearance. She is not ill-appearing, toxic-appearing or diaphoretic.   HENT:      Head: Normocephalic.      Nose: Nose normal.      Mouth/Throat:      Mouth: Mucous membranes are moist.      Pharynx: No oropharyngeal exudate or posterior oropharyngeal erythema.   Eyes:      Extraocular Movements: Extraocular movements intact.      Pupils: Pupils are equal, round, and reactive to light.   Cardiovascular:      Rate and Rhythm: Normal rate and regular rhythm.      Pulses: Normal pulses.      Heart sounds: Normal " heart sounds.      No gallop.   Pulmonary:      Effort: No respiratory distress.      Breath sounds: Normal breath sounds. No wheezing, rhonchi or rales.   Abdominal:      General: Abdomen is flat.   Musculoskeletal:         General: No tenderness.      Cervical back: Normal range of motion and neck supple.      Right lower leg: No edema.      Left lower leg: No edema.   Skin:     General: Skin is warm.   Neurological:      General: No focal deficit present.      Mental Status: She is alert and oriented to person, place, and time. Mental status is at baseline.   Psychiatric:         Mood and Affect: Mood normal.         Behavior: Behavior normal.         Thought Content: Thought content normal.         Judgment: Judgment normal.       Administrative Statements   I have spent a total time of 30 minutes in caring for this patient on the day of the visit/encounter including Diagnostic results, Prognosis, Risks and benefits of tx options, Instructions for management, Patient and family education, Importance of tx compliance, Risk factor reductions, Impressions, Counseling / Coordination of care, Documenting in the medical record, Reviewing / ordering tests, medicine, procedures  , and Obtaining or reviewing history  .

## 2024-10-09 NOTE — PROGRESS NOTES
Ambulatory Visit  Name: Madeleine Sanchez      : 1945      MRN: [id number]  Encounter Provider: JOE Yates DO  Encounter Date: 10/9/2024   Encounter department: [facility] PRIMARY C[facility] [place]    Assessment & Plan  Primary insomnia    Orders:    temazepam (RESTORIL) 15 mg capsule; Take 1 capsule (15 mg total) by mouth daily at bedtime as needed for sleep    Fibromyalgia         Mixed hyperlipidemia         Encounter for Medicare annual wellness exam           Falls Plan of Care: balance, strength, and gait training instructions were provided and referral to physical therapy. Recommended assistive device to help with gait and balance. Medications that increase falls were reviewed. Home safety evaluation by OT recommended.       Preventive health issues were discussed with patient, and age appropriate screening tests were ordered as noted in patient's After Visit Summary. Personalized health advice and appropriate referrals for health education or preventive services given if needed, as noted in patient's After Visit Summary.    History of Present Illness     HPI   Patient Care Team:  JOE Yates DO as PCP - General (Family Medicine)    Review of Systems  Medical History Reviewed by provider this encounter:       Annual Wellness Visit Questionnaire   Madeleine is here for her Subsequent Wellness visit. Last Medicare Wellness visit information reviewed, patient interviewed and updates made to the record today.      Health Risk Assessment:   Patient rates overall health as fair. Patient feels that their physical health rating is slightly worse. Patient is satisfied with their life. Eyesight was rated as same. Hearing was rated as same. Patient feels that their emotional and mental health rating is same. Patients states they are never, rarely angry. Patient states they are always unusually tired/fatigued. Pain experienced in the last 7 days has been some. Patient's pain rating has been 8/10. Patient states  that she has experienced no weight loss or gain in last 6 months. LBP -- 8/10    Fall Risk Screening:   In the past year, patient has experienced: history of falling in past year    Number of falls: 1  Injured during fall?: Yes    Feels unsteady when standing or walking?: No    Worried about falling?: No      Urinary Incontinence Screening:   Patient has not leaked urine accidently in the last six months.     Home Safety:  Patient does not have trouble with stairs inside or outside of their home. Patient has working smoke alarms and has working carbon monoxide detector. Home safety hazards include: none.     Nutrition:   Current diet is Regular, Limited junk food and Other (please comment). Pt reports very small portion sizes and low activity level. Good protein intake, regular fruits and vegetables.     Medications:   Patient is currently taking over-the-counter supplements. OTC medications include: see medication list. Patient is able to manage medications.     Activities of Daily Living (ADLs)/Instrumental Activities of Daily Living (IADLs):   Walk and transfer into and out of bed and chair?: Yes  Dress and groom yourself?: Yes    Bathe or shower yourself?: Yes    Feed yourself? Yes  Do your laundry/housekeeping?: Yes  Manage your money, pay your bills and track your expenses?: Yes  Make your own meals?: Yes    Do your own shopping?: Yes    Previous Hospitalizations:   Any hospitalizations or ED visits within the last 12 months?: Yes    How many hospitalizations have you had in the last year?: 3-4    Advance Care Planning:   Living will: Yes    Durable POA for healthcare: Yes    Advanced directive: Yes    Advanced directive counseling given: Yes    ACP document given: Yes    Patient declined ACP directive: No    End of Life Decisions reviewed with patient: Yes    Provider agrees with end of life decisions: Yes      Cognitive Screening:   Provider or family/friend/caregiver concerned regarding cognition?:  No    PREVENTIVE SCREENINGS      Cardiovascular Screening:    General: History Lipid Disorder and Risks and Benefits Discussed    Due for: Lipid Panel      Diabetes Screening:     General: Screening Current and Risks and Benefits Discussed      Colorectal Cancer Screening:     General: Risks and Benefits Discussed      Breast Cancer Screening:     General: Screening Not Indicated, History Breast Cancer and Risks and Benefits Discussed      Cervical Cancer Screening:    General: Screening Not Indicated and Risks and Benefits Discussed      Osteoporosis Screening:    General: Screening Not Indicated, History Osteoporosis and Risks and Benefits Discussed      Abdominal Aortic Aneurysm (AAA) Screening:        General: Screening Not Indicated and Risks and Benefits Discussed      Lung Cancer Screening:     General: Screening Not Indicated and Risks and Benefits Discussed      Hepatitis C Screening:    General: Screening Current and Risks and Benefits Discussed    Hep C Screening Accepted: No     Screening, Brief Intervention, and Referral to Treatment (SBIRT)    Screening  Typical number of drinks in a day: 0  Typical number of drinks in a week: 0  Interpretation: Low risk drinking behavior.    Single Item Drug Screening:  How often have you used an illegal drug (including marijuana) or a prescription medication for non-medical reasons in the past year? never    Single Item Drug Screen Score: 0  Interpretation: Negative screen for possible drug use disorder    Brief Intervention  Alcohol & drug use screenings were reviewed. No concerns regarding substance use disorder identified. Healthy alcohol use/limits discussed.     Other Counseling Topics:   Car/seat belt/driving safety, skin self-exam, sunscreen and calcium and vitamin D intake and regular weightbearing exercise.     Social Determinants of Health     Financial Resource Strain: Low Risk  (9/14/2023)    Received [facility], [facility]  [facility]    Overall Financial Resource Strain (CARDIA)     Difficulty of Paying Living Expenses: Not very hard   Recent Concern: Financial Resource Strain - Medium Risk (7/18/2023)    Overall Financial Resource Strain (CARDIA)     Difficulty of Paying Living Expenses: Somewhat hard   Food Insecurity: No Food Insecurity (6/6/2024)    Hunger Vital Sign     Worried About Running Out of Food in the Last Year: Never true     Ran Out of Food in the Last Year: Never true   Transportation Needs: No Transportation Needs (6/6/2024)    PRAPARE - Transportation     Lack of Transportation (Medical): No     Lack of Transportation (Non-Medical): No   Housing Stability: Unknown (6/6/2024)    Housing Stability Vital Sign     Unable to Pay for Housing in the Last Year: No     Homeless in the Last Year: No   Utilities: Not At Risk (6/6/2024)    [facility] Utilities     Threatened with loss of utilities: No     No results found.    Objective     There were no vitals taken for this visit.    Physical Exam  Administrative Statements   I have spent a total time of 40 minutes in caring for this patient on the day of the visit/encounter including Diagnostic results, Prognosis, Risks and benefits of tx options, Instructions for management, Patient and family education, Importance of tx compliance, Risk factor reductions, Impressions, Counseling / Coordination of care, Documenting in the medical record, Reviewing / ordering tests, medicine, procedures  , and Obtaining or reviewing history  .

## 2024-10-09 NOTE — ASSESSMENT & PLAN NOTE
Medications that are ineffective i.e. Lyrica and mirtazapine will be or are discontinued and all other medications reviewed and deemed

## 2024-10-09 NOTE — ASSESSMENT & PLAN NOTE
Pros and cons of various medications discussed especially the 3 that her psychiatrist asked me to consider--Viibryd, Pristiq, and duloxetine.

## 2024-10-09 NOTE — ASSESSMENT & PLAN NOTE
"All medications reviewed for safety efficacy and tolerance considerable time spent discussing and my thoughts on the \"5 mind altering medications\"       "

## 2024-10-10 ENCOUNTER — TELEPHONE (OUTPATIENT)
Age: 79
End: 2024-10-10

## 2024-10-10 NOTE — TELEPHONE ENCOUNTER
Pt contacted the office in regards to returning a call from the [place] office. Writer transferred pt to [facility] office to assist.

## 2024-10-15 ENCOUNTER — OFFICE VISIT (OUTPATIENT)
Dept: BEHAVIORAL/MENTAL HEALTH CLINIC | Facility: CLINIC | Age: 79
End: 2024-10-15
Payer: MEDICARE

## 2024-10-15 DIAGNOSIS — F33.3 MAJOR DEPRESSIVE DISORDER, RECURRENT, SEVERE WITH PSYCHOTIC FEATURES (HCC): ICD-10-CM

## 2024-10-15 DIAGNOSIS — F41.1 GAD (GENERALIZED ANXIETY DISORDER): Primary | ICD-10-CM

## 2024-10-15 PROBLEM — F33.2 MAJOR DEPRESSIVE DISORDER, RECURRENT SEVERE WITHOUT PSYCHOTIC FEATURES (HCC): Status: ACTIVE | Noted: 2024-10-15

## 2024-10-15 PROCEDURE — 90791 PSYCH DIAGNOSTIC EVALUATION: CPT | Performed by: COUNSELOR

## 2024-10-15 NOTE — PSYCH
" Behavioral Health Psychotherapy Assessment    Date of Initial Psychotherapy Assessment: 10/15/24  Referral Source: JHONATAN Landon  Has a release of information been signed for the referral source? Yes    Preferred Name: Madeleine Sanchez  Preferred Pronouns: She/her  YOB: 1945 Age: 79 y.o.  Sex assigned at birth: female   Gender Identity: female  Race:   Preferred Language: English    Emergency Contact:  Full Name: Josie Callahan  Relationship to Client: daughter  Contact information: [phone]    Primary Care Physician:  JOE Yates DO  [address] [facility] [address]  [facility] [address] [phone]  Has a release of information been signed? Yes    Physical Health History:  Past surgical procedures: 2 cataracts and eye implants, laser in right eye, tubal ligation, 3 back operations for herniated disc and fusion, 1978, 198, 2023, left knee replacement, 2 right knee replacements, pin in toe, fundoplication, mastectomies for breast cancer and subsequent implants, also had to be redone, tumor in arm - difficulty using arm for a year,  vein ligation surgeries for blood clots  Do you have a history of any of the following: heart/cardiac issues, thyroid disease, and other elevated cholesterol, blood clots in lungs  Do you have any mobility issues? Yes, describe: foot drop due to back surgery    Relevant Family History:  Cancer (breast), heart issues on paternal side, mom gallbladder removal    Presenting Problem (What brings you in?)  \"After last knee surgery I got very emotional and started crying. Spoke with doctor, who had someone come in to talk to me. I was referred back to my PCP. I have been on a downward spiral - don't want to do much - hard time sleeping, no energy to do anything. Lost interest in my activities. Nothing gets me out of this funk - don't even want to go to [facility].\"    Mental Health Advance Directive:  Do you currently have a Mental Health Advance " Directive?no    Diagnosis:   Diagnosis ICD-10-CM Associated Orders   1. BRINDA (generalized anxiety disorder)  F41.1       2. Major depressive disorder, recurrent, severe with psychotic features (HCC)  F33.3           Initial Assessment:     Current Mental Status:    Appearance: appropriate, casual and neat      Behavior/Manner: cooperative and tearful      Affect/Mood:  Depressed and relaxed    Speech:  Normal and articulate    Sleep:  Interrupted    Oriented to: oriented to self, oriented to place and oriented to time       Clinical Symptoms    Depression: yes      Anxiety: yes      Depression Symptoms: depressed mood, serious loss of interest in things, excessive crying, social isolation, fatigue, indecision, poor concentration, weight gain, sleep disturbance and irritable      Anxiety Symptoms: excessive worry, fatigues easily, muscle tension, irritable, diaphoresis, nervous/anxious, difficulty controlling worry, chest tightness, shortness of breath, chills and hot flashes      Have you ever been assaultive to others or the environment: No      Have you ever been self-injurious: No      Counseling History:  Previous Counseling or Treatment  (Mental Health or Drug & Alcohol): Yes    Previous Counseling Details:  Over twenty years ago - son was in hospital for alcohol and she received family counseling.  Had individual counseling back then - but less than a year.  Have you previously taken psychiatric medications: Yes    Previous Medications Attempted:  See record    Suicide Risk Assessment  Have you ever had a suicide attempt: No    Have you had incidents of suicidal ideation: No    Are you currently experiencing suicidal thoughts: No      Substance Abuse/Addiction Assessment:  Alcohol: Yes    Age of First Use:  Over 20  Age of regular use:  After 20  Frequency:  Monthly  Amount:  1 drink per setting, usually with daughter  Last use:  Vodka with juice or wine,  Heroin: No    Fentanyl: No    Opiates: No    Cocaine:  No    Amphetamines: No    Hallucinogens: No    Club Drugs: No    Benzodiazepines: No    Other Rx Meds: No    Marijuana: No    Tobacco/Nicotine: No    Have you experienced blackouts as a result of substance use: No    Have you had any periods of abstinence: Yes    Additional Abstinence information:  Not a regular drinker  Have you experienced symptoms of withdrawal: No    Have you ever overdosed on any substances?: No    Are you currently using any Medication Assisted Treatment for Substance Use: No      Compulsive Behaviors:  Compulsive Behavior Information:  None reported    Disordered Eating History:  Do you have a history of disordered eating: No      Social Determinants of Health:    SDOH:  Financial instability and stress    Trauma and Abuse History:    Have you ever been abused: No      Legal History:    Have you ever been arrested  or had a DUI: No      Have you been incarcerated: No      Are you currently on parole/probation: No      Any current Children and Youth involvement: No      Any pending legal charges: No      Relationship History:    Current marital status:       Natural Supports:  Other    Other natural supports:  4 children, daughter lives with her, grandson comes to visit weekly    Relationship History:  Oldest of three children from intact family. Sister was 18 months younger and brother was 10 years younger. Father worked in [employer] for [employer]. Also was a . Mother was homemaker - volunteered at the [employer]. Grew up in [place], graduated from [employer]. Attended [employer] for younger grades.  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 Returned home and started working at [employer]. Worked for 25 years. Met  in [place], he was in [employer]. Met him via a friend - he was from [place]. Dated him for year and half -got pregnant(during nursing school). Lived in [place] - had daughter finished nursing  "school, moved home  to [place] and family helped with child. Described marital relationship as turmoil - he was alcoholic \"for awhile\". Never hit her but was loud and nasty. Talked to  - thought of divorce but went to AA and then he stayed sober. Had 4 children girl, boy, boy, girl. Things were better after he got sober. Older son started drinking in college.   ten years ago - not sure about final diagnosis. Was in hospital for 3 months prior to passing. Stated he always worked and provided for family. 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    Sources of income/financial support:  senior care SSA and snf Pension     History:      Status: no history of  duty  Educational History:     Have you ever been diagnosed with a learning disability: No      Highest level of education:  Other    Other education: RN degree from [employer]    Have you ever had an IEP or 504-plan: No      Do you need assistance with reading or writing: No      Recommended Treatment:     Psychotherapy:  Individual sessions    Frequency:  1 time    Session frequency:  Weekly      Visit start and stop times:    10/15/24  Start Time: 1407  Stop Time: 1500  Total Visit Time: 53 minutes  "

## 2024-10-16 ENCOUNTER — TELEPHONE (OUTPATIENT)
Age: 79
End: 2024-10-16

## 2024-10-16 NOTE — TELEPHONE ENCOUNTER
Madeleine is calling in regards to the Restoril medication that was prescribed for her. She states that when she first took it it helped her sleep for the first 3 days but she has not gotten sleep the last few nights and woken up several times.   She would like to know if there is an alternative medication she can try or what Dr. Yates recommends.     Please advise, thank you!

## 2024-10-17 NOTE — TELEPHONE ENCOUNTER
Called pt and lvm informing of PCP note -- I advised she call office back upon receipt to confirm she received and understands message. She should also be calling office on Monday w update as to how medication dose adjustment is working. Will await call back.

## 2024-10-18 ENCOUNTER — TELEPHONE (OUTPATIENT)
Age: 79
End: 2024-10-18

## 2024-10-18 DIAGNOSIS — F32.1 CURRENT MODERATE EPISODE OF MAJOR DEPRESSIVE DISORDER WITHOUT PRIOR EPISODE (HCC): Primary | ICD-10-CM

## 2024-10-18 DIAGNOSIS — F41.1 GAD (GENERALIZED ANXIETY DISORDER): ICD-10-CM

## 2024-10-18 RX ORDER — VILAZODONE HYDROCHLORIDE 10 MG/1
TABLET ORAL
Qty: 25 TABLET | Refills: 0 | Status: SHIPPED | OUTPATIENT
Start: 2024-10-18 | End: 2024-11-15

## 2024-10-18 NOTE — TELEPHONE ENCOUNTER
Patient called in to inform the provider that they spoke to their PCP and they agree that the Viibryd can be prescribed.    Patient is requesting the prescription being sent as soon as possible so they are able to start taking it.    Patient stated all they have been doing it crying. Their PCP prescribed a sleeping pill and it worked for 3 days but made them feel drunk for the week they were taking it and they stopped and are going to be speaking with their PCP about it.    Writer confirmed the pharmacy as [facility] in [place] on [address]

## 2024-10-18 NOTE — TELEPHONE ENCOUNTER
Called Madeleine and informed her that Viibryd was sent to pharmacy.  They are 10 MG tablets.  She should take 1/2 of a tablet daily for 1 week, then she can  increase to a whole tablet (10 MG) daily.  Instructed her to take with food.  She was instructed to call the office if she has any concerns.

## 2024-10-22 ENCOUNTER — TELEPHONE (OUTPATIENT)
Age: 79
End: 2024-10-22

## 2024-10-22 NOTE — TELEPHONE ENCOUNTER
Patient is calling regarding cancelling an appointment.    Date/Time: 10/22/2024 11am     Reason:     Patient was rescheduled: YES [x] NO []  If yes, when was Patient reschedule for: 10/23/2024 1pm    Patient requesting call back to reschedule: YES [] NO [x]

## 2024-10-23 ENCOUNTER — TELEPHONE (OUTPATIENT)
Dept: PSYCHIATRY | Facility: CLINIC | Age: 79
End: 2024-10-23

## 2024-10-23 ENCOUNTER — SOCIAL WORK (OUTPATIENT)
Dept: BEHAVIORAL/MENTAL HEALTH CLINIC | Facility: CLINIC | Age: 79
End: 2024-10-23
Payer: MEDICARE

## 2024-10-23 DIAGNOSIS — F41.1 GAD (GENERALIZED ANXIETY DISORDER): Primary | ICD-10-CM

## 2024-10-23 DIAGNOSIS — F33.2 MAJOR DEPRESSIVE DISORDER, RECURRENT SEVERE WITHOUT PSYCHOTIC FEATURES (HCC): ICD-10-CM

## 2024-10-23 PROCEDURE — 90834 PSYTX W PT 45 MINUTES: CPT | Performed by: COUNSELOR

## 2024-10-23 NOTE — TELEPHONE ENCOUNTER
Nurse spoke with [facility] - pharmacy is awaiting response on PA for Viibryd.   Informed pt could be 7-21 days and will call once response is received.

## 2024-10-23 NOTE — TELEPHONE ENCOUNTER
Pt went to pharmacy and she said that it need approval or verification from you.      Patient called in to inform the provider that they spoke to their PCP and they agree that the Viibryd can be prescribed.     Patient is requesting the prescription being sent as soon as possible so they are able to start taking it.     Patient stated all they have been doing it crying. Their PCP prescribed a sleeping pill and it worked for 3 days but made them feel drunk for the week they were taking it and they stopped and are going to be speaking with their PCP about it.     Writer confirmed the pharmacy as [facility] in [place] on [address]

## 2024-10-23 NOTE — TELEPHONE ENCOUNTER
PA for Vilazodone 10 mg tablet SUBMITTED     via    [x]CMM-KEY: [id number]  []Surescripts-Case ID #   []Availity-Auth ID # NDC #   []Faxed to plan   []Other website   []Phone call Case ID #     Office notes sent, clinical questions answered. Awaiting determination    Turnaround time for your insurance to make a decision on your Prior Authorization can take 7-21 business days.

## 2024-10-23 NOTE — PSYCH
"Behavioral Health Psychotherapy Progress Note    Psychotherapy Provided: Individual Psychotherapy     1. BRINDA (generalized anxiety disorder)        2. Major depressive disorder, recurrent severe without psychotic features (HCC)            Goals addressed in session: Goal 1 and Goal 2     DATA: Madeleine was in person for session. Reviewed treatment goals and created treatment plan. Areas of need include dealing with loneliness and life changes as well as changes in health profile.   During this session, this clinician used the following therapeutic modalities: Client-centered Therapy and Cognitive Behavioral Therapy    Substance Abuse was not addressed during this session. If the client is diagnosed with a co-occurring substance use disorder, please indicate any changes in the frequency or amount of use: not applicable. Stage of change for addressing substance use diagnoses: No substance use/Not applicable    ASSESSMENT:  Madeleine Sanchez presents with a Depressed mood.     her affect is Normal range and intensity, which is congruent, with her mood and the content of the session. The client has made progress on their goals.    Identified areas re: loss of her  and health concerns. Madeleine Sanchez presents with a none risk of suicide, none risk of self-harm, and none risk of harm to others.    For any risk assessment that surpasses a \"low\" rating, a safety plan must be developed.    A safety plan was indicated: no  If yes, describe in detail not applicable    PLAN: Between sessions, Madeleine Sanchez will begin to observe and identify depressive symptoms . At the next session, the therapist will use Client-centered Therapy and Cognitive Behavioral Therapy to address mood regulation.    Behavioral Health Treatment Plan and Discharge Planning: Madeleine Sanchez is aware of and agrees to continue to work on their treatment plan. They have identified and are working toward their discharge goals. yes    Visit start and stop " times:    10/23/24  Start Time: 1302  Stop Time: 1350  Total Visit Time: 48 minutes

## 2024-10-23 NOTE — BH TREATMENT PLAN
"Outpatient Behavioral Health Psychotherapy Treatment Plan    Madeleine Sanchez  1945     Date of Initial Psychotherapy Assessment: 10/15/2024   Date of Current Treatment Plan: 10/23/24  Treatment Plan Target Date: 04/23/2025  Treatment Plan Expiration Date: 05/31/2025    Diagnosis:   1. BRINDA (generalized anxiety disorder)        2. Major depressive disorder, recurrent severe without psychotic features (HCC)            Area(s) of Need: Mood regulation    Long Term Goal 1 (in the client's own words): \"I've always been someone to take care of things for myself and lately I can't wrap my head around things. I don't have the drive to even try. I'm having trouble with sleep, physical aliments which are affecting my eating. I have a lot of sadness. Sometimes I feel like I'm just going through the motions. I just want to feel like myself and get interested in things again.\"    Stage of Change: Contemplation    Target Date for completion: April 23, 2025     Anticipated therapeutic modalities: client centered, supportive, cognitive behavioral techniques     People identified to complete this goal: Madeleine Sanchez (client) & Teressa Bhatti (therapist)      Objective 1: (identify the means of measuring success in meeting the objective): Madeleine will begin to observe and identify thoughts, feelings, and physical reactions that are leading to low motivation and energy. She will discuss observations in sessions.       Objective 2: (identify the means of measuring success in meeting the objective): Madeleine will develop and implement at least two cognitive behavioral strategies to decrease depressive symptoms. She will report progress in sessions.     Objective 3: (identify the means of measuring success in meeting the objective): Madeleine will monitor her physical symptoms and issues with health hygiene. She will discuss issues in sessions.         Long Term Goal 2 (in the client's own words): \"I've been missing my  more in " "the past year than when he first . It's not loneliness, it's more of an empty feeling. I don't really have friendships since I devoted so much time to caring for family members over the years.\"     Stage of Change: Pre-contemplation    Target Date for completion: 2025     Anticipated therapeutic modalities: Client centered, supportive,      People identified to complete this goal: Madeleine Sanchez (client) & Teressa Bhatti (therapist)       Objective 1: (identify the means of measuring success in meeting the objective): Madeleine will begin to discuss situations and feelings that lead to isolation and loneliness. She will vent her losses in sessions.        Objective 2: (identify the means of measuring success in meeting the objective): Haider will identify and implement at least three possible activities for socialization. She will discuss progress in sessions.      I am currently under the care of a [facility] psychiatric provider: yes    My [facility] psychiatric provider is: JHONATAN Maldonado    I am currently taking psychiatric medications: Yes, as prescribed, but is having some difficulty in getting some of the medicines in a timely way    I feel that I will be ready for discharge from mental health care when I reach the following (measurable goal/objective): \"I'll be back to regular activities and feeling more motivated to do them.\"    For children and adults who have a legal guardian:   Has there been any change to custody orders and/or guardianship status? NA. If yes, attach updated documentation.    I have created my Crisis Plan and have been offered a copy of this plan    Behavioral Health Treatment Plan [facility]: Diagnosis and Treatment Plan explained to Madeleine Sanchez acknowledges an understanding of their diagnosis. Madeleine Sanchez agrees to this treatment plan.    I have been offered a copy of this Treatment Plan. yes        "

## 2024-10-28 DIAGNOSIS — F41.1 GAD (GENERALIZED ANXIETY DISORDER): ICD-10-CM

## 2024-10-28 RX ORDER — BUSPIRONE HYDROCHLORIDE 15 MG/1
15 TABLET ORAL 2 TIMES DAILY
Qty: 60 TABLET | Refills: 0 | Status: SHIPPED | OUTPATIENT
Start: 2024-10-28

## 2024-10-28 NOTE — TELEPHONE ENCOUNTER
PA for Vilazodone 10 mg tablet DENIED     Reason:(Screenshot if applicable)          Message sent to office clinical pool Yes     Denial letter scanned into Media Yes     Appeal started No (Provider will need to decide if appeal is warranted and send clinical documentation to Prior Authorization Team for initiation.)     **Please follow up with your patient regarding denial and next steps**

## 2024-10-28 NOTE — TELEPHONE ENCOUNTER
Pt called re: vilazodone (Viibryd) 10 mg tablet that pharmacy still has not rec Pror auth and pt stated she was told when she was in the office it would be done as a priority. Pt needs a call back

## 2024-11-01 ENCOUNTER — SOCIAL WORK (OUTPATIENT)
Dept: BEHAVIORAL/MENTAL HEALTH CLINIC | Facility: CLINIC | Age: 79
End: 2024-11-01
Payer: MEDICARE

## 2024-11-01 DIAGNOSIS — F33.2 MAJOR DEPRESSIVE DISORDER, RECURRENT SEVERE WITHOUT PSYCHOTIC FEATURES (HCC): ICD-10-CM

## 2024-11-01 DIAGNOSIS — F32.1 CURRENT MODERATE EPISODE OF MAJOR DEPRESSIVE DISORDER WITHOUT PRIOR EPISODE (HCC): ICD-10-CM

## 2024-11-01 DIAGNOSIS — F41.1 GAD (GENERALIZED ANXIETY DISORDER): Primary | ICD-10-CM

## 2024-11-01 PROCEDURE — 90834 PSYTX W PT 45 MINUTES: CPT | Performed by: COUNSELOR

## 2024-11-01 RX ORDER — DULOXETIN HYDROCHLORIDE 30 MG/1
30 CAPSULE, DELAYED RELEASE ORAL DAILY
Qty: 30 CAPSULE | Refills: 1 | Status: SHIPPED | OUTPATIENT
Start: 2024-11-01 | End: 2024-12-31

## 2024-11-01 NOTE — PSYCH
"Behavioral Health Psychotherapy Progress Note    Psychotherapy Provided: Individual Psychotherapy     1. BRINDA (generalized anxiety disorder)        2. Major depressive disorder, recurrent severe without psychotic features (HCC)            Goals addressed in session: Goal 1 and Goal 2     DATA: Haider was in person for session.  Discussed physical ailments and concerns that her body is not functioning the way it used to.  Upset with gastrointestinal distress, mostly reflux which prohibits her from enjoying eating and also keeps her awake at night therefore she is not getting good rest.  Shared some of her medical history and the concerns about changes that she sees in her functioning as well as a feeling of unwell at times.  Client to vent and share her experience.  During this session, this clinician used the following therapeutic modalities: Client-centered Therapy and Cognitive Behavioral Therapy    Substance Abuse was not addressed during this session. If the client is diagnosed with a co-occurring substance use disorder, please indicate any changes in the frequency or amount of use: not applicable. Stage of change for addressing substance use diagnoses: No substance use/Not applicable    ASSESSMENT:  Madeleine Sanchez presents with a Depressed mood.     her affect is Normal range and intensity, which is congruent, with her mood and the content of the session. The client has made progress on their goals.    First session beyond treatment planning.  Beginning to open up about herself but has difficulty identifying her affect and is more comfortable discussing the physical at this time Madeleine Sanchez presents with a none risk of suicide, none risk of self-harm, and none risk of harm to others.    For any risk assessment that surpasses a \"low\" rating, a safety plan must be developed.    A safety plan was indicated: no  If yes, describe in detail not applicable    PLAN: Between sessions, Madeleine Sanchez will continue " to work on developing therapeutic alliance. At the next session, the therapist will use Client-centered Therapy and Cognitive Behavioral Therapy to address mood regulation.    Behavioral Health Treatment Plan and Discharge Planning: Madeleine Sanchez is aware of and agrees to continue to work on their treatment plan. They have identified and are working toward their discharge goals. yes    Visit start and stop times:    11/01/24  Start Time: 1205  Stop Time: 1250  Total Visit Time: 45 minutes

## 2024-11-04 ENCOUNTER — TELEPHONE (OUTPATIENT)
Age: 79
End: 2024-11-04

## 2024-11-04 NOTE — TELEPHONE ENCOUNTER
Patient was returning call received today from the office, however, Writer did not see any encounters indicating a call.    Patient needs to reschedule 11/12 12:00 with Irma Batista due to a conflict of scheduling.    Patient reschedule for 11/25 17:00

## 2024-11-04 NOTE — TELEPHONE ENCOUNTER
SHAHEEN left requesting a call back to inform of PA denial. Forwarding to primary provider for review upon return to office.   Also see separate encounter 10/18- SHAHEEN left to also inform her of separate encounter info.

## 2024-11-05 NOTE — TELEPHONE ENCOUNTER
Just left another VM requesting a call back. If she calls back please see encounter from 10/18 and 10/23.

## 2024-11-08 PROBLEM — Z00.00 ENCOUNTER FOR MEDICARE ANNUAL WELLNESS EXAM: Status: RESOLVED | Noted: 2024-10-09 | Resolved: 2024-11-08

## 2024-11-12 ENCOUNTER — OFFICE VISIT (OUTPATIENT)
Dept: GASTROENTEROLOGY | Facility: CLINIC | Age: 79
End: 2024-11-12
Payer: MEDICARE

## 2024-11-12 ENCOUNTER — TELEPHONE (OUTPATIENT)
Dept: GASTROENTEROLOGY | Facility: CLINIC | Age: 79
End: 2024-11-12

## 2024-11-12 VITALS
BODY MASS INDEX: 30.39 KG/M2 | HEART RATE: 83 BPM | HEIGHT: 64 IN | SYSTOLIC BLOOD PRESSURE: 150 MMHG | WEIGHT: 178 LBS | DIASTOLIC BLOOD PRESSURE: 78 MMHG

## 2024-11-12 DIAGNOSIS — K21.00 GASTROESOPHAGEAL REFLUX DISEASE WITH ESOPHAGITIS WITHOUT HEMORRHAGE: ICD-10-CM

## 2024-11-12 DIAGNOSIS — R13.10 DYSPHAGIA, UNSPECIFIED TYPE: ICD-10-CM

## 2024-11-12 DIAGNOSIS — K57.10 DUODENAL DIVERTICULUM: ICD-10-CM

## 2024-11-12 DIAGNOSIS — K44.9 HIATAL HERNIA: ICD-10-CM

## 2024-11-12 DIAGNOSIS — R11.2 NAUSEA AND VOMITING, UNSPECIFIED VOMITING TYPE: Primary | ICD-10-CM

## 2024-11-12 DIAGNOSIS — K64.8 INTERNAL HEMORRHOIDS: ICD-10-CM

## 2024-11-12 DIAGNOSIS — K62.5 RECTAL BLEEDING: ICD-10-CM

## 2024-11-12 DIAGNOSIS — Z86.0101 HX OF ADENOMATOUS COLONIC POLYPS: ICD-10-CM

## 2024-11-12 PROCEDURE — 99214 OFFICE O/P EST MOD 30 MIN: CPT | Performed by: INTERNAL MEDICINE

## 2024-11-12 PROCEDURE — 46221 LIGATION OF HEMORRHOID(S): CPT | Performed by: INTERNAL MEDICINE

## 2024-11-12 RX ORDER — SODIUM CHLORIDE, SODIUM LACTATE, POTASSIUM CHLORIDE, CALCIUM CHLORIDE 600; 310; 30; 20 MG/100ML; MG/100ML; MG/100ML; MG/100ML
125 INJECTION, SOLUTION INTRAVENOUS CONTINUOUS
OUTPATIENT
Start: 2024-11-12

## 2024-11-12 NOTE — TELEPHONE ENCOUNTER
Pt to be on clear liquids day before egd as well (can have breakfast on 12/18/24) and continue on clears until after her procedure on  12/19/24 . Sb     Cardiac clearance to be faxed to [facility] - Dr Bennett  sb       Scheduled date of EGD(as of today):12/19/24  Physician performing EGD:Dr Mckeon   Location of EGD:[facility]   Instructions reviewed with patient by:sb  Clearances: [facility]-Dr Bennett

## 2024-11-12 NOTE — ASSESSMENT & PLAN NOTE
Recurrent hiatal hernia status post prior fundoplication.  Question of ulceration within the hernia on CT earlier this year.  Possibly secondary to medication or Dax ulceration.

## 2024-11-12 NOTE — ASSESSMENT & PLAN NOTE
Differential diagnosis is broad and would include dysmotility, gastroparesis despite negative gastric emptying scan, gastric ulcer as suggested on abdominal CT, small intestinal bacterial overgrowth secondary to duodenal diverticula, etc. will plan further evaluation with SIBO breath testing and EGD.    Orders:    Small intestinal bacterial overgrowth    EGD; Future

## 2024-11-12 NOTE — ASSESSMENT & PLAN NOTE
Improvement of symptoms with right posterior band placement.  Now with increased symptoms we will perform further banding in the left lateral position

## 2024-11-12 NOTE — ASSESSMENT & PLAN NOTE
Will evaluate further as below  Orders:    EGD; Future    FL barium swallow video w speech; Future

## 2024-11-12 NOTE — PROGRESS NOTES
Ambulatory Visit  Name: Madeleine Sanchez      : 1945      MRN: [id number]  Encounter Provider: Viral Mckeon MD  Encounter Date: 2024   Encounter department: [facility] GASTROENTEROLOGY [address]    Assessment & Plan  Nausea and vomiting, unspecified vomiting type  Differential diagnosis is broad and would include dysmotility, gastroparesis despite negative gastric emptying scan, gastric ulcer as suggested on abdominal CT, small intestinal bacterial overgrowth secondary to duodenal diverticula, etc. will plan further evaluation with SIBO breath testing and EGD.    Orders:    Small intestinal bacterial overgrowth    EGD; Future    Dysphagia, unspecified type  Will evaluate further as below  Orders:    EGD; Future    FL barium swallow video w speech; Future    Hiatal hernia  Recurrent hiatal hernia status post prior fundoplication.  Question of ulceration within the hernia on CT earlier this year.  Possibly secondary to medication or Dax ulceration.       Gastroesophageal reflux disease with esophagitis without hemorrhage  Continue pantoprazole       Duodenal diverticulum  Risk for small intestinal bacterial overgrowth.  Will eluate for this with breath testing       Internal hemorrhoids  Improvement of symptoms with right posterior band placement.  Now with increased symptoms we will perform further banding in the left lateral position       Rectal bleeding  Hemorrhoidal banding.  She understands there is an increased risk of bleeding given her use of Xarelto       Hx of adenomatous colonic polyps  Surveillance colonoscopy due          History of Present Illness     Madeleine Sanchez is a 79 y.o. female who presents with some recurrent rectal bleeding secondary to hemorrhoids.  Underwent banding previously in May with improvement of rectal bleeding but this has returned intermittently.  On Xarelto.  Also reports worsening nausea with some retching as well as dysphagia  "which is described as proximal.  Does report some coughing and choking with swallowing.  Has previously undergone evaluation with barium esophagram in 2022 which suggested dysmotility.  Subsequent esophageal manometry was entirely normal.  EGD with empiric dilation previously was reported to provide some symptomatic improvement and repeat EGD was performed in March 2024.  At that time she had a substantial amount of food retained in her hiatal hernia and the procedure was aborted due to risk of aspiration.  Gastric emptying scan May 2024 revealed 97% emptying at 2 hours.  CT of the abdomen in June 2024 revealed hiatal hernia with question of ulceration within the hernia sac as well as multiple small bowel diverticula.      Review of Systems        Objective     /78   Pulse 83   Ht 5' 4\" (1.626 m)   Wt 80.7 kg (178 lb)   BMI 30.55 kg/m²     Anal Excision Procedures    Performed by: Viral Mckeon MD  Authorized by: Viral Mckeon MD    Universal Protocol:     Verbal consent obtained?: Yes      Risks and benefits: Risks, benefits and alternatives were discussed      Consent given by:  Patient    Patient states understanding of procedure being performed: Yes      Patient's understanding of procedure matches consent: Yes      Procedure consent matches procedure scheduled: Yes      Relevant documents present and verified: Yes      Patient identity confirmed:  Verbally with patient  A time out verifies correct patient, procedure, equipment, support staff and site/side marked as required:   Procedure Type: hemorrhoidectomy    Hemorrhoidectomy Details:   Hemorrhoid type: internal    Number of columns/groups removed:  1  Single rubber band ligation    Patient tolerance:  Patient tolerated the procedure well with no immediate complications  Additiional Procedure Intervention Details:  Left lateral        Physical Exam    "

## 2024-11-13 ENCOUNTER — SOCIAL WORK (OUTPATIENT)
Dept: BEHAVIORAL/MENTAL HEALTH CLINIC | Facility: CLINIC | Age: 79
End: 2024-11-13
Payer: MEDICARE

## 2024-11-13 DIAGNOSIS — F33.2 MAJOR DEPRESSIVE DISORDER, RECURRENT SEVERE WITHOUT PSYCHOTIC FEATURES (HCC): ICD-10-CM

## 2024-11-13 DIAGNOSIS — E53.8 VITAMIN B 12 DEFICIENCY: ICD-10-CM

## 2024-11-13 DIAGNOSIS — F41.1 GAD (GENERALIZED ANXIETY DISORDER): Primary | ICD-10-CM

## 2024-11-13 PROCEDURE — 90834 PSYTX W PT 45 MINUTES: CPT | Performed by: COUNSELOR

## 2024-11-13 NOTE — TELEPHONE ENCOUNTER
Reason for call:   [x] Refill   [] Prior Auth  [] Other:     Office:   [x] PCP/Provider - JOE Yates   [] Specialty/Provider -     Medication: cyanocobalamin 1,000 mcg/mL     Dose/Frequency: inject 1 mL into a muscle once a week    Quantity: 4 mL    Pharmacy: [facility] - [place] - [address]    Does the patient have enough for 3 days?   [x] Yes   [] No - Send as HP to POD

## 2024-11-13 NOTE — PSYCH
"Behavioral Health Psychotherapy Progress Note    Psychotherapy Provided: Individual Psychotherapy     1. BRINDA (generalized anxiety disorder)        2. Major depressive disorder, recurrent severe without psychotic features (HCC)            Goals addressed in session: Goal 1     DATA: Madeleine was in person for session.  Discussed weeks events.  Noted that she was being supportive of her daughter who may need to have surgery.  Discussed her daughter's issues with her back.  Also shared some stories about her grandson.  Noted that he and his father were coming for Thanksgiving dinner.  Shared with she was planning and is excited about having family gathered.  Shared that she was only having a few people which turned out to be over 12 for dinner.  Seems happy at the prospect of having everyone gathered.  Continues to  have concerns about her indigestion, following up with plans to have a scope for assessment.  During this session, this clinician used the following therapeutic modalities: Client-centered Therapy and Cognitive Behavioral Therapy    Substance Abuse was not addressed during this session. If the client is diagnosed with a co-occurring substance use disorder, please indicate any changes in the frequency or amount of use: not applicable. Stage of change for addressing substance use diagnoses: No substance use/Not applicable    ASSESSMENT:  Madeleine Sanchez presents with a Euthymic/ normal mood.     her affect is Normal range and intensity, which is congruent, with her mood and the content of the session. The client has made progress on their goals.    Beginning to open up about family and relationship dynamics.  Madeleine Sanchez presents with a none risk of suicide, none risk of self-harm, and none risk of harm to others.    For any risk assessment that surpasses a \"low\" rating, a safety plan must be developed.    A safety plan was indicated: no  If yes, describe in detail not applicable    PLAN: Between sessions, " Madeleine Sanchez will continue to work on developing therapeutic alliance will follow up with healthcare needs. At the next session, the therapist will use Client-centered Therapy, Cognitive Behavioral Therapy, and Supportive Psychotherapy to address mood regulation.    Behavioral Health Treatment Plan and Discharge Planning: Madeleine Sanchez is aware of and agrees to continue to work on their treatment plan. They have identified and are working toward their discharge goals. yes    Visit start and stop times:    11/13/24  Start Time: 1305  Stop Time: 1350  Total Visit Time: 45 minutes

## 2024-11-14 RX ORDER — CYANOCOBALAMIN 1000 UG/ML
1000 INJECTION, SOLUTION INTRAMUSCULAR; SUBCUTANEOUS WEEKLY
Qty: 4 ML | Refills: 0 | Status: SHIPPED | OUTPATIENT
Start: 2024-11-14

## 2024-11-18 ENCOUNTER — HOSPITAL ENCOUNTER (OUTPATIENT)
Dept: RADIOLOGY | Facility: HOSPITAL | Age: 79
Discharge: HOME | End: 2024-11-18
Attending: INTERNAL MEDICINE
Payer: MEDICARE

## 2024-11-18 DIAGNOSIS — I26.99 BILATERAL PULMONARY EMBOLISM (HCC): ICD-10-CM

## 2024-11-18 DIAGNOSIS — R13.10 DYSPHAGIA, UNSPECIFIED TYPE: ICD-10-CM

## 2024-11-18 PROCEDURE — 92611 MOTION FLUOROSCOPY/SWALLOW: CPT

## 2024-11-18 PROCEDURE — 74230 X-RAY XM SWLNG FUNCJ C+: CPT

## 2024-11-18 NOTE — PROCEDURES
"                                   Video Swallow Study      Patient Name: Madeleine Sanchez  Today's Date: 11/18/2024        Past Medical History  Past Medical History:   Diagnosis Date    Bilateral pulmonary embolism (HCC) 8/25/2019    Cancer (HCC)     left breast cancer    Cervical herniated disc     Chronic pain disorder     back pain    Chronic respiratory failure (HCC)     uses O2 at home with NC /5/19 no longer using/just CPAP    Colon polyp     CPAP (continuous positive airway pressure) dependence     Disease of thyroid gland     hypothyroid    DVT (deep venous thrombosis) ([facility]) 2020    right leg    Examination for, follow-up 11/03/2021    Family history of reaction to anesthesia     \"son and daughter hard time waking up and also PONV\"    Fibromyalgia, primary     GERD (gastroesophageal reflux disease)     Glaucoma     Hiatal hernia     History of palpitations     History of pneumonia     History of transfusion     Hyperlipidemia     Hypertension     Irregular heart beat     Migraine     Myocardial infarction (HCC)     pt sees cardilogist Dr Bennett [facility]/\"didnt realize had one, found on EKG before a surgery\"    OAB (overactive bladder)     Pollen allergies     Pulmonary embolism (HCC) 2019    Bilateral; on Xarelto    Restless leg     Risk for falls     Sleep apnea     Use of cane as ambulatory aid     occas    Uses brace     Mafo/left leg    Wears glasses         Past Surgical History  Past Surgical History:   Procedure Laterality Date    ADENOIDECTOMY      BAND HEMORRHOIDECTOMY      CARDIAC CATHETERIZATION      CARPAL TUNNEL RELEASE      CATARACT EXTRACTION Bilateral     COLONOSCOPY      FOOT SURGERY      pin implanted right toe    JOINT REPLACEMENT Bilateral     knees    LAMINECTOMY      LUMBAR EPIDURAL INJECTION      MASTECTOMY Bilateral     with reconstruction and implants    NISSEN FUNDOPLICATION      KS LNGTH/SHRT TENDON LEG/ANKLE 1 TENDON SPX Left 05/24/2021    Procedure: Sectioning peroneal tendons with " lengthening/sectioning achilles tendon lower leg and ankle;  Surgeon: Fei Sifuentes DPM;  Location: [facility];  Service: Podiatry    REPLACEMENT TOTAL KNEE      SPINAL FUSION      TONSILLECTOMY      TUBAL LIGATION      TUMOR EXCISION      right forearm/benign    UPPER GASTROINTESTINAL ENDOSCOPY      VEIN LIGATION Right      Modified (Video) Barium Swallow Study    Summary:  **Images from study are unavailable on PACs.  Limited trials 2/2 pt becoming nauseous with increased trials.     Oral stage: WNL  Adequate labial seal and retrieval of bolus. Timely mastication. No oral residue. Adequate AP transfer.     Pharyngeal stage: WNL  Timely pharyngeal swallow. Complete epiglottic inversion. No pharyngeal residue/retention. Osteophytes C5 region. No aspiration or penetration noted throughout study. Pt with coughing episode after solids and liquids x1, however, solids were noted in the esophagus and not aspiration related.     Per gross esophageal screen:  Significant retro flow of liquids. Slowed motility with barium pill with eventual pass through to stomach. Dysmotility noted with all solids. Corkscrew like appearance.  Pt complaining of food stuck throughout trials and pointing to chest region. Liquid washes did not assist with movement. Pt with frequent belching and nausea- therefore trials stopped at this time.     Recommendations:  Diet: Regular   Liquids: Thins  Meds: As best tolerated   Strategies: Small bites/sips, slow rate of pace   Frequent/thorough oral care  Reflux Precautions  Consider consult with: GI  Results reviewed with: pt  Repeat MBS as necessary  If a dedicated assessment of the esophagus is desired:  Consider esophagram/routine barium swallow w/ barium tablet administration, FL Upper GI/UGI,   or EGD (Options may be limited if pt cannot stand/maneuver for barium studies.)      Pt is a 79 year old female referred for VBS per GI due to complaints of dysphagia. Per pt report, pt stating she  "has significant nausea in the morning and avoids certain meats as she feels as if they get \"stuck\". Pt with hx of esophageal dysphagia as seen below. VBS warranted at this time to r/o aspiration and assess least restrictive diet.       Functional Oral Intake Scale (FOIS)  Rory Rob PhD, F-ARMAAN  (Does not include liquids)  Nothing by mouth (NPO).  Tube dependent with minimal attempts of food or liquid.  Tube dependent with consistent intake of food or liquid.  Total oral diet of a single consistency.  Total oral diet with multiple consistencies but requiring special preparation or compensations.  Total oral diet with multiple consistencies without special preparations, but with specific food limitations.  Total oral diet with no restriction.      H&P/pertinent provider notes: (PMH noted above)  Madeleine Sanchez is a 79 y.o. female who presents with some recurrent rectal bleeding secondary to hemorrhoids.  Underwent banding previously in May with improvement of rectal bleeding but this has returned intermittently.  On Xarelto.  Also reports worsening nausea with some retching as well as dysphagia which is described as proximal.  Does report some coughing and choking with swallowing.  Has previously undergone evaluation with barium esophagram in 2022 which suggested dysmotility.  Subsequent esophageal manometry was entirely normal.  EGD with empiric dilation previously was reported to provide some symptomatic improvement and repeat EGD was performed in March 2024.  At that time she had a substantial amount of food retained in her hiatal hernia and the procedure was aborted due to risk of aspiration.  Gastric emptying scan May 2024 revealed 97% emptying at 2 hours.  CT of the abdomen in June 2024 revealed hiatal hernia with question of ulceration within the hernia sac as well as multiple small bowel diverticula     Special Studies:  EGD 1/30/24:  FINDINGS:  Tortuous esophagus  3 cm hiatal hernia without Dax " lesions present - GE junction 34 cm from the incisors. Moderate amount of retained solid food within the hernia sac.  Empiric dilation not performed due to risk of aspiration  The duodenum appeared normal    EGD 9/14/22:  FINDINGS:  Medium sliding hiatal hernia (type I hiatal hernia) - GE junction 34 cm from the incisors, diaphragmatic impression 39 cm from the incisors. EG Junction is slightly narrow. Esophagus is foreshortened and tortuous.    Barium swallow 9/6/22:  IMPRESSION:     Prominent esophageal dysmotility with prominent tertiary contractions and delayed emptying.     Reflux of contrast from the upper esophagus into the pharynx seen particularly with prone imaging.     Small hiatal hernia.  Prior fundoplication.    EGD 3/11/24:  IMPRESSION:  Early watermelon stomach, gastric polyp removed, paucity of gastric folds, hiatal hernia 3-4 mm in size, tortuous esophagus     Previous MBS:  None found on Epic       Food allergies: NKFA   Current diet: Regular/thin   Premorbid diet: Regular/thin   Dentition: Natural   O2 requirement: RA   Oral mech: WNL   Vocal quality/speech: WNL   Cognitive status: WNL     Consistencies administered: Puree, hard cookie, barium pill w/ thin, thin    Thin liquid by:  single cup/straw sip, consecutive straw/cup sips  Nectar thick liquid by: single cup/straw sip, consecutive straw/cup sips  Honey thick liquid by: single cup/straw sip, consecutive straw/cup sips    Pt was viewed standing laterally and AP

## 2024-11-19 NOTE — TELEPHONE ENCOUNTER
Bom for pt . I received her xarelto clearance back . She is to hold her xarelto starting 12/17/24. Pt to call back and confirm that she received this msg. Sb

## 2024-11-20 ENCOUNTER — SOCIAL WORK (OUTPATIENT)
Dept: BEHAVIORAL/MENTAL HEALTH CLINIC | Facility: CLINIC | Age: 79
End: 2024-11-20
Payer: MEDICARE

## 2024-11-20 ENCOUNTER — TELEPHONE (OUTPATIENT)
Dept: PSYCHIATRY | Facility: CLINIC | Age: 79
End: 2024-11-20

## 2024-11-20 DIAGNOSIS — F32.1 CURRENT MODERATE EPISODE OF MAJOR DEPRESSIVE DISORDER WITHOUT PRIOR EPISODE (HCC): Primary | ICD-10-CM

## 2024-11-20 DIAGNOSIS — F41.1 GAD (GENERALIZED ANXIETY DISORDER): ICD-10-CM

## 2024-11-20 PROCEDURE — 90837 PSYTX W PT 60 MINUTES: CPT | Performed by: COUNSELOR

## 2024-11-20 RX ORDER — RIVAROXABAN 20 MG/1
20 TABLET, FILM COATED ORAL EVERY EVENING
Qty: 90 TABLET | Refills: 1 | Status: SHIPPED | OUTPATIENT
Start: 2024-11-20

## 2024-11-20 NOTE — PSYCH
Behavioral Health Psychotherapy Progress Note    Psychotherapy Provided: Individual Psychotherapy     1. Current moderate episode of major depressive disorder without prior episode (HCC)        2. BRINDA (generalized anxiety disorder)            Goals addressed in session: Goal 1 `  DATA: Madeleine was in person for session.Discussed upcoming holiday and preparations for dinner.  Also discussed the history of her gastro distress and the difficulty of the process of determining the issue.  Notes that she had surgery on this situation before.  After further questions I asked if maybe it was not done as well as they had hoped which she was reticent to say but realizes that it may be possible.  Discussed having patti and healthcare providers and frustration with the current situation.  Affect seems lighter and is beginning to connect and relax more in sessions.  During this session, this clinician used the following therapeutic modalities: Client-centered Therapy and Cognitive Behavioral Therapy    Substance Abuse was not addressed during this session. If the client is diagnosed with a co-occurring substance use disorder, please indicate any changes in the frequency or amount of use: Not applicable. Stage of change for addressing substance use diagnoses: No substance use/Not applicable    ASSESSMENT:  Madeleine Sanchez presents with a Euthymic/ normal mood.     her affect is Normal range and intensity, which is congruent, with her mood and the content of the session. The client has made progress on their goals.    Is beginning to open up and appear more comfortable in sessions, but it is difficult to determine whether her situation is emotional or the emotions are being affected due to the physical discomfort which seems to be a regular situation.  Presents as strong and confident but is very frustrated with her inability to feel well at this time.  Madeleine Sanchez presents with a none risk of suicide, none risk of self-harm,  "and none risk of harm to others.    For any risk assessment that surpasses a \"low\" rating, a safety plan must be developed.    A safety plan was indicated: no  If yes, describe in detail not applicable    PLAN: Between sessions, Madeleine Sanchez will continue to address and explore solutions for her health concerns.  We will continue to observe how her moods are affected by her physical state. At the next session, the therapist will use Client-centered Therapy and Cognitive Behavioral Therapy to address mood regulation.    Behavioral Health Treatment Plan and Discharge Planning: Madeleine Sanchez is aware of and agrees to continue to work on their treatment plan. They have identified and are working toward their discharge goals. yes    Visit start and stop times:    11/20/24  Start Time: 1300  Stop Time: 1345  Total Visit Time: 45 minutes  "

## 2024-11-21 ENCOUNTER — TELEPHONE (OUTPATIENT)
Dept: GASTROENTEROLOGY | Facility: CLINIC | Age: 79
End: 2024-11-21

## 2024-11-21 NOTE — TELEPHONE ENCOUNTER
----- Message from Viral Mckeon MD sent at 11/19/2024  2:13 PM EST -----  Regarding: Modified barium swallow  Please call patient with results.  Modified barium swallow again suggested some dysmotility in the esophagus which is likely contributing to her symptoms.  Unfortunately there is likely little that can be done to improve this.  She did have some improvement of dysphagia previously with EGD and esophageal dilation, and it would be reasonable to try this again, if she wishes.  If so, would have her take nothing but liquids by mouth for 24 hours before the procedure as her last EGD was aborted due to retained food in her hiatal hernia

## 2024-11-21 NOTE — TELEPHONE ENCOUNTER
Spoke to pt regarding results and recommendations. Reminded pt liquids for 24 hrs before procedure due to hiatal hernia, pt verbalized understanding has EGD scheduled 12/19/24.

## 2024-11-29 ENCOUNTER — SOCIAL WORK (OUTPATIENT)
Dept: BEHAVIORAL/MENTAL HEALTH CLINIC | Facility: CLINIC | Age: 79
End: 2024-11-29
Payer: MEDICARE

## 2024-11-29 DIAGNOSIS — F33.2 MAJOR DEPRESSIVE DISORDER, RECURRENT SEVERE WITHOUT PSYCHOTIC FEATURES (HCC): Primary | ICD-10-CM

## 2024-11-29 DIAGNOSIS — F41.1 GAD (GENERALIZED ANXIETY DISORDER): ICD-10-CM

## 2024-11-29 PROCEDURE — 90834 PSYTX W PT 45 MINUTES: CPT | Performed by: COUNSELOR

## 2024-11-29 NOTE — PSYCH
Behavioral Health Psychotherapy Progress Note    Psychotherapy Provided: Individual Psychotherapy     1. Major depressive disorder, recurrent severe without psychotic features (HCC)        2. BRINDA (generalized anxiety disorder)            Goals addressed in session: Goal 1     DATA: Madeleine was in person for session.  Discussed Thanksgiving holiday and family dynamics.  Shared about her grandson who did not attend and her son has been.  Noted that this young man is not going to school and avoiding family gatherings.  Mother has had mental health issues their whole life and she is now coming around so he is avoiding situations where she is present.  Noted that his family situations affect her.  In 2 address how she can become more aware of how these feelings are affecting her moods as it appears she is carrying all of the family burdens and it is affecting her emotionally.  During this session, this clinician used the following therapeutic modalities: Client-centered Therapy and Cognitive Behavioral Therapy    Substance Abuse was not addressed during this session. If the client is diagnosed with a co-occurring substance use disorder, please indicate any changes in the frequency or amount of use: not applicable. Stage of change for addressing substance use diagnoses: No substance use/Not applicable    ASSESSMENT:  Madeleine Sanchez presents with a Euthymic/ normal mood.     her affect is Normal range and intensity, which is congruent, with her mood and the content of the session. The client has made progress on their goals.    Spent most of the session discussing her family's needs and their dynamics and then at the end noted how it is hurtful to see them all going through this.  Will continue to explore how she can detach from becoming overinvolved in their concerns.  Madeleine Sanchez presents with a none risk of suicide, none risk of self-harm, and none risk of harm to others.    For any risk assessment that surpasses a  "\"low\" rating, a safety plan must be developed.    A safety plan was indicated: no  If yes, describe in detail not applicable    PLAN: Between sessions, Madeliene Sanchez will observe and identify how she manages extended family issues. At the next session, the therapist will use Client-centered Therapy and Cognitive Behavioral Therapy to address mood regulation.    Behavioral Health Treatment Plan and Discharge Planning: Madeleine Sanchez is aware of and agrees to continue to work on their treatment plan. They have identified and are working toward their discharge goals. yes    Visit start and stop times:    11/29/24  Start Time: 1400  Stop Time: 1445  Total Visit Time: 45 minutes  "

## 2024-12-04 ENCOUNTER — TELEPHONE (OUTPATIENT)
Age: 79
End: 2024-12-04

## 2024-12-04 NOTE — TELEPHONE ENCOUNTER
Patient is calling regarding cancelling an appointment.    Date/Time: 12/4 @2pm    Reason: sick    Patient was rescheduled: YES [] NO []  If yes, when was Patient reschedule for: patient has recurring appts     Patient requesting call back to reschedule: YES [] NO [x]    Patient also requested a f/u med management appt.     Patient is now scheduled for 12/16 @2pm.

## 2024-12-05 ENCOUNTER — ANESTHESIA EVENT (OUTPATIENT)
Dept: ANESTHESIOLOGY | Facility: HOSPITAL | Age: 79
End: 2024-12-05

## 2024-12-05 ENCOUNTER — ANESTHESIA (OUTPATIENT)
Dept: ANESTHESIOLOGY | Facility: HOSPITAL | Age: 79
End: 2024-12-05

## 2024-12-06 DIAGNOSIS — F41.1 GAD (GENERALIZED ANXIETY DISORDER): ICD-10-CM

## 2024-12-06 DIAGNOSIS — F43.23 ADJUSTMENT DISORDER WITH MIXED ANXIETY AND DEPRESSED MOOD: ICD-10-CM

## 2024-12-06 RX ORDER — BUSPIRONE HYDROCHLORIDE 15 MG/1
15 TABLET ORAL 2 TIMES DAILY
Qty: 60 TABLET | Refills: 0 | Status: SHIPPED | OUTPATIENT
Start: 2024-12-06

## 2024-12-06 RX ORDER — BUPROPION HYDROCHLORIDE 300 MG/1
300 TABLET ORAL DAILY
Qty: 30 TABLET | Refills: 1 | Status: SHIPPED | OUTPATIENT
Start: 2024-12-06

## 2024-12-11 ENCOUNTER — SOCIAL WORK (OUTPATIENT)
Dept: BEHAVIORAL/MENTAL HEALTH CLINIC | Facility: CLINIC | Age: 79
End: 2024-12-11
Payer: MEDICARE

## 2024-12-11 DIAGNOSIS — F41.1 GAD (GENERALIZED ANXIETY DISORDER): Primary | ICD-10-CM

## 2024-12-11 DIAGNOSIS — F33.2 MAJOR DEPRESSIVE DISORDER, RECURRENT SEVERE WITHOUT PSYCHOTIC FEATURES (HCC): ICD-10-CM

## 2024-12-11 PROCEDURE — 90834 PSYTX W PT 45 MINUTES: CPT | Performed by: COUNSELOR

## 2024-12-11 NOTE — PSYCH
Behavioral Health Psychotherapy Progress Note    Psychotherapy Provided: Individual Psychotherapy     1. BRINDA (generalized anxiety disorder)        2. Major depressive disorder, recurrent severe without psychotic features (HCC)            Goals addressed in session: Goal 1     DATA: Madeleine was in person for session.  Spent session discussing concerns with her son and her grandson.  Noted that her grandson is upset with his dad and mom because father is allowing mother to be present in the home.  Son is upset and wants to go to children and youth.  Also discussed her daughter and how her daughter and grandson live with her but do not participate in paying the bills nor supporting the home.  Reviewed her anxiety about making sure that everyone is happy.  Shared about her other daughter who feels that the youngest is taking advantage of her.  Discussed ways that she can be supportive without overextending herself and getting overinvolved in family relationships.  Also discussed ways to be more assertive and get what she needs including payments for certain things from her daughter.  During this session, this clinician used the following therapeutic modalities: Client-centered Therapy, Cognitive Behavioral Therapy, Solution-Focused Therapy, and Supportive Psychotherapy    Substance Abuse was not addressed during this session. If the client is diagnosed with a co-occurring substance use disorder, please indicate any changes in the frequency or amount of use: Not applicable. Stage of change for addressing substance use diagnoses: No substance use/Not applicable    ASSESSMENT:  Madeleine Sanchez presents with a Euthymic/ normal and Anxious mood.     her affect is Normal range and intensity, which is congruent, with her mood and the content of the session. The client has made progress on their goals.    Beginning to open up about the stresses of her family and how she carries their concerns with her.  Madeleine Sanchez presents  "with a none risk of suicide, none risk of self-harm, and none risk of harm to others.    For any risk assessment that surpasses a \"low\" rating, a safety plan must be developed.    A safety plan was indicated: no  If yes, describe in detail not applicable    PLAN: Between sessions, Madeleine Sanchez will continue to process concerns about family members and identify ways that she could set stronger limits to protect herself from their problems. At the next session, the therapist will use Client-centered Therapy and Cognitive Behavioral Therapy to address anxiety management and mood regulation.    Behavioral Health Treatment Plan and Discharge Planning: Madeleine Sanchez is aware of and agrees to continue to work on their treatment plan. They have identified and are working toward their discharge goals. yes    Depression Follow-up Plan Completed: Not applicable    Visit start and stop times:    12/11/24  Start Time: 1405  Stop Time: 1455  Total Visit Time: 50 minutes  "

## 2024-12-13 ENCOUNTER — TELEPHONE (OUTPATIENT)
Dept: PSYCHIATRY | Facility: CLINIC | Age: 79
End: 2024-12-13

## 2024-12-13 NOTE — TELEPHONE ENCOUNTER
Called and lvm for pt to call the office. Teressa Bhatti has an opening on Tuesday 12/17/2024 at 11:00 am. Pt is scheduled for 12/18/2024 at 10 am also

## 2024-12-16 ENCOUNTER — OFFICE VISIT (OUTPATIENT)
Dept: PSYCHIATRY | Facility: CLINIC | Age: 79
End: 2024-12-16
Payer: MEDICARE

## 2024-12-16 DIAGNOSIS — F32.1 CURRENT MODERATE EPISODE OF MAJOR DEPRESSIVE DISORDER WITHOUT PRIOR EPISODE (HCC): ICD-10-CM

## 2024-12-16 DIAGNOSIS — F41.1 GAD (GENERALIZED ANXIETY DISORDER): Primary | ICD-10-CM

## 2024-12-16 PROCEDURE — G2211 COMPLEX E/M VISIT ADD ON: HCPCS | Performed by: PHYSICIAN ASSISTANT

## 2024-12-16 PROCEDURE — 99214 OFFICE O/P EST MOD 30 MIN: CPT | Performed by: PHYSICIAN ASSISTANT

## 2024-12-16 RX ORDER — DULOXETIN HYDROCHLORIDE 30 MG/1
30 CAPSULE, DELAYED RELEASE ORAL DAILY
Qty: 30 CAPSULE | Refills: 1 | Status: SHIPPED | OUTPATIENT
Start: 2024-12-16 | End: 2025-02-14

## 2024-12-16 RX ORDER — BUSPIRONE HYDROCHLORIDE 15 MG/1
15 TABLET ORAL 2 TIMES DAILY
Qty: 60 TABLET | Refills: 1 | Status: SHIPPED | OUTPATIENT
Start: 2024-12-16

## 2024-12-16 NOTE — ASSESSMENT & PLAN NOTE
Not at goal - continue duloxetine 30 mg qd, bupropion  mg qAM, buspirone 15 mg BID; continue talk therapy; f/u in 4-6 weeks

## 2024-12-16 NOTE — PSYCH
This note was not shared with the patient due to reasonable likelihood of causing patient harm    PROGRESS NOTE        [facility]      Name and Date of Birth:  Madeleine Sanchez 79 y.o. 1945 MRN: [id number]    Insurance: Payor: MEDICARE / Plan: MEDICARE A AND B / Product Type: Medicare A & B Fee for Service /     Date of Visit: December 16, 2024    Reason for Visit:   Chief Complaint   Patient presents with    Follow-up    Medication Management     Assessment & Plan  BRINDA (generalized anxiety disorder)  Not at goal - continue duloxetine 30 mg qd, bupropion  mg qAM, buspirone 15 mg BID; continue talk therapy; f/u in 4-6 weeks    Orders:    DULoxetine (Cymbalta) 30 mg delayed release capsule; Take 1 capsule (30 mg total) by mouth daily    busPIRone (BUSPAR) 15 mg tablet; Take 1 tablet (15 mg total) by mouth 2 (two) times a day    Current moderate episode of major depressive disorder without prior episode (HCC)  Not at goal - continue duloxetine 30 mg qd, bupropion  mg qAM, buspirone 15 mg BID; continue talk therapy; f/u in 4-6 weeks    Orders:    DULoxetine (Cymbalta) 30 mg delayed release capsule; Take 1 capsule (30 mg total) by mouth daily       SUBJECTIVE:    Madeleine Sanchez is a sean 79 y.o. female with a history of Adjustment Disorder and Generalized Anxiety Disorder who presents today for follow-up and medication management. Since her last visit she wound up starting duloxetine 30 mg qd (vilazodone was denied) and does feel this is well tolerated and has been mildly beneficial for her depression, anxiety, and fibromyalgia. She did also start with Apple Bhatti LPC, for therapy. She is having continued GI problems, particularly nausea in the AM (started before duloxetine) and has endoscopy coming up.     She denies any suicidal ideation, intent or plan at present; denies any homicidal ideation, intent or plan at present.    She denies any  auditory hallucinations, denies any visual hallucinations, denies any delusions.    She denies any side effects from current psychiatric medications.    HPI ROS Appetite Changes and Sleep:     She reports difficulty falling asleep, adequate appetite, adequate energy level    Current Rating Scores:     None completed today.     Review Of Systems:    Mood anxiety and depression   Behavior appropriate, cooperative, and calm   Thought Content daily worries and negative thoughts   General emotional problems and decreased functioning   Personality no change in personality   Other Psych Symptoms normal   Constitutional as noted in HPI   ENT as noted in HPI   Cardiovascular as noted in HPI   Respiratory as noted in HPI   Gastrointestinal as noted in HPI   Genitourinary as noted in HPI   Musculoskeletal as noted in HPI   Integumentary as noted in HPI   Neurological as noted in HPI   Endocrine negative   Other Symptoms none, all other systems are negative     Family Psychiatric History:     Family History   Problem Relation Age of Onset    Cancer Sister      Social/Substance Abuse History:    Social History     Socioeconomic History    Marital status:      Spouse name: Not on file    Number of children: Not on file    Years of education: Not on file    Highest education level: Not on file   Occupational History    Not on file   Tobacco Use    Smoking status: Never    Smokeless tobacco: Never   Vaping Use    Vaping status: Never Used   Substance and Sexual Activity    Alcohol use: Not Currently     Comment: socially    Drug use: Never    Sexual activity: Not on file     Comment: defer   Other Topics Concern    Not on file   Social History Narrative    · Most recent tobacco use screenin2019      Social Drivers of Health     Financial Resource Strain: Low Risk  (2023)    [facility]    Overall Financial Resource Strain (CARDIA)     Difficulty of  Paying Living Expenses: Not very hard   Recent Concern: Financial Resource Strain - Medium Risk (7/18/2023)    Overall Financial Resource Strain (CARDIA)     Difficulty of Paying Living Expenses: Somewhat hard   Food Insecurity: No Food Insecurity (10/9/2024)    Nursing - Inadequate Food Risk Classification     Worried About Running Out of Food in the Last Year: Never true     Ran Out of Food in the Last Year: Never true     Ran Out of Food in the Last Year: Not on file   Transportation Needs: No Transportation Needs (10/9/2024)    PRAPARE - Transportation     Lack of Transportation (Medical): No     Lack of Transportation (Non-Medical): No   Physical Activity: Not on file   Stress: Not on file   Social Connections: Not on file   Intimate Partner Violence: Not At Risk (9/14/2023)    [facility]    Humiliation, Afraid, Rape, and Kick questionnaire     Fear of Current or Ex-Partner: No     Emotionally Abused: No     Physically Abused: No     Sexually Abused: No   Housing Stability: Low Risk  (10/9/2024)    Housing Stability Vital Sign     Unable to Pay for Housing in the Last Year: No     Number of Times Moved in the Last Year: 0     Homeless in the Last Year: No     The following portions of the patient's history were reviewed and updated as appropriate: past family history, past medical history, past social history, past surgical history and problem list.    OBJECTIVE:     Mental Status Evaluation:  Appearance:  dressed appropriately, adequate grooming, looks stated age   Behavior:  pleasant, cooperative, calm, interacts appropriately with this writer   Speech:  normal rate, normal volume, normal pitch   Mood:  slightly less depressed   Affect:  constricted   Thought Process:  organized, logical, coherent   Associations: intact associations   Thought Content:  no overt delusions, no paranoia noted on exam   Perceptual Disturbances: no auditory  hallucinations, no visual hallucinations   Risk Potential: Suicidal ideation - None  Homicidal ideation - None  Potential for aggression - No   Sensorium:  oriented to person, place, and time/date   Memory:  recent and remote memory grossly intact   Consciousness:  alert and awake   Attention/Concentration: attention span and concentration are age appropriate   Insight:  age appropriate   Judgment: age appropriate   Gait/Station: unstable gait, uses cane   Motor Activity: no abnormal movements     Laboratory Results: I have personally reviewed all pertinent laboratory/tests results    Suicide/Homicide Risk Assessment:    Risk of Harm to Self:  The following ratings are based on assessment at the time of the interview  Based on today's assessment, Madeleine presents the following risk of harm to self: none    Risk of Harm to Others:  The following ratings are based on assessment at the time of the interview  Based on today's assessment, Madeleine presents the following risk of harm to others: none    The following interventions are recommended: no intervention changes needed    Assessment/Plan:      She did wind up starting duloxetine 30 mg qd since last visit (vilazodone was not approved) and does feel that it has helped mildly with depression and anxiety and even fibromyalgia. She is having an endoscopy soon due to ongoing stomach problems so advised no changes at this time but if worsens can increase duloxetine to 40 or 60 mg qd. Eventually pt would like to transition off of bupropion and/or buspirone as well. We have discussed her safety plan and she agrees that if she experience unsafe thoughts that she will reach out to her supports including this office, the suicide hotline, and emergency services if necessary. Madeleine is aware of non-emergent and emergent mental health resources. They are able to contract for their own safety at this time.    Will follow up in 4 weeks. Patient is aware to call the office if  questions or concerns arise sooner.      Diagnoses and all orders for this visit:    BRINDA (generalized anxiety disorder)  -     DULoxetine (Cymbalta) 30 mg delayed release capsule; Take 1 capsule (30 mg total) by mouth daily  -     busPIRone (BUSPAR) 15 mg tablet; Take 1 tablet (15 mg total) by mouth 2 (two) times a day    Current moderate episode of major depressive disorder without prior episode (HCC)  -     DULoxetine (Cymbalta) 30 mg delayed release capsule; Take 1 capsule (30 mg total) by mouth daily        Treatment Recommendations/Precautions:    Continue current medications:    - duloxetine 30 mg qd     - buspirone 15 mg BID     - bupropion  mg qAM    Aware of 24 hour and weekend coverage for urgent situations accessed by calling [facility] main practice number  Medication management every 1 month  Continue psychotherapy with [facility] therapist Apple Bhatti  I am scheduling this patient out for greater than 3 months: No    Medications Risks/Benefits      Risks, Benefits And Possible Side Effects Of Medications:    Risks, benefits, and possible side effects of medications explained to Madeleine and she verbalizes understanding and agreement for treatment.    Controlled Medication Discussion:     Not applicable    Psychotherapy Provided:     Individual psychotherapy provided: No    Treatment Plan:    Completed and signed during the session: Not applicable - Treatment Plan to be completed by [facility] therapist    Visit Time    Visit Start Time:  2:00 PM  Visit End Time:  2:25 PM  Total Visit Duration:  25 minutes    Irma Batista 12/16/24

## 2024-12-17 ENCOUNTER — RA CDI HCC (OUTPATIENT)
Dept: OTHER | Facility: HOSPITAL | Age: 79
End: 2024-12-17

## 2024-12-17 ENCOUNTER — TELEPHONE (OUTPATIENT)
Age: 79
End: 2024-12-17

## 2024-12-19 ENCOUNTER — ANESTHESIA (OUTPATIENT)
Dept: GASTROENTEROLOGY | Facility: AMBULARY SURGERY CENTER | Age: 79
End: 2024-12-19
Payer: MEDICARE

## 2024-12-19 ENCOUNTER — HOSPITAL ENCOUNTER (OUTPATIENT)
Dept: GASTROENTEROLOGY | Facility: AMBULARY SURGERY CENTER | Age: 79
Setting detail: OUTPATIENT SURGERY
End: 2024-12-19
Attending: INTERNAL MEDICINE
Payer: MEDICARE

## 2024-12-19 VITALS
SYSTOLIC BLOOD PRESSURE: 121 MMHG | HEART RATE: 89 BPM | TEMPERATURE: 97.5 F | OXYGEN SATURATION: 99 % | DIASTOLIC BLOOD PRESSURE: 63 MMHG | RESPIRATION RATE: 16 BRPM

## 2024-12-19 DIAGNOSIS — R13.10 DYSPHAGIA, UNSPECIFIED TYPE: ICD-10-CM

## 2024-12-19 DIAGNOSIS — R11.2 NAUSEA AND VOMITING, UNSPECIFIED VOMITING TYPE: ICD-10-CM

## 2024-12-19 PROCEDURE — 43249 ESOPH EGD DILATION <30 MM: CPT | Performed by: INTERNAL MEDICINE

## 2024-12-19 PROCEDURE — C1726 CATH, BAL DIL, NON-VASCULAR: HCPCS

## 2024-12-19 RX ORDER — LIDOCAINE HYDROCHLORIDE 10 MG/ML
INJECTION, SOLUTION EPIDURAL; INFILTRATION; INTRACAUDAL; PERINEURAL AS NEEDED
Status: DISCONTINUED | OUTPATIENT
Start: 2024-12-19 | End: 2024-12-19

## 2024-12-19 RX ORDER — PROPOFOL 10 MG/ML
INJECTION, EMULSION INTRAVENOUS AS NEEDED
Status: DISCONTINUED | OUTPATIENT
Start: 2024-12-19 | End: 2024-12-19

## 2024-12-19 RX ORDER — SODIUM CHLORIDE, SODIUM LACTATE, POTASSIUM CHLORIDE, CALCIUM CHLORIDE 600; 310; 30; 20 MG/100ML; MG/100ML; MG/100ML; MG/100ML
INJECTION, SOLUTION INTRAVENOUS CONTINUOUS PRN
Status: DISCONTINUED | OUTPATIENT
Start: 2024-12-19 | End: 2024-12-19

## 2024-12-19 RX ADMIN — LIDOCAINE HYDROCHLORIDE 5 ML: 10 INJECTION, SOLUTION EPIDURAL; INFILTRATION; INTRACAUDAL; PERINEURAL at 11:59

## 2024-12-19 RX ADMIN — PROPOFOL 50 MG: 10 INJECTION, EMULSION INTRAVENOUS at 12:01

## 2024-12-19 RX ADMIN — PROPOFOL 50 MG: 10 INJECTION, EMULSION INTRAVENOUS at 12:04

## 2024-12-19 RX ADMIN — SODIUM CHLORIDE, SODIUM LACTATE, POTASSIUM CHLORIDE, AND CALCIUM CHLORIDE: .6; .31; .03; .02 INJECTION, SOLUTION INTRAVENOUS at 10:35

## 2024-12-19 RX ADMIN — PROPOFOL 50 MG: 10 INJECTION, EMULSION INTRAVENOUS at 12:00

## 2024-12-19 RX ADMIN — PROPOFOL 100 MG: 10 INJECTION, EMULSION INTRAVENOUS at 11:59

## 2024-12-19 NOTE — ANESTHESIA PREPROCEDURE EVALUATION
Procedure:  EGD    Relevant Problems   CARDIO   (+) GAVE (gastric antral vascular ectasia)   (+) Internal hemorrhoids   (+) Mixed hyperlipidemia   (+) TA (temporal arteritis) (HCC)      ENDO   (+) Hypothyroidism, adult      GI/HEPATIC   (+) Dysphagia   (+) GERD (gastroesophageal reflux disease)   (+) Hiatal hernia      GYN   (+) Bilateral malignant neoplasm of breast in female, unspecified estrogen receptor status, unspecified site of breast (HCC)      HEMATOLOGY   (+) Anemia   (+) Iron deficiency anemia due to chronic blood loss      MUSCULOSKELETAL   (+) Fibromyalgia   (+) Hiatal hernia   (+) Primary osteoarthritis of right knee      NEURO/PSYCH   (+) Current moderate episode of major depressive disorder without prior episode (HCC)   (+) Fibromyalgia   (+) BRINDA (generalized anxiety disorder)   (+) Headache      PULMONARY   (+) Moderate obstructive sleep apnea   Esophageal stenosis prior balloon dilation prior Nissen fundoplication      TTE: LVEF 60-65%, mild TR     On Xarelto, held  ASHLEY - wears CPAP        Physical Exam    Airway    Mallampati score: II  TM Distance: >3 FB  Neck ROM: full     Dental   Comment: Denies loose teeth     Cardiovascular  Cardiovascular exam normal    Pulmonary  Pulmonary exam normal     Other Findings  Portions of exam deferred due to low yield and/or unknown COVID statuspost-pubertal.      Anesthesia Plan  ASA Score- 3     Anesthesia Type- IV sedation with anesthesia with ASA Monitors.         Additional Monitors:     Airway Plan:            Plan Factors-Exercise tolerance (METS): >4 METS.    Chart reviewed.   Existing labs reviewed. Patient summary reviewed.    Patient is not a current smoker.              Induction- intravenous.    Postoperative Plan-         Informed Consent- Anesthetic plan and risks discussed with patient.  I personally reviewed this patient with the CRNA. Discussed and agreed on the Anesthesia Plan with the CRNA..

## 2024-12-19 NOTE — H&P
"History and Physical - [facility]  Madeleine Sanchez 79 y.o. female MRN: [id number]                  HPI: Madeleine Sanchez is a 79 y.o. year old female who presents for dysphagia      REVIEW OF SYSTEMS: Per the HPI, and otherwise unremarkable.    Historical Information   Past Medical History:   Diagnosis Date    Bilateral pulmonary embolism (HCC) 08/25/2019    Cancer (HCC)     left breast cancer    Cervical herniated disc     Chronic pain disorder     back pain    Chronic respiratory failure (HCC)     uses O2 at home with NC /5/19 no longer using/just CPAP    Colon polyp     CPAP (continuous positive airway pressure) dependence     Disease of thyroid gland     hypothyroid    DVT (deep venous thrombosis) (HCC) 2020    right leg    Examination for, follow-up 11/03/2021    Family history of reaction to anesthesia     \"son and daughter hard time waking up and also PONV\"    Fibromyalgia, primary     GERD (gastroesophageal reflux disease)     Glaucoma     Hiatal hernia     History of palpitations     History of pneumonia     History of transfusion     Hyperlipidemia     Hypertension     Irregular heart beat     Migraine     Myocardial infarction (HCC)     pt sees cardilogist Dr Bennett [facility]/\"didnt realize had one, found on EKG before a surgery\"    OAB (overactive bladder)     Pollen allergies     Pulmonary embolism (HCC) 2019    Bilateral; on Xarelto    Restless leg     Risk for falls     Sleep apnea     Use of cane as ambulatory aid     occas    Uses brace     Mafo/left leg    Wears glasses      Past Surgical History:   Procedure Laterality Date    ADENOIDECTOMY      BAND HEMORRHOIDECTOMY      CARDIAC CATHETERIZATION      CARPAL TUNNEL RELEASE      CATARACT EXTRACTION Bilateral     COLONOSCOPY      FOOT SURGERY      pin implanted right toe    JOINT REPLACEMENT Bilateral     knees    LAMINECTOMY      LUMBAR EPIDURAL INJECTION      MASTECTOMY Bilateral     with reconstruction and implants    NISSEN " "FUNDOPLICATION      CA LNGTH/SHRT TENDON LEG/ANKLE 1 TENDON SPX Left 05/24/2021    Procedure: Sectioning peroneal tendons with lengthening/sectioning achilles tendon lower leg and ankle;  Surgeon: Fei Sifuentes DPM;  Location: [facility];  Service: Podiatry    REPLACEMENT TOTAL KNEE      SPINAL FUSION      TONSILLECTOMY      TUBAL LIGATION      TUMOR EXCISION      right forearm/benign    UPPER GASTROINTESTINAL ENDOSCOPY      VEIN LIGATION Right      Social History   Social History     Substance and Sexual Activity   Alcohol Use Not Currently    Comment: socially     Social History     Substance and Sexual Activity   Drug Use Never     Social History     Tobacco Use   Smoking Status Never   Smokeless Tobacco Never     Family History   Problem Relation Age of Onset    Cancer Sister        Meds/Allergies       Current Outpatient Medications:     acetaminophen (TYLENOL) 325 mg tablet    ASPIRIN 81 PO    brimonidine (ALPHAGAN P) 0.15 % ophthalmic solution    buPROPion (WELLBUTRIN XL) 300 mg 24 hr tablet    busPIRone (BUSPAR) 15 mg tablet    cyanocobalamin 1,000 mcg/mL    DULoxetine (Cymbalta) 30 mg delayed release capsule    famotidine (PEPCID) 20 mg tablet    latanoprost (XALATAN) 0.005 % ophthalmic solution    levothyroxine 100 mcg tablet    magnesium 30 MG tablet    NEEDLE, DISP, 25 G (B-D DISP NEEDLE 25GX1\") 25G X 1\" MISC    pantoprazole (PROTONIX) 40 mg tablet    rOPINIRole (REQUIP) 1 mg tablet    Syringe, Disposable, (2-3CC SYRINGE) 3 ML MISC    timolol (TIMOPTIC) 0.5 % ophthalmic solution    torsemide (DEMADEX) 10 mg tablet    Xarelto 20 MG tablet    fluticasone (FLONASE) 50 mcg/act nasal spray  No current facility-administered medications for this encounter.    Facility-Administered Medications Ordered in Other Encounters:     lactated ringers infusion, , Intravenous, Continuous PRN, New Bag at 12/19/24 1035    Allergies   Allergen Reactions    Benzalkonium Chloride Hives     Merthiolate tincture " 9/28/2020      Hydromorphone Hallucinations     disorientation; hallucination; confusion      Merthiolate  [Thimerosal (Thiomersal)] Hives    Quinine Derivatives Other (See Comments) and Tinnitus     tinnitus      Codeine Nausea Only and Other (See Comments)     nausea      Medical Tape Rash    Bee Pollen Other (See Comments)     Other reaction(s): Nasal Congestion    Other Itching    Pamelor [Nortriptyline] Vomiting     Per pt, itching also    Pollen Extract Nasal Congestion    Statins Itching    Wound Dressing Adhesive Rash       Objective     /66   Pulse 74   Temp 97.5 °F (36.4 °C) (Temporal)   Resp 18   SpO2 97%       PHYSICAL EXAM    Gen: NAD  Head: NCAT  CV: RRR  CHEST: Clear  ABD: soft, NT/ND  EXT: no edema      ASSESSMENT/PLAN:  This is a 79 y.o. year old female here for EGD, and she is stable and optimized for her procedure.

## 2024-12-19 NOTE — ANESTHESIA POSTPROCEDURE EVALUATION
Post-Op Assessment Note    CV Status:  Stable  Pain Score: 0    Pain management: adequate       Mental Status:  Alert and awake   Hydration Status:  Euvolemic   PONV Controlled:  Controlled   Airway Patency:  Patent     Post Op Vitals Reviewed: Yes    No anethesia notable event occurred.    Staff: Anesthesiologist           Last Filed PACU Vitals:  Vitals Value Taken Time   Temp     Pulse 69    /54    Resp 16    SpO2 100        Modified María:  Activity: 2 (12/19/2024 10:51 AM)  Respiration: 2 (12/19/2024 10:51 AM)  Consciousness: 2 (12/19/2024 10:51 AM)  Oxygen Saturation: 2 (12/19/2024 10:51 AM)

## 2024-12-31 ENCOUNTER — SOCIAL WORK (OUTPATIENT)
Dept: BEHAVIORAL/MENTAL HEALTH CLINIC | Facility: CLINIC | Age: 79
End: 2024-12-31
Payer: MEDICARE

## 2024-12-31 DIAGNOSIS — F32.1 CURRENT MODERATE EPISODE OF MAJOR DEPRESSIVE DISORDER WITHOUT PRIOR EPISODE (HCC): Primary | ICD-10-CM

## 2024-12-31 DIAGNOSIS — F41.1 GAD (GENERALIZED ANXIETY DISORDER): ICD-10-CM

## 2024-12-31 PROCEDURE — 90834 PSYTX W PT 45 MINUTES: CPT | Performed by: COUNSELOR

## 2024-12-31 NOTE — PSYCH
Behavioral Health Psychotherapy Progress Note    Psychotherapy Provided: Individual Psychotherapy     1. Current moderate episode of major depressive disorder without prior episode (HCC)        2. BRINDA (generalized anxiety disorder)            Goals addressed in session: Goal 1     DATA: Madeleine was in person for session.  Shared that she was still in some pain but did have the procedure on her esophagus and is feeling some relief from that.  Noted that she is still in pain and thinks it may be gallbladder related.  Also revisited her family and the holiday.  Noted that there were still concerns with her grandson and that they never followed through with going to [facility] as his mother reentered the hospital.  Shared that she was there at [place] and he was trying to talk her into something that he wanted.  Was upset that he dressed in pajamas and a T-shirt and his hair was unkempt.  Realized that her son does not address these issues and that he is almost encouraging of some of the grandson's behavior.  Also continue to address concerns with her daughter who does not work and is not making any positive changes in her life continues to focus on her boyfriend and saw over the holidays how he is not very nice to her.  Also concerned about her other son's wife who has brain cancer and is having difficulty with her medical treatment.  Shifted focus back to her to talk about her own feelings and it is difficult for her to stay on topic about herself as she is so consumed with worrying about her children and their concerns.  Continue to advocate for her to set appropriate limits and discussed how she would feel about returning to have the home to herself.  Encouraged her to have other children assist her in helping to set limits with her daughter.  Noted that if she tries to set limits with her daughter she receives several excuses why it cannot be done.  Encouraged her to allow the excuses to be her daughter's  "problem not her own and still continue to advocate and set a limit.  Asked her to consider by what dates she would like the daughter to be out on her own and inform her of that rather than telling her how she can do it.  During this session, this clinician used the following therapeutic modalities: Client-centered Therapy and Cognitive Behavioral Therapy    Substance Abuse was not addressed during this session. If the client is diagnosed with a co-occurring substance use disorder, please indicate any changes in the frequency or amount of use: Not applicable. Stage of change for addressing substance use diagnoses: No substance use/Not applicable    ASSESSMENT:  Madeleine Sanchez presents with a Euthymic/ normal and Anxious mood.     her affect is Normal range and intensity, which is congruent, with her mood and the content of the session. The client has made progress on their goals.    Is becoming aware of some of the ways that she is overinvolved with family and is learning how to discern the difference between caring about them versus needing to solve their problems.  Resorts to some advice giving which she finds is being ignored or dismissed.  Madeleine Sanchez presents with a none risk of suicide, none risk of self-harm, and none risk of harm to others.    For any risk assessment that surpasses a \"low\" rating, a safety plan must be developed.    A safety plan was indicated: no  If yes, describe in detail not applicable    PLAN: Between sessions, Madeleine Sanchez will continue to identify her own needs rather than becoming overinvolved in extended family issues. At the next session, the therapist will use Client-centered Therapy and Cognitive Behavioral Therapy to address mood regulation and anxiety management.    Behavioral Health Treatment Plan and Discharge Planning: Madeleine Sanchez is aware of and agrees to continue to work on their treatment plan. They have identified and are working toward their discharge goals. " yes    Depression Follow-up Plan Completed: Not applicable    Visit start and stop times:    12/31/24  Start Time: 1100  Stop Time: 1145  Total Visit Time: 45 minutes

## 2025-01-03 DIAGNOSIS — K21.9 CHRONIC GERD: ICD-10-CM

## 2025-01-03 DIAGNOSIS — E03.9 HYPOTHYROIDISM, ADULT: ICD-10-CM

## 2025-01-06 ENCOUNTER — NURSE TRIAGE (OUTPATIENT)
Age: 80
End: 2025-01-06

## 2025-01-06 ENCOUNTER — TELEPHONE (OUTPATIENT)
Age: 80
End: 2025-01-06

## 2025-01-06 RX ORDER — LEVOTHYROXINE SODIUM 100 UG/1
100 TABLET ORAL DAILY
Qty: 90 TABLET | Refills: 1 | Status: SHIPPED | OUTPATIENT
Start: 2025-01-06

## 2025-01-06 RX ORDER — FAMOTIDINE 40 MG/1
40 TABLET, FILM COATED ORAL DAILY
Qty: 90 TABLET | Refills: 1 | Status: SHIPPED | OUTPATIENT
Start: 2025-01-06

## 2025-01-06 NOTE — TELEPHONE ENCOUNTER
"Patient had EGD on 12/19.  Having continued nausea, belching and tenderness 5-8/10 in upper abdomen since prior to EGD.  Vomited x 2 since EGD. Worse in morning.  Does have hx of constipation, denies diarrhea.  Taking famotidine 20 mg twice daily and pantoprazole along with tums with no relief.  Dr. Mckeon and patient had discussed possible gallbladder.  EGD did not show anything.  Appointment made for 1/23/25.   Reason for Disposition   Abdominal pain is a chronic symptom (recurrent or ongoing AND lasting > 4 weeks)    Answer Assessment - Initial Assessment Questions  1. LOCATION: \"Where does it hurt?\"       Upper abdomen  2. RADIATION: \"Does the pain shoot anywhere else?\" (e.g., chest, back)      denies  3. ONSET: \"When did the pain begin?\" (e.g., minutes, hours or days ago)       Months ago    5. PATTERN \"Does the pain come and go, or is it constant?\"      Constant   6. SEVERITY: \"How bad is the pain?\"  (e.g., Scale 1-10; mild, moderate, or severe)      5-8/10  7. RECURRENT SYMPTOM: \"Have you ever had this type of stomach pain before?\" If Yes, ask: \"When was the last time?\" and \"What happened that time?\"       denies  8. CAUSE: \"What do you think is causing the stomach pain?\"      unsure  9. RELIEVING/AGGRAVATING FACTORS: \"What makes it better or worse?\" (e.g., antacids, bending or twisting motion, bowel movement)      denies  10. OTHER SYMPTOMS: \"Do you have any other symptoms?\" (e.g., back pain, diarrhea, fever, urination pain, vomiting)        Nausea and belching    Protocols used: Abdominal Pain - Female-Adult-[place]    "

## 2025-01-06 NOTE — TELEPHONE ENCOUNTER
Spoke with patient . She states she is still having abdominal pain, level is now 6/10, in mid upper abdomen. C/o nausea but no vomiting. Reviewed recommendations from [facility] with her, advised her to go to ER. She will talk with her daughter; states she feels better than earlier. Patient states she feels she needs gallbladder evaluated. She will decide after talking to her daughter if she shoild go to ER. Advised if symptoms continue and or worsen that she should go to ER for evaluation.

## 2025-01-06 NOTE — TELEPHONE ENCOUNTER
Recommend that if patient has having pain 8/10 pain in upper abdomen she go to the emergency room for further evaluation.  Since she reports this has been ongoing since the EGD and had no reports of pain prior to the EGD they need to do imaging to make sure there was no perforation.  Thank you

## 2025-01-06 NOTE — TELEPHONE ENCOUNTER
Patient is calling regarding cancelling an appointment.    Date/Time: 1/8/25 at 2 pm.    Reason: Patient has an appt same day and time with Cardiology.    Patient was rescheduled: YES [] NO [x]  If yes, when was Patient reschedule for: Pt already has appt on 1/15/25 at 2 pm she will attend.    Patient requesting call back to reschedule: YES [] NO [x]

## 2025-01-14 ENCOUNTER — APPOINTMENT (EMERGENCY)
Dept: RADIOLOGY | Facility: HOSPITAL | Age: 80
End: 2025-01-14
Payer: MEDICARE

## 2025-01-14 ENCOUNTER — HOSPITAL ENCOUNTER (OUTPATIENT)
Facility: HOSPITAL | Age: 80
Setting detail: OBSERVATION
Discharge: HOME | End: 2025-01-15
Attending: EMERGENCY MEDICINE | Admitting: INTERNAL MEDICINE
Payer: MEDICARE

## 2025-01-14 DIAGNOSIS — F43.23 ADJUSTMENT DISORDER WITH MIXED ANXIETY AND DEPRESSED MOOD: ICD-10-CM

## 2025-01-14 DIAGNOSIS — F41.1 GAD (GENERALIZED ANXIETY DISORDER): ICD-10-CM

## 2025-01-14 DIAGNOSIS — J40 BRONCHITIS: Primary | ICD-10-CM

## 2025-01-14 DIAGNOSIS — J10.1 INFLUENZA A: ICD-10-CM

## 2025-01-14 DIAGNOSIS — K21.00 GASTROESOPHAGEAL REFLUX DISEASE WITH ESOPHAGITIS WITHOUT HEMORRHAGE: ICD-10-CM

## 2025-01-14 PROBLEM — R06.02 SHORTNESS OF BREATH: Status: ACTIVE | Noted: 2025-01-14

## 2025-01-14 LAB
ALBUMIN SERPL BCG-MCNC: 3.9 G/DL (ref 3.5–5)
ALP SERPL-CCNC: 106 U/L (ref 34–104)
ALT SERPL W P-5'-P-CCNC: 13 U/L (ref 7–52)
ANION GAP SERPL CALCULATED.3IONS-SCNC: 10 MMOL/L (ref 4–13)
AST SERPL W P-5'-P-CCNC: 18 U/L (ref 13–39)
BASOPHILS # BLD AUTO: 0.02 THOUSANDS/ΜL (ref 0–0.1)
BASOPHILS NFR BLD AUTO: 0 % (ref 0–1)
BILIRUB SERPL-MCNC: 0.29 MG/DL (ref 0.2–1)
BUN SERPL-MCNC: 14 MG/DL (ref 5–25)
CALCIUM SERPL-MCNC: 8.8 MG/DL (ref 8.4–10.2)
CHLORIDE SERPL-SCNC: 106 MMOL/L (ref 96–108)
CO2 SERPL-SCNC: 27 MMOL/L (ref 21–32)
CREAT SERPL-MCNC: 0.92 MG/DL (ref 0.6–1.3)
EOSINOPHIL # BLD AUTO: 0.57 THOUSAND/ΜL (ref 0–0.61)
EOSINOPHIL NFR BLD AUTO: 10 % (ref 0–6)
ERYTHROCYTE [DISTWIDTH] IN BLOOD BY AUTOMATED COUNT: 13.3 % (ref 11.6–15.1)
FLUAV AG UPPER RESP QL IA.RAPID: POSITIVE
FLUBV AG UPPER RESP QL IA.RAPID: NEGATIVE
GFR SERPL CREATININE-BSD FRML MDRD: 58 ML/MIN/1.73SQ M
GLUCOSE SERPL-MCNC: 109 MG/DL (ref 65–140)
HCT VFR BLD AUTO: 45.5 % (ref 34.8–46.1)
HGB BLD-MCNC: 14.6 G/DL (ref 11.5–15.4)
IMM GRANULOCYTES # BLD AUTO: 0.03 THOUSAND/UL (ref 0–0.2)
IMM GRANULOCYTES NFR BLD AUTO: 1 % (ref 0–2)
LYMPHOCYTES # BLD AUTO: 1.07 THOUSANDS/ΜL (ref 0.6–4.47)
LYMPHOCYTES NFR BLD AUTO: 18 % (ref 14–44)
MCH RBC QN AUTO: 30.2 PG (ref 26.8–34.3)
MCHC RBC AUTO-ENTMCNC: 32.1 G/DL (ref 31.4–37.4)
MCV RBC AUTO: 94 FL (ref 82–98)
MONOCYTES # BLD AUTO: 0.9 THOUSAND/ΜL (ref 0.17–1.22)
MONOCYTES NFR BLD AUTO: 15 % (ref 4–12)
NEUTROPHILS # BLD AUTO: 3.29 THOUSANDS/ΜL (ref 1.85–7.62)
NEUTS SEG NFR BLD AUTO: 56 % (ref 43–75)
NRBC BLD AUTO-RTO: 0 /100 WBCS
PLATELET # BLD AUTO: 252 THOUSANDS/UL (ref 149–390)
PMV BLD AUTO: 9.8 FL (ref 8.9–12.7)
POTASSIUM SERPL-SCNC: 3.4 MMOL/L (ref 3.5–5.3)
PROT SERPL-MCNC: 7.1 G/DL (ref 6.4–8.4)
RBC # BLD AUTO: 4.83 MILLION/UL (ref 3.81–5.12)
SARS-COV+SARS-COV-2 AG RESP QL IA.RAPID: NEGATIVE
SODIUM SERPL-SCNC: 143 MMOL/L (ref 135–147)
WBC # BLD AUTO: 5.88 THOUSAND/UL (ref 4.31–10.16)

## 2025-01-14 PROCEDURE — 36415 COLL VENOUS BLD VENIPUNCTURE: CPT | Performed by: EMERGENCY MEDICINE

## 2025-01-14 PROCEDURE — 99284 EMERGENCY DEPT VISIT MOD MDM: CPT

## 2025-01-14 PROCEDURE — 94664 DEMO&/EVAL PT USE INHALER: CPT

## 2025-01-14 PROCEDURE — 99223 1ST HOSP IP/OBS HIGH 75: CPT | Performed by: INTERNAL MEDICINE

## 2025-01-14 PROCEDURE — 94640 AIRWAY INHALATION TREATMENT: CPT

## 2025-01-14 PROCEDURE — 71045 X-RAY EXAM CHEST 1 VIEW: CPT

## 2025-01-14 PROCEDURE — 87804 INFLUENZA ASSAY W/OPTIC: CPT | Performed by: EMERGENCY MEDICINE

## 2025-01-14 PROCEDURE — 96374 THER/PROPH/DIAG INJ IV PUSH: CPT

## 2025-01-14 PROCEDURE — 80053 COMPREHEN METABOLIC PANEL: CPT | Performed by: EMERGENCY MEDICINE

## 2025-01-14 PROCEDURE — 94760 N-INVAS EAR/PLS OXIMETRY 1: CPT

## 2025-01-14 PROCEDURE — 99284 EMERGENCY DEPT VISIT MOD MDM: CPT | Performed by: EMERGENCY MEDICINE

## 2025-01-14 PROCEDURE — 85025 COMPLETE CBC W/AUTO DIFF WBC: CPT | Performed by: EMERGENCY MEDICINE

## 2025-01-14 PROCEDURE — 87811 SARS-COV-2 COVID19 W/OPTIC: CPT | Performed by: EMERGENCY MEDICINE

## 2025-01-14 RX ORDER — DEXAMETHASONE SODIUM PHOSPHATE 10 MG/ML
10 INJECTION, SOLUTION INTRAMUSCULAR; INTRAVENOUS ONCE
Status: COMPLETED | OUTPATIENT
Start: 2025-01-14 | End: 2025-01-14

## 2025-01-14 RX ORDER — PANTOPRAZOLE SODIUM 40 MG/1
40 TABLET, DELAYED RELEASE ORAL
Status: DISCONTINUED | OUTPATIENT
Start: 2025-01-14 | End: 2025-01-15 | Stop reason: HOSPADM

## 2025-01-14 RX ORDER — DULOXETIN HYDROCHLORIDE 30 MG/1
30 CAPSULE, DELAYED RELEASE ORAL DAILY
Status: DISCONTINUED | OUTPATIENT
Start: 2025-01-14 | End: 2025-01-15 | Stop reason: HOSPADM

## 2025-01-14 RX ORDER — GUAIFENESIN/DEXTROMETHORPHAN 100-10MG/5
10 SYRUP ORAL EVERY 4 HOURS PRN
Status: DISCONTINUED | OUTPATIENT
Start: 2025-01-14 | End: 2025-01-15 | Stop reason: HOSPADM

## 2025-01-14 RX ORDER — GUAIFENESIN 600 MG/1
600 TABLET, EXTENDED RELEASE ORAL EVERY 12 HOURS SCHEDULED
Status: DISCONTINUED | OUTPATIENT
Start: 2025-01-14 | End: 2025-01-15 | Stop reason: HOSPADM

## 2025-01-14 RX ORDER — IBUPROFEN 600 MG/1
600 TABLET, FILM COATED ORAL ONCE
Status: COMPLETED | OUTPATIENT
Start: 2025-01-14 | End: 2025-01-14

## 2025-01-14 RX ORDER — ACETAMINOPHEN 325 MG/1
650 TABLET ORAL EVERY 6 HOURS PRN
Status: DISCONTINUED | OUTPATIENT
Start: 2025-01-14 | End: 2025-01-15 | Stop reason: HOSPADM

## 2025-01-14 RX ORDER — BRIMONIDINE TARTRATE 2 MG/ML
1 SOLUTION/ DROPS OPHTHALMIC 2 TIMES DAILY
Status: DISCONTINUED | OUTPATIENT
Start: 2025-01-14 | End: 2025-01-15 | Stop reason: HOSPADM

## 2025-01-14 RX ORDER — AZITHROMYCIN 500 MG/1
500 TABLET, FILM COATED ORAL ONCE
Status: COMPLETED | OUTPATIENT
Start: 2025-01-14 | End: 2025-01-14

## 2025-01-14 RX ORDER — IPRATROPIUM BROMIDE AND ALBUTEROL SULFATE 2.5; .5 MG/3ML; MG/3ML
3 SOLUTION RESPIRATORY (INHALATION) ONCE
Status: COMPLETED | OUTPATIENT
Start: 2025-01-14 | End: 2025-01-14

## 2025-01-14 RX ORDER — TORSEMIDE 10 MG/1
10 TABLET ORAL DAILY
Status: DISCONTINUED | OUTPATIENT
Start: 2025-01-14 | End: 2025-01-15 | Stop reason: HOSPADM

## 2025-01-14 RX ORDER — BUPROPION HYDROCHLORIDE 150 MG/1
300 TABLET ORAL DAILY
Status: DISCONTINUED | OUTPATIENT
Start: 2025-01-14 | End: 2025-01-15 | Stop reason: HOSPADM

## 2025-01-14 RX ORDER — ROPINIROLE 1 MG/1
1 TABLET, FILM COATED ORAL
Status: DISCONTINUED | OUTPATIENT
Start: 2025-01-14 | End: 2025-01-15 | Stop reason: HOSPADM

## 2025-01-14 RX ORDER — ONDANSETRON 2 MG/ML
4 INJECTION INTRAMUSCULAR; INTRAVENOUS EVERY 6 HOURS PRN
Status: DISCONTINUED | OUTPATIENT
Start: 2025-01-14 | End: 2025-01-15 | Stop reason: HOSPADM

## 2025-01-14 RX ORDER — ALBUTEROL SULFATE 0.83 MG/ML
2.5 SOLUTION RESPIRATORY (INHALATION)
Status: DISCONTINUED | OUTPATIENT
Start: 2025-01-14 | End: 2025-01-14

## 2025-01-14 RX ORDER — LATANOPROST 50 UG/ML
1 SOLUTION/ DROPS OPHTHALMIC
Status: DISCONTINUED | OUTPATIENT
Start: 2025-01-14 | End: 2025-01-15 | Stop reason: HOSPADM

## 2025-01-14 RX ORDER — LEVOTHYROXINE SODIUM 100 UG/1
100 TABLET ORAL
Status: DISCONTINUED | OUTPATIENT
Start: 2025-01-14 | End: 2025-01-15 | Stop reason: HOSPADM

## 2025-01-14 RX ORDER — LEVALBUTEROL INHALATION SOLUTION 1.25 MG/3ML
1.25 SOLUTION RESPIRATORY (INHALATION)
Status: DISCONTINUED | OUTPATIENT
Start: 2025-01-14 | End: 2025-01-15 | Stop reason: HOSPADM

## 2025-01-14 RX ORDER — FAMOTIDINE 20 MG/1
40 TABLET, FILM COATED ORAL DAILY
Status: DISCONTINUED | OUTPATIENT
Start: 2025-01-14 | End: 2025-01-15 | Stop reason: HOSPADM

## 2025-01-14 RX ORDER — ENOXAPARIN SODIUM 100 MG/ML
40 INJECTION SUBCUTANEOUS DAILY
Status: DISCONTINUED | OUTPATIENT
Start: 2025-01-14 | End: 2025-01-15 | Stop reason: HOSPADM

## 2025-01-14 RX ORDER — ASPIRIN 81 MG/1
81 TABLET, CHEWABLE ORAL DAILY
Status: DISCONTINUED | OUTPATIENT
Start: 2025-01-14 | End: 2025-01-15 | Stop reason: HOSPADM

## 2025-01-14 RX ADMIN — GUAIFENESIN 600 MG: 600 TABLET, EXTENDED RELEASE ORAL at 22:03

## 2025-01-14 RX ADMIN — GUAIFENESIN AND DEXTROMETHORPHAN 10 ML: 20; 200 SYRUP ORAL at 14:01

## 2025-01-14 RX ADMIN — ROPINIROLE 1 MG: 1 TABLET, FILM COATED ORAL at 21:57

## 2025-01-14 RX ADMIN — LEVALBUTEROL HYDROCHLORIDE 1.25 MG: 1.25 SOLUTION RESPIRATORY (INHALATION) at 19:39

## 2025-01-14 RX ADMIN — AZITHROMYCIN 500 MG: 500 TABLET, FILM COATED ORAL at 10:34

## 2025-01-14 RX ADMIN — BRIMONIDINE TARTRATE 1 DROP: 2 SOLUTION/ DROPS OPHTHALMIC at 21:57

## 2025-01-14 RX ADMIN — ALBUTEROL SULFATE 2.5 MG: 2.5 SOLUTION RESPIRATORY (INHALATION) at 15:11

## 2025-01-14 RX ADMIN — DEXAMETHASONE SODIUM PHOSPHATE 10 MG: 10 INJECTION, SOLUTION INTRAMUSCULAR; INTRAVENOUS at 08:29

## 2025-01-14 RX ADMIN — PREDNISONE 50 MG: 20 TABLET ORAL at 17:50

## 2025-01-14 RX ADMIN — BUSPIRONE HYDROCHLORIDE 15 MG: 10 TABLET ORAL at 17:02

## 2025-01-14 RX ADMIN — ACETAMINOPHEN 650 MG: 325 TABLET, FILM COATED ORAL at 14:01

## 2025-01-14 RX ADMIN — RIVAROXABAN 20 MG: 20 TABLET, FILM COATED ORAL at 17:02

## 2025-01-14 RX ADMIN — IPRATROPIUM BROMIDE AND ALBUTEROL SULFATE 3 ML: .5; 3 SOLUTION RESPIRATORY (INHALATION) at 08:29

## 2025-01-14 RX ADMIN — IPRATROPIUM BROMIDE AND ALBUTEROL SULFATE 3 ML: .5; 3 SOLUTION RESPIRATORY (INHALATION) at 10:34

## 2025-01-14 RX ADMIN — IBUPROFEN 600 MG: 600 TABLET, FILM COATED ORAL at 08:29

## 2025-01-14 RX ADMIN — ASPIRIN 81 MG 81 MG: 81 TABLET ORAL at 11:36

## 2025-01-14 RX ADMIN — LATANOPROST 1 DROP: 50 SOLUTION OPHTHALMIC at 21:57

## 2025-01-14 RX ADMIN — GUAIFENESIN 600 MG: 600 TABLET, EXTENDED RELEASE ORAL at 14:01

## 2025-01-14 RX ADMIN — FAMOTIDINE 40 MG: 20 TABLET, FILM COATED ORAL at 22:03

## 2025-01-14 RX ADMIN — ACETAMINOPHEN 650 MG: 325 TABLET, FILM COATED ORAL at 21:57

## 2025-01-14 NOTE — ASSESSMENT & PLAN NOTE
Continue on Synthroid.   81 yo male Acoustic neuroma ,[place] , ,Parkinson Dz, unsteady gait ,  Chronic kidney disease  ,Gout  ,HTN (hypertension)   ,UC (ulcerative colitis) , resident of [facility] brought for eval of SOB and black stools for several days. Found to have anemia and admitted for hemotransfusion and GI workup .Patient had colonoscopy 3 years ago and [place] and had some polyps removed ,he denies any recent hx of GIB or GI workup ,but admits black stools and lightheadedness ,sob ,worsening last few days .Found to have RANJANA elevation and seen by cardiologist Admitted  to telemetry unit for monitoring , send 3 sets of cardiac ensymes to rule out coronary event, obtain ECHO to evaluate LVEF, cardiology consult ,continue current management, O2 supply, anticoagulation plan as per cardiology  ,aggrenox placed on hold until GI clearance will be obtained Nephrology cons called ,IV hydration is administrated ,lisinopril held as per cardiologist recommendation . (15 Jul 2019 08:57)

## 2025-01-14 NOTE — ASSESSMENT & PLAN NOTE
Will place patient under observation to medical surgical unit.  Treat the patient with Tamiflu.  Symptomatic management Tylenol as needed for fever and bodyaches.

## 2025-01-14 NOTE — ASSESSMENT & PLAN NOTE
Possible secondary to influenza A infection.  Treat the patient with albuterol nebulizers 4 times a day  We will place the patient under observation to medical surgical unit.  Monitor vitals and O2 sats to keep above 92%.

## 2025-01-14 NOTE — ED PROVIDER NOTES
ED Disposition       None          Assessment & Plan       Medical Decision Making  79-year-old female presenting with shortness of breath and cough.  Patient has expiratory wheezing throughout.  Patient is coughing up yellow sputum.  Will treat as likely bronchitis with DuoNeb and Decadron.  Will obtain chest x-ray.  Will obtain COVID and flu testing.  Will ultimately placed on antibiotics given the duration of symptoms with productive sputum    Problems Addressed:  Bronchitis: acute illness or injury     Details: Patient remains persistently wheezing and symptomatic after steroids and neb.  Will admit based on persistent symptoms  Influenza A: acute illness or injury     Details: Given persistence of symptoms, unsure when she contracted influenza.  She is out of Tamiflu window    Amount and/or Complexity of Data Reviewed  Labs: ordered. Decision-making details documented in ED Course.  Radiology: ordered and independent interpretation performed.    Risk  Prescription drug management.  Decision regarding hospitalization.        ED Course as of 01/14/25 1041   Tue Jan 14, 2025   1015 Influenza A Rapid Antigen(!): Positive  Patient persistently wheezing after steroids and DuoNeb.  Given her persistent symptoms, will admit for further treatment       Medications   ibuprofen (MOTRIN) tablet 600 mg (has no administration in time range)   dexamethasone (PF) (DECADRON) injection 10 mg (has no administration in time range)   ipratropium-albuterol (DUO-NEB) 0.5-2.5 mg/3 mL inhalation solution 3 mL (has no administration in time range)       ED Risk Strat Scores                                              History of Present Illness       Chief Complaint   Patient presents with    Cough     Patient reports coughing for the last month but worsened last night - patient presents with expiratory wheezes        Past Medical History:   Diagnosis Date    Bilateral pulmonary embolism (HCC) 08/25/2019    Cancer (HCC)     left  "breast cancer    Cervical herniated disc     Chronic pain disorder     back pain    Chronic respiratory failure (HCC)     uses O2 at home with NC /5/19 no longer using/just CPAP    Colon polyp     CPAP (continuous positive airway pressure) dependence     Disease of thyroid gland     hypothyroid    DVT (deep venous thrombosis) (HCC) 2020    right leg    Examination for, follow-up 11/03/2021    Family history of reaction to anesthesia     \"son and daughter hard time waking up and also PONV\"    Fibromyalgia, primary     GERD (gastroesophageal reflux disease)     Glaucoma     Hiatal hernia     History of palpitations     History of pneumonia     History of transfusion     Hyperlipidemia     Hypertension     Irregular heart beat     Migraine     Myocardial infarction (HCC)     pt sees cardilogist Dr Bennett [facility]/\"didnt realize had one, found on EKG before a surgery\"    OAB (overactive bladder)     Pollen allergies     Pulmonary embolism (HCC) 2019    Bilateral; on Xarelto    Restless leg     Risk for falls     Sleep apnea     Use of cane as ambulatory aid     occas    Uses brace     Mafo/left leg    Wears glasses       Past Surgical History:   Procedure Laterality Date    ADENOIDECTOMY      BAND HEMORRHOIDECTOMY      CARDIAC CATHETERIZATION      CARPAL TUNNEL RELEASE      CATARACT EXTRACTION Bilateral     COLONOSCOPY      FOOT SURGERY      pin implanted right toe    JOINT REPLACEMENT Bilateral     knees    LAMINECTOMY      LUMBAR EPIDURAL INJECTION      MASTECTOMY Bilateral     with reconstruction and implants    NISSEN FUNDOPLICATION      [place] LNGTH/SHRT TENDON LEG/ANKLE 1 TENDON SPX Left 05/24/2021    Procedure: Sectioning peroneal tendons with lengthening/sectioning achilles tendon lower leg and ankle;  Surgeon: Fei Sifuentes DPM;  Location: [facility];  Service: Podiatry    REPLACEMENT TOTAL KNEE      SPINAL FUSION      TONSILLECTOMY      TUBAL LIGATION      TUMOR EXCISION      right forearm/benign    UPPER " GASTROINTESTINAL ENDOSCOPY      VEIN LIGATION Right       Family History   Problem Relation Age of Onset    Cancer Sister       Social History     Tobacco Use    Smoking status: Never    Smokeless tobacco: Never   Vaping Use    Vaping status: Never Used   Substance Use Topics    Alcohol use: Not Currently     Comment: socially    Drug use: Never      E-Cigarette/Vaping    E-Cigarette Use Never User       E-Cigarette/Vaping Substances    Nicotine No     THC No     CBD No     Flavoring No     Other No     Unknown No       I have reviewed and agree with the history as documented.     79-year-old female presenting with 1 week of persistent cough.  Over the past several days, symptoms have worsened despite Tylenol and Robitussin.  Patient states the cough is now productive and described as thick and yellow.  She is also now developing a fever.  She is denying chest pain for me but complains more of chest tightness with cough and wheeze.        Review of Systems   Constitutional:  Positive for fever.   Respiratory:  Positive for cough, shortness of breath and wheezing.    Cardiovascular:  Negative for chest pain.   Gastrointestinal:  Negative for abdominal pain.   Musculoskeletal:  Negative for neck pain.   Skin:  Negative for rash.   Neurological:  Positive for weakness.           Objective       ED Triage Vitals   Temp Pulse BP Resp SpO2 Patient Position - Orthostatic VS   -- -- -- -- -- --      Temp src Heart Rate Source BP Location FiO2 (%) Pain Score    -- -- -- -- --      Vitals    None         Physical Exam  Vitals and nursing note reviewed.   Constitutional:       General: She is not in acute distress.  HENT:      Head: Normocephalic and atraumatic.      Mouth/Throat:      Pharynx: No posterior oropharyngeal erythema.   Eyes:      Conjunctiva/sclera: Conjunctivae normal.   Cardiovascular:      Rate and Rhythm: Regular rhythm.   Pulmonary:      Effort: Pulmonary effort is normal.      Breath sounds: Wheezing  "present.   Abdominal:      General: There is no distension.   Musculoskeletal:      Cervical back: No rigidity.   Skin:     General: Skin is warm and dry.   Neurological:      Mental Status: She is alert. Mental status is at baseline.   Psychiatric:         Mood and Affect: Mood normal.         Results Reviewed       None            No orders to display       Procedures    ED Medication and Procedure Management   Prior to Admission Medications   Prescriptions Last Dose Informant Patient Reported? Taking?   ASPIRIN 81 PO  Self Yes No   Sig: Aspirin EC 81 MG Oral Tablet Delayed Release    Refills: 0       Active   DULoxetine (Cymbalta) 30 mg delayed release capsule   No No   Sig: Take 1 capsule (30 mg total) by mouth daily   NEEDLE, DISP, 25 G (B-D DISP NEEDLE 25GX1\") 25G X 1\" MISC  Self No No   Sig: Use once a week   Syringe, Disposable, (2-3CC SYRINGE) 3 ML MISC  Self No No   Sig: Use once a week   Xarelto 20 MG tablet   No No   Sig: TAKE ONE TABLET BY MOUTH EVERY EVENING   acetaminophen (TYLENOL) 325 mg tablet  Self Yes No   Sig: Take 650 mg by mouth every 6 (six) hours as needed for mild pain   brimonidine (ALPHAGAN P) 0.15 % ophthalmic solution  Self No No   Sig: ADMINISTER 1 DROP TO THE RIGHT EYE 2 (TWO) TIMES A DAY   buPROPion (WELLBUTRIN XL) 300 mg 24 hr tablet   No No   Sig: TAKE ONE TABLET BY MOUTH EVERY MORNING   busPIRone (BUSPAR) 15 mg tablet   No No   Sig: Take 1 tablet (15 mg total) by mouth 2 (two) times a day   cyanocobalamin 1,000 mcg/mL   No No   Sig: Inject 1 mL (1,000 mcg total) into a muscle once a week   famotidine (PEPCID) 20 mg tablet  Self Yes No   Sig: Take 20 mg by mouth 3 (three) times a day   famotidine (PEPCID) 40 MG tablet   No No   Sig: Take 1 tablet (40 mg total) by mouth daily   fluticasone (FLONASE) 50 mcg/act nasal spray  Self No No   Si sprays into each nostril once for 1 dose OD   latanoprost (XALATAN) 0.005 % ophthalmic solution  Self Yes No   Sig: Administer 1 drop to both " eyes daily at bedtime   levothyroxine 100 mcg tablet   No No   Sig: Take 1 tablet (100 mcg total) by mouth daily   magnesium 30 MG tablet  Self Yes No   Sig: Take 30 mg by mouth 2 (two) times a day OD HS   pantoprazole (PROTONIX) 40 mg tablet  Self No No   Sig: Take 1 tablet (40 mg total) by mouth daily   rOPINIRole (REQUIP) 1 mg tablet  Self No No   Sig: TAKE 1 TABLET AT 6PM AND TAKE 2 TABLETS AT BEDTIME   Patient taking differently: Take 1 mg by mouth daily at bedtime TAKE 1 TABLET AT 6PM AND TAKE 2 TABLETS AT BEDTIME   timolol (TIMOPTIC) 0.5 % ophthalmic solution  Self Yes No   Sig: Administer to both eyes 2 (two) times a day    torsemide (DEMADEX) 10 mg tablet  Self No No   Sig: TAKE 1 TABLET EVERY DAY      Facility-Administered Medications: None     Patient's Medications   Discharge Prescriptions    No medications on file     No discharge procedures on file.  ED SEPSIS DOCUMENTATION            Zeb Arita,   01/14/25 1436

## 2025-01-14 NOTE — H&P
H&P - Hospitalist   Name: Madeleine Sanchez 79 y.o. female I MRN: [id number]  Unit/Bed#: ED 06 I Date of Admission: 1/14/2025   Date of Service: 1/14/2025 I Hospital Day: 0     Assessment & Plan  Influenza A  Will place patient under observation to medical surgical unit.  Treat the patient with Tamiflu.  Symptomatic management Tylenol as needed for fever and bodyaches.  Shortness of breath  Possible secondary to influenza A infection.  Treat the patient with albuterol nebulizers 4 times a day  We will place the patient under observation to medical surgical unit.  Monitor vitals and O2 sats to keep above 92%.    Hypothyroidism, adult  Continue on Synthroid.  BRINDA (generalized anxiety disorder)  Continue bupropion and Wellbutrin.  Mixed hyperlipidemia    GERD (gastroesophageal reflux disease)  Continue on PPI/Pepcid      Date of Service:   01/14/25    VTE Prophylaxis: VTE Score: 5 High Risk (Score >/= 5) - Pharmacological DVT Prophylaxis Ordered: enoxaparin (Lovenox). Sequential Compression Devices Ordered.  Code Status: Level 1 - Full Code  POLST:There is no POLST form on file for this patient (pre-hospital)   Discussion with family:  Updated  (son) at bedside.    Anticipated Length of Stay:  Patient will be admitted on an Observation basis with an anticipated length of stay of  < 2 midnights.   Justification for Hospital Stay: Influenza A infection and shortness of breath.    Total Time for Visit, including Counseling / Coordination of Care: 45 minutes.  Greater than 50% of this total time spent on direct patient counseling and coordination of care.    Chief Complaint:     Cough (Patient reports coughing for the last month but worsened last night - patient presents with expiratory wheezes )    History of Present Illness:    Madeleine Sanchez is a 79 y.o. female with PMHx significant for breast cancer in the past, bilateral PE in the past on Eliquis, remote smoking history, who presents with presented to the  "ED with chief complaints of cough progressively worsening over the past 5 days.    Patient has some chronic cough going on for prolonged period of time.  Also has a history of GERD.  Denies any smoking history actively and had quit smoking long time ago.     Patient mention she has been having chronic cough for at least a month and over the last 5 days she got worsening of her cough.  Some sick contact with her grandson.  Patient denied any fevers or chills.  Complains of some yellowish sputum production.    Denies any chest pain.  But has been having some dyspnea over the last few days.  Hence patient came to the ED for further evaluation.    Vitals in the ED were stable, oxygen saturation 95% on room air.  But patient had significant cough and in spite of treating with multiple rounds of nebulizers and steroids in the ED patient still continues have some cough with dyspnea and hence patient was admitted to medicine service for observation.  Patient was tested positive for influenza A as well.  Labs were also stable with no leukocytosis.  Chest x-ray was negative for any infiltrates.     Review of Systems:  Review of Systems: A thorough 10 point review of System was done which was negative for other than that mentioned in HPI.     Past Medical and Surgical History:     Past Medical History:   Diagnosis Date    Bilateral pulmonary embolism (HCC) 08/25/2019    Cancer (HCC)     left breast cancer    Cervical herniated disc     Chronic pain disorder     back pain    Chronic respiratory failure (HCC)     uses O2 at home with NC /5/19 no longer using/just CPAP    Colon polyp     CPAP (continuous positive airway pressure) dependence     Disease of thyroid gland     hypothyroid    DVT (deep venous thrombosis) (HCC) 2020    right leg    Examination for, follow-up 11/03/2021    Family history of reaction to anesthesia     \"son and daughter hard time waking up and also PONV\"    Fibromyalgia, primary     GERD (gastroesophageal " "reflux disease)     Glaucoma     Hiatal hernia     History of palpitations     History of pneumonia     History of transfusion     Hyperlipidemia     Hypertension     Irregular heart beat     Migraine     Myocardial infarction (HCC)     pt sees cardilogist Dr [facility]/\"didnt realize had one, found on EKG before a surgery\"    OAB (overactive bladder)     Pollen allergies     Pulmonary embolism (HCC) 2019    Bilateral; on Xarelto    Restless leg     Risk for falls     Sleep apnea     Use of cane as ambulatory aid     occas    Uses brace     Mafo/left leg    Wears glasses        Past Surgical History:   Procedure Laterality Date    ADENOIDECTOMY      BAND HEMORRHOIDECTOMY      CARDIAC CATHETERIZATION      CARPAL TUNNEL RELEASE      CATARACT EXTRACTION Bilateral     COLONOSCOPY      FOOT SURGERY      pin implanted right toe    JOINT REPLACEMENT Bilateral     knees    LAMINECTOMY      LUMBAR EPIDURAL INJECTION      MASTECTOMY Bilateral     with reconstruction and implants    NISSEN FUNDOPLICATION      DE LNGTH/SHRT TENDON LEG/ANKLE 1 TENDON SPX Left 05/24/2021    Procedure: Sectioning peroneal tendons with lengthening/sectioning achilles tendon lower leg and ankle;  Surgeon: Fei Sifuentes DPM;  Location: [facility];  Service: Podiatry    REPLACEMENT TOTAL KNEE      SPINAL FUSION      TONSILLECTOMY      TUBAL LIGATION      TUMOR EXCISION      right forearm/benign    UPPER GASTROINTESTINAL ENDOSCOPY      VEIN LIGATION Right        Meds/Allergies:  Prior to Admission medications    Medication Sig Start Date End Date Taking? Authorizing Provider   acetaminophen (TYLENOL) 325 mg tablet Take 650 mg by mouth every 6 (six) hours as needed for mild pain    Historical Provider, MD   ASPIRIN 81 PO Aspirin EC 81 MG Oral Tablet Delayed Release    Refills: 0       Active    Historical Provider, MD   brimonidine (ALPHAGAN P) 0.15 % ophthalmic solution ADMINISTER 1 DROP TO THE RIGHT EYE 2 (TWO) TIMES A DAY 8/29/24   JOE Polanco " "DO [place]   buPROPion (WELLBUTRIN XL) 300 mg 24 hr tablet TAKE ONE TABLET BY MOUTH EVERY MORNING 12/6/24   Irma Batista   busPIRone (BUSPAR) 15 mg tablet Take 1 tablet (15 mg total) by mouth 2 (two) times a day 12/16/24   Irma Batista   cyanocobalamin 1,000 mcg/mL Inject 1 mL (1,000 mcg total) into a muscle once a week 11/14/24   JOE Yates DO   DULoxetine (Cymbalta) 30 mg delayed release capsule Take 1 capsule (30 mg total) by mouth daily 12/16/24 2/14/25  Irma Batista   famotidine (PEPCID) 20 mg tablet Take 20 mg by mouth 3 (three) times a day    Historical Provider, MD   famotidine (PEPCID) 40 MG tablet Take 1 tablet (40 mg total) by mouth daily 1/6/25   JOE Yates DO   fluticasone (FLONASE) 50 mcg/act nasal spray 2 sprays into each nostril once for 1 dose OD 4/1/24 11/12/24  JOE Yates DO   latanoprost (XALATAN) 0.005 % ophthalmic solution Administer 1 drop to both eyes daily at bedtime 5/22/20   Historical Provider, MD   levothyroxine 100 mcg tablet Take 1 tablet (100 mcg total) by mouth daily 1/6/25   JOE Yates DO   magnesium 30 MG tablet Take 30 mg by mouth 2 (two) times a day OD HS    Historical Provider, MD   NEEDLE, DISP, 25 G (B-D DISP NEEDLE 25GX1\") 25G X 1\" MISC Use once a week 9/26/24   JOE Yates DO   pantoprazole (PROTONIX) 40 mg tablet Take 1 tablet (40 mg total) by mouth daily 1/30/24 12/19/24  JACK Ortega   rOPINIRole (REQUIP) 1 mg tablet TAKE 1 TABLET AT 6PM AND TAKE 2 TABLETS AT BEDTIME  Patient taking differently: Take 1 mg by mouth daily at bedtime TAKE 1 TABLET AT 6PM AND TAKE 2 TABLETS AT BEDTIME 12/27/23   JOE Yates DO   Syringe, Disposable, (2-3CC SYRINGE) 3 ML MISC Use once a week 6/19/24   G Collin Miles, DO   timolol (TIMOPTIC) 0.5 % ophthalmic solution Administer to both eyes 2 (two) times a day  4/13/21   Historical Provider, MD   torsemide (DEMADEX) 10 mg tablet TAKE 1 TABLET EVERY DAY 10/3/24   G Collin Yates, DO   Xarelto 20 MG " tablet TAKE ONE TABLET BY MOUTH EVERY EVENING 11/20/24   JOE Yates, DO     I have reviewed home medications with patient personally.    Allergies:   Allergies   Allergen Reactions    Benzalkonium Chloride Hives     Merthiolate tincture 9/28/2020      Hydromorphone Hallucinations     disorientation; hallucination; confusion      Merthiolate  [Thimerosal (Thiomersal)] Hives    Quinine Derivatives Other (See Comments) and Tinnitus     tinnitus      Codeine Nausea Only and Other (See Comments)     nausea      Medical Tape Rash    Bee Pollen Other (See Comments)     Other reaction(s): Nasal Congestion    Other Itching    Pamelor [Nortriptyline] Vomiting     Per pt, itching also    Pollen Extract Nasal Congestion    Statins Itching    Wound Dressing Adhesive Rash       Social History:     Marital Status:    Occupation: Reviewed and Non Contributory   Patient Pre-hospital Living Situation: Home   Patient Pre-hospital Level of Mobility: Walks   Patient Pre-hospital Diet Restrictions: None     Substance Use History:   Social History     Substance and Sexual Activity   Alcohol Use Not Currently    Comment: socially     Social History     Tobacco Use   Smoking Status Never   Smokeless Tobacco Never     Social History     Substance and Sexual Activity   Drug Use Never       Family History:    Family History   Problem Relation Age of Onset    Cancer Sister        Physical Exam:     Vitals:   Blood Pressure: 132/68 (01/14/25 0821)  Pulse: 90 (01/14/25 0821)  Temperature: 98.6 °F (37 °C) (01/14/25 0821)  Temp Source: Oral (01/14/25 0821)  Respirations: 18 (01/14/25 0821)  SpO2: 95 % (01/14/25 0821)    Physical Exam  General Exam: Alert and Oriented x 3, NAD  Eyes: TERESA  Neck: Supple.   CVS: S1, S2 [place], RRR.   R/S: Bibasilar wheezing and rhonchi appreciated.    Abd: Soft, NT, ND, BS+ve  Extremities: No edema noted.   Skin: No acute Rash noted.   CNS: No acute FND. Moves all 4 extremities.   Psych: Co-operative, Not  "agitated.     Lab Results: I have personally reviewed pertinent reports.    Results from last 7 days   Lab Units 01/14/25  0830   WBC Thousand/uL 5.88   HEMOGLOBIN g/dL 14.6   HEMATOCRIT % 45.5   PLATELETS Thousands/uL 252     Results from last 7 days   Lab Units 01/14/25  0830   POTASSIUM mmol/L 3.4*   CHLORIDE mmol/L 106   CO2 mmol/L 27   BUN mg/dL 14   CREATININE mg/dL 0.92   CALCIUM mg/dL 8.8   ALK PHOS U/L 106*   ALT U/L 13   AST U/L 18                   Imaging: I have personally reviewed pertinent reports.      XR chest 1 view portable   ED Interpretation by Zeb Arita DO (01/14 0856)   RML pna      Final Result by Vazquez Wrad MD (01/14 0924)      No acute cardiopulmonary disease.            Workstation performed: [id number]             XR chest 1 view portable   ED Interpretation   RML pna      Final Result      No acute cardiopulmonary disease.            Workstation performed: [id number]             EKG, Imaging, and Other Studies Reviewed on Admission:   Reviewed and negative for any infiltrates.    Discussed the case with ED Provider  Discussed the case with n/a  [facility] Records Reviewed: Yes     ** Please Note: \"This note has been constructed using a voice recognition system.Therefore there may be syntax, spelling, and/or grammatical errors. Please call if you have any questions. \"**      "

## 2025-01-14 NOTE — PLAN OF CARE
Problem: PAIN - ADULT  Goal: Verbalizes/displays adequate comfort level or baseline comfort level  Description: Interventions:  - Encourage patient to monitor pain and request assistance  - Assess pain using appropriate pain scale  - Administer analgesics based on type and severity of pain and evaluate response  - Implement non-pharmacological measures as appropriate and evaluate response  - Consider cultural and social influences on pain and pain management  - Notify physician/advanced practitioner if interventions unsuccessful or patient reports new pain  Outcome: Progressing     Problem: INFECTION - ADULT  Goal: Absence or prevention of progression during hospitalization  Description: INTERVENTIONS:  - Assess and monitor for signs and symptoms of infection  - Monitor lab/diagnostic results  - Monitor all insertion sites, i.e. indwelling lines, tubes, and drains  - Monitor endotracheal if appropriate and nasal secretions for changes in amount and color  - [place] appropriate cooling/warming therapies per order  - Administer medications as ordered  - Instruct and encourage patient and family to use good hand hygiene technique  - Identify and instruct in appropriate isolation precautions for identified infection/condition  Outcome: Progressing  Goal: Absence of fever/infection during neutropenic period  Description: INTERVENTIONS:  - Monitor WBC    Outcome: Progressing     Problem: DISCHARGE PLANNING  Goal: Discharge to home or other facility with appropriate resources  Description: INTERVENTIONS:  - Identify barriers to discharge w/patient and caregiver  - Arrange for needed discharge resources and transportation as appropriate  - Identify discharge learning needs (meds, wound care, etc.)  - Arrange for interpretive services to assist at discharge as needed  - Refer to Case Management Department for coordinating discharge planning if the patient needs post-hospital services based on physician/advanced  practitioner order or complex needs related to functional status, cognitive ability, or social support system  Outcome: Progressing     Problem: Knowledge Deficit  Goal: Patient/family/caregiver demonstrates understanding of disease process, treatment plan, medications, and discharge instructions  Description: Complete learning assessment and assess knowledge base.  Interventions:  - Provide teaching at level of understanding  - Provide teaching via preferred learning methods  Outcome: Progressing     Problem: RESPIRATORY - ADULT  Goal: Achieves optimal ventilation and oxygenation  Description: INTERVENTIONS:  - Assess for changes in respiratory status  - Assess for changes in mentation and behavior  - Position to facilitate oxygenation and minimize respiratory effort  - Oxygen administered by appropriate delivery if ordered  - Initiate smoking cessation education as indicated  - Encourage broncho-pulmonary hygiene including cough, deep breathe, Incentive Spirometry  - Assess the need for suctioning and aspirate as needed  - Assess and instruct to report SOB or any respiratory difficulty  - Respiratory Therapy support as indicated  Outcome: Progressing

## 2025-01-14 NOTE — RESPIRATORY THERAPY NOTE
"RT Protocol Note  Madeleine Sanchez 79 y.o. female MRN: [id number]  Unit/Bed#: -01 Encounter: [id number]    Assessment    Active Problems:    Mixed hyperlipidemia    Hypothyroidism, adult    GERD (gastroesophageal reflux disease)    BRINDA (generalized anxiety disorder)    Influenza A    Shortness of breath      Home Pulmonary Medications:  None  Home Devices/Therapy: BiPAP/CPAP    Past Medical History:   Diagnosis Date    Bilateral pulmonary embolism (HCC) 08/25/2019    Cancer (HCC)     left breast cancer    Cervical herniated disc     Chronic pain disorder     back pain    Chronic respiratory failure (HCC)     uses O2 at home with NC /5/19 no longer using/just CPAP    Colon polyp     CPAP (continuous positive airway pressure) dependence     Disease of thyroid gland     hypothyroid    DVT (deep venous thrombosis) (HCC) 2020    right leg    Examination for, follow-up 11/03/2021    Family history of reaction to anesthesia     \"son and daughter hard time waking up and also PONV\"    Fibromyalgia, primary     GERD (gastroesophageal reflux disease)     Glaucoma     Hiatal hernia     History of palpitations     History of pneumonia     History of transfusion     Hyperlipidemia     Hypertension     Irregular heart beat     Migraine     Myocardial infarction (HCC)     pt sees cardilogist Dr Bennett [facility]/\"didnt realize had one, found on EKG before a surgery\"    OAB (overactive bladder)     Pollen allergies     Pulmonary embolism (HCC) 2019    Bilateral; on Xarelto    Restless leg     Risk for falls     Sleep apnea     Use of cane as ambulatory aid     occas    Uses brace     Mafo/left leg    Wears glasses      Social History     Socioeconomic History    Marital status:      Spouse name: None    Number of children: None    Years of education: None    Highest education level: None   Occupational History    None   Tobacco Use    Smoking status: Never    Smokeless tobacco: Never   Vaping Use    Vaping status: Never Used "   Substance and Sexual Activity    Alcohol use: Not Currently     Comment: socially    Drug use: Never    Sexual activity: None     Comment: defer   Other Topics Concern    None   Social History Narrative    · Most recent tobacco use screenin2019      Social Drivers of Health     Financial Resource Strain: Low Risk  (2023)    [facility]    Overall Financial Resource Strain (CARDIA)     Difficulty of Paying Living Expenses: Not very hard   Recent Concern: Financial Resource Strain - Medium Risk (2023)    Overall Financial Resource Strain (CARDIA)     Difficulty of Paying Living Expenses: Somewhat hard   Food Insecurity: No Food Insecurity (2025)    Nursing - Inadequate Food Risk Classification     Worried About Running Out of Food in the Last Year: Never true     Ran Out of Food in the Last Year: Never true     Ran Out of Food in the Last Year: Never true   Transportation Needs: No Transportation Needs (2025)    Nursing - Transportation Risk Classification     Lack of Transportation: Not on file     Lack of Transportation: No   Physical Activity: Not on file   Stress: Not on file   Social Connections: Not on file   Intimate Partner Violence: Unknown (2025)    Nursing IPS     Feels Physically and Emotionally Safe: Not on file     Physically Hurt by Someone: Not on file     Humiliated or Emotionally Abused by Someone: Not on file     Physically Hurt by Someone: No     Hurt or Threatened by Someone: No   Housing Stability: Unknown (2025)    Nursing: Inadequate Housing Risk Classification     Has Housing: Not on file     Worried About Losing Housing: Not on file     Unable to Get Utilities: Not on file     Unable to Pay for Housing in the Last Year: No     Has Housin       Subjective         Objective    Physical Exam:   Assessment Type: Pre-treatment  General Appearance: Alert, Awake  Respiratory Pattern:  "Normal  Chest Assessment: Chest expansion symmetrical  Bilateral Breath Sounds: Diminished, Expiratory wheezes    Vitals:  Blood pressure 117/84, pulse 83, temperature (!) 97.2 °F (36.2 °C), temperature source Tympanic, resp. rate 20, height 5' 3\" (1.6 m), weight 78 kg (172 lb), SpO2 94%.          Imaging and other studies:           Plan    Respiratory Plan: No distress/Pulmonary history, Mild Distress pathway (ASHLEY)        Resp Comments: Assessed pt per protocol. Pt has a ASHLEY history. Pt has no other pulmonary history. Currently on RA and saturating well. Pt does not take any pulmonary meds. Breath sounds are diminished with wheezes. Pt does not want to wear the CPAP HS due to coughing. Pt test positive for the Flu. Will switched over to TID Xopenex for wheezing. Respiratory to follow.   "

## 2025-01-15 ENCOUNTER — TRANSITIONAL CARE MANAGEMENT (OUTPATIENT)
Dept: FAMILY MEDICINE CLINIC | Facility: CLINIC | Age: 80
End: 2025-01-15

## 2025-01-15 VITALS
SYSTOLIC BLOOD PRESSURE: 129 MMHG | RESPIRATION RATE: 20 BRPM | HEIGHT: 63 IN | BODY MASS INDEX: 30.48 KG/M2 | DIASTOLIC BLOOD PRESSURE: 72 MMHG | WEIGHT: 172 LBS | HEART RATE: 82 BPM | OXYGEN SATURATION: 96 % | TEMPERATURE: 99.4 F

## 2025-01-15 LAB
ANION GAP SERPL CALCULATED.3IONS-SCNC: 12 MMOL/L (ref 4–13)
BASOPHILS # BLD AUTO: 0 THOUSANDS/ΜL (ref 0–0.1)
BASOPHILS NFR BLD AUTO: 0 % (ref 0–1)
BUN SERPL-MCNC: 18 MG/DL (ref 5–25)
CALCIUM SERPL-MCNC: 9.4 MG/DL (ref 8.4–10.2)
CHLORIDE SERPL-SCNC: 102 MMOL/L (ref 96–108)
CO2 SERPL-SCNC: 27 MMOL/L (ref 21–32)
CREAT SERPL-MCNC: 0.79 MG/DL (ref 0.6–1.3)
EOSINOPHIL # BLD AUTO: 0 THOUSAND/ΜL (ref 0–0.61)
EOSINOPHIL NFR BLD AUTO: 0 % (ref 0–6)
ERYTHROCYTE [DISTWIDTH] IN BLOOD BY AUTOMATED COUNT: 13.2 % (ref 11.6–15.1)
GFR SERPL CREATININE-BSD FRML MDRD: 70 ML/MIN/1.73SQ M
GLUCOSE P FAST SERPL-MCNC: 151 MG/DL (ref 65–99)
GLUCOSE SERPL-MCNC: 151 MG/DL (ref 65–140)
HCT VFR BLD AUTO: 44.8 % (ref 34.8–46.1)
HGB BLD-MCNC: 14.4 G/DL (ref 11.5–15.4)
IMM GRANULOCYTES # BLD AUTO: 0.04 THOUSAND/UL (ref 0–0.2)
IMM GRANULOCYTES NFR BLD AUTO: 1 % (ref 0–2)
LYMPHOCYTES # BLD AUTO: 0.96 THOUSANDS/ΜL (ref 0.6–4.47)
LYMPHOCYTES NFR BLD AUTO: 18 % (ref 14–44)
MCH RBC QN AUTO: 30.3 PG (ref 26.8–34.3)
MCHC RBC AUTO-ENTMCNC: 32.1 G/DL (ref 31.4–37.4)
MCV RBC AUTO: 94 FL (ref 82–98)
MONOCYTES # BLD AUTO: 0.49 THOUSAND/ΜL (ref 0.17–1.22)
MONOCYTES NFR BLD AUTO: 9 % (ref 4–12)
NEUTROPHILS # BLD AUTO: 4.01 THOUSANDS/ΜL (ref 1.85–7.62)
NEUTS SEG NFR BLD AUTO: 72 % (ref 43–75)
NRBC BLD AUTO-RTO: 0 /100 WBCS
PLATELET # BLD AUTO: 288 THOUSANDS/UL (ref 149–390)
PMV BLD AUTO: 10.8 FL (ref 8.9–12.7)
POTASSIUM SERPL-SCNC: 3.7 MMOL/L (ref 3.5–5.3)
RBC # BLD AUTO: 4.76 MILLION/UL (ref 3.81–5.12)
SODIUM SERPL-SCNC: 141 MMOL/L (ref 135–147)
WBC # BLD AUTO: 5.5 THOUSAND/UL (ref 4.31–10.16)

## 2025-01-15 PROCEDURE — 94640 AIRWAY INHALATION TREATMENT: CPT

## 2025-01-15 PROCEDURE — 94760 N-INVAS EAR/PLS OXIMETRY 1: CPT

## 2025-01-15 PROCEDURE — 85025 COMPLETE CBC W/AUTO DIFF WBC: CPT | Performed by: INTERNAL MEDICINE

## 2025-01-15 PROCEDURE — 80048 BASIC METABOLIC PNL TOTAL CA: CPT | Performed by: INTERNAL MEDICINE

## 2025-01-15 PROCEDURE — 99239 HOSP IP/OBS DSCHRG MGMT >30: CPT | Performed by: INTERNAL MEDICINE

## 2025-01-15 RX ORDER — ALBUTEROL SULFATE 0.83 MG/ML
2.5 SOLUTION RESPIRATORY (INHALATION) EVERY 6 HOURS PRN
Status: DISCONTINUED | OUTPATIENT
Start: 2025-01-15 | End: 2025-01-15 | Stop reason: HOSPADM

## 2025-01-15 RX ORDER — GUAIFENESIN 600 MG/1
600 TABLET, EXTENDED RELEASE ORAL EVERY 12 HOURS SCHEDULED
Qty: 60 TABLET | Refills: 0 | Status: SHIPPED | OUTPATIENT
Start: 2025-01-15

## 2025-01-15 RX ORDER — GUAIFENESIN/DEXTROMETHORPHAN 100-10MG/5
10 SYRUP ORAL EVERY 4 HOURS PRN
Qty: 118 ML | Refills: 0 | Status: SHIPPED | OUTPATIENT
Start: 2025-01-15

## 2025-01-15 RX ORDER — BUPROPION HYDROCHLORIDE 150 MG/1
150 TABLET ORAL DAILY
Start: 2025-01-15 | End: 2025-01-24 | Stop reason: SDUPTHER

## 2025-01-15 RX ORDER — ALBUTEROL SULFATE 90 UG/1
2 INHALANT RESPIRATORY (INHALATION) EVERY 6 HOURS PRN
Qty: 8.5 G | Refills: 0 | Status: SHIPPED | OUTPATIENT
Start: 2025-01-15

## 2025-01-15 RX ORDER — PANTOPRAZOLE SODIUM 40 MG/1
40 TABLET, DELAYED RELEASE ORAL
Qty: 30 TABLET | Refills: 0 | Status: SHIPPED | OUTPATIENT
Start: 2025-01-15 | End: 2025-02-14

## 2025-01-15 RX ADMIN — PANTOPRAZOLE SODIUM 40 MG: 40 TABLET, DELAYED RELEASE ORAL at 06:35

## 2025-01-15 RX ADMIN — ALBUTEROL SULFATE 2.5 MG: 2.5 SOLUTION RESPIRATORY (INHALATION) at 05:00

## 2025-01-15 RX ADMIN — ACETAMINOPHEN 650 MG: 325 TABLET, FILM COATED ORAL at 11:13

## 2025-01-15 RX ADMIN — ASPIRIN 81 MG 81 MG: 81 TABLET ORAL at 09:36

## 2025-01-15 RX ADMIN — BUPROPION HYDROCHLORIDE 300 MG: 150 TABLET, EXTENDED RELEASE ORAL at 09:36

## 2025-01-15 RX ADMIN — DULOXETINE HYDROCHLORIDE 30 MG: 30 CAPSULE, DELAYED RELEASE ORAL at 09:36

## 2025-01-15 RX ADMIN — GUAIFENESIN AND DEXTROMETHORPHAN 10 ML: 20; 200 SYRUP ORAL at 09:47

## 2025-01-15 RX ADMIN — GUAIFENESIN AND DEXTROMETHORPHAN 10 ML: 20; 200 SYRUP ORAL at 04:52

## 2025-01-15 RX ADMIN — ACETAMINOPHEN 650 MG: 325 TABLET, FILM COATED ORAL at 04:52

## 2025-01-15 RX ADMIN — TORSEMIDE 10 MG: 10 TABLET ORAL at 09:35

## 2025-01-15 RX ADMIN — LEVALBUTEROL HYDROCHLORIDE 1.25 MG: 1.25 SOLUTION RESPIRATORY (INHALATION) at 09:50

## 2025-01-15 RX ADMIN — GUAIFENESIN 600 MG: 600 TABLET, EXTENDED RELEASE ORAL at 09:35

## 2025-01-15 RX ADMIN — ENOXAPARIN SODIUM 40 MG: 40 INJECTION SUBCUTANEOUS at 09:36

## 2025-01-15 RX ADMIN — LEVALBUTEROL HYDROCHLORIDE 1.25 MG: 1.25 SOLUTION RESPIRATORY (INHALATION) at 13:20

## 2025-01-15 RX ADMIN — BRIMONIDINE TARTRATE 1 DROP: 2 SOLUTION/ DROPS OPHTHALMIC at 09:46

## 2025-01-15 RX ADMIN — BUSPIRONE HYDROCHLORIDE 15 MG: 10 TABLET ORAL at 09:35

## 2025-01-15 RX ADMIN — LEVOTHYROXINE SODIUM 100 MCG: 100 TABLET ORAL at 06:02

## 2025-01-15 RX ADMIN — FAMOTIDINE 40 MG: 20 TABLET, FILM COATED ORAL at 09:35

## 2025-01-15 NOTE — DISCHARGE SUMMARY
Discharge Summary - [facility] Internal Medicine    Patient Information: Madeleine Sanchez 79 y.o. female MRN: [id number]  Unit/Bed#: -01 Encounter: [id number]    Discharging Physician / Practitioner: Vel Mills MD  PCP: JOE Yates DO  Admission Date: 1/14/2025  Discharge Date: 01/15/25    Reason for Admission: Cough (Patient reports coughing for the last month but worsened last night - patient presents with expiratory wheezes )      Discharge Diagnoses:     Primary Discharge Diagnosis:     Principal Problem:    Influenza A  Active Problems:    Shortness of breath    Hypothyroidism, adult    BRINDA (generalized anxiety disorder)    Mixed hyperlipidemia    GERD (gastroesophageal reflux disease)  Resolved Problems:    * No resolved hospital problems. *      Consultations During Hospital Stay:  None    Procedures Performed:     Significant Findings:   Refer to hospital course and above listed diagnosis related plan for details    Imaging while in hospital:  CXR    Incidental Findings:     Test Results Pending at Discharge (will require follow up):   As per After Visit Summary     Outpatient Tests Requested:    Complications:  Refer to hospital course and above listed diagnosis related plan, if any    Hospital Course:   79 y.o. female with PMHx significant for breast cancer in the past, bilateral PE in the past on Eliquis, remote smoking history, who presents with presented to the ED with chief complaints of cough progressively worsening over the past 5 days.   Patient was found to have influenza A positive.  Patient was out of the window for treatment with Tamiflu.  She was treated with 1 dose of prednisone but treated with nebulizers around-the-clock and patient's breathing improved.  She was not wheezing anymore.  Still had some dyspnea but was improved from before and patient was then discharged to home next day with some cough medicines.  She has been recommended to take Protonix 40 mg nightly and  "Pepcid 40 mg daily in the morning.  She should follow-up with GI as an outpatient to address possibility of GERD and management of GERD given the fact patient's chronic lung or pulmonary symptoms could be secondary to GERD and asthma triggered from GERD.  Please see above list of diagnoses and related plan for additional information.       Condition at Discharge: fair     Discharge Day Visit / Exam:     Subjective:  I have seen and examined the patient at bedside. Overnight events reviewed with the RN.     Vitals: Blood Pressure: 129/72 (01/15/25 0804)  Pulse: 82 (01/15/25 0804)  Temperature: 99.4 °F (37.4 °C) (01/15/25 0804)  Temp Source: Oral (01/15/25 0804)  Respirations: 20 (01/15/25 0804)  Height: 5' 3\" (160 cm) (01/14/25 1212)  Weight - Scale: 78 kg (172 lb) (01/15/25 0554)  SpO2: 96 % (01/15/25 0918)  Exam:   Physical Exam  General Exam: Alert and Oriented x 3, NAD  Eyes: TERESA  Neck: Supple.   CVS: S1, S2 [place], RRR.   R/S: Clear to auscultate anteriorly.  no Rales rhonchi or any wheezes heard.  Abd: Soft, NT, ND, BS+ve  Extremities: No edema noted.   Skin: No acute Rash noted.   CNS: No acute FND. Moves all 4 extremities.   Psych: Co-operative, Not agitated.       Discharge instructions/Information to patient and family:(Discharge Medications and Follow up):   See after visit summary for information provided to patient and family.      Provisions for Follow-Up Care:  See after visit summary for information related to follow-up care and any pertinent home health orders.      Disposition: Home    Planned Readmission:  No     Discharge Statement:  I spent 35 minutes discharging the patient. This time was spent on the day of discharge. I had direct contact with the patient on the day of discharge. Greater than 50% of the total time was spent examining patient, answering all patient questions, arranging and discussing plan of care with patient as well as directly providing post-discharge instructions.  " "Additional time then spent on discharge activities.    Discharge Medications:  See after visit summary for reconciled discharge medications provided to patient and family.      ** Please Note: \"This note has been constructed using a voice recognition system.Therefore there may be syntax, spelling, and/or grammatical errors. Please call if you have any questions. \"**        "

## 2025-01-15 NOTE — PLAN OF CARE
Problem: PAIN - ADULT  Goal: Verbalizes/displays adequate comfort level or baseline comfort level  Description: Interventions:  - Encourage patient to monitor pain and request assistance  - Assess pain using appropriate pain scale  - Administer analgesics based on type and severity of pain and evaluate response  - Implement non-pharmacological measures as appropriate and evaluate response  - Consider cultural and social influences on pain and pain management  - Notify physician/advanced practitioner if interventions unsuccessful or patient reports new pain  Outcome: Progressing     Problem: INFECTION - ADULT  Goal: Absence or prevention of progression during hospitalization  Description: INTERVENTIONS:  - Assess and monitor for signs and symptoms of infection  - Monitor lab/diagnostic results  - Monitor all insertion sites, i.e. indwelling lines, tubes, and drains  - Monitor endotracheal if appropriate and nasal secretions for changes in amount and color  - [place] appropriate cooling/warming therapies per order  - Administer medications as ordered  - Instruct and encourage patient and family to use good hand hygiene technique  - Identify and instruct in appropriate isolation precautions for identified infection/condition  Outcome: Progressing

## 2025-01-15 NOTE — DISCHARGE INSTR - AVS FIRST PAGE
Dear Madeleine Sanchez,     It was our pleasure to care for you here at [facility].  It is our hope that we were always able to exceed the expected standards for your care during your stay.  You were hospitalized due to Influenza A infection and cough and shortness of breath.  You were cared for on the 3rd floor under the service of Vel Mills MD with the [facility] who covers for your primary care physician (PCP), JOE Yates DO, while you were hospitalized.  If you have any questions or concerns related to this hospitalization, you may contact us at .  For follow up as well as medication refills, we recommend that you follow up with your primary care physician.  A registered nurse will reach out to you by phone within a few days after your discharge to answer any additional questions that you may have after going home.  However, at this time we provide for you here, the most important instructions / recommendations at discharge:     Notable Medication Adjustments -   Albuterol inhaler 2 puffs every 6 hours as needed for shortness of breath or wheezing.  Robitussin DM 10 mL every 4 hours as needed for cough  Guaifenesin 600 mg twice a day  Take Pepcid 40 mg daily in the morning  Start taking Protonix 40 mg daily at bedtime  Testing Required after Discharge -   ** Please contact your PCP to request testing orders for any of the testing recommended here **  Important follow up information -   Follow-up with your PCP/psychiatrist regarding confirmation of your dose of Wellbutrin at home.  Other Instructions -     Please review this entire after visit summary as additional general instructions including medication list, appointments, activity, diet, any pertinent wound care, and other additional recommendations from your care team that may be provided for you.      Sincerely,     Vel Mills MD

## 2025-01-15 NOTE — PLAN OF CARE
Problem: INFECTION - ADULT  Goal: Absence or prevention of progression during hospitalization  Description: INTERVENTIONS:  - Assess and monitor for signs and symptoms of infection  - Monitor lab/diagnostic results  - Monitor all insertion sites, i.e. indwelling lines, tubes, and drains  - Monitor endotracheal if appropriate and nasal secretions for changes in amount and color  - [place] appropriate cooling/warming therapies per order  - Administer medications as ordered  - Instruct and encourage patient and family to use good hand hygiene technique  - Identify and instruct in appropriate isolation precautions for identified infection/condition  Outcome: Progressing  Goal: Absence of fever/infection during neutropenic period  Description: INTERVENTIONS:  - Monitor WBC    Outcome: Progressing     Problem: PAIN - ADULT  Goal: Verbalizes/displays adequate comfort level or baseline comfort level  Description: Interventions:  - Encourage patient to monitor pain and request assistance  - Assess pain using appropriate pain scale  - Administer analgesics based on type and severity of pain and evaluate response  - Implement non-pharmacological measures as appropriate and evaluate response  - Consider cultural and social influences on pain and pain management  - Notify physician/advanced practitioner if interventions unsuccessful or patient reports new pain  Outcome: Progressing

## 2025-01-17 ENCOUNTER — TELEPHONE (OUTPATIENT)
Age: 80
End: 2025-01-17

## 2025-01-17 ENCOUNTER — RA CDI HCC (OUTPATIENT)
Dept: OTHER | Facility: HOSPITAL | Age: 80
End: 2025-01-17

## 2025-01-17 DIAGNOSIS — J20.9 ACUTE BRONCHITIS, UNSPECIFIED ORGANISM: Primary | ICD-10-CM

## 2025-01-17 RX ORDER — AZITHROMYCIN 250 MG/1
TABLET, FILM COATED ORAL
Qty: 6 TABLET | Refills: 0 | Status: SHIPPED | OUTPATIENT
Start: 2025-01-17 | End: 2025-01-21

## 2025-01-17 RX ORDER — ALBUTEROL SULFATE AND BUDESONIDE 90; 80 UG/1; UG/1
2 AEROSOL, METERED RESPIRATORY (INHALATION) 4 TIMES DAILY PRN
Qty: 10.7 G | Refills: 1 | Status: SHIPPED | OUTPATIENT
Start: 2025-01-17

## 2025-01-17 RX ORDER — PREDNISONE 10 MG/1
TABLET ORAL
Qty: 20 TABLET | Refills: 0 | Status: SHIPPED | OUTPATIENT
Start: 2025-01-17

## 2025-01-20 ENCOUNTER — TELEPHONE (OUTPATIENT)
Age: 80
End: 2025-01-20

## 2025-01-20 DIAGNOSIS — E53.8 VITAMIN B 12 DEFICIENCY: ICD-10-CM

## 2025-01-20 DIAGNOSIS — G25.81 RLS (RESTLESS LEGS SYNDROME): ICD-10-CM

## 2025-01-20 NOTE — TELEPHONE ENCOUNTER
Post discharge from hospital 1/15 for influenza. Patient reports she is using the inhaler, cough med, and using medications with no improvement. Reports he she has a Headache, strong cough with dark yellow sputum, wheezing audible on phone. She is sipping on warm drinks that are not breaking coughing spells; chest muscles are sore and tight.Reports she was given steroids in hospital; no Tamiflu? Please follow up with patient for additional care advice.   
Pt called in stating that she is taking the antibiotic, prednisone and nebulizer from hospital without improvement in her symptoms. Pt continues with severe productive cough and fatigue. Please advise.   
Spoke with patient and made aware that PCP sent medications to the pharmacy.  
No

## 2025-01-21 ENCOUNTER — OFFICE VISIT (OUTPATIENT)
Dept: FAMILY MEDICINE CLINIC | Facility: CLINIC | Age: 80
End: 2025-01-21
Payer: MEDICARE

## 2025-01-21 VITALS
SYSTOLIC BLOOD PRESSURE: 128 MMHG | TEMPERATURE: 98 F | OXYGEN SATURATION: 96 % | HEIGHT: 63 IN | HEART RATE: 68 BPM | DIASTOLIC BLOOD PRESSURE: 70 MMHG | BODY MASS INDEX: 30.47 KG/M2

## 2025-01-21 DIAGNOSIS — M31.6 TA (TEMPORAL ARTERITIS) (HCC): ICD-10-CM

## 2025-01-21 DIAGNOSIS — J96.01 ACUTE HYPOXEMIC RESPIRATORY FAILURE (HCC): ICD-10-CM

## 2025-01-21 DIAGNOSIS — F32.1 CURRENT MODERATE EPISODE OF MAJOR DEPRESSIVE DISORDER WITHOUT PRIOR EPISODE (HCC): ICD-10-CM

## 2025-01-21 DIAGNOSIS — C50.912 BILATERAL MALIGNANT NEOPLASM OF BREAST IN FEMALE, UNSPECIFIED ESTROGEN RECEPTOR STATUS, UNSPECIFIED SITE OF BREAST (HCC): ICD-10-CM

## 2025-01-21 DIAGNOSIS — J40 BRONCHITIS: Primary | ICD-10-CM

## 2025-01-21 DIAGNOSIS — C50.911 BILATERAL MALIGNANT NEOPLASM OF BREAST IN FEMALE, UNSPECIFIED ESTROGEN RECEPTOR STATUS, UNSPECIFIED SITE OF BREAST (HCC): ICD-10-CM

## 2025-01-21 DIAGNOSIS — I82.4Y3 DEEP VEIN THROMBOSIS (DVT) OF PROXIMAL VEIN OF BOTH LOWER EXTREMITIES, UNSPECIFIED CHRONICITY (HCC): ICD-10-CM

## 2025-01-21 DIAGNOSIS — F33.3 MAJOR DEPRESSIVE DISORDER, RECURRENT, SEVERE WITH PSYCHOTIC FEATURES (HCC): ICD-10-CM

## 2025-01-21 PROCEDURE — G2211 COMPLEX E/M VISIT ADD ON: HCPCS | Performed by: FAMILY MEDICINE

## 2025-01-21 PROCEDURE — 99214 OFFICE O/P EST MOD 30 MIN: CPT | Performed by: FAMILY MEDICINE

## 2025-01-21 RX ORDER — CYANOCOBALAMIN 1000 UG/ML
1000 INJECTION, SOLUTION INTRAMUSCULAR; SUBCUTANEOUS WEEKLY
Qty: 4 ML | Refills: 0 | Status: SHIPPED | OUTPATIENT
Start: 2025-01-21

## 2025-01-21 RX ORDER — DEXTROMETHORPHAN HYDROBROMIDE AND PROMETHAZINE HYDROCHLORIDE 15; 6.25 MG/5ML; MG/5ML
5 SYRUP ORAL 4 TIMES DAILY PRN
Qty: 180 ML | Refills: 0 | Status: SHIPPED | OUTPATIENT
Start: 2025-01-21 | End: 2025-01-23 | Stop reason: SDUPTHER

## 2025-01-21 RX ORDER — ROPINIROLE 1 MG/1
TABLET, FILM COATED ORAL
Qty: 270 TABLET | Refills: 3 | Status: SHIPPED | OUTPATIENT
Start: 2025-01-21

## 2025-01-21 NOTE — PROGRESS NOTES
Name: Madeleine Sanchez      : 1945      MRN: [id number]  Encounter Provider: JOE Yates DO  Encounter Date: 2025   Encounter department: [facility] PRIMARY CARE [place]    Assessment & Plan  Bronchitis    Orders:    promethazine-dextromethorphan (PHENERGAN-DM) 6.25-15 mg/5 mL oral syrup; Take 5 mL by mouth 4 (four) times a day as needed for cough    Major depressive disorder, recurrent, severe with psychotic features (HCC)           Acute hypoxemic respiratory failure (HCC)         Deep vein thrombosis (DVT) of proximal vein of both lower extremities, unspecified chronicity (HCC)         Current moderate episode of major depressive disorder without prior episode (HCC)           TA (temporal arteritis) (HCC)         Bilateral malignant neoplasm of breast in female, unspecified estrogen receptor status, unspecified site of breast (HCC)         This is hosp f/u from admission x 30 hrs  thru 1/15 Hospital Course:   79 y.o. female with PMHx significant for breast cancer in the past, bilateral PE in the past on Eliquis, remote smoking history, who presents with presented to the ED with chief complaints of cough progressively worsening over the past 5 days.   Patient was found to have influenza A positive.  Patient was out of the window for treatment with Tamiflu.  She was treated with 1 dose of prednisone but treated with nebulizers around-the-clock and patient's breathing improved.  She was not wheezing anymore.  Still had some dyspnea but was improved from before and patient was then discharged to home next day with some cough medicines.  She has been recommended to take Protonix 40 mg nightly and Pepcid 40 mg daily in the morning.  She should follow-up with GI as an outpatient to address possibility of GERD and management of GERD given the fact patient's chronic lung or pulmonary symptoms could be secondary to GERD and asthma triggered from GERD.  Please see above list of diagnoses and related plan  for a few days ago I called in Rx for Airsupra 2 puffs 4 times daily and additional information.  Assessment & Plan  1. Influenza A - Hospitalized for 30 hours due to progressively worsening cough, tested positive for Influenza A  - Treated with prednisone and nebulizers, which improved breathing  - Discharged with cough medicines  - Out of the window for Tamiflu treatment  - Prescription for promethazine DM issued: 1 teaspoon 4 times daily, 2 teaspoons at bedtime if necessary  - Advised to avoid concurrent use of Robitussin-DM  - Mucinex 600 mg plain recommended: 2 tablets twice daily  - Encouraged to maintain adequate hydration  - RSV vaccine recommended once health improves    2. Gastroesophageal Reflux Disease (GERD) - Chronic lung and pulmonary symptoms could be secondary to GERD  - Recommended to take Protonix nightly and Pepcid daily in the morning  - Follow up with GI as an outpatient to address possibility and management of GERD    Falls Plan of Care: Recommended assistive device to help with gait and balance. Medications that increase falls were reviewed. Vitamin D supplementation was recommended.         History of Present Illness     History of Present Illness  The patient is an 79-year-old female who presents for a hospital follow-up from admission.    Persistent Cough  - Experiencing a persistent cough since December  - Initially believed it would resolve spontaneously  - Cough accompanied by a fever  - Significant decline in overall health status  - Unable to get out of bed  - Hospitalized on 01/14/2024 and discharged on 01/15/2024  - Administered an inhaler, prednisone, and an antibiotic during hospital stay  - Continues to use the inhaler and Robitussin  - Maintains adequate hydration  - Not prescribed Tamiflu due to the duration of the cough  - Reports no history of smoking  - Producing yellow sputum, which has recently lightened in color  - Reports difficulty in nasal clearance and a mildly  scratchy throat  - One more day of prednisone treatment left  - Received influenza vaccine this year    Gastrointestinal Issues  - Experiencing significant belching, burping, and bloating  - Consulted with a gastroenterologist  - Scheduled for a follow-up visit but unable to attend due to current health status  - Reports morning nausea and decreased appetite until the afternoon  - Suspects a potential gallbladder issue due to associated belching    Supplemental information: None    SOCIAL HISTORY  She does not smoke.    MEDICATIONS  Current: Eliquis, Robitussin, Protonix, Pepcid, prednisone, azithromycin    IMMUNIZATIONS  She has received her influenza vaccine this year.     Review of Systems   Constitutional:  Negative for activity change, appetite change, chills, diaphoresis, fatigue and fever.   HENT:  Negative for congestion, ear discharge, ear pain, facial swelling, nosebleeds, sore throat, tinnitus, trouble swallowing and voice change.    Eyes:  Negative for photophobia, pain, discharge, redness, itching and visual disturbance.   Respiratory:  Negative for apnea, cough, choking, chest tightness and shortness of breath.    Cardiovascular:  Negative for chest pain, palpitations and leg swelling.   Gastrointestinal:  Negative for abdominal distention, abdominal pain, blood in stool, constipation, diarrhea, nausea and vomiting.   Endocrine: Negative for cold intolerance, heat intolerance, polydipsia, polyphagia and polyuria.   Genitourinary:  Negative for decreased urine volume, difficulty urinating, dysuria, enuresis, frequency, hematuria, pelvic pain, urgency and vaginal bleeding.   Musculoskeletal:  Negative for arthralgias, back pain, gait problem, joint swelling, neck pain and neck stiffness.   Skin:  Negative for color change, pallor and rash.   Allergic/Immunologic: Negative for immunocompromised state.   Neurological:  Negative for dizziness, seizures, facial asymmetry, light-headedness, numbness and  "headaches.   Hematological:  Negative for adenopathy.   Psychiatric/Behavioral:  Negative for agitation, behavioral problems, confusion, decreased concentration, dysphoric mood and hallucinations.      Objective   /70 (BP Location: Left arm, Patient Position: Sitting, Cuff Size: Standard)   Pulse 68   Temp 98 °F (36.7 °C) (Temporal)   Ht 5' 3\" (1.6 m)   SpO2 96%   BMI 30.47 kg/m²     Physical Exam  Lungs were auscultated.  Physical Exam  Vitals and nursing note reviewed.   Constitutional:       General: She is not in acute distress.     Appearance: Normal appearance. She is not ill-appearing, toxic-appearing or diaphoretic.   HENT:      Head: Normocephalic.      Nose: Nose normal.      Mouth/Throat:      Mouth: Mucous membranes are moist.      Pharynx: No oropharyngeal exudate or posterior oropharyngeal erythema.   Eyes:      Extraocular Movements: Extraocular movements intact.      Pupils: Pupils are equal, round, and reactive to light.   Cardiovascular:      Rate and Rhythm: Normal rate and regular rhythm.      Pulses: Normal pulses.      Heart sounds: Normal heart sounds.      No gallop.   Pulmonary:      Effort: No respiratory distress.      Breath sounds: Normal breath sounds. No wheezing, rhonchi or rales.   Abdominal:      General: Abdomen is flat.   Musculoskeletal:         General: No tenderness.      Cervical back: Normal range of motion and neck supple.      Right lower leg: No edema.      Left lower leg: No edema.   Skin:     General: Skin is warm.   Neurological:      General: No focal deficit present.      Mental Status: She is alert and oriented to person, place, and time. Mental status is at baseline.   Psychiatric:         Mood and Affect: Mood normal.         Behavior: Behavior normal.         Thought Content: Thought content normal.         Judgment: Judgment normal.       Administrative Statements   I have spent a total time of 30 minutes in caring for this patient on the day of the " visit/encounter including Diagnostic results, Prognosis, Risks and benefits of tx options, Instructions for management, Patient and family education, Importance of tx compliance, Risk factor reductions, Impressions, Counseling / Coordination of care, Documenting in the medical record, Reviewing / ordering tests, medicine, procedures  , and Obtaining or reviewing history  .

## 2025-01-23 DIAGNOSIS — J40 BRONCHITIS: ICD-10-CM

## 2025-01-23 RX ORDER — DEXTROMETHORPHAN HYDROBROMIDE AND PROMETHAZINE HYDROCHLORIDE 15; 6.25 MG/5ML; MG/5ML
5 SYRUP ORAL 4 TIMES DAILY PRN
Qty: 180 ML | Refills: 0 | Status: SHIPPED | OUTPATIENT
Start: 2025-01-23

## 2025-01-23 NOTE — TELEPHONE ENCOUNTER
Patient called in post OV 1/21 and order for   promethazine-dextromethorphan (PHENERGAN-DM) 6.25-15 mg/5 mL oral syrup Medication is ON BACKORDER with on anticipated delivery. Patient was consistently coughing on phone and is requesting new order for cough medication. Please follow up with patient for provider response.

## 2025-01-23 NOTE — TELEPHONE ENCOUNTER
Called and spoke w pt -- [facility] [address] [facility] and routed to PCP Rx inbox.

## 2025-01-24 DIAGNOSIS — F41.1 GAD (GENERALIZED ANXIETY DISORDER): ICD-10-CM

## 2025-01-24 DIAGNOSIS — F43.23 ADJUSTMENT DISORDER WITH MIXED ANXIETY AND DEPRESSED MOOD: ICD-10-CM

## 2025-01-27 ENCOUNTER — TELEPHONE (OUTPATIENT)
Age: 80
End: 2025-01-27

## 2025-01-27 RX ORDER — BUPROPION HYDROCHLORIDE 150 MG/1
150 TABLET ORAL DAILY
Qty: 30 TABLET | Refills: 5 | Status: SHIPPED | OUTPATIENT
Start: 2025-01-27

## 2025-01-27 NOTE — TELEPHONE ENCOUNTER
Patient is calling regarding cancelling an appointment.    Date/Time: 1/27/25 at 3pm    Reason: patient is hospitalized and sick    Patient was rescheduled: YES [] NO [x]  If yes, when was Patient reschedule for: n/a    Patient requesting call back to reschedule: YES [] NO [x]

## 2025-01-27 NOTE — TELEPHONE ENCOUNTER
PA Airsupra 90-80MC SUBMITTED     to [employer]    via    [x]CMM-KEY: [id number]  []Surescripts-Case ID #    []Availity-Auth ID #  NDC #    []Faxed to plan   []Other website    []Phone call Case ID #      []PA sent as URGENT    All office notes, labs and other pertaining documents and studies sent. Clinical questions answered. Awaiting determination from insurance company.     Turnaround time for your insurance to make a decision on your Prior Authorization can take 7-21 business days.

## 2025-01-28 NOTE — TELEPHONE ENCOUNTER
PA Airsupra 90-80MCG APPROVED     Date(s) approved UNTIL 12/31/25    Case #     Patient advised by          [x][employer] Message - pharmacy notified pt, waiting for it to be picked up  []Phone call   []LMOM  []L/M to call office as no active Communication consent on file  []Unable to leave detailed message as VM not approved on Communication consent       Pharmacy advised by    [x]Fax  [x]Phone call

## 2025-02-04 ENCOUNTER — OFFICE VISIT (OUTPATIENT)
Dept: FAMILY MEDICINE CLINIC | Facility: CLINIC | Age: 80
End: 2025-02-04
Payer: MEDICARE

## 2025-02-04 VITALS
HEIGHT: 63 IN | TEMPERATURE: 98.1 F | HEART RATE: 83 BPM | DIASTOLIC BLOOD PRESSURE: 86 MMHG | OXYGEN SATURATION: 97 % | WEIGHT: 172 LBS | BODY MASS INDEX: 30.48 KG/M2 | SYSTOLIC BLOOD PRESSURE: 142 MMHG

## 2025-02-04 DIAGNOSIS — Z71.2 ENCOUNTER TO DISCUSS TEST RESULTS: ICD-10-CM

## 2025-02-04 DIAGNOSIS — R26.9 ABNORMAL GAIT: ICD-10-CM

## 2025-02-04 DIAGNOSIS — Z79.899 MEDICATION MANAGEMENT: ICD-10-CM

## 2025-02-04 DIAGNOSIS — Z99.89 DEPENDENCE ON CANE: Primary | ICD-10-CM

## 2025-02-04 DIAGNOSIS — J40 BRONCHITIS: ICD-10-CM

## 2025-02-04 PROCEDURE — 99214 OFFICE O/P EST MOD 30 MIN: CPT | Performed by: FAMILY MEDICINE

## 2025-02-04 PROCEDURE — G2211 COMPLEX E/M VISIT ADD ON: HCPCS | Performed by: FAMILY MEDICINE

## 2025-02-04 RX ORDER — ALBUTEROL SULFATE 1.25 MG/3ML
1.25 SOLUTION RESPIRATORY (INHALATION) EVERY 6 HOURS PRN
COMMUNITY
Start: 2025-01-29

## 2025-02-04 RX ORDER — DEXTROMETHORPHAN HYDROBROMIDE AND PROMETHAZINE HYDROCHLORIDE 15; 6.25 MG/5ML; MG/5ML
5 SYRUP ORAL 4 TIMES DAILY PRN
Qty: 180 ML | Refills: 1 | Status: SHIPPED | OUTPATIENT
Start: 2025-02-04 | End: 2025-02-06 | Stop reason: SDUPTHER

## 2025-02-04 NOTE — ASSESSMENT & PLAN NOTE
All medications reviewed especially the 8-10 pages sent home with her from [facility] hospital

## 2025-02-04 NOTE — PROGRESS NOTES
Name: Madeleine Sanchez      : 1945      MRN: [id number]  Encounter Provider: JOE Yates DO  Encounter Date: 2025   Encounter department: [facility] PRIMARY CARE [place]    First office visit post [facility] hospitalization from Saturday to  to .  Home now and doing well.  States the nebulizer treatments in the hospital helped a great deal.  She does have the nebulizer she is using just albuterol 0.083% and that with good results her chest is perfectly clear today she does have a nonproductive cough without fever chills etc. I think I held on the Promethazine DM which I will reinstate vital signs look good and temperature is normal  Assessment & Plan  Bronchitis    Orders:    promethazine-dextromethorphan (PHENERGAN-DM) 6.25-15 mg/5 mL oral syrup; Take 5 mL by mouth 4 (four) times a day as needed for cough    Dependence on cane         Medication management  All medications reviewed especially the 8-10 pages sent home with her from [facility] hospital         Assessment & Plan  1. Persistent cough: Stable. Recent hospitalization.  - Continue using the nebulizer with albuterol solution 3 cc every 6 hours, up to four times a day  - Promethazine DM, one teaspoon four times daily as needed for cough relief  - Order an additional bottle of Promethazine DM to be sent to [facility] [address]  - Maintain adequate hydration by drinking plenty of water and tea  - Reduce the frequency of guaifenesin intake if the cough persists    Follow-up  - Reorder Promethazine DM with a refill    Falls Plan of Care: balance, strength, and gait training instructions were provided. Recommended assistive device to help with gait and balance.         History of Present Illness     History of Present Illness  The patient is an 79-year-old female who presents for evaluation of a persistent cough.    Persistent Cough  - Previously evaluated in the emergency room at [facility] due to  severe coughing and was subsequently admitted to the hospital  - A CT scan of her lungs was performed, which did not reveal any abnormalities  - Kept under observation overnight due to her inability to ambulate  - Discharged the following day with a nebulizer machine and tubing  - Prescribed albuterol, guaifenesin, and prednisone, the latter of which she has completed  - Reports that the guaifenesin has been effective in managing her symptoms  - Has promethazine DM at home, which she takes at night  - Discontinued the use of Robitussin  - Experiences coughing episodes when she talks or moves around  - Cough is so severe that it disrupts her sleep, often waking her up around 4:00 AM    Headaches  - Reports experiencing headaches, which she attributes to her coughing    Chest Discomfort and Tightness  - Continues to experience chest discomfort and tightness, even though her lungs sound clear    Supplemental information: She has been tested for COVID-19 and RSV, both of which were negative. She maintains hydration by consuming water and tea.    MEDICATIONS  Current: albuterol, guaifenesin, prednisone, promethazine DM     Review of Systems   Constitutional:  Negative for activity change, appetite change, chills, diaphoresis, fatigue and fever.   HENT:  Negative for congestion, ear discharge, ear pain, facial swelling, nosebleeds, sore throat, tinnitus, trouble swallowing and voice change.    Eyes:  Negative for photophobia, pain, discharge, redness, itching and visual disturbance.   Respiratory:  Negative for apnea, cough, choking, chest tightness and shortness of breath.    Cardiovascular:  Negative for chest pain, palpitations and leg swelling.   Gastrointestinal:  Negative for abdominal distention, abdominal pain, blood in stool, constipation, diarrhea, nausea and vomiting.   Endocrine: Negative for cold intolerance, heat intolerance, polydipsia, polyphagia and polyuria.   Genitourinary:  Negative for decreased urine  "volume, difficulty urinating, dysuria, enuresis, frequency, hematuria, pelvic pain, urgency and vaginal bleeding.   Musculoskeletal:  Negative for arthralgias, back pain, gait problem, joint swelling, neck pain and neck stiffness.   Skin:  Negative for color change, pallor and rash.   Allergic/Immunologic: Negative for immunocompromised state.   Neurological:  Negative for dizziness, seizures, facial asymmetry, light-headedness, numbness and headaches.   Hematological:  Negative for adenopathy.   Psychiatric/Behavioral:  Negative for agitation, behavioral problems, confusion, decreased concentration, dysphoric mood and hallucinations.      Objective   /86 (BP Location: Left arm, Patient Position: Sitting, Cuff Size: Standard)   Pulse 83   Temp 98.1 °F (36.7 °C) (Temporal)   Ht 5' 3\" (1.6 m)   Wt 78 kg (172 lb)   SpO2 97%   BMI 30.47 kg/m²     Physical Exam  Lungs are clear.  Heart sounds are normal.  Physical Exam  Vitals and nursing note reviewed.   Constitutional:       General: She is not in acute distress.     Appearance: Normal appearance. She is not ill-appearing, toxic-appearing or diaphoretic.   HENT:      Head: Normocephalic.      Nose: Nose normal.      Mouth/Throat:      Mouth: Mucous membranes are moist.      Pharynx: No oropharyngeal exudate or posterior oropharyngeal erythema.   Eyes:      Extraocular Movements: Extraocular movements intact.      Pupils: Pupils are equal, round, and reactive to light.   Cardiovascular:      Rate and Rhythm: Normal rate and regular rhythm.      Pulses: Normal pulses.      Heart sounds: Normal heart sounds.      No gallop.   Pulmonary:      Effort: No respiratory distress.      Breath sounds: Normal breath sounds. No wheezing, rhonchi or rales.   Abdominal:      General: Abdomen is flat.   Musculoskeletal:         General: No tenderness.      Cervical back: Normal range of motion and neck supple.      Right lower leg: No edema.      Left lower leg: No edema. "   Skin:     General: Skin is warm.   Neurological:      General: No focal deficit present.      Mental Status: She is alert and oriented to person, place, and time. Mental status is at baseline.   Psychiatric:         Mood and Affect: Mood normal.         Behavior: Behavior normal.         Thought Content: Thought content normal.         Judgment: Judgment normal.       Administrative Statements   I have spent a total time of 30 minutes in caring for this patient on the day of the visit/encounter including Diagnostic results, Prognosis, Risks and benefits of tx options, Instructions for management, Patient and family education, Importance of tx compliance, Risk factor reductions, Impressions, Counseling / Coordination of care, Documenting in the medical record, Reviewing / ordering tests, medicine, procedures  , and Obtaining or reviewing history  .

## 2025-02-06 DIAGNOSIS — J40 BRONCHITIS: ICD-10-CM

## 2025-02-06 RX ORDER — DEXTROMETHORPHAN HYDROBROMIDE AND PROMETHAZINE HYDROCHLORIDE 15; 6.25 MG/5ML; MG/5ML
5 SYRUP ORAL 4 TIMES DAILY PRN
Qty: 180 ML | Refills: 1 | Status: SHIPPED | OUTPATIENT
Start: 2025-02-06

## 2025-02-06 NOTE — TELEPHONE ENCOUNTER
[facility] is unable to fill the prescription, patient is asking for the prescription to please be sent to [facility].  NOT A DUPLICATE    Reason for call:   [x] Refill   [] Prior Auth  [] Other:     Office:   [x] PCP/Provider - JOE Yates DO   [] Specialty/Provider -     Medication:     promethazine-dextromethorphan (PHENERGAN-DM) 6.25-15 mg/5 mL oral syrup       Dose/Frequency: 5 mL, Oral, 4 times daily PRN     Quantity: 180 ml    Pharmacy: [facility]/pharmacy #[id number] [address]     Does the patient have enough for 3 days?   [] Yes   [x] No - Send as HP to POD

## 2025-02-12 ENCOUNTER — TELEPHONE (OUTPATIENT)
Age: 80
End: 2025-02-12

## 2025-02-12 NOTE — TELEPHONE ENCOUNTER
[facility] supply co called to find out if patient was referred to any outside Dr for her left pain of foot/ankle. They need an order sign for supplies for her feet pain and back.

## 2025-02-12 NOTE — TELEPHONE ENCOUNTER
Called and spoke w pt -- she is not aware of [facility] and does not remember them reaching out to her or vice versa. She already has brace for ankle. She does not want us to reach out on her behalf at this time -- she will reach out to office if further action is needed re Supply co. NFA needed.

## 2025-02-14 PROBLEM — J10.1 INFLUENZA A: Status: RESOLVED | Noted: 2025-01-14 | Resolved: 2025-02-14

## 2025-02-19 ENCOUNTER — TELEPHONE (OUTPATIENT)
Age: 80
End: 2025-02-19

## 2025-02-19 DIAGNOSIS — J40 BRONCHITIS: Primary | ICD-10-CM

## 2025-02-19 NOTE — TELEPHONE ENCOUNTER
Pt called to request Rx for:    Requested Prescriptions     Pending Prescriptions Disp Refills    albuterol (ACCUNEB) 1.25 MG/3ML nebulizer solution       Sig: Take 3 mL (1.25 mg total) by nebulization every 6 (six) hours as needed for wheezing or shortness of breath     Please send to [facility] on [address]

## 2025-02-19 NOTE — TELEPHONE ENCOUNTER
Pt called to ask Dr Yates if there is anything else she can use for her cough.  It is constant and it is keeping her from sleeping.  She is using the promethazine-dextromethorphan syrup and she has tried Robitussin, but nothing will quiet the cough.   She does not have a fever any more and the mucus she is coughing up is not colored any more, but she is exhausted from all of the coughing.  Please advise.

## 2025-02-20 DIAGNOSIS — R05.1 ACUTE COUGH: Primary | ICD-10-CM

## 2025-02-20 RX ORDER — HYDROCODONE POLISTIREX AND CHLORPHENIRAMINE POLISTIREX 10; 8 MG/5ML; MG/5ML
5 SUSPENSION, EXTENDED RELEASE ORAL EVERY 12 HOURS PRN
Qty: 90 ML | Refills: 0 | Status: SHIPPED | OUTPATIENT
Start: 2025-02-20

## 2025-02-21 ENCOUNTER — TELEPHONE (OUTPATIENT)
Dept: OTHER | Facility: OTHER | Age: 80
End: 2025-02-21

## 2025-02-21 RX ORDER — ALBUTEROL SULFATE 1.25 MG/3ML
1.25 SOLUTION RESPIRATORY (INHALATION) EVERY 6 HOURS PRN
Qty: 90 ML | Refills: 1 | Status: SHIPPED | OUTPATIENT
Start: 2025-02-21

## 2025-02-21 NOTE — TELEPHONE ENCOUNTER
Venus from [employer] called again. She was advised we spoke with the pt who did not place an order with them and is not interested. No further action needed.

## 2025-02-21 NOTE — TELEPHONE ENCOUNTER
Jamison from [facility] called to inquired if there was another Dx code associated with he nebulizer solution was Medicare wll likely not cover J40. He was advised the only dx code associated with the prescription was J40.     SERA

## 2025-02-26 DIAGNOSIS — M79.89 SWELLING OF LIMB: ICD-10-CM

## 2025-02-26 RX ORDER — TORSEMIDE 10 MG/1
10 TABLET ORAL DAILY
Qty: 90 TABLET | Refills: 1 | Status: SHIPPED | OUTPATIENT
Start: 2025-02-26

## 2025-02-27 ENCOUNTER — TELEPHONE (OUTPATIENT)
Age: 80
End: 2025-02-27

## 2025-02-27 NOTE — TELEPHONE ENCOUNTER
Patient contacted the office to schedule a follow up visit with provider. Patient is now scheduled for 3/13/25  at 12pm in office.

## 2025-03-04 DIAGNOSIS — E53.8 VITAMIN B 12 DEFICIENCY: ICD-10-CM

## 2025-03-05 ENCOUNTER — OFFICE VISIT (OUTPATIENT)
Dept: FAMILY MEDICINE CLINIC | Facility: CLINIC | Age: 80
End: 2025-03-05
Payer: MEDICARE

## 2025-03-05 VITALS
TEMPERATURE: 97.6 F | HEART RATE: 100 BPM | SYSTOLIC BLOOD PRESSURE: 136 MMHG | DIASTOLIC BLOOD PRESSURE: 84 MMHG | OXYGEN SATURATION: 96 % | RESPIRATION RATE: 17 BRPM | BODY MASS INDEX: 30.47 KG/M2 | HEIGHT: 63 IN

## 2025-03-05 DIAGNOSIS — R05.8 POST-VIRAL COUGH SYNDROME: Primary | ICD-10-CM

## 2025-03-05 DIAGNOSIS — Z87.09 HISTORY OF INFLUENZA: ICD-10-CM

## 2025-03-05 DIAGNOSIS — R05.1 ACUTE COUGH: ICD-10-CM

## 2025-03-05 PROCEDURE — 99213 OFFICE O/P EST LOW 20 MIN: CPT | Performed by: NURSE PRACTITIONER

## 2025-03-05 PROCEDURE — G2211 COMPLEX E/M VISIT ADD ON: HCPCS | Performed by: NURSE PRACTITIONER

## 2025-03-05 RX ORDER — HYDROCODONE POLISTIREX AND CHLORPHENIRAMINE POLISTIREX 10; 8 MG/5ML; MG/5ML
5 SUSPENSION, EXTENDED RELEASE ORAL EVERY 12 HOURS PRN
Qty: 90 ML | Refills: 0 | Status: SHIPPED | OUTPATIENT
Start: 2025-03-05

## 2025-03-05 RX ORDER — PREDNISONE 10 MG/1
TABLET ORAL
Qty: 20 TABLET | Refills: 0 | Status: SHIPPED | OUTPATIENT
Start: 2025-03-05

## 2025-03-05 NOTE — PROGRESS NOTES
Name: Madeleine Sanchez      : 1945      MRN: [id number]  Encounter Provider: JACK Valles  Encounter Date: 3/5/2025   Encounter department: [facility]  :  Assessment & Plan  Post-viral cough syndrome  Patient reassured  Exam reveals clear lungs upon auscultation  We will check chest x-ray to be sure  We will treat with steroid burst for reactive airway related to recent influenza A with associated post cough syndrome  Recheck in 1 week or sooner for worsening symptoms  Orders:    XR chest pa and lateral; Future    predniSONE 10 mg tablet; Take 4 tabs daily for 2 days then 3 tabs daily for 2 days then 2 tabs daily for 2 days then 1 tabs daily for 2 days then stop    History of influenza    Orders:    XR chest pa and lateral; Future    predniSONE 10 mg tablet; Take 4 tabs daily for 2 days then 3 tabs daily for 2 days then 2 tabs daily for 2 days then 1 tabs daily for 2 days then stop    Acute cough    Orders:    Hydrocod Macario-Chlorphe Macario ER (TUSSIONEX) 10-8 mg/5 mL ER suspension; Take 5 mL by mouth every 12 (twelve) hours as needed for cough Max Daily Amount: 10 mL           History of Present Illness   Struggling with post viral cough syndrome  Admitted to [facility]  with influenza A  Saw her PCP  for follow-up  She has been treated with steroids, inhaler, multiple cough syrups  Continues to cough-she is frustrated as the cough is quite refractory  It is dry  She cannot get a solid 4 hours of sleep  She is very fatigued  She is on Xarelto for history of PE    Cough  Chronicity: subacute since mid january. diagnosed with influenza A. The problem has been waxing and waning. The problem occurs every few minutes. The cough is Non-productive. Associated symptoms include postnasal drip and rhinorrhea. Pertinent negatives include no fever, sore throat or wheezing. Associated symptoms comments: Rib/chest wall soreness from coughing  . The symptoms are  "aggravated by lying down. She has tried prescription cough suppressant, OTC cough suppressant, oral steroids, cool air, body position changes and rest for the symptoms. The treatment provided mild relief.     Review of Systems   Constitutional:  Positive for fatigue. Negative for fever.   HENT:  Positive for postnasal drip and rhinorrhea. Negative for sore throat.    Respiratory:  Positive for cough. Negative for wheezing.         Denied hemoptysis   Cardiovascular: Negative.    Musculoskeletal:  Positive for arthralgias.   Neurological:  Negative for dizziness and weakness.       Objective   /84 (BP Location: Left arm, Patient Position: Sitting, Cuff Size: Large)   Pulse 100   Temp 97.6 °F (36.4 °C) (Temporal)   Resp 17   Ht 5' 3\" (1.6 m)   SpO2 96%   BMI 30.47 kg/m²      Physical Exam  Vitals and nursing note reviewed.   Constitutional:       General: She is not in acute distress.     Appearance: Normal appearance. She is not ill-appearing.   HENT:      Nose: Nose normal.      Mouth/Throat:      Mouth: Mucous membranes are moist.      Pharynx: Posterior oropharyngeal erythema present.   Cardiovascular:      Rate and Rhythm: Normal rate and regular rhythm.      Pulses: Normal pulses.   Pulmonary:      Effort: Pulmonary effort is normal.      Breath sounds: Normal breath sounds. No wheezing, rhonchi or rales.      Comments: Frequent dry cough  Musculoskeletal:      Right lower leg: No edema.      Left lower leg: No edema.   Lymphadenopathy:      Cervical: No cervical adenopathy.   Skin:     Coloration: Skin is not pale.   Neurological:      General: No focal deficit present.      Mental Status: She is alert.   Psychiatric:         Mood and Affect: Mood normal.         "

## 2025-03-06 ENCOUNTER — HOSPITAL ENCOUNTER (OUTPATIENT)
Dept: RADIOLOGY | Facility: HOSPITAL | Age: 80
End: 2025-03-06
Payer: MEDICARE

## 2025-03-06 ENCOUNTER — RA CDI HCC (OUTPATIENT)
Dept: OTHER | Facility: HOSPITAL | Age: 80
End: 2025-03-06

## 2025-03-06 DIAGNOSIS — Z87.09 HISTORY OF INFLUENZA: ICD-10-CM

## 2025-03-06 DIAGNOSIS — R05.8 POST-VIRAL COUGH SYNDROME: ICD-10-CM

## 2025-03-06 PROCEDURE — 71046 X-RAY EXAM CHEST 2 VIEWS: CPT

## 2025-03-06 RX ORDER — CYANOCOBALAMIN 1000 UG/ML
INJECTION, SOLUTION INTRAMUSCULAR; SUBCUTANEOUS
Qty: 4 ML | Refills: 0 | Status: SHIPPED | OUTPATIENT
Start: 2025-03-06

## 2025-03-06 NOTE — TELEPHONE ENCOUNTER
Refill must be reviewed and completed by the office or provider. The refill is unable to be approved or denied by the medication management team.     Off protocol

## 2025-03-07 ENCOUNTER — RESULTS FOLLOW-UP (OUTPATIENT)
Dept: FAMILY MEDICINE CLINIC | Facility: CLINIC | Age: 80
End: 2025-03-07

## 2025-03-12 ENCOUNTER — OFFICE VISIT (OUTPATIENT)
Dept: FAMILY MEDICINE CLINIC | Facility: CLINIC | Age: 80
End: 2025-03-12
Payer: MEDICARE

## 2025-03-12 VITALS
TEMPERATURE: 97.1 F | BODY MASS INDEX: 30.47 KG/M2 | RESPIRATION RATE: 17 BRPM | HEIGHT: 63 IN | SYSTOLIC BLOOD PRESSURE: 140 MMHG | HEART RATE: 93 BPM | OXYGEN SATURATION: 96 % | DIASTOLIC BLOOD PRESSURE: 76 MMHG

## 2025-03-12 DIAGNOSIS — R05.8 POST-VIRAL COUGH SYNDROME: Primary | ICD-10-CM

## 2025-03-12 DIAGNOSIS — Z87.09 HISTORY OF INFLUENZA: ICD-10-CM

## 2025-03-12 DIAGNOSIS — J98.11 ATELECTASIS: ICD-10-CM

## 2025-03-12 PROCEDURE — G2211 COMPLEX E/M VISIT ADD ON: HCPCS | Performed by: NURSE PRACTITIONER

## 2025-03-12 PROCEDURE — 99213 OFFICE O/P EST LOW 20 MIN: CPT | Performed by: NURSE PRACTITIONER

## 2025-03-12 RX ORDER — NEEDLES, FILTER 19GX1 1/2"
NEEDLE, DISPOSABLE MISCELLANEOUS
COMMUNITY
Start: 2025-03-04

## 2025-03-12 RX ORDER — PREDNISONE 10 MG/1
10 TABLET ORAL DAILY
Qty: 5 TABLET | Refills: 0 | Status: SHIPPED | OUTPATIENT
Start: 2025-03-12 | End: 2025-03-17

## 2025-03-12 NOTE — PROGRESS NOTES
Name: Madeleine Sanchez      : 1945      MRN: [id number]  Encounter Provider: JACK Valles  Encounter Date: 3/12/2025   Encounter department: [facility]  :  Assessment & Plan  Post-viral cough syndrome  Validated patient's frustration  Reviewed cxr-no PNA  Encouraged to cough and deep breath, incentive spirometry  Advised to follow up with her pulm  Orders:    predniSONE 10 mg tablet; Take 1 tablet (10 mg total) by mouth daily for 5 days    History of influenza         Atelectasis                History of Present Illness   Struggling with post viral cough syndrome-I saw her 1 week ago   Slightly better on pred burst-still coughing  Cxr clear except atelectasis and poor expansion  Admitted to [facility]  with influenza A  Saw her PCP  for follow-up  She has been treated with steroids, inhaler, multiple cough syrups  Continues to cough-she is frustrated as the cough is quite refractory  It is dry  She cannot get a solid 4 hours of sleep  She is very fatigued  She is on Xarelto for history of PE    Cough  Chronicity: subacute since mid january. diagnosed with influenza A. The problem has been waxing and waning. The problem occurs every few minutes. The cough is Non-productive. Associated symptoms include postnasal drip and rhinorrhea. Pertinent negatives include no fever, sore throat or wheezing. Associated symptoms comments: Rib/chest wall soreness from coughing  . The symptoms are aggravated by lying down. She has tried prescription cough suppressant, OTC cough suppressant, oral steroids, cool air, body position changes and rest for the symptoms. The treatment provided mild relief.     Review of Systems   Constitutional:  Positive for fatigue. Negative for fever.   HENT:  Positive for postnasal drip and rhinorrhea. Negative for sore throat.    Respiratory:  Positive for cough. Negative for wheezing.         Denied hemoptysis   Cardiovascular:  "Negative.    Musculoskeletal:  Positive for arthralgias.   Neurological:  Negative for dizziness and weakness.       Objective   /76 (BP Location: Left arm, Patient Position: Sitting, Cuff Size: Large)   Pulse 93   Temp (!) 97.1 °F (36.2 °C) (Temporal)   Resp 17   Ht 5' 3\" (1.6 m)   SpO2 96%   BMI 30.47 kg/m²      Physical Exam  Vitals and nursing note reviewed.   Constitutional:       General: She is not in acute distress.     Appearance: Normal appearance. She is not ill-appearing.   HENT:      Nose: Nose normal.      Mouth/Throat:      Mouth: Mucous membranes are moist.      Pharynx: Posterior oropharyngeal erythema present.   Cardiovascular:      Rate and Rhythm: Normal rate and regular rhythm.      Pulses: Normal pulses.   Pulmonary:      Effort: Pulmonary effort is normal.      Breath sounds: Normal breath sounds. No wheezing, rhonchi or rales.      Comments: Frequent dry cough  Musculoskeletal:      Right lower leg: No edema.      Left lower leg: No edema.   Lymphadenopathy:      Cervical: No cervical adenopathy.   Skin:     Coloration: Skin is not pale.   Neurological:      General: No focal deficit present.      Mental Status: She is alert.   Psychiatric:         Mood and Affect: Mood normal.         "

## 2025-03-13 ENCOUNTER — SOCIAL WORK (OUTPATIENT)
Dept: BEHAVIORAL/MENTAL HEALTH CLINIC | Facility: CLINIC | Age: 80
End: 2025-03-13
Payer: MEDICARE

## 2025-03-13 DIAGNOSIS — F32.1 CURRENT MODERATE EPISODE OF MAJOR DEPRESSIVE DISORDER WITHOUT PRIOR EPISODE (HCC): ICD-10-CM

## 2025-03-13 DIAGNOSIS — F41.1 GAD (GENERALIZED ANXIETY DISORDER): Primary | ICD-10-CM

## 2025-03-13 PROCEDURE — 90834 PSYTX W PT 45 MINUTES: CPT | Performed by: COUNSELOR

## 2025-03-13 NOTE — BH CRISIS PLAN
Client Name: Madeleine Sanchez       Client YOB: 1945    [employer] Safety Plan      Creation Date: 3/13/25 Update Date: 3/13/26   Created By: Apple Bhatti LPC Last Updated By: Apple Bhatti LPC      Step 1: Warning Signs:   Warning Signs   I can't think straight, things feel like a burden   fidgety and distracted, don't want to do normal activites, don't want to leave house, feel withdrawn            Step 2: Internal Coping Strategies:   Internal Coping Strategies   cook something, water plants, play with cats, reading, craft            Step 3: People and social settings that provide distraction:   Name Contact Information   Fiorella (Sister) [phone]   cousins Gayathri and Lisa    neighbor, [facility], [employer],           Step 4: People whom I can ask for help during a crisis:      Name Contact Information    Gustabo (Son) [phone]    Josie (daughter) [phone]      Step 5: Professionals or agencies I can contact during a crisis:      Clinican/Agency Name Phone Emergency Contact    Teressa Bhatti [phone] Gustabo son - [phone]    [facility] [phone]       Local Emergency Department Emergency Department Phone Emergency Department Address    [address] [address] [phone]        Crisis Phone Numbers:   Suicide Prevention Lifeline: Call or Text  746 Crisis Text Line: Text HOME to 368-222   Please note: Some [facility] do not have a separate number for Child/Adolescent specific crisis. If your county is not listed under Child/Adolescent, please call the adult number for your county      Adult Crisis Numbers: Child/Adolescent Crisis Numbers   [facility]: [phone] [facility]: [phone]   [facility]: [phone] [facility]: [phone]   [facility]: [phone] [place]: [phone]   [facility]: [phone] [place]: [phone]  "  [place]: [phone]   [facility]: [phone]   [facility]: [phone]   [place] [facility]: [phone] (daytime) [phone] (after hours, weekends, holidays)      Step 6: Making the environment safer (plan for lethal means safety):   Plan: I don't have any thoughts like this.  NO SI or intent     Optional: What is most important to me and worth living for?   \"I just want to keep on living. I still want to do things. I don't want to miss out on things. I love my children and I want to see them get on with their lives. I'd like to see my grandsons get . I want to get back to doing what I need to do.\"         [facility] Safety Plan. Erin Santana and Joe Galvan. Used with permission of the authors.           "

## 2025-03-13 NOTE — PSYCH
"Behavioral Health Psychotherapy Progress Note    Psychotherapy Provided: Individual Psychotherapy     1. BRINDA (generalized anxiety disorder)        2. Current moderate episode of major depressive disorder without prior episode (HCC)            Goals addressed in session: Goal 1     DATA: Madeleine was in person for session.  Return to session after months away.  Noted that she had several physical ailments and hospitalization which interfered with her ability to attend sessions.  Noted that she is willing to come back and resume sessions.  Shared that her daughter is creating a plan to move out in the summer and she is hopeful that it will occur.  Updated on some of the family dynamics and continues to identify ways that she at times ignores her own needs in order to be helpful to her family.  Was able to create a schedule for several upcoming visits in the next few weeks.  During this session, this clinician used the following therapeutic modalities: Client-centered Therapy and Cognitive Behavioral Therapy    Substance Abuse was addressed during this session. If the client is diagnosed with a co-occurring substance use disorder, please indicate any changes in the frequency or amount of use: Not applicable. Stage of change for addressing substance use diagnoses: No substance use/Not applicable    ASSESSMENT:  Madeleine Sanchez presents with a Euthymic/ normal and Anxious mood.     her affect is Normal range and intensity, which is congruent, with her mood and the content of the session. The client has made progress on their goals.    Discussed focusing on her health but also moving toward becoming more assertiveness with particular family members.  Madeleine Sanchez presents with a none risk of suicide, none risk of self-harm, and none risk of harm to others.    For any risk assessment that surpasses a \"low\" rating, a safety plan must be developed.    A safety plan was indicated: no  If yes, describe in detail not " applicable    PLAN: Between sessions, Madeleine Sanchez will begin to identify areas where she can add more self advocacy and supportive boundaries. At the next session, the therapist will use Client-centered Therapy and Cognitive Behavioral Therapy to address mood regulation.    Behavioral Health Treatment Plan and Discharge Planning: Madeleine Sanchez is aware of and agrees to continue to work on their treatment plan. They have identified and are working toward their discharge goals. yes    Depression Follow-up Plan Completed: Not applicable    Visit start and stop times:    03/13/25  Start Time: 1200  Stop Time: 1245  Total Visit Time: 45 minutes

## 2025-03-19 DIAGNOSIS — F41.1 GAD (GENERALIZED ANXIETY DISORDER): ICD-10-CM

## 2025-03-19 DIAGNOSIS — G25.81 RLS (RESTLESS LEGS SYNDROME): ICD-10-CM

## 2025-03-19 DIAGNOSIS — F32.1 CURRENT MODERATE EPISODE OF MAJOR DEPRESSIVE DISORDER WITHOUT PRIOR EPISODE (HCC): ICD-10-CM

## 2025-03-19 RX ORDER — DULOXETIN HYDROCHLORIDE 30 MG/1
30 CAPSULE, DELAYED RELEASE ORAL DAILY
Qty: 30 CAPSULE | Refills: 1 | Status: SHIPPED | OUTPATIENT
Start: 2025-03-19 | End: 2025-05-18

## 2025-03-19 RX ORDER — BUSPIRONE HYDROCHLORIDE 15 MG/1
15 TABLET ORAL 2 TIMES DAILY
Qty: 60 TABLET | Refills: 1 | Status: SHIPPED | OUTPATIENT
Start: 2025-03-19

## 2025-03-19 NOTE — TELEPHONE ENCOUNTER
Needs sent to mail order pharmacy    Reason for call:   [x] Refill   [] Prior Auth  [] Other:     Office:   [x] PCP/Provider -   [] Specialty/Provider -     Medication: rOPINIRole (REQUIP) 1 mg tablet TAKE 1 TABLET AT 6PM AND TAKE 2 TABLETS AT BEDTIME       Pharmacy: [facility] - [facility] [address]      Local Pharmacy   Does the patient have enough for 3 days?   [] Yes   [] No - Send as HP to POD    Mail Away Pharmacy   Does the patient have enough for 10 days?   [x] Yes   [] No - Send as HP to POD

## 2025-03-19 NOTE — TELEPHONE ENCOUNTER
Medication: DULoxetine (Cymbalta) 30 mg delayed release capsule     Dose/Frequency:   Take 1 capsule (30 mg total) by mouth daily          Quantity: 30    Pharmacy: [facility] - [facility]domo - [address]        Office:   [] PCP/Provider -   [x] Speciality/Provider -     Does the patient have enough for 3 days?   [x] Yes   [] No - Send as HP to POD

## 2025-03-19 NOTE — TELEPHONE ENCOUNTER
Medication:     busPIRone (BUSPAR)       Dose/Frequency: Take 1 tablet (15 mg total) by mouth 2 (two) times a day     Quantity: 60    Pharmacy: [facility]dom[facility] - [address] [place] [address]     Office:   [] PCP/Provider -   [x] Speciality/Provider -     Does the patient have enough for 3 days?   [x] Yes   [] No - Send as HP to POD

## 2025-03-20 ENCOUNTER — SOCIAL WORK (OUTPATIENT)
Dept: BEHAVIORAL/MENTAL HEALTH CLINIC | Facility: CLINIC | Age: 80
End: 2025-03-20
Payer: MEDICARE

## 2025-03-20 DIAGNOSIS — F32.1 CURRENT MODERATE EPISODE OF MAJOR DEPRESSIVE DISORDER WITHOUT PRIOR EPISODE (HCC): Primary | ICD-10-CM

## 2025-03-20 DIAGNOSIS — F41.1 GAD (GENERALIZED ANXIETY DISORDER): ICD-10-CM

## 2025-03-20 DIAGNOSIS — F33.3 MAJOR DEPRESSIVE DISORDER, RECURRENT, SEVERE WITH PSYCHOTIC FEATURES (HCC): ICD-10-CM

## 2025-03-20 PROCEDURE — 90834 PSYTX W PT 45 MINUTES: CPT | Performed by: COUNSELOR

## 2025-03-20 RX ORDER — ROPINIROLE 1 MG/1
TABLET, FILM COATED ORAL
Qty: 270 TABLET | Refills: 1 | Status: SHIPPED | OUTPATIENT
Start: 2025-03-20

## 2025-03-20 NOTE — PSYCH
Behavioral Health Psychotherapy Progress Note    Psychotherapy Provided: Individual Psychotherapy     1. Current moderate episode of major depressive disorder without prior episode (HCC)        2. Major depressive disorder, recurrent, severe with psychotic features (HCC)        3. BRINDA (generalized anxiety disorder)            Goals addressed in session: Goal 1     DATA: Madeleine was in person for session.  Continue to process situations with her family.  Mood daughter is going for her surgery and she hopes that she will find some relief from her physical pain.  Shared that she is still on track to move this summer but is managing her health care first.  Spent most of the session discussing family and her concerns about different people and their circumstances.  Needs to be redirected to focus on herself and her own concerns, and how she can advocate for her own needs.  Discussed the history of people that she has cared for in her life and how that has been her main focus.  During this session, this clinician used the following therapeutic modalities: Client-centered Therapy and Cognitive Behavioral Therapy    Substance Abuse was not addressed during this session. If the client is diagnosed with a co-occurring substance use disorder, please indicate any changes in the frequency or amount of use: not applicable. Stage of change for addressing substance use diagnoses: No substance use/Not applicable    ASSESSMENT:  Madeleine Sanchez presents with a Euthymic/ normal and Anxious mood.     her affect is Normal range and intensity, which is congruent, with her mood and the content of the session. The client has made progress on their goals.    Presents with a long history of caretaking behaviors for family and others who have been ill.  Appears to have difficulty putting focus on herself first.  Madeleine Sanchez presents with a none risk of suicide, none risk of self-harm, and none risk of harm to others.    For any risk  "assessment that surpasses a \"low\" rating, a safety plan must be developed.    A safety plan was indicated: no  If yes, describe in detail not applicable    PLAN: Between sessions, Madeleine Sanchez will continue to identify ways that she can begin to focus on her own self-care and needs. At the next session, the therapist will use Client-centered Therapy and Cognitive Behavioral Therapy to address mood regulation.    Behavioral Health Treatment Plan and Discharge Planning: Madeleine Sanchez is aware of and agrees to continue to work on their treatment plan. They have identified and are working toward their discharge goals. yes    Depression Follow-up Plan Completed: Not applicable    Visit start and stop times:    03/20/25  Start Time: 1200  Stop Time: 1245  Total Visit Time: 45 minutes  "

## 2025-03-26 ENCOUNTER — SOCIAL WORK (OUTPATIENT)
Dept: BEHAVIORAL/MENTAL HEALTH CLINIC | Facility: CLINIC | Age: 80
End: 2025-03-26
Payer: MEDICARE

## 2025-03-26 DIAGNOSIS — F33.3 MAJOR DEPRESSIVE DISORDER, RECURRENT, SEVERE WITH PSYCHOTIC FEATURES (HCC): ICD-10-CM

## 2025-03-26 DIAGNOSIS — F32.1 CURRENT MODERATE EPISODE OF MAJOR DEPRESSIVE DISORDER WITHOUT PRIOR EPISODE (HCC): ICD-10-CM

## 2025-03-26 DIAGNOSIS — F41.1 GAD (GENERALIZED ANXIETY DISORDER): Primary | ICD-10-CM

## 2025-03-26 PROCEDURE — 90834 PSYTX W PT 45 MINUTES: CPT | Performed by: COUNSELOR

## 2025-03-26 NOTE — PSYCH
Behavioral Health Psychotherapy Progress Note    Psychotherapy Provided: Individual Psychotherapy     1. BRINDA (generalized anxiety disorder)        2. Major depressive disorder, recurrent, severe with psychotic features (HCC)        3. Current moderate episode of major depressive disorder without prior episode (HCC)            Goals addressed in session: Goal 1     DATA: Madeleine was in person for session.  Noted that her daughter is getting ready for medical procedures to help with her physical issues.  Discussed some concerns about family members but also focus more of the session on herself.  Shared that she was not sure who she was anymore.  Asked her to drill down to what that meant.  Shared that she spent most of her life caring for her  and caring for her mother who was ill and now that they have both passed she struggles to find an identity.  Noted that she has less friends as most have moved away.  Became tearful at some points in session.  Discussed options for finding activities and starting slowly.  Discussed possibilities regarding her Faith and some groups that may be available to her.  Noted that there had been some changes of the Jewish and the people that she knew were gone but she would consider this as a possibility.  During this session, this clinician used the following therapeutic modalities: Client-centered Therapy and Cognitive Behavioral Therapy    Substance Abuse was not addressed during this session. If the client is diagnosed with a co-occurring substance use disorder, please indicate any changes in the frequency or amount of use: Not applicable. Stage of change for addressing substance use diagnoses: No substance use/Not applicable    ASSESSMENT:  Madeleine Sanchez presents with a Euthymic/ normal mood.     her affect is Normal range and intensity, which is congruent, with her mood and the content of the session. The client has made progress on their goals.    Had difficulty at  "first focusing on herself and her own concerns but was able to sustain that conversation for the duration of this session after we drilled down on the topic of identifying her \"self\".  Appears to be coping with many losses, people and her own mobility.  Madeleine Sanchez presents with a none risk of suicide, none risk of self-harm, and none risk of harm to others.    For any risk assessment that surpasses a \"low\" rating, a safety plan must be developed.    A safety plan was indicated: no  If yes, describe in detail not applicable    PLAN: Between sessions, Madeleine Sanchez will continue to identify \"self\" and what she feels she is missing or what she would like to explore at this stage of her life. At the next session, the therapist will use Client-centered Therapy and Cognitive Behavioral Therapy to address mood regulation.    Behavioral Health Treatment Plan and Discharge Planning: Madeleine Sanchez is aware of and agrees to continue to work on their treatment plan. They have identified and are working toward their discharge goals. yes    Depression Follow-up Plan Completed: Not applicable    Visit start and stop times:    03/26/25  Start Time: 1500  Stop Time: 1545  Total Visit Time: 45 minutes  "

## 2025-03-28 ENCOUNTER — TELEPHONE (OUTPATIENT)
Dept: PSYCHIATRY | Facility: CLINIC | Age: 80
End: 2025-03-28

## 2025-03-28 NOTE — TELEPHONE ENCOUNTER
Called patient but had to leave voicemail stating appointment with Irma Batista PA-C on 3/31/2025 is cancelled due to provider out of office. Patient has Talk Therapy appt on 4/1/2025 and can't schedule med mgmt in same day.   Left call back # [phone] to reschedule.

## 2025-04-01 ENCOUNTER — SOCIAL WORK (OUTPATIENT)
Dept: BEHAVIORAL/MENTAL HEALTH CLINIC | Facility: CLINIC | Age: 80
End: 2025-04-01
Payer: MEDICARE

## 2025-04-01 DIAGNOSIS — F32.1 CURRENT MODERATE EPISODE OF MAJOR DEPRESSIVE DISORDER WITHOUT PRIOR EPISODE (HCC): Primary | ICD-10-CM

## 2025-04-01 DIAGNOSIS — F41.1 GAD (GENERALIZED ANXIETY DISORDER): ICD-10-CM

## 2025-04-01 PROCEDURE — 90837 PSYTX W PT 60 MINUTES: CPT | Performed by: COUNSELOR

## 2025-04-01 NOTE — PSYCH
Behavioral Health Psychotherapy Progress Note    Psychotherapy Provided: Individual Psychotherapy     1. Current moderate episode of major depressive disorder without prior episode (HCC)        2. BRINDA (generalized anxiety disorder)            Goals addressed in session: Goal 1     DATA: Discussed her daughter's progress since her ablation and noted that she was doing better but may have pushed it too far yesterday at a new job.  Also shared that her older daughter came to visit and made a disparaging comment about the younger.  Noted that she advocated for her younger daughter and wonders why they cannot get along better as sisters.  Discussed the idea of age and stage and family and the difference between her upbringing and culture and times of her children.  Noted that her efforts to advocate 1 child over the other may not be received as she intends.  Continues to process her difficulties with sleep and concerns about not feeling herself and not being able to do the things she used to do.  Reviewed options for getting more rest during the day and also realigning her expectations to complete smaller tasks rather than much she had done in previous years.  During this session, this clinician used the following therapeutic modalities: Client-centered Therapy and Cognitive Behavioral Therapy    Substance Abuse was not addressed during this session. If the client is diagnosed with a co-occurring substance use disorder, please indicate any changes in the frequency or amount of use: Not applicable. Stage of change for addressing substance use diagnoses: No substance use/Not applicable    ASSESSMENT:  Madeleine Sanchez presents with a Euthymic/ normal and Depressed mood.     her affect is Normal range and intensity  which is congruent, with her mood and the content of the session. The client has made progress on their goals.    Is able to verbalize her concerns and also shift away from being mother and caretaker to  "discussing her own needs and desires more.  Madeleine Sanchez presents with a none risk of suicide, none risk of self-harm, and none risk of harm to others.    For any risk assessment that surpasses a \"low\" rating, a safety plan must be developed.    A safety plan was indicated: no  If yes, describe in detail not applicable    PLAN: Between sessions, Madeleine Sanchez will continue to identify areas of concern and take small action steps to complete them. At the next session, the therapist will use Client-centered Therapy and Cognitive Behavioral Therapy to address mood regulation.    Behavioral Health Treatment Plan and Discharge Planning: Madeleine Sanchez is aware of and agrees to continue to work on their treatment plan. They have identified and are working toward their discharge goals. yes    Depression Follow-up Plan Completed: Not applicable    Visit start and stop times:    04/01/25  Start Time: 1400  Stop Time: 1445  Total Visit Time: 45 minutes  "

## 2025-04-03 ENCOUNTER — OFFICE VISIT (OUTPATIENT)
Dept: PSYCHIATRY | Facility: CLINIC | Age: 80
End: 2025-04-03
Payer: MEDICARE

## 2025-04-03 DIAGNOSIS — F32.1 CURRENT MODERATE EPISODE OF MAJOR DEPRESSIVE DISORDER WITHOUT PRIOR EPISODE (HCC): Primary | ICD-10-CM

## 2025-04-03 DIAGNOSIS — F41.1 GAD (GENERALIZED ANXIETY DISORDER): ICD-10-CM

## 2025-04-03 PROCEDURE — G2211 COMPLEX E/M VISIT ADD ON: HCPCS | Performed by: PHYSICIAN ASSISTANT

## 2025-04-03 PROCEDURE — 99214 OFFICE O/P EST MOD 30 MIN: CPT | Performed by: PHYSICIAN ASSISTANT

## 2025-04-03 RX ORDER — DULOXETIN HYDROCHLORIDE 60 MG/1
60 CAPSULE, DELAYED RELEASE ORAL DAILY
Qty: 30 CAPSULE | Refills: 1 | Status: SHIPPED | OUTPATIENT
Start: 2025-04-03 | End: 2025-06-02

## 2025-04-03 NOTE — ASSESSMENT & PLAN NOTE
Not at goal - increase duloxetine to 60 mg qd; continue bupropion  mg qAM, buspirone 15 mg BID; continue talk therapy; f/u in 1 month

## 2025-04-03 NOTE — PSYCH
MEDICATION MANAGEMENT NOTE    Name: Madeleine Sanchez      : 1945      MRN: [id number]  Encounter Provider: Irma Batista  Encounter Date: 4/3/2025   Encounter department: [facility]    Insurance: Payor: MEDICARE / Plan: MEDICARE A AND B / Product Type: Medicare A & B Fee for Service /      Reason for Visit:   Chief Complaint   Patient presents with    Follow-up    Medication Management    Depression    Anxiety   :  Assessment & Plan  Current moderate episode of major depressive disorder without prior episode (HCC)  Not at goal - increase duloxetine to 60 mg qd; continue bupropion  mg qAM, buspirone 15 mg BID; continue talk therapy; f/u in 1 month    Orders:    DULoxetine (Cymbalta) 60 mg delayed release capsule; Take 1 capsule (60 mg total) by mouth daily    BRINDA (generalized anxiety disorder)  Not at goal - increase duloxetine to 60 mg qd; continue bupropion  mg qAM, buspirone 15 mg BID; continue talk therapy; f/u in 1 month    Orders:    DULoxetine (Cymbalta) 60 mg delayed release capsule; Take 1 capsule (60 mg total) by mouth daily      She initially thought duloxetine was helpful but now doesn't think it is doing much. She is on a very low dose so advise increasing to 60 mg qd. She tolerates it well at this time. No other med changes advised though she would like to get off either buspirone and/or bupropion if duloxetine is successful. She knows it has helped pain in the past. She has very disrupted sleep but would rather try this for now instead of adding a sleep medication.     Treatment Recommendations:    Continue current medications:     - duloxetine 30 mg qd     - buspirone 15 mg BID     - bupropion  mg qAM    Educated about diagnosis and treatment modalities. Verbalizes understanding and agreement with the treatment plan.  Discussed self monitoring of symptoms, and symptom monitoring tools.  Discussed medications and if treatment adjustment  "was needed or desired.  Medication management every 1 month  Aware of 24 hour and weekend coverage for urgent situations accessed by calling [facility] main practice number  Continue psychotherapy with [facility] therapist Teerssa Bhatti  I am scheduling this patient out for greater than 3 months: No    Medications Risks/Benefits:      Risks, Benefits And Possible Side Effects Of Medications:    Risks, benefits, and possible side effects of medications explained to Madeleine and she (or legal representative) verbalizes understanding and agreement for treatment.    Controlled Medication Discussion:     Not applicable      History of Present Illness     Madeleine is seen today for a follow up for Follow-up, Medication Management, Depression, and Anxiety. Since her last visit she unfortunately got quite ill with a respiratory infection and cough. She did start the duloxetine and initially thought this was helpful but is not sure anymore. She is still very down most days. She does try to get up and do the things she \"needs\" to do but would rather just stay in bed. She is working on her sleep habits but her sleep is still very disrupted per pt. She does not think it is related to racing thoughts, pain, or bathroom issues. She does have pain from the fibromyalgia. She is not sure if there is some nausea related to duloxetine (was there previously as well).     Past medication trials:  Lexapro (dizziness, fatigue), Trintellix (nausea), Viibryd (cost), hydroxyzine (grogginess), Restoril (no benefit)    She denies any suicidal ideation, intent or plan at present; denies any homicidal ideation, intent or plan at present.    She denies any auditory hallucinations, denies any visual hallucinations, denies any delusions.    She denies any side effects from current psychiatric medications.    HPI ROS Appetite Changes and Sleep:     She reports disrupted sleep, decreased appetite, low energy    Review Of Systems: A " "review of systems is obtained and is negative except for the pertinent positives listed in HPI/Subjective above.      Current Rating Scores:     None completed today.    Areas of Improvement: reviewed in HPI/Subjective Section and reviewed in Assessment and Plan Section    Past Medical History:   Diagnosis Date    Bilateral pulmonary embolism (HCC) 08/25/2019    Cancer (HCC)     left breast cancer    Cervical herniated disc     Chronic pain disorder     back pain    Chronic respiratory failure (HCC)     uses O2 at home with NC /5/19 no longer using/just CPAP    Colon polyp     CPAP (continuous positive airway pressure) dependence     Disease of thyroid gland     hypothyroid    DVT (deep venous thrombosis) ([facility]) 2020    right leg    Examination for, follow-up 11/03/2021    Family history of reaction to anesthesia     \"son and daughter hard time waking up and also PONV\"    Fibromyalgia, primary     GERD (gastroesophageal reflux disease)     Glaucoma     Hiatal hernia     History of palpitations     History of pneumonia     History of transfusion     Hyperlipidemia     Hypertension     Irregular heart beat     Migraine     Myocardial infarction ([facility])     pt sees cardilogist Dr Bennett [facility]/\"didnt realize had one, found on EKG before a surgery\"    OAB (overactive bladder)     Pollen allergies     Pulmonary embolism (HCC) 2019    Bilateral; on Xarelto    Restless leg     Risk for falls     Sleep apnea     Use of cane as ambulatory aid     occas    Uses brace     Mafo/left leg    Wears glasses      Past Surgical History:   Procedure Laterality Date    ADENOIDECTOMY      BAND HEMORRHOIDECTOMY      CARDIAC CATHETERIZATION      CARPAL TUNNEL RELEASE      CATARACT EXTRACTION Bilateral     COLONOSCOPY      FOOT SURGERY      pin implanted right toe    JOINT REPLACEMENT Bilateral     knees    LAMINECTOMY      LUMBAR EPIDURAL INJECTION      MASTECTOMY Bilateral     with reconstruction and implants    NISSEN FUNDOPLICATION      MO " "LNGTH/SHRT TENDON LEG/ANKLE 1 TENDON SPX Left 05/24/2021    Procedure: Sectioning peroneal tendons with lengthening/sectioning achilles tendon lower leg and ankle;  Surgeon: Fei Sifuentes DPM;  Location: [facility] OR;  Service: Podiatry    REPLACEMENT TOTAL KNEE      SPINAL FUSION      TONSILLECTOMY      TUBAL LIGATION      TUMOR EXCISION      right forearm/benign    UPPER GASTROINTESTINAL ENDOSCOPY      VEIN LIGATION Right      Allergies:   Allergies   Allergen Reactions    Benzalkonium Chloride Hives     Merthiolate tincture 9/28/2020      Hydromorphone Hallucinations     disorientation; hallucination; confusion      Merthiolate  [Thimerosal (Thiomersal)] Hives    Quinine Derivatives Other (See Comments) and Tinnitus     tinnitus      Codeine Nausea Only and Other (See Comments)     nausea      Medical Tape Rash    Bee Pollen Other (See Comments)     Other reaction(s): Nasal Congestion    Other Itching    Pamelor [Nortriptyline] Vomiting     Per pt, itching also    Pollen Extract Nasal Congestion    Statins Itching    Wound Dressing Adhesive Rash       Current Outpatient Medications   Medication Instructions    acetaminophen (TYLENOL) 650 mg, Every 6 hours PRN    albuterol (ACCUNEB) 1.25 mg, Nebulization, Every 6 hours PRN    albuterol (ProAir HFA) 90 mcg/act inhaler 2 puffs, Inhalation, Every 6 hours PRN    Albuterol-Budesonide (Airsupra) 90-80 MCG/ACT AERO 2 puffs, Inhalation, 4 times daily PRN    ASPIRIN 81 PO Aspirin EC 81 MG Oral Tablet Delayed Release    Refills: 0       Active    BD Integra Syringe 25G X 1\" 3 ML MISC USE TO INJECT B-12 ONCE WEEKLY    brimonidine (ALPHAGAN P) 0.15 % ophthalmic solution 1 drop, Right Eye, 2 times daily    buPROPion (WELLBUTRIN XL) 150 mg, Oral, Daily    busPIRone (BUSPAR) 15 mg, Oral, 2 times daily    cyanocobalamin 1,000 mcg/mL INJECT 1ML INTO A MUSCLE ONCE A WEEK    DULoxetine (CYMBALTA) 60 mg, Oral, Daily    famotidine (PEPCID) 40 mg, Oral, Daily    fluticasone " "(FLONASE) 50 mcg/act nasal spray 2 sprays, Nasal, Once, OD    guaiFENesin (MUCINEX) 600 mg, Oral, Every 12 hours scheduled    Hydrocod Macario-Chlorphe Macario ER (TUSSIONEX) 10-8 mg/5 mL ER suspension 5 mL, Oral, Every 12 hours PRN    latanoprost (XALATAN) 0.005 % ophthalmic solution 1 drop, Daily at bedtime    levothyroxine 100 mcg, Oral, Daily    magnesium 30 mg, 2 times daily    NEEDLE, DISP, 25 G (B-D DISP NEEDLE 25GX1\") 25G X 1\" MISC Does not apply, Weekly    pantoprazole (PROTONIX) 40 mg, Oral, Daily at bedtime    rOPINIRole (REQUIP) 1 mg tablet TAKE 1 TABLET AT 6PM AND TAKE 2 TABLETS AT BEDTIME    Syringe, Disposable, (2-3CC SYRINGE) 3 ML MISC Does not apply, Weekly    timolol (TIMOPTIC) 0.5 % ophthalmic solution 2 times daily    torsemide (DEMADEX) 10 mg, Oral, Daily    Xarelto 20 mg, Oral, Every evening        Substance Abuse History:    Tobacco, Alcohol and Drug Use History     Tobacco Use    Smoking status: Never    Smokeless tobacco: Never   Vaping Use    Vaping status: Never Used   Substance Use Topics    Alcohol use: Not Currently     Comment: socially    Drug use: Never     Alcohol Use: Not At Risk (2023)    AUDIT-C     Frequency of Alcohol Consumption: Never     Average Number of Drinks: Patient does not drink     Frequency of Binge Drinking: Never       Social History:    Social History     Socioeconomic History    Marital status:      Spouse name: Not on file    Number of children: Not on file    Years of education: Not on file    Highest education level: Not on file   Occupational History    Not on file   Other Topics Concern    Not on file   Social History Narrative    · Most recent tobacco use screenin2019         Family Psychiatric History:     Family History   Problem Relation Age of Onset    Cancer Sister        Medical History Reviewed by provider this encounter:  Tobacco  Allergies  Meds  Problems  Med Hx  Surg Hx  Fam Hx          Objective   There were no vitals " taken for this visit.     Mental Status Evaluation:    Appearance age appropriate, casually dressed, looks stated age   Behavior cooperative, calm   Speech normal rate, normal volume, normal pitch, spontaneous   Mood depressed   Affect constricted   Thought Processes organized, goal directed   Thought Content no overt delusions   Perceptual Disturbances: no auditory hallucinations, no visual hallucinations   Abnormal Thoughts  Risk Potential Suicidal ideation - None  Homicidal ideation - None  Potential for aggression - No   Orientation oriented to person, place, time/date, and situation   Memory recent and remote memory grossly intact   Consciousness alert and awake   Attention Span Concentration Span attention span and concentration are age appropriate   Intellect appears to be of average intelligence   Insight intact   Judgement intact   Muscle Strength and  Gait unstable gait, imbalance, uses cane   Motor activity no abnormal movements   Language no difficulty naming common objects, no difficulty repeating a phrase, no difficulty writing a sentence   Fund of Knowledge adequate knowledge of current events  adequate fund of knowledge regarding past history  adequate fund of knowledge regarding vocabulary    Pain none   Pain Scale 0       Laboratory Results: I have personally reviewed all pertinent laboratory/tests results    Suicide/Homicide Risk Assessment:    Risk of Harm to Self:  The following ratings are based on assessment at the time of the interview  Based on today's assessment, Madeleine presents the following risk of harm to self: none    Risk of Harm to Others:  The following ratings are based on assessment at the time of the interview  Based on today's assessment, Madeleine presents the following risk of harm to others: none    The following interventions are recommended: Continue medication management. No other intervention changes indicated at this time.    Psychotherapy Provided:     Individual  psychotherapy provided: No    Treatment Plan:    Completed and signed during the session: Not applicable - Treatment Plan to be completed by [facility] therapist.    Goals: Progress towards Treatment Plan goals - Yes, progressing, as evidenced by subjective findings in HPI/Subjective Section and in Assessment and Plan Section    Depression Follow-up Plan Completed: Not applicable    Note Share:    This note was not shared with the patient due to reasonable likelihood of causing patient harm    Visit Time  Visit Start Time: 5:30 PM  Visit Stop Time: 5:50 PM  Total Visit Duration:  20 minutes    Irma Batista 04/03/25

## 2025-04-04 ENCOUNTER — TELEPHONE (OUTPATIENT)
Age: 80
End: 2025-04-04

## 2025-04-04 NOTE — TELEPHONE ENCOUNTER
Due to a change in the providers schedule, pts 6/20/25 appt needs to be rescheduled. Lm for pt to return calll to reschedule.

## 2025-04-08 ENCOUNTER — OFFICE VISIT (OUTPATIENT)
Dept: FAMILY MEDICINE CLINIC | Facility: CLINIC | Age: 80
End: 2025-04-08
Payer: MEDICARE

## 2025-04-08 ENCOUNTER — SOCIAL WORK (OUTPATIENT)
Dept: BEHAVIORAL/MENTAL HEALTH CLINIC | Facility: CLINIC | Age: 80
End: 2025-04-08
Payer: MEDICARE

## 2025-04-08 ENCOUNTER — HOSPITAL ENCOUNTER (OUTPATIENT)
Dept: RADIOLOGY | Facility: HOSPITAL | Age: 80
Discharge: HOME | End: 2025-04-08
Payer: MEDICARE

## 2025-04-08 VITALS
WEIGHT: 180 LBS | TEMPERATURE: 97.2 F | HEIGHT: 63 IN | HEART RATE: 90 BPM | OXYGEN SATURATION: 97 % | DIASTOLIC BLOOD PRESSURE: 90 MMHG | BODY MASS INDEX: 31.89 KG/M2 | SYSTOLIC BLOOD PRESSURE: 146 MMHG

## 2025-04-08 DIAGNOSIS — Z99.89 DEPENDENCE ON CANE: ICD-10-CM

## 2025-04-08 DIAGNOSIS — R13.10 DYSPHAGIA, UNSPECIFIED TYPE: ICD-10-CM

## 2025-04-08 DIAGNOSIS — F32.1 CURRENT MODERATE EPISODE OF MAJOR DEPRESSIVE DISORDER WITHOUT PRIOR EPISODE (HCC): ICD-10-CM

## 2025-04-08 DIAGNOSIS — J98.11 ATELECTASIS: ICD-10-CM

## 2025-04-08 DIAGNOSIS — F41.1 GAD (GENERALIZED ANXIETY DISORDER): Primary | ICD-10-CM

## 2025-04-08 DIAGNOSIS — J40 BRONCHITIS: ICD-10-CM

## 2025-04-08 DIAGNOSIS — J20.9 ACUTE BRONCHITIS, UNSPECIFIED ORGANISM: Primary | ICD-10-CM

## 2025-04-08 DIAGNOSIS — R05.8 POST-VIRAL COUGH SYNDROME: ICD-10-CM

## 2025-04-08 DIAGNOSIS — R04.0 ANTERIOR EPISTAXIS: ICD-10-CM

## 2025-04-08 PROCEDURE — 99214 OFFICE O/P EST MOD 30 MIN: CPT | Performed by: FAMILY MEDICINE

## 2025-04-08 PROCEDURE — 71046 X-RAY EXAM CHEST 2 VIEWS: CPT

## 2025-04-08 PROCEDURE — G2211 COMPLEX E/M VISIT ADD ON: HCPCS | Performed by: FAMILY MEDICINE

## 2025-04-08 PROCEDURE — 90834 PSYTX W PT 45 MINUTES: CPT | Performed by: COUNSELOR

## 2025-04-08 RX ORDER — DEXTROMETHORPHAN HYDROBROMIDE AND PROMETHAZINE HYDROCHLORIDE 15; 6.25 MG/5ML; MG/5ML
5 SYRUP ORAL 4 TIMES DAILY PRN
Qty: 180 ML | Refills: 0 | Status: SHIPPED | OUTPATIENT
Start: 2025-04-08

## 2025-04-08 RX ORDER — AZITHROMYCIN 250 MG/1
TABLET, FILM COATED ORAL
Qty: 6 TABLET | Refills: 0 | Status: SHIPPED | OUTPATIENT
Start: 2025-04-08 | End: 2025-04-12

## 2025-04-08 NOTE — PROGRESS NOTES
Name: Madeleine Sanchez      : 1945      MRN: [id number]  Encounter Provider: JOE Yates DO  Encounter Date: 2025   [facility]    :  Assessment & Plan  Acute bronchitis, unspecified organism    Orders:    azithromycin (Zithromax) 250 mg tablet; Take 2 tablets (500 mg total) by mouth daily for 1 day, THEN 1 tablet (250 mg total) daily for 4 days.    Anterior epistaxis    Orders:    Ambulatory Referral to Otolaryngology; Future    Dysphagia, unspecified type         Post-viral cough syndrome    Orders:    XR chest pa and lateral; Future    Atelectasis    Orders:    XR chest pa and lateral; Future    Bronchitis    Orders:    XR chest pa and lateral; Future    promethazine-dextromethorphan (PHENERGAN-DM) 6.25-15 mg/5 mL oral syrup; Take 5 mL by mouth 4 (four) times a day as needed for cough    Dependence on cane             Assessment & Plan  1. Persistent cough: Post-viral syndrome, atelectasis, or bronchitis. Chest x-ray on 2025 showed poor pulmonary expansion with bibasilar parenchymal crowding and early linear atelectasis secondary to low volumes. Lungs clear, no pneumo or pleural effusion. Differential diagnosis includes atelectasis and bronchitis.  - Order chest x-ray for further evaluation  - Prescribe Zithromax: 2 tablets initially, followed by 1 tablet daily  - Prescribe promethazine DM  - Advise Mucinex 600 m tablets twice daily  - Consider CT scan of lungs if chest x-ray reveals abnormalities  - Potential pulmonary consultation based on CT scan results    2. Nonhealing nasal scab: Chronic.  - Referral to Dr. Raymundo for ENT evaluation    3. Hypertension: Elevated.  - Initiate amlodipine therapy    4. Shortness of breath: Dyspnea on exertion.  - Order chest x-ray for further evaluation    5. Medication management.  - Continue Xarelto once daily  - Continue weekly B12 injections  - Continue levothyroxine, timolol, torsemide, and  duloxetine    Follow-up  - Follow-up with chest x-ray results  - Follow-up with ENT evaluation    PROCEDURE  The patient had her nose cauterized approximately 2 years ago.       History of Present Illness     History of Present Illness  The patient is an 80-year-old female who presents for evaluation of persistent cough, nonhealing nasal scab, hypertension, and shortness of breath.    Persistent Cough  - Occurs in spasms, particularly when lying down to sleep  - Uses a pillow to elevate her head during sleep, suspecting hiatal hernia contribution  - Limits use of CPAP due to bothersome cough  - Uses a nebulizer at night before bed but wakes up every 2 hours  - Unable to sleep through the night and feels fatigued  - Not currently taking any cough medicine but has previously used promethazine DM  - Uses Airsupra intermittently and albuterol without a spacer  - Last chest x-ray was normal despite initial suspicions of pneumonia  - Advised to perform deep breathing exercises    Shortness of Breath  - Uses a cane for mobility  - Acknowledges need for increased physical activity but feels limited due to fatigue, coughing, and shortness of breath    Nonhealing Nasal Scab  - Experiencing rhinorrhea, recently turned yellow  - Nose cauterized approximately 2 years ago and prescribed Ponaris  - Scab remains present  - Experienced epistaxis prior to this visit  - Tried bacitracin, Neosporin, and Vaseline without success  - Scab detaches when blowing nose    Hypertension  - Blood pressure elevated today, attributed to recent physical activity and caffeine intake  - Monitors blood pressure at home, noting occasional elevations that subsequently normalize  - Not currently on any antihypertensive medication as blood pressure had previously dropped too low    Supplemental information: She is currently on Xarelto once daily. She receives weekly B12 injections from her daughter. She is also on levothyroxine, timolol, torsemide, and  duloxetine.    ALLERGIES  The patient is allergic to HYDROMORPHONE, THIOLATE, and QUININE DERIVATIVES.    MEDICATIONS  Current: Xarelto, levothyroxine, timolol, torsemide, Airsupra, albuterol, duloxetine     Review of Systems   Constitutional:  Negative for activity change, appetite change, chills, diaphoresis, fatigue and fever.   HENT:  Negative for congestion, ear discharge, ear pain, facial swelling, nosebleeds, postnasal drip, rhinorrhea, sore throat, tinnitus, trouble swallowing and voice change.    Eyes:  Negative for photophobia, pain, discharge, redness, itching and visual disturbance.   Respiratory:  Positive for cough. Negative for choking, chest tightness and shortness of breath.         Still coughing  Sleeping with head of bed elevated   Yellow nasal discharge  Had nose cauterized about 2 yrs ago   It doesn't heal ==>will send back to Dr. Raymundo to eval why this left side of nose does not heal. She tried several tihings to heal it. Nothing works.    CXR x 2   Cardiovascular:  Negative for chest pain, palpitations and leg swelling.   Gastrointestinal:  Negative for abdominal distention, abdominal pain, blood in stool, constipation, diarrhea, nausea and vomiting.   Endocrine: Negative for cold intolerance, heat intolerance, polydipsia, polyphagia and polyuria.   Genitourinary:  Negative for decreased urine volume, difficulty urinating, dysuria, enuresis, frequency, hematuria, pelvic pain, urgency and vaginal bleeding.   Musculoskeletal:  Negative for arthralgias, back pain, gait problem, joint swelling, myalgias, neck pain and neck stiffness.   Skin:  Negative for color change, pallor and rash.   Allergic/Immunologic: Negative for immunocompromised state.   Neurological:  Negative for dizziness, seizures, facial asymmetry, light-headedness, numbness and headaches.   Hematological:  Negative for adenopathy.   Psychiatric/Behavioral:  Negative for agitation, behavioral problems, confusion, decreased  "concentration, dysphoric mood and hallucinations.      Objective   /90 (BP Location: Left arm, Patient Position: Sitting, Cuff Size: Standard)   Pulse 90   Temp (!) 97.2 °F (36.2 °C) (Temporal)   Ht 5' 3\" (1.6 m)   Wt 81.6 kg (180 lb)   SpO2 97%   BMI 31.89 kg/m²     Physical Exam  Nose is oozing a little bit and looks raw.  Lungs were auscultated.    Vital Signs  Blood pressure is 136/90.  Physical Exam  Constitutional:       General: She is in acute distress.      Appearance: Normal appearance. She is not ill-appearing.   HENT:      Head: Normocephalic and atraumatic.      Nose: Congestion and rhinorrhea present.      Mouth/Throat:      Mouth: Mucous membranes are moist.   Eyes:      Pupils: Pupils are equal, round, and reactive to light.   Cardiovascular:      Rate and Rhythm: Normal rate and regular rhythm.      Pulses: Normal pulses.   Pulmonary:      Effort: Pulmonary effort is normal.      Breath sounds: Normal breath sounds. No rhonchi or rales.   Abdominal:      General: Abdomen is flat.   Musculoskeletal:      Right lower leg: No edema.      Left lower leg: No edema.   Skin:     General: Skin is warm.      Coloration: Skin is pale.      Findings: No rash.   Neurological:      General: No focal deficit present.      Mental Status: She is alert and oriented to person, place, and time. Mental status is at baseline.      Gait: Gait abnormal (Walks with a single-point cane).   Psychiatric:         Mood and Affect: Mood normal.         Behavior: Behavior normal.         Thought Content: Thought content normal.         Judgment: Judgment normal.       Administrative Statements   I have spent a total time of 32 minutes in caring for this patient on the day of the visit/encounter including Diagnostic results, Prognosis, Risks and benefits of tx options, Instructions for management, Patient and family education, Importance of tx compliance, Risk factor reductions, Impressions, Counseling / Coordination " of care, Documenting in the medical record, Reviewing/placing orders in the medical record (including tests, medications, and/or procedures), and Obtaining or reviewing history  .

## 2025-04-08 NOTE — PSYCH
Behavioral Health Psychotherapy Progress Note    Psychotherapy Provided: Individual Psychotherapy     1. BRINDA (generalized anxiety disorder)        2. Current moderate episode of major depressive disorder without prior episode (HCC)            Goals addressed in session: Goal 1     DATA: Madeleine was in person for session.  Discussed weeks events and continues to share insights that she gathers in a journal.  Expressing a great deal of loss and remorse as well as feelings of guilt.  Continues to struggle with navigating the different needs of her children in spite of them being adults.  Continues to support one in particular which creates tension and division with the others.  Questions why her children do not get along the way she and her sister did.  Shared memories of growing up with her sister and how everyone got along and supported each other.  Continue to note differences in age and stage.  Also appears that she feels her sister was more favored by her mother and aunt expressed evidence that mom was harder on her than sibling.  During this session, this clinician used the following therapeutic modalities: Client-centered Therapy and Cognitive Behavioral Therapy    Substance Abuse was not addressed during this session. If the client is diagnosed with a co-occurring substance use disorder, please indicate any changes in the frequency or amount of use: Not applicable. Stage of change for addressing substance use diagnoses: No substance use/Not applicable    ASSESSMENT:  Madeleine Sanchez presents with a Depressed mood.     her affect is Normal range and intensity, which is congruent, with her mood and the content of the session. The client has made progress on their goals.    While she continues to struggle with her feelings of loss and both ability and lifestyle she is consistent with bringing content to sessions in exploring her emotions as well as memories of the past.  Madeleine Sanchez presents with a none risk of  "suicide, none risk of self-harm, and none risk of harm to others.    For any risk assessment that surpasses a \"low\" rating, a safety plan must be developed.    A safety plan was indicated: no  If yes, describe in detail not applicable    PLAN: Between sessions, Madeleine Sanchez will continue to identify sources of sadness and triggers to low mood. At the next session, the therapist will use Client-centered Therapy, Cognitive Behavioral Therapy, Solution-Focused Therapy, and Supportive Psychotherapy to address mood regulation.    Behavioral Health Treatment Plan and Discharge Planning: Madeleine Sanchez is aware of and agrees to continue to work on their treatment plan. They have identified and are working toward their discharge goals. yes    Depression Follow-up Plan Completed: Not applicable    Visit start and stop times:    04/08/25  Start Time: 1400  Stop Time: 1450  Total Visit Time: 50 minutes  "

## 2025-04-09 ENCOUNTER — RESULTS FOLLOW-UP (OUTPATIENT)
Dept: FAMILY MEDICINE CLINIC | Facility: CLINIC | Age: 80
End: 2025-04-09

## 2025-04-10 NOTE — TELEPHONE ENCOUNTER
Left another message for patient to call and reschedule her 6/20 appointment with Dr. Mckeon due to change in his schedule. Will cancel appointment and send letter asking her to call.

## 2025-04-14 ENCOUNTER — TELEPHONE (OUTPATIENT)
Age: 80
End: 2025-04-14

## 2025-04-14 NOTE — TELEPHONE ENCOUNTER
Pt wants to make an ENT appt, so I  gave her the phone number for [facility] [phone]

## 2025-04-15 ENCOUNTER — SOCIAL WORK (OUTPATIENT)
Dept: BEHAVIORAL/MENTAL HEALTH CLINIC | Facility: CLINIC | Age: 80
End: 2025-04-15
Payer: MEDICARE

## 2025-04-15 DIAGNOSIS — F33.1 MAJOR DEPRESSIVE DISORDER, RECURRENT, MODERATE (HCC): ICD-10-CM

## 2025-04-15 DIAGNOSIS — F41.1 GAD (GENERALIZED ANXIETY DISORDER): Primary | ICD-10-CM

## 2025-04-15 PROCEDURE — 90834 PSYTX W PT 45 MINUTES: CPT | Performed by: COUNSELOR

## 2025-04-15 NOTE — BH TREATMENT PLAN
"Outpatient Behavioral Health Psychotherapy Treatment Plan    Madeleine Sanchez  1945     Date of Initial Psychotherapy Assessment: 10/15/2024   Date of Current Treatment Plan: 04/15/25  Treatment Plan Target Date: 10/15/2024  Treatment Plan Expiration Date: 11/15/2025    Diagnosis:   1. BRINDA (generalized anxiety disorder)        2. Major depressive disorder, recurrent, moderate (HCC)            Area(s) of Need: Mood regulation    Long Term Goal 1 (in the client's own words):  \"I've always been someone to take care of things for myself and lately I can't wrap my head around things. I don't have the drive to even try. I'm having trouble with sleep, physical aliments which are affecting my eating. I have a lot of sadness. Sometimes I feel like I'm just going through the motions. I just want to feel like myself and get interested in things again.\"       Stage of Change: Contemplation    Target Date for completion: October 15, 2025     Anticipated therapeutic modalities: client centered, cognitive behavioral,      People identified to complete this goal: Haider Sanchez (client) & Teressa Bhatti (therapist)      Objective 1: (identify the means of measuring success in meeting the objective):  Madeleine will begin to observe and identify thoughts, feelings, and physical reactions that are leading to low motivation and energy. She will discuss observations in sessions. (Continue)                    Objective 2: (identify the means of measuring success in meeting the objective): Madeleine will develop and implement at least two cognitive behavioral strategies to decrease depressive symptoms. She will report progress in sessions. (Partially achieved)     Objective 3: (identify the means of measuring success in meeting the objective): Madeleine will monitor her physical symptoms and issues with health hygiene, particular attention to sleep and feeling rested. She will discuss issues in sessions. (Continue)             Long Term " "Goal 2 (in the client's own words):  \"I just don't feel like myself. I lack the energy to do things and get moving. I can't shake the feeling of being empty. I have been working on shifting my focus from family to me. 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 I need help from my children but  I don't want to be taken care of in the sense of not being able to do things for myself .\"        Stage of Change: Contemplation    Target Date for completion: October 15, 2025     Anticipated therapeutic modalities: Client centered, supportive counseling,      People identified to complete this goal: [place] Sanchez (client) & Teressa Bhatti (therapist)      Objective 1: (identify the means of measuring success in meeting the objective):  Madeleine will begin to discuss situations and feelings that lead to isolation and loneliness. She will vent her losses in sessions.  (Continue)                    Objective 2: (identify the means of measuring success in meeting the objective): Madeleine will identify and implement at least three possible activities for socialization.  She will continue to identify areas of interest . She will discuss progress in sessions. (Continue)       Objective 3: (identify the means of measuring success in meeting the objective): Madeleine will continue to observe and address relationships with her children, paying attention to balancing their needs from her and asking for what she needs from them. She will discuss these relationships in session.      I am currently under the care of a [facility] psychiatric provider: yes    My [facility] psychiatric provider is: JHONATAN Brooks    I am currently taking psychiatric medications: Yes, as prescribed    I feel that I will be ready for discharge from mental health care when I reach the following (measurable goal/objective):  \" When I feel that I have a purpose to get up and have a meaningful day. I'll be back to regular activities and " "feeling more motivated to do them.\"     For children and adults who have a legal guardian:   Has there been any change to custody orders and/or guardianship status? NA. If yes, attach updated documentation.    I have updated my Crisis Plan and have been offered a copy of this plan    Behavioral Health Treatment Plan [place]: Diagnosis and Treatment Plan explained to Madeleine Sanchez acknowledges an understanding of their diagnosis. Madeleine Sanchez agrees to this treatment plan.    I have been offered a copy of this Treatment Plan. yes        "

## 2025-04-15 NOTE — PSYCH
"Behavioral Health Psychotherapy Progress Note    Psychotherapy Provided: Individual Psychotherapy     1. BRINDA (generalized anxiety disorder)        2. Major depressive disorder, recurrent, moderate (HCC)            Goals addressed in session: Goal 1     DATA: Haider was in person for session. Reviewed treatment plan and updated treatment goals.  Noted some of her progress was delayed due to her health concerns and being hospitalized for an extended period of time however she has been consistent and exploring goal areas since her return.  We will continue current goals with some refinement over the next 6 months  During this session, this clinician used the following therapeutic modalities: Client-centered Therapy and Cognitive Behavioral Therapy    Substance Abuse was not addressed during this session. If the client is diagnosed with a co-occurring substance use disorder, please indicate any changes in the frequency or amount of use: not applicable. Stage of change for addressing substance use diagnoses: No substance use/Not applicable    ASSESSMENT:  Madeleine Sanchez presents with a Euthymic/ normal and Depressed mood.     her affect is Normal range and intensity, which is congruent, with her mood and the content of the session. The client has made progress on their goals.    Continues to explore and how her health is affecting her mood.  Has difficulty letting go of a past sense of self and is concerned about restoring her sense of purpose in life.  Madeleine Sanchez presents with a none risk of suicide, none risk of self-harm, and none risk of harm to others.    For any risk assessment that surpasses a \"low\" rating, a safety plan must be developed.    A safety plan was indicated: no  If yes, describe in detail not applicable    PLAN: Between sessions, Madeleine Sanchez will continue to explore how health concerns are affecting her current mood and focusing on how to get more sleep and/or rest daily. At the next " session, the therapist will use Client-centered Therapy and Cognitive Behavioral Therapy to address mood regulation.    Behavioral Health Treatment Plan and Discharge Planning: Madeleine Sanchez is aware of and agrees to continue to work on their treatment plan. They have identified and are working toward their discharge goals. yes    Depression Follow-up Plan Completed: Not applicable    Visit start and stop times:    04/15/25  Start Time: 1400  Stop Time: 1445  Total Visit Time: 45 minutes

## 2025-04-22 ENCOUNTER — SOCIAL WORK (OUTPATIENT)
Dept: BEHAVIORAL/MENTAL HEALTH CLINIC | Facility: CLINIC | Age: 80
End: 2025-04-22
Payer: MEDICARE

## 2025-04-22 DIAGNOSIS — F41.1 GAD (GENERALIZED ANXIETY DISORDER): ICD-10-CM

## 2025-04-22 DIAGNOSIS — F33.1 MAJOR DEPRESSIVE DISORDER, RECURRENT, MODERATE (HCC): Primary | ICD-10-CM

## 2025-04-22 PROCEDURE — 90834 PSYTX W PT 45 MINUTES: CPT | Performed by: COUNSELOR

## 2025-04-22 NOTE — PSYCH
Behavioral Health Psychotherapy Progress Note    Psychotherapy Provided: Individual Psychotherapy     1. Major depressive disorder, recurrent, moderate (HCC)        2. BRINDA (generalized anxiety disorder)            Goals addressed in session: Goal 1     DATA: Madeleine was in person for session.  Processed Easter Sunday.  Noted she did not get to go to [place] with family and noted tension among her children.  Continues to be frustrated with her elder daughters negative stance toward her sister.  Feels frustrated because her children do not get along and supported each other the way she and her sister did when they were growing up.  Noted the difference in times as well as the fact that she was the older sister and was always the caretaker for the rest of the family.  Continue to encourage her to step away from defending her younger daughter and focus on maintaining positive relationships with all family members.  Has a plan to attend a lunch next week with one of the daughters friends but is concerned that both girls will not attend if the other is going.  Stated she plans to go no matter what.  During this session, this clinician used the following therapeutic modalities: Client-centered Therapy and Cognitive Behavioral Therapy    Substance Abuse was not addressed during this session. If the client is diagnosed with a co-occurring substance use disorder, please indicate any changes in the frequency or amount of use: Not applicable. Stage of change for addressing substance use diagnoses: No substance use/Not applicable    ASSESSMENT:  Madeleine Sanchez presents with a Euthymic/ normal and Depressed mood.     her affect is Normal range and intensity, which is congruent, with her mood and the content of the session. The client has made progress on their goals.    Willing to address her honest feelings and concerns about family members.  Continues to be swayed in her own mood lability by their conflicts and concerns.  This  "becomes exponential when she needs to address grandchildren as well.  Madeleine Sanchez presents with a none risk of suicide, none risk of self-harm, and none risk of harm to others.    For any risk assessment that surpasses a \"low\" rating, a safety plan must be developed.    A safety plan was indicated: no  If yes, describe in detail not applicable    PLAN: Between sessions, Madeleine Sanchez will continue to work on keeping the focus on herself and her own healing as opposed to focusing on her adult children's issues. At the next session, the therapist will use Client-centered Therapy, Cognitive Behavioral Therapy, and Solution-Focused Therapy to address mood regulation.    Behavioral Health Treatment Plan and Discharge Planning: Madeleine Sanchez is aware of and agrees to continue to work on their treatment plan. They have identified and are working toward their discharge goals. yes    Depression Follow-up Plan Completed: Not applicable    Visit start and stop times:    04/22/25  Start Time: 1400  Stop Time: 1445  Total Visit Time: 45 minutes  "

## 2025-04-25 DIAGNOSIS — E53.8 VITAMIN B 12 DEFICIENCY: ICD-10-CM

## 2025-04-25 NOTE — TELEPHONE ENCOUNTER
Reason for call:   [x] Refill   [] Prior Auth  [] Other:     Office:   [x] PCP/Provider - Miles  [] Specialty/Provider -     Medication:   Cyanocobalamin 1,000mcg, 1 ml weekly    Pharmacy:   [facility] [address] [place] [address] [facility]   Does the patient have enough for 3 days?   [] Yes   [x] No - Send as HP to POD    [facility]   Does the patient have enough for 10 days?   [] Yes   [] No - Send as HP to POD

## 2025-04-26 RX ORDER — CYANOCOBALAMIN 1000 UG/ML
1000 INJECTION, SOLUTION INTRAMUSCULAR; SUBCUTANEOUS
Qty: 12 ML | Refills: 1 | Status: SHIPPED | OUTPATIENT
Start: 2025-04-26

## 2025-04-29 ENCOUNTER — SOCIAL WORK (OUTPATIENT)
Dept: BEHAVIORAL/MENTAL HEALTH CLINIC | Facility: CLINIC | Age: 80
End: 2025-04-29
Payer: MEDICARE

## 2025-04-29 DIAGNOSIS — F33.1 MAJOR DEPRESSIVE DISORDER, RECURRENT, MODERATE (HCC): Primary | ICD-10-CM

## 2025-04-29 DIAGNOSIS — F41.1 GAD (GENERALIZED ANXIETY DISORDER): ICD-10-CM

## 2025-04-29 PROCEDURE — 90834 PSYTX W PT 45 MINUTES: CPT | Performed by: COUNSELOR

## 2025-04-29 NOTE — PSYCH
Behavioral Health Psychotherapy Progress Note    Psychotherapy Provided: Individual Psychotherapy     1. Major depressive disorder, recurrent, moderate (HCC)        2. BRINDA (generalized anxiety disorder)            Goals addressed in session: Goal 1     DATA: Madeleine was in person for session. PHQ-9 was 17. Notes some difficulty with managing problems and functioning. BRINDA - 7 was 14.  Continues to express frustration and concern over her inability to sleep.  Reviewed some of the stressors that keep her from getting rest.  Feels uncomfortable napping or resting during the day and continues to try to accomplish small tasks.  Encouraged her to take time for herself and allow herself to rest and different periods throughout the day if she feels tired or overwhelmed.  Did note that her luncheon with daughters went well and that both girls attended and it was a nice event without incident or arguments.  Shared information about her granddaughter who was upset at the different family event.  Continues to worry about all parties involved and feels frustrated that they do not get along and have tolerance or consideration for each other.  During this session, this clinician used the following therapeutic modalities: Client-centered Therapy and Cognitive Behavioral Therapy    Substance Abuse was not addressed during this session. If the client is diagnosed with a co-occurring substance use disorder, please indicate any changes in the frequency or amount of use: not applicable. Stage of change for addressing substance use diagnoses: No substance use/Not applicable    ASSESSMENT:  Madeleine Sanchez presents with a Euthymic/ normal and Anxious mood.     her affect is Normal range and intensity, which is congruent, with her mood and the content of the session. The client has made progress on their goals.    Is able to express her concerns but has difficulty accepting changes in her ability as well as concern that family members are  "not being kind to 1 another in spite of her effort to raise them to do so.  Continue to encourage her to keep focus on herself and her own health and wellbeing while allowing various family members to create their own solutions and/or consequences.  Madeleine Sanchez presents with a none risk of suicide, none risk of self-harm, and none risk of harm to others.    For any risk assessment that surpasses a \"low\" rating, a safety plan must be developed.    A safety plan was indicated: no  If yes, describe in detail not applicable    PLAN: Between sessions, Madeleine Sanchez will continue to focus on balancing emotional needs as well as identifying health concerns that may be interfering with her ability to do so. At the next session, the therapist will use Client-centered Therapy and Cognitive Behavioral Therapy to address mood regulation.    Behavioral Health Treatment Plan and Discharge Planning: Madeleine Sanchez is aware of and agrees to continue to work on their treatment plan. They have identified and are working toward their discharge goals. yes    Depression Follow-up Plan Completed: Not applicable    Visit start and stop times:    04/29/25  Start Time: 1410  Stop Time: 1455  Total Visit Time: 45 minutes  "

## 2025-05-06 ENCOUNTER — SOCIAL WORK (OUTPATIENT)
Dept: BEHAVIORAL/MENTAL HEALTH CLINIC | Facility: CLINIC | Age: 80
End: 2025-05-06
Payer: MEDICARE

## 2025-05-06 DIAGNOSIS — F41.1 GAD (GENERALIZED ANXIETY DISORDER): ICD-10-CM

## 2025-05-06 DIAGNOSIS — F33.1 MAJOR DEPRESSIVE DISORDER, RECURRENT, MODERATE (HCC): Primary | ICD-10-CM

## 2025-05-06 PROCEDURE — 90834 PSYTX W PT 45 MINUTES: CPT | Performed by: COUNSELOR

## 2025-05-06 NOTE — PSYCH
Behavioral Health Psychotherapy Progress Note    Psychotherapy Provided: Individual Psychotherapy     1. Major depressive disorder, recurrent, moderate (HCC)        2. BRINDA (generalized anxiety disorder)            Goals addressed in session: Goal 1     DATA: Madeleine was in person for session.  Discussed family members and her continued concerns about their issues and behaviors.  Also becoming more concerned about her grandchildren and how they are handling more managing their lives.  Noted that she is having her own difficulties with sleep and ability to get things done.  Continues to cite frustration at how she has changed and annoyance that she cannot accomplish what she wants.  Is resistant to resting and giving herself breaks during the day continuing to noted that she was never like this and why she liked this man.  Encouraged her to accept changes and listen to her body as she would encourage anyone else to do.  During this session, this clinician used the following therapeutic modalities: Client-centered Therapy and Cognitive Behavioral Therapy    Substance Abuse was not addressed during this session. If the client is diagnosed with a co-occurring substance use disorder, please indicate any changes in the frequency or amount of use: not applicable. Stage of change for addressing substance use diagnoses: No substance use/Not applicable    ASSESSMENT:  Madeleine Sanchez presents with a Euthymic/ normal and Dysthymic mood.     her affect is Normal range and intensity, which is congruent, with her mood and the content of the session. The client has made progress on their goals.    Focuses on the why she is this way as opposed to acceptance that she is and how to manage physical needs.  Her moods seem to be affected by her physical state as well as her concerns about family members.  Madeleine Sanchez presents with a none risk of suicide, none risk of self-harm, and none risk of harm to others.    For any risk  "assessment that surpasses a \"low\" rating, a safety plan must be developed.    A safety plan was indicated: no  If yes, describe in detail not applicable    PLAN: Between sessions, Madeleine Sanchez will focus on allowing herself to have the rest and except feedback from her body regarding feeling tired or strained. At the next session, the therapist will use Client-centered Therapy and Cognitive Behavioral Therapy to address mood regulation.    Behavioral Health Treatment Plan and Discharge Planning: Madeleine Sanchez is aware of and agrees to continue to work on their treatment plan. They have identified and are working toward their discharge goals. yes    Depression Follow-up Plan Completed: Not applicable    Visit start and stop times:    05/06/25  Start Time: 1405  Stop Time: 1450  Total Visit Time: 45 minutes  "

## 2025-05-13 ENCOUNTER — TELEPHONE (OUTPATIENT)
Age: 80
End: 2025-05-13

## 2025-05-13 ENCOUNTER — SOCIAL WORK (OUTPATIENT)
Dept: BEHAVIORAL/MENTAL HEALTH CLINIC | Facility: CLINIC | Age: 80
End: 2025-05-13
Payer: MEDICARE

## 2025-05-13 DIAGNOSIS — F41.1 GAD (GENERALIZED ANXIETY DISORDER): ICD-10-CM

## 2025-05-13 DIAGNOSIS — M48.061 SPINAL STENOSIS AT L4-L5 LEVEL: Primary | ICD-10-CM

## 2025-05-13 DIAGNOSIS — F33.1 MAJOR DEPRESSIVE DISORDER, RECURRENT, MODERATE (HCC): Primary | ICD-10-CM

## 2025-05-13 PROCEDURE — 90834 PSYTX W PT 45 MINUTES: CPT | Performed by: COUNSELOR

## 2025-05-13 RX ORDER — TRAMADOL HYDROCHLORIDE 50 MG/1
50 TABLET ORAL 2 TIMES DAILY
Qty: 40 TABLET | Refills: 0 | Status: SHIPPED | OUTPATIENT
Start: 2025-05-13

## 2025-05-13 NOTE — TELEPHONE ENCOUNTER
Patient called in asking if Dr. Yates could call in a prescription for Tramadol.  She has taken it in the past for back issues.  She has had back surgery 2 years ago.  Back pain has been getting worse lately.  Please advise.

## 2025-05-13 NOTE — PSYCH
Behavioral Health Psychotherapy Progress Note    Psychotherapy Provided: Individual Psychotherapy     1. Major depressive disorder, recurrent, moderate (HCC)        2. BRINDA (generalized anxiety disorder)            Goals addressed in session: Goal 1     DATA: Madeleine was in person for session.  Discussed Mother's Day and how it overall went well.  Shared some frustration over her daughters and especially her older daughter who made a disparaging comment to the younger 1.  Reacted strongly as she told the story stating that her older daughter should know better and should be nicer to her.  Stopped her to explore where the sense of should comes from with the girls relationship.  Also noted that she continues to contact her sister frequently and noted that her relationship with her sister was always 1 of being helpful and crying so she has the expectation that her daughters should be the same.  Challenged her about having a standard for the goals based on her belief rather than their feelings about each other.  Also noted that she was able to get to [employer] this week which made her happy.  Continues to note difficulty with sleep and having energy during the day.  During this session, this clinician used the following therapeutic modalities: Client-centered Therapy and Cognitive Behavioral Therapy    Substance Abuse was not addressed during this session. If the client is diagnosed with a co-occurring substance use disorder, please indicate any changes in the frequency or amount of use: not applicable. Stage of change for addressing substance use diagnoses: No substance use/Not applicable    ASSESSMENT:  Madeleine Sanchez presents with a Euthymic/ normal mood.     her affect is Normal range and intensity, which is congruent, with her mood and the content of the session. The client has made progress on their goals.    Was taking small steps to try to limit activity and not have high expectations of her ability to get things  "done.  Also beginning to become more aware of her \"should\" standards for her children.  Madeleine Sanchez presents with a none risk of suicide, none risk of self-harm, and none risk of harm to others.    For any risk assessment that surpasses a \"low\" rating, a safety plan must be developed.    A safety plan was indicated: no  If yes, describe in detail not applicable    PLAN: Between sessions, Madeleine Sanchez will continue to identify ways to keep focus on her own health and wellbeing while allowing her children to manage their own situations. At the next session, the therapist will use Client-centered Therapy, Cognitive Behavioral Therapy, and Solution-Focused Therapy to address mood regulation.    Behavioral Health Treatment Plan and Discharge Planning: Madeleine Sanchez is aware of and agrees to continue to work on their treatment plan. They have identified and are working toward their discharge goals. yes    Depression Follow-up Plan Completed: Not applicable    Visit start and stop times:    05/13/25  Start Time: 1430  Stop Time: 1515  Total Visit Time: 45 minutes  "

## 2025-05-15 ENCOUNTER — TELEPHONE (OUTPATIENT)
Age: 80
End: 2025-05-15

## 2025-05-15 NOTE — TELEPHONE ENCOUNTER
Patient is calling regarding cancelling an appointment.    Date/Time: 5/15 @3pm    Reason: conflicting schedule    Patient was rescheduled: YES [x] NO []  If yes, when was Patient reschedule for: 6/12 @3:30pm    Patient requesting call back to reschedule: YES [] NO [x]    Provider has been notified.

## 2025-05-20 ENCOUNTER — SOCIAL WORK (OUTPATIENT)
Dept: BEHAVIORAL/MENTAL HEALTH CLINIC | Facility: CLINIC | Age: 80
End: 2025-05-20
Payer: MEDICARE

## 2025-05-20 DIAGNOSIS — F33.1 MAJOR DEPRESSIVE DISORDER, RECURRENT, MODERATE (HCC): Primary | ICD-10-CM

## 2025-05-20 DIAGNOSIS — F41.1 GAD (GENERALIZED ANXIETY DISORDER): ICD-10-CM

## 2025-05-20 PROCEDURE — 90834 PSYTX W PT 45 MINUTES: CPT | Performed by: COUNSELOR

## 2025-05-20 NOTE — PSYCH
"[id number]    Psychotherapy Provided: Individual Psychotherapy     1. Major depressive disorder, recurrent, moderate (HCC)        2. BRINAD (generalized anxiety disorder)            Goals addressed in session: Goal 1     DATA: Madeleine was in person.  Noted that she had a bad week and was feeling \"terrible\".  Had a difficult time expressing what was contributing to her negative feeling and needed to be redirected several times to discuss her feelings and not the details of everyone's life and her family.  Eventually noted that her grandson gave her a hard time about making a request to do something for her and stated that he needed 2 days notice in order to do so in the future.  Then became upset because his mother is the one she believes should be telling him to do these things for her rather than her having to ask.  Redirected her to note that she has already made the request several times that she does not need to give him advance notice and that she should be assertive and not to address her needs to him directly.  Articulating what she wants the regularity with which she would like it and a direct response as to how he needs to be respectful of her and her abilities.  Continue to note that he was also frustrated with his mother who does not do anything either without asking and also makes excuses.  Was able to express her frustration and I continued to note that she has a right to her feelings and a right to make requests of people who live in her home and set appropriate and healthy boundaries.  During this session, this clinician used the following therapeutic modalities: Client-centered Therapy and Cognitive Behavioral Therapy    Substance Abuse was not addressed during this session. If the client is diagnosed with a co-occurring substance use disorder, please indicate any changes in the frequency or amount of use: not applicable. Stage of change for addressing substance use " "diagnoses: No substance use/Not applicable    ASSESSMENT:  Madeleine Sanchez presents with a Euthymic/ normal mood.     her affect is Normal range and intensity, which is congruent, with her mood and the content of the session. The client has made progress on their goals.    Was able to articulate her feelings as well as her desire for certain tasks to be completed.  Less frustrated with her inability to do something herself as she is to have family give reasonable support to her.  Continues to have difficulty asking for what she wants assertively as she hopes they would understand and follow through without her having to ask.  Madeleine Sanchez presents with a none risk of suicide, none risk of self-harm, and none risk of harm to others.    For any risk assessment that surpasses a \"low\" rating, a safety plan must be developed.    A safety plan was indicated: no  If yes, describe in detail not applicable    PLAN: Between sessions, Madeleine Sanchez will continue to identify and did find areas of need and ways to express that more assertively and directly to others. At the next session, the therapist will use Client-centered Therapy and Cognitive Behavioral Therapy to address mood regulation.    Behavioral Health Treatment Plan and Discharge Planning: Madeleine Sanchez is aware of and agrees to continue to work on their treatment plan. They have identified and are working toward their discharge goals. yes    Depression Follow-up Plan Completed: Not applicable    Visit start and stop times:    05/20/25  Start Time: 1355  Stop Time: 1440  Total Visit Time: 45 minutes  "

## 2025-05-27 ENCOUNTER — SOCIAL WORK (OUTPATIENT)
Dept: BEHAVIORAL/MENTAL HEALTH CLINIC | Facility: CLINIC | Age: 80
End: 2025-05-27
Payer: MEDICARE

## 2025-05-27 DIAGNOSIS — F33.1 MAJOR DEPRESSIVE DISORDER, RECURRENT, MODERATE (HCC): Primary | ICD-10-CM

## 2025-05-27 DIAGNOSIS — F41.1 GAD (GENERALIZED ANXIETY DISORDER): ICD-10-CM

## 2025-05-27 DIAGNOSIS — J00 ACUTE RHINITIS: ICD-10-CM

## 2025-05-27 PROCEDURE — 90834 PSYTX W PT 45 MINUTES: CPT | Performed by: COUNSELOR

## 2025-05-27 NOTE — PSYCH
"Behavioral Health Psychotherapy Progress Note    Psychotherapy Provided: Individual Psychotherapy     1. Major depressive disorder, recurrent, moderate (HCC)        2. BRINDA (generalized anxiety disorder)            Goals addressed in session: Goal 1     DATA: Madeleine was in person for session.  Shared that she had a better week and noted that she had a positive weekend with her children.  Shared stories about different family members and now that she has shifted from angst with her daughters she was focusing on her son.  Continues to share stories and compare her relationship with her sister to that of her children. Identifies concerns about both children and grandchildren that she would like to solve but is leaning away from getting involved in their business.  During this session, this clinician used the following therapeutic modalities: Client-centered Therapy and Cognitive Behavioral Therapy    Substance Abuse was not addressed during this session. If the client is diagnosed with a co-occurring substance use disorder, please indicate any changes in the frequency or amount of use: not applicable. Stage of change for addressing substance use diagnoses: No substance use/Not applicable    ASSESSMENT:  Madeleine Sanchez presents with a Euthymic/ normal and Anxious mood.     her affect is Normal range and intensity, which is congruent, with her mood and the content of the session. The client has made progress on their goals.    Is beginning to acknowledge how she can stay out of solving others issues and focus more on her own needs. Mood appears improved when she can sleep.  Madeeline Sanchez presents with a none risk of suicide, none risk of self-harm, and none risk of harm to others.    For any risk assessment that surpasses a \"low\" rating, a safety plan must be developed.    A safety plan was indicated: no  If yes, describe in detail not applicable    PLAN: Between sessions, Madeleine Sanchez will continue to work on " keeping focus on her own needs both health and emotional.. At the next session, the therapist will use Client-centered Therapy and Cognitive Behavioral Therapy to address mood regulation.    Behavioral Health Treatment Plan and Discharge Planning: Madeleine HAWK Sanchez is aware of and agrees to continue to work on their treatment plan. They have identified and are working toward their discharge goals. yes    Depression Follow-up Plan Completed: Not applicable    Visit start and stop times:    05/27/25  Start Time: 1401  Stop Time: 1450  Total Visit Time: 49 minutes

## 2025-05-28 RX ORDER — FLUTICASONE PROPIONATE 50 MCG
2 SPRAY, SUSPENSION (ML) NASAL DAILY
Qty: 48 G | Refills: 3 | Status: SHIPPED | OUTPATIENT
Start: 2025-05-28

## 2025-05-30 NOTE — TELEPHONE ENCOUNTER
----- Message from Valeria Mazariegos, [address] sent at 9/20/2022  2:20 PM EDT -----  Regarding: Please schedule EGD w/ dilitation w/ Dr Brissa Palacio  Patient has EGD ordered but hasn't been scheduled yet  She had to run to another appointment so we didn't have a change to schedule while in office  She is on Xarelto for hx DVT/PE so will need clearance to hold prior to procedure  Thank you & sorry!
Called Dr Jamee Mascorro office, lmom asking them to please call me back to let me know if urgent Xarelto clearance was received  Will call their office again in a few hours if do not hear back from them 
Called and spoke to pt whom informed that Dr Otoniel Chaudhary refills her Xarelto for her but originally prescribed by Dr Odette Alonzo  Will fax Xarelto clearance to Dr Otoniel Chaudhary 
Called pt to try to schedule, phone Sam Boothe and it sounded like pt picked up and hung up  Will call again tomorrow to try to schedule EGD w/ dil with Dr Ubaldo Colunga 
Did you need something from me for this?
Faxed urgent Xarelto clearance to Dr Caio Junior  Will call their office tomorrow to make sure received as pt was scheduled for Monday 9/26/22  Glenys Huntley pt scheduled at [facility] on 9/26/22 
I lmom again for Dr Daniel Civil office to please call me back to let me know if Xarelto clearance was received or if pt's procedure on Monday 9/26/22 needs to be r/sd  I will call again in an hour or so to see if received 
Received Xarelto clearance back from Dr Giana Tavera  Pt is cleared to hold her Xarelto 1 day prior to procedure  I called and spoke to pt and informed of this and she will hold on Sunday 
Scheduled patient for EGD w/Dilation  For 9/26/22  Advised her the office would get ok for her to stop her Xarelto or call her to reschedule    Thank you
Will call pt to schedule 
2 = A lot of assistance

## 2025-06-03 ENCOUNTER — SOCIAL WORK (OUTPATIENT)
Dept: BEHAVIORAL/MENTAL HEALTH CLINIC | Facility: CLINIC | Age: 80
End: 2025-06-03
Payer: MEDICARE

## 2025-06-03 DIAGNOSIS — F41.1 GAD (GENERALIZED ANXIETY DISORDER): Primary | ICD-10-CM

## 2025-06-03 PROCEDURE — 90834 PSYTX W PT 45 MINUTES: CPT | Performed by: COUNSELOR

## 2025-06-03 NOTE — PSYCH
Behavioral Health Psychotherapy Progress Note    Psychotherapy Provided: Individual Psychotherapy     1. BRINDA (generalized anxiety disorder)            Goals addressed in session: Goal 1     DATA: Madeleine was in person for session.  Shared that she had an up and down week.  Noted that she was happy because she was able to do some yard work and accomplish some things and continues to be concerned about her children and her sister.  Noted that she is upset that her sister is alone and away from her children.  Also discussed situations with her daughter and how she involved herself with a woman who asks her to do a lot of things but then does not share with her.  Noted that she is unaware of what her daughters actual marital status is she believes that she is  but is not sure if there is an actual divorce settlement.  Is incredibly angry at the man she  and how he is treating her and her grandson and refuses to pay or support anything.  Also noted her own frustration with how daughter is willing to help this friend but when it comes to doing things for her is not willing or needs prompting.  During this session, this clinician used the following therapeutic modalities: Client-centered Therapy and Cognitive Behavioral Therapy    Substance Abuse was not addressed during this session. If the client is diagnosed with a co-occurring substance use disorder, please indicate any changes in the frequency or amount of use: not applicable. Stage of change for addressing substance use diagnoses: No substance use/Not applicable    ASSESSMENT:  Madeleine Sanchez presents with a Euthymic/ normal mood.     her affect is Normal range and intensity, which is congruent, with her mood and the content of the session. The client has made progress on their goals.    Is becoming more open about her feelings and while she has difficulty letting go of her need to worry and manage her children's affairs in her mind she is becoming  "more able to do a few things for herself in order to improve her mood and feel useful and valuable.  Madeleine Sanchez presents with a none risk of suicide, none risk of self-harm, and none risk of harm to others.    For any risk assessment that surpasses a \"low\" rating, a safety plan must be developed.    A safety plan was indicated: no  If yes, describe in detail not applicable    PLAN: Between sessions, Madeleine Sanchez will continue to focus on maintaining health and regulating her emotions. At the next session, the therapist will use Client-centered Therapy and Cognitive Behavioral Therapy to address mood regulation.    Behavioral Health Treatment Plan and Discharge Planning: Madeleine Sanchez is aware of and agrees to continue to work on their treatment plan. They have identified and are working toward their discharge goals. yes    Depression Follow-up Plan Completed: Not applicable    Visit start and stop times:    06/03/25  Start Time: 1400  Stop Time: 1450  Total Visit Time: 50 minutes  "

## 2025-06-05 ENCOUNTER — OFFICE VISIT (OUTPATIENT)
Age: 80
End: 2025-06-05
Payer: MEDICARE

## 2025-06-05 VITALS
HEART RATE: 99 BPM | WEIGHT: 179 LBS | SYSTOLIC BLOOD PRESSURE: 119 MMHG | DIASTOLIC BLOOD PRESSURE: 80 MMHG | HEIGHT: 63 IN | BODY MASS INDEX: 31.71 KG/M2

## 2025-06-05 DIAGNOSIS — K21.00 GASTROESOPHAGEAL REFLUX DISEASE WITH ESOPHAGITIS WITHOUT HEMORRHAGE: ICD-10-CM

## 2025-06-05 DIAGNOSIS — R13.19 ESOPHAGEAL DYSPHAGIA: Primary | ICD-10-CM

## 2025-06-05 PROCEDURE — 99213 OFFICE O/P EST LOW 20 MIN: CPT | Performed by: INTERNAL MEDICINE

## 2025-06-05 RX ORDER — PANTOPRAZOLE SODIUM 40 MG/1
40 TABLET, DELAYED RELEASE ORAL
Qty: 30 TABLET | Refills: 0 | Status: SHIPPED | OUTPATIENT
Start: 2025-06-05 | End: 2025-07-05

## 2025-06-05 NOTE — PROGRESS NOTES
"Name: Madeleine Sanchez      : 1945      MRN: [id number]  Encounter Provider: Viral Mckeon MD  Encounter Date: 2025   Encounter department: [facility]  :  Assessment & Plan  Esophageal dysphagia  Will plan to reevaluate with barium swallow motility study  Orders:    [place] Barium Swallow Motility Study; Future    Gastroesophageal reflux disease with esophagitis without hemorrhage  Will continue pantoprazole  Orders:    pantoprazole (PROTONIX) 40 mg tablet; Take 1 tablet (40 mg total) by mouth daily at bedtime        History of Present Illness   Madeleine Sanchez is a 80 y.o. female who presents with persistent and worsening dysphagia.  Has previously undergone evaluation with EGD, modified barium swallow, barium esophagram and esophageal manometry with mixed results.  Does have a history of GERD, status post fundoplication with recurrent hiatal hernia.  Barium esophagram in  did suggest some degree of dysmotility but manometry was normal.  Reports that she occasionally has sensation dysphagia and nonforceful regurgitation.  Empiric esophageal dilation at the time of EGD did not provide significant improvement.    HPI    Review of Systems A complete review of systems is negative other than that noted above in the HPI.      Current Medications[1]  Objective   /80 (BP Location: Left arm, Patient Position: Sitting, Cuff Size: Standard)   Pulse 99   Ht 5' 3\" (1.6 m)   Wt 81.2 kg (179 lb)   BMI 31.71 kg/m²     Physical Exam       Lab Results: I personally reviewed relevant lab results.       Results for orders placed during the hospital encounter of 24    EGD    Impression  The esophagus appeared normal.  4 cm hiatal hernia  The duodenum appeared normal.  Dilated in the lower third of the esophagus with balloon dilator to 18 mm end size. Dilation caused no change      RECOMMENDATION:  Follow up with GI Clinic            Viral Mckeon MD             [1] "   Current Outpatient Medications   Medication Sig Dispense Refill    acetaminophen (TYLENOL) 325 mg tablet Take 650 mg by mouth every 6 (six) hours as needed for mild pain      albuterol (ACCUNEB) 1.25 MG/3ML nebulizer solution Take 3 mL (1.25 mg total) by nebulization every 6 (six) hours as needed for wheezing or shortness of breath 90 mL 1    albuterol (ProAir HFA) 90 mcg/act inhaler Inhale 2 puffs every 6 (six) hours as needed for wheezing 8.5 g 0    Albuterol-Budesonide (Airsupra) 90-80 MCG/ACT AERO Inhale 2 puffs 4 (four) times a day as needed (wheezing) 10.7 g 1    ASPIRIN 81 PO       brimonidine (ALPHAGAN P) 0.15 % ophthalmic solution ADMINISTER 1 DROP TO THE RIGHT EYE 2 (TWO) TIMES A DAY 10 mL 3    buPROPion (WELLBUTRIN XL) 150 mg 24 hr tablet Take 1 tablet (150 mg total) by mouth daily 30 tablet 5    busPIRone (BUSPAR) 15 mg tablet Take 1 tablet (15 mg total) by mouth 2 (two) times a day 60 tablet 1    cyanocobalamin 1,000 mcg/mL Inject 1 mL (1,000 mcg total) into a muscle every 7 days 12 mL 1    DULoxetine (Cymbalta) 60 mg delayed release capsule Take 1 capsule (60 mg total) by mouth daily 30 capsule 1    famotidine (PEPCID) 40 MG tablet Take 1 tablet (40 mg total) by mouth daily 90 tablet 1    fluticasone (FLONASE) 50 mcg/act nasal spray USE 2 SPRAYS IN EACH NOSTRIL EVERY DAY 48 g 3    latanoprost (XALATAN) 0.005 % ophthalmic solution Administer 1 drop to both eyes daily at bedtime      levothyroxine 100 mcg tablet Take 1 tablet (100 mcg total) by mouth daily 90 tablet 1    magnesium 30 MG tablet Take 30 mg by mouth in the morning and 30 mg in the evening. OD HS .      pantoprazole (PROTONIX) 40 mg tablet Take 1 tablet (40 mg total) by mouth daily at bedtime 30 tablet 0    rOPINIRole (REQUIP) 1 mg tablet TAKE 1 TABLET AT 6PM AND TAKE 2 TABLETS AT BEDTIME 270 tablet 1    torsemide (DEMADEX) 10 mg tablet TAKE 1 TABLET EVERY DAY 90 tablet 1    traMADol (Ultram) 50 mg tablet Take 1 tablet (50 mg total) by  "mouth 2 (two) times a day 40 tablet 0    Xarelto 20 MG tablet TAKE ONE TABLET BY MOUTH EVERY EVENING 90 tablet 1    BD Integra Syringe 25G X 1\" 3 ML MISC USE TO INJECT B-12 ONCE WEEKLY (Patient not taking: Reported on 5/21/2025)      NEEDLE, DISP, 25 G (B-D DISP NEEDLE 25GX1\") 25G X 1\" MISC Use once a week (Patient not taking: Reported on 5/21/2025) 50 each 6    promethazine-dextromethorphan (PHENERGAN-DM) 6.25-15 mg/5 mL oral syrup Take 5 mL by mouth 4 (four) times a day as needed for cough (Patient not taking: Reported on 6/5/2025) 180 mL 0    Syringe, Disposable, (2-3CC SYRINGE) 3 ML MISC Use once a week (Patient not taking: Reported on 5/21/2025) 50 each 0    timolol (TIMOPTIC) 0.5 % ophthalmic solution Administer to both eyes 2 (two) times a day  (Patient not taking: Reported on 5/21/2025)       No current facility-administered medications for this visit.     "

## 2025-06-05 NOTE — ASSESSMENT & PLAN NOTE
Will continue pantoprazole  Orders:    pantoprazole (PROTONIX) 40 mg tablet; Take 1 tablet (40 mg total) by mouth daily at bedtime

## 2025-06-05 NOTE — ASSESSMENT & PLAN NOTE
Will plan to reevaluate with barium swallow motility study  Orders:    [place] Barium Swallow Motility Study; Future

## 2025-06-07 DIAGNOSIS — F41.1 GAD (GENERALIZED ANXIETY DISORDER): ICD-10-CM

## 2025-06-07 DIAGNOSIS — I26.99 BILATERAL PULMONARY EMBOLISM (HCC): ICD-10-CM

## 2025-06-08 RX ORDER — RIVAROXABAN 20 MG/1
20 TABLET, FILM COATED ORAL EVERY EVENING
Qty: 90 TABLET | Refills: 1 | Status: SHIPPED | OUTPATIENT
Start: 2025-06-08

## 2025-06-09 RX ORDER — BUSPIRONE HYDROCHLORIDE 15 MG/1
15 TABLET ORAL 2 TIMES DAILY
Qty: 60 TABLET | Refills: 1 | Status: SHIPPED | OUTPATIENT
Start: 2025-06-09

## 2025-06-10 ENCOUNTER — TELEPHONE (OUTPATIENT)
Age: 80
End: 2025-06-10

## 2025-06-10 NOTE — TELEPHONE ENCOUNTER
Patient is calling regarding cancelling an appointment.    Date/Time: 6/10 @ 2    Reason: sick    Patient was rescheduled: YES [] NO [x]  If yes, when was Patient reschedule for:     Patient requesting call back to reschedule: YES [] NO [x]

## 2025-06-12 ENCOUNTER — OFFICE VISIT (OUTPATIENT)
Dept: PSYCHIATRY | Facility: CLINIC | Age: 80
End: 2025-06-12
Payer: MEDICARE

## 2025-06-12 DIAGNOSIS — F32.1 CURRENT MODERATE EPISODE OF MAJOR DEPRESSIVE DISORDER WITHOUT PRIOR EPISODE (HCC): Primary | ICD-10-CM

## 2025-06-12 DIAGNOSIS — F41.1 GAD (GENERALIZED ANXIETY DISORDER): ICD-10-CM

## 2025-06-12 PROCEDURE — G2211 COMPLEX E/M VISIT ADD ON: HCPCS | Performed by: PHYSICIAN ASSISTANT

## 2025-06-12 PROCEDURE — 99214 OFFICE O/P EST MOD 30 MIN: CPT | Performed by: PHYSICIAN ASSISTANT

## 2025-06-12 RX ORDER — DULOXETIN HYDROCHLORIDE 30 MG/1
30 CAPSULE, DELAYED RELEASE ORAL DAILY
Qty: 90 CAPSULE | Refills: 0 | Status: SHIPPED | OUTPATIENT
Start: 2025-06-12 | End: 2025-09-10

## 2025-06-12 NOTE — PSYCH
MEDICATION MANAGEMENT NOTE    Name: Madeleine Sanchez      : 1945      MRN: [id number]  Encounter Provider: Irma Batista  Encounter Date: 2025   Encounter department: [facility]    Insurance: Payor: MEDICARE / Plan: MEDICARE A AND B / Product Type: Medicare A & B Fee for Service /      Reason for Visit:   Chief Complaint   Patient presents with    Follow-up    Medication Management    Depression    Anxiety   :  Assessment & Plan  Current moderate episode of major depressive disorder without prior episode (HCC)  Not at goal - increase duloxetine to 90 mg qd; continue bupropion  mg qAM, buspirone 15 mg BID; continue talk therapy; f/u in 1 month  Orders:    DULoxetine (Cymbalta) 30 mg delayed release capsule; Take 1 capsule (30 mg total) by mouth daily Take in addition to 60 mg for a total of 90 mg daily     BRINDA (generalized anxiety disorder)  Not at goal - increase duloxetine to 90 mg qd; continue bupropion  mg qAM, buspirone 15 mg BID; continue talk therapy; f/u in 1 month  Orders:    DULoxetine (Cymbalta) 30 mg delayed release capsule; Take 1 capsule (30 mg total) by mouth daily Take in addition to 60 mg for a total of 90 mg daily       She tolerated increase in duloxetine well and thinks it helped somewhat with mood and anxiety. However, she still feels down most days. She is also having a lot of trouble sleeping, but does not want medication for this at this time. Advised increasing duloxetine to 90 mg qd. No other med changes advised. PARQ for SNRI completed including serotonin syndrome, SIADH, worsened depression/suicidality, induction of olya, blood pressure changes and GI distress, weight gain, sexual side effects, insomnia, sedation, potential for drug interactions, and others.     Treatment Recommendations:    Continue current medications:     - buspirone 15 mg BID     - bupropion  mg qAM    Increase medication:    - duloxetine 60 mg qd  "to 90 mg qd    Educated about diagnosis and treatment modalities. Verbalizes understanding and agreement with the treatment plan.  Discussed self monitoring of symptoms, and symptom monitoring tools.  Discussed medications and if treatment adjustment was needed or desired.  Medication management every 1 month  Aware of 24 hour and weekend coverage for urgent situations accessed by calling [facility] main practice number  Continue psychotherapy with [facility] therapist Teressa Bhatti  I am scheduling this patient out for greater than 3 months: No    Medications Risks/Benefits:      Risks, Benefits And Possible Side Effects Of Medications:    Risks, benefits, and possible side effects of medications explained to Madeleine and she (or legal representative) verbalizes understanding and agreement for treatment.    Controlled Medication Discussion:     Not applicable      History of Present Illness     Madeleine is seen today for a follow up for Follow-up, Medication Management, Depression, and Anxiety. Since her last visit she did increase duloxetine and did think this might have been somewhat helpful for mood and anxiety. However, it did not help with the pain and this is still keeping her up at night and causing distress. She admits she has trouble falling and staying asleep, in part from environmental factors, but she does not like sleep meds as all past trials have caused \"hangover\" sensations. There are still stressors as well which she feels contribute to the mood and anxiety issues.     Past medication trials:  Lexapro (dizziness, fatigue), Trintellix (nausea), Viibryd (cost), Pamelor (vomiting), hydroxyzine (grogginess), Restoril (no benefit)    She denies any suicidal ideation, intent or plan at present; denies any homicidal ideation, intent or plan at present.    She denies any auditory hallucinations, denies any visual hallucinations, denies any delusions.    She denies any side effects " "from current psychiatric medications.    HPI ROS Appetite Changes and Sleep:     She reports disrupted sleep, adequate appetite, decreased energy    Review Of Systems: A review of systems is obtained and is negative except for the pertinent positives listed in HPI/Subjective above.      Current Rating Scores:     None completed today.    Areas of Improvement: reviewed in HPI/Subjective Section and reviewed in Assessment and Plan Section      Past Medical History[1]  Past Surgical History[2]  Allergies: Allergies[3]    Current Outpatient Medications   Medication Instructions    acetaminophen (TYLENOL) 650 mg, Every 6 hours PRN    albuterol (ACCUNEB) 1.25 mg, Nebulization, Every 6 hours PRN    albuterol (ProAir HFA) 90 mcg/act inhaler 2 puffs, Inhalation, Every 6 hours PRN    Albuterol-Budesonide (Airsupra) 90-80 MCG/ACT AERO 2 puffs, Inhalation, 4 times daily PRN    ASPIRIN 81 PO     BD Integra Syringe 25G X 1\" 3 ML MISC     brimonidine (ALPHAGAN P) 0.15 % ophthalmic solution 1 drop, Right Eye, 2 times daily    buPROPion (WELLBUTRIN XL) 150 mg, Oral, Daily    busPIRone (BUSPAR) 15 mg, Oral, 2 times daily    cyanocobalamin 1,000 mcg, Intramuscular, Every 7 days    DULoxetine (CYMBALTA) 60 mg, Oral, Daily    DULoxetine (CYMBALTA) 30 mg, Oral, Daily, Take in addition to 60 mg for a total of 90 mg daily    famotidine (PEPCID) 40 mg, Oral, Daily    latanoprost (XALATAN) 0.005 % ophthalmic solution 1 drop, Daily at bedtime    levothyroxine 100 mcg, Oral, Daily    magnesium 30 mg, 2 times daily    NEEDLE, DISP, 25 G (B-D DISP NEEDLE 25GX1\") 25G X 1\" MISC Does not apply, Weekly    pantoprazole (PROTONIX) 40 mg, Oral, Daily at bedtime    rOPINIRole (REQUIP) 1 mg tablet TAKE 1 TABLET AT 6PM AND TAKE 2 TABLETS AT BEDTIME    Syringe, Disposable, (2-3CC SYRINGE) 3 ML MISC Does not apply, Weekly    timolol (TIMOPTIC) 0.5 % ophthalmic solution 2 times daily    torsemide (DEMADEX) 10 mg, Oral, Daily    traMADol (ULTRAM) 50 mg, Oral, " 2 times daily    Xarelto 20 mg, Oral, Every evening        Substance Abuse History:    Tobacco, Alcohol and Drug Use History     Tobacco Use    Smoking status: Never    Smokeless tobacco: Never   Vaping Use    Vaping status: Never Used   Substance Use Topics    Alcohol use: Not Currently     Comment: socially    Drug use: Never     Alcohol Use: Not At Risk (2023)    AUDIT-C     Frequency of Alcohol Consumption: Never     Average Number of Drinks: Patient does not drink     Frequency of Binge Drinking: Never       Social History:    Social History     Socioeconomic History    Marital status:      Spouse name: Not on file    Number of children: Not on file    Years of education: Not on file    Highest education level: Not on file   Occupational History    Not on file   Other Topics Concern    Not on file   Social History Narrative    · Most recent tobacco use screenin2019         Family Psychiatric History:     Family History[4]    Medical History Reviewed by provider this encounter:  Tobacco  Allergies  Meds  Problems  Med Hx  Surg Hx  Fam Hx          Objective   There were no vitals taken for this visit.     Mental Status Evaluation:    Appearance age appropriate, casually dressed   Behavior cooperative, calm   Speech normal rate, normal volume, normal pitch, spontaneous   Mood still somewhat anxious, still somewhat depressed   Affect constricted   Thought Processes organized, goal directed   Thought Content no overt delusions   Perceptual Disturbances: no auditory hallucinations, no visual hallucinations   Abnormal Thoughts  Risk Potential Suicidal ideation - None  Homicidal ideation - None  Potential for aggression - No   Orientation oriented to person, place, time/date, and situation   Memory recent and remote memory grossly intact   Consciousness alert and awake   Attention Span Concentration Span attention span and concentration are age appropriate   Intellect appears to be of  "average intelligence   Insight intact   Judgement intact   Muscle Strength and  Gait unstable gait, uses cane   Motor activity no abnormal movements   Language no difficulty naming common objects, no difficulty repeating a phrase, no difficulty writing a sentence   Fund of Knowledge adequate knowledge of current events  adequate fund of knowledge regarding past history  adequate fund of knowledge regarding vocabulary        Laboratory Results: I have personally reviewed all pertinent laboratory/tests results    Suicide/Homicide Risk Assessment:    Risk of Harm to Self:  The following ratings are based on assessment at the time of the interview  Based on today's assessment, Madeleine presents the following risk of harm to self: none    Risk of Harm to Others:  The following ratings are based on assessment at the time of the interview  Based on today's assessment, Madeleine presents the following risk of harm to others: none    The following interventions are recommended: Continue medication management. No other intervention changes indicated at this time.    Psychotherapy Provided:     Individual psychotherapy provided: No    Treatment Plan:    Completed and signed during the session: Not applicable - Treatment Plan to be completed by [facility] therapist.    Goals: Progress towards Treatment Plan goals - Yes, progressing, as evidenced by subjective findings in HPI/Subjective Section and in Assessment and Plan Section    Depression Follow-up Plan Completed: Not applicable    Note Share:    This note was shared with patient.    Visit Time  Visit Start Time: 3:30 PM  Visit Stop Time: 3:55 PM  Total Visit Duration: 25 minutes    Portions of the record may have been created with voice recognition software. Occasional wrong word or \"sound a like\" substitutions may have occurred due to the inherent limitations of voice recognition software. Read the chart carefully and recognize, using context, where " "substitutions have occurred.    Irma Batista 06/12/25         [1]   Past Medical History:  Diagnosis Date    Bilateral pulmonary embolism (HCC) 08/25/2019    Cancer (HCC)     left breast cancer    Cervical herniated disc     Chronic pain disorder     back pain    Chronic respiratory failure (HCC)     uses O2 at home with NC /5/19 no longer using/just CPAP    Colon polyp     CPAP (continuous positive airway pressure) dependence     Disease of thyroid gland     hypothyroid    DVT (deep venous thrombosis) (HCC) 2020    right leg    Examination for, follow-up 11/03/2021    Family history of reaction to anesthesia     \"son and daughter hard time waking up and also PONV\"    Fibromyalgia, primary     GERD (gastroesophageal reflux disease)     Glaucoma     Hiatal hernia     History of palpitations     History of pneumonia     History of transfusion     Hyperlipidemia     Hypertension     Irregular heart beat     Migraine     Myocardial infarction (HCC)     pt sees cardilogist Dr Bennett [facility]/\"didnt realize had one, found on EKG before a surgery\"    OAB (overactive bladder)     Pollen allergies     Pulmonary embolism (HCC) 2019    Bilateral; on Xarelto    Restless leg     Risk for falls     Sleep apnea     Use of cane as ambulatory aid     occas    Uses brace     Mafo/left leg    Wears glasses    [2]   Past Surgical History:  Procedure Laterality Date    ADENOIDECTOMY      BAND HEMORRHOIDECTOMY      CARDIAC CATHETERIZATION      CARPAL TUNNEL RELEASE      CATARACT EXTRACTION Bilateral     COLONOSCOPY      FOOT SURGERY      pin implanted right toe    JOINT REPLACEMENT Bilateral     knees    LAMINECTOMY      LUMBAR EPIDURAL INJECTION      MASTECTOMY Bilateral     with reconstruction and implants    NISSEN FUNDOPLICATION      SD LNGTH/SHRT TENDON LEG/ANKLE 1 TENDON SPX Left 05/24/2021    Procedure: Sectioning peroneal tendons with lengthening/sectioning achilles tendon lower leg and ankle;  Surgeon: Fei Sifuentes, IFEANYIM;  " Location: [facility];  Service: Podiatry    REPLACEMENT TOTAL KNEE      SPINAL FUSION      TONSILLECTOMY      TUBAL LIGATION      TUMOR EXCISION      right forearm/benign    UPPER GASTROINTESTINAL ENDOSCOPY      VEIN LIGATION Right    [3]   Allergies  Allergen Reactions    Benzalkonium Chloride Hives     Merthiolate tincture 9/28/2020      Hydromorphone Hallucinations     disorientation; hallucination; confusion      Merthiolate  [Thimerosal (Thiomersal)] Hives    Quinine Derivatives Other (See Comments) and Tinnitus     tinnitus      Codeine Nausea Only and Other (See Comments)     nausea      Medical Tape Rash    Bee Pollen Other (See Comments)     Other reaction(s): Nasal Congestion    Other Itching    Pamelor [Nortriptyline] Vomiting     Per pt, itching also    Pollen Extract Nasal Congestion    Statins Itching    Wound Dressing Adhesive Rash   [4]   Family History  Problem Relation Name Age of Onset    Cancer Sister

## 2025-06-12 NOTE — ASSESSMENT & PLAN NOTE
Not at goal - increase duloxetine to 90 mg qd; continue bupropion  mg qAM, buspirone 15 mg BID; continue talk therapy; f/u in 1 month  Orders:    DULoxetine (Cymbalta) 30 mg delayed release capsule; Take 1 capsule (30 mg total) by mouth daily Take in addition to 60 mg for a total of 90 mg daily

## 2025-06-16 ENCOUNTER — NURSE TRIAGE (OUTPATIENT)
Age: 80
End: 2025-06-16

## 2025-06-16 NOTE — TELEPHONE ENCOUNTER
"REASON FOR CONVERSATION: Cough    SYMPTOMS: Patient had OV 3/12 for post viral cough and 4/8 for bronchitis. Patient still remains with cough. She is coughing so much her ribs from front to back hurt. Denies being congested. Has slight runny nose at times that is clear. Denies fevers. Denies wheezing but states the cough is so bad at times she does feel short of breath. States she has been using incentive spirometer and taking deep breaths.     OTHER HEALTH INFORMATION: OV 4/8 for bronchitis. Chest X-ray found hypo inflated lung.     PROTOCOL DISPOSITION: See Within 3 Days in Office    CARE ADVICE PROVIDED: Continue using incentive spirometer and taking deep breaths. Call back with worsening or changing symptoms.     PRACTICE FOLLOW-UP: OV scheduled 4/18 at 0800 with Linda SANTIAGO.                Reason for Disposition   Cough has been present for > 3 weeks    Answer Assessment - Initial Assessment Questions  1. ONSET: \"When did the cough begin?\"       Has been ongoing for months   2. SEVERITY: \"How bad is the cough today?\"       Mild-moderate  3. SPUTUM: \"Describe the color of your sputum\" (e.g., none, dry cough; clear, white, yellow, green)      Clear runny nose   4. HEMOPTYSIS: \"Are you coughing up any blood?\" If Yes, ask: \"How much?\" (e.g., flecks, streaks, tablespoons, etc.)      Denies   5. DIFFICULTY BREATHING: \"Are you having difficulty breathing?\" If Yes, ask: \"How bad is it?\" (e.g., mild, moderate, severe)      Ongoing since 4/18  6. FEVER: \"Do you have a fever?\" If Yes, ask: \"What is your temperature, how was it measured, and when did it start?\"      Denies   7. CARDIAC HISTORY: \"Do you have any history of heart disease?\" (e.g., heart attack, congestive heart failure)       Denies   8. LUNG HISTORY: \"Do you have any history of lung disease?\"  (e.g., pulmonary embolus, asthma, emphysema)      Was diagnosed with bronchitis 4/8  9. PE RISK FACTORS: \"Do you have a history of blood clots?\" (or: recent " "major surgery, recent prolonged travel, bedridden)      Denies   10. OTHER SYMPTOMS: \"Do you have any other symptoms?\" (e.g., runny nose, wheezing, chest pain)        Painful around ribs from front to back   11. PREGNANCY: \"Is there any chance you are pregnant?\" \"When was your last menstrual period?\"        NA  12. TRAVEL: \"Have you traveled out of the country in the last month?\" (e.g., travel history, exposures)        NA    Protocols used: Cough-Adult-[place]    "

## 2025-06-17 ENCOUNTER — SOCIAL WORK (OUTPATIENT)
Dept: BEHAVIORAL/MENTAL HEALTH CLINIC | Facility: CLINIC | Age: 80
End: 2025-06-17
Payer: MEDICARE

## 2025-06-17 DIAGNOSIS — F33.1 MAJOR DEPRESSIVE DISORDER, RECURRENT, MODERATE (HCC): Primary | ICD-10-CM

## 2025-06-17 DIAGNOSIS — F41.1 GAD (GENERALIZED ANXIETY DISORDER): ICD-10-CM

## 2025-06-17 PROCEDURE — 90834 PSYTX W PT 45 MINUTES: CPT | Performed by: COUNSELOR

## 2025-06-17 NOTE — PSYCH
"Behavioral Health Psychotherapy Progress Note    Psychotherapy Provided: Individual Psychotherapy     1. Major depressive disorder, recurrent, moderate (HCC)        2. BRINDA (generalized anxiety disorder)            Goals addressed in session: Goal 1     DATA: Madeleine was in person for session.  Noted that she had a bad week or so due to the resurgence of her stomach and esophageal issues.  Shared that she has been up most nights and is having difficulty with food.  Noted that she had been to see her doctor and is trying different solutions to manage this issue.  Also discussed her family and continues to discuss her daughter's issues and how she navigates.  Continues to note that she is trying to stay active and accomplish things during the day so that she does not feel more depressed with an activity.  During this session, this clinician used the following therapeutic modalities: Client-centered Therapy and Cognitive Behavioral Therapy    Substance Abuse was not addressed during this session. If the client is diagnosed with a co-occurring substance use disorder, please indicate any changes in the frequency or amount of use: not applicable. Stage of change for addressing substance use diagnoses: No substance use/Not applicable    ASSESSMENT:  Madeleine Sanchez presents with a Euthymic/ normal mood.     her affect is Normal range and intensity, which is congruent, with her mood and the content of the session. The client has made progress on their goals.    Has a difficult time staying focused on her own concerns especially when she is not physically feeling well as a great need to be needed and wants to be helpful to others.  Continues to find activities and is always kind to others.  Maedleine Sanchez presents with a none risk of suicide, none risk of self-harm, and none risk of harm to others.    For any risk assessment that surpasses a \"low\" rating, a safety plan must be developed.    A safety plan was indicated: no  If " yes, describe in detail not applicable    PLAN: Between sessions, Madeleine Sanchez will continue to find a balance between her how and her emotional being. At the next session, the therapist will use Client-centered Therapy and Cognitive Behavioral Therapy to address mood regulation.    Behavioral Health Treatment Plan and Discharge Planning: Madeleine Sanchez is aware of and agrees to continue to work on their treatment plan. They have identified and are working toward their discharge goals. yes    Depression Follow-up Plan Completed: Not applicable    Visit start and stop times:    06/17/25  Start Time: 1403  Stop Time: 1450  Total Visit Time: 47 minutes

## 2025-06-18 ENCOUNTER — OFFICE VISIT (OUTPATIENT)
Dept: FAMILY MEDICINE CLINIC | Facility: CLINIC | Age: 80
End: 2025-06-18
Payer: MEDICARE

## 2025-06-18 VITALS
DIASTOLIC BLOOD PRESSURE: 74 MMHG | HEART RATE: 94 BPM | HEIGHT: 63 IN | TEMPERATURE: 97.7 F | BODY MASS INDEX: 29.77 KG/M2 | SYSTOLIC BLOOD PRESSURE: 136 MMHG | WEIGHT: 168 LBS | OXYGEN SATURATION: 96 %

## 2025-06-18 DIAGNOSIS — K21.9 GASTROESOPHAGEAL REFLUX DISEASE WITHOUT ESOPHAGITIS: ICD-10-CM

## 2025-06-18 DIAGNOSIS — M48.061 SPINAL STENOSIS AT L4-L5 LEVEL: ICD-10-CM

## 2025-06-18 DIAGNOSIS — R05.2 SUBACUTE COUGH: Primary | ICD-10-CM

## 2025-06-18 DIAGNOSIS — K44.9 HIATAL HERNIA: ICD-10-CM

## 2025-06-18 DIAGNOSIS — R13.19 OTHER DYSPHAGIA: ICD-10-CM

## 2025-06-18 PROBLEM — J96.01 ACUTE HYPOXEMIC RESPIRATORY FAILURE (HCC): Status: RESOLVED | Noted: 2025-01-21 | Resolved: 2025-06-18

## 2025-06-18 PROBLEM — J20.9 ACUTE BRONCHITIS: Status: RESOLVED | Noted: 2025-04-08 | Resolved: 2025-06-18

## 2025-06-18 PROCEDURE — G2211 COMPLEX E/M VISIT ADD ON: HCPCS | Performed by: NURSE PRACTITIONER

## 2025-06-18 PROCEDURE — 99213 OFFICE O/P EST LOW 20 MIN: CPT | Performed by: NURSE PRACTITIONER

## 2025-06-18 RX ORDER — TRAMADOL HYDROCHLORIDE 50 MG/1
50 TABLET ORAL 2 TIMES DAILY
Qty: 40 TABLET | Refills: 0 | Status: SHIPPED | OUTPATIENT
Start: 2025-06-18

## 2025-06-18 NOTE — PROGRESS NOTES
Name: Madeleine Sanchez      : 1945      MRN: [id number]  Encounter Provider: JACK Valles  Encounter Date: 2025   Encounter department: [facility]  :  Assessment & Plan  Subacute cough         Hiatal hernia         Gastroesophageal reflux disease without esophagitis         Other dysphagia         Had lengthy discussion with Madeleine.  Lungs are clear today.  She has negative cough that seems to worsen when she lies down at night or during mealtime.  Workup is in progress per her primary team and specialty team.  Escalation of treatment plan not necessary at this point.  She is under the care of pulmonology and gastroenterology for the current problem  She has had chronic cough of multifactor since December  I saw her in March for postviral cough syndrome after having influenza.  She is frustrated at the impact of quality of life caused by the cough  She is wanting solution and answers  She saw her gastroenterologist to seems to question etiology as possible dysphagia/aspiration she is scheduled for barium swallow  I explained the process to her and advised this may provide some answers  She has not seen her pulmonologist since March-I encouraged her to reach out to the pulmonologist  I encouraged her to stay with plan per her current team-PCP, Dr. Yates, gastroenterologist, Dr. Mckeon, and her [facility] pulmonologist.  She has scheduled follow-ups with all of them in July.  Encourage close follow-up with her PCP.  Consider seeing cardiologist  She verbalizes understanding of the same and accepted the plan           History of Present Illness   80-year-old female presents for ongoing cough  I saw her for same day in March after she was treated for influenza at [facility]  She complains that dry cough is persisting she has seen her PCP, pulmonologist, gastroenterologist  She is frustrated due to the refractory nature  I reviewed last gastroenterology note early -she continues  "with dysphagia.  It seems that she may have mild aspiration which may be a factor.  She is planned for barium swallow  Additionally she has not seen her pulmonologist since March but has not reached out related to the ongoing nature of this cough.    Nor has she reached out to her PCP related to the ongoing nature of this cough  She has trialed inhaled corticosteroids, cough suppressants, antibiotics, GERD medicine, remaining upright during eating, not drinking during eating  Nothing seems to be helping  She is frustrated given the ongoing nature of this refractory cough      Cough  This is a chronic problem. Episode onset: at least 6 months. has seen pcp, gi, pulm. i saw her in 3/2025 for post viral cough aftre she was treated for influenza. The problem has been waxing and waning. The problem occurs every few minutes. The cough is Non-productive. The symptoms are aggravated by lying down (eating, talking). She has tried prescription cough suppressant, OTC cough suppressant, a beta-agonist inhaler, steroid inhaler and rest for the symptoms. The treatment provided mild relief. Her past medical history is significant for bronchitis, environmental allergies and pneumonia. dysphagia, gerd, hiatal hernia, flu, ?asthma     Review of Systems   Respiratory:  Positive for cough.    Allergic/Immunologic: Positive for environmental allergies.       Objective   /74 (BP Location: Left arm, Patient Position: Sitting, Cuff Size: Standard)   Pulse 94   Temp 97.7 °F (36.5 °C) (Temporal)   Ht 5' 3\" (1.6 m)   Wt 76.2 kg (168 lb)   SpO2 96%   BMI 29.76 kg/m²      Physical Exam  Vitals and nursing note reviewed.   Constitutional:       General: She is not in acute distress.     Appearance: Normal appearance. She is not ill-appearing.   Pulmonary:      Effort: Pulmonary effort is normal.      Breath sounds: Normal breath sounds.      Comments: SpO2 96% on room air    Patient has occasional dry cough during our " discussion    Musculoskeletal:      Right lower leg: No edema.      Left lower leg: No edema.     Skin:     General: Skin is warm and dry.      Coloration: Skin is not pale.     Neurological:      General: No focal deficit present.      Mental Status: She is alert. Mental status is at baseline.     Psychiatric:         Mood and Affect: Mood normal.

## 2025-06-24 ENCOUNTER — SOCIAL WORK (OUTPATIENT)
Dept: BEHAVIORAL/MENTAL HEALTH CLINIC | Facility: CLINIC | Age: 80
End: 2025-06-24
Payer: MEDICARE

## 2025-06-24 DIAGNOSIS — F41.1 GAD (GENERALIZED ANXIETY DISORDER): Primary | ICD-10-CM

## 2025-06-24 DIAGNOSIS — F32.1 CURRENT MODERATE EPISODE OF MAJOR DEPRESSIVE DISORDER WITHOUT PRIOR EPISODE (HCC): ICD-10-CM

## 2025-06-24 PROCEDURE — 90834 PSYTX W PT 45 MINUTES: CPT | Performed by: COUNSELOR

## 2025-06-24 NOTE — PSYCH
Behavioral Health Psychotherapy Progress Note    Psychotherapy Provided: Individual Psychotherapy     1. BRINDA (generalized anxiety disorder)        2. Current moderate episode of major depressive disorder without prior episode (HCC)            Goals addressed in session: Goal 1     DATA: Madeleine was in person for session. Discussed reoccurrence of her gastro distress which has made her very uncomfortable. Discussed how she is coping and medication interventions she is following to get answers but is having difficulty with sleep and is frustrated as there are no emerging answers to the problem.  Discussed several family members and their concerns and her feelings about them.  Spent more time in the session discussing her own concerns and noted near the end of the session that she does continue to focus on others perhaps as a way to not feel so overwhelmed and upset about her own situation.  Noted that she does care about the other people in her life and that it may be a good distraction from things that she cannot control such as this current health situation.  During this session, this clinician used the following therapeutic modalities: Client-centered Therapy and Cognitive Behavioral Therapy    Substance Abuse was not addressed during this session. If the client is diagnosed with a co-occurring substance use disorder, please indicate any changes in the frequency or amount of use: not applicable. Stage of change for addressing substance use diagnoses: No substance use/Not applicable    ASSESSMENT:  Madeleine Sanchez presents with a Euthymic/ normal mood.     her affect is Normal range and intensity, which is congruent, with her mood and the content of the session. The client has made progress on their goals.    Is able to follow through on clinical as well as health suggestions and is diligent about taking care of herself to the best of her ability.  Likes to continue to maintain her regular level of activity but is  "also cognizant of the hazards with the current weather situation and is staying indoors.  Madeleine Sanchez presents with a none risk of suicide, none risk of self-harm, and none risk of harm to others.    For any risk assessment that surpasses a \"low\" rating, a safety plan must be developed.    A safety plan was indicated: yes  If yes, describe in detail not applicable    PLAN: Between sessions, Madeleine Sanchez will continue to focus on self-care and managing activity level. At the next session, the therapist will use Client-centered Therapy and Cognitive Behavioral Therapy to address mood regulation.    Behavioral Health Treatment Plan and Discharge Planning: Madeleine Sanchez is aware of and agrees to continue to work on their treatment plan. They have identified and are working toward their discharge goals. yes    Depression Follow-up Plan Completed: Not applicable    Visit start and stop times:    06/24/25  Start Time: 1406  Stop Time: 1455  Total Visit Time: 49 minutes  "

## 2025-07-01 ENCOUNTER — SOCIAL WORK (OUTPATIENT)
Dept: BEHAVIORAL/MENTAL HEALTH CLINIC | Facility: CLINIC | Age: 80
End: 2025-07-01
Payer: MEDICARE

## 2025-07-01 DIAGNOSIS — F33.1 MAJOR DEPRESSIVE DISORDER, RECURRENT, MODERATE (HCC): Primary | ICD-10-CM

## 2025-07-01 DIAGNOSIS — F41.1 GAD (GENERALIZED ANXIETY DISORDER): ICD-10-CM

## 2025-07-01 PROCEDURE — 90834 PSYTX W PT 45 MINUTES: CPT | Performed by: COUNSELOR

## 2025-07-01 NOTE — PSYCH
"Behavioral Health Psychotherapy Progress Note    Psychotherapy Provided: Individual Psychotherapy     1. Major depressive disorder, recurrent, moderate (HCC)        2. BRINDA (generalized anxiety disorder)            Goals addressed in session: Goal 1     DATA: Madeleine was in person for session. Discussed week's events and noted that she is still having difficulty sleeping and experiencing gastrointestinal distress.  Discussed things that went well and visits and places that she went but also continues to be concerned about family issues.  Her daughter who lives with her made a statement that she would need Madeleine to continue to \"front her as she is not getting pain due to her injury.  Madeleine becomes very stressed and sad because she wants to advocate for her daughter and the horrible situation she is in as a result of her failed marriage but is also feeling overwhelmed with the fact that she is being all the bills for 3 adults.  Discussed options for her to get support in being more clear with her daughter about deadlines and time.  Became tearful and expressed concern about her own abilities.  Reflected that she seems to be in a better mood when she is active and involved in tasks or socializing but appears to become upset when she feels burdened by her children's stresses.  We will continue to discuss options for becoming more assertive and managing her own activities.  During this session, this clinician used the following therapeutic modalities: Client-centered Therapy and Cognitive Behavioral Therapy    Substance Abuse was not addressed during this session. If the client is diagnosed with a co-occurring substance use disorder, please indicate any changes in the frequency or amount of use: not applicable. Stage of change for addressing substance use diagnoses: No substance use/Not applicable    ASSESSMENT:  Madeleine Sanchez presents with a Euthymic/ normal and Dysthymic mood.     her affect is Normal range and " "intensity and Tearful, which is congruent, with her mood and the content of the session. The client has made progress on their goals.    Continues to feel sad and has difficulty expressing the \"why\" and loses track of the conversation when she is asked to reflect on her emotions or what may have triggered her feeling upset.  Becomes easily involved in details and past history of events that led to the present circumstances of her daughters marriage which appears to be the greatest stressor in her life.  Also has a strong sense of the way things she would like to be rather than acceptance of the way they are, although she does have cognitive acceptance but emotionally lapses.  Madeleine Sanchez presents with a none risk of suicide, none risk of self-harm, and none risk of harm to others.    For any risk assessment that surpasses a \"low\" rating, a safety plan must be developed.    A safety plan was indicated: no  If yes, describe in detail not applicable    PLAN: Between sessions, Madeleine Sanchez will continue to identify ways to focus on her own needs and activities and allow her children to manage their own concerns.. At the next session, the therapist will use Client-centered Therapy and Cognitive Behavioral Therapy to address mood regulation.    Behavioral Health Treatment Plan and Discharge Planning: Madeleine Sanchez is aware of and agrees to continue to work on their treatment plan. They have identified and are working toward their discharge goals. yes    Depression Follow-up Plan Completed: Not applicable    Visit start and stop times:    07/01/25  Start Time: 1400  Stop Time: 1445  Total Visit Time: 45 minutes  "

## 2025-07-07 ENCOUNTER — APPOINTMENT (OUTPATIENT)
Dept: RADIOLOGY | Facility: HOSPITAL | Age: 80
End: 2025-07-07
Payer: MEDICARE

## 2025-07-07 ENCOUNTER — HOSPITAL ENCOUNTER (OUTPATIENT)
Dept: RADIOLOGY | Facility: HOSPITAL | Age: 80
Discharge: HOME | End: 2025-07-07
Attending: INTERNAL MEDICINE
Payer: MEDICARE

## 2025-07-07 DIAGNOSIS — E03.9 HYPOTHYROIDISM, ADULT: ICD-10-CM

## 2025-07-07 DIAGNOSIS — R13.19 ESOPHAGEAL DYSPHAGIA: ICD-10-CM

## 2025-07-07 PROCEDURE — 74220 X-RAY XM ESOPHAGUS 1CNTRST: CPT

## 2025-07-08 ENCOUNTER — TELEPHONE (OUTPATIENT)
Age: 80
End: 2025-07-08

## 2025-07-08 DIAGNOSIS — F41.1 GAD (GENERALIZED ANXIETY DISORDER): ICD-10-CM

## 2025-07-08 DIAGNOSIS — F32.1 CURRENT MODERATE EPISODE OF MAJOR DEPRESSIVE DISORDER WITHOUT PRIOR EPISODE (HCC): ICD-10-CM

## 2025-07-08 RX ORDER — LEVOTHYROXINE SODIUM 100 UG/1
100 TABLET ORAL DAILY
Qty: 90 TABLET | Refills: 0 | Status: SHIPPED | OUTPATIENT
Start: 2025-07-08

## 2025-07-08 NOTE — TELEPHONE ENCOUNTER
Patient is calling regarding cancelling an appointment.    Date/Time: 7/8/25 [facility]    Reason: patient not feeling well    Patient was rescheduled: YES [x] NO []  If yes, when was Patient reschedule for: 7/15/25    Patient requesting call back to reschedule: YES [] NO [x]

## 2025-07-09 RX ORDER — DULOXETIN HYDROCHLORIDE 60 MG/1
60 CAPSULE, DELAYED RELEASE ORAL DAILY
Qty: 30 CAPSULE | Refills: 1 | Status: SHIPPED | OUTPATIENT
Start: 2025-07-09

## 2025-07-09 RX ORDER — DULOXETIN HYDROCHLORIDE 30 MG/1
CAPSULE, DELAYED RELEASE ORAL
Qty: 90 CAPSULE | Refills: 0 | OUTPATIENT
Start: 2025-07-09

## 2025-07-15 ENCOUNTER — SOCIAL WORK (OUTPATIENT)
Dept: BEHAVIORAL/MENTAL HEALTH CLINIC | Facility: CLINIC | Age: 80
End: 2025-07-15
Payer: MEDICARE

## 2025-07-15 DIAGNOSIS — F41.1 GAD (GENERALIZED ANXIETY DISORDER): ICD-10-CM

## 2025-07-15 DIAGNOSIS — F33.1 MAJOR DEPRESSIVE DISORDER, RECURRENT, MODERATE (HCC): Primary | ICD-10-CM

## 2025-07-15 PROCEDURE — 90834 PSYTX W PT 45 MINUTES: CPT | Performed by: COUNSELOR

## 2025-07-15 NOTE — PSYCH
Behavioral Health Psychotherapy Progress Note    Psychotherapy Provided: Individual Psychotherapy     1. Major depressive disorder, recurrent, moderate (HCC)        2. BRINDA (generalized anxiety disorder)            Goals addressed in session: Goal 1     DATA: Madeleine was in person for session.  Noted that she continues to feel ill and is not sure what is wrong with her continuing to have gastrointestinal distress and also feeling frustrated with situations at home.  Continue to process her concern about her daughter, daughter's finances, and frustration that she needs to continue to maintain finances for her daughter as well as emotionally being concerned for other children's wellbeing.  Feels frustrated because she does not know how to stop caring for daughter but is also frustrated with having to continue to do so.  Noted that if she involved her eldest daughter in the process that it might be helpful as that daughter can advocate for her and stand up to the younger sister.  During this session, this clinician used the following therapeutic modalities: Client-centered Therapy and Cognitive Behavioral Therapy    Substance Abuse was not addressed during this session. If the client is diagnosed with a co-occurring substance use disorder, please indicate any changes in the frequency or amount of use: not applicable. Stage of change for addressing substance use diagnoses: No substance use/Not applicable    ASSESSMENT:  Madeleine Sanchez presents with a Euthymic/ normal mood.     her affect is Normal range and intensity, which is congruent, with her mood and the content of the session. The client has made progress on their goals.    Has mixed emotions and conflicted about being needed and taking care of family members versus having some autonomy and getting help from them when she needs it.  At times feels that they are not supportive of her and doing their own life's first and ignoring when she may need help or support  "with small things.  Madeleine Sanchez presents with a none risk of suicide, none risk of self-harm, and none risk of harm to others.    For any risk assessment that surpasses a \"low\" rating, a safety plan must be developed.    A safety plan was indicated: no  If yes, describe in detail not    PLAN: Between sessions, Madeleine Sanchez will continue to determine what steps she would like to take regarding health and supporting other family members. At the next session, the therapist will use Client-centered Therapy and Cognitive Behavioral Therapy to address mood regulation.    Behavioral Health Treatment Plan and Discharge Planning: Madeleine Sanchez is aware of and agrees to continue to work on their treatment plan. They have identified and are working toward their discharge goals. yes    Depression Follow-up Plan Completed: Not applicable    Visit start and stop times:    07/15/25  Start Time: 1405  Stop Time: 1450  Total Visit Time: 45 minutes  "

## 2025-07-17 ENCOUNTER — RESULTS FOLLOW-UP (OUTPATIENT)
Dept: GASTROENTEROLOGY | Facility: CLINIC | Age: 80
End: 2025-07-17

## 2025-07-17 ENCOUNTER — OFFICE VISIT (OUTPATIENT)
Dept: FAMILY MEDICINE CLINIC | Facility: CLINIC | Age: 80
End: 2025-07-17
Payer: MEDICARE

## 2025-07-17 VITALS
TEMPERATURE: 98.1 F | OXYGEN SATURATION: 95 % | DIASTOLIC BLOOD PRESSURE: 80 MMHG | HEART RATE: 80 BPM | HEIGHT: 63 IN | WEIGHT: 166 LBS | BODY MASS INDEX: 29.41 KG/M2 | SYSTOLIC BLOOD PRESSURE: 120 MMHG

## 2025-07-17 DIAGNOSIS — M48.061 SPINAL STENOSIS AT L4-L5 LEVEL: ICD-10-CM

## 2025-07-17 DIAGNOSIS — K21.00 GASTROESOPHAGEAL REFLUX DISEASE WITH ESOPHAGITIS WITHOUT HEMORRHAGE: ICD-10-CM

## 2025-07-17 DIAGNOSIS — K22.9 ESOPHAGUS DISORDER: ICD-10-CM

## 2025-07-17 DIAGNOSIS — K21.00 GASTROESOPHAGEAL REFLUX DISEASE WITH ESOPHAGITIS WITHOUT HEMORRHAGE: Primary | ICD-10-CM

## 2025-07-17 PROCEDURE — G2211 COMPLEX E/M VISIT ADD ON: HCPCS | Performed by: FAMILY MEDICINE

## 2025-07-17 PROCEDURE — 99214 OFFICE O/P EST MOD 30 MIN: CPT | Performed by: FAMILY MEDICINE

## 2025-07-17 RX ORDER — TRAMADOL HYDROCHLORIDE 50 MG/1
50 TABLET ORAL DAILY
Qty: 90 TABLET | Refills: 1 | Status: SHIPPED | OUTPATIENT
Start: 2025-07-17

## 2025-07-17 NOTE — PROGRESS NOTES
"Name: Madeleine Sanchez      : 1945      MRN: [id number]  Encounter Provider: JOE Yates DO  Encounter Date: 2025   Encounter department: [facility]  :  Assessment & Plan  Gastroesophageal reflux disease with esophagitis without hemorrhage  We have reviewed the entire barium esophagogram of 2025.  The impression is as follows   IMPRESSION:     Esophageal distensibility: Normal distensibility.     Esophageal motility: Corkscrew (rosary bead) tertiary contractions of the distal esophagus with tortuosity at the GE junction.     Esophageal stasis (liquid bolus): Grade II stasis.     Esophageal stasis (solid pill swallow): The tablet did pass into the stomach within 5 minutes. Approximate time to passage was immediate.     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 GE junction is above the diaphragmatic hiatus by 4 cm.     Gastric/duodenal findings: Normal gastric motility and emptying. No gross gastric or duodenal mucosal abnormalities although evaluation limited with single contrast technique.    Patient will need to follow-up with Dr. Mckeon and she will see him in the next \"couple weeks\".  This will be valuable because I do not have anything further to offer her other than chewing her food well and drinking right amount of fluids at the right time.It takes an hour to eat.        Spinal stenosis at L4-L5 level    Orders:  •  traMADol (Ultram) 50 mg tablet; Take 1 tablet (50 mg total) by mouth in the morning.  •  Ambulatory referral to Physical Therapy; Future    Esophagus disorder  Barium swallow of 2025 is as follows  IMPRESSION:     Esophageal distensibility: Normal distensibility.     Esophageal motility: Corkscrew (rosary bead) tertiary contractions of the distal esophagus with tortuosity at the GE junction.     Esophageal stasis (liquid bolus): Grade II stasis.     Esophageal stasis (solid pill swallow): The tablet did pass into the stomach " "within 5 minutes. Approximate time to passage was immediate.     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 GE junction is above the diaphragmatic hiatus by 4 cm.     Gastric/duodenal findings: Normal gastric motility and emptying. No gross gastric or duodenal mucosal abnormalities although evaluation limited with single contrast technique.         Assessment & Plan           History of Present Illness   Here today to discuss barium swallow results which she has had difficulty with presbyesophagus per years.  Both Dr. Christensen and Dr. Llamas mention that the esophagus was slow and tortuous      F/u and eval HTN: Blood pressure controlled at 120/80 and will continue same medical Rx with low-salt diet and torsemide 10 mg daily.  No other prescription medications for blood pressure noted    F/u and eval NIDDM: Discussed diet and exercise as best possible.  She is limited by physical skeletal arthritis spinal issues etc.  Relevant labs:  Hemoglobin A1C/EST AVG Glucose Lab Results       Component                Value               Date                       HGBA1C                   6.2 (H)             12/28/2023                 EAG                      131                 12/28/2023            Fasting glucose Lab Results       Component                Value               Date                       GLUF                     151 (H)             01/15/2025              F/u and eval HLD: Not taking statin despite cholesterol 266 triglycerides 161.  Would be interesting to note her LP(a)--she states she cannot take statins dating back to 2021 and her allergy list of 12 different medications that she cannot take stating that statins cause \"hives\"!  Relevant labs:  Lipid Profile: Lab Results       Component                Value               Date                       CHOLESTEROL              266 (H)             05/17/2022                 HDL                      53                  05/17/2022               "   TRIG                     161 (H)             05/17/2022                 LDLCALC                  181 (H)             05/17/2022                 NONHDLC                  213                 05/17/2022              F/u and eval deconditioning: Limited by her arthritic pain capabilities    F/u weights: Weight fluctuates and with BMI 29.4 would be good to lose weight  Previous weights:  Wt Readings from Last 3 Encounters:  07/17/25 : 75.3 kg (166 lb)  06/18/25 : 76.2 kg (168 lb)  06/05/25 : 81.2 kg (179 lb)      F/u and eval polypharmacy: A significant element exist as she is on many medications but no statins no ACEs or ARB's and no antidiabetic medication.            Review of Systems   Constitutional:  Negative for activity change, appetite change, chills, diaphoresis, fatigue and fever.   HENT:  Negative for congestion, ear discharge, ear pain, facial swelling, nosebleeds, sore throat, tinnitus, trouble swallowing and voice change.    Eyes:  Negative for photophobia, pain, discharge, redness, itching and visual disturbance.   Respiratory:  Negative for apnea, cough, choking, chest tightness and shortness of breath.    Cardiovascular:  Negative for chest pain, palpitations and leg swelling.   Gastrointestinal:  Negative for abdominal distention, abdominal pain, blood in stool, constipation, diarrhea, nausea and vomiting.   Endocrine: Negative for cold intolerance, heat intolerance, polydipsia, polyphagia and polyuria.   Genitourinary:  Negative for decreased urine volume, difficulty urinating, dysuria, enuresis, frequency, hematuria, pelvic pain, urgency and vaginal bleeding.   Musculoskeletal:  Positive for arthralgias, back pain, gait problem, neck pain and neck stiffness. Negative for joint swelling.   Skin:  Negative for color change, pallor and rash.   Allergic/Immunologic: Negative for immunocompromised state.   Neurological:  Negative for dizziness, seizures, facial asymmetry, light-headedness, numbness and  "headaches.   Hematological:  Negative for adenopathy.   Psychiatric/Behavioral:  Negative for agitation, behavioral problems, confusion, decreased concentration, dysphoric mood and hallucinations.        Objective   /80 (BP Location: Left arm, Patient Position: Sitting, Cuff Size: Standard)   Pulse 80   Temp 98.1 °F (36.7 °C) (Temporal)   Ht 5' 3\" (1.6 m)   Wt 75.3 kg (166 lb)   SpO2 95%   BMI 29.41 kg/m²      Physical Exam  Vitals and nursing note reviewed.   Constitutional:       General: She is not in acute distress.     Appearance: Normal appearance. She is not ill-appearing, toxic-appearing or diaphoretic.   HENT:      Head: Normocephalic and atraumatic.      Nose: Nose normal. No congestion or rhinorrhea.      Mouth/Throat:      Mouth: Mucous membranes are moist.     Eyes:      General: No scleral icterus.     Extraocular Movements: Extraocular movements intact.      Pupils: Pupils are equal, round, and reactive to light.       Cardiovascular:      Rate and Rhythm: Normal rate and regular rhythm.   Pulmonary:      Effort: Pulmonary effort is normal.      Breath sounds: Normal breath sounds.   Abdominal:      General: Abdomen is flat.     Musculoskeletal:         General: No swelling or tenderness.      Right lower leg: No edema.      Left lower leg: No edema.      Comments: \"Hurts all over\"!     Skin:     General: Skin is warm and dry.     Neurological:      General: No focal deficit present.      Mental Status: She is alert and oriented to person, place, and time. Mental status is at baseline.     Psychiatric:         Mood and Affect: Mood normal.         Behavior: Behavior normal.         Thought Content: Thought content normal.         Judgment: Judgment normal.         "

## 2025-07-17 NOTE — RESULT ENCOUNTER NOTE
Please notify the patient regarding results.  Esophageal motility study suggests some irregular muscle contractions in the lower esophagus and slow passage of liquid.  She also has a significant hiatal hernia which may be leading to regurgitation.  If she is still experiencing significant dysphagia, could consider a trial of medication such as diltiazem 30 mg 3 times daily

## 2025-07-17 NOTE — ASSESSMENT & PLAN NOTE
"We have reviewed the entire barium esophagogram of 7/7/2025.  The impression is as follows   IMPRESSION:     Esophageal distensibility: Normal distensibility.     Esophageal motility: Corkscrew (rosary bead) tertiary contractions of the distal esophagus with tortuosity at the GE junction.     Esophageal stasis (liquid bolus): Grade II stasis.     Esophageal stasis (solid pill swallow): The tablet did pass into the stomach within 5 minutes. Approximate time to passage was immediate.     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 GE junction is above the diaphragmatic hiatus by 4 cm.     Gastric/duodenal findings: Normal gastric motility and emptying. No gross gastric or duodenal mucosal abnormalities although evaluation limited with single contrast technique.    Patient will need to follow-up with Dr. Mckeon and she will see him in the next \"couple weeks\".  This will be valuable because I do not have anything further to offer her other than chewing her food well and drinking right amount of fluids at the right time.It takes an hour to eat.      "

## 2025-07-17 NOTE — TELEPHONE ENCOUNTER
----- Message from Viral Mckeon MD sent at 7/17/2025  2:55 PM EDT -----  Please notify the patient regarding results.  Esophageal motility study suggests some irregular muscle contractions in the lower esophagus and slow passage of liquid.  She also has a significant   hiatal hernia which may be leading to regurgitation.  If she is still experiencing significant dysphagia, could consider a trial of medication such as diltiazem 30 mg 3 times daily  ----- Message -----  From: Interface, Radiology Results In  Sent: 7/7/2025   9:49 AM EDT  To: Viral Mckeon MD

## 2025-07-18 NOTE — TELEPHONE ENCOUNTER
Pt calling back in, I reviewed results with pt. She understood and is still having dysphagia, she would like to try to diltiazem please send to [facility]

## 2025-07-22 DIAGNOSIS — R13.19 ESOPHAGEAL DYSPHAGIA: Primary | ICD-10-CM

## 2025-07-22 RX ORDER — PANTOPRAZOLE SODIUM 40 MG/1
40 TABLET, DELAYED RELEASE ORAL
Qty: 30 TABLET | Refills: 5 | Status: SHIPPED | OUTPATIENT
Start: 2025-07-22 | End: 2026-01-18

## 2025-07-22 RX ORDER — DILTIAZEM HYDROCHLORIDE 30 MG/1
30 TABLET, FILM COATED ORAL 3 TIMES DAILY
Qty: 90 TABLET | Refills: 5 | Status: SHIPPED | OUTPATIENT
Start: 2025-07-22

## 2025-07-28 ENCOUNTER — OFFICE VISIT (OUTPATIENT)
Dept: PSYCHIATRY | Facility: CLINIC | Age: 80
End: 2025-07-28
Payer: MEDICARE

## 2025-07-28 DIAGNOSIS — F32.1 CURRENT MODERATE EPISODE OF MAJOR DEPRESSIVE DISORDER WITHOUT PRIOR EPISODE (HCC): Primary | ICD-10-CM

## 2025-07-28 DIAGNOSIS — F41.1 GAD (GENERALIZED ANXIETY DISORDER): ICD-10-CM

## 2025-07-28 PROCEDURE — 99214 OFFICE O/P EST MOD 30 MIN: CPT | Performed by: PHYSICIAN ASSISTANT

## 2025-07-28 PROCEDURE — G2211 COMPLEX E/M VISIT ADD ON: HCPCS | Performed by: PHYSICIAN ASSISTANT

## 2025-07-28 RX ORDER — BUPROPION HYDROCHLORIDE 75 MG/1
75 TABLET ORAL EVERY MORNING
Qty: 30 TABLET | Refills: 1 | Status: SHIPPED | OUTPATIENT
Start: 2025-07-28 | End: 2025-09-26

## 2025-07-28 RX ORDER — BUSPIRONE HYDROCHLORIDE 15 MG/1
15 TABLET ORAL 2 TIMES DAILY
Qty: 60 TABLET | Refills: 1 | Status: SHIPPED | OUTPATIENT
Start: 2025-07-28

## 2025-07-28 RX ORDER — DULOXETIN HYDROCHLORIDE 60 MG/1
60 CAPSULE, DELAYED RELEASE ORAL DAILY
Qty: 30 CAPSULE | Refills: 1 | Status: SHIPPED | OUTPATIENT
Start: 2025-07-28

## 2025-07-29 ENCOUNTER — SOCIAL WORK (OUTPATIENT)
Dept: BEHAVIORAL/MENTAL HEALTH CLINIC | Facility: CLINIC | Age: 80
End: 2025-07-29
Payer: MEDICARE

## 2025-07-29 DIAGNOSIS — F32.1 CURRENT MODERATE EPISODE OF MAJOR DEPRESSIVE DISORDER WITHOUT PRIOR EPISODE (HCC): Primary | ICD-10-CM

## 2025-07-29 DIAGNOSIS — F41.1 GAD (GENERALIZED ANXIETY DISORDER): ICD-10-CM

## 2025-07-29 PROCEDURE — 90834 PSYTX W PT 45 MINUTES: CPT | Performed by: COUNSELOR

## 2025-08-05 ENCOUNTER — SOCIAL WORK (OUTPATIENT)
Dept: BEHAVIORAL/MENTAL HEALTH CLINIC | Facility: CLINIC | Age: 80
End: 2025-08-05
Payer: MEDICARE

## 2025-08-05 DIAGNOSIS — M79.89 SWELLING OF LIMB: ICD-10-CM

## 2025-08-05 DIAGNOSIS — F41.1 GAD (GENERALIZED ANXIETY DISORDER): ICD-10-CM

## 2025-08-05 DIAGNOSIS — F33.1 MAJOR DEPRESSIVE DISORDER, RECURRENT, MODERATE (HCC): Primary | ICD-10-CM

## 2025-08-05 PROCEDURE — 90834 PSYTX W PT 45 MINUTES: CPT | Performed by: COUNSELOR

## 2025-08-06 ENCOUNTER — EVALUATION (OUTPATIENT)
Dept: PHYSICAL THERAPY | Facility: CLINIC | Age: 80
End: 2025-08-06
Attending: FAMILY MEDICINE
Payer: MEDICARE

## 2025-08-06 DIAGNOSIS — M48.061 SPINAL STENOSIS AT L4-L5 LEVEL: ICD-10-CM

## 2025-08-06 DIAGNOSIS — M54.50 CHRONIC BILATERAL LOW BACK PAIN, UNSPECIFIED WHETHER SCIATICA PRESENT: Primary | ICD-10-CM

## 2025-08-06 DIAGNOSIS — G89.29 CHRONIC BILATERAL LOW BACK PAIN, UNSPECIFIED WHETHER SCIATICA PRESENT: Primary | ICD-10-CM

## 2025-08-06 PROCEDURE — 97110 THERAPEUTIC EXERCISES: CPT | Performed by: PHYSICAL THERAPIST

## 2025-08-06 PROCEDURE — 97161 PT EVAL LOW COMPLEX 20 MIN: CPT | Performed by: PHYSICAL THERAPIST

## 2025-08-06 PROCEDURE — 97112 NEUROMUSCULAR REEDUCATION: CPT | Performed by: PHYSICAL THERAPIST

## 2025-08-07 RX ORDER — TORSEMIDE 10 MG/1
10 TABLET ORAL DAILY
Qty: 90 TABLET | Refills: 0 | Status: SHIPPED | OUTPATIENT
Start: 2025-08-07

## 2025-08-08 DIAGNOSIS — G25.81 RLS (RESTLESS LEGS SYNDROME): ICD-10-CM

## 2025-08-08 DIAGNOSIS — K21.9 CHRONIC GERD: ICD-10-CM

## 2025-08-09 RX ORDER — ROPINIROLE 1 MG/1
TABLET, FILM COATED ORAL
Qty: 270 TABLET | Refills: 3 | Status: SHIPPED | OUTPATIENT
Start: 2025-08-09

## 2025-08-09 RX ORDER — FAMOTIDINE 40 MG/1
40 TABLET, FILM COATED ORAL DAILY
Qty: 90 TABLET | Refills: 3 | Status: SHIPPED | OUTPATIENT
Start: 2025-08-09

## 2025-08-13 ENCOUNTER — TELEPHONE (OUTPATIENT)
Dept: PSYCHIATRY | Facility: CLINIC | Age: 80
End: 2025-08-13

## 2025-08-13 ENCOUNTER — OFFICE VISIT (OUTPATIENT)
Dept: PHYSICAL THERAPY | Facility: CLINIC | Age: 80
End: 2025-08-13
Attending: FAMILY MEDICINE
Payer: MEDICARE

## 2025-08-14 ENCOUNTER — OFFICE VISIT (OUTPATIENT)
Dept: PHYSICAL THERAPY | Facility: CLINIC | Age: 80
End: 2025-08-14
Attending: FAMILY MEDICINE
Payer: MEDICARE

## 2025-08-20 ENCOUNTER — OFFICE VISIT (OUTPATIENT)
Dept: PHYSICAL THERAPY | Facility: CLINIC | Age: 80
End: 2025-08-20
Attending: FAMILY MEDICINE
Payer: MEDICARE

## 2025-08-20 DIAGNOSIS — M54.50 CHRONIC BILATERAL LOW BACK PAIN, UNSPECIFIED WHETHER SCIATICA PRESENT: ICD-10-CM

## 2025-08-20 DIAGNOSIS — M48.061 SPINAL STENOSIS AT L4-L5 LEVEL: Primary | ICD-10-CM

## 2025-08-20 DIAGNOSIS — G89.29 CHRONIC BILATERAL LOW BACK PAIN, UNSPECIFIED WHETHER SCIATICA PRESENT: ICD-10-CM

## 2025-08-20 PROCEDURE — 97112 NEUROMUSCULAR REEDUCATION: CPT | Performed by: PHYSICAL THERAPIST

## 2025-08-20 PROCEDURE — 97110 THERAPEUTIC EXERCISES: CPT | Performed by: PHYSICAL THERAPIST

## (undated) DEVICE — SCD SEQUENTIAL COMPRESSION COMFORT SLEEVE MEDIUM KNEE LENGTH: Brand: KENDALL SCD

## (undated) DEVICE — GLOVE SRG BIOGEL 6.5

## (undated) DEVICE — COBAN 6 IN STERILE

## (undated) DEVICE — ACE WRAP 4 IN UNSTERILE

## (undated) DEVICE — GLOVE SRG BIOGEL ORTHOPEDIC 8

## (undated) DEVICE — GLOVE PI ULTRA TOUCH SZ.6.5

## (undated) DEVICE — 10FR FRAZIER SUCTION HANDLE: Brand: CARDINAL HEALTH

## (undated) DEVICE — GLOVE INDICATOR PI UNDERGLOVE SZ 8 BLUE

## (undated) DEVICE — 2000CC GUARDIAN II: Brand: GUARDIAN

## (undated) DEVICE — WEBRIL 6 IN UNSTERILE

## (undated) DEVICE — 3M™ DURAPORE™ SURGICAL TAPE 1538-3, 3 INCH X 10 YARD (7,5CM X 9,1M), 4 ROLLS/BOX: Brand: 3M™ DURAPORE™

## (undated) DEVICE — GLOVE INDICATOR PI UNDERGLOVE SZ 7 BLUE

## (undated) DEVICE — GLOVE PI ULTRA TOUCH SZ.7.5

## (undated) DEVICE — PLUMEPEN PRO 10FT

## (undated) DEVICE — BETHLEHEM UNIVERSAL  MIONR EXT: Brand: CARDINAL HEALTH

## (undated) DEVICE — GLOVE INDICATOR PI UNDERGLOVE SZ 7.5 BLUE

## (undated) DEVICE — NEEDLE 18 G X 1 1/2

## (undated) DEVICE — GAUZE SPONGES,16 PLY: Brand: CURITY

## (undated) DEVICE — SYRINGE 10ML LL

## (undated) DEVICE — INTENDED FOR TISSUE SEPARATION, AND OTHER PROCEDURES THAT REQUIRE A SHARP SURGICAL BLADE TO PUNCTURE OR CUT.: Brand: BARD-PARKER ® CARBON RIB-BACK BLADES

## (undated) DEVICE — CHLORAPREP HI-LITE 26ML ORANGE

## (undated) DEVICE — PAD CAST 4 IN COTTON NON STERILE

## (undated) DEVICE — PREP PAD BNS: Brand: CONVERTORS

## (undated) DEVICE — OCCLUSIVE GAUZE STRIP,3% BISMUTH TRIBROMOPHENATE IN PETROLATUM BLEND: Brand: XEROFORM

## (undated) DEVICE — CUFF TOURNIQUET 34 X 4 IN QUICK CONNECT DISP 1BLA

## (undated) DEVICE — NEEDLE 25G X 1 1/2

## (undated) DEVICE — CAST PADDING 4 IN SYNTHETIC NON-STRL

## (undated) DEVICE — ACE WRAP 6 IN UNSTERILE